# Patient Record
Sex: FEMALE | Race: BLACK OR AFRICAN AMERICAN | Employment: OTHER | URBAN - METROPOLITAN AREA
[De-identification: names, ages, dates, MRNs, and addresses within clinical notes are randomized per-mention and may not be internally consistent; named-entity substitution may affect disease eponyms.]

---

## 2017-01-05 ENCOUNTER — HOSPITAL ENCOUNTER (EMERGENCY)
Age: 77
Discharge: HOME | End: 2017-01-05
Attending: EMERGENCY MEDICINE
Payer: MEDICARE

## 2017-01-05 ENCOUNTER — APPOINTMENT (OUTPATIENT)
Dept: GENERAL RADIOLOGY | Age: 77
End: 2017-01-05
Attending: EMERGENCY MEDICINE
Payer: MEDICARE

## 2017-01-05 VITALS
BODY MASS INDEX: 37.24 KG/M2 | RESPIRATION RATE: 16 BRPM | OXYGEN SATURATION: 98 % | SYSTOLIC BLOOD PRESSURE: 143 MMHG | DIASTOLIC BLOOD PRESSURE: 58 MMHG | HEIGHT: 62 IN | TEMPERATURE: 97.9 F | WEIGHT: 202.38 LBS | HEART RATE: 107 BPM

## 2017-01-05 DIAGNOSIS — J20.9 ACUTE BRONCHITIS, UNSPECIFIED ORGANISM: Primary | ICD-10-CM

## 2017-01-05 DIAGNOSIS — J98.01 ACUTE BRONCHOSPASM: ICD-10-CM

## 2017-01-05 LAB
ALBUMIN SERPL BCP-MCNC: 3.7 G/DL (ref 3.5–5)
ALBUMIN/GLOB SERPL: 0.8 {RATIO} (ref 1.1–2.2)
ALP SERPL-CCNC: 59 U/L (ref 45–117)
ALT SERPL-CCNC: 32 U/L (ref 12–78)
ANION GAP BLD CALC-SCNC: 9 MMOL/L (ref 5–15)
AST SERPL W P-5'-P-CCNC: 19 U/L (ref 15–37)
ATRIAL RATE: 77 BPM
BASOPHILS # BLD AUTO: 0 K/UL (ref 0–0.1)
BASOPHILS # BLD: 0 % (ref 0–1)
BILIRUB SERPL-MCNC: 0.2 MG/DL (ref 0.2–1)
BUN SERPL-MCNC: 12 MG/DL (ref 6–20)
BUN/CREAT SERPL: 16 (ref 12–20)
CALCIUM SERPL-MCNC: 9.3 MG/DL (ref 8.5–10.1)
CALCULATED P AXIS, ECG09: 71 DEGREES
CALCULATED R AXIS, ECG10: -37 DEGREES
CALCULATED T AXIS, ECG11: 7 DEGREES
CHLORIDE SERPL-SCNC: 102 MMOL/L (ref 97–108)
CK MB CFR SERPL CALC: 0.6 % (ref 0–2.5)
CK MB SERPL-MCNC: 1.2 NG/ML (ref 5–25)
CK SERPL-CCNC: 214 U/L (ref 26–192)
CO2 SERPL-SCNC: 31 MMOL/L (ref 21–32)
CREAT SERPL-MCNC: 0.73 MG/DL (ref 0.55–1.02)
DIAGNOSIS, 93000: NORMAL
EOSINOPHIL # BLD: 0.2 K/UL (ref 0–0.4)
EOSINOPHIL NFR BLD: 4 % (ref 0–7)
ERYTHROCYTE [DISTWIDTH] IN BLOOD BY AUTOMATED COUNT: 14.8 % (ref 11.5–14.5)
GLOBULIN SER CALC-MCNC: 4.4 G/DL (ref 2–4)
GLUCOSE SERPL-MCNC: 112 MG/DL (ref 65–100)
HCT VFR BLD AUTO: 36.6 % (ref 35–47)
HGB BLD-MCNC: 11.7 G/DL (ref 11.5–16)
LYMPHOCYTES # BLD AUTO: 40 % (ref 12–49)
LYMPHOCYTES # BLD: 2 K/UL (ref 0.8–3.5)
MCH RBC QN AUTO: 27.5 PG (ref 26–34)
MCHC RBC AUTO-ENTMCNC: 32 G/DL (ref 30–36.5)
MCV RBC AUTO: 85.9 FL (ref 80–99)
MONOCYTES # BLD: 0.3 K/UL (ref 0–1)
MONOCYTES NFR BLD AUTO: 5 % (ref 5–13)
NEUTS SEG # BLD: 2.5 K/UL (ref 1.8–8)
NEUTS SEG NFR BLD AUTO: 51 % (ref 32–75)
P-R INTERVAL, ECG05: 160 MS
PLATELET # BLD AUTO: 288 K/UL (ref 150–400)
POTASSIUM SERPL-SCNC: 4 MMOL/L (ref 3.5–5.1)
PROT SERPL-MCNC: 8.1 G/DL (ref 6.4–8.2)
Q-T INTERVAL, ECG07: 400 MS
QRS DURATION, ECG06: 88 MS
QTC CALCULATION (BEZET), ECG08: 452 MS
RBC # BLD AUTO: 4.26 M/UL (ref 3.8–5.2)
SODIUM SERPL-SCNC: 142 MMOL/L (ref 136–145)
TROPONIN I SERPL-MCNC: <0.04 NG/ML
VENTRICULAR RATE, ECG03: 77 BPM
WBC # BLD AUTO: 5 K/UL (ref 3.6–11)

## 2017-01-05 PROCEDURE — 74011250637 HC RX REV CODE- 250/637: Performed by: EMERGENCY MEDICINE

## 2017-01-05 PROCEDURE — 94640 AIRWAY INHALATION TREATMENT: CPT

## 2017-01-05 PROCEDURE — 82550 ASSAY OF CK (CPK): CPT | Performed by: EMERGENCY MEDICINE

## 2017-01-05 PROCEDURE — 74011636637 HC RX REV CODE- 636/637: Performed by: EMERGENCY MEDICINE

## 2017-01-05 PROCEDURE — 36415 COLL VENOUS BLD VENIPUNCTURE: CPT | Performed by: EMERGENCY MEDICINE

## 2017-01-05 PROCEDURE — 77030013140 HC MSK NEB VYRM -A

## 2017-01-05 PROCEDURE — A9270 NON-COVERED ITEM OR SERVICE: HCPCS | Performed by: EMERGENCY MEDICINE

## 2017-01-05 PROCEDURE — 74011000250 HC RX REV CODE- 250: Performed by: EMERGENCY MEDICINE

## 2017-01-05 PROCEDURE — 84484 ASSAY OF TROPONIN QUANT: CPT | Performed by: EMERGENCY MEDICINE

## 2017-01-05 PROCEDURE — 71020 XR CHEST PA LAT: CPT

## 2017-01-05 PROCEDURE — 80053 COMPREHEN METABOLIC PANEL: CPT | Performed by: EMERGENCY MEDICINE

## 2017-01-05 PROCEDURE — 99284 EMERGENCY DEPT VISIT MOD MDM: CPT

## 2017-01-05 PROCEDURE — 93005 ELECTROCARDIOGRAM TRACING: CPT

## 2017-01-05 PROCEDURE — 85025 COMPLETE CBC W/AUTO DIFF WBC: CPT | Performed by: EMERGENCY MEDICINE

## 2017-01-05 RX ORDER — BENZONATATE 100 MG/1
200 CAPSULE ORAL ONCE
Status: COMPLETED | OUTPATIENT
Start: 2017-01-05 | End: 2017-01-05

## 2017-01-05 RX ORDER — PREDNISONE 20 MG/1
60 TABLET ORAL
Status: COMPLETED | OUTPATIENT
Start: 2017-01-05 | End: 2017-01-05

## 2017-01-05 RX ORDER — IPRATROPIUM BROMIDE AND ALBUTEROL SULFATE 2.5; .5 MG/3ML; MG/3ML
3 SOLUTION RESPIRATORY (INHALATION)
Status: COMPLETED | OUTPATIENT
Start: 2017-01-05 | End: 2017-01-05

## 2017-01-05 RX ORDER — PREDNISONE 20 MG/1
60 TABLET ORAL DAILY
Qty: 12 TAB | Refills: 0 | Status: SHIPPED | OUTPATIENT
Start: 2017-01-05 | End: 2017-01-09

## 2017-01-05 RX ADMIN — IPRATROPIUM BROMIDE AND ALBUTEROL SULFATE 3 ML: .5; 3 SOLUTION RESPIRATORY (INHALATION) at 08:09

## 2017-01-05 RX ADMIN — IPRATROPIUM BROMIDE AND ALBUTEROL SULFATE 3 ML: .5; 3 SOLUTION RESPIRATORY (INHALATION) at 09:09

## 2017-01-05 RX ADMIN — BENZONATATE 200 MG: 100 CAPSULE ORAL at 08:08

## 2017-01-05 RX ADMIN — PREDNISONE 60 MG: 20 TABLET ORAL at 08:08

## 2017-01-05 RX ADMIN — IPRATROPIUM BROMIDE AND ALBUTEROL SULFATE 3 ML: .5; 3 SOLUTION RESPIRATORY (INHALATION) at 08:40

## 2017-01-05 NOTE — DISCHARGE INSTRUCTIONS
Bronchitis: Care Instructions  Your Care Instructions    Bronchitis is inflammation of the bronchial tubes, which carry air to the lungs. The tubes swell and produce mucus, or phlegm. The mucus and inflamed bronchial tubes make you cough. You may have trouble breathing. Most cases of bronchitis are caused by viruses like those that cause colds. Antibiotics usually do not help and they may be harmful. Bronchitis usually develops rapidly and lasts about 2 to 3 weeks in otherwise healthy people. Follow-up care is a key part of your treatment and safety. Be sure to make and go to all appointments, and call your doctor if you are having problems. It's also a good idea to know your test results and keep a list of the medicines you take. How can you care for yourself at home? · Take all medicines exactly as prescribed. Call your doctor if you think you are having a problem with your medicine. · Get some extra rest.  · Take an over-the-counter pain medicine, such as acetaminophen (Tylenol), ibuprofen (Advil, Motrin), or naproxen (Aleve) to reduce fever and relieve body aches. Read and follow all instructions on the label. · Do not take two or more pain medicines at the same time unless the doctor told you to. Many pain medicines have acetaminophen, which is Tylenol. Too much acetaminophen (Tylenol) can be harmful. · Take an over-the-counter cough medicine that contains dextromethorphan to help quiet a dry, hacking cough so that you can sleep. Avoid cough medicines that have more than one active ingredient. Read and follow all instructions on the label. · Breathe moist air from a humidifier, hot shower, or sink filled with hot water. The heat and moisture will thin mucus so you can cough it out. · Do not smoke. Smoking can make bronchitis worse. If you need help quitting, talk to your doctor about stop-smoking programs and medicines. These can increase your chances of quitting for good.   When should you call for help? Call 911 anytime you think you may need emergency care. For example, call if:  · You have severe trouble breathing. Call your doctor now or seek immediate medical care if:  · You have new or worse trouble breathing. · You cough up dark brown or bloody mucus (sputum). · You have a new or higher fever. · You have a new rash. Watch closely for changes in your health, and be sure to contact your doctor if:  · You cough more deeply or more often, especially if you notice more mucus or a change in the color of your mucus. · You are not getting better as expected. Where can you learn more? Go to http://radha-tracy.info/. Enter H333 in the search box to learn more about \"Bronchitis: Care Instructions. \"  Current as of: May 23, 2016  Content Version: 11.1  © 4863-0255 [employer]. Care instructions adapted under license by [employer] (which disclaims liability or warranty for this information). If you have questions about a medical condition or this instruction, always ask your healthcare professional. [address] any warranty or liability for your use of this information.

## 2017-01-05 NOTE — ED NOTES
Dr. Nestor Cantu reviewed discharge instructions with the patient. The patient verbalized understanding. All questions and concerns were addressed. The patient is discharged ambulatory in the care of family members with instructions and prescriptions in hand. Pt is alert and oriented x 4. Respirations are clear and unlabored.

## 2017-01-05 NOTE — ED PROVIDER NOTES
HPI Comments: Arpita Gilbert, 78 y.o. Female with PMHx of asthma, HTN, GERD, DM, PNA and DAVID  presents ambulatory to [address] [facility] ED with cc of wheezing and slightly productive cough x 1 week. She has been coughing a lot, which is causing \"scraping\" type chest tightness, occurring only with coughing, lasting no more than a few seconds. Also reports multiple episodes of diarrhea yesterday. Pt notes that her symptoms have made her feel very weak. SOB is exacerbated by lying flat or ambulating. No CP is present when she lies down. She used a nebulizer treatment at home without relief of SOB or wheezing. She has been hospitalized due to asthma and has received steroids for asthma in the past.  She has not had any recent hospital visits. She denies a hx of cardiac issues. She denies any fevers, chills, tremors, nausea, vomiting, recent travel, or leg swelling. PCP: Glory Merino MD  PSHx: hysterectomy, appendectomy, tonsillectomy    Social history significant for: - Tobacco, - EtOH, - Illicit Drug Use    There are no other complaints, changes, or physical findings at this time. Written by Lisbeth Foote ED Scribe, as dictated by [employer]. Roberto Ruby MD.    The history is provided by the patient. No  was used.         Past Medical History:   Diagnosis Date    Abdominal pain 4/7/2014    Abnormal finding on MRI of brain 3/16/2015     evaluated by neurologist and no findings    Acid reflux 4/5/2010    Acute bronchitis 11/30/2011    Acute pharyngitis 5/26/2016    Anemia NEC     Arthralgia of right hip 4/25/2016    Arthritis 2/18/2010    Asthma 2/18/2010    Cataract 9/24/2014    Dental disorder NEC     Diabetes ([facility]     Elevated LFTs 4/16/2014    DAVID (generalized anxiety disorder) 1/22/2015    GERD (gastroesophageal reflux disease)     GERD (gastroesophageal reflux disease) 4/7/2014    Hammer toe of right foot 9/29/2014    Headache(784.0) 6/00/8950    Helicobacter Pylori (H. Pylori) Infection 8/8/5376    Helicobacter pylori (H. 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 ([address] 9/29/2014    Preop examination 9/24/2014    Risk for falls 4/16/2014    Screening for breast cancer 9/23/2014    Screening for depression 4/16/2014    Shoulder pain, left 3/18/2014    Spondylitis, cervical 4/5/2010    Tinea pedis 6/3/2015    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 ([address] 9/29/2014    Type 2 diabetes mellitus with pressure callus ([address] 9/29/2014       Past Surgical History:   Procedure Laterality Date    Hx gyn  1983     total hysterectomy for uterine fibroid    Pr abdomen surgery proc unlisted       inguinal hernia    Pr abdomen surgery proc unlisted      Pr open repair clavicle fracture  2009    Hx appendectomy      Endoscopy, colon, diagnostic      Hx heent       tonsillectomy    Hx orthopaedic       clavicle repair    Hx other surgical       ?straightened colon out         Family History:   Problem Relation Age of Onset    Cancer Mother      mycosis fungoives    Stroke Father     Cancer Sister      one sister with breast cancer    Breast Cancer Sister     Cancer Paternal Aunt      2 paternal aunts with breast cancer    Thyroid Cancer Neg Hx        Social History     Social History    Marital status:      Spouse name: N/A    Number of children: N/A    Years of education: N/A     Occupational History    Not on file.      Social History Main Topics    Smoking status: Former Smoker     Quit date: 6/14/1988    Smokeless tobacco: Never Used    Alcohol use No    Drug use: No    Sexual activity: No     Other Topics Concern    Not on file Social History Narrative         ALLERGIES: Latex; Amoxil [amoxicillin]; Levaquin [levofloxacin]; Percocet [oxycodone-acetaminophen]; Tetanus vaccines and toxoid; and Tetracycline hcl    Review of Systems   Constitutional: Negative for chills and fever. HENT: Negative for congestion. Eyes: Negative for visual disturbance. Respiratory: Positive for cough, chest tightness, shortness of breath and wheezing. Cardiovascular: Negative for chest pain and leg swelling. Gastrointestinal: Positive for diarrhea. Negative for abdominal pain, nausea and vomiting. Endocrine: Negative for polyuria. Genitourinary: Negative for dysuria and frequency. Musculoskeletal: Negative for myalgias. Skin: Negative for color change. Allergic/Immunologic: Negative for immunocompromised state. Neurological: Negative for numbness. All other systems reviewed and are negative. Patient Vitals for the past 12 hrs:   Temp Pulse Resp BP SpO2   01/05/17 1051 - (!) 107 - - 98 %   01/05/17 1050 - - - 143/58 -   01/05/17 0830 - 81 16 138/88 100 %   01/05/17 0800 97.9 °F (36.6 °C) - - - -   01/05/17 0800 - 80 21 (!) 143/99 99 %   01/05/17 0733 - - - - 100 %   01/05/17 0650 98 °F (36.7 °C) 78 18 160/80 100 %            Physical Exam   Nursing note and vitals reviewed.   General appearance: non-toxic, NAD  Eyes: PERRL, EOMI, conjunctiva normal, anicteric sclera  HENT: mucous membranes moist, oropharynx is clear, dentures noted  Pulmonary: Lung sounds diminished, slightly prolonged expiratory phase, scattered expiratory wheezes  Cardiac: normal rate and regular rhythm, no murmurs, gallops, or rubs, 2+ peripheral pulses  Abdomen: soft, nontender, nondistended, bowel sounds present  MSK: no lower extremity edema, no calf/thigh TTP or swelling  Neuro: Alert, answers questions appropriately      MDM  Number of Diagnoses or Management Options  Acute bronchitis, unspecified organism:   Acute bronchospasm:   Diagnosis management comments: DDx: PNA, bronchitis, pleural effusion, pulmonary edema. Very low suspicion of ACS or PE (Wells score 0). Pt improved after nebs/steroids. Ambulates w/o symptoms. No respiratory distress. Suspect bronchitis, no signs organized PNA. No Abx indicated at this time. Amount and/or Complexity of Data Reviewed  Clinical lab tests: ordered and reviewed  Tests in the radiology section of CPT®: ordered and reviewed  Tests in the medicine section of CPT®: ordered and reviewed  Review and summarize past medical records: yes  Independent visualization of images, tracings, or specimens: yes    Patient Progress  Patient progress: stable    ED Course       Procedures    EKG interpretation: (Preliminary) 06:56  Rhythm: normal sinus rhythm; and regular . Rate (approx.): 77; Axis: left axis deviation; TX interval: 160; QRS interval: 88; ST/T wave: normal;  Other findings: unchanged from previous EKG  Written by Guillermo Warner ED Scribe, as dictated by Ariadne Orona. Veronica Urias MD.    Progress Note   8:35 AM  Pt is feeling improved after first nebulizer treatment. I will reevaluate pt after 3rd nebulizer treatment. Written by Guillermo Warner ED Scribe, as dictated by Ariadne Orona.  Veronica Urias MD.    LABORATORY TESTS:  Recent Results (from the past 12 hour(s))   EKG, 12 LEAD, INITIAL    Collection Time: 01/05/17  6:56 AM   Result Value Ref Range    Ventricular Rate 77 BPM    Atrial Rate 77 BPM    P-R Interval 160 ms    QRS Duration 88 ms    Q-T Interval 400 ms    QTC Calculation (Bezet) 452 ms    Calculated P Axis 71 degrees    Calculated R Axis -37 degrees    Calculated T Axis 7 degrees    Diagnosis       Normal sinus rhythm  Left axis deviation  Poor Anterior Forces    When compared with ECG of 22-NOV-2015 07:57,  No significant change was found  Confirmed by Jeremías Spain ([id number]) on 1/5/2017 8:32:51 AM     CBC WITH AUTOMATED DIFF    Collection Time: 01/05/17  7:42 AM   Result Value Ref Range    WBC 5.0 3.6 - 11.0 K/uL    RBC 4.26 3.80 - 5.20 M/uL    HGB 11.7 11.5 - 16.0 g/dL    HCT 36.6 35.0 - 47.0 %    MCV 85.9 80.0 - 99.0 FL    MCH 27.5 26.0 - 34.0 PG    MCHC 32.0 30.0 - 36.5 g/dL    RDW 14.8 (H) 11.5 - 14.5 %    PLATELET 145 569 - 045 K/uL    NEUTROPHILS 51 32 - 75 %    LYMPHOCYTES 40 12 - 49 %    MONOCYTES 5 5 - 13 %    EOSINOPHILS 4 0 - 7 %    BASOPHILS 0 0 - 1 %    ABS. NEUTROPHILS 2.5 1.8 - 8.0 K/UL    ABS. LYMPHOCYTES 2.0 0.8 - 3.5 K/UL    ABS. MONOCYTES 0.3 0.0 - 1.0 K/UL    ABS. EOSINOPHILS 0.2 0.0 - 0.4 K/UL    ABS. BASOPHILS 0.0 0.0 - 0.1 K/UL   CK W/ CKMB & INDEX    Collection Time: 01/05/17  7:42 AM   Result Value Ref Range     (H) 26 - 192 U/L    CK - MB 1.2 <3.6 NG/ML    CK-MB Index 0.6 0 - 2.5     TROPONIN I    Collection Time: 01/05/17  7:42 AM   Result Value Ref Range    Troponin-I, Qt. <0.04 <1.78 ng/mL   METABOLIC PANEL, COMPREHENSIVE    Collection Time: 01/05/17  7:42 AM   Result Value Ref Range    Sodium 142 136 - 145 mmol/L    Potassium 4.0 3.5 - 5.1 mmol/L    Chloride 102 97 - 108 mmol/L    CO2 31 21 - 32 mmol/L    Anion gap 9 5 - 15 mmol/L    Glucose 112 (H) 65 - 100 mg/dL    BUN 12 6 - 20 MG/DL    Creatinine 0.73 0.55 - 1.02 MG/DL    BUN/Creatinine ratio 16 12 - 20      GFR est AA >60 >60 ml/min/1.73m2    GFR est non-AA >60 >60 ml/min/1.73m2    Calcium 9.3 8.5 - 10.1 MG/DL    Bilirubin, total 0.2 0.2 - 1.0 MG/DL    ALT 32 12 - 78 U/L    AST 19 15 - 37 U/L    Alk. phosphatase 59 45 - 117 U/L    Protein, total 8.1 6.4 - 8.2 g/dL    Albumin 3.7 3.5 - 5.0 g/dL    Globulin 4.4 (H) 2.0 - 4.0 g/dL    A-G Ratio 0.8 (L) 1.1 - 2.2         IMAGING RESULTS:  XR CHEST PA LAT   Final Result   Indication: cough      Exam: PA and lateral views of the chest.     Direct comparison is made to prior CXR dated November 2015.     Findings: Cardiomediastinal silhouette is within normal limits. Lungs are clear  bilaterally.  Pleural spaces are normal. Osseous structures are intact.     IMPRESSION  IMPRESSION: No acute cardiopulmonary disease. MEDICATIONS GIVEN:  Medications   albuterol-ipratropium (DUO-NEB) 2.5 MG-0.5 MG/3 ML (3 mL Nebulization Given 1/5/17 0909)   predniSONE (DELTASONE) tablet 60 mg (60 mg Oral Given 1/5/17 0808)   benzonatate (TESSALON) capsule 200 mg (200 mg Oral Given 1/5/17 0808)       IMPRESSION:  1. Acute bronchitis, unspecified organism    2. Acute bronchospasm        PLAN:  1. Discharge Medication List as of 1/5/2017 11:00 AM      START taking these medications    Details   predniSONE (DELTASONE) 20 mg tablet Take 3 Tabs by mouth daily for 4 days. , Normal, Disp-12 Tab, R-0      codeine-guaifenesin  mg/5 mL liqd Take 5 mL by mouth every six (6) hours as needed. Max Daily Amount: 20 mL. Indications: COLD SYMPTOMS, COUGH, MAY CAUSE DROWSINESS; DO NOT DRINK,DRIVE, OR USE MACHINERY WHILE TAKING, Print, Disp-473 mL, R-0      inhalational spacing device (SPACE CHAMBER PLUS) 1 Each by Does Not Apply route as needed. Indications: USE WITH ALBUTEROL MDI, Normal, Disp-1 Device, R-0         CONTINUE these medications which have NOT CHANGED    Details   cetirizine (ZYRTEC) 10 mg tablet TAKE 1 TABLET BY MOUTH EVERY DAY, Normal, Disp-30 Tab, R-3      ESTRACE 0.01 % (0.1 mg/gram) vaginal cream INSERT 1 GRAM VAGINALLY TWICE WEEKLY AS DIRECTED, Historical Med, R-4, RICARDO      nystatin (MYCOSTATIN) topical cream APPLY TO AFFECTED AREA 3 TIMES A DAY, Historical Med, R-0      triamcinolone acetonide (KENALOG) 0.1 % topical cream APPLY TO AFFECTED AREA 3 TIMES A DAY, Historical Med, R-0      pantoprazole (PROTONIX) 40 mg tablet Take 1 Tab by mouth daily. , Normal, Disp-90 Tab, R-5      albuterol-ipratropium (DUO-NEB) 2.5 mg-0.5 mg/3 ml nebu 3 mL by Nebulization route every four (4) hours. ICD 10: J45.909, Normal, Disp-180 Nebule, R-3      aspirin delayed-release 81 mg tablet Take 1 Tab by mouth daily. , Normal, Disp-30 Tab, R-11      hydrochlorothiazide (HYDRODIURIL) 25 mg tablet Take 1 Tab by mouth daily. , Normal, Disp-90 Tab, R-1 albuterol (PROAIR HFA) 90 mcg/actuation inhaler Take 1 Puff by inhalation every six (6) hours as needed for Wheezing., Normal, Disp-1 Inhaler, R-11      metoprolol succinate (TOPROL-XL) 25 mg XL tablet TAKE 1 TABLET BY MOUTH EVERY DAY, Normal, Disp-90 Tab, R-2      rosuvastatin (CRESTOR) 10 mg tablet TAKE 1 TABLET BY MOUTH EVERY DAY, Normal, Disp-90 Tab, R-3      calcium-cholecalciferol, D3, (CALTRATE 600+D) tablet Take 1 Tab by mouth two (2) times a day., Print, Disp-60 Tab, R-11      butalbital-acetaminophen (PHRENILIN)  mg tablet Take 1 Tab by mouth every six (6) hours as needed for Pain., Normal, Disp-90 Tab, R-1      naproxen (NAPROSYN) 500 mg tablet Take 500 mg by mouth as needed., Historical Med, R-0      metFORMIN (GLUCOPHAGE) 500 mg tablet TAKE 1 & 1/2 TABLETS BY MOUTH TWICE A DAY WITH MEALS, Normal, Disp-90 Tab, R-11      !! glucose blood VI test strips (FREESTYLE INSULINX) strip TEST EVERY DAY AND AS NEEDED, Normal, Disp-100 Strip, R-6      fluticasone (FLONASE) 50 mcg/actuation nasal spray 2 Sprays by Both Nostrils route as needed for Rhinitis., Historical Med      MULTIVITAMIN PO Take 1 Tab by mouth daily. , Historical Med      Lancets (FREESTYLE LANCETS) Misc Test once daily. (diagnosis code: 250.00), Normal, Disp-1 Package, R-11      Blood-Glucose Meter monitoring kit Once daily before breakfast, Normal, Disp-1 Kit, R-0      !! glucose blood VI test strips (ACCU-CHEK DA) strip ., Normal, Disp-1 Package, R-11       !! - Potential duplicate medications found. Please discuss with provider.         2.   Follow-up Information     Follow up With Details Comments [address], [place] Schedule an appointment as soon as possible for a visit in 4 days  [address] [place] [address]  [phone]      [facility] EMERGENCY DEPT Go in 1 day If symptoms worsen [address] [place] [address]  [phone]        Return to ED if worse     Discharge Note:  11:36 AM  The pt is ready for discharge. The pt's signs, symptoms, diagnosis, and discharge instructions have been discussed and pt has conveyed their understanding. The pt is to follow up as recommended or return to ER should their symptoms worsen. Plan has been discussed and pt is in agreement. This note is prepared by Xu Adams, acting as a Scribe for [employer]. Daryl Carroll MD.    [employer]. Daryl Carroll MD: The scribe's documentation has been prepared under my direction and personally reviewed by me in its entirety. I confirm that the notes above accurately reflects all work, treatment, procedures, and medical decision making performed by me.

## 2017-01-05 NOTE — ED NOTES
Hourly rounds. Patient quiet in room. Pt is alert and oriented x 4.  . Monitor x 3 continues, VS reassessed. Safety measures continue. Patient reports that she is feeling better and is requesting to go home, Dr. Barrett Roger to be updated.

## 2017-01-11 ENCOUNTER — APPOINTMENT (OUTPATIENT)
Dept: MRI IMAGING | Age: 77
End: 2017-01-11
Attending: INTERNAL MEDICINE
Payer: MEDICARE

## 2017-01-11 ENCOUNTER — HOSPITAL ENCOUNTER (OUTPATIENT)
Age: 77
Setting detail: OBSERVATION
Discharge: HOME | End: 2017-01-12
Attending: EMERGENCY MEDICINE | Admitting: INTERNAL MEDICINE
Payer: MEDICARE

## 2017-01-11 ENCOUNTER — APPOINTMENT (OUTPATIENT)
Dept: CT IMAGING | Age: 77
End: 2017-01-11
Attending: EMERGENCY MEDICINE
Payer: MEDICARE

## 2017-01-11 ENCOUNTER — APPOINTMENT (OUTPATIENT)
Dept: GENERAL RADIOLOGY | Age: 77
End: 2017-01-11
Attending: EMERGENCY MEDICINE
Payer: MEDICARE

## 2017-01-11 DIAGNOSIS — E11.9 CONTROLLED TYPE 2 DIABETES MELLITUS WITHOUT COMPLICATION, UNSPECIFIED LONG TERM INSULIN USE STATUS: ICD-10-CM

## 2017-01-11 DIAGNOSIS — I10 ESSENTIAL HYPERTENSION: ICD-10-CM

## 2017-01-11 DIAGNOSIS — R20.0 LEFT FACIAL NUMBNESS: ICD-10-CM

## 2017-01-11 DIAGNOSIS — R51.9 HEADACHE, UNSPECIFIED HEADACHE TYPE: ICD-10-CM

## 2017-01-11 LAB
ALBUMIN SERPL BCP-MCNC: 3.6 G/DL (ref 3.5–5)
ALBUMIN/GLOB SERPL: 1 {RATIO} (ref 1.1–2.2)
ALP SERPL-CCNC: 54 U/L (ref 45–117)
ALT SERPL-CCNC: 34 U/L (ref 12–78)
ANION GAP BLD CALC-SCNC: 6 MMOL/L (ref 5–15)
APPEARANCE UR: CLEAR
AST SERPL W P-5'-P-CCNC: 19 U/L (ref 15–37)
BACTERIA URNS QL MICRO: NEGATIVE /HPF
BASOPHILS # BLD AUTO: 0 K/UL (ref 0–0.1)
BASOPHILS # BLD: 0 % (ref 0–1)
BILIRUB SERPL-MCNC: 0.2 MG/DL (ref 0.2–1)
BILIRUB UR QL: NEGATIVE
BUN SERPL-MCNC: 14 MG/DL (ref 6–20)
BUN/CREAT SERPL: 17 (ref 12–20)
CALCIUM SERPL-MCNC: 8.8 MG/DL (ref 8.5–10.1)
CHLORIDE SERPL-SCNC: 100 MMOL/L (ref 97–108)
CK SERPL-CCNC: 111 U/L (ref 26–192)
CO2 SERPL-SCNC: 36 MMOL/L (ref 21–32)
COLOR UR: ABNORMAL
CREAT SERPL-MCNC: 0.82 MG/DL (ref 0.55–1.02)
EOSINOPHIL # BLD: 0.1 K/UL (ref 0–0.4)
EOSINOPHIL NFR BLD: 1 % (ref 0–7)
EPITH CASTS URNS QL MICRO: ABNORMAL /LPF
ERYTHROCYTE [DISTWIDTH] IN BLOOD BY AUTOMATED COUNT: 14.9 % (ref 11.5–14.5)
GLOBULIN SER CALC-MCNC: 3.6 G/DL (ref 2–4)
GLUCOSE BLD STRIP.AUTO-MCNC: 117 MG/DL (ref 65–100)
GLUCOSE BLD STRIP.AUTO-MCNC: 128 MG/DL (ref 65–100)
GLUCOSE BLD STRIP.AUTO-MCNC: 129 MG/DL (ref 65–100)
GLUCOSE BLD STRIP.AUTO-MCNC: 92 MG/DL (ref 65–100)
GLUCOSE BLD STRIP.AUTO-MCNC: 92 MG/DL (ref 65–100)
GLUCOSE SERPL-MCNC: 96 MG/DL (ref 65–100)
GLUCOSE UR STRIP.AUTO-MCNC: NEGATIVE MG/DL
HCT VFR BLD AUTO: 36.9 % (ref 35–47)
HGB BLD-MCNC: 11.7 G/DL (ref 11.5–16)
HGB UR QL STRIP: NEGATIVE
HYALINE CASTS URNS QL MICRO: ABNORMAL /LPF (ref 0–5)
KETONES UR QL STRIP.AUTO: NEGATIVE MG/DL
LEUKOCYTE ESTERASE UR QL STRIP.AUTO: ABNORMAL
LYMPHOCYTES # BLD AUTO: 54 % (ref 12–49)
LYMPHOCYTES # BLD: 3.2 K/UL (ref 0.8–3.5)
MAGNESIUM SERPL-MCNC: 2.3 MG/DL (ref 1.6–2.4)
MCH RBC QN AUTO: 27.2 PG (ref 26–34)
MCHC RBC AUTO-ENTMCNC: 31.7 G/DL (ref 30–36.5)
MCV RBC AUTO: 85.8 FL (ref 80–99)
MONOCYTES # BLD: 0.3 K/UL (ref 0–1)
MONOCYTES NFR BLD AUTO: 5 % (ref 5–13)
NEUTS SEG # BLD: 2.4 K/UL (ref 1.8–8)
NEUTS SEG NFR BLD AUTO: 40 % (ref 32–75)
NITRITE UR QL STRIP.AUTO: NEGATIVE
PH UR STRIP: 7.5 [PH] (ref 5–8)
PLATELET # BLD AUTO: 310 K/UL (ref 150–400)
POTASSIUM SERPL-SCNC: 4 MMOL/L (ref 3.5–5.1)
PROT SERPL-MCNC: 7.2 G/DL (ref 6.4–8.2)
PROT UR STRIP-MCNC: NEGATIVE MG/DL
RBC # BLD AUTO: 4.3 M/UL (ref 3.8–5.2)
RBC #/AREA URNS HPF: ABNORMAL /HPF (ref 0–5)
SERVICE CMNT-IMP: ABNORMAL
SERVICE CMNT-IMP: NORMAL
SERVICE CMNT-IMP: NORMAL
SODIUM SERPL-SCNC: 142 MMOL/L (ref 136–145)
SP GR UR REFRACTOMETRY: 1.01 (ref 1–1.03)
TROPONIN I SERPL-MCNC: <0.04 NG/ML
TSH SERPL DL<=0.05 MIU/L-ACNC: 4.53 UIU/ML (ref 0.36–3.74)
UA: UC IF INDICATED,UAUC: ABNORMAL
UROBILINOGEN UR QL STRIP.AUTO: 0.2 EU/DL (ref 0.2–1)
WBC # BLD AUTO: 6 K/UL (ref 3.6–11)
WBC URNS QL MICRO: ABNORMAL /HPF (ref 0–4)

## 2017-01-11 PROCEDURE — 94640 AIRWAY INHALATION TREATMENT: CPT

## 2017-01-11 PROCEDURE — 82550 ASSAY OF CK (CPK): CPT | Performed by: EMERGENCY MEDICINE

## 2017-01-11 PROCEDURE — 77030029684 HC NEB SM VOL KT MONA -A

## 2017-01-11 PROCEDURE — 85025 COMPLETE CBC W/AUTO DIFF WBC: CPT | Performed by: EMERGENCY MEDICINE

## 2017-01-11 PROCEDURE — 70551 MRI BRAIN STEM W/O DYE: CPT

## 2017-01-11 PROCEDURE — 99284 EMERGENCY DEPT VISIT MOD MDM: CPT

## 2017-01-11 PROCEDURE — 70450 CT HEAD/BRAIN W/O DYE: CPT

## 2017-01-11 PROCEDURE — 99218 HC RM OBSERVATION: CPT

## 2017-01-11 PROCEDURE — 84443 ASSAY THYROID STIM HORMONE: CPT | Performed by: EMERGENCY MEDICINE

## 2017-01-11 PROCEDURE — 80053 COMPREHEN METABOLIC PANEL: CPT | Performed by: EMERGENCY MEDICINE

## 2017-01-11 PROCEDURE — 70544 MR ANGIOGRAPHY HEAD W/O DYE: CPT

## 2017-01-11 PROCEDURE — 96372 THER/PROPH/DIAG INJ SC/IM: CPT

## 2017-01-11 PROCEDURE — 82962 GLUCOSE BLOOD TEST: CPT

## 2017-01-11 PROCEDURE — 81001 URINALYSIS AUTO W/SCOPE: CPT | Performed by: EMERGENCY MEDICINE

## 2017-01-11 PROCEDURE — 36415 COLL VENOUS BLD VENIPUNCTURE: CPT | Performed by: EMERGENCY MEDICINE

## 2017-01-11 PROCEDURE — 74011250636 HC RX REV CODE- 250/636: Performed by: EMERGENCY MEDICINE

## 2017-01-11 PROCEDURE — 74011000250 HC RX REV CODE- 250: Performed by: INTERNAL MEDICINE

## 2017-01-11 PROCEDURE — 74011250637 HC RX REV CODE- 250/637: Performed by: INTERNAL MEDICINE

## 2017-01-11 PROCEDURE — 74020 XR ABD FLAT/ ERECT: CPT

## 2017-01-11 PROCEDURE — 96374 THER/PROPH/DIAG INJ IV PUSH: CPT

## 2017-01-11 PROCEDURE — 74011250636 HC RX REV CODE- 250/636: Performed by: INTERNAL MEDICINE

## 2017-01-11 PROCEDURE — 83735 ASSAY OF MAGNESIUM: CPT | Performed by: EMERGENCY MEDICINE

## 2017-01-11 PROCEDURE — 84484 ASSAY OF TROPONIN QUANT: CPT | Performed by: EMERGENCY MEDICINE

## 2017-01-11 PROCEDURE — 70547 MR ANGIOGRAPHY NECK W/O DYE: CPT

## 2017-01-11 RX ORDER — IPRATROPIUM BROMIDE AND ALBUTEROL SULFATE 2.5; .5 MG/3ML; MG/3ML
3 SOLUTION RESPIRATORY (INHALATION) EVERY 12 HOURS
Status: DISCONTINUED | OUTPATIENT
Start: 2017-01-11 | End: 2017-01-12 | Stop reason: HOSPADM

## 2017-01-11 RX ORDER — ASPIRIN 81 MG/1
81 TABLET ORAL DAILY
Status: DISCONTINUED | OUTPATIENT
Start: 2017-01-11 | End: 2017-01-11

## 2017-01-11 RX ORDER — IPRATROPIUM BROMIDE AND ALBUTEROL SULFATE 2.5; .5 MG/3ML; MG/3ML
3 SOLUTION RESPIRATORY (INHALATION)
Status: DISCONTINUED | OUTPATIENT
Start: 2017-01-11 | End: 2017-01-11

## 2017-01-11 RX ORDER — ALBUTEROL SULFATE 90 UG/1
1 AEROSOL, METERED RESPIRATORY (INHALATION)
Status: DISCONTINUED | OUTPATIENT
Start: 2017-01-11 | End: 2017-01-12 | Stop reason: HOSPADM

## 2017-01-11 RX ORDER — METFORMIN HYDROCHLORIDE 850 MG/1
850 TABLET ORAL 2 TIMES DAILY WITH MEALS
Status: DISCONTINUED | OUTPATIENT
Start: 2017-01-11 | End: 2017-01-12 | Stop reason: HOSPADM

## 2017-01-11 RX ORDER — SODIUM CHLORIDE 0.9 % (FLUSH) 0.9 %
5-10 SYRINGE (ML) INJECTION EVERY 8 HOURS
Status: DISCONTINUED | OUTPATIENT
Start: 2017-01-11 | End: 2017-01-12 | Stop reason: HOSPADM

## 2017-01-11 RX ORDER — INSULIN LISPRO 100 [IU]/ML
INJECTION, SOLUTION INTRAVENOUS; SUBCUTANEOUS
Status: DISCONTINUED | OUTPATIENT
Start: 2017-01-11 | End: 2017-01-12 | Stop reason: HOSPADM

## 2017-01-11 RX ORDER — ASPIRIN 325 MG
325 TABLET ORAL DAILY
Status: DISCONTINUED | OUTPATIENT
Start: 2017-01-11 | End: 2017-01-12 | Stop reason: HOSPADM

## 2017-01-11 RX ORDER — ONDANSETRON 2 MG/ML
4 INJECTION INTRAMUSCULAR; INTRAVENOUS
Status: COMPLETED | OUTPATIENT
Start: 2017-01-11 | End: 2017-01-11

## 2017-01-11 RX ORDER — SODIUM CHLORIDE 0.9 % (FLUSH) 0.9 %
5-10 SYRINGE (ML) INJECTION EVERY 8 HOURS
Status: DISCONTINUED | OUTPATIENT
Start: 2017-01-11 | End: 2017-01-12 | Stop reason: SDUPTHER

## 2017-01-11 RX ORDER — ATORVASTATIN CALCIUM 10 MG/1
20 TABLET, FILM COATED ORAL DAILY
Status: DISCONTINUED | OUTPATIENT
Start: 2017-01-11 | End: 2017-01-12 | Stop reason: HOSPADM

## 2017-01-11 RX ORDER — HYDROCHLOROTHIAZIDE 25 MG/1
25 TABLET ORAL DAILY
Status: DISCONTINUED | OUTPATIENT
Start: 2017-01-11 | End: 2017-01-12 | Stop reason: HOSPADM

## 2017-01-11 RX ORDER — SODIUM CHLORIDE 0.9 % (FLUSH) 0.9 %
5-10 SYRINGE (ML) INJECTION AS NEEDED
Status: DISCONTINUED | OUTPATIENT
Start: 2017-01-11 | End: 2017-01-12 | Stop reason: HOSPADM

## 2017-01-11 RX ORDER — SODIUM CHLORIDE 0.9 % (FLUSH) 0.9 %
SYRINGE (ML) INJECTION
Status: DISCONTINUED
Start: 2017-01-11 | End: 2017-01-12 | Stop reason: HOSPADM

## 2017-01-11 RX ORDER — DEXTROSE 50 % IN WATER (D50W) INTRAVENOUS SYRINGE
12.5-25 AS NEEDED
Status: DISCONTINUED | OUTPATIENT
Start: 2017-01-11 | End: 2017-01-12 | Stop reason: HOSPADM

## 2017-01-11 RX ORDER — MAGNESIUM SULFATE 100 %
4 CRYSTALS MISCELLANEOUS AS NEEDED
Status: DISCONTINUED | OUTPATIENT
Start: 2017-01-11 | End: 2017-01-12 | Stop reason: HOSPADM

## 2017-01-11 RX ORDER — BENZONATATE 100 MG/1
100 CAPSULE ORAL
Status: DISCONTINUED | OUTPATIENT
Start: 2017-01-11 | End: 2017-01-12 | Stop reason: HOSPADM

## 2017-01-11 RX ORDER — ENOXAPARIN SODIUM 100 MG/ML
40 INJECTION SUBCUTANEOUS DAILY
Status: DISCONTINUED | OUTPATIENT
Start: 2017-01-11 | End: 2017-01-12 | Stop reason: HOSPADM

## 2017-01-11 RX ORDER — PRENATAL VIT CALC,IRON,FOLIC
1 TABLET ORAL 2 TIMES DAILY
Status: DISCONTINUED | OUTPATIENT
Start: 2017-01-11 | End: 2017-01-12 | Stop reason: HOSPADM

## 2017-01-11 RX ORDER — SODIUM CHLORIDE 0.9 % (FLUSH) 0.9 %
5-10 SYRINGE (ML) INJECTION AS NEEDED
Status: DISCONTINUED | OUTPATIENT
Start: 2017-01-11 | End: 2017-01-12 | Stop reason: SDUPTHER

## 2017-01-11 RX ORDER — ACETAMINOPHEN 325 MG/1
650 TABLET ORAL
Status: DISCONTINUED | OUTPATIENT
Start: 2017-01-11 | End: 2017-01-12 | Stop reason: HOSPADM

## 2017-01-11 RX ORDER — METOPROLOL SUCCINATE 25 MG/1
25 TABLET, EXTENDED RELEASE ORAL DAILY
Status: DISCONTINUED | OUTPATIENT
Start: 2017-01-11 | End: 2017-01-12 | Stop reason: HOSPADM

## 2017-01-11 RX ORDER — LORAZEPAM 2 MG/ML
1 INJECTION INTRAMUSCULAR
Status: COMPLETED | OUTPATIENT
Start: 2017-01-11 | End: 2017-01-11

## 2017-01-11 RX ORDER — BUTALBITAL, ACETAMINOPHEN AND CAFFEINE 50; 325; 40 MG/1; MG/1; MG/1
1 TABLET ORAL
Status: DISCONTINUED | OUTPATIENT
Start: 2017-01-11 | End: 2017-01-12 | Stop reason: HOSPADM

## 2017-01-11 RX ORDER — LABETALOL HCL 20 MG/4 ML
10 SYRINGE (ML) INTRAVENOUS
Status: DISCONTINUED | OUTPATIENT
Start: 2017-01-11 | End: 2017-01-12 | Stop reason: HOSPADM

## 2017-01-11 RX ORDER — ACETAMINOPHEN 650 MG/1
650 SUPPOSITORY RECTAL
Status: DISCONTINUED | OUTPATIENT
Start: 2017-01-11 | End: 2017-01-11

## 2017-01-11 RX ORDER — ONDANSETRON 2 MG/ML
4 INJECTION INTRAMUSCULAR; INTRAVENOUS
Status: DISCONTINUED | OUTPATIENT
Start: 2017-01-11 | End: 2017-01-12 | Stop reason: HOSPADM

## 2017-01-11 RX ADMIN — Medication 1 TABLET: at 10:45

## 2017-01-11 RX ADMIN — Medication 10 ML: at 21:12

## 2017-01-11 RX ADMIN — Medication 10 ML: at 15:34

## 2017-01-11 RX ADMIN — ATORVASTATIN CALCIUM 20 MG: 10 TABLET, FILM COATED ORAL at 11:42

## 2017-01-11 RX ADMIN — GUAIFENESIN 600 MG: 600 TABLET, EXTENDED RELEASE ORAL at 18:19

## 2017-01-11 RX ADMIN — Medication 1 TABLET: at 18:19

## 2017-01-11 RX ADMIN — IPRATROPIUM BROMIDE AND ALBUTEROL SULFATE 3 ML: .5; 3 SOLUTION RESPIRATORY (INHALATION) at 20:31

## 2017-01-11 RX ADMIN — METFORMIN HYDROCHLORIDE 850 MG: 850 TABLET ORAL at 10:45

## 2017-01-11 RX ADMIN — METOPROLOL SUCCINATE 25 MG: 25 TABLET, EXTENDED RELEASE ORAL at 08:55

## 2017-01-11 RX ADMIN — ASPIRIN 325 MG ORAL TABLET 325 MG: 325 PILL ORAL at 08:55

## 2017-01-11 RX ADMIN — LORAZEPAM 1 MG: 2 INJECTION INTRAMUSCULAR; INTRAVENOUS at 18:16

## 2017-01-11 RX ADMIN — METFORMIN HYDROCHLORIDE 850 MG: 850 TABLET ORAL at 16:19

## 2017-01-11 RX ADMIN — ENOXAPARIN SODIUM 40 MG: 40 INJECTION SUBCUTANEOUS at 08:55

## 2017-01-11 RX ADMIN — HYDROCHLOROTHIAZIDE 25 MG: 25 TABLET ORAL at 10:45

## 2017-01-11 RX ADMIN — IPRATROPIUM BROMIDE AND ALBUTEROL SULFATE 3 ML: .5; 3 SOLUTION RESPIRATORY (INHALATION) at 08:54

## 2017-01-11 RX ADMIN — GUAIFENESIN 600 MG: 600 TABLET, EXTENDED RELEASE ORAL at 11:43

## 2017-01-11 RX ADMIN — ONDANSETRON 4 MG: 2 INJECTION INTRAMUSCULAR; INTRAVENOUS at 08:55

## 2017-01-11 RX ADMIN — BUTALBITAL, ACETAMINOPHEN AND CAFFEINE 1 TABLET: 50; 325; 40 TABLET ORAL at 16:22

## 2017-01-11 NOTE — CONSULTS
NEUROLOGY CONSULTATION NOTE       DATE OF CONSULTATION: 1/11/2017    CONSULTED BY: Rubi Newman MD    Reason for Consult  I have been asked to see the patient in neurological consultation to render advice and opinion regarding stroke. HISTORY OF PRESENT ILLNESS  Lety Parnell is a 78 y.o. female who presents to the hospital because of left facial numbness. At baseline, she does have a headache every 3 wks which usually starts from the cervical area and radiates to the vertex. It is moderate in severity. Since Sunday, she has been having a sensation of facial tightness and yesterday she started having dizziness. She was unstable on walking. By evening, she started having a headache. Along with this she was also having tingling in her lips. She took butabital and she got slightly better. Today am, she had 10/10 headache in the left side of the head and this was associated with left facial numbness. No other bulbar symptoms. NO weakness in the arms and legs. She came to the hospital and CT head was performed and that was normal. TTE is normal. She was admitted for further evaluation.      ROS  A ten system review of constitutional, cardiovascular, respiratory, musculoskeletal, endocrine, skin, SHEENT, genitourinary, psychiatric and neurologic systems was obtained and is unremarkable except as stated in HPI     PMH  Past Medical History   Diagnosis Date    Abnormal finding on MRI of brain 3/16/2015     evaluated by neurologist and no findings    Acute pharyngitis 5/26/2016    Anemia NEC     Arthralgia of right hip 4/25/2016    Arthritis 2/18/2010    Asthma 2/18/2010    Cataract 9/24/2014    Diabetes ([address]     Elevated LFTs 4/16/2014    DAVID (generalized anxiety disorder) 1/22/2015    GERD (gastroesophageal reflux disease) 4/7/2014    Hammer toe of right foot 9/29/2014    Headache(784.0) 7/06/5161    Helicobacter pylori (H. 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 ([facility] 9/29/2014    Preop examination 9/24/2014    Risk for falls 4/16/2014    Screening for breast cancer 9/23/2014    Screening for depression 4/16/2014    Shoulder pain, left 3/18/2014    Spondylitis, cervical 4/5/2010    Tinea pedis 6/3/2015    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 ([facility] 9/29/2014       FH  Family History   Problem Relation Age of Onset    Cancer Mother      mycosis fungoives    Stroke Father     Cancer Sister      one sister with breast cancer    Breast Cancer Sister     Cancer Paternal Aunt      2 paternal aunts with breast cancer    Thyroid Cancer Neg Hx        SH  Social History     Social History    Marital status:      Spouse name: N/A    Number of children: N/A    Years of education: N/A     Social History Main Topics    Smoking status: Former Smoker     Quit date: 6/14/1988    Smokeless tobacco: Never Used    Alcohol use No    Drug use: No    Sexual activity: No     Other Topics Concern    None     Social History Narrative       ALLERGIES  Allergies   Allergen Reactions    Latex Rash    Amoxil [Amoxicillin] Itching     rash    Levaquin [Levofloxacin] Other (comments)     Dizziness      Percocet [Oxycodone-Acetaminophen] Nausea and Vomiting    Tetanus Vaccines And Toxoid Swelling    Tetracycline Hcl Rash       PHYSICAL EXAM  EXAMINATION:   Visit Vitals    /67    Pulse 80    Temp 98.8 °F (37.1 °C)    Resp 18    Ht 5' 2\" (1.575 m)    Wt 202 lb 2.6 oz (91.7 kg)    LMP 02/18/1983    SpO2 95%    BMI 36.98 kg/m2        General:   General appearance: Pt is in no acute distress   Distal pulses are preserved  Fundoscopic exam: attempted    Neurological Examination:   Mental Status:  AAO x3. Speech is fluent. Follows commands, has normal fund of knowledge, attention, short term recall, comprehension and insight. Cranial Nerves: Visual fields are full. PERRL, Extraocular movements are full. Facial sensation intact V1- V3. Facial movement intact, symmetric. Hearing intact to conversation. Palate elevates symmetrically. Shoulder shrug symmetric. Tongue midline. Motor: Strength is 5/5 in all 4 ext. Normal tone. No atrophy. Sensation: Normal to light touch    Reflexes: DTRs 1+ in UE and absent in LE throughout. Coordination/Cerebellar: Intact to finger-nose-finger     Gait: Romberg is positive and casual gait is normal.     Skin: No significant bruising or lacerations. HOME MEDS  Prior to Admission Medications   Prescriptions Last Dose Informant Patient Reported? Taking? Blood-Glucose Meter monitoring kit   No No   Sig: Once daily before breakfast   ESTRACE 0.01 % (0.1 mg/gram) vaginal cream   Yes No   Sig: INSERT 1 GRAM VAGINALLY TWICE WEEKLY AS DIRECTED   Lancets (FREESTYLE LANCETS) [facility]   No No   Sig: Test once daily. (diagnosis code: 250.00)   MULTIVITAMIN PO   Yes No   Sig: Take 1 Tab by mouth daily. albuterol (PROAIR HFA) 90 mcg/actuation inhaler   No No   Sig: Take 1 Puff by inhalation every six (6) hours as needed for Wheezing. albuterol-ipratropium (DUO-NEB) 2.5 mg-0.5 mg/3 ml nebu   No No   Sig: 3 mL by Nebulization route every four (4) hours. ICD 10: J45.909   aspirin delayed-release 81 mg tablet   No No   Sig: Take 1 Tab by mouth daily. butalbital-acetaminophen (PHRENILIN)  mg tablet   No No   Sig: Take 1 Tab by mouth every six (6) hours as needed for Pain. calcium-cholecalciferol, D3, (CALTRATE 600+D) tablet   No No   Sig: Take 1 Tab by mouth two (2) times a day. fluticasone (FLONASE) 50 mcg/actuation nasal spray   Yes No   Si Sprays by Both Nostrils route as needed for Rhinitis.    glucose blood VI test strips (ACCU-CHEK DA) strip   No No   Sig: .   glucose blood VI test strips (FREESTYLE INSULINX) strip   No No   Sig: TEST EVERY DAY AND AS NEEDED   hydrochlorothiazide (HYDRODIURIL) 25 mg tablet   No No   Sig: Take 1 Tab by mouth daily. inhalational spacing device (SPACE CHAMBER PLUS)   No No   Si Each by Does Not Apply route as needed. Indications: USE WITH ALBUTEROL MDI   metFORMIN (GLUCOPHAGE) 500 mg tablet   No No   Sig: TAKE 1 & 1/2 TABLETS BY MOUTH TWICE A DAY WITH MEALS   metoprolol succinate (TOPROL-XL) 25 mg XL tablet   No No   Sig: TAKE 1 TABLET BY MOUTH EVERY DAY   naproxen (NAPROSYN) 500 mg tablet   Yes No   Sig: Take 500 mg by mouth as needed. nystatin (MYCOSTATIN) topical cream   Yes No   Sig: APPLY TO AFFECTED AREA 3 TIMES A DAY   pantoprazole (PROTONIX) 40 mg tablet   No No   Sig: Take 1 Tab by mouth daily.    rosuvastatin (CRESTOR) 10 mg tablet   No No   Sig: TAKE 1 TABLET BY MOUTH EVERY DAY   triamcinolone acetonide (KENALOG) 0.1 % topical cream   Yes No   Sig: APPLY TO AFFECTED AREA 3 TIMES A DAY      Facility-Administered Medications: None       CURRENT MEDS  Current Facility-Administered Medications   Medication Dose Route Frequency    sodium chloride (NS) flush 5-10 mL  5-10 mL IntraVENous Q8H    Calcium Citrate-Vitamin D3 (CITROCAL) tablet 1 Tab  1 Tab Oral BID    hydroCHLOROthiazide (HYDRODIURIL) tablet 25 mg  25 mg Oral DAILY    metFORMIN (GLUCOPHAGE) tablet 850 mg  850 mg Oral BID WITH MEALS    metoprolol succinate (TOPROL-XL) XL tablet 25 mg  25 mg Oral DAILY    atorvastatin (LIPITOR) tablet 20 mg  20 mg Oral DAILY    sodium chloride (NS) flush 5-10 mL  5-10 mL IntraVENous Q8H    aspirin (ASPIRIN) tablet 325 mg  325 mg Oral DAILY    enoxaparin (LOVENOX) injection 40 mg  40 mg SubCUTAneous DAILY    insulin lispro (HUMALOG) injection   SubCUTAneous ACB&D    LORazepam (ATIVAN) injection 1 mg  1 mg IntraVENous ON CALL    albuterol-ipratropium (DUO-NEB) 2.5 MG-0.5 MG/3 ML  3 mL Nebulization Q12H    guaiFENesin SR (MUCINEX) tablet 600 mg 600 mg Oral BID    sodium chloride (NS) 0.9 % flush           LAB DATA REVIEWED:    Results for orders placed or performed during the hospital encounter of 01/11/17   CBC WITH AUTOMATED DIFF   Result Value Ref Range    WBC 6.0 3.6 - 11.0 K/uL    RBC 4.30 3.80 - 5.20 M/uL    HGB 11.7 11.5 - 16.0 g/dL    HCT 36.9 35.0 - 47.0 %    MCV 85.8 80.0 - 99.0 FL    MCH 27.2 26.0 - 34.0 PG    MCHC 31.7 30.0 - 36.5 g/dL    RDW 14.9 (H) 11.5 - 14.5 %    PLATELET 343 254 - 062 K/uL    NEUTROPHILS 40 32 - 75 %    LYMPHOCYTES 54 (H) 12 - 49 %    MONOCYTES 5 5 - 13 %    EOSINOPHILS 1 0 - 7 %    BASOPHILS 0 0 - 1 %    ABS. NEUTROPHILS 2.4 1.8 - 8.0 K/UL    ABS. LYMPHOCYTES 3.2 0.8 - 3.5 K/UL    ABS. MONOCYTES 0.3 0.0 - 1.0 K/UL    ABS. EOSINOPHILS 0.1 0.0 - 0.4 K/UL    ABS. BASOPHILS 0.0 0.0 - 0.1 K/UL   CK W/ REFLX CKMB   Result Value Ref Range     26 - 192 U/L   MAGNESIUM   Result Value Ref Range    Magnesium 2.3 1.6 - 2.4 mg/dL   METABOLIC PANEL, COMPREHENSIVE   Result Value Ref Range    Sodium 142 136 - 145 mmol/L    Potassium 4.0 3.5 - 5.1 mmol/L    Chloride 100 97 - 108 mmol/L    CO2 36 (H) 21 - 32 mmol/L    Anion gap 6 5 - 15 mmol/L    Glucose 96 65 - 100 mg/dL    BUN 14 6 - 20 MG/DL    Creatinine 0.82 0.55 - 1.02 MG/DL    BUN/Creatinine ratio 17 12 - 20      GFR est AA >60 >60 ml/min/1.73m2    GFR est non-AA >60 >60 ml/min/1.73m2    Calcium 8.8 8.5 - 10.1 MG/DL    Bilirubin, total 0.2 0.2 - 1.0 MG/DL    ALT 34 12 - 78 U/L    AST 19 15 - 37 U/L    Alk.  phosphatase 54 45 - 117 U/L    Protein, total 7.2 6.4 - 8.2 g/dL    Albumin 3.6 3.5 - 5.0 g/dL    Globulin 3.6 2.0 - 4.0 g/dL    A-G Ratio 1.0 (L) 1.1 - 2.2     TROPONIN I   Result Value Ref Range    Troponin-I, Qt. <0.04 <0.05 ng/mL   URINALYSIS W/ REFLEX CULTURE   Result Value Ref Range    Color YELLOW/STRAW      Appearance CLEAR CLEAR      Specific gravity 1.009 1.003 - 1.030      pH (UA) 7.5 5.0 - 8.0      Protein NEGATIVE  NEG mg/dL    Glucose NEGATIVE  NEG mg/dL Ketone NEGATIVE  NEG mg/dL    Bilirubin NEGATIVE  NEG      Blood NEGATIVE  NEG      Urobilinogen 0.2 0.2 - 1.0 EU/dL    Nitrites NEGATIVE  NEG      Leukocyte Esterase SMALL (A) NEG      WBC 0-4 0 - 4 /hpf    RBC 0-5 0 - 5 /hpf    Epithelial cells FEW FEW /lpf    Bacteria NEGATIVE  NEG /hpf    UA:UC IF INDICATED CULTURE NOT INDICATED BY UA RESULT CNI      Hyaline Cast 0-2 0 - 5 /lpf   TSH, 3RD GENERATION   Result Value Ref Range    TSH 4.53 (H) 0.36 - 3.74 uIU/mL   GLUCOSE, POC   Result Value Ref Range    Glucose (POC) 92 65 - 100 mg/dL    Performed by Rossi Sal    GLUCOSE, POC   Result Value Ref Range    Glucose (POC) 92 65 - 100 mg/dL    Performed by Unkown     GLUCOSE, POC   Result Value Ref Range    Glucose (POC) 117 (H) 65 - 100 mg/dL    Performed by Jorge Espinosa         Imaging review:  CT Head  No acute abnormality    Stroke workup    MRI Brain:   Pending    MRA head/neck:   Pending    TTE:   Left ventricle: Systolic function was normal. Ejection fraction was  estimated in the range of 60 % to 65 %. There were no regional wall motion  abnormalities.  Wall thickness was at the upper limits of normal.    Stroke labs:  HgBA1c    Lab Results   Component Value Date/Time    Hemoglobin A1c 7.0 11/29/2016 09:03 AM     LDL   Lab Results   Component Value Date/Time    LDL, calculated 83 11/29/2016 09:03 AM       Stroke Risk Factors:      IMPRESSION:  Patient Active Problem List   Diagnosis Code    Asthma J45.909    Hyperlipemia E78.5    Arthritis M19.90    HTN (hypertension) I10    Headache(784.0)     Spondylitis, cervical M46.92    Tingling sensation R20.2    Chest pain R07.9    Anemia D64.9    Cyst of right kidney N28.1    Hip pain, right M25.551    Acute bronchitis J20.9    Visual floaters H43.399    Colon cancer screening Z12.11    Osteopenia M85.80    Postmenopausal state Z78.0    Vitamin D deficiency E55.9    Bilateral hip pain M25.551, M25.552    Diabetes mellitus, type 2 (HCC) E11.9    Postmenopausal disorder N95.9    Numbness and tingling of right leg R20.2    Foot pain, left M79.672    Rotator cuff (capsule) sprain and strain [id number]    Osteoarthritis of acromioclavicular joint M19.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 pylori) infection A04.8       Marzena Taylor is a 78 y.o. female who presents with new onset left facial numbness since am. Came to the hospital and CT head is nl. TTE is nl. She does have risk factor for stroke - htn, hld and dm. This could also be secondary to the headache. Will do MRI Of the brain and a stroke taylor for further eval.     1. Left facial tingling ?secondary to headache vs stroke  2. Headaches  3. HTN  4. HLD  5.  DM    RECOMMENDATIONS:  - MRI brain w/o C + MRA head  - MRA Neck    - TTE - normal  - Telemetry  - Permissive HTN (SBP<220/<120) for 24 hrs from symptom onset and then BP goal is less than 140/90  - Stroke labs (HgbA1c, TSH, lipid panel)  - Continue ASA 325 mg daily  - Start atorvastatin 20mg daily and increase to 40 mg if LDL more than 70.  - ST/OT/PT eval  - Discussed tobacco cessation, healthy lifestyle changes, and modifiable risk factors for stroke with patient and family    Thank you very much for this consultation. We will follow up on the above studies and give further recommendations as indicated.       Kevin Ngo MD

## 2017-01-11 NOTE — H&P
Hospitalist Admission Note    NAME: Marsha Singh   :  1940   MRN:  [id number]     Date/Time:  2017 7:46 AM    Patient PCP: Kirsten Pimentel MD  ________________________________________________________________________    My assessment of this patient's clinical condition and my plan of care is as follows. Assessment / Plan:  TIA vs atypical migraine: +L facial numbness and L eye blurred vision in setting of severe HA - multiple other sx reported  - CT head no acute abnormality  - check MRI/MRA head and neck  - check echo  - check lipids and HgA1c in AM  - increase ASA to 325mg dose. 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 Still with some cough/wheezing.  - con't duonebs BID as she does at home  - mucinex and tessalon perles ordered  - prn albuterol MRI  Hypertension, benign/essential: controlled  - con' HCTZ and toprol as I do not feel this is acute CVA  - prn labetalol  Hyperlipidemia: con't crestor, checking lipids as above  Recurrent H Pylori positive:  Completed Abx mid-December  - off PPI per protocol and waiting for re-test for H Pylori later this month  Morbid obesity    Code Status: Full  Surrogate Decision Maker: dtr  DVT Prophylaxis: lovenox        Subjective:   CHIEF COMPLAINT: multiple complaints including visual changes    HISTORY OF PRESENT ILLNESS:     Anila Fraser is a 78 y.o.  female who presents with above. Pt recently seen at [facility] last week and completed a short course of steroids 2 days ago for bronchitis. She has multiple complaints. She says she woke up yesterday morning and felt \"dizzy and out of it. \"  She complained of a dull headache going up the back of her neck to the top of her head. No fevers, but continues to have coughing and wheezing despite recent course of steroids for asthmatic bronchitis. She feels generally weak. She felt like her lips were swollen and tingling (bilaterally) yesterday but they looked normal in the mirror to her despite checking multiple times. She felt like there was pressure in her sinuses and her face felt \"tight\" to her. She also developed reflux sx and drank a glass of water with baking soda since she is not to take her PPI right now after just completing a course of Abx for H Pylori. After drinking the baking soda she had a couple episodes of diarrhea. Last night, she took some butalbitol that she had at home for her headache and went to bed. When she woke up this morning, her headache was much worse. It was still mostly on the top of her head. Her L eye was blurry like her glasses were not clean and remains blurry. She has new L facial tingling. Her L arm hurts (starts in her shoulder and goes down her arm). She denies focal weakness. We were asked to admit for work up and evaluation of the above problems.      Past Medical History   Diagnosis Date    Abnormal finding on MRI of brain 3/16/2015     evaluated by neurologist and no findings    Acute pharyngitis 5/26/2016    Anemia NEC     Arthralgia of right hip 4/25/2016    Arthritis 2/18/2010    Asthma 2/18/2010    Cataract 9/24/2014    Diabetes ([address]     Elevated LFTs 4/16/2014    DAVID (generalized anxiety disorder) 1/22/2015    GERD (gastroesophageal reflux disease) 4/7/2014    Hammer toe of right foot 9/29/2014    Headache(784.0) 0/31/1504    Helicobacter pylori (H. 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 ([facility] 9/29/2014        Past Surgical History   Procedure Laterality Date    Hx gyn  1983     total hysterectomy for uterine fibroid    Pr abdomen surgery proc unlisted       inguinal hernia    Pr abdomen surgery proc unlisted      Pr open repair clavicle fracture  2009    Hx appendectomy      Endoscopy, colon, diagnostic      Hx heent       tonsillectomy    Hx orthopaedic       clavicle repair    Hx other surgical       ?straightened colon out       Social History   Substance Use Topics    Smoking status: Former Smoker     Quit date: 6/14/1988    Smokeless tobacco: Never Used    Alcohol use No        Family History   Problem Relation Age of Onset    Cancer Mother      mycosis fungoives    Stroke Father     Cancer Sister      one sister with breast cancer    Breast Cancer Sister     Cancer Paternal Aunt      2 paternal aunts with breast cancer    Thyroid Cancer Neg Hx      Allergies   Allergen Reactions    Latex Rash    Amoxil [Amoxicillin] Itching     rash    Levaquin [Levofloxacin] Other (comments)     Dizziness      Percocet [Oxycodone-Acetaminophen] Nausea and Vomiting    Tetanus Vaccines And Toxoid Swelling    Tetracycline Hcl Rash        Prior to Admission medications    Medication Sig Start Date End Date Taking? Authorizing Provider   inhalational spacing device (SPACE CHAMBER PLUS) 1 Each by Does Not Apply route as needed. Indications: USE WITH ALBUTEROL MDI 1/5/17   Arvel Brothers.  Jean-Paul Petit MD   ESTRACE 0.01 % (0.1 mg/gram) vaginal cream INSERT 1 GRAM VAGINALLY TWICE WEEKLY AS DIRECTED 10/5/16   Historical Provider   nystatin (MYCOSTATIN) topical cream APPLY TO AFFECTED AREA 3 TIMES A DAY 9/20/16 Historical Provider   triamcinolone acetonide (KENALOG) 0.1 % topical cream APPLY TO AFFECTED AREA 3 TIMES A DAY 9/20/16   Historical Provider   pantoprazole (PROTONIX) 40 mg tablet Take 1 Tab by mouth daily. 11/29/16   Yimi Reddy MD   albuterol-ipratropium (DUO-NEB) 2.5 mg-0.5 mg/3 ml nebu 3 mL by Nebulization route every four (4) hours. ICD 10: J45.909 11/29/16   Yimi Reddy MD   aspirin delayed-release 81 mg tablet Take 1 Tab by mouth daily. 11/18/16   Yimi Reddy MD   hydrochlorothiazide (HYDRODIURIL) 25 mg tablet Take 1 Tab by mouth daily. 9/8/16   Yimi Reddy MD   albuterol (PROAIR HFA) 90 mcg/actuation inhaler Take 1 Puff by inhalation every six (6) hours as needed for Wheezing. 7/14/16   Yimi Reddy MD   metoprolol succinate (TOPROL-XL) 25 mg XL tablet TAKE 1 TABLET BY MOUTH EVERY DAY 6/3/16   Yimi Reddy MD   rosuvastatin (CRESTOR) 10 mg tablet TAKE 1 TABLET BY MOUTH EVERY DAY 5/31/16   Yimi Reddy MD   calcium-cholecalciferol, D3, (CALTRATE 600+D) tablet Take 1 Tab by mouth two (2) times a day. 4/25/16   Yimi Reddy MD   butalbital-acetaminophen (PHRENILIN)  mg tablet Take 1 Tab by mouth every six (6) hours as needed for Pain. 4/25/16   Yimi Reddy MD   naproxen (NAPROSYN) 500 mg tablet Take 500 mg by mouth as needed. 1/31/16   Historical Provider   metFORMIN (GLUCOPHAGE) 500 mg tablet TAKE 1 & 1/2 TABLETS BY MOUTH TWICE A DAY WITH MEALS 3/28/16   Yimi Reddy MD   glucose blood VI test strips (FREESTYLE INSULINX) strip TEST EVERY DAY AND AS NEEDED 3/16/16   Yimi Reddy MD   fluticasone (FLONASE) 50 mcg/actuation nasal spray 2 Sprays by Both Nostrils route as needed for Rhinitis. Historical Provider   MULTIVITAMIN PO Take 1 Tab by mouth daily. Historical Provider   Lancets (FREESTYLE LANCETS) Misc Test once daily.  (diagnosis code: 250.00) 10/24/13   Yimi Reddy MD   Blood-Glucose Meter monitoring kit Once daily before breakfast 9/20/13   Yimi Reddy MD glucose blood VI test strips (ACCU-CHEK DA) strip . 11/7/12   Lei Santacruz MD       REVIEW OF SYSTEMS:     I am not able to complete the review of systems because: The patient is intubated and sedated    The patient has altered mental status due to his acute medical problems    The patient has baseline aphasia from prior stroke(s)    The patient has baseline dementia and is not reliable historian    The patient is in acute medical distress and unable to provide information           Total of 12 systems reviewed as follows:       POSITIVE= underlined text  Negative = text not underlined  General:  fever, chills, sweats, generalized weakness, weight loss/gain,      loss of appetite   Eyes:    blurred vision, eye pain, loss of vision, double vision  ENT:    rhinorrhea, pharyngitis   Respiratory:   cough, sputum production, SOB, SUGGS, wheezing, pleuritic pain   Cardiology:   chest pain, palpitations, orthopnea, PND, edema, syncope   Gastrointestinal:  abdominal pain , N/V, diarrhea, dysphagia, constipation, bleeding   Genitourinary:  frequency, urgency, dysuria, hematuria, incontinence   Muskuloskeletal :  arthralgia, myalgia, back pain  Hematology:  easy bruising, nose or gum bleeding, lymphadenopathy   Dermatological: rash, ulceration, pruritis, color change / jaundice  Endocrine:   hot flashes or polydipsia   Neurological:  headache, dizziness, confusion, focal weakness, paresthesia,     Speech difficulties, memory loss, gait difficulty  Psychological: Feelings of anxiety, depression, agitation    Objective:   VITALS:    Visit Vitals    /70    Pulse 82    Temp 98 °F (36.7 °C)    Resp 16    Ht 5' 2\" (1.575 m)    Wt 91.7 kg (202 lb 2.6 oz)    SpO2 97%    BMI 36.98 kg/m2       PHYSICAL EXAM:    General:    Obese, alert, cooperative, no distress, appears stated age.      HEENT: Atraumatic, anicteric sclerae, pink conjunctivae     No oral ulcers, mucosa moist, throat clear, dentition fair  Neck:  Supple, symmetrical,  thyroid: non tender  Lungs:   Clear to auscultation bilaterally. No Wheezing or Rhonchi. No rales. Chest wall:  No tenderness  No Accessory muscle use. Heart:   Regular  rhythm,  No  murmur   No edema  Abdomen:   Soft, non-tender. Not distended. Bowel sounds normal  Extremities: No cyanosis. No clubbing  Skin:     Not pale. Not Jaundiced  No rashes   Psych:  Some insight. Not depressed. Not anxious or agitated. Neurologic: EOMs intact. No facial asymmetry. No aphasia or slurred speech. Symmetrical strength, Sensation grossly intact. Alert and oriented X 4.     _______________________________________________________________________  Care Plan discussed with:    Comments   Patient x    Family  x dtr   RN x    Care Manager                    Consultant:      _______________________________________________________________________  Expected  Disposition:   Home with Family x   HH/PT/OT/RN x   SNF/LTC    MARNIE    ________________________________________________________________________  TOTAL TIME:  54 Minutes    Critical Care Provided     Minutes non procedure based      Comments    x Reviewed previous records   >50% of visit spent in counseling and coordination of care x Discussion with patient and/or family and questions answered       ________________________________________________________________________  Signed: Maria Ines Pardo MD    Procedures: see electronic medical records for all procedures/Xrays and details which were not copied into this note but were reviewed prior to creation of Plan.     LAB DATA REVIEWED:    Recent Results (from the past 24 hour(s))   GLUCOSE, POC    Collection Time: 01/11/17  5:40 AM   Result Value Ref Range    Glucose (POC) 92 65 - 100 mg/dL    Performed by Leta Bull    CBC WITH AUTOMATED DIFF    Collection Time: 01/11/17  5:42 AM   Result Value Ref Range    WBC 6.0 3.6 - 11.0 K/uL    RBC 4.30 3.80 - 5.20 M/uL    HGB 11.7 11.5 - 16.0 g/dL HCT 36.9 35.0 - 47.0 %    MCV 85.8 80.0 - 99.0 FL    MCH 27.2 26.0 - 34.0 PG    MCHC 31.7 30.0 - 36.5 g/dL    RDW 14.9 (H) 11.5 - 14.5 %    PLATELET 783 531 - 914 K/uL    NEUTROPHILS 40 32 - 75 %    LYMPHOCYTES 54 (H) 12 - 49 %    MONOCYTES 5 5 - 13 %    EOSINOPHILS 1 0 - 7 %    BASOPHILS 0 0 - 1 %    ABS. NEUTROPHILS 2.4 1.8 - 8.0 K/UL    ABS. LYMPHOCYTES 3.2 0.8 - 3.5 K/UL    ABS. MONOCYTES 0.3 0.0 - 1.0 K/UL    ABS. EOSINOPHILS 0.1 0.0 - 0.4 K/UL    ABS. BASOPHILS 0.0 0.0 - 0.1 K/UL   CK W/ REFLX CKMB    Collection Time: 01/11/17  5:42 AM   Result Value Ref Range     26 - 192 U/L   MAGNESIUM    Collection Time: 01/11/17  5:42 AM   Result Value Ref Range    Magnesium 2.3 1.6 - 2.4 mg/dL   METABOLIC PANEL, COMPREHENSIVE    Collection Time: 01/11/17  5:42 AM   Result Value Ref Range    Sodium 142 136 - 145 mmol/L    Potassium 4.0 3.5 - 5.1 mmol/L    Chloride 100 97 - 108 mmol/L    CO2 36 (H) 21 - 32 mmol/L    Anion gap 6 5 - 15 mmol/L    Glucose 96 65 - 100 mg/dL    BUN 14 6 - 20 MG/DL    Creatinine 0.82 0.55 - 1.02 MG/DL    BUN/Creatinine ratio 17 12 - 20      GFR est AA >60 >60 ml/min/1.73m2    GFR est non-AA >60 >60 ml/min/1.73m2    Calcium 8.8 8.5 - 10.1 MG/DL    Bilirubin, total 0.2 0.2 - 1.0 MG/DL    ALT 34 12 - 78 U/L    AST 19 15 - 37 U/L    Alk.  phosphatase 54 45 - 117 U/L    Protein, total 7.2 6.4 - 8.2 g/dL    Albumin 3.6 3.5 - 5.0 g/dL    Globulin 3.6 2.0 - 4.0 g/dL    A-G Ratio 1.0 (L) 1.1 - 2.2     TROPONIN I    Collection Time: 01/11/17  5:42 AM   Result Value Ref Range    Troponin-I, Qt. <0.04 <0.05 ng/mL   TSH, 3RD GENERATION    Collection Time: 01/11/17  5:42 AM   Result Value Ref Range    TSH 4.53 (H) 0.36 - 3.74 uIU/mL   URINALYSIS W/ REFLEX CULTURE    Collection Time: 01/11/17  6:30 AM   Result Value Ref Range    Color YELLOW/STRAW      Appearance CLEAR CLEAR      Specific gravity 1.009 1.003 - 1.030      pH (UA) 7.5 5.0 - 8.0      Protein NEGATIVE  NEG mg/dL    Glucose NEGATIVE  NEG mg/dL Ketone NEGATIVE  NEG mg/dL    Bilirubin NEGATIVE  NEG      Blood NEGATIVE  NEG      Urobilinogen 0.2 0.2 - 1.0 EU/dL    Nitrites NEGATIVE  NEG      Leukocyte Esterase SMALL (A) NEG      WBC 0-4 0 - 4 /hpf    RBC 0-5 0 - 5 /hpf    Epithelial cells FEW FEW /lpf    Bacteria NEGATIVE  NEG /hpf    UA:UC IF INDICATED CULTURE NOT INDICATED BY UA RESULT CNI      Hyaline Cast 0-2 0 - 5 /lpf

## 2017-01-11 NOTE — IP AVS SNAPSHOT
[address] [facility] 
[phone] Patient: Nelida Perez MRN: [id number] :1940 You are allergic to the following Allergen Reactions Latex Rash Amoxil (Amoxicillin) Itching  
 rash Levaquin (Levofloxacin) Other (comments) Dizziness Percocet (Oxycodone-Acetaminophen) Nausea and Vomiting Tetanus Vaccines And Toxoid Swelling Tetracycline Hcl Rash Recent Documentation Height Weight BMI OB Status Smoking Status 1.575 m 91.7 kg 36.98 kg/m2 Hysterectomy Former Smoker Emergency Contacts Name Discharge Info Relation Home Work Mobile [facility] DISCHARGE CAREGIVER [3] Daughter [21] [phone] About your hospitalization You were admitted on:  2017 You last received care in the:  [facility] 3 NEUROSCIENCE TELEMETRY You were discharged on:  2017 Unit phone number:  [phone] Why you were hospitalized Your primary diagnosis was:  Not on File Providers Seen During Your Hospitalizations Provider Role Specialty Primary office phone Surya Burns MD Attending Provider Emergency Medicine [phone] Bina Godoy MD Attending Provider Internal Medicine [phone] Your Primary Care Physician (PCP) Primary Care Physician Office Phone Office Fax Marinetta Radha [phone] Follow-up Information Follow up With Details Comments Contact Info Naty  This is the outpatient provider  [address] [facility] 
[phone] Esvin Fields MD Schedule an appointment as soon as possible for a visit in 1 week Call Sooner, As needed, If symptoms worsen [id number] [facility] [address] [phone] 
[phone] Elias Daniels MD Schedule an appointment as soon as possible for a visit As needed if headaches do not improve [facility] [address] [place] 
[phone] Current Discharge Medication List  
  
CONTINUE these medications which have NOT CHANGED Dose & Instructions Dispensing Information Comments Morning Noon Evening Bedtime  
 albuterol 90 mcg/actuation inhaler Commonly known as:  PROAIR HFA Your next dose is: Today, Tomorrow Other:  _________ Dose:  1 Puff Take 1 Puff by inhalation every six (6) hours as needed for Wheezing. Quantity:  1 Inhaler Refills:  11  
     
   
   
   
  
 albuterol-ipratropium 2.5 mg-0.5 mg/3 ml Nebu Commonly known as:  Lorzara Vegary Your next dose is: Today, Tomorrow Other:  _________ Dose:  3 mL  
3 mL by Nebulization route every four (4) hours. ICD 10: R66.544 Quantity:  180 Nebule Refills:  3  
     
   
   
   
  
 aspirin delayed-release 81 mg tablet Your next dose is: Today, Tomorrow Other:  _________ Dose:  81 mg Take 1 Tab by mouth daily. Quantity:  30 Tab Refills:  11 Blood-Glucose Meter monitoring kit Your next dose is: Today, Tomorrow Other:  _________ Once daily before breakfast  
 Quantity:  1 Kit Refills:  0  
     
   
   
   
  
 butalbital-acetaminophen  mg tablet Commonly known as:  Omar Olmos Your next dose is: Today, Tomorrow Other:  _________ Dose:  1 Tab Take 1 Tab by mouth every six (6) hours as needed for Pain. Quantity:  90 Tab Refills:  1  
     
   
   
   
  
 calcium-cholecalciferol (D3) tablet Commonly known as:  CALTRATE 600+D Your next dose is: Today, Tomorrow Other:  _________ Dose:  1 Tab Take 1 Tab by mouth two (2) times a day. Quantity:  60 Tab Refills:  11  
     
   
   
   
 ESTRACE 0.01 % (0.1 mg/gram) vaginal cream  
Generic drug:  estradiol Your next dose is: Today, Tomorrow Other:  _________ INSERT 1 GRAM VAGINALLY TWICE WEEKLY AS DIRECTED Refills:  4 FLONASE 50 mcg/actuation nasal spray Generic drug:  fluticasone Your next dose is: Today, Tomorrow Other:  _________ Dose:  2 Spray 2 Sprays by Both Nostrils route as needed for Rhinitis. Refills:  0  
     
   
   
   
  
 * glucose blood VI test strips strip Commonly known as:  ACCU-CHEK DA Your next dose is: Today, Tomorrow Other:  _________ Peggyann Gang Quantity:  1 Package Refills:  11  
     
   
   
   
  
 * glucose blood VI test strips strip Commonly known as:  FREESTYLE INSULINX Your next dose is: Today, Tomorrow Other:  _________ TEST EVERY DAY AND AS NEEDED Quantity:  100 Strip Refills:  6 DX Code E11.51 .  
    
   
   
   
  
 hydroCHLOROthiazide 25 mg tablet Commonly known as:  HYDRODIURIL Your next dose is: Today, Tomorrow Other:  _________ Dose:  25 mg Take 1 Tab by mouth daily. Quantity:  90 Tab Refills:  1  
     
   
   
   
  
 inhalational spacing device Commonly known as:  SPACE CHAMBER PLUS Your next dose is: Today, Tomorrow Other:  _________ Dose:  1 Each  
1 Each by Does Not Apply route as needed. Indications: USE WITH ALBUTEROL MDI Quantity:  1 Device Refills:  0 Lancets Misc Commonly known as:  FREESTYLE LANCETS Your next dose is: Today, Tomorrow Other:  _________ Test once daily. (diagnosis code: 250.00) Quantity:  1 Package Refills:  11  
     
   
   
   
  
 metFORMIN 500 mg tablet Commonly known as:  GLUCOPHAGE Your next dose is: Today, Tomorrow Other:  _________  TAKE 1 & 1/2 TABLETS BY MOUTH TWICE A DAY WITH MEALS  
 Quantity:  90 Tab Refills:  11  
     
   
   
   
  
 metoprolol succinate 25 mg XL tablet Commonly known as:  TOPROL-XL Your next dose is: Today, Tomorrow Other:  _________ TAKE 1 TABLET BY MOUTH EVERY DAY Quantity:  90 Tab Refills:  2 MULTIVITAMIN PO Your next dose is: Today, Tomorrow Other:  _________ Dose:  1 Tab Take 1 Tab by mouth daily. Refills:  0  
     
   
   
   
  
 naproxen 500 mg tablet Commonly known as:  NAPROSYN Your next dose is: Today, Tomorrow Other:  _________ Dose:  500 mg Take 500 mg by mouth as needed. Refills:  0  
     
   
   
   
  
 nystatin topical cream  
Commonly known as:  MYCOSTATIN Your next dose is: Today, Tomorrow Other:  _________ APPLY TO AFFECTED AREA 3 TIMES A DAY Refills:  0  
     
   
   
   
  
 pantoprazole 40 mg tablet Commonly known as:  PROTONIX Your next dose is: Today, Tomorrow Other:  _________ Dose:  40 mg Take 1 Tab by mouth daily. Quantity:  90 Tab Refills:  5  
     
   
   
   
  
 rosuvastatin 10 mg tablet Commonly known as:  CRESTOR Your next dose is: Today, Tomorrow Other:  _________ TAKE 1 TABLET BY MOUTH EVERY DAY Quantity:  90 Tab Refills:  3  
     
   
   
   
  
 triamcinolone acetonide 0.1 % topical cream  
Commonly known as:  KENALOG Your next dose is: Today, Tomorrow Other:  _________ APPLY TO AFFECTED AREA 3 TIMES A DAY Refills:  0  
     
   
   
   
  
 * Notice: This list has 2 medication(s) that are the same as other medications prescribed for you. Read the directions carefully, and ask your doctor or other care provider to review them with you. Discharge Instructions DISCHARGE DIAGNOSIS: 
Left facial tingling secondary to headache NO TIA Mild dizziness, chronic MEDICATIONS: 
 · It is important that you take the medication exactly as they are prescribed. · Keep your medication in the bottles provided by the pharmacist and keep a list of the medication names, dosages, and times to be taken in your wallet. · Do not take other medications without consulting your doctor. Pain Management: per above medications What to do at [facility] Recommended diet:  Cardiac Diet Recommended activity: Outpatient balance center If you have questions regarding the hospital related prescriptions or hospital related issues please call [place] [phone] at . You can always direct your questions to your primary care doctor if you are unable to reach your hospital physician; your PCP works as an extension of your hospital doctor just like your hospital doctor is an extension of your PCP for your time at the [facility]). If you experience any of the following symptoms then please call your primary care physician or return to the emergency room if you cannot get hold of your doctor: 
Fever, chills, nausea, vomiting, diarrhea, change in mentation, falling, bleeding, shortness of breath You have been referred to OUTPATIENT GAIT RETRAINING to help with your dizziness with moving. Call neurology if your headache and numbness persist 
 
 
Discharge Orders None [employer] Announcement We are excited to announce that we are making your provider's discharge notes available to you in Idooblet. You will see these notes when they are completed and signed by the physician that discharged you from your recent hospital stay. If you have any questions or concerns about any information you see in Idooblet, please call the Health Information Department where you were seen or reach out to your Primary Care Provider for more information about your plan of care. Introducing [facility] [place] [facility]! Dear Sade Wayne: Thank you for requesting a DocbookMD account. Our records indicate that you already have an active DocbookMD account. You can access your account anytime at [email or web address] Did you know that you can access your hospital and ER discharge instructions at any time in DocbookMD? You can also review all of your test results from your hospital stay or ER visit. Additional Information If you have questions, please visit the Frequently Asked Questions section of the DocbookMD website at [email or web address] Remember, DocbookMD is NOT to be used for urgent needs. For medical emergencies, dial 911. Now available from your iPhone and Android! General Information Please provide this summary of care documentation to your next provider. Patient Signature:  ____________________________________________________________ Date:  ____________________________________________________________  
  
Evelia Rinaldimp Provider Signature:  ____________________________________________________________ Date:  ____________________________________________________________

## 2017-01-11 NOTE — ED NOTES
.Bedside shift change report given to Gabriel Page RN (oncoming nurse) by Scot Oden RN (offgoing nurse). Report included the following information SBAR, Kardex, ED Summary, Procedure Summary and Recent Results.

## 2017-01-11 NOTE — ED NOTES
Assumed care of pt at this time. Pt states that yesterday she started having diarrhea with 4 total episodes, numbness in the right side of her face and tingling in her fingers on both hands. Pt complains of abdominal pain of 5 out of 10 at this time. Assessment done at this time. Call bell in reach, family at bedside.

## 2017-01-11 NOTE — PROGRESS NOTES
Physical Therapy    Received PT order for eval.  Arrived to see pt in ED awaiting IP bed. Pt currently in [address] per RN with RN procedures to follow. Will see for eval as available in ED or post trasfer to IP floor.     Thi Garcia, PT

## 2017-01-11 NOTE — ED NOTES
TRANSFER - OUT REPORT:    Verbal report given to Meagan Masterson (name) on Génesis Hayden  being transferred to NSTU (unit) for routine progression of care       Report consisted of patients Situation, Background, Assessment and   Recommendations(SBAR). Information from the following report(s) SBAR and ED Summary was reviewed with the receiving nurse. Lines:   Peripheral IV 01/11/17 Left Forearm (Active)   Site Assessment Clean, dry, & intact 1/11/2017  6:02 AM   Phlebitis Assessment 0 1/11/2017  6:02 AM   Infiltration Assessment 0 1/11/2017  6:02 AM   Dressing Status New 1/11/2017  6:02 AM   Dressing Type Transparent 1/11/2017  6:02 AM   Hub Color/Line Status Pink 1/11/2017  6:02 AM   Alcohol Cap Used No 1/11/2017  6:02 AM        Opportunity for questions and clarification was provided.       Patient transported with:   [employer]

## 2017-01-11 NOTE — ED NOTES
Pt continues to rest quietly in bed in position of comfort. Updated on plan of care. Call bell within reach.

## 2017-01-11 NOTE — ED PROVIDER NOTES
HPI Comments: Jayme Noel is a 78 y.o. female who presents ambulatory to the ED with c/o dizziness and HA x yesterday. Pt reports having onset of dizziness described as feeling unsteady on her feet early yesterday and then developed tingling paresthesias in her BL fingers, diarrhea, and a HA with sinus pressure and rhinorrhea. She was able to go to bed but awoke at 0300 with a worsened, dull HA across her occiput, left facial numbness, \"fuzzy\" vision in her left eye, and palpitations. Pt denies any focal weakness or slurred speech, which daughter confirms. [facility]/cold sxs and was put on a 4 day course of steroids for bronchitis which she finished 2 days ago. She states she has been monitoring her BG given the recent course of steroids and states it was 140s last night. Pt denies any CP, SOB, vomiting, urinary sxs, leg swelling, or gait disturbance. Pt notes taking 81mg ASA every day. PCP: Aram Lu MD  PMHx significant for: asthma, HTN, anemia, GERD, DM, anxiety, uterine fibroids  PSHx significant for: total hysterectomy, inguinal hernia repair, appendectomy, colonoscopy   Social Hx:  smoking (-)                      alcohol (-)                        There are no other complaints, changes, or physical findings at this time. Written by Johnnie Solano ED scribperico, as dictated by Agustin Suárez MD     The history is provided by the patient and a relative.         Past Medical History:   Diagnosis Date    Abdominal pain 4/7/2014    Abnormal finding on MRI of brain 3/16/2015     evaluated by neurologist and no findings    Acid reflux 4/5/2010    Acute bronchitis 11/30/2011    Acute pharyngitis 5/26/2016    Anemia NEC     Arthralgia of right hip 4/25/2016    Arthritis 2/18/2010    Asthma 2/18/2010    Cataract 9/24/2014    Dental disorder NEC     Diabetes ([facility] [address]     Elevated LFTs 4/16/2014    DAVID (generalized anxiety disorder) 1/22/2015    GERD (gastroesophageal reflux disease)     GERD (gastroesophageal reflux disease) 4/7/2014    Hammer toe of right foot 9/29/2014    Headache(784.0) 1/95/7331    Helicobacter Pylori (H. Pylori) Infection 9/2/4975    Helicobacter pylori (H. 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 ([address] 9/29/2014    Preop examination 9/24/2014    Risk for falls 4/16/2014    Screening for breast cancer 9/23/2014    Screening for depression 4/16/2014    Shoulder pain, left 3/18/2014    Spondylitis, cervical 4/5/2010    Tinea pedis 6/3/2015    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 ([facility] [address] 9/29/2014    Type 2 diabetes mellitus with pressure callus ([facility] [address] 9/29/2014       Past Surgical History:   Procedure Laterality Date    Hx gyn  1983     total hysterectomy for uterine fibroid    Pr abdomen surgery proc unlisted       inguinal hernia    Pr abdomen surgery proc unlisted      Pr open repair clavicle fracture  2009    Hx appendectomy      Endoscopy, colon, diagnostic      Hx heent       tonsillectomy    Hx orthopaedic       clavicle repair    Hx other surgical       ?straightened colon out         Family History:   Problem Relation Age of Onset    Cancer Mother      mycosis fungoives    Stroke Father     Cancer Sister      one sister with breast cancer    Breast Cancer Sister     Cancer Paternal Aunt      2 paternal aunts with breast cancer    Thyroid Cancer Neg Hx        Social History     Social History    Marital status:      Spouse name: N/A    Number of children: N/A    Years of education: N/A     Occupational History    Not on file.      Social History Main Topics  Smoking status: Former Smoker     Quit date: 6/14/1988    Smokeless tobacco: Never Used    Alcohol use No    Drug use: No    Sexual activity: No     Other Topics Concern    Not on file     Social History Narrative         ALLERGIES: Latex; Amoxil [amoxicillin]; Levaquin [levofloxacin]; Percocet [oxycodone-acetaminophen]; Tetanus vaccines and toxoid; and Tetracycline hcl    Review of Systems   Constitutional: Positive for fatigue. HENT: Negative for sore throat. Eyes: Positive for visual disturbance (blurred left eye). + Pressure around eyes   Respiratory: Positive for cough. Negative for shortness of breath. Cardiovascular: Positive for palpitations. Negative for chest pain and leg swelling. Gastrointestinal: Positive for diarrhea. Negative for nausea and vomiting. Endocrine: Negative. Genitourinary: Negative. Negative for dysuria. Musculoskeletal: Negative. Skin: Negative. Neurological: Positive for dizziness, numbness (left face) and headaches. Negative for speech difficulty.        + Tingling in BL fingers   All other systems reviewed and are negative. Patient Vitals for the past 12 hrs:   Temp Pulse Resp BP SpO2   01/11/17 0545 - - - 138/70 97 %   01/11/17 0530 - - - 153/75 95 %   01/11/17 0521 98 °F (36.7 °C) 82 16 176/84 94 %   01/11/17 0520 - - - - 94 %   01/11/17 0519 - - - 176/84 -              Physical Exam   Nursing note and vitals reviewed.    General appearance - overweight, well appearing, and in no distress  Eyes - pupils equal and reactive, extraocular eye movements intact  ENT - mucous membranes moist, pharynx normal without lesions  Neck - supple, no significant adenopathy; non-tender to palpation  Chest - clear to auscultation, no wheezes, rales or rhonchi; non-tender to palpation  Heart - normal rate and regular rhythm, S1 and S2 normal, no murmurs noted  Abdomen - soft, nontender, nondistended, no masses or organomegaly  Musculoskeletal - no joint tenderness, deformity or swelling; normal ROM  Extremities - peripheral pulses normal, no pedal edema  Skin - normal coloration and turgor, no rashes  Neurological - alert, oriented x3, normal speech, no focal findings or movement disorder noted. Normal finger to nose, no pronator drift, cranial nerves intact    MDM  Number of Diagnoses or Management Options  Diagnosis management comments: DDx: Electrolyte Abnormalities, Veritgo, TIA, paresthesias, UTI        Amount and/or Complexity of Data Reviewed  Clinical lab tests: ordered and reviewed  Tests in the radiology section of CPT®: ordered and reviewed  Obtain history from someone other than the patient: yes (Daughter)  Review and summarize past medical records: yes  Discuss the patient with other providers: yes (hospitalist)      ED Course       Procedures    5:31 AM   Pt's rate is 84 with a normal rhythm as noted on Cardiac monitor. SpO2 is 94% on room air  Written by Howard Almaraz ED scribe, as dictated by Surya Burns MD       CONSULT NOTE:   7:47 AM  Surya Burns MD  spoke to Dr. Ovi Goodwin  Specialty: Hospitalist  Discussed pt's HPI and available diagnostic results thus far. Expressed concerns for needed admission.  She will see and evaluate for admission  Written by Howard Almaraz ED scribe, as dictated by Vida Velasquez MD        LABORATORY TESTS:  Recent Results (from the past 12 hour(s))   GLUCOSE, POC    Collection Time: 01/11/17  5:40 AM   Result Value Ref Range    Glucose (POC) 92 65 - 100 mg/dL    Performed by Estella Kwan    CBC WITH AUTOMATED DIFF    Collection Time: 01/11/17  5:42 AM   Result Value Ref Range    WBC 6.0 3.6 - 11.0 K/uL    RBC 4.30 3.80 - 5.20 M/uL    HGB 11.7 11.5 - 16.0 g/dL    HCT 36.9 35.0 - 47.0 %    MCV 85.8 80.0 - 99.0 FL    MCH 27.2 26.0 - 34.0 PG    MCHC 31.7 30.0 - 36.5 g/dL    RDW 14.9 (H) 11.5 - 14.5 %    PLATELET 725 056 - 051 K/uL    NEUTROPHILS 40 32 - 75 %    LYMPHOCYTES 54 (H) 12 - 49 %    MONOCYTES 5 5 - 13 % EOSINOPHILS 1 0 - 7 %    BASOPHILS 0 0 - 1 %    ABS. NEUTROPHILS 2.4 1.8 - 8.0 K/UL    ABS. LYMPHOCYTES 3.2 0.8 - 3.5 K/UL    ABS. MONOCYTES 0.3 0.0 - 1.0 K/UL    ABS. EOSINOPHILS 0.1 0.0 - 0.4 K/UL    ABS. BASOPHILS 0.0 0.0 - 0.1 K/UL   CK W/ REFLX CKMB    Collection Time: 01/11/17  5:42 AM   Result Value Ref Range     26 - 192 U/L   MAGNESIUM    Collection Time: 01/11/17  5:42 AM   Result Value Ref Range    Magnesium 2.3 1.6 - 2.4 mg/dL   METABOLIC PANEL, COMPREHENSIVE    Collection Time: 01/11/17  5:42 AM   Result Value Ref Range    Sodium 142 136 - 145 mmol/L    Potassium 4.0 3.5 - 5.1 mmol/L    Chloride 100 97 - 108 mmol/L    CO2 36 (H) 21 - 32 mmol/L    Anion gap 6 5 - 15 mmol/L    Glucose 96 65 - 100 mg/dL    BUN 14 6 - 20 MG/DL    Creatinine 0.82 0.55 - 1.02 MG/DL    BUN/Creatinine ratio 17 12 - 20      GFR est AA >60 >60 ml/min/1.73m2    GFR est non-AA >60 >60 ml/min/1.73m2    Calcium 8.8 8.5 - 10.1 MG/DL    Bilirubin, total 0.2 0.2 - 1.0 MG/DL    ALT 34 12 - 78 U/L    AST 19 15 - 37 U/L    Alk.  phosphatase 54 45 - 117 U/L    Protein, total 7.2 6.4 - 8.2 g/dL    Albumin 3.6 3.5 - 5.0 g/dL    Globulin 3.6 2.0 - 4.0 g/dL    A-G Ratio 1.0 (L) 1.1 - 2.2     TROPONIN I    Collection Time: 01/11/17  5:42 AM   Result Value Ref Range    Troponin-I, Qt. <0.04 <0.05 ng/mL   TSH, 3RD GENERATION    Collection Time: 01/11/17  5:42 AM   Result Value Ref Range    TSH 4.53 (H) 0.36 - 3.74 uIU/mL   URINALYSIS W/ REFLEX CULTURE    Collection Time: 01/11/17  6:30 AM   Result Value Ref Range    Color YELLOW/STRAW      Appearance CLEAR CLEAR      Specific gravity 1.009 1.003 - 1.030      pH (UA) 7.5 5.0 - 8.0      Protein NEGATIVE  NEG mg/dL    Glucose NEGATIVE  NEG mg/dL    Ketone NEGATIVE  NEG mg/dL    Bilirubin NEGATIVE  NEG      Blood NEGATIVE  NEG      Urobilinogen 0.2 0.2 - 1.0 EU/dL    Nitrites NEGATIVE  NEG      Leukocyte Esterase SMALL (A) NEG      WBC 0-4 0 - 4 /hpf    RBC 0-5 0 - 5 /hpf    Epithelial cells FEW FEW /lpf    Bacteria NEGATIVE  NEG /hpf    UA:UC IF INDICATED CULTURE NOT INDICATED BY UA RESULT CNI      Hyaline Cast 0-2 0 - 5 /lpf       IMAGING RESULTS:  CT Results  (Last 48 hours)               01/11/17 0614  CT HEAD WO CONT Final result    Impression:  IMPRESSION: No acute abnormality               Narrative:  EXAM:  CT HEAD WO CONT       INDICATION:   Left-sided headache began at 4:00 AM this morning       COMPARISON: None. TECHNIQUE: Unenhanced CT of the head was performed using 5 mm images. Brain and   bone windows were generated. CT dose reduction was achieved through use of a   standardized protocol tailored for this examination and automatic exposure   control for dose modulation. FINDINGS:   The ventricles and sulci are normal in size, shape and configuration and   midline. There is no significant white matter disease. There is no intracranial   hemorrhage, extra-axial collection, mass, mass effect or midline shift. The   basilar cisterns are open. No acute infarct is identified. The bone windows   demonstrate no abnormalities. The visualized portions of the paranasal sinuses   and mastoid air cells are clear. MEDICATIONS GIVEN:  Medications   sodium chloride (NS) flush 5-10 mL (not administered)   sodium chloride (NS) flush 5-10 mL (not administered)   ondansetron (ZOFRAN) injection 4 mg (not administered)       IMPRESSION:  1. Controlled type 2 diabetes mellitus without complication, unspecified long term insulin use status ([facility] [address]    2. Headache, unspecified headache type    3. Essential hypertension    4. Left facial numbness        PLAN:  1. Admit to medicine    ADMIT NOTE    Pt will be admitted by Dr. Arcelia Lundberg. All diagnostic results and reasons for admission have been reviewed with pt and any family. Care plan has been outlined with pt and any family. Pt and all family agree and understand conditions of admission. All of pt's questions have been answered.  There are no further findings. Pt is ready to be admitted. This note is prepared by Delphine Michelle, acting as scribe for Vida Mcgrath MD.    Emiliano Euceda MD: The scribe's documentation has been prepared under my direction and personally reviewed by me in its entirety. I confirm that the note above accurately reflects all work, treatment, procedures, and medical decision making performed by me                                         This note will not be viewable in [address]

## 2017-01-12 VITALS
BODY MASS INDEX: 37.2 KG/M2 | HEIGHT: 62 IN | RESPIRATION RATE: 16 BRPM | HEART RATE: 100 BPM | DIASTOLIC BLOOD PRESSURE: 61 MMHG | OXYGEN SATURATION: 97 % | WEIGHT: 202.16 LBS | TEMPERATURE: 98.9 F | SYSTOLIC BLOOD PRESSURE: 151 MMHG

## 2017-01-12 LAB
ANION GAP BLD CALC-SCNC: 9 MMOL/L (ref 5–15)
BUN SERPL-MCNC: 14 MG/DL (ref 6–20)
BUN/CREAT SERPL: 17 (ref 12–20)
CALCIUM SERPL-MCNC: 9.2 MG/DL (ref 8.5–10.1)
CHLORIDE SERPL-SCNC: 98 MMOL/L (ref 97–108)
CHOLEST SERPL-MCNC: 172 MG/DL
CO2 SERPL-SCNC: 33 MMOL/L (ref 21–32)
CREAT SERPL-MCNC: 0.81 MG/DL (ref 0.55–1.02)
ERYTHROCYTE [DISTWIDTH] IN BLOOD BY AUTOMATED COUNT: 15 % (ref 11.5–14.5)
EST. AVERAGE GLUCOSE BLD GHB EST-MCNC: 154 MG/DL
GLUCOSE BLD STRIP.AUTO-MCNC: 121 MG/DL (ref 65–100)
GLUCOSE SERPL-MCNC: 107 MG/DL (ref 65–100)
HBA1C MFR BLD: 7 % (ref 4.2–6.3)
HCT VFR BLD AUTO: 38 % (ref 35–47)
HDLC SERPL-MCNC: 59 MG/DL
HDLC SERPL: 2.9 {RATIO} (ref 0–5)
HGB BLD-MCNC: 11.9 G/DL (ref 11.5–16)
LDLC SERPL CALC-MCNC: 77.4 MG/DL (ref 0–100)
LIPID PROFILE,FLP: ABNORMAL
MCH RBC QN AUTO: 27 PG (ref 26–34)
MCHC RBC AUTO-ENTMCNC: 31.3 G/DL (ref 30–36.5)
MCV RBC AUTO: 86.4 FL (ref 80–99)
PLATELET # BLD AUTO: 318 K/UL (ref 150–400)
POTASSIUM SERPL-SCNC: 3.7 MMOL/L (ref 3.5–5.1)
RBC # BLD AUTO: 4.4 M/UL (ref 3.8–5.2)
SERVICE CMNT-IMP: ABNORMAL
SODIUM SERPL-SCNC: 140 MMOL/L (ref 136–145)
TRIGL SERPL-MCNC: 178 MG/DL (ref ?–150)
VLDLC SERPL CALC-MCNC: 35.6 MG/DL
WBC # BLD AUTO: 5.6 K/UL (ref 3.6–11)

## 2017-01-12 PROCEDURE — 36415 COLL VENOUS BLD VENIPUNCTURE: CPT | Performed by: INTERNAL MEDICINE

## 2017-01-12 PROCEDURE — 96375 TX/PRO/DX INJ NEW DRUG ADDON: CPT

## 2017-01-12 PROCEDURE — 96372 THER/PROPH/DIAG INJ SC/IM: CPT

## 2017-01-12 PROCEDURE — 74011000250 HC RX REV CODE- 250: Performed by: INTERNAL MEDICINE

## 2017-01-12 PROCEDURE — 97165 OT EVAL LOW COMPLEX 30 MIN: CPT | Performed by: OCCUPATIONAL THERAPIST

## 2017-01-12 PROCEDURE — G8988 SELF CARE GOAL STATUS: HCPCS | Performed by: OCCUPATIONAL THERAPIST

## 2017-01-12 PROCEDURE — 97112 NEUROMUSCULAR REEDUCATION: CPT

## 2017-01-12 PROCEDURE — 94640 AIRWAY INHALATION TREATMENT: CPT

## 2017-01-12 PROCEDURE — G8979 MOBILITY GOAL STATUS: HCPCS

## 2017-01-12 PROCEDURE — 83036 HEMOGLOBIN GLYCOSYLATED A1C: CPT | Performed by: INTERNAL MEDICINE

## 2017-01-12 PROCEDURE — 85027 COMPLETE CBC AUTOMATED: CPT | Performed by: INTERNAL MEDICINE

## 2017-01-12 PROCEDURE — 80061 LIPID PANEL: CPT | Performed by: INTERNAL MEDICINE

## 2017-01-12 PROCEDURE — 99218 HC RM OBSERVATION: CPT

## 2017-01-12 PROCEDURE — 97162 PT EVAL MOD COMPLEX 30 MIN: CPT

## 2017-01-12 PROCEDURE — G8978 MOBILITY CURRENT STATUS: HCPCS

## 2017-01-12 PROCEDURE — 74011250636 HC RX REV CODE- 250/636: Performed by: INTERNAL MEDICINE

## 2017-01-12 PROCEDURE — 97530 THERAPEUTIC ACTIVITIES: CPT | Performed by: OCCUPATIONAL THERAPIST

## 2017-01-12 PROCEDURE — 80048 BASIC METABOLIC PNL TOTAL CA: CPT | Performed by: INTERNAL MEDICINE

## 2017-01-12 PROCEDURE — G8989 SELF CARE D/C STATUS: HCPCS | Performed by: OCCUPATIONAL THERAPIST

## 2017-01-12 PROCEDURE — 82962 GLUCOSE BLOOD TEST: CPT

## 2017-01-12 PROCEDURE — 74011250637 HC RX REV CODE- 250/637: Performed by: INTERNAL MEDICINE

## 2017-01-12 PROCEDURE — G8987 SELF CARE CURRENT STATUS: HCPCS | Performed by: OCCUPATIONAL THERAPIST

## 2017-01-12 RX ADMIN — ENOXAPARIN SODIUM 40 MG: 40 INJECTION SUBCUTANEOUS at 08:54

## 2017-01-12 RX ADMIN — Medication 10 ML: at 04:24

## 2017-01-12 RX ADMIN — IPRATROPIUM BROMIDE AND ALBUTEROL SULFATE 3 ML: .5; 3 SOLUTION RESPIRATORY (INHALATION) at 08:14

## 2017-01-12 RX ADMIN — METFORMIN HYDROCHLORIDE 850 MG: 850 TABLET ORAL at 08:55

## 2017-01-12 RX ADMIN — ASPIRIN 325 MG ORAL TABLET 325 MG: 325 PILL ORAL at 08:55

## 2017-01-12 RX ADMIN — Medication 1 TABLET: at 08:55

## 2017-01-12 RX ADMIN — METOPROLOL SUCCINATE 25 MG: 25 TABLET, EXTENDED RELEASE ORAL at 08:55

## 2017-01-12 RX ADMIN — HYDROCHLOROTHIAZIDE 25 MG: 25 TABLET ORAL at 08:54

## 2017-01-12 RX ADMIN — GUAIFENESIN 600 MG: 600 TABLET, EXTENDED RELEASE ORAL at 08:55

## 2017-01-12 RX ADMIN — ATORVASTATIN CALCIUM 20 MG: 10 TABLET, FILM COATED ORAL at 08:55

## 2017-01-12 NOTE — DISCHARGE SUMMARY
Hospitalist Discharge Summary    NAME: Aubree Sanchez   :  1940   MRN:  [id number]     DISCHARGE DIAGNOSIS:  Left facial tingling secondary to headache  NO TIA   Mild dizziness, chronic    CONSULTATIONS: Neurology    Follow Up: Follow-up Information     Follow up With Details Comments Contact [address]  This is the outpatient provider  [address]   [address] [facility]    Pamela Zhu MD Schedule an appointment as soon as possible for a visit in 1 week Call Sooner, As needed, If symptoms worsen [address]  [phone]      Luna Mann MD Schedule an appointment as soon as possible for a visit As needed if headaches do not improve [address] [place] [address]  [phone]            Procedures: see electronic medical records for all procedures/Xrays and details which were not copied into this note but were reviewed prior to creation of Plan. Please follow-up tests/labs that are still pendin. None     DISCHARGE SUMMARY/HOSPITAL COURSE: for full details see H&P, daily progress notes, labs, consult notes. Briefly As Per HPI:   Polly Garcia is a 78 y.o.  female who presents with above. Pt recently seen at [address] [facility] last week and completed a short course of steroids 2 days ago for bronchitis. She has multiple complaints. She says she woke up yesterday morning and felt \"dizzy and out of it. \" She complained of a dull headache going up the back of her neck to the top of her head. No fevers, but continues to have coughing and wheezing despite recent course of steroids for asthmatic bronchitis. She feels generally weak. She felt like her lips were swollen and tingling (bilaterally) yesterday but they looked normal in the mirror to her despite checking multiple times.  She felt like there was pressure in her sinuses and her face felt \"tight\" to her. She also developed reflux sx and drank a glass of water with baking soda since she is not to take her PPI right now after just completing a course of Abx for H Pylori. After drinking the baking soda she had a couple episodes of diarrhea. Last night, she took some butalbitol that she had at home for her headache and went to bed. When she woke up this morning, her headache was much worse. It was still mostly on the top of her head. Her L eye was blurry like her glasses were not clean and remains blurry. She has new L facial tingling. Her L arm hurts (starts in her shoulder and goes down her arm). She denies focal weakness.     We were asked to admit for work up and evaluation of the above problems.        The patient's hospital course was complicated by:  Left facial tingling secondary to headache  NO TIA   Mild dizziness, chronic    Patient with complex inpatient course. Please see all notes/labs/vitals/testing/procedures for details, briefly the patient was admitted following presentation to ED with facial tingling/headache. Ruled out for TIA with normal stroke work up. 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 She will continue on home regimen per neurologist. She has chronic dizziness - we have sent referral for OP Rehab to correct further. Patient safe for dc at this time. _______________________________________________________________________   Patient seen and examined by me on day of discharge. Pertinent findings are:  Gen: pleasant bf sitting up in chair in nad  HEENT: ncat  Chest: cta b  Cv: no r/g  Abd: s/nd/nt +bs  Neuro: cn 2-12 gi    See Discharge Instructions for further details.   _______________________________________________________________________  DISPOSITION:    Home with Family: x   Home with HH/PT/OT/RN:    SNF/LTC:    MARNIE:    OTHER:    ________________________________________________________________________  Medications Reviewed:  Current Discharge Medication List CONTINUE these medications which have NOT CHANGED    Details   inhalational spacing device (SPACE CHAMBER PLUS) 1 Each by Does Not Apply route as needed. Indications: USE WITH ALBUTEROL MDI  Qty: 1 Device, Refills: 0      ESTRACE 0.01 % (0.1 mg/gram) vaginal cream INSERT 1 GRAM VAGINALLY TWICE WEEKLY AS DIRECTED  Refills: 4      nystatin (MYCOSTATIN) topical cream APPLY TO AFFECTED AREA 3 TIMES A DAY  Refills: 0      triamcinolone acetonide (KENALOG) 0.1 % topical cream APPLY TO AFFECTED AREA 3 TIMES A DAY  Refills: 0      pantoprazole (PROTONIX) 40 mg tablet Take 1 Tab by mouth daily. Qty: 90 Tab, Refills: 5    Associated Diagnoses: Gastroesophageal reflux disease without esophagitis      albuterol-ipratropium (DUO-NEB) 2.5 mg-0.5 mg/3 ml nebu 3 mL by Nebulization route every four (4) hours. ICD 10: J45.909  Qty: 180 Nebule, Refills: 3    Associated Diagnoses: Asthmatic bronchitis, unspecified asthma severity, with status asthmaticus      aspirin delayed-release 81 mg tablet Take 1 Tab by mouth daily. Qty: 30 Tab, Refills: 11    Associated Diagnoses: Vitamin D deficiency      hydrochlorothiazide (HYDRODIURIL) 25 mg tablet Take 1 Tab by mouth daily. Qty: 90 Tab, Refills: 1      albuterol (PROAIR HFA) 90 mcg/actuation inhaler Take 1 Puff by inhalation every six (6) hours as needed for Wheezing. Qty: 1 Inhaler, Refills: 11      metoprolol succinate (TOPROL-XL) 25 mg XL tablet TAKE 1 TABLET BY MOUTH EVERY DAY  Qty: 90 Tab, Refills: 2      rosuvastatin (CRESTOR) 10 mg tablet TAKE 1 TABLET BY MOUTH EVERY DAY  Qty: 90 Tab, Refills: 3      calcium-cholecalciferol, D3, (CALTRATE 600+D) tablet Take 1 Tab by mouth two (2) times a day. Qty: 60 Tab, Refills: 11      butalbital-acetaminophen (PHRENILIN)  mg tablet Take 1 Tab by mouth every six (6) hours as needed for Pain.   Qty: 90 Tab, Refills: 1    Associated Diagnoses: Intractable headache, unspecified chronicity pattern, unspecified headache type; Essential hypertension with goal blood pressure less than 140/90      naproxen (NAPROSYN) 500 mg tablet Take 500 mg by mouth as needed. Refills: 0    Associated Diagnoses: Essential hypertension, hypertension with unspecified goal      metFORMIN (GLUCOPHAGE) 500 mg tablet TAKE 1 & 1/2 TABLETS BY MOUTH TWICE A DAY WITH MEALS  Qty: 90 Tab, Refills: 11      !! glucose blood VI test strips (FREESTYLE INSULINX) strip TEST EVERY DAY AND AS NEEDED  Qty: 100 Strip, Refills: 6    Comments: DX Code E11.51 . 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 MULTIVITAMIN PO Take 1 Tab by mouth daily. Lancets (FREESTYLE LANCETS) Misc Test once daily. (diagnosis code: 250.00)  Qty: 1 Package, Refills: 11      Blood-Glucose Meter monitoring kit Once daily before breakfast  Qty: 1 Kit, Refills: 0      !! glucose blood VI test strips (ACCU-CHEK DA) strip . Qty: 1 Package, Refills: 11       !! - Potential duplicate medications found. Please discuss with provider. PMH/SH reviewed - no change compared to H&P  ________________________________________________________________________  Total time spent in discharge (min): 35   ________________________________________________________________________  Risk of deterioration:    Low    Moderate x   High    ________________________________________________________________________  Care Plan discussed with:    Comments   Patient xx    Family  x    RN x    Care Manager x 5                  Consultant:  x Neurology 5   ________________________________________________________________________  Signed:  Sloane Sarabia MD    CDMP Checked:   Yes 35

## 2017-01-12 NOTE — DISCHARGE INSTRUCTIONS
DISCHARGE DIAGNOSIS:  Left facial tingling secondary to headache  NO TIA   Mild dizziness, chronic    MEDICATIONS:  · It is important that you take the medication exactly as they are prescribed. · Keep your medication in the bottles provided by the pharmacist and keep a list of the medication names, dosages, and times to be taken in your wallet. · Do not take other medications without consulting your doctor. Pain Management: per above medications    What to do at Home    Recommended diet:  Cardiac Diet    Recommended activity: Outpatient balance center    If you have questions regarding the hospital related prescriptions or hospital related issues please call [address] [place] [address] at . You can always direct your questions to your primary care doctor if you are unable to reach your hospital physician; your PCP works as an extension of your hospital doctor just like your hospital doctor is an extension of your PCP for your time at the hospital [facility]). If you experience any of the following symptoms then please call your primary care physician or return to the emergency room if you cannot get hold of your doctor:  Fever, chills, nausea, vomiting, diarrhea, change in mentation, falling, bleeding, shortness of breath    You have been referred to OUTPATIENT GAIT RETRAINING to help with your dizziness with moving.      Call neurology if your headache and numbness persist

## 2017-01-12 NOTE — ROUTINE PROCESS
BSV Stroke Education Jw Reid and Stroke Education provided to patient and the following topics were discussed    1. Patients personal risk factors for stroke are hypertension, diabetes mellitus and obesity    2. Warning signs of Stroke:        * Sudden numbness or weakness of the face, arm or leg, especially on one side of          The body            * Sudden confusion, trouble speaking or understanding        * Sudden trouble seeing in one or both eyes        * Sudden trouble walking, dizziness, loss of balance or coordination        * Sudden severe headache with no known cause      3. Importance of activation Emergency [facility] Services ( 9-1-1 ) immediately if                       experience any warning signs of stroke. 4. Be sure and schedule a follow-up appointment with your primary care doctor or any                  specialists as instructed. 5. You must take medicine every day to treat your risk factors for stroke. Be sure to take your medicines exactly as your doctor tells you: no more, no less. Know what your medicines are for , what they do. Anti-thrombotics /anticoagulants can help prevent strokes. You are taking the following medicine(s)  Aspirin, Enoxaparin  6. Smoking and second-hand smoke greatly increase your risk of stroke, cardiovascular disease and death.  Smoking history never

## 2017-01-12 NOTE — PROGRESS NOTES
MSW intern delivered observation letter to pt. Pt accepted observation letter and signed it. Copy was placed on pt's bedside nursing chart.     CONNOR Mcnamara, MSW Intern    Fatoumata Do MSW  [facility]   Ext [phone]

## 2017-01-12 NOTE — PROGRESS NOTES
NEUROLOGY progress NOTE       SUBJECTIVE   - Pt states that the headache is better  - Pt has bilateral facial numbness, better, more on the left that the right    ROS  A ten system review of constitutional, cardiovascular, respiratory, musculoskeletal, endocrine, skin, SHEENT, genitourinary, psychiatric and neurologic systems was obtained and is unremarkable except as stated in HPI     PHYSICAL EXAM  EXAMINATION:   Visit Vitals    /61 (BP 1 Location: Left arm, BP Patient Position: Sitting;Post activity)    Pulse 100    Temp 98.9 °F (37.2 °C)    Resp 16    Ht 5' 2\" (1.575 m)    Wt 202 lb 2.6 oz (91.7 kg)    LMP 02/18/1983    SpO2 97%    BMI 36.98 kg/m2        General:   General appearance: Pt is in no acute distress   Distal pulses are preserved    Neurological Examination:   Mental Status:  AAO x3. Speech is fluent. Follows commands, has normal fund of knowledge, attention, short term recall, comprehension and insight. Cranial Nerves: Visual fields are full. PERRL, Extraocular movements are full. Facial sensation- decreased on the left. Facial movement intact, symmetric. Hearing intact to conversation. Palate elevates symmetrically. Shoulder shrug symmetric. Tongue midline. Motor: Strength is 5/5 in all 4 ext. Normal tone. No atrophy. Sensation: Normal to light touch    Coordination/Cerebellar: Intact to finger-nose-finger     Gait: Romberg is positive and casual gait is normal.     Skin: No significant bruising or lacerations.     CURRENT MEDS  Current Facility-Administered Medications   Medication Dose Route Frequency    sodium chloride (NS) flush 5-10 mL  5-10 mL IntraVENous Q8H    Calcium Citrate-Vitamin D3 (CITROCAL) tablet 1 Tab  1 Tab Oral BID    hydroCHLOROthiazide (HYDRODIURIL) tablet 25 mg  25 mg Oral DAILY    metFORMIN (GLUCOPHAGE) tablet 850 mg  850 mg Oral BID WITH MEALS    metoprolol succinate (TOPROL-XL) XL tablet 25 mg  25 mg Oral DAILY    atorvastatin (LIPITOR) tablet 20 mg  20 mg Oral DAILY    aspirin (ASPIRIN) tablet 325 mg  325 mg Oral DAILY    enoxaparin (LOVENOX) injection 40 mg  40 mg SubCUTAneous DAILY    insulin lispro (HUMALOG) injection   SubCUTAneous ACB&D    albuterol-ipratropium (DUO-NEB) 2.5 MG-0.5 MG/3 ML  3 mL Nebulization Q12H    guaiFENesin SR (MUCINEX) tablet 600 mg  600 mg Oral BID    sodium chloride (NS) 0.9 % flush           LAB DATA REVIEWED:    Results for orders placed or performed during the hospital encounter of 01/11/17   CBC WITH AUTOMATED DIFF   Result Value Ref Range    WBC 6.0 3.6 - 11.0 K/uL    RBC 4.30 3.80 - 5.20 M/uL    HGB 11.7 11.5 - 16.0 g/dL    HCT 36.9 35.0 - 47.0 %    MCV 85.8 80.0 - 99.0 FL    MCH 27.2 26.0 - 34.0 PG    MCHC 31.7 30.0 - 36.5 g/dL    RDW 14.9 (H) 11.5 - 14.5 %    PLATELET 711 874 - 459 K/uL    NEUTROPHILS 40 32 - 75 %    LYMPHOCYTES 54 (H) 12 - 49 %    MONOCYTES 5 5 - 13 %    EOSINOPHILS 1 0 - 7 %    BASOPHILS 0 0 - 1 %    ABS. NEUTROPHILS 2.4 1.8 - 8.0 K/UL    ABS. LYMPHOCYTES 3.2 0.8 - 3.5 K/UL    ABS. MONOCYTES 0.3 0.0 - 1.0 K/UL    ABS. EOSINOPHILS 0.1 0.0 - 0.4 K/UL    ABS. BASOPHILS 0.0 0.0 - 0.1 K/UL   CK W/ REFLX CKMB   Result Value Ref Range     26 - 192 U/L   MAGNESIUM   Result Value Ref Range    Magnesium 2.3 1.6 - 2.4 mg/dL   METABOLIC PANEL, COMPREHENSIVE   Result Value Ref Range    Sodium 142 136 - 145 mmol/L    Potassium 4.0 3.5 - 5.1 mmol/L    Chloride 100 97 - 108 mmol/L    CO2 36 (H) 21 - 32 mmol/L    Anion gap 6 5 - 15 mmol/L    Glucose 96 65 - 100 mg/dL    BUN 14 6 - 20 MG/DL    Creatinine 0.82 0.55 - 1.02 MG/DL    BUN/Creatinine ratio 17 12 - 20      GFR est AA >60 >60 ml/min/1.73m2    GFR est non-AA >60 >60 ml/min/1.73m2    Calcium 8.8 8.5 - 10.1 MG/DL    Bilirubin, total 0.2 0.2 - 1.0 MG/DL    ALT 34 12 - 78 U/L    AST 19 15 - 37 U/L    Alk.  phosphatase 54 45 - 117 U/L    Protein, total 7.2 6.4 - 8.2 g/dL    Albumin 3.6 3.5 - 5.0 g/dL    Globulin 3.6 2.0 - 4.0 g/dL    A-G Ratio 1.0 (L) 1.1 - 2.2     TROPONIN I   Result Value Ref Range    Troponin-I, Qt. <0.04 <0.05 ng/mL   URINALYSIS W/ REFLEX CULTURE   Result Value Ref Range    Color YELLOW/STRAW      Appearance CLEAR CLEAR      Specific gravity 1.009 1.003 - 1.030      pH (UA) 7.5 5.0 - 8.0      Protein NEGATIVE  NEG mg/dL    Glucose NEGATIVE  NEG mg/dL    Ketone NEGATIVE  NEG mg/dL    Bilirubin NEGATIVE  NEG      Blood NEGATIVE  NEG      Urobilinogen 0.2 0.2 - 1.0 EU/dL    Nitrites NEGATIVE  NEG      Leukocyte Esterase SMALL (A) NEG      WBC 0-4 0 - 4 /hpf    RBC 0-5 0 - 5 /hpf    Epithelial cells FEW FEW /lpf    Bacteria NEGATIVE  NEG /hpf    UA:UC IF INDICATED CULTURE NOT INDICATED BY UA RESULT CNI      Hyaline Cast 0-2 0 - 5 /lpf   TSH, 3RD GENERATION   Result Value Ref Range    TSH 4.53 (H) 0.36 - 3.74 uIU/mL   CBC W/O DIFF   Result Value Ref Range    WBC 5.6 3.6 - 11.0 K/uL    RBC 4.40 3.80 - 5.20 M/uL    HGB 11.9 11.5 - 16.0 g/dL    HCT 38.0 35.0 - 47.0 %    MCV 86.4 80.0 - 99.0 FL    MCH 27.0 26.0 - 34.0 PG    MCHC 31.3 30.0 - 36.5 g/dL    RDW 15.0 (H) 11.5 - 14.5 %    PLATELET 018 764 - 482 K/uL   LIPID PANEL   Result Value Ref Range    LIPID PROFILE          Cholesterol, total 172 <200 MG/DL    Triglyceride 178 (H) <150 MG/DL    HDL Cholesterol 59 MG/DL    LDL, calculated 77.4 0 - 100 MG/DL    VLDL, calculated 35.6 MG/DL    CHOL/HDL Ratio 2.9 0 - 5.0     HEMOGLOBIN A1C WITH EAG   Result Value Ref Range    Hemoglobin A1c 7.0 (H) 4.2 - 6.3 %    Est. average glucose 663 mg/dL   METABOLIC PANEL, BASIC   Result Value Ref Range    Sodium 140 136 - 145 mmol/L    Potassium 3.7 3.5 - 5.1 mmol/L    Chloride 98 97 - 108 mmol/L    CO2 33 (H) 21 - 32 mmol/L    Anion gap 9 5 - 15 mmol/L    Glucose 107 (H) 65 - 100 mg/dL    BUN 14 6 - 20 MG/DL    Creatinine 0.81 0.55 - 1.02 MG/DL    BUN/Creatinine ratio 17 12 - 20      GFR est AA >60 >60 ml/min/1.73m2    GFR est non-AA >60 >60 ml/min/1.73m2    Calcium 9.2 8.5 - 10.1 MG/DL   GLUCOSE, POC   Result Value Ref Range    Glucose (POC) 92 65 - 100 mg/dL    Performed by Mike Amin    GLUCOSE, POC   Result Value Ref Range    Glucose (POC) 92 65 - 100 mg/dL    Performed by Nisha Cifuentes    GLUCOSE, POC   Result Value Ref Range    Glucose (POC) 117 (H) 65 - 100 mg/dL    Performed by Jazz Fishman    GLUCOSE, POC   Result Value Ref Range    Glucose (POC) 129 (H) 65 - 100 mg/dL    Performed by Markell Nixon    GLUCOSE, POC   Result Value Ref Range    Glucose (POC) 128 (H) 65 - 100 mg/dL    Performed by SHILOH VELEZ    GLUCOSE, POC   Result Value Ref Range    Glucose (POC) 121 (H) 65 - 100 mg/dL    Performed by Tip Ferrell         Imaging review:  CT Head  No acute abnormality    Stroke workup    MRI Brain:   Mild white matter disease likely to chronic small vessel ischemic  disease. No evidence of acute process. MRA head/neck:   1. 2 mm protuberance area from the left supraclinoid ICA which may be an  infundibulum of a vessel versus a small aneurysm. Recommend CTA for further  evaluation. .  2. Mild narrowing of the origin of the right internal carotid artery. TTE:   Left ventricle: Systolic function was normal. Ejection fraction was  estimated in the range of 60 % to 65 %. There were no regional wall motion  abnormalities.  Wall thickness was at the upper limits of normal.    Stroke labs:  HgBA1c    Lab Results   Component Value Date/Time    Hemoglobin A1c 7.0 01/12/2017 04:20 AM     LDL   Lab Results   Component Value Date/Time    LDL, calculated 77.4 01/12/2017 04:20 AM       Stroke Risk Factors:      IMPRESSION:  Patient Active Problem List   Diagnosis Code    Asthma J45.909    Hyperlipemia E78.5    Arthritis M19.90    HTN (hypertension) I10    Headache(784.0)     Spondylitis, cervical M46.92    Tingling sensation R20.2    Chest pain R07.9    Anemia D64.9    Cyst of right kidney N28.1    Hip pain, right M25.551    Acute bronchitis J20.9    Visual floaters H43.399    Colon cancer screening Z12.11  Osteopenia M85.80    Postmenopausal state Z78.0    Vitamin D deficiency E55.9    Bilateral hip pain M25.551, M25.552    Diabetes mellitus, type 2 (HCC) E11.9    Postmenopausal disorder N95.9    Numbness and tingling of right leg R20.2    Foot pain, left M79.672    Rotator cuff (capsule) sprain and strain [id number]    Osteoarthritis of acromioclavicular joint M19.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 0    Need for varicella vaccine Z23    Screening for breast cancer Z12.39    Cataract H26.9    Preop examination Z01.818    Peripheral autonomic neuropathy due to DM ([facility]) E11.43    Pre-ulcerative calluses L84    Type 2 diabetes mellitus with pressure callus ([facility]) E11.628, L84    Hammer toe of right foot M20.41    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 ([facility]) E11.69, M21.969    Poorly controlled type 2 diabetes mellitus with circulatory disorder ([facility]) E11.59, E11.65    DAVID (generalized anxiety disorder) F41.1    Abnormal finding on MRI of brain R90.89    Tinea pedis B35.3    Arthralgia of right hip M25.551    Swollen gland R59.9    Acute pharyngitis J02.9    Diabetes mellitus type 2, controlled ([facility]) E11.9    Multiple thyroid nodules E04.2    Skipped heart beats D08.5    Helicobacter pylori (H. pylori) infection A04.8       Lv Maguire is a 78 y.o. female who presents with new onset left facial numbness since am. Came to the hospital and CT head is nl. TTE is nl. She does have risk factor for stroke - htn, hld and dm. This could also be secondary to the headache. 1. Left facial tingling secondary to headache. No evidence of bell's palsy. 2. Headaches  3. HTN  4. HLD  5.  DM    RECOMMENDATIONS:  - Stroke taylor is negative  - Headache is better but continues to have mild numbness on the left side of the face.  - OK to d/c pt  - Continue to take butabital as needed. Pt is having headache once in 3 wks. If headache frequency worsens, will need a follow up with neurology.       Kia Duval MD

## 2017-01-12 NOTE — ROUTINE PROCESS
Bedside shift change report given to Fabricio Gonzalez RN (oncoming nurse) by Víctor Andujar RN (offgoing nurse). Report included the following information SBAR, Kardex, OR Summary, Procedure Summary and Intake/Output.     Zone Phone:   [phone]      Significant changes during shift:  Admission      Patient Information    Jayme Noel  78 y.o.  1/11/2017  5:16 AM by Suzanne Canavan, MD. Jayme Noel was admitted from Home    Problem List    Patient Active Problem List    Diagnosis Date Noted    Helicobacter pylori (H. pylori) infection 12/07/2016    Skipped heart beats 11/29/2016    Multiple thyroid nodules 07/14/2016    Swollen gland 05/26/2016    Acute pharyngitis 05/26/2016    Diabetes mellitus type 2, controlled ([address] 05/26/2016    Arthralgia of right hip 04/25/2016    Tinea pedis 06/03/2015    Abnormal finding on MRI of brain 03/16/2015    DAVID (generalized anxiety disorder) 01/22/2015    Peripheral autonomic neuropathy due to DM ([address] 09/29/2014    Pre-ulcerative calluses 09/29/2014    Type 2 diabetes mellitus with pressure callus ([address] 09/29/2014    Hammer toe of right foot 09/29/2014    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 ([address] 09/29/2014    Poorly controlled type 2 diabetes mellitus with circulatory disorder ([address] 09/29/2014    Cataract 09/24/2014    Preop examination 09/24/2014    Need for varicella vaccine 09/23/2014    Screening for breast cancer 09/23/2014    Acute asthma exacerbation 08/31/2014    Elevated LFTs 04/16/2014    Screening for depression 04/16/2014    Risk for falls 04/16/2014    GERD (gastroesophageal reflux disease) 04/07/2014    Osteoarthritis of acromioclavicular joint 03/18/2014    Shoulder pain, left 03/18/2014    Rotator cuff (capsule) sprain and strain 09/20/2013    Foot pain, left 06/14/2013    Bilateral hip pain 05/20/2013    Diabetes mellitus, type 2 ([address] 05/20/2013    Postmenopausal disorder 05/20/2013    Numbness and tingling of right leg 05/20/2013    Postmenopausal state 01/21/2013    Vitamin D deficiency 01/21/2013    Colon cancer screening 01/02/2013    Osteopenia 01/02/2013    Visual floaters 02/01/2012    Acute bronchitis 11/30/2011    Hip pain, right 09/14/2011    Cyst of right kidney 08/10/2011    Anemia 04/22/2011    Chest pain 10/20/2010    Tingling sensation 09/13/2010    Spondylitis, cervical 04/05/2010    HTN (hypertension) 03/26/2010    Headache(784.0) 03/26/2010    Asthma 02/18/2010    Hyperlipemia 02/18/2010    Arthritis 02/18/2010     Past Medical History   Diagnosis Date    Abnormal finding on MRI of brain 3/16/2015     evaluated by neurologist and no findings    Acute pharyngitis 5/26/2016    Anemia NEC     Arthralgia of right hip 4/25/2016    Arthritis 2/18/2010    Asthma 2/18/2010    Cataract 9/24/2014    Diabetes ([address]     Elevated LFTs 4/16/2014    DAVID (generalized anxiety disorder) 1/22/2015    GERD (gastroesophageal reflux disease) 4/7/2014    Hammer toe of right foot 9/29/2014    Headache(784.0) 2/52/5909    Helicobacter pylori (H. 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 ([address] 9/29/2014         Core Measures:    CVA: Yes     Activity Status:    OOB to Chair Yes  Ambulated this shift Yes      DVT prophylaxis:    DVT prophylaxis Med-    Patient Safety:    Falls Score Total Score: 1  Safety Level_______  Bed Alarm On? No  Sitter?  No    Plan for upcoming shift: Safety, Meds        Discharge Plan: In progress  Active Consults:  IP CONSULT TO NEUROLOGY

## 2017-01-12 NOTE — INTERDISCIPLINARY ROUNDS
[facility] Interdisciplinary rounds were held today to discuss patient plan of care and outcomes. The interdisciplinary team collaborated to facilitate discharge planning needs. The following members were present; Nurse Manager, Kraig Nunez [id number], Pharmacist, Primary Nurse, , Physical Therapy,   Physician, and Case Management. PLAN:  Pt will be dc'd today with a complicated migraine dx, to follow with PCP.

## 2017-01-12 NOTE — PROGRESS NOTES
Problem: Mobility Impaired (Adult and Pediatric)  Goal: *Acute Goals and Plan of Care (Insert Text)  Physical Therapy Goals  Initiated 1/12/2017  1. Patient will move from supine to sit and sit to supine , scoot up and down and roll side to side in bed with independence within 7 day(s). 2. Patient will transfer from bed to chair and chair to bed with independence using the least restrictive device within 7 day(s). 3. Patient will perform sit to stand with independence within 7 day(s). 4. Patient will ambulate with independence for 150 feet with the least restrictive device within 7 day(s). 5. Patient will ascend/descend 2 stairs with one sided handrail(s) with modified independence within 7 day(s). 6. Patient will improve Whitten Balance score by 7 points within 7 days. PHYSICAL THERAPY EVALUATION- NEURO POPULATION     Patient: Uziel Gonzalez (13 y.o. female)  Date: 1/12/2017  Primary Diagnosis: TIA        Precautions:      CT and MRI negative for acute CVA. ASSESSMENT :  Based on the objective data described below, the patient presents with good strength, decreased functional mobility, impaired higher level balance, and mildly unsteady gait following admission for TIA. PTA patient was independent with all aspects of functional mobility. Patient lives with her daughter who can provide 24/7 support. Patient performs all iADLs and is fairly active at home. She reports feeling unbalanced for the last few weeks. Currently, patient was up ad nuzhat in the room, reporting feeling dizzy although VSS. Patient is independent with bed mobility and transfers. Patient ambulated 150 feet with CGA-SBA. Patient with unsteady gait, worsening with head turns and nods with change in gait speed and increased path deviations. Patient with veering to the L at times with narrow base of support, although no LOB noted. Patient scored 50/56 on Whitten balance test and may benefit from higher level dynamic balance testing possibly DGI. Patient and/or family was verbally educated on the BE FAST acronym for signs/symptoms of CVA and TIA. BE FAST was written on patient's communication board  for visual education and reinforcement. All questions answered with patient indicating good understanding. Patient left sitting in the chair at the conclusion of PT evaluation. Patient will benefit from OP neuro PT at discharge. Patient will benefit from skilled intervention to address the above impairments. Patients rehabilitation potential is considered to be Good  Factors which may influence rehabilitation potential include:   [X]           None noted  [ ]           Mental ability/status  [ ]           Medical condition  [ ]           Home/family situation and support systems  [ ]           Safety awareness  [ ]           Pain tolerance/management  [ ]           Other:        PLAN :  Recommendations and Planned Interventions:  [X]             Bed Mobility Training             [X]      Neuromuscular Re-Education  [X]             Transfer Training                   [ ]      Orthotic/Prosthetic Training  [X]             Gait Training                         [ ]      Modalities  [X]             Therapeutic Exercises           [ ]      Edema Management/Control  [X]             Therapeutic Activities            [X]      Patient and Family Training/Education  [ ]             Other (comment):  Frequency/Duration: Patient will be followed by physical therapy 5 days per week to address goals. Discharge Recommendations: Outpatient neuro  Further Equipment Recommendations for Discharge: recommended buying SPC       SUBJECTIVE:   Patient stated My balance has been off for a few weeks.       OBJECTIVE DATA SUMMARY:   HISTORY:    Past Medical History   Diagnosis Date    Abnormal finding on MRI of brain 3/16/2015       evaluated by neurologist and no findings    Acute pharyngitis 5/26/2016    Anemia NEC      Arthralgia of right hip 4/25/2016    Arthritis 2/18/2010    Asthma 2/18/2010    Cataract 9/24/2014    Diabetes ([address]      Elevated LFTs 4/16/2014    DAVID (generalized anxiety disorder) 1/22/2015    GERD (gastroesophageal reflux disease) 4/7/2014    Hammer toe of right foot 9/29/2014    Headache(784.0) 8/42/9129    Helicobacter pylori (H. 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 ([address] 9/29/2014    Preop examination 9/24/2014    Risk for falls 4/16/2014    Screening for breast cancer 9/23/2014    Screening for depression 4/16/2014    Shoulder pain, left 3/18/2014    Spondylitis, cervical 4/5/2010    Tinea pedis 6/3/2015    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 ([address] 9/29/2014     Past Surgical History   Procedure Laterality Date    Hx gyn   1983       total hysterectomy for uterine fibroid    Pr abdomen surgery proc unlisted           inguinal hernia    Pr abdomen surgery proc unlisted        Pr open repair clavicle fracture   2009    Hx appendectomy        Endoscopy, colon, diagnostic        Hx heent           tonsillectomy    Hx orthopaedic           clavicle repair    Hx other surgical           ?straightened colon out     Prior Level of Function/Home Situation: patient was independent with all aspects of functional mobility. Patient lives with her daughter who can provide 24/7 support. Patient performs all iADLs and is fairly active at home. She reports feeling unbalanced for the last few weeks. Personal factors and/or comorbidities impacting plan of care:   Fall risk      Home Situation  Home Environment: Private residence  # Steps to Enter: 2  Rails to Enter: Yes  Hand Rails : Bilateral  One/Two Story Residence: One story  Living Alone: No  Support Systems: Child(florian), Family member(s) (grandson is in a wheelchair; daughter)  Patient Expects to be Discharged to[de-identified] Private residence  Current DME Used/Available at Home: Nebulizer  Tub or Shower Type: Tub/Shower combination     EXAMINATION/PRESENTATION/DECISION MAKING:   Critical Behavior:  Neurologic State: Alert  Orientation Level: Oriented X4  Cognition: Appropriate for age attention/concentration  Safety/Judgement: Awareness of environment, Fall prevention  Skin:  Appears intact  Strength:    Strength: Within functional limits                    Coordination:  Coordination:  (slight tremor)  Tone & Sensation:   Tone: Normal              Sensation:  (numb sensation across top of head/forehead)               Range Of Motion:  AROM: Within functional limits           PROM: Within functional limits           Functional Mobility:  Bed Mobility:  Rolling:  (in bathroom upon arrival)        Scooting: Independent  Transfers:  Sit to Stand: Supervision  Stand to Sit: Supervision        Bed to Chair: Contact guard assistance              Balance:   Sitting: Intact  Standing: Impaired  Standing - Static: Good  Standing - Dynamic : Fair  Ambulation/Gait Training:  Distance (ft): 150 Feet (ft)  Assistive Device: Gait belt  Ambulation - Level of Assistance: Contact guard assistance;Minimal assistance     Gait Description (WDL): Exceptions to WDL  Gait Abnormalities: Ataxic;Decreased step clearance; Path deviations        Base of Support: Narrowed     Speed/Tona: Pace decreased (<100 feet/min)  Step Length: Left shortened;Right shortened                               Functional Measure  Whitten Balance Test:      Sitting to Standin  Standing Unsupported: 4  Sitting with Back Unsupported: 4  Standing to Sittin  Transfers: 4  Standing Unsupported with Eyes Closed: 3  Standing Unsupported with Feet Together: 3  Reach Forward with Outstretched Arm: 4   Object: 4  Turn to Look Over Shoulders: 4  Turn 360 Degrees: 4  Alternate Foot on Step/Stool: 3  Standing Unsupported One Foot in Front: 3  Stand on One Le  Total: 50             56=Maximum possible score;   0-20=High fall risk  21-40=Moderate fall risk   41-56=Low fall risk      Whitten Balance Test and G-code impairment scale:  Percentage of Impairment CH     0%    CI     1-19% CJ     20-39% CK     40-59% CL     60-79% CM     80-99% CN      100%   Whitten   Score 0-56 56 45-55 34-44 23-33 12-22 1-11 0         G codes: In compliance with CMSs Claims Based Outcome Reporting, the following G-code set was chosen for this patient based on their primary functional limitation being treated: The outcome measure chosen to determine the severity of the functional limitation was the Dumont with a score of 50/56 which was correlated with the impairment scale. · Mobility - Walking and Moving Around:               - CURRENT STATUS:    CI - 1%-19% impaired, limited or restricted               - GOAL STATUS:           CH - 0% impaired, limited or restricted               - D/C STATUS:                       ---------------To be determined---------------       Physical Therapy Evaluation Charge Determination   History Examination Presentation Decision-Making   MEDIUM  Complexity : 1-2 comorbidities / personal factors will impact the outcome/ POC  MEDIUM Complexity : 3 Standardized tests and measures addressing body structure, function, activity limitation and / or participation in recreation  LOW Complexity : Stable, uncomplicated  Other outcome measures Whitten  LOW       Based on the above components, the patient evaluation is determined to be of the following complexity level: LOW      Therapeutic Exercises:      Pain:                    Activity Tolerance:   Good, VSS  Please refer to the flowsheet for vital signs taken during this treatment.   After treatment:   [X]     Patient left in no apparent distress sitting up in chair  [ ]     Patient left in no apparent distress in bed  [X] Call bell left within reach  [X]     Nursing notified  [ ]     Caregiver present  [ ]     Bed alarm activated      COMMUNICATION/EDUCATION:   The patients plan of care was discussed with: Occupational Therapist, Registered Nurse and . Patient was educated regarding Her deficit(s) of balance and unsteady gait as this relates to Her diagnosis of CVA/TIA workup. She demonstrated good understanding as evidenced by verbal feedback. [X]  Fall prevention education was provided and the patient/caregiver indicated understanding. [X]  Patient/family have participated as able in goal setting and plan of care. [X]  Patient/family agree to work toward stated goals and plan of care. [ ]  Patient understands intent and goals of therapy, but is neutral about his/her participation. [ ]  Patient is unable to participate in goal setting and plan of care.      Thank you for this referral.  Karol Marx, PT, DPT   Time Calculation: 34 mins

## 2017-01-12 NOTE — PROGRESS NOTES
Occupational Therapy neurological EVALUATION with discharge  Patient: Nelida Perez (86 y.o. female)  Date: 1/12/2017  Primary Diagnosis: TIA        Precautions: none       ASSESSMENT:  Based on the objective data described below, the patient presents with dizziness with head turns up and down and left and right. Pt reported no numbness and had equal strength and coordination in UE. BP was stable. Pt was in bathroom upon arrival and was able to perform ADLs with distant supervision due to slight unsteadiness and dizziness. PT is being followed by PT services to address balance. Further skilled acute occupational therapy is not indicated at this time.   Discharge Recommendations: OP Neuro PT  Further Equipment Recommendations for Discharge: none     SUBJECTIVE:   Patient stated I don't feel completely back to normal.    OBJECTIVE DATA SUMMARY:   HISTORY:   Past Medical History   Diagnosis Date    Abnormal finding on MRI of brain 3/16/2015     evaluated by neurologist and no findings    Acute pharyngitis 5/26/2016    Anemia NEC     Arthralgia of right hip 4/25/2016    Arthritis 2/18/2010    Asthma 2/18/2010    Cataract 9/24/2014    Diabetes ([address]     Elevated LFTs 4/16/2014    DAVID (generalized anxiety disorder) 1/22/2015    GERD (gastroesophageal reflux disease) 4/7/2014    Hammer toe of right foot 9/29/2014    Headache(784.0) 2/26/7268    Helicobacter pylori (H. 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 ([facility] 9/29/2014     Past Surgical History   Procedure Laterality Date    Hx gyn  1983     total hysterectomy for uterine fibroid    Pr abdomen surgery proc unlisted       inguinal hernia    Pr abdomen surgery proc unlisted      Pr open repair clavicle fracture  2009    Hx appendectomy      Endoscopy, colon, diagnostic      Hx heent       tonsillectomy    Hx orthopaedic       clavicle repair    Hx other surgical       ?straightened colon out       Prior Level of Function/Home Situation: ambulated recently touching objects or with shopping cart; dizziness and unsteadiness at times; able to perform ADLs/IADLs   Expanded or extensive additional review of patient history: performed fugl wright; PLF assessment; looked at old notes; assessed vision balance ADLs    Home Situation  Home Environment: Private residence  # Steps to Enter: 2  Rails to Enter: Yes  Hand Rails : Bilateral  One/Two Story Residence: One story  Living Alone: No  Support Systems: Child(florian), Family member(s) (grandson is in a wheelchair; daughter)  Patient Expects to be Discharged to[de-identified] Private residence  Current DME Used/Available at Home: Nebulizer  Tub or Shower Type: Tub/Shower combination  [x]  Right hand dominant   []  Left hand dominant    EXAMINATION OF PERFORMANCE DEFICITS:  Cognitive/Behavioral Status:  Neurologic State: Alert  Orientation Level: Oriented X4  Cognition: Appropriate decision making; Appropriate for age attention/concentration; Appropriate safety awareness; Follows commands  Perception: Appears intact  Perseveration: No perseveration noted  Safety/Judgement: Awareness of environment; Fall prevention    Vision/Perceptual:    Tracking: Able to track stimulus in all quadrants w/o difficulty                      Acuity: Within Defined Limits    Corrective Lenses: Glasses  Range of Motion:    AROM: Within functional limits  PROM: Within functional limits Strength:    Strength: Within functional limits              Coordination:  Coordination:  (slight tremor)  Fine Motor Skills-Upper: Left Intact; Right Intact    Gross Motor Skills-Upper: Left Intact; Right Intact  Tone & Sensation:    Tone: Normal  Sensation:  (numb sensation across top of head/forehead)                      Balance:  Sitting: Intact  Standing: Impaired  Standing - Static: Good  Standing - Dynamic : Fair    Functional Mobility and Transfers for ADLs:  Bed Mobility:  Rolling:  (in bathroom upon arrival)  Scooting: Independent    Transfers:  Sit to Stand: Supervision  Stand to Sit: Supervision  Bed to Chair: Contact guard assistance  Toilet Transfer : Contact guard assistance    ADL Assessment:  Feeding: Independent    Oral Facial Hygiene/Grooming: Supervision    Bathing: Supervision    Upper Body Dressing: Supervision    Lower Body Dressing: Supervision    Toileting: Supervision        Cognitive Retraining  Safety/Judgement: Awareness of environment; Fall prevention      Functional Measure:   Fugl-France Assessment of Motor Recovery after Stroke:     Reflex Activity  Flexors/Biceps/Fingers: Can be elicited  Extensors/Triceps: Can be elicited  Reflex Subtotal: 4    Volitional Movement Within Synergies  Shoulder Retraction: Full  Shoulder Elevation: Full  Shoulder Abduction (90 degrees): Full  Shoulder External Rotation: Full  Elbow Flexion: Full  Forearm Supination: Full  Shoulder Adduction/Internal Rotation: Full  Elbow Extension: Full  Forearm Pronation: Full  Subtotal: 18    Volitional Movement Mixing Synergies  Hand to Lumbar Spine: Full  Shoulder Flexion (0-90 degrees): Full  Pronation-Supination: Full  Subtotal: 6    Volitional Movement With Little or No Synergy  Shoulder Abduction (0-90 degrees): Full  Shoulder Flexion ( degrees): Full  Pronation/Supination: Full  Subtotal : 6    Normal Reflex Activity  Biceps, Triceps, Finger Flexors:  Full  Subtotal : 2    Upper Extremity Total   Upper Extremity Total: 36    Wrist  Stability at 15 Degree Dorsiflexion: Full  Repeated Dorsiflexion/ Volar Flexion: Full  Stability at 15 Degree Dorsiflexion: Full  Repeated Dorsiflexion/ Volar Flexion: Full  Circumduction: Full  Wrist Total: 10    Hand  Mass Flexion: Full  Mass Extension: Full  Grasp A: Full  Grasp B: Full  Grasp C: Full  Grasp D: Full  Grasp E: Full  Hand Total: 14    Coordination/Speed  Tremor: None  Dysmetria: None  Time: <1s  Coordination/Speed Total : 6    Total A-D  Total A-D (Motor Function): 66/66     Percentage of impairment CH  0% CI  1-19% CJ  20-39% CK  40-59% CL  60-79% CM  80-99% CN  100%   Fugl-France score: 0-66 66 53-65 39-52 26-38 13-25 1-12   0      This is a reliable/valid measure of arm function after a neurological event. It has established value to characterize functional status and for measuring spontaneous and therapy-induced recovery; tests proximal and distal motor functions. Fugl-France Assessment - UE scores recorded between five and 30 days post neurologic event can be used to predict UE recovery at six months post neurologic event. Severe = 0-21 points   Moderately Severe = 22-33 points   Moderate = 34-47 points   Mild = 48-66 points  Bard FORTINO Crook, LESA Olmos, & PRETTY Olea (1992). Measurement of motor recovery after stroke: Outcome assessment and sample size requirements.  Stroke, 23, pp. 7963-2813.   ------------------------------------------------------------------------------------------------------------------------------------------------------------------  MCID:  Stroke:   Marston Settles et al, 2001; n = 171; mean age 79 (5) years; assessed within 16 (12) days of stroke, Acute Stroke)  FMA Motor Scores from Admission to Discharge   10 point increase in FMA Upper Extremity = 1.5 change in discharge FIM   10 point increase in FMA Lower Extremity = 1.9 change in discharge FIM  MDC:   Stroke:   Yesy Whitaker et al, 2008, n = 14, mean age = 59.9 (14.6) years, assessed on average 14 (6.5) months post stroke, Chronic Stroke)   FMA = 5.2 points for the Upper Extremity portion of the assessment     G codes: In compliance with CMSs Claims Based Outcome Reporting, the following G-code set was chosen for this patient based on their primary functional limitation being treated: The outcome measure chosen to determine the severity of the functional limitation was the fugl wright with a score of 66/66 which was correlated with the impairment scale. ? Self Care:     - CURRENT STATUS: CH - 0% impaired, limited or restricted    - GOAL STATUS: CH - 0% impaired, limited or restricted    - D/C STATUS:  CH - 0% impaired, limited or restricted      Occupational Therapy Evaluation Charge Determination   History Examination Decision-Making   MEDIUM Complexity : Expanded review of history including physical, cognitive and psychosocial  history  LOW Complexity : 1-3 performance deficits relating to physical, cognitive , or psychosocial skils that result in activity limitations and / or participation restrictions  MEDIUM Complexity : Patient may present with comorbidities that affect occupational performnce. Miniml to moderate modification of tasks or assistance (eg, physical or verbal ) with assesment(s) is necessary to enable patient to complete evaluation       Based on the above components, the patient evaluation is determined to be of the following complexity level: LOW     Pain:  Pain Scale 1: Numeric (0 - 10)  Pain Intensity 1: 0              Activity Tolerance:     Please refer to the flowsheet for vital signs taken during this treatment.   After treatment:   [x]  Patient left in no apparent distress sitting up in chair  []  Patient left in no apparent distress in bed  [x]  Call bell left within reach  [x]  Nursing notified  []  Caregiver present  [x]  Bed alarm activated    COMMUNICATION/EDUCATION:   The patients plan of care was discussed with: Physical Therapist, Registered Nurse and patient. Patient was educated regarding the BEFAST scale. She demonstrated Good understanding as evidenced by teach back. []      Home safety education was provided and the patient/caregiver indicated understanding. [x]      Patient have participated as able and agree with findings and recommendations. []      Patient is unable to participate in plan of care at this time.   Findings and recommendations were discussed with: Physical Therapist, Registered Nurse and patient    Thank you for this referral.  Armaan Lopez OTR/L  Time Calculation: 24 mins

## 2017-01-12 NOTE — PROGRESS NOTES
Orders received, chart reviewed and patient evaluated by physical therapy. Recommend patient to discharge to OP neuro pending progress with skilled acute care physical therapy. Recommend with nursing patient to complete as able in order to maintain strength, endurance and independence: OOB to chair 3x/day with CGA-SBA. Thank you for your assistance. Full evaluation to follow.

## 2017-01-12 NOTE — PROGRESS NOTES
Pt is a 79 y/o -American female under observation for TIA. Pt has been recommended for outpatient Neurological Conditions via [facility]. CM provided pt with therapy notes and [facility] outpatient orders. Pt will be transported by family at discharge. Pt is alert and oriented to person, place, time and situation. Care Management Interventions  PCP Verified by CM: Yes (Dr. Josefina Benavidez)  Mode of Transport at Discharge: BLS  Transition of Care Consult (CM Consult):  Other (outpatient )  Discharge Durable Medical Equipment: No  Health Maintenance Reviewed: Yes  Physical Therapy Consult: Yes  Occupational Therapy Consult: Yes  Confirm Follow Up Transport: Self (Pt is independent to include driving )  Plan discussed with Pt/Family/Caregiver: Yes  Discharge Location  Discharge Placement: Home with outpatient services (Home with outpatient )    EMI Barroso, [address] [facility]   [phone]

## 2017-01-12 NOTE — PROGRESS NOTES
Patient discharge instructions provided. Instructions included AVS and stroke booklet. All questions answered. Patient left via volunteers.

## 2017-01-12 NOTE — ROUTINE PROCESS
* No surgery found *  Bedside and Verbal shift change report given to Robert (oncoming nurse) by Humble Richards RN (offgoing nurse). Report included the following information SBAR, Kardex, MAR and Recent Results.     Zone Phone:   [phone]      Significant changes during shift:  none        Patient Information    Ava Lane  78 y.o.  1/11/2017  5:16 AM by William Miner MD. Ava Lane was admitted from Home    Problem List    Patient Active Problem List    Diagnosis Date Noted    Helicobacter pylori (H. pylori) infection 12/07/2016    Skipped heart beats 11/29/2016    Multiple thyroid nodules 07/14/2016    Swollen gland 05/26/2016    Acute pharyngitis 05/26/2016    Diabetes mellitus type 2, controlled ([address] 05/26/2016    Arthralgia of right hip 04/25/2016    Tinea pedis 06/03/2015    Abnormal finding on MRI of brain 03/16/2015    DAVID (generalized anxiety disorder) 01/22/2015    Peripheral autonomic neuropathy due to DM ([address] 09/29/2014    Pre-ulcerative calluses 09/29/2014    Type 2 diabetes mellitus with pressure callus ([address] 09/29/2014    Hammer toe of right foot 09/29/2014    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 ([address] 09/29/2014    Poorly controlled type 2 diabetes mellitus with circulatory disorder ([address] 09/29/2014    Cataract 09/24/2014    Preop examination 09/24/2014    Need for varicella vaccine 09/23/2014    Screening for breast cancer 09/23/2014    Acute asthma exacerbation 08/31/2014    Elevated LFTs 04/16/2014    Screening for depression 04/16/2014    Risk for falls 04/16/2014    GERD (gastroesophageal reflux disease) 04/07/2014    Osteoarthritis of acromioclavicular joint 03/18/2014    Shoulder pain, left 03/18/2014    Rotator cuff (capsule) sprain and strain 09/20/2013    Foot pain, left 06/14/2013    Bilateral hip pain 05/20/2013    Diabetes mellitus, type 2 ([address] 05/20/2013    Postmenopausal disorder 05/20/2013    Numbness and tingling of right leg 05/20/2013  Postmenopausal state 01/21/2013    Vitamin D deficiency 01/21/2013    Colon cancer screening 01/02/2013    Osteopenia 01/02/2013    Visual floaters 02/01/2012    Acute bronchitis 11/30/2011    Hip pain, right 09/14/2011    Cyst of right kidney 08/10/2011    Anemia 04/22/2011    Chest pain 10/20/2010    Tingling sensation 09/13/2010    Spondylitis, cervical 04/05/2010    HTN (hypertension) 03/26/2010    Headache(784.0) 03/26/2010    Asthma 02/18/2010    Hyperlipemia 02/18/2010    Arthritis 02/18/2010     Past Medical History   Diagnosis Date    Abnormal finding on MRI of brain 3/16/2015     evaluated by neurologist and no findings    Acute pharyngitis 5/26/2016    Anemia NEC     Arthralgia of right hip 4/25/2016    Arthritis 2/18/2010    Asthma 2/18/2010    Cataract 9/24/2014    Diabetes ([address]     Elevated LFTs 4/16/2014    DAVID (generalized anxiety disorder) 1/22/2015    GERD (gastroesophageal reflux disease) 4/7/2014    Hammer toe of right foot 9/29/2014    Headache(784.0) 9/54/9999    Helicobacter pylori (H. 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 ([address] 9/29/2014         Core Measures:    CVA: YES YES  CHF:NO NOT APPLICABLE  PNA:NO NOT APPLICABLE      Activity Status:    OOB to Chair NO  Ambulated this shift YES   Bed Rest NO    Supplemental O2: (If Applicable)    On room air    LINES AND DRAINS:    Central Line? NO     PICC LINE? NO     Urinary Catheter? NO     DVT prophylaxis:    DVT prophylaxis Med- YES  DVT prophylaxis SCD or SHAWNA- NO     Wounds: (If Applicable)    Wounds- NO    Location none    Patient Safety:    Falls Score Total Score: 2  Safety Level_II______  Bed Alarm On? YES  Sitter?  NO    Plan for upcoming shift: PT, OT,        Discharge Plan: NO just admitted    Active Consults:  IP CONSULT TO NEUROLOGY

## 2017-01-13 ENCOUNTER — PATIENT OUTREACH (OUTPATIENT)
Dept: FAMILY MEDICINE CLINIC | Age: 77
End: 2017-01-13

## 2017-01-13 NOTE — PROGRESS NOTES
NN NOTE  Patient listed on discharge [facility]) report on 17. Patient discharged from [facility] for atypical migraine. Contacted patient to perform post hospital discharge assessment. Verified  and address with patient as identifiers. Provided introduction to self, and explanation of the NN role. Performed medication reconciliation with patient, and patient verbalizes understanding of administration of home medications. Reviewed discharge instructions with patient. Patient verbalizes understand of discharge instructions and follow-up care. Patient scheduled to f/u with Dr. Hanny Mallory at [facility] on 17 at 11:30 AM.  Reviewed red flags with patient, and patient verbalizes understanding. Patient is testing blood sugar several times a day and still reports diarrhea from antibiotics that she finished for H. Pylori. No other clinical/social/functional needs noted. Patient given an opportunity to ask questions. Contact information provided to the patient for future reference or further questions. Red Flags:  Patient states at this time she does not wish to pursue outpatient therapy for balance training at [facility], encouraged her to discuss this with PCP at appointment.

## 2017-01-25 ENCOUNTER — OFFICE VISIT (OUTPATIENT)
Dept: FAMILY MEDICINE CLINIC | Age: 77
End: 2017-01-25

## 2017-01-25 VITALS
HEART RATE: 67 BPM | OXYGEN SATURATION: 95 % | DIASTOLIC BLOOD PRESSURE: 72 MMHG | WEIGHT: 204.4 LBS | RESPIRATION RATE: 12 BRPM | SYSTOLIC BLOOD PRESSURE: 146 MMHG | HEIGHT: 62 IN | BODY MASS INDEX: 37.61 KG/M2 | TEMPERATURE: 97.9 F

## 2017-01-25 DIAGNOSIS — J45.40 MODERATE PERSISTENT ASTHMA WITHOUT COMPLICATION: ICD-10-CM

## 2017-01-25 DIAGNOSIS — G43.119 INTRACTABLE MIGRAINE WITH AURA WITHOUT STATUS MIGRAINOSUS: ICD-10-CM

## 2017-01-25 DIAGNOSIS — J45.902: ICD-10-CM

## 2017-01-25 DIAGNOSIS — E11.9 DIABETES MELLITUS WITHOUT COMPLICATION (HCC): Primary | ICD-10-CM

## 2017-01-25 LAB — GLUCOSE POC: 136 MG/DL

## 2017-01-25 RX ORDER — AZELASTINE 1 MG/ML
SPRAY, METERED NASAL
COMMUNITY
Start: 2017-01-25 | End: 2017-04-12

## 2017-01-25 RX ORDER — IBUPROFEN 200 MG
600 TABLET ORAL
Qty: 40 TAB | Refills: 1
Start: 2017-01-25 | End: 2017-04-12

## 2017-01-25 RX ORDER — IPRATROPIUM BROMIDE 0.5 MG/2.5ML
0.5 SOLUTION RESPIRATORY (INHALATION) AS NEEDED
Qty: 1250 ML | Refills: 11 | Status: SHIPPED | OUTPATIENT
Start: 2017-01-25 | End: 2017-01-26 | Stop reason: SDUPTHER

## 2017-01-25 RX ORDER — CETIRIZINE HCL 10 MG
TABLET ORAL
Refills: 3 | COMMUNITY
Start: 2016-12-31 | End: 2017-10-18 | Stop reason: ALTCHOICE

## 2017-01-25 RX ORDER — LEVALBUTEROL TARTRATE 45 UG/1
1 AEROSOL, METERED ORAL
Qty: 1 INHALER | Refills: 7 | Status: SHIPPED | OUTPATIENT
Start: 2017-01-25 | End: 2017-10-14

## 2017-01-25 RX ORDER — LEVALBUTEROL INHALATION SOLUTION 1.25 MG/3ML
1.25 SOLUTION RESPIRATORY (INHALATION)
Qty: 30 NEBULE | Refills: 11 | Status: SHIPPED | OUTPATIENT
Start: 2017-01-25 | End: 2017-01-26 | Stop reason: SDUPTHER

## 2017-01-25 RX ORDER — FLUTICASONE PROPIONATE 50 MCG
2 SPRAY, SUSPENSION (ML) NASAL AS NEEDED
Qty: 1 BOTTLE | Refills: 11 | Status: SHIPPED | OUTPATIENT
Start: 2017-01-25 | End: 2017-10-31 | Stop reason: SDUPTHER

## 2017-01-25 NOTE — PROGRESS NOTES
HISTORY OF PRESENT ILLNESS  Génesis Hayden is a 78 y.o. female. HPI   Present with her daughter with 1 night posthospitalization stating that she developed and Had some upper extremity numbness, tingling senstion of her face, so that she went to [facility] fortunately she was investigated with negative results, before that was also told to have bronchitits , was put on prednisone, finished prednisone, then after, the numbness and tingling sensation started today she is stating that she is not stressed out, in addition she was sent to ENT was told have Crystal problem in her ear and that may cause her strokelike symptoms she was given some therapeutic maneuvers and in addition given nasal spray, stating that she has not had any of the above symptoms since the discharge. The daughter stating that she was only hospitalized one night was tought to have stroke, also had evaluation by consulting neurologist for which she was told to have complex migraine HA, she was not given any migraine medication and was told to be on butalbital for headache at this time till she sees her primary care physician, she has no seizural activity, one pulsating HA once every weeks last one day, requesting not to be put on any new headache medication at this time  Asthma  no wheezing no sob, no cough, no exposure ot cigs and other smokes,  no hx of nasal polyps, no recent attack, last albuterol use has been dailly, no night awakening for dyspnea over the last 30nights, unfortunately stating that every time she takes her nebulizer or her albuterol inhaler she developed tremor become very jittery anxious and get palpitation and does not feel comfortable for couple of and then sometimes it aggravates her headache, compliant with meds, + different medication RF needed.       Current Outpatient Prescriptions   Medication Sig Dispense Refill    azelastine (ASTELIN) 137 mcg (0.1 %) nasal spray       cetirizine (ZYRTEC) 10 mg tablet TAKE 1 TABLET BY MOUTH EVERY DAY  3    levalbuterol (XOPENEX) 1.25 mg/3 mL nebu 3 mL by Nebulization route every six (6) hours as needed. 30 Nebule 11    levalbuterol tartrate (XOPENEX HFA) 45 mcg/actuation inhaler Take 1 Puff by inhalation every six (6) hours as needed for Wheezing or Shortness of Breath. Indications: BRONCHOSPASM PREVENTION, unable to take ALbuterol developed tremors and palpitations 1 Inhaler 7    fluticasone (FLONASE) 50 mcg/actuation nasal spray 2 Sprays by Both Nostrils route as needed for Rhinitis or Allergies. Indications: ALLERGIC RHINITIS 1 Bottle 11    pantoprazole (PROTONIX) 40 mg tablet Take 1 Tab by mouth daily. 90 Tab 5    aspirin delayed-release 81 mg tablet Take 1 Tab by mouth daily. 30 Tab 11    hydrochlorothiazide (HYDRODIURIL) 25 mg tablet Take 1 Tab by mouth daily. 90 Tab 1    metoprolol succinate (TOPROL-XL) 25 mg XL tablet TAKE 1 TABLET BY MOUTH EVERY DAY 90 Tab 2    rosuvastatin (CRESTOR) 10 mg tablet TAKE 1 TABLET BY MOUTH EVERY DAY 90 Tab 3    calcium-cholecalciferol, D3, (CALTRATE 600+D) tablet Take 1 Tab by mouth two (2) times a day. 60 Tab 11    butalbital-acetaminophen (PHRENILIN)  mg tablet Take 1 Tab by mouth every six (6) hours as needed for Pain. 90 Tab 1    metFORMIN (GLUCOPHAGE) 500 mg tablet TAKE 1 & 1/2 TABLETS BY MOUTH TWICE A DAY WITH MEALS 90 Tab 11    glucose blood VI test strips (FREESTYLE INSULINX) strip TEST EVERY DAY AND AS NEEDED 100 Strip 6    MULTIVITAMIN PO Take 1 Tab by mouth daily.  Lancets (FREESTYLE LANCETS) Misc Test once daily. (diagnosis code: 250.00) 1 Package 11    Blood-Glucose Meter monitoring kit Once daily before breakfast 1 Kit 0    glucose blood VI test strips (ACCU-CHEK DA) strip . 1 Package 11    ipratropium (ATROVENT) 0.02 % nebulizer solution 2.5 mL by Nebulization route as needed for Wheezing.  One package of Nebules to be used every 4-6hrs prn 1250 mL 11    inhalational spacing device (SPACE CHAMBER PLUS) 1 Each by Does Not Apply route as needed.  Indications: USE WITH ALBUTEROL MDI 1 Device 0     Allergies   Allergen Reactions    Latex Rash    Amoxil [Amoxicillin] Itching     rash    Levaquin [Levofloxacin] Other (comments)     Dizziness      Percocet [Oxycodone-Acetaminophen] Nausea and Vomiting    Tetanus Vaccines And Toxoid Swelling    Tetracycline Hcl Rash     Past Medical History   Diagnosis Date    Abnormal finding on MRI of brain 3/16/2015     evaluated by neurologist and no findings    Acute pharyngitis 5/26/2016    Anemia NEC     Arthralgia of right hip 4/25/2016    Arthritis 2/18/2010    Asthma 2/18/2010    Cataract 9/24/2014    Diabetes ([address]     Elevated LFTs 4/16/2014    DAVID (generalized anxiety disorder) 1/22/2015    GERD (gastroesophageal reflux disease) 4/7/2014    Hammer toe of right foot 9/29/2014    Headache(784.0) 3/47/2230    Helicobacter pylori (H. 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 ([address] 9/29/2014     Past Surgical History   Procedure Laterality Date    Hx gyn  1983     total hysterectomy for uterine fibroid    Pr abdomen surgery proc unlisted       inguinal hernia    Pr abdomen surgery proc unlisted      Pr open repair clavicle fracture  2009    Hx appendectomy      Endoscopy, colon, diagnostic      Hx heent       tonsillectomy    Hx orthopaedic       clavicle repair    Hx other surgical       ?straightened colon out Family History   Problem Relation Age of Onset    Cancer Mother      mycosis fungoives    Stroke Father     Cancer Sister      one sister with breast cancer    Breast Cancer Sister     Cancer Paternal Aunt      2 paternal aunts with breast cancer    Thyroid Cancer Neg Hx      Social History   Substance Use Topics    Smoking status: Former Smoker     Quit date: 6/14/1988    Smokeless tobacco: Never Used    Alcohol use No      Lab Results  Component Value Date/Time   WBC 5.6 01/12/2017 04:20 AM   HGB 11.9 01/12/2017 04:20 AM   HCT 38.0 01/12/2017 04:20 AM   PLATELET 729 52/26/5931 04:20 AM   MCV 86.4 01/12/2017 04:20 AM       Lab Results  Component Value Date/Time   Hemoglobin A1c 7.0 01/12/2017 04:20 AM   Hemoglobin A1c 7.0 11/29/2016 09:03 AM   Hemoglobin A1c 6.8 07/24/2014 09:10 AM   Glucose 107 01/12/2017 04:20 AM   Glucose (POC) 121 01/12/2017 06:48 AM   Microalb/Creat ratio (ug/mg creat.) 3.7 04/25/2016 08:22 AM   LDL, calculated 77.4 01/12/2017 04:20 AM   Creatinine (POC) 0.7 10/28/2010 09:10 AM   Creatinine 0.81 01/12/2017 04:20 AM      Lab Results  Component Value Date/Time   Cholesterol, total 172 01/12/2017 04:20 AM   HDL Cholesterol 59 01/12/2017 04:20 AM   LDL, calculated 77.4 01/12/2017 04:20 AM   Triglyceride 178 01/12/2017 04:20 AM   CHOL/HDL Ratio 2.9 01/12/2017 04:20 AM       Lab Results  Component Value Date/Time   GFR est AA >60 01/12/2017 04:20 AM   GFR est non-AA >60 01/12/2017 04:20 AM   Creatinine (POC) 0.7 10/28/2010 09:10 AM   Creatinine 0.81 01/12/2017 04:20 AM   BUN 14 01/12/2017 04:20 AM   Sodium 140 01/12/2017 04:20 AM   Potassium 3.7 01/12/2017 04:20 AM   Chloride 98 01/12/2017 04:20 AM   CO2 33 01/12/2017 04:20 AM         Review of Systems   Constitutional: Negative for chills and fever. HENT: Negative for ear pain and nosebleeds. Eyes: Negative for blurred vision, pain and discharge. Respiratory: Negative for shortness of breath.     Cardiovascular: Negative for chest pain and leg swelling. Gastrointestinal: Negative for constipation, diarrhea, nausea and vomiting. Genitourinary: Negative for frequency. Musculoskeletal: Negative for joint pain. Skin: Negative for itching and rash. Neurological: Negative for headaches. Psychiatric/Behavioral: Negative for depression. The patient is not nervous/anxious. Physical Exam   Constitutional: She is oriented to person, place, and time. She appears well-developed and well-nourished. HENT:   Head: Normocephalic and atraumatic. Eyes: Conjunctivae and EOM are normal.   Neck: Normal range of motion. Neck supple. Cardiovascular: Normal rate, regular rhythm and normal heart sounds. No murmur heard. Pulmonary/Chest: Effort normal and breath sounds normal.   Abdominal: Soft. Bowel sounds are normal. She exhibits no distension. Musculoskeletal: Normal range of motion. She exhibits no edema. Neurological: She is alert and oriented to person, place, and time. Skin: No erythema. Psychiatric: Her behavior is normal.   Nursing note and vitals reviewed. ASSESSMENT and Ellie Lynn was seen today for hospital follow up. Diagnoses and all orders for this visit:    Diabetes mellitus without complication [address] [address]  -     [facility] POC GLUCOSE BLOOD, BY GLUCOSE MONITORING DEVICE  -      DIABETES FOOT EXAM    Moderate persistent asthma without complication  -     levalbuterol (XOPENEX) 1.25 mg/3 mL nebu; 3 mL by Nebulization route every six (6) hours as needed. -     ipratropium (ATROVENT) 0.02 % nebulizer solution; 2.5 mL by Nebulization route as needed for Wheezing. One package of Nebules to be used every 4-6hrs prn  -     levalbuterol tartrate (XOPENEX HFA) 45 mcg/actuation inhaler; Take 1 Puff by inhalation every six (6) hours as needed for Wheezing or Shortness of Breath.  Indications: BRONCHOSPASM PREVENTION, unable to take ALbuterol developed tremors and palpitations  -     fluticasone (FLONASE) 50 mcg/actuation nasal spray; 2 Sprays by Both Nostrils route as needed for Rhinitis or Allergies. Indications: ALLERGIC RHINITIS    Intractable migraine with aura without status migrainosus  -     fluticasone (FLONASE) 50 mcg/actuation nasal spray; 2 Sprays by Both Nostrils route as needed for Rhinitis or Allergies. Indications: ALLERGIC RHINITIS  -     ibuprofen (ADVIL) 200 mg tablet; Take 3 Tabs by mouth every eight (8) hours as needed for Pain. Indications: HEADACHE DISORDER    Chronic asthma with status asthmaticus  -     levalbuterol (XOPENEX) 1.25 mg/3 mL nebu; 3 mL by Nebulization route every six (6) hours as needed. -     ipratropium (ATROVENT) 0.02 % nebulizer solution; 2.5 mL by Nebulization route as needed for Wheezing. One package of Nebules to be used every 4-6hrs prn  -     levalbuterol tartrate (XOPENEX HFA) 45 mcg/actuation inhaler; Take 1 Puff by inhalation every six (6) hours as needed for Wheezing or Shortness of Breath. Indications: BRONCHOSPASM PREVENTION, unable to take ALbuterol developed tremors and palpitations  -     fluticasone (FLONASE) 50 mcg/actuation nasal spray; 2 Sprays by Both Nostrils route as needed for Rhinitis or Allergies.  Indications: ALLERGIC RHINITIS      Evan Escobedo and Red action plan was stated to the patient, was told to use 1 or 2 puffs 10-15 minutes before any kind of exercise activity patient acknowledged, return to clinic any time in the office chart for finalizing h recommendeder  immunizations,  Regardless today all her meds side effects detailed and her compliancy advised, no cig nor 2nd hand cig exposures advised, carry meds at all time w/ self any wheezing or cough please call or go to er, avoid stimulants    Exedrin otc prn, lie in darkened room and apply cold packs prn for pain, will start on prophylactic therapy with triptan therapy due to high frequency of pain, side effect profile discussed in detail, behavioral modification and exercise regimen asked to keep headache diary, avoid alcohol, fatty or fried foods, smoking, stressful situations as much as practical and the use of illicit or street drugs.  Patient agreed with today's recommendation

## 2017-01-25 NOTE — MR AVS SNAPSHOT
Visit Information Date & Time Provider Department Dept. Phone Encounter #  
 1/25/2017 11:30 AM MD Lj Kelly OFFICE-[facility] [phone] [id number] Your Appointments 2/28/2017  8:00 AM  
ROUTINE CARE with MD Lj Kelly OFFICE[facility] ([facility]) Appt Note: 3 mo f/u  
 [address] [facility] [place] [address] [phone]  
[phone] [phone]  
  
    
 3/1/2017  9:30 AM  
ROUTINE CARE with Valentino Rummer, MD  
[facility]) Appt Note: 8m f/u Thyroid cp0.00  
 [address] [facility] [phone]  
[address] [facility]  
  
    
 5/2/2017  8:30 AM  
Office Visit with MD Lj Kelly OFFICE-ANNEX ([facility]) Appt Note: medicare wellness exam  
 [address] [facility] [place] [address] [phone]  
[phone] [phone] Upcoming Health Maintenance Date Due  
 FOOT EXAM Q1 10/7/2016 MICROALBUMIN Q1 4/25/2017 MEDICARE YEARLY EXAM 4/26/2017 HEMOGLOBIN A1C Q6M 7/12/2017 EYE EXAM RETINAL OR DILATED Q1 11/21/2017 LIPID PANEL Q1 1/12/2018 BREAST CANCER SCRN MAMMOGRAM 11/14/2018 GLAUCOMA SCREENING Q2Y 11/21/2018 COLONOSCOPY 6/20/2021 DTaP/Tdap/Td series (2 - Td) 5/26/2026 Allergies as of 1/25/2017  Review Complete On: 1/25/2017 By: Meryle Dad, LPN Severity Noted Reaction Type Reactions Latex  06/14/2011    Rash Amoxil [Amoxicillin]  01/05/2017    Itching  
 rash Levaquin [Levofloxacin]  03/11/2016    Other (comments) Dizziness Percocet [Oxycodone-acetaminophen]  02/18/2010   Intolerance Nausea and Vomiting Tetanus Vaccines And Toxoid  02/18/2010   Systemic Swelling Tetracycline Hcl  02/18/2010   Systemic Rash Current Immunizations  Reviewed on 4/25/2016 Name Date Influenza High Dose Vaccine PF 11/29/2016, 10/7/2015, 11/3/2014 Pneumococcal Conjugate (PCV-13) 10/7/2015 Pneumococcal Polysaccharide (PPSV-23) 2/5/2013 Zoster Vaccine, Live 9/29/2014 Not reviewed this visit You Were Diagnosed With   
  
 Codes Comments Diabetes mellitus without complication ([facility]    -  Primary ICD-10-CM: E11.9 ICD-9-CM: 250.00 Vitals BP Pulse Temp Resp Height(growth percentile) Weight(growth percentile) 146/72 (BP 1 Location: Left arm, BP Patient Position: At rest) 67 97.9 °F (36.6 °C) (Oral) 12 5' 2\" (1.575 m) 204 lb 6.4 oz (92.7 kg) LMP SpO2 BMI OB Status Smoking Status 02/18/1983 95% 37.39 kg/m2 Hysterectomy Former Smoker Vitals History BMI and BSA Data Body Mass Index Body Surface Area  
 37.39 kg/m 2 2.01 m 2 Preferred Pharmacy Pharmacy Name Phone [id number] Ricardo MCGEE [phone] Your Updated Medication List  
  
   
This list is accurate as of: 1/25/17 12:33 PM.  Always use your most recent med list.  
  
  
  
  
 aspirin delayed-release 81 mg tablet Take 1 Tab by mouth daily. azelastine 137 mcg (0.1 %) nasal spray Commonly known as:  ASTELIN Blood-Glucose Meter monitoring kit Once daily before breakfast  
  
 butalbital-acetaminophen  mg tablet Commonly known as:  Siddharth Shape Take 1 Tab by mouth every six (6) hours as needed for Pain. calcium-cholecalciferol (D3) tablet Commonly known as:  CALTRATE 600+D Take 1 Tab by mouth two (2) times a day. cetirizine 10 mg tablet Commonly known as:  ZYRTEC  
TAKE 1 TABLET BY MOUTH EVERY DAY  
  
 fluticasone 50 mcg/actuation nasal spray Commonly known as:  Georgina Buffalo 2 Sprays by Both Nostrils route as needed for Rhinitis or Allergies. Indications: ALLERGIC RHINITIS  
  
 * glucose blood VI test strips strip Commonly known as:  ACCU-CHEK DA Estefanía Harlan * glucose blood VI test strips strip Commonly known as:  FREESTYLE INSULINX  
TEST EVERY DAY AND AS NEEDED  
  
 hydroCHLOROthiazide 25 mg tablet Commonly known as:  HYDRODIURIL Take 1 Tab by mouth daily. inhalational spacing device Commonly known as:  SPACE CHAMBER PLUS  
1 Each by Does Not Apply route as needed. Indications: USE WITH ALBUTEROL MDI  
  
 ipratropium 0.02 % nebulizer solution Commonly known as:  ATROVENT  
2.5 mL by Nebulization route as needed for Wheezing. One package of Nebules to be used every 4-6hrs prn Lancets Misc Commonly known as:  FREESTYLE LANCETS Test once daily. (diagnosis code: 250.00) levalbuterol 1.25 mg/3 mL Nebu Commonly known as:  XOPENEX  
3 mL by Nebulization route every six (6) hours as needed. levalbuterol tartrate 45 mcg/actuation inhaler Commonly known as:  Torres Seeds Take 1 Puff by inhalation every six (6) hours as needed for Wheezing or Shortness of Breath. Indications: BRONCHOSPASM PREVENTION, unable to take ALbuterol developed tremors and palpitations  
  
 metFORMIN 500 mg tablet Commonly known as:  GLUCOPHAGE  
TAKE 1 & 1/2 TABLETS BY MOUTH TWICE A DAY WITH MEALS  
  
 metoprolol succinate 25 mg XL tablet Commonly known as:  TOPROL-XL  
TAKE 1 TABLET BY MOUTH EVERY DAY  
  
 MULTIVITAMIN PO Take 1 Tab by mouth daily. pantoprazole 40 mg tablet Commonly known as:  PROTONIX Take 1 Tab by mouth daily. rosuvastatin 10 mg tablet Commonly known as:  CRESTOR  
TAKE 1 TABLET BY MOUTH EVERY DAY  
  
 * Notice: This list has 2 medication(s) that are the same as other medications prescribed for you. Read the directions carefully, and ask your doctor or other care provider to review them with you. Prescriptions Printed  Refills  
 levalbuterol (XOPENEX) 1.25 mg/3 mL nebu 11  
 Sig: 3 mL by Nebulization route every six (6) hours as needed. Class: Print Route: Nebulization  
 ipratropium (ATROVENT) 0.02 % nebulizer solution 11 Si.5 mL by Nebulization route as needed for Wheezing. One package of Nebules to be used every 4-6hrs prn Class: Print Route: Nebulization  
 levalbuterol tartrate (XOPENEX HFA) 45 mcg/actuation inhaler 7 Sig: Take 1 Puff by inhalation every six (6) hours as needed for Wheezing or Shortness of Breath. Indications: BRONCHOSPASM PREVENTION, unable to take ALbuterol developed tremors and palpitations Class: Print Route: Inhalation Prescriptions Sent to Pharmacy Refills  
 fluticasone (FLONASE) 50 mcg/actuation nasal spray 11 Si Sprays by Both Nostrils route as needed for Rhinitis or Allergies. Indications: ALLERGIC RHINITIS Class: Normal  
 Pharmacy: [address] [place] [address] #: [phone] Route: Both Nostrils We Performed the Following AMB POC GLUCOSE BLOOD, BY GLUCOSE MONITORING DEVICE [24348 CPT(R)] To-Do List   
 2017 7:30 AM  
  Appointment with Sharmaine Hazel at [facility] ([phone]) GENERAL INSTRUCTIONS  1. Bring outside films ([employer]) pertaining to the affected area with you on the day of appointment. 2. A written order with a valid diagnosis and Physicians signature is required for all scheduled tests. 3. Check in at registration 30 minutes before your appointment time unless you were instructed to do otherwise. Introducing [facility]! Dear Yasmine Mulligan: Thank you for requesting a [employer] account. Our records indicate that you already have an active [employer] account. You can access your account anytime at [email or web address] Did you know that you can access your hospital and ER discharge instructions at any time in [email or web address]? You can also review all of your test results from your hospital stay or ER visit. Additional Information If you have questions, please visit the Frequently Asked Questions section of the [email or web address] website at [email or web address] Remember, [email or web address] is NOT to be used for urgent needs. For medical emergencies, dial 911. Now available from your iPhone and Android! Please provide this summary of care documentation to your next provider. Your primary care clinician is listed as Pamela Zhu. If you have any questions after today's visit, please call [phone].

## 2017-01-25 NOTE — PATIENT INSTRUCTIONS
Learning About Asthma Triggers  What are asthma triggers? When you have asthma, certain things can make your symptoms worse. These are called triggers. Learn what triggers an asthma attack for you, and avoid the triggers when you can. Common triggers include colds, smoke, air pollution, dust, pollen, pets, stress, and cold air. How do asthma triggers affect you? Triggers can make it harder for your lungs to work as they should. They can lead to sudden breathing problems and other symptoms. When you are around a trigger, an asthma attack is more likely. If your symptoms are severe, you may need emergency treatment or have to go to the hospital for treatment. What can you do to avoid triggers? The first thing is to know your triggers. When you are having symptoms, note the things around you that might be causing them. Then look for patterns that may be triggering your symptoms. Record your triggers on a piece of paper or in an asthma diary. When you have your list of possible triggers, work with your doctor to find ways to avoid them. Avoid colds and flu. Get a pneumococcal vaccine shot. If you have had one before, ask your doctor whether you need a second dose. Get a flu vaccine every year, as soon as it's available. If you must be around people with colds or the flu, wash your hands often. Here are some ways to avoid a few common triggers. · Do not smoke or allow others to smoke around you. If you need help quitting, talk to your doctor about stop-smoking programs and medicines. These can increase your chances of quitting for good. · If there is a lot of pollution, pollen, or dust outside, stay at home and keep your windows closed. Use an air conditioner or air filter in your home. Check your local weather report or newspaper for air quality and pollen reports. What else should you know? · Take your controller medicine every day, not just when you have symptoms.  It helps prevent problems before they occur. · Your doctor may suggest that you check how well your lungs are working by measuring your peak expiratory flow (PEF) throughout the day. Your PEF may drop when you are near things that trigger symptoms. Where can you learn more? Go to http://radha-tracy.info/. Enter P943 in the search box to learn more about \"Learning About Asthma Triggers. \"  Current as of: May 23, 2016  Content Version: 11.1  © 9966-8394 [employer]. Care instructions adapted under license by [employer] (which disclaims liability or warranty for this information). If you have questions about a medical condition or this instruction, always ask your healthcare professional. [address] any warranty or liability for your use of this information. Asthma Action Plan: After Your Visit  Your Care Instructions  An asthma action plan is based on peak flow and asthma symptoms. Sorting symptoms and peak flow into red, yellow, and green \"zones\" can help you know how bad your asthma is and what actions you should take. Work with your doctor to make your plan. An action plan may include:  · The peak flow readings and symptoms for each zone. · What medicines to take in each zone. · When to call a doctor. · A list of emergency contact numbers. · A list of your asthma triggers. Follow-up care is a key part of your treatment and safety. Be sure to make and go to all appointments, and call your doctor if you are having problems. It's also a good idea to know your test results and keep a list of the medicines you take. How can you care for yourself at home? · Take your daily medicines to help minimize long-term damage and avoid asthma attacks. · Check your peak flow every morning and evening. This is the best way to know how well your lungs are working.   · Check your action plan to see what zone you are in.  ¨ If you are in the green zone, keep taking your daily asthma medicines as prescribed. ¨ If you are in the yellow zone, you may be having or will soon have an asthma attack. You may not have any symptoms, but your lungs are not working as well as they should. Take the medicines listed in your action plan. If you stay in the yellow zone, your doctor may need to increase the dose or add a medicine. ¨ If you are in the red zone, follow your action plan. If your symptoms or peak flow don't improve soon, you may need to go to the emergency room or be admitted to the hospital.  · Use an asthma diary. Write down your peak flow readings in the asthma diary. If you have an attack, write down what caused it (if you know), the symptoms, and what medicine you took. · Make sure you know how and when to call your doctor or go to the hospital.  · Take both the asthma action plan and the asthma diary--along with your peak flow meter and medicines--when you see your doctor. Tell your doctor if you are having trouble following your action plan. When should you call for help? Call 911 anytime you think you may need emergency care. For example, call if:  · You have severe trouble breathing. Call your doctor now or seek immediate medical care if:  · Your symptoms do not get better after you have followed your asthma action plan. · You cough up yellow, dark brown, or bloody mucus (sputum). Watch closely for changes in your health, and be sure to contact your doctor if:  · Your coughing and wheezing get worse. · You need to use quick-relief medicine on more than 2 days a week (unless it is just for exercise). · You need help figuring out what is triggering your asthma attacks. Where can you learn more? Go to [employer]  Enter B511 in the search box to learn more about \"Asthma Action Plan: After Your Visit. \"   © 1484-7826 Healthwise[employer].  Care instructions adapted under license by [employer] (which disclaims liability or warranty for this information). This care instruction is for use with your licensed healthcare professional. If you have questions about a medical condition or this instruction, always ask your healthcare professional. [address] any warranty or liability for your use of this information. Content Version: 96.5.848617; Last Revised: March 9, 2012              Asthma Action Plan: After Your Visit  Your Care Instructions  An asthma action plan is based on peak flow and asthma symptoms. Sorting symptoms and peak flow into red, yellow, and green \"zones\" can help you know how bad your asthma is and what actions you should take. Work with your doctor to make your plan. An action plan may include:  · The peak flow readings and symptoms for each zone. · What medicines to take in each zone. · When to call a doctor. · A list of emergency contact numbers. · A list of your asthma triggers. Follow-up care is a key part of your treatment and safety. Be sure to make and go to all appointments, and call your doctor if you are having problems. It's also a good idea to know your test results and keep a list of the medicines you take. How can you care for yourself at home? · Take your daily medicines to help minimize long-term damage and avoid asthma attacks. · Check your peak flow every morning and evening. This is the best way to know how well your lungs are working. · Check your action plan to see what zone you are in.  ¨ If you are in the green zone, keep taking your daily asthma medicines as prescribed. ¨ If you are in the yellow zone, you may be having or will soon have an asthma attack. You may not have any symptoms, but your lungs are not working as well as they should. Take the medicines listed in your action plan. If you stay in the yellow zone, your doctor may need to increase the dose or add a medicine. ¨ If you are in the red zone, follow your action plan.  If your symptoms or peak flow don't improve soon, you may need to go to the emergency room or be admitted to the hospital.  · Use an asthma diary. Write down your peak flow readings in the asthma diary. If you have an attack, write down what caused it (if you know), the symptoms, and what medicine you took. · Make sure you know how and when to call your doctor or go to the hospital.  · Take both the asthma action plan and the asthma diary--along with your peak flow meter and medicines--when you see your doctor. Tell your doctor if you are having trouble following your action plan. When should you call for help? Call 911 anytime you think you may need emergency care. For example, call if:  · You have severe trouble breathing. Call your doctor now or seek immediate medical care if:  · Your symptoms do not get better after you have followed your asthma action plan. · You cough up yellow, dark brown, or bloody mucus (sputum). Watch closely for changes in your health, and be sure to contact your doctor if:  · Your coughing and wheezing get worse. · You need to use quick-relief medicine on more than 2 days a week (unless it is just for exercise). · You need help figuring out what is triggering your asthma attacks. Where can you learn more? Go to [employer]  Enter B511 in the search box to learn more about \"Asthma Action Plan: After Your Visit. \"   © 1852-0999 Healthwise, Incorporated. Care instructions adapted under license by [employer] (which disclaims liability or warranty for this information). This care instruction is for use with your licensed healthcare professional. If you have questions about a medical condition or this instruction, always ask your healthcare professional. [place] [address] any warranty or liability for your use of this information. Content Version: 31.9.482582;  Last Revised: March 9, 2012                 Asthma in Adults: Care Instructions  Your Care Instructions    During an asthma attack, your airways swell and narrow as a reaction to certain things (triggers). This makes it hard to breathe. You may be able to prevent asthma attacks if you avoid the things that set off your asthma symptoms. Keeping your asthma under control and treating symptoms before they get bad can help you avoid severe attacks. If you can control your asthma, you may be able to do all of your normal daily activities. You may also avoid asthma attacks and trips to the hospital.  Follow-up care is a key part of your treatment and safety. Be sure to make and go to all appointments, and call your doctor if you are having problems. It's also a good idea to know your test results and keep a list of the medicines you take. How can you care for yourself at home? · Follow your asthma action plan so you can manage your symptoms at home. An asthma action plan will help you prevent and control airway reactions and will tell you what to do during an asthma attack. If you do not have an asthma action plan, work with your doctor to build one. · Take your asthma medicine exactly as prescribed. Medicine plays an important role in controlling asthma. Talk to your doctor right away if you have any questions about what to take and how to take it. ¨ Use your quick-relief medicine when you have symptoms of an attack. Quick-relief medicine often is an albuterol inhaler. Some people need to use quick-relief medicine before they exercise. ¨ Take your controller medicine every day, not just when you have symptoms. Controller medicine is usually an inhaled corticosteroid. The goal is to prevent problems before they occur. Do not use your controller medicine to try to treat an attack that has already started. It does not work fast enough to help. ¨ If your doctor prescribed corticosteroid pills to use during an attack, take them as directed. They may take hours to work, but they may shorten the attack and help you breathe better.   ¨ Keep your quick-relief medicine with you at all times. · Talk to your doctor before using other medicines. Some medicines, such as aspirin, can cause asthma attacks in some people. · Check yourself for asthma symptoms to know which step to follow in your action plan. Watch for things like being short of breath, having chest tightness, coughing, and wheezing. Also notice if symptoms wake you up at night or if you get tired quickly when you exercise. · If you have a peak flow meter, use it to check how well you are breathing. This can help you predict when an asthma attack is going to occur. Then you can take medicine to prevent the asthma attack or make it less severe. · See your doctor regularly. These visits will help you learn more about asthma and what you can do to control it. Your doctor will monitor your treatment to make sure the medicine is helping you. · Keep track of your asthma attacks and your treatment. After you have had an attack, write down what triggered it, what helped end it, and any concerns you have about your asthma action plan. Take your diary when you see your doctor. You can then review your asthma action plan and decide if it is working. · Do not smoke or allow others to smoke around you. Avoid smoky places. Smoking makes asthma worse. If you need help quitting, talk to your doctor about stop-smoking programs and medicines. These can increase your chances of quitting for good. · Learn what triggers an asthma attack for you, and avoid the triggers when you can. Common triggers include colds, smoke, air pollution, dust, pollen, mold, pets, cockroaches, stress, and cold air. · Avoid colds and the flu. Get a pneumococcal vaccine shot. If you have had one before, ask your doctor whether you need a second dose. Get a flu vaccine every fall. If you must be around people with colds or the flu, wash your hands often. When should you call for help?   Call 911 anytime you think you may need emergency care. For example, call if:  · You have severe trouble breathing. Call your doctor now or seek immediate medical care if:  · Your symptoms do not get better after you have followed your asthma action plan. · You cough up yellow, dark brown, or bloody mucus (sputum). Watch closely for changes in your health, and be sure to contact your doctor if:  · Your coughing and wheezing get worse. · You need to use quick-relief medicine on more than 2 days a week (unless it is just for exercise). · You need help figuring out what is triggering your asthma attacks. Where can you learn more? Go to http://radha-tracy.info/. Enter P597 in the search box to learn more about \"Asthma in Adults: Care Instructions. \"  Current as of: May 23, 2016  Content Version: 11.1  © 5694-3088 [employer]). Care instructions adapted under license by [employer] (which disclaims liability or warranty for this information). If you have questions about a medical condition or this instruction, always ask your healthcare professional. [place] [address] any warranty or liability for your use of this information. Controlling Your Asthma: Care Instructions  Your Care Instructions    Asthma is a long-term condition that affects your breathing. It causes the airways that lead to the lungs to swell. During an asthma attack, the airways swell and narrow. This makes it hard to breathe. You may wheeze or cough. If you have a bad attack, you may need emergency care. There are two things to do to treat asthma. · Control asthma over the long term. · Treat attacks when they occur. You and your doctor can make an asthma action plan. It tells you what medicines you need to take every day to control asthma symptoms and what to do if you have an asthma attack. Your asthma action plan can help prevent and treat attacks.   When you keep your asthma under control, you can prevent severe attacks and lasting damage to your airways. You need to treat your asthma even when you are not having symptoms. Although asthma is a lifelong problem, you can still lead a full and active life. Follow-up care is a key part of your treatment and safety. Be sure to make and go to all appointments, and call your doctor if you are having problems. It's also a good idea to know your test results and keep a list of the medicines you take. How can you care for yourself at home? To control asthma over the long term  Medicines  Controller medicines reduce swelling in your lungs. They also prevent asthma attacks. Take your controller medicine exactly as prescribed. Talk to your doctor if you have any problems with your medicine. · Inhaled corticosteroid is a common and effective controller medicine. Using it the right way can prevent or reduce most side effects. · Take your controller medicine every day, not just when you have symptoms. It helps prevent problems before they occur. · Your doctor may prescribe another medicine that you use along with the corticosteroid. This is often a long-acting bronchodilator. Do not take this medicine by itself. Using a long-acting bronchodilator by itself can increase your risk of a severe or fatal asthma attack. · Do not take inhaled corticosteroids or long-acting bronchodilators to stop an asthma attack that has already started. They don't work fast enough to help. · Talk to your doctor before you use other medicines. Some medicines, such as aspirin, can cause asthma attacks in some people. Education  · Learn what triggers an asthma attack. Avoid these triggers when you can. Common triggers include colds, smoke, air pollution, dust, pollen, mold, pets, cockroaches, stress, and cold air. · Check yourself for asthma symptoms to know which step to follow in your action plan. Watch for things like being short of breath, having chest tightness, coughing, and wheezing.  Also notice if symptoms wake you up at night or if you get tired quickly when you exercise. · If you have a peak flow meter, use it to check how well you are breathing. It can help you know when an asthma attack is going to occur. Then you can take medicine to prevent the asthma attack or make it less severe. · Do not smoke or allow others to smoke around you. Avoid smoky places. Smoking makes asthma worse. If you need help quitting, talk to your doctor about stop-smoking programs and medicines. These can increase your chances of quitting for good. · Avoid colds and the flu. Get a pneumococcal vaccine shot. If you have had one before, ask your doctor whether you need a second dose. Get a flu vaccine every year, as soon as it's available. If you must be around people with colds or the flu, wash your hands often. To treat attacks when they occur  Use your asthma action plan when you have an attack. Your quick-relief medicine will stop an asthma attack. It relaxes the muscles that get tight around the airways. If your doctor prescribed corticosteroid pills to use during an attack, take them as directed. They may take hours to work, but they may shorten the attack and help you breathe better. · Albuterol is an effective quick-relief inhaler. · Take your quick-relief medicine exactly as prescribed. · Always bring your asthma medicine with you when you travel. · You may need to use quick-relief medicine before you exercise. · Call your doctor if you think you are having a problem with your medicine. When should you call for help? Call 911 anytime you think you may need emergency care. For example, call if:  · You are having severe trouble breathing. Call your doctor now or seek immediate medical care if:  · Your symptoms do not get better after you have followed your asthma action plan. · You cough up yellow, dark brown, or bloody mucus (sputum).   Watch closely for changes in your health, and be sure to contact your doctor if:  · Your coughing and wheezing get worse. · You need to use your quick-relief medicine on more than 2 days a week (unless it is just for exercise). · You need help figuring out what is triggering your asthma attacks. Where can you learn more? Go to http://radha-tracy.info/. Enter S320 in the search box to learn more about \"Controlling Your Asthma: Care Instructions. \"  Current as of: May 23, 2016  Content Version: 11.1  © 8488-6322 [employer]. Care instructions adapted under license by [employer] (which disclaims liability or warranty for this information). If you have questions about a medical condition or this instruction, always ask your healthcare professional. [address] any warranty or liability for your use of this information. Learning About Asthma  What is asthma? Asthma is a long-term condition that affects your breathing. It causes the airways that lead to the lungs to swell. People with asthma may have asthma attacks. During an asthma attack, the airways tighten and become narrower. This makes it hard to breathe, and you may wheeze or cough. If you have a bad asthma attack, you may need emergency care. Asthma affects people in different ways. Some people only have asthma attacks during allergy season, or when they breathe in cold air, or when they exercise. Others have many bad attacks that send them to the doctor often. What are the symptoms? Symptoms of asthma can be mild or severe. You may have mild attacks now and then, you may have severe symptoms every day, or you may have something in between. How often you have symptoms can also change. When you have asthma, you may:  · Wheeze, making a loud or soft whistling noise when you breathe in and out. · Cough a lot. · Feel tightness in your chest.  · Feel short of breath. · Have trouble sleeping because of coughing or having a hard time breathing.   · Get tired quickly during exercise. Your symptoms may be worse at night. How can you prevent asthma attacks? Certain things can make asthma symptoms worse. These are called triggers. When you are around a trigger, an asthma attack is more likely. Common triggers include:  · Cigarette smoke or air pollution. · Things you are allergic to, such as:  ¨ Pollen, mold, or dust mites. ¨ Pet hair, skin, or saliva. · Illnesses, like colds, flu, or pneumonia. · Exercise. · Dry, cold air. Here are some ways to avoid a few common triggers:  · Do not smoke or allow others to smoke around you. If you need help quitting, talk to your doctor about stop-smoking programs and medicines. These can increase your chances of quitting for good. · If there is a lot of pollution, pollen, or dust outside, stay at home and keep your windows closed. Use an air conditioner or air filter in your home. Check your local weather report or newspaper for air quality and pollen reports. · Get the flu vaccine every year. Talk to your doctor about getting a pneumococcal shot. Wash your hands often to prevent infections. · Avoid exercising outdoors in cold weather. If you are outdoors in cold weather, wear a scarf around your face and breathe through your nose. How is asthma treated? There are two parts to treating asthma, which are outlined in your asthma action plan. The goals are to:  · Control asthma over the long term. The asthma action plan tells you which medicine you may need to take every day. This is called a controller medicine. It helps to reduce the swelling of the airways and prevent asthma attacks. · Treat asthma attacks when they occur. The asthma action plan tells you what to do when you have an asthma attack. It helps you identify triggers that can cause your attacks. You use quick-relief medicine during an attack. The asthma plan also helps you track your symptoms and know how well the treatment is working.   Follow-up care is a key part of your treatment and safety. Be sure to make and go to all appointments, and call your doctor if you are having problems. It's also a good idea to know your test results and keep a list of the medicines you take. Where can you learn more? Go to http://radha-tracy.info/. Enter 9187 [id number] in the search box to learn more about \"Learning About Asthma. \"  Current as of: May 23, 2016  Content Version: 11.1  © 4403-0967 [employer]. Care instructions adapted under license by [employer] (which disclaims liability or warranty for this information). If you have questions about a medical condition or this instruction, always ask your healthcare professional. [address] any warranty or liability for your use of this information. Noninsulin Medicines for Type 2 Diabetes: Care Instructions  Your Care Instructions  There are different types of noninsulin medicines for diabetes. Each works in a different way to help you control your blood sugar. Some types help your body make insulin to lower your blood sugar. Others lower how much insulin your body needs. Some can slow how quickly your body digests sugars. And some can remove extra glucose through your urine. Some of these medicines are:  · Alpha-glucosidase inhibitors. These keep starches from breaking down. This means that they lower the amount of glucose absorbed when you eat. They do not help your body make more insulin. So they will not cause low blood sugar unless they are used with other medicines for diabetes. They include acarbose and miglitol. · DPP-4 inhibitors. These help your body increase the level of insulin after eating. They also help your body make less of a hormone that raises blood sugar. They include linagliptin, saxagliptin, and sitagliptin. · Incretin hormones (GLP-1 receptor agonists).  Your body makes a protein that can raise your insulin level, lower your blood sugar, and make you less hungry. You can inject hormone medicines that work the same way as this protein. They include exenatide and liraglutide. · Meglitinides. These help your body release insulin. They also help slow how your body digests sugars. In this way, they prevent your blood sugar from rising too fast after you eat. They include nateglinide and repaglinide. · Metformin. This lowers how much glucose your liver makes. And it helps you respond better to insulin. It also lowers the amount of stored sugar that your liver releases when you are not eating. · Sodium glucose co-transporter 2 inhibitors (SGLT2 inhibitors). These help to remove extra glucose through your urine. They may also help some people lose weight. They include canagliflozin, dapagliflozin, and empagliflozin. · Sulfonylureas. These help your body release more insulin. Some work for many hours and can cause low blood sugar if you don't eat as you expected. They include glipizide and glyburide. · Thiazolidinediones. These reduce the amount of blood glucose. They also help you respond better to insulin. They include pioglitazone and rosiglitazone. You may need to take more than one medicine for diabetes. Two or more medicines may work better to lower your blood sugar level than just one medicine. Follow-up care is a key part of your treatment and safety. Be sure to make and go to all appointments, and call your doctor if you are having problems. It's also a good idea to know your test results and keep a list of the medicines you take. How can you care for yourself at home? · Eat a healthy diet. Get some exercise each day. This may help you to reduce how much medicine you need. · Do not take other prescription or over-the-counter medicines, vitamins, herbal products, or supplements without talking to your doctor first. Some medicines for type 2 diabetes can cause problems with other medicines or supplements.   · Tell your doctor if you plan to get pregnant. Some of these drugs are not safe for pregnant women. · Be safe with medicines. Take your medicines exactly as prescribed. Meglitinides or sulfonylureas can cause your blood sugar to drop very low. Call your doctor if you think you are having a problem with your medicine. · Check your blood sugar levels often. You can use a glucose monitor. Keeping track can help you know how certain foods, activities, and medicines affect your blood sugar. And it can help you keep your blood sugar from getting so low that it's not safe. When should you call for help? Call 911 anytime you think you may need emergency care. For example, call if:  · You passed out (lost consciousness), or you suddenly become very sleepy or confused. (You may have very low blood sugar.)  · You have symptoms of high blood sugar, such as:  ¨ Blurred vision. ¨ Trouble staying awake or being woken up. ¨ Fast, deep breathing. ¨ Breath that smells fruity. ¨ Belly pain, not feeling hungry, and vomiting. ¨ Feeling confused. Call your doctor now or seek immediate medical care if:  · You are sick and cannot control your blood sugar. · You have been vomiting or have had diarrhea for more than 6 hours. · Your blood sugar stays higher than the level your doctor has set for you. · You have symptoms of low blood sugar, such as:  ¨ Sweating. ¨ Feeling nervous, shaky, and weak. ¨ Extreme hunger and slight nausea. ¨ Dizziness and headache. ¨ Blurred vision. ¨ Confusion. Watch closely for changes in your health, and be sure to contact your doctor if:  · You have a hard time knowing when your blood sugar is low. · You have trouble keeping your blood sugar in the target range. · You often have problems controlling your blood sugar. · You have symptoms of long-term diabetes problems, such as:  ¨ New vision changes. ¨ New pain, numbness, or tingling in your hands or feet. ¨ Skin problems. Where can you learn more?   Go to http://radha-tracy.info/. Enter H153 in the search box to learn more about \"Noninsulin Medicines for Type 2 Diabetes: Care Instructions. \"  Current as of: August 3, 2016  Content Version: 11.1  © 1259-2484 [employer]. Care instructions adapted under license by [employer] (which disclaims liability or warranty for this information). If you have questions about a medical condition or this instruction, always ask your healthcare professional. [place] [address] any warranty or liability for your use of this information.

## 2017-01-25 NOTE — PROGRESS NOTES
Marsha Singh      Name and  verified      Chief Complaint   Patient presents with   [facility] Follow Up     Left Facial Tingling Secondary to Headache 2017       Order placed for AMB POC Glucose, per Verbal Order from Dr. Kirsten Pimentel on 2017 due to patient complaint sussy.

## 2017-01-26 PROBLEM — J45.902: Status: ACTIVE | Noted: 2017-01-26

## 2017-01-26 RX ORDER — IPRATROPIUM BROMIDE 0.5 MG/2.5ML
0.5 SOLUTION RESPIRATORY (INHALATION) AS NEEDED
Qty: 1250 ML | Refills: 11
Start: 2017-01-26 | End: 2017-01-26 | Stop reason: SDUPTHER

## 2017-01-26 RX ORDER — LEVALBUTEROL INHALATION SOLUTION 1.25 MG/3ML
1.25 SOLUTION RESPIRATORY (INHALATION)
Qty: 30 NEBULE | Refills: 11 | Status: SHIPPED | OUTPATIENT
Start: 2017-01-26 | End: 2018-03-06

## 2017-01-27 ENCOUNTER — TELEPHONE (OUTPATIENT)
Dept: FAMILY MEDICINE CLINIC | Age: 77
End: 2017-01-27

## 2017-02-20 DIAGNOSIS — A04.8 HELICOBACTER PYLORI (H. PYLORI) INFECTION: ICD-10-CM

## 2017-02-27 RX ORDER — METFORMIN HYDROCHLORIDE 500 MG/1
TABLET ORAL
Qty: 270 TAB | Refills: 2 | Status: SHIPPED | OUTPATIENT
Start: 2017-02-27 | End: 2017-03-01 | Stop reason: DRUGHIGH

## 2017-02-28 ENCOUNTER — HOSPITAL ENCOUNTER (OUTPATIENT)
Dept: ULTRASOUND IMAGING | Age: 77
Discharge: HOME | End: 2017-02-28
Attending: INTERNAL MEDICINE
Payer: MEDICARE

## 2017-02-28 DIAGNOSIS — E04.1 LEFT THYROID NODULE: ICD-10-CM

## 2017-02-28 DIAGNOSIS — E04.2 MULTIPLE THYROID NODULES: ICD-10-CM

## 2017-02-28 PROCEDURE — 76536 US EXAM OF HEAD AND NECK: CPT

## 2017-03-01 ENCOUNTER — OFFICE VISIT (OUTPATIENT)
Dept: ENDOCRINOLOGY | Age: 77
End: 2017-03-01

## 2017-03-01 VITALS — OXYGEN SATURATION: 98 % | HEIGHT: 62 IN | RESPIRATION RATE: 16 BRPM | BODY MASS INDEX: 36.99 KG/M2 | WEIGHT: 201 LBS

## 2017-03-01 DIAGNOSIS — E11.9 CONTROLLED TYPE 2 DIABETES MELLITUS WITHOUT COMPLICATION, UNSPECIFIED LONG TERM INSULIN USE STATUS: ICD-10-CM

## 2017-03-01 DIAGNOSIS — R79.89 ELEVATED TSH: ICD-10-CM

## 2017-03-01 DIAGNOSIS — E04.2 MULTIPLE THYROID NODULES: Primary | ICD-10-CM

## 2017-03-01 RX ORDER — METOPROLOL SUCCINATE 25 MG/1
TABLET, EXTENDED RELEASE ORAL
Qty: 90 TAB | Refills: 2 | Status: SHIPPED | OUTPATIENT
Start: 2017-03-01 | End: 2017-10-14

## 2017-03-01 RX ORDER — METFORMIN HYDROCHLORIDE 850 MG/1
850 TABLET ORAL 2 TIMES DAILY WITH MEALS
Qty: 180 TAB | Refills: 3 | Status: SHIPPED | OUTPATIENT
Start: 2017-03-01 | End: 2017-05-04 | Stop reason: SDUPTHER

## 2017-03-01 NOTE — PROGRESS NOTES
Endocrinology Visit    CC: thyroid nodule    History of present illness:  Patient is an 78y.o. year old with history of type 2 DM, asthma, and HTN who returns for thyroid nodules. I saw her in initial consultation in July 2016 at which time I recommended FNA of the dominant nodule on the left side. Pathology returned benign. She also had a follow-up ultrasound done last week which showed stability of the nodule size. Today she denies denies dysphagia, neck soreness, supine compression, or voice hoarseness. She does report occasional hoarseness when she talks for a long duration of time (saw ENT and visual inspection of the vocal cords was normal). She denies racing heart, tremors, palpitations, heat or cold intolerance, weight loss, weight gain, fatigue, constipation, diarrhea, and excessive sweating. She does report occasional 'skipped beats' for which she has seen cardiology. She takes an 81mg aspirin daily but is not on any other anticoagulants. TSH values have always been normal until recently when TSH returned slightly elevated in January (see labs below). She is not taking any thyroid-specific medication. She also has a history of type 2 diabetes. Last A1c was 7.0% in Jan and she is only taking metformin 750mg (1 and 1/2 of the 500mg tablets) BIDAC. She tries to watch her diet and avoids excessive carbohydrates. For breakfast every morning she has oatmeal ([employer]). Eats meat/poultry, vegetables, salads for her other meals. Review of Systems - see hpi for pertinent positives and negatives, otherwise a 7 system review is negative.     Problem list:  Patient Active Problem List   Diagnosis Code    Asthma J45.909    Hyperlipemia E78.5    Arthritis M19.90    HTN (hypertension) I10    Headache(784.0)     Spondylitis, cervical M46.92    Tingling sensation R20.2    Chest pain R07.9    Anemia D64.9    Cyst of right kidney N28.1    Hip pain, right M25.551    Acute bronchitis J20.9  Visual floaters H43.399    Colon cancer screening Z12.11    Osteopenia M85.80    Postmenopausal state Z78.0    Vitamin D deficiency E55.9    Bilateral hip pain M25.551, M25.552    Diabetes mellitus, type 2 (HCC) E11.9    Postmenopausal disorder N95.9    Numbness and tingling of right leg R20.2    Foot pain, left M79.672    Rotator cuff (capsule) sprain and strain [id number]    Osteoarthritis of acromioclavicular joint M19.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 0    Need for varicella vaccine Z23    Screening for breast cancer Z12.39    Cataract H26.9    Preop examination Z01.818    Peripheral autonomic neuropathy due to DM (HCC) E11.43    Pre-ulcerative calluses L84    Type 2 diabetes mellitus with pressure callus ([facility]) E11.628, L84    Hammer toe of right foot M20.41    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 (HCC) E11.69, M21.969    Poorly controlled type 2 diabetes mellitus with circulatory disorder (HCC) E11.59, E11.65    DAVID (generalized anxiety disorder) F41.1    Abnormal finding on MRI of brain R90.89    Tinea pedis B35.3    Arthralgia of right hip M25.551    Swollen gland R59.9    Acute pharyngitis J02.9    Diabetes mellitus type 2, controlled (HCC) E11.9    Multiple thyroid nodules E04.2    Skipped heart beats L07.1    Helicobacter pylori (H. pylori) infection A04.8    Intractable migraine with aura without status migrainosus G43.119    Chronic asthma with status asthmaticus J45.902       Medical history:  Past Medical History:   Diagnosis Date    Abnormal finding on MRI of brain 3/16/2015    evaluated by neurologist and no findings    Acute pharyngitis 5/26/2016    Anemia NEC     Arthralgia of right hip 4/25/2016    Arthritis 2/18/2010    Asthma 2/18/2010    Cataract 9/24/2014    Diabetes ([facility] [address]     Elevated LFTs 4/16/2014    DAVID (generalized anxiety disorder) 1/22/2015    GERD (gastroesophageal reflux disease) 4/7/2014    Hammer toe of right foot 9/29/2014    Headache(784.0) 3/90/4620    Helicobacter pylori (H. pylori) infection 4/16/2014    HTN (hypertension) 3/26/2010    Hyperlipemia 2/18/2010    Intractable migraine with aura without status migrainosus 1/25/2017    Morbid obesity ([facility] [address]     Need for varicella vaccine 9/23/2014    Peripheral autonomic neuropathy due to DM ([address] 9/29/2014    Poorly controlled type 2 diabetes mellitus with circulatory disorder ([facility] [address] 9/29/2014    Preop examination 9/24/2014    Risk for falls 4/16/2014    Screening for breast cancer 9/23/2014    Screening for depression 4/16/2014    Shoulder pain, left 3/18/2014    Spondylitis, cervical 4/5/2010    Tinea pedis 6/3/2015    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 ([facility] [address] 9/29/2014       Past surgical history:  Past Surgical History:   Procedure Laterality Date    ABDOMEN SURGERY PROC UNLISTED      inguinal hernia    ABDOMEN SURGERY PROC UNLISTED      ENDOSCOPY, COLON, DIAGNOSTIC      HX APPENDECTOMY      HX GYN  1983    total hysterectomy for uterine fibroid    HX HEENT      tonsillectomy    HX ORTHOPAEDIC      clavicle repair    HX OTHER SURGICAL      ? straightened colon out    OPEN REPAIR CLAVICLE FRACTURE  2009       Medications:    Current Outpatient Prescriptions:     metFORMIN (GLUCOPHAGE) 500 mg tablet, TAKE 1 & 1/2 TABLETS BY MOUTH TWICE A DAY WITH MEALS, Disp: 270 Tab, Rfl: 2    levalbuterol (XOPENEX) 1.25 mg/3 mL nebu, 3 mL by Nebulization route every six (6) hours as needed. icd 10 J45.902, Disp: 30 Nebule, Rfl: 11    ipratropium (ATROVENT) 0.02 % nebulizer solution, 2.5 mL by Nebulization route as needed for Wheezing.  One package of Nebules to be used every 4-6hrs prn.  icd 10 J45.902, Disp: 1250 mL, Rfl: 11    azelastine (ASTELIN) 137 mcg (0.1 %) nasal spray, , Disp: , Rfl:     cetirizine (ZYRTEC) 10 mg tablet, TAKE 1 TABLET BY MOUTH EVERY DAY, Disp: , Rfl: 3    levalbuterol tartrate (XOPENEX HFA) 45 mcg/actuation inhaler, Take 1 Puff by inhalation every six (6) hours as needed for Wheezing or Shortness of Breath. Indications: BRONCHOSPASM PREVENTION, unable to take ALbuterol developed tremors and palpitations, Disp: 1 Inhaler, Rfl: 7    fluticasone (FLONASE) 50 mcg/actuation nasal spray, 2 Sprays by Both Nostrils route as needed for Rhinitis or Allergies. Indications: ALLERGIC RHINITIS, Disp: 1 Bottle, Rfl: 11    ibuprofen (ADVIL) 200 mg tablet, Take 3 Tabs by mouth every eight (8) hours as needed for Pain. Indications: HEADACHE DISORDER, Disp: 40 Tab, Rfl: 1    inhalational spacing device (SPACE CHAMBER PLUS), 1 Each by Does Not Apply route as needed. Indications: USE WITH ALBUTEROL MDI, Disp: 1 Device, Rfl: 0    pantoprazole (PROTONIX) 40 mg tablet, Take 1 Tab by mouth daily. , Disp: 90 Tab, Rfl: 5    aspirin delayed-release 81 mg tablet, Take 1 Tab by mouth daily. , Disp: 30 Tab, Rfl: 11    hydrochlorothiazide (HYDRODIURIL) 25 mg tablet, Take 1 Tab by mouth daily. , Disp: 90 Tab, Rfl: 1    metoprolol succinate (TOPROL-XL) 25 mg XL tablet, TAKE 1 TABLET BY MOUTH EVERY DAY, Disp: 90 Tab, Rfl: 2    rosuvastatin (CRESTOR) 10 mg tablet, TAKE 1 TABLET BY MOUTH EVERY DAY, Disp: 90 Tab, Rfl: 3    calcium-cholecalciferol, D3, (CALTRATE 600+D) tablet, Take 1 Tab by mouth two (2) times a day., Disp: 60 Tab, Rfl: 11    butalbital-acetaminophen (PHRENILIN)  mg tablet, Take 1 Tab by mouth every six (6) hours as needed for Pain., Disp: 90 Tab, Rfl: 1    glucose blood VI test strips (FREESTYLE INSULINX) strip, TEST EVERY DAY AND AS NEEDED, Disp: 100 Strip, Rfl: 6    MULTIVITAMIN PO, Take 1 Tab by mouth daily. , Disp: , Rfl:     Lancets (FREESTYLE LANCETS) Misc, Test once daily.  (diagnosis code: 250.00), Disp: 1 Package, Rfl: 11    Blood-Glucose Meter monitoring kit, Once daily before breakfast, Disp: 1 Kit, Rfl: 0    glucose blood VI test strips (ACCU-CHEK DA) strip, ., Disp: 1 Package, Rfl: 11    Allergies: Allergies   Allergen Reactions    Latex Rash    Amoxil [Amoxicillin] Itching     rash    Levaquin [Levofloxacin] Other (comments)     Dizziness      Percocet [Oxycodone-Acetaminophen] Nausea and Vomiting    Tetanus Vaccines And Toxoid Swelling    Tetracycline Hcl Rash       Social History:  Social History     Social History    Marital status:      Spouse name: N/A    Number of children: N/A    Years of education: N/A     Occupational History    Not on file. Social History Main Topics    Smoking status: Former Smoker     Quit date: 6/14/1988    Smokeless tobacco: Never Used    Alcohol use No    Drug use: No    Sexual activity: No     Other Topics Concern    Not on file     Social History Narrative       Family History:  Family History   Problem Relation Age of Onset    Cancer Mother      mycosis fungoives    Stroke Father     Cancer Sister      one sister with breast cancer    Breast Cancer Sister     Cancer Paternal Aunt      2 paternal aunts with breast cancer    Thyroid Cancer Neg Hx      Physical Exam:  Visit Vitals    Wt 201 lb (91.2 kg)    LMP 02/18/1983    BMI 36.76 kg/m2     Gen: NAD, pleasant, appears younger than stated age  [de-identified]: normocephalic, atraumatic, mucous membranes moist, no lid lag, stare or exophthalmos. No scleral or conjunctival injection. Extra ocular muscles intact . Thyroid: moves well with swallowing, larger L>R with a 3cm palpable, mobile nodule on left; normal texture, no tenderness  Heme/lymph: no cervical, supraclavicular or submandibular lymphadenopathy is appreciated. Pulmonary: clear to auscultation bilaterally, no wheezes/rhonchi or rales  Cardiovascular: regular rate and rhythm, no murmurs, rubs or gallops, good distal pulses  Extremities: tr nonpitting edema BL.  No onocholysis. Neuro: no tremor of the outstretch hands, reflexes are normal with normal relaxation phase. Normal gait, normal concentration  Skin: normal texture or warm and moist, or dry  Psyche: normal affect with good insight into their medical conditions    Pertinent lab review:    Lab Results   Component Value Date/Time    TSH 4.53 01/11/2017 05:42 AM       Component      Latest Ref Rng 4/25/2016 8/30/2013 5/20/2013 1/2/2013           8:22 AM  9:32 AM 11:02 AM  4:58 PM   TSH      0.450 - 4.500 uIU/mL 1.670 2.860 2.260 1.930     Lab Results   Component Value Date/Time    Hemoglobin A1c 7.0 01/12/2017 04:20 AM    Hemoglobin A1c (POC) 6.4 04/25/2016 08:22 AM     Thyroid Ultrasound May 2016  REPORT:  EXAM:  US THYROID PARATHYROID     INDICATION:   left sided swollen glands and thyromegaly     COMPARISON: None.     FINDINGS: Real-time sonography of the thyroid gland was performed with a    high frequency linear transducer. Multiple static images were obtained.     The thyroid is heterogeneous in echotexture. In the upper pole of the right    thyroid, there are multiple anechoic smooth subcentimeter structures. In the    lower pole of the right thyroid, there is a discrete 1.4 x 1.4 x 1.2 cm    isoechoic macrolobulated nodule with multiple internal microcystic changes.    There is no increased flow to this structure. The isthmus is slightly    prominent but does not contain a discrete mass. The left thyroid is    comprised largely of 3 predominantly solid, but minimally cystic nodules.    These individually measure up to 3.2 cm; however, formal measurements were    not made. There is diffusely increased flow in the left thyroid lobe as    compared to the right.     The right lobe measures 5.0 x 1.9 x 1.7 cm and the left lobe measures 5.5 x    2.6 x 2.5 cm. The isthmus measures 4 mm.       IMPRESSION:     Multinodular goiter.  Three large left thyroid nodules with increased flow.    Please correlate with clinical parameters. Thyroid US Feb 2017  FINDINGS:      There is no significant change. Left thyroid lobe is expanded by 3 nodules which  fill the left lobe, difficult to individually measure. Approximate dimensions of  the largest nodule which is in the upper pole are 3.3 x 2.9 x 1.9 cm, without  appreciable change.     Right thyroid lobe contains a 0.7 cm cyst, a 1.1 cm spongiform nodule, and a  posterior area of coarse hypoechogenicity without well-defined margins which is  not recognized as a discrete nodule (this is measured on the current study, 2.8  x 1.5 x 1.5 cm, not previously measured).     A nodule is measured in the isthmus, 1.2 x 1.2 x 0.8 cm, not previously  measured, although probably present.     Right lobe measures approximately 5.8 x 2.1 x 1.9 cm. Left lobe measures  approximately 5.7 x 3.0 x 2.8 cm.     IMPRESSION: Multinodular goiter, affecting the left lobe more than the right  lobe, without appreciable change. Assessment and Plan:  Patient is a pleasant 78y.o. year old here for f/u multiple thyroid nodules and diabetes. 1. Multiple thyroid nodules: s/p FNA of large left sided nodule with benign results. 8mo f/u US demonstrates stability of nodule size. She is not experiencing compressive symptoms. Plan to repeat US in Feb 2018 prior to her f/u with me. 2. Elevated TSH: TSH values have always been normal until recently in January when TSH returned slightly elevated. This could be lab error or subclinical hypothyroidism. Will recheck TSH again today with FT3 and FT4 to determine if she has overt hypothyroidism. If so, will start levothyroxine and have her follow-up sooner than 1 year. 3. Controlled type 2 diabetes mellitus without complication, unspecified long term insulin use status ([facility] [place] [address]): recent A1c of 7.0% indicates good control on metformin alone. We discussed lifestyle modification as an adjunct, particularly reduction in dietary carbohydrates and increase in physical activity. Switch to metformin 850mg BID so she doesn't have to split pills. Mrs Kika Beltrán will return to clinic in 1 year. I spent 40 minutes with the patient today and > 50% of the time was spent in counseling about thyroid nodules, treatment of hypothyroidism, and management of type 2 diabetes. Thank you for the opportunity to partake in this patients care.   Marlena Felder MD

## 2017-03-02 LAB
T3FREE SERPL-MCNC: 3.1 PG/ML (ref 2–4.4)
T4 FREE SERPL-MCNC: 1.15 NG/DL (ref 0.82–1.77)
TSH SERPL DL<=0.005 MIU/L-ACNC: 1.52 UIU/ML (ref 0.45–4.5)

## 2017-03-09 ENCOUNTER — LAB ONLY (OUTPATIENT)
Dept: FAMILY MEDICINE CLINIC | Age: 77
End: 2017-03-09

## 2017-03-09 DIAGNOSIS — A04.8 HELICOBACTER PYLORI (H. PYLORI) INFECTION: Primary | ICD-10-CM

## 2017-03-11 LAB — H PYLORI AG STL QL IA: NEGATIVE

## 2017-03-20 DIAGNOSIS — E11.65 POORLY CONTROLLED TYPE 2 DIABETES MELLITUS WITH CIRCULATORY DISORDER (HCC): ICD-10-CM

## 2017-03-20 DIAGNOSIS — E11.59 POORLY CONTROLLED TYPE 2 DIABETES MELLITUS WITH CIRCULATORY DISORDER (HCC): ICD-10-CM

## 2017-04-12 ENCOUNTER — APPOINTMENT (OUTPATIENT)
Dept: GENERAL RADIOLOGY | Age: 77
End: 2017-04-12
Attending: PHYSICIAN ASSISTANT
Payer: MEDICARE

## 2017-04-12 ENCOUNTER — HOSPITAL ENCOUNTER (EMERGENCY)
Age: 77
Discharge: HOME | End: 2017-04-12
Attending: EMERGENCY MEDICINE
Payer: MEDICARE

## 2017-04-12 VITALS
WEIGHT: 197.97 LBS | BODY MASS INDEX: 36.43 KG/M2 | RESPIRATION RATE: 20 BRPM | SYSTOLIC BLOOD PRESSURE: 148 MMHG | HEART RATE: 76 BPM | OXYGEN SATURATION: 99 % | DIASTOLIC BLOOD PRESSURE: 62 MMHG | HEIGHT: 62 IN | TEMPERATURE: 98.4 F

## 2017-04-12 DIAGNOSIS — R07.89 MUSCULOSKELETAL CHEST PAIN: ICD-10-CM

## 2017-04-12 DIAGNOSIS — Z86.59 H/O ANXIETY DISORDER: ICD-10-CM

## 2017-04-12 DIAGNOSIS — M94.0 COSTOCHONDRITIS: Primary | ICD-10-CM

## 2017-04-12 LAB
ALBUMIN SERPL BCP-MCNC: 3.8 G/DL (ref 3.5–5)
ALBUMIN/GLOB SERPL: 0.9 {RATIO} (ref 1.1–2.2)
ALP SERPL-CCNC: 51 U/L (ref 45–117)
ALT SERPL-CCNC: 31 U/L (ref 12–78)
ANION GAP BLD CALC-SCNC: 8 MMOL/L (ref 5–15)
APPEARANCE UR: CLEAR
AST SERPL W P-5'-P-CCNC: 23 U/L (ref 15–37)
BASOPHILS # BLD AUTO: 0 K/UL (ref 0–0.1)
BASOPHILS # BLD: 0 % (ref 0–1)
BILIRUB SERPL-MCNC: 0.2 MG/DL (ref 0.2–1)
BILIRUB UR QL: NEGATIVE
BUN SERPL-MCNC: 17 MG/DL (ref 6–20)
BUN/CREAT SERPL: 16 (ref 12–20)
CALCIUM SERPL-MCNC: 9.3 MG/DL (ref 8.5–10.1)
CHLORIDE SERPL-SCNC: 101 MMOL/L (ref 97–108)
CK MB CFR SERPL CALC: 0.6 % (ref 0–2.5)
CK MB SERPL-MCNC: 1.8 NG/ML (ref 5–25)
CK SERPL-CCNC: 282 U/L (ref 26–192)
CO2 SERPL-SCNC: 33 MMOL/L (ref 21–32)
COLOR UR: NORMAL
CREAT SERPL-MCNC: 1.04 MG/DL (ref 0.55–1.02)
EOSINOPHIL # BLD: 0.1 K/UL (ref 0–0.4)
EOSINOPHIL NFR BLD: 1 % (ref 0–7)
ERYTHROCYTE [DISTWIDTH] IN BLOOD BY AUTOMATED COUNT: 14.5 % (ref 11.5–14.5)
GLOBULIN SER CALC-MCNC: 4.2 G/DL (ref 2–4)
GLUCOSE SERPL-MCNC: 81 MG/DL (ref 65–100)
GLUCOSE UR STRIP.AUTO-MCNC: NEGATIVE MG/DL
HCT VFR BLD AUTO: 36.7 % (ref 35–47)
HGB BLD-MCNC: 12.1 G/DL (ref 11.5–16)
HGB UR QL STRIP: NEGATIVE
KETONES UR QL STRIP.AUTO: NEGATIVE MG/DL
LEUKOCYTE ESTERASE UR QL STRIP.AUTO: NEGATIVE
LYMPHOCYTES # BLD AUTO: 36 % (ref 12–49)
LYMPHOCYTES # BLD: 2.1 K/UL (ref 0.8–3.5)
MCH RBC QN AUTO: 28.4 PG (ref 26–34)
MCHC RBC AUTO-ENTMCNC: 33 G/DL (ref 30–36.5)
MCV RBC AUTO: 86.2 FL (ref 80–99)
MONOCYTES # BLD: 0.3 K/UL (ref 0–1)
MONOCYTES NFR BLD AUTO: 5 % (ref 5–13)
NEUTS SEG # BLD: 3.4 K/UL (ref 1.8–8)
NEUTS SEG NFR BLD AUTO: 58 % (ref 32–75)
NITRITE UR QL STRIP.AUTO: NEGATIVE
PH UR STRIP: 8 [PH] (ref 5–8)
PLATELET # BLD AUTO: 276 K/UL (ref 150–400)
POTASSIUM SERPL-SCNC: 4 MMOL/L (ref 3.5–5.1)
PROT SERPL-MCNC: 8 G/DL (ref 6.4–8.2)
PROT UR STRIP-MCNC: NEGATIVE MG/DL
RBC # BLD AUTO: 4.26 M/UL (ref 3.8–5.2)
SODIUM SERPL-SCNC: 142 MMOL/L (ref 136–145)
SP GR UR REFRACTOMETRY: 1.01 (ref 1–1.03)
TROPONIN I SERPL-MCNC: <0.04 NG/ML
UROBILINOGEN UR QL STRIP.AUTO: 0.2 EU/DL (ref 0.2–1)
WBC # BLD AUTO: 5.9 K/UL (ref 3.6–11)

## 2017-04-12 PROCEDURE — 84484 ASSAY OF TROPONIN QUANT: CPT | Performed by: PHYSICIAN ASSISTANT

## 2017-04-12 PROCEDURE — 71020 XR CHEST PA LAT: CPT

## 2017-04-12 PROCEDURE — 80053 COMPREHEN METABOLIC PANEL: CPT | Performed by: EMERGENCY MEDICINE

## 2017-04-12 PROCEDURE — 81003 URINALYSIS AUTO W/O SCOPE: CPT | Performed by: EMERGENCY MEDICINE

## 2017-04-12 PROCEDURE — 93005 ELECTROCARDIOGRAM TRACING: CPT

## 2017-04-12 PROCEDURE — 82553 CREATINE MB FRACTION: CPT | Performed by: PHYSICIAN ASSISTANT

## 2017-04-12 PROCEDURE — 99285 EMERGENCY DEPT VISIT HI MDM: CPT

## 2017-04-12 PROCEDURE — 85025 COMPLETE CBC W/AUTO DIFF WBC: CPT | Performed by: EMERGENCY MEDICINE

## 2017-04-12 PROCEDURE — 36415 COLL VENOUS BLD VENIPUNCTURE: CPT | Performed by: EMERGENCY MEDICINE

## 2017-04-12 PROCEDURE — 82550 ASSAY OF CK (CPK): CPT | Performed by: PHYSICIAN ASSISTANT

## 2017-04-12 RX ORDER — IBUPROFEN 800 MG/1
800 TABLET ORAL
Qty: 20 TAB | Refills: 0 | Status: SHIPPED | OUTPATIENT
Start: 2017-04-12 | End: 2017-04-19

## 2017-04-12 NOTE — DISCHARGE INSTRUCTIONS
Chest Pain: Care Instructions  Your Care Instructions  There are many things that can cause chest pain. Some are not serious and will get better on their own in a few days. But some kinds of chest pain need more testing and treatment. Your doctor may have recommended a follow-up visit in the next 8 to 12 hours. If you are not getting better, you may need more tests or treatment. Even though your doctor has released you, you still need to watch for any problems. The doctor carefully checked you, but sometimes problems can develop later. If you have new symptoms or if your symptoms do not get better, get medical care right away. If you have worse or different chest pain or pressure that lasts more than 5 minutes or you passed out (lost consciousness), call 911 or seek other emergency help right away. A medical visit is only one step in your treatment. Even if you feel better, you still need to do what your doctor recommends, such as going to all suggested follow-up appointments and taking medicines exactly as directed. This will help you recover and help prevent future problems. How can you care for yourself at home? · Rest until you feel better. · Take your medicine exactly as prescribed. Call your doctor if you think you are having a problem with your medicine. · Do not drive after taking a prescription pain medicine. When should you call for help? Call 911 if:  · You passed out (lost consciousness). · You have severe difficulty breathing. · You have symptoms of a heart attack. These may include:  ¨ Chest pain or pressure, or a strange feeling in your chest.  ¨ Sweating. ¨ Shortness of breath. ¨ Nausea or vomiting. ¨ Pain, pressure, or a strange feeling in your back, neck, jaw, or upper belly or in one or both shoulders or arms. ¨ Lightheadedness or sudden weakness. ¨ A fast or irregular heartbeat.   After you call 911, the  may tell you to chew 1 adult-strength or 2 to 4 low-dose aspirin. Wait for an ambulance. Do not try to drive yourself. Call your doctor today if:  · You have any trouble breathing. · Your chest pain gets worse. · You are dizzy or lightheaded, or you feel like you may faint. · You are not getting better as expected. · You are having new or different chest pain. Where can you learn more? Go to http://radha-tracy.info/. Enter A120 in the search box to learn more about \"Chest Pain: Care Instructions. \"  Current as of: May 27, 2016  Content Version: 11.2  © 3622-4829 [employer]. Care instructions adapted under license by [employer] (which disclaims liability or warranty for this information). If you have questions about a medical condition or this instruction, always ask your healthcare professional. [address] any warranty or liability for your use of this information. Costochondritis: Care Instructions  Your Care Instructions  You have chest pain because the cartilage of your rib cage is inflamed. This problem is called costochondritis. This type of chest wall pain may last from days to weeks. It is not a heart problem. Sometimes costochondritis occurs with a cold or the flu, and other times the exact cause is not known. Follow-up care is a key part of your treatment and safety. Be sure to make and go to all appointments, and call your doctor if you are having problems. Its also a good idea to know your test results and keep a list of the medicines you take. How can you care for yourself at home? · Take medicines for pain and inflammation exactly as directed. ¨ If the doctor gave you a prescription medicine, take it as prescribed. ¨ If you are not taking a prescription pain medicine, ask your doctor if you can take an over-the-counter medicine. ¨ Do not take two or more pain medicines at the same time unless the doctor told you to.  Many pain medicines have acetaminophen, which is Tylenol. Too much acetaminophen (Tylenol) can be harmful. · It may help to use a warm compress or heating pad (set on low) on your chest. You can also try alternating heat and ice. Put ice or a cold pack on the area for 10 to 20 minutes at a time. Put a thin cloth between the ice and your skin. · Avoid any activity that strains the chest area. As your pain gets better, you can slowly return to your normal activities. · Do not use tape, an elastic bandage, a \"rib belt,\" or anything else that restricts your chest wall motion. When should you call for help? Call 911 anytime you think you may need emergency care. For example, call if:  · You have new or different chest pain or pressure. This may occur with:  ¨ Sweating. ¨ Shortness of breath. ¨ Nausea or vomiting. ¨ Pain that spreads from the chest to the neck, jaw, or one or both shoulders or arms. ¨ Dizziness or lightheadedness. ¨ A fast or uneven pulse. After calling 911, chew 1 adult-strength aspirin. Wait for an ambulance. Do not try to drive yourself. · You have severe trouble breathing. Call your doctor now or seek immediate medical care if:  · You have a fever or cough. · You have any trouble breathing. · Your chest pain gets worse. Watch closely for changes in your health, and be sure to contact your doctor if:  · Your chest pain continues even though you are taking anti-inflammatory medicine. · Your chest wall pain has not improved after 5 to 7 days. Where can you learn more? Go to http://radha-tracy.info/. Enter W152 in the search box to learn more about \"Costochondritis: Care Instructions. \"  Current as of: May 27, 2016  Content Version: 11.2  © 9002-9770 [employer]. Care instructions adapted under license by [employer] (which disclaims liability or warranty for this information).  If you have questions about a medical condition or this instruction, always ask your healthcare professional. [employer], [facility] disclaims any warranty or liability for your use of this information. Musculoskeletal Chest Pain: Care Instructions  Your Care Instructions  Chest pain is not always a sign that something is wrong with your heart or that you have another serious problem. The doctor thinks your chest pain is caused by strained muscles or ligaments, inflamed chest cartilage, or another problem in your chest, rather than by your heart. You may need more tests to find the cause of your chest pain. Follow-up care is a key part of your treatment and safety. Be sure to make and go to all appointments, and call your doctor if you are having problems. Its also a good idea to know your test results and keep a list of the medicines you take. How can you care for yourself at home? · Take pain medicines exactly as directed. ¨ If the doctor gave you a prescription medicine for pain, take it as prescribed. ¨ If you are not taking a prescription pain medicine, ask your doctor if you can take an over-the-counter medicine. · Rest and protect the sore area. · Stop, change, or take a break from any activity that may be causing your pain or soreness. · Put ice or a cold pack on the sore area for 10 to 20 minutes at a time. Try to do this every 1 to 2 hours for the next 3 days (when you are awake) or until the swelling goes down. Put a thin cloth between the ice and your skin. · After 2 or 3 days, apply a heating pad set on low or a warm cloth to the area that hurts. Some doctors suggest that you go back and forth between hot and cold. · Do not wrap or tape your ribs for support. This may cause you to take smaller breaths, which could increase your risk of lung problems. · Mentholated creams such as Bengay or Icy Hot may soothe sore muscles. Follow the instructions on the package. · Follow your doctor's instructions for exercising. · Gentle stretching and massage may help you get better faster.  Stretch slowly to the point just before pain begins, and hold the stretch for at least 15 to 30 seconds. Do this 3 or 4 times a day. Stretch just after you have applied heat. · As your pain gets better, slowly return to your normal activities. Any increased pain may be a sign that you need to rest a while longer. When should you call for help? Call 911 anytime you think you may need emergency care. For example, call if:  · You have chest pain or pressure. This may occur with:  ¨ Sweating. ¨ Shortness of breath. ¨ Nausea or vomiting. ¨ Pain that spreads from the chest to the neck, jaw, or one or both shoulders or arms. ¨ Dizziness or lightheadedness. ¨ A fast or uneven pulse. After calling 911, chew 1 adult-strength aspirin. Wait for an ambulance. Do not try to drive yourself. · You have sudden chest pain and shortness of breath, or you cough up blood. Call your doctor now or seek immediate medical care if:  · You have any trouble breathing. · Your chest pain gets worse. · Your chest pain occurs consistently with exercise and is relieved by rest.  Watch closely for changes in your health, and be sure to contact your doctor if:  · Your chest pain does not get better after 1 week. Where can you learn more? Go to http://radha-tracy.info/. Enter V293 in the search box to learn more about \"Musculoskeletal Chest Pain: Care Instructions. \"  Current as of: May 27, 2016  Content Version: 11.2  © 8356-0277 [employer]. Care instructions adapted under license by [employer] (which disclaims liability or warranty for this information). If you have questions about a medical condition or this instruction, always ask your healthcare professional. [place] [address] any warranty or liability for your use of this information.

## 2017-04-12 NOTE — ED PROVIDER NOTES
HPI Comments: Fely Gallardo is a 78 y.o. female, pmhx significant for asthma, arthritis, HTN, cervical spondylitis, DM, GERD, chronic shoulder pain, and generalized anxiety d/o , who presents ambulatory with daughter to the ED c/o progressively worsening left flank pain and left chest wall pain that radiates into her left shoulder x 2 weeks. Pt states that this pain has been constantly waxing and waning with intermittent episodes of sharp pains to her side. Pt states that the pain is made worse with movement of her LUE, when lifting heavy objects, or when sitting in one position for too long. Pt states that the sharp pains are typically brought on by staying in one position for too long, but that the pain improves once she changes position. Pt additionally reports that she's had diarrhea with nausea for the past couple days which she describes as sometimes loose stools or sometimes watery. However, she states that she has not had diarrhea today. Pt has not taken any OTC medications for her pain. Pt denies any significant SOB, dyspnea, cough, fever, chills, melena, or any urinary sxs. PCP: Mega Ge MD    PSHx: Significant for total hysterectomy, inguinal hernia repair, appendectomy, tonsillectomy, orthopaedic (open repair of clavicle fx in 2009)   Social Hx: - tobacco (former), - EtOH , - Illicit Drugs    There are no other complaints, changes, or physical findings at this time. The history is provided by the patient. No  was used.         Past Medical History:   Diagnosis Date    Abnormal finding on MRI of brain 3/16/2015    evaluated by neurologist and no findings    Acute pharyngitis 5/26/2016    Anemia NEC     Arthralgia of right hip 4/25/2016    Arthritis 2/18/2010    Asthma 2/18/2010    Cataract 9/24/2014    Diabetes [address] [address]     Elevated LFTs 4/16/2014    DAVID (generalized anxiety disorder) 1/22/2015    GERD (gastroesophageal reflux disease) 4/7/2014    Hammer toe of right foot 9/29/2014    Headache 7/66/6671    Helicobacter pylori (H. 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 ([address] 9/29/2014    Preop examination 9/24/2014    Risk for falls 4/16/2014    Screening for breast cancer 9/23/2014    Screening for depression 4/16/2014    Shoulder pain, left 3/18/2014    Spondylitis, cervical ([address] 4/5/2010    Tinea pedis 6/3/2015    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 ([facility] [address] 9/29/2014       Past Surgical History:   Procedure Laterality Date    ABDOMEN SURGERY PROC UNLISTED      inguinal hernia    ABDOMEN SURGERY PROC UNLISTED      ENDOSCOPY, COLON, DIAGNOSTIC      HX APPENDECTOMY      HX GYN  1983    total hysterectomy for uterine fibroid    HX HEENT      tonsillectomy    HX ORTHOPAEDIC      clavicle repair    HX OTHER SURGICAL      ? straightened colon out    OPEN REPAIR CLAVICLE FRACTURE  2009         Family History:   Problem Relation Age of Onset    Cancer Mother      mycosis fungoives    Stroke Father     Cancer Sister      one sister with breast cancer    Breast Cancer Sister     Cancer Paternal Aunt      2 paternal aunts with breast cancer    Thyroid Cancer Neg Hx        Social History     Social History    Marital status:      Spouse name: N/A    Number of children: N/A    Years of education: N/A     Occupational History    Not on file.      Social History Main Topics    Smoking status: Former Smoker     Quit date: 6/14/1988    Smokeless tobacco: Never Used    Alcohol use No    Drug use: No    Sexual activity: No     Other Topics Concern    Not on file     Social History Narrative         ALLERGIES: Latex; Amoxil [amoxicillin]; Levaquin [levofloxacin]; Percocet [oxycodone-acetaminophen]; Tetanus vaccines and toxoid; and Tetracycline hcl    Review of Systems   Constitutional: Negative. Negative for activity change, appetite change, chills, diaphoresis, fever and unexpected weight change. HENT: Negative for congestion, hearing loss, rhinorrhea, sinus pressure, sneezing, sore throat and trouble swallowing. Eyes: Negative for pain, redness, itching and visual disturbance. Respiratory: Negative for cough, shortness of breath and wheezing. Cardiovascular: Negative for chest pain, palpitations and leg swelling. Gastrointestinal: Negative for abdominal pain, constipation, diarrhea, nausea and vomiting. Genitourinary: Positive for flank pain (L side). Negative for dysuria and hematuria. Musculoskeletal: Positive for arthralgias (radiation from L side to L shoulder). Negative for gait problem and myalgias. Skin: Negative for color change, pallor, rash and wound. Neurological: Negative for tremors, weakness, light-headedness, numbness and headaches. All other systems reviewed and are negative. Patient Vitals for the past 12 hrs:   Temp Pulse Resp BP SpO2   04/12/17 1904 98.4 °F (36.9 °C) 76 20 148/62 99 %   04/12/17 1900 - 78 21 - 98 %   04/12/17 1815 - 79 19 - 95 %   04/12/17 1545 98.1 °F (36.7 °C) 83 18 156/73 100 %     Physical Exam   Constitutional: She is oriented to person, place, and time. Vital signs are normal. She appears well-developed and well-nourished. No distress. 78 y.o.  female in NAD  Communicates appropriately and in full sentences     HENT:   Head: Normocephalic and atraumatic. Right Ear: External ear normal.   Left Ear: External ear normal.   Mouth/Throat: Oropharynx is clear and moist. No oropharyngeal exudate. Eyes: Conjunctivae are normal. Pupils are equal, round, and reactive to light. Right eye exhibits no discharge. Left eye exhibits no discharge.  No scleral icterus. Neck: Normal range of motion. Neck supple. No tracheal deviation present. Cardiovascular: Normal rate, regular rhythm, normal heart sounds and intact distal pulses. Exam reveals no gallop and no friction rub. No murmur heard. Pulmonary/Chest: Effort normal and breath sounds normal. No stridor. No respiratory distress. She has no wheezes. She has no rales. She exhibits tenderness (MCL under L breast). Chest wall minimally tender to palpation under left breast  Comfortable at rest without tachypnea, accessory muscle use, or obvious chest wall abnormalities. Lungs CTA B/L without adventitious sounds. Communicates in full sentences     Abdominal: Soft. Bowel sounds are normal. She exhibits no distension. There is no tenderness. No CVA tenderness  No pulsatile mass   No abdominal scars  Normoactive bowel sounds x 4  no tenderness  with light and deep palpation  no grimacing throughout entirety of abdominal exam  No rebound, guarding, or peritoneal signs     Musculoskeletal: Normal range of motion. She exhibits no edema or deformity. Pt has full active/passive ROM of left shoulder  No elicited pain along joint lines  No obvious deformity   Lymphadenopathy:     She has no cervical adenopathy. Neurological: She is alert and oriented to person, place, and time. No cranial nerve deficit. Coordination normal.   Skin: Skin is warm and dry. No rash noted. She is not diaphoretic. No erythema. No pallor. Psychiatric: She has a normal mood and affect. Nursing note and vitals reviewed. MDM  Number of Diagnoses or Management Options  Costochondritis:   H/O anxiety disorder:   Musculoskeletal chest pain:   Diagnosis management comments: DDx: costochondritis, pneumonia, musculoskeletal chest pain, low suspicion for ACS and nephrolithiasis, arrhythmia, anxiety, esophageal spasm     70yo  Female presents with CP PTA, relieved p/t d/c. No acute findings.  The patient has no chest pain on re-examination. She has had intermittent chest pain for two weeks with one negative set of cardiac enzymes in the ER during this visit. Diagnosis, follow up, return instructions, test results, x-rays and medications have been discussed and reviewed with the patient and/or family. The patient and/or family have been given the opportunity to ask questions. The patient and/or family express understanding of the care plan, follow-up appointments and return instructions. The patient and/or family agree to follow up with cardiology as directed and to return immediately if the chest pain worsens. The patient and family express understanding that although cardiac testing at this time is negative, a cardiac problem could still be present and that a follow-up appointment with a cardiologist for further evaluation and risk factor modification is necessary to complete the evaluation of this complaint. Amount and/or Complexity of Data Reviewed  Clinical lab tests: ordered and reviewed  Tests in the radiology section of CPT®: ordered and reviewed  Tests in the medicine section of CPT®: reviewed and ordered  Review and summarize past medical records: yes  Independent visualization of images, tracings, or specimens: yes    Patient Progress  Patient progress: stable    ED Course       Procedures     PROGRESS NOTE   4:57 PM  Pt declined analgesia while in the ED at this time, and was encouraged to ask RN or PA for analgesia at any point during her ED course if she felt she needed it. Written by [employer], ED Scribe, as dictated by Lauryn Bob PA-C. PROGRESS NOTE   6:18 PM  Pt re-evaluated. She is still reporting mild chest wall tenderness, but continues to decline analgesia while in the ED. Written by [facility], ED Scribe, as dictated by Lauryn Bob PA-C.      PROGRESS NOTE   7:03 PM  Pt reports that she was a pt of Dr. Heraclio Gomez, but was discharged from the practice about 2 years ago after having an extensive negative cariology workup. Pt states that she's had multiple negative stress tests in the past. Encouraged pt to contact Dr. Ellen Smith to ensure appropriate evaluation and follow-up. Written by MALACHI Joseph, as dictated by Yuko Garcia PA-C. PROGRESS NOTE:  7:39 PM  Discussed workup results/findings, and counseled pt regarding her diagnosis. Pt has been encouraged will follow-up as instructed. All questions have been answered, and pt conveyed understanding. Written by MALACHI Joseph, as dictated by Yuko Garcia PA-C.    LABORATORY TESTS:  Recent Results (from the past 12 hour(s))   CBC WITH AUTOMATED DIFF    Collection Time: 04/12/17  3:58 PM   Result Value Ref Range    WBC 5.9 3.6 - 11.0 K/uL    RBC 4.26 3.80 - 5.20 M/uL    HGB 12.1 11.5 - 16.0 g/dL    HCT 36.7 35.0 - 47.0 %    MCV 86.2 80.0 - 99.0 FL    MCH 28.4 26.0 - 34.0 PG    MCHC 33.0 30.0 - 36.5 g/dL    RDW 14.5 11.5 - 14.5 %    PLATELET 401 008 - 690 K/uL    NEUTROPHILS 58 32 - 75 %    LYMPHOCYTES 36 12 - 49 %    MONOCYTES 5 5 - 13 %    EOSINOPHILS 1 0 - 7 %    BASOPHILS 0 0 - 1 %    ABS. NEUTROPHILS 3.4 1.8 - 8.0 K/UL    ABS. LYMPHOCYTES 2.1 0.8 - 3.5 K/UL    ABS. MONOCYTES 0.3 0.0 - 1.0 K/UL    ABS. EOSINOPHILS 0.1 0.0 - 0.4 K/UL    ABS. BASOPHILS 0.0 0.0 - 0.1 K/UL   METABOLIC PANEL, COMPREHENSIVE    Collection Time: 04/12/17  3:58 PM   Result Value Ref Range    Sodium 142 136 - 145 mmol/L    Potassium 4.0 3.5 - 5.1 mmol/L    Chloride 101 97 - 108 mmol/L    CO2 33 (H) 21 - 32 mmol/L    Anion gap 8 5 - 15 mmol/L    Glucose 81 65 - 100 mg/dL    BUN 17 6 - 20 MG/DL    Creatinine 1.04 (H) 0.55 - 1.02 MG/DL    BUN/Creatinine ratio 16 12 - 20      GFR est AA >60 >60 ml/min/1.73m2    GFR est non-AA 52 (L) >60 ml/min/1.73m2    Calcium 9.3 8.5 - 10.1 MG/DL    Bilirubin, total 0.2 0.2 - 1.0 MG/DL    ALT (SGPT) 31 12 - 78 U/L    AST (SGOT) 23 15 - 37 U/L    Alk.  phosphatase 51 45 - 117 U/L    Protein, total 8.0 6.4 - 8.2 g/dL    Albumin 3.8 3.5 - 5.0 g/dL    Globulin 4.2 (H) 2.0 - 4.0 g/dL    A-G Ratio 0.9 (L) 1.1 - 2.2     CK W/ REFLX CKMB    Collection Time: 04/12/17  3:58 PM   Result Value Ref Range     (H) 26 - 192 U/L   TROPONIN I    Collection Time: 04/12/17  3:58 PM   Result Value Ref Range    Troponin-I, Qt. <0.04 <0.05 ng/mL   CK-MB,QUANT. Collection Time: 04/12/17  3:58 PM   Result Value Ref Range    CK - MB 1.8 <3.6 NG/ML    CK-MB Index 0.6 0 - 2.5     URINALYSIS W/ RFLX MICROSCOPIC    Collection Time: 04/12/17  4:28 PM   Result Value Ref Range    Color YELLOW/STRAW      Appearance CLEAR CLEAR      Specific gravity 1.015 1.003 - 1.030      pH (UA) 8.0 5.0 - 8.0      Protein NEGATIVE  NEG mg/dL    Glucose NEGATIVE  NEG mg/dL    Ketone NEGATIVE  NEG mg/dL    Bilirubin NEGATIVE  NEG      Blood NEGATIVE  NEG      Urobilinogen 0.2 0.2 - 1.0 EU/dL    Nitrites NEGATIVE  NEG      Leukocyte Esterase NEGATIVE  NEG         IMAGING RESULTS:  XR CHEST PA LAT   Final Result     EXAM: XR CHEST PA LAT     INDICATION: Left chest wall pain, left breast pain, and left shoulder pain  started 2 weeks ago but increased recently.     COMPARISON: Chest views on 1/5/2017     TECHNIQUE: PA and lateral chest views     FINDINGS: The cardiomediastinal and hilar contours are within normal limits. The  pulmonary vasculature is within normal limits.      The lungs and pleural spaces are clear. Bilateral shoulder arthritis is  unchanged.     IMPRESSION  IMPRESSION:     No acute process on chest x-ray. No change. IMPRESSION:  1. Costochondritis    2. Musculoskeletal chest pain    3. H/O anxiety disorder        PLAN:  1. Discharge home. Discharge Medication List as of 4/12/2017  6:53 PM      CONTINUE these medications which have CHANGED    Details   ibuprofen (MOTRIN) 800 mg tablet Take 1 Tab by mouth every six (6) hours as needed for Pain for up to 7 days. , Normal, Disp-20 Tab, R-0 CONTINUE these medications which have NOT CHANGED    Details   !! glucose blood VI test strips (FREESTYLE INSULINX) strip TEST EVERY DAY AND AS NEEDED, Normal, Disp-100 Strip, R-6      metoprolol succinate (TOPROL-XL) 25 mg XL tablet TAKE 1 TABLET BY MOUTH EVERY DAY, Normal, Disp-90 Tab, R-2      metFORMIN (GLUCOPHAGE) 850 mg tablet Take 1 Tab by mouth two (2) times daily (with meals). , Normal, Disp-180 Tab, R-3      levalbuterol (XOPENEX) 1.25 mg/3 mL nebu 3 mL by Nebulization route every six (6) hours as needed. icd 10 J45.902, Normal, Disp-30 Nebule, R-11      ipratropium (ATROVENT) 0.02 % nebulizer solution 2.5 mL by Nebulization route as needed for Wheezing. One package of Nebules to be used every 4-6hrs prn.  icd 10 J45.902, Normal, Disp-1250 mL, R-11      cetirizine (ZYRTEC) 10 mg tablet TAKE 1 TABLET BY MOUTH EVERY DAY, Historical Med, R-3      levalbuterol tartrate (XOPENEX HFA) 45 mcg/actuation inhaler Take 1 Puff by inhalation every six (6) hours as needed for Wheezing or Shortness of Breath. Indications: BRONCHOSPASM PREVENTION, unable to take ALbuterol developed tremors and palpitations, Print, Disp-1 Inhaler, R-7      fluticasone (FLONASE) 50 mcg/actuation nasal spray 2 Sprays by Both Nostrils route as needed for Rhinitis or Allergies. Indications: ALLERGIC RHINITIS, Normal, Disp-1 Bottle, R-11      inhalational spacing device (SPACE CHAMBER PLUS) 1 Each by Does Not Apply route as needed. Indications: USE WITH ALBUTEROL MDI, Normal, Disp-1 Device, R-0      aspirin delayed-release 81 mg tablet Take 1 Tab by mouth daily. , Normal, Disp-30 Tab, R-11      hydrochlorothiazide (HYDRODIURIL) 25 mg tablet Take 1 Tab by mouth daily. , Normal, Disp-90 Tab, R-1      rosuvastatin (CRESTOR) 10 mg tablet TAKE 1 TABLET BY MOUTH EVERY DAY, Normal, Disp-90 Tab, R-3      calcium-cholecalciferol, D3, (CALTRATE 600+D) tablet Take 1 Tab by mouth two (2) times a day., Print, Disp-60 Tab, R-11      butalbital-acetaminophen (PHRENILIN)  mg tablet Take 1 Tab by mouth every six (6) hours as needed for Pain., Normal, Disp-90 Tab, R-1      MULTIVITAMIN PO Take 1 Tab by mouth daily. , Historical Med      Lancets (FREESTYLE LANCETS) Misc Test once daily. (diagnosis code: 250.00), Normal, Disp-1 Package, R-11      Blood-Glucose Meter monitoring kit Once daily before breakfast, Normal, Disp-1 Kit, R-0      !! glucose blood VI test strips (ACCU-CHEK DA) strip ., Normal, Disp-1 Package, R-11       !! - Potential duplicate medications found. Please discuss with provider. 2.   Follow-up Information     Follow up With Details Comments Zainab  West, MD Schedule an appointment as soon as possible for a visit in 2 days As needed, If symptoms worsen, Possible further evaluation and treatment [address] [facility]  [phone]      [facility] EMERGENCY DEPT Go to As needed, If symptoms worsen [address] [place] [address]  [phone]    [facility] Schedule an appointment as soon as possible for a visit in 2 days As needed, If symptoms worsen, Possible further evaluation and treatment [facility] [address]  [address] [address]  [phone]        3. Return to ED if worse       DISCHARGE NOTE:  7:39 PM  Patient's results have been reviewed with them. Patient and/or family have verbally conveyed their understanding and agreement of the patient's signs, symptoms, diagnosis, treatment and prognosis and additionally agree to follow up as recommended or return to the Emergency Room should their condition change prior to follow-up. Discharge instructions have also been provided to the patient with some educational information regarding their diagnosis as well a list of reasons why they would want to return to the ER prior to their follow-up appointment should their condition change.      This note is prepared by Aleksey Godoy, acting as Scribe for [employer], [employer], [place]: The scribe's documentation has been prepared under my direction and personally reviewed by me in its entirety. I confirm that the note above accurately reflects all work, treatment, procedures, and medical decision making performed by me. This note will not be viewable in [address]

## 2017-04-12 NOTE — ED NOTES
The physician has reviewed discharge instructions with the patient. The patient verbalized understanding. Patient ambulated out of the Emergency Department with a steady gait.

## 2017-04-13 LAB
ATRIAL RATE: 77 BPM
CALCULATED P AXIS, ECG09: 63 DEGREES
CALCULATED R AXIS, ECG10: -40 DEGREES
CALCULATED T AXIS, ECG11: 0 DEGREES
DIAGNOSIS, 93000: NORMAL
P-R INTERVAL, ECG05: 166 MS
Q-T INTERVAL, ECG07: 434 MS
QRS DURATION, ECG06: 92 MS
QTC CALCULATION (BEZET), ECG08: 491 MS
VENTRICULAR RATE, ECG03: 77 BPM

## 2017-04-14 ENCOUNTER — PATIENT OUTREACH (OUTPATIENT)
Dept: FAMILY MEDICINE CLINIC | Age: 77
End: 2017-04-14

## 2017-04-14 NOTE — PROGRESS NOTES
Spoke with patient after verifying name and  regarding ED visit. Patient informed this writer she has just sat down to breakfast please call back in about 30 minutes. 1030--Spoke with patient after verifying name and . Patient informed this writer she is still having the discomfort but nothing was found on EKG and chest xray. She stated she has moved in the last 3 weeks and was packing two prior to move and now unpacking. She does not want to take pain medication. She was prescribed Motrin 800 mg but has not filled. This writer educated patient on Motrin. Patient declines to schedule appointment at this time but agrees to call office if needed. Patient has follow up appointment scheduled for 17.

## 2017-04-20 ENCOUNTER — HOSPITAL ENCOUNTER (EMERGENCY)
Age: 77
Discharge: HOME | End: 2017-04-20
Attending: EMERGENCY MEDICINE
Payer: MEDICARE

## 2017-04-20 ENCOUNTER — APPOINTMENT (OUTPATIENT)
Dept: GENERAL RADIOLOGY | Age: 77
End: 2017-04-20
Attending: PHYSICIAN ASSISTANT
Payer: MEDICARE

## 2017-04-20 VITALS
RESPIRATION RATE: 18 BRPM | HEIGHT: 62 IN | OXYGEN SATURATION: 100 % | TEMPERATURE: 97.8 F | BODY MASS INDEX: 37.53 KG/M2 | SYSTOLIC BLOOD PRESSURE: 168 MMHG | DIASTOLIC BLOOD PRESSURE: 79 MMHG | WEIGHT: 203.93 LBS | HEART RATE: 80 BPM

## 2017-04-20 DIAGNOSIS — S90.31XA CONTUSION OF RIGHT FOOT, INITIAL ENCOUNTER: ICD-10-CM

## 2017-04-20 DIAGNOSIS — S97.81XA CRUSH INJURY OF RIGHT FOOT, INITIAL ENCOUNTER: Primary | ICD-10-CM

## 2017-04-20 PROCEDURE — 73630 X-RAY EXAM OF FOOT: CPT

## 2017-04-20 PROCEDURE — 74011250637 HC RX REV CODE- 250/637: Performed by: PHYSICIAN ASSISTANT

## 2017-04-20 PROCEDURE — 99283 EMERGENCY DEPT VISIT LOW MDM: CPT

## 2017-04-20 RX ORDER — HYDROCODONE BITARTRATE AND ACETAMINOPHEN 5; 325 MG/1; MG/1
.5-1 TABLET ORAL
Qty: 8 TAB | Refills: 0 | Status: SHIPPED | OUTPATIENT
Start: 2017-04-20 | End: 2017-08-03

## 2017-04-20 RX ORDER — HYDROCODONE BITARTRATE AND ACETAMINOPHEN 5; 325 MG/1; MG/1
1 TABLET ORAL
Status: COMPLETED | OUTPATIENT
Start: 2017-04-20 | End: 2017-04-20

## 2017-04-20 RX ADMIN — HYDROCODONE BITARTRATE AND ACETAMINOPHEN 1 TABLET: 5; 325 TABLET ORAL at 00:53

## 2017-04-20 NOTE — DISCHARGE INSTRUCTIONS
Contusion: Care Instructions  Your Care Instructions  Contusion is the medical term for a bruise. It is the result of a direct blow or an impact, such as a fall. Contusions are common sports injuries. Most people think of a bruise as a black-and-blue spot. This happens when small blood vessels get torn and leak blood under the skin. But bones, muscles, and organs can also get bruised. This may damage deep tissues but not cause a bruise you can see. The doctor will do a physical exam to find the location of your contusion. You may also have tests to make sure you do not have a more serious injury, such as a broken bone or nerve damage. These may include X-rays or other imaging tests like a CT scan or MRI. Deep-tissue contusions may cause pain and swelling. But if there is no serious damage, they will often get better in a few weeks with home treatment. The doctor has checked you carefully, but problems can develop later. If you notice any problems or new symptoms, get medical treatment right away. Follow-up care is a key part of your treatment and safety. Be sure to make and go to all appointments, and call your doctor if you are having problems. It's also a good idea to know your test results and keep a list of the medicines you take. How can you care for yourself at home? · Put ice or a cold pack on the sore area for 10 to 20 minutes at a time to stop swelling. Put a thin cloth between the ice pack and your skin. · Be safe with medicines. Read and follow all instructions on the label. ¨ If the doctor gave you a prescription medicine for pain, take it as prescribed. ¨ If you are not taking a prescription pain medicine, ask your doctor if you can take an over-the-counter medicine. · If you can, prop up the sore area on pillows as much as possible for the next few days. Try to keep the sore area above the level of your heart. When should you call for help?   Call your doctor now or seek immediate medical care if:  · Your pain gets worse. · You have new or worse swelling. · You have tingling, weakness, or numbness in the area near the contusion. · The area near the contusion is cold or pale. Watch closely for changes in your health, and be sure to contact your doctor if:  · You do not get better as expected. Where can you learn more? Go to [employer]  Enter [id number] in the search box to learn more about \"Contusion: Care Instructions. \"   © 7660-3743 [employer]wise, [employer]. Care instructions adapted under license by [employer] (which disclaims liability or warranty for this information). This care instruction is for use with your licensed healthcare professional. If you have questions about a medical condition or this instruction, always ask your healthcare professional. [address] any warranty or liability for your use of this information.   Content Version: 48.0.414969; Current as of: May 22, 2015

## 2017-04-20 NOTE — ED NOTES
.Discharge instructions reviewed with patient and given to pt per PA Teja Speaks. Pt able to return/verbalize discharge instructions. Copy of discharge instructions given. RX given to pt. Pt condition stable, no further complaints. Pt out of ER, accompanied by self & family. Ambulatory, steady gait. Wheelchair offered & pt declined. Family to drive patient home. Patient out of ED prior to obtaining discharge vitals.

## 2017-04-20 NOTE — ED PROVIDER NOTES
HPI Comments: Julian Parra is a 78 y.o. female who has a h/o arthritis, hypertension and diabetes presents to [facility] ED ambulatory with cc right foot pain and swelling x today. The patient states that she went to the trunk of her car, to remove of folding chair, however when she opened the trunk, the folding chair fell out, onto her foot. The patient has not yet attempted any medications for symptom relief. She is still able to ambulate and does not endorse any exacerbating or alleviating factors for her current pain symptoms. The patient denies any previous history of injuries to that right foot. She denies all other symptoms at this time, including any numbness/tingling. Of note, the patient is a diabetic and states that her sugars having been around 120, which is normal for her baseline. PCP: Ayo Strong MD    There are no other complaints changes or physical findings at this time  Written by Rand Scott ED Scribe as dictated by Daily Jones. The history is provided by the patient.         Past Medical History:   Diagnosis Date    Abnormal finding on MRI of brain 3/16/2015    evaluated by neurologist and no findings    Acute pharyngitis 5/26/2016    Anemia NEC     Arthralgia of right hip 4/25/2016    Arthritis 2/18/2010    Asthma 2/18/2010    Cataract 9/24/2014    Diabetes ([address]     Elevated LFTs 4/16/2014    DAVID (generalized anxiety disorder) 1/22/2015    GERD (gastroesophageal reflux disease) 4/7/2014    Hammer toe of right foot 9/29/2014    Headache 4/18/1267    Helicobacter pylori (H. 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 ([address] 9/29/2014    Preop examination 9/24/2014    Risk for falls 4/16/2014    Screening for breast cancer 9/23/2014    Screening for depression 4/16/2014    Shoulder pain, left 3/18/2014    Spondylitis, cervical ([facility] 4/5/2010    Tinea pedis 6/3/2015    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 straightened colon out    OPEN REPAIR CLAVICLE FRACTURE  2009         Family History:   Problem Relation Age of Onset    Cancer Mother      mycosis fungoives    Stroke Father     Cancer Sister      one sister with breast cancer    Breast Cancer Sister     Cancer Paternal Aunt      2 paternal aunts with breast cancer    Thyroid Cancer Neg Hx        Social History     Social History    Marital status:      Spouse name: N/A    Number of children: N/A    Years of education: N/A     Occupational History    Not on file. Social History Main Topics    Smoking status: Former Smoker     Quit date: 6/14/1988    Smokeless tobacco: Never Used    Alcohol use No    Drug use: No    Sexual activity: No     Other Topics Concern    Not on file     Social History Narrative         ALLERGIES: Latex; Amoxil [amoxicillin]; Levaquin [levofloxacin]; Percocet [oxycodone-acetaminophen]; Tetanus vaccines and toxoid; and Tetracycline hcl    Review of Systems   Constitutional: Negative. Negative for fever. HENT: Negative. Eyes: Negative. Respiratory: Negative. Cardiovascular: Negative. Gastrointestinal: Negative. Negative for constipation, diarrhea, nausea and vomiting.         Denies liver disease   Endocrine:        Denies thyroid disease   Genitourinary: Negative. Negative for dysuria. Denies kidney disease   Musculoskeletal: Positive for arthralgias and joint swelling. Skin: Negative. Neurological: Negative. Negative for numbness. All other systems reviewed and are negative. Vitals:    04/20/17 0013   BP: 168/79   Pulse: 80   Resp: 18   Temp: 97.8 °F (36.6 °C)   SpO2: 100%   Weight: 92.5 kg (203 lb 14.8 oz)   Height: 5' 2\" (1.575 m)            Physical Exam   Constitutional: She is oriented to person, place, and time. She appears well-developed and well-nourished. No distress. Patient is ambulatory   HENT:   Head: Normocephalic and atraumatic. Right Ear: External ear normal.   Left Ear: External ear normal.   Nose: Nose normal.   Mouth/Throat: Oropharynx is clear and moist. No oropharyngeal exudate. Eyes: Conjunctivae and EOM are normal. Pupils are equal, round, and reactive to light. Right eye exhibits no discharge. Left eye exhibits no discharge. No scleral icterus. Neck: Normal range of motion. Neck supple. No tracheal deviation present. Cardiovascular: Normal rate, regular rhythm, normal heart sounds and intact distal pulses. Exam reveals no gallop and no friction rub. No murmur heard. Pulmonary/Chest: Effort normal and breath sounds normal. No respiratory distress. She has no wheezes. She has no rales. She exhibits no tenderness. Abdominal: Soft. Bowel sounds are normal. She exhibits no distension and no mass. There is no tenderness. There is no rebound and no guarding. Musculoskeletal:   Swelling, tenderness and contusions to the superior aspect of the right mid foot  Flexion and extension of the right foot is intact   Lymphadenopathy:     She has no cervical adenopathy. Neurological: She is alert and oriented to person, place, and time. No cranial nerve deficit. Skin: Skin is warm and dry. No rash noted. No erythema. Capillary refill is less than 3 seconds   Psychiatric: She has a normal mood and affect.  Her behavior is normal.   Nursing note and vitals reviewed. MDM  Number of Diagnoses or Management Options  Diagnosis management comments: Ddx: Crush injury, contusion, fracture, sprain, strain       Amount and/or Complexity of Data Reviewed  Tests in the radiology section of CPT®: ordered and reviewed  Review and summarize past medical records: yes  Independent visualization of images, tracings, or specimens: yes    Patient Progress  Patient progress: stable    ED Course       Procedures    Procedure Note - Ace Wrap Placement:  1:08 AM  Performed by: Shannan Jack  Neurovascularly intact prior to tx. An ace wrap was placed on pt's right foot. Joint was placed in neutral position. Neurovascularly intact after tx. The procedure took 1-15 minutes, and pt tolerated well. Written by Chidi Menard II, ED Scribe, as dictated by Shannan Jack. LABORATORY TESTS:  No results found for this or any previous visit (from the past 12 hour(s)). IMAGING RESULTS:  INDICATION: crush injury     COMPARISON: None     FINDINGS:  3 views of the right foot demonstrate no acute fracture or subluxation. Diffuse  soft tissue swelling. There is no radiopaque foreign body.     IMPRESSION  IMPRESSION:  No acute fracture. MEDICATIONS GIVEN:  Medications   HYDROcodone-acetaminophen (NORCO) 5-325 mg per tablet 1 Tab (1 Tab Oral Given 4/20/17 0053)       IMPRESSION:  1. Crush injury of right foot, initial encounter    2. Contusion of right foot, initial encounter        PLAN:  1. Current Discharge Medication List      START taking these medications    Details   HYDROcodone-acetaminophen (NORCO) 5-325 mg per tablet Take 0.5-1 Tabs by mouth every six (6) hours as needed for Pain. Max Daily Amount: 4 Tabs. Qty: 8 Tab, Refills: 0           2.    Follow-up Information     Follow up With Details Comments [place] [address] [place], [place]   [address]  [phone]      [facility] EMERGENCY DEPT  If symptoms worsen [address] [address]  [phone]    Jaswinder Mata MD  foot and ankle specialist, As needed [address]  [address]  [place] [phone]          Return to ED if worse   Discharge Note:  1:14 AM  The patient is ready for discharge. The patient's signs, symptoms, diagnosis, and discharge instructions have been discussed and the patient has conveyed their understanding. The patient is to follow up as recommended or return to the ER should their symptoms worsen. Plan has been discussed and the patient is in agreement. This note is prepared by Mckenzie Powell II acting as Scribe for Jeana Farah. CARO Brown Brought: The Scribe's documentation has been prepared under my direction and personally reviewed by me in its entirety. I confirm that the note above accurately reflects all work, treatment, procedures, and medical decision making performed by me.

## 2017-05-02 ENCOUNTER — HOSPITAL ENCOUNTER (OUTPATIENT)
Dept: LAB | Age: 77
Discharge: HOME | End: 2017-05-02
Payer: MEDICARE

## 2017-05-02 ENCOUNTER — OFFICE VISIT (OUTPATIENT)
Dept: FAMILY MEDICINE CLINIC | Age: 77
End: 2017-05-02

## 2017-05-02 VITALS
WEIGHT: 200 LBS | HEART RATE: 70 BPM | TEMPERATURE: 97.8 F | DIASTOLIC BLOOD PRESSURE: 72 MMHG | RESPIRATION RATE: 18 BRPM | BODY MASS INDEX: 36.8 KG/M2 | OXYGEN SATURATION: 95 % | SYSTOLIC BLOOD PRESSURE: 144 MMHG | HEIGHT: 62 IN

## 2017-05-02 DIAGNOSIS — S99.921D RIGHT FOOT INJURY, SUBSEQUENT ENCOUNTER: ICD-10-CM

## 2017-05-02 DIAGNOSIS — Z13.39 SCREENING FOR ALCOHOLISM: ICD-10-CM

## 2017-05-02 DIAGNOSIS — E09.65 DRUG OR CHEMICAL INDUCED DIABETES MELLITUS WITH HYPERGLYCEMIA (HCC): ICD-10-CM

## 2017-05-02 DIAGNOSIS — Z13.31 SCREENING FOR DEPRESSION: ICD-10-CM

## 2017-05-02 DIAGNOSIS — Z71.89 ADVANCED DIRECTIVES, COUNSELING/DISCUSSION: ICD-10-CM

## 2017-05-02 DIAGNOSIS — Z12.11 SCREEN FOR COLON CANCER: ICD-10-CM

## 2017-05-02 DIAGNOSIS — Z00.00 ROUTINE GENERAL MEDICAL EXAMINATION AT A HEALTH CARE FACILITY: Primary | ICD-10-CM

## 2017-05-02 DIAGNOSIS — E78.2 MIXED HYPERLIPIDEMIA: ICD-10-CM

## 2017-05-02 PROBLEM — S99.921A RIGHT FOOT INJURY: Status: ACTIVE | Noted: 2017-05-02

## 2017-05-02 LAB — HBA1C MFR BLD HPLC: 6.3 %

## 2017-05-02 PROCEDURE — 80061 LIPID PANEL: CPT

## 2017-05-02 PROCEDURE — 84443 ASSAY THYROID STIM HORMONE: CPT

## 2017-05-02 PROCEDURE — 80053 COMPREHEN METABOLIC PANEL: CPT

## 2017-05-02 PROCEDURE — 85027 COMPLETE CBC AUTOMATED: CPT

## 2017-05-02 PROCEDURE — 36415 COLL VENOUS BLD VENIPUNCTURE: CPT

## 2017-05-02 NOTE — MR AVS SNAPSHOT
Visit Information Date & Time Provider Department Dept. Phone Encounter #  
 5/2/2017  8:30 AM Connie Brooks MD [facility] [phone] [id number] Follow-up Instructions Return in about 1 year (around 5/2/2018). Your Appointments 3/1/2018  9:30 AM  
ROUTINE CARE with Dewey Blair MD  
[facility] [address] [facility]) Appt Note: 1yr f/u thyroid cp0.00  
 [address] [place] [address]  
[address] [address] [address] [facility] [address] [id number] Upcoming Health Maintenance Date Due MICROALBUMIN Q1 4/25/2017 MEDICARE YEARLY EXAM 4/26/2017 HEMOGLOBIN A1C Q6M 7/12/2017 INFLUENZA AGE 9 TO ADULT 8/1/2017 EYE EXAM RETINAL OR DILATED Q1 11/21/2017 LIPID PANEL Q1 1/12/2018 FOOT EXAM Q1 1/25/2018 GLAUCOMA SCREENING Q2Y 11/21/2018 COLONOSCOPY 6/20/2021 DTaP/Tdap/Td series (2 - Td) 5/26/2026 Allergies as of 5/2/2017  Review Complete On: 5/2/2017 By: Adali Sanderson LPN Severity Noted Reaction Type Reactions Latex  06/14/2011    Rash Amoxil [Amoxicillin]  01/05/2017    Itching  
 rash Levaquin [Levofloxacin]  03/11/2016    Other (comments) Dizziness Percocet [Oxycodone-acetaminophen]  02/18/2010   Intolerance Nausea and Vomiting Tetanus Vaccines And Toxoid  02/18/2010   Systemic Swelling Tetracycline Hcl  02/18/2010   Systemic Rash Current Immunizations  Reviewed on 5/2/2017 Name Date Influenza High Dose Vaccine PF 11/29/2016, 10/7/2015, 11/3/2014 Pneumococcal Conjugate (PCV-13) 10/7/2015 Pneumococcal Polysaccharide (PPSV-23) 2/5/2013 Zoster Vaccine, Live 9/29/2014 Reviewed by Connie Brooks MD on 5/2/2017 at  8:35 AM  
 Reviewed by Connie Brooks MD on 5/2/2017 at  8:36 AM  
 Reviewed by Connie Brooks MD on 5/2/2017 at  8:36 AM  
You Were Diagnosed With   
  
 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  
 36.58 kg/m 2 1.99 m 2 Preferred Pharmacy Pharmacy Name Phone [id number]- [place], [place] [phone] Your Updated Medication List  
  
   
This list is accurate as of: 5/2/17  9:19 AM.  Always use your most recent med list.  
  
  
  
  
 aspirin delayed-release 81 mg tablet Take 1 Tab by mouth daily. Blood-Glucose Meter monitoring kit Once daily before breakfast  
  
 butalbital-acetaminophen  mg tablet Commonly known as:  Richar Harry Take 1 Tab by mouth every six (6) hours as needed for Pain. calcium-cholecalciferol (D3) tablet Commonly known as:  CALTRATE 600+D Take 1 Tab by mouth two (2) times a day. cetirizine 10 mg tablet Commonly known as:  ZYRTEC  
TAKE 1 TABLET BY MOUTH EVERY DAY  
  
 fluticasone 50 mcg/actuation nasal spray Commonly known as:  Maritza Earnest 2 Sprays by Both Nostrils route as needed for Rhinitis or Allergies. Indications: ALLERGIC RHINITIS  
  
 * glucose blood VI test strips strip Commonly known as:  ACCU-CHEK DA Luana Pretty * glucose blood VI test strips strip Commonly known as:  FREESTYLE INSULINX  
TEST EVERY DAY AND AS NEEDED  
  
 hydroCHLOROthiazide 25 mg tablet Commonly known as:  HYDRODIURIL Take 1 Tab by mouth daily. HYDROcodone-acetaminophen 5-325 mg per tablet Commonly known as:  Simmie Blonder Take 0.5-1 Tabs by mouth every six (6) hours as needed for Pain. Max Daily Amount: 4 Tabs. inhalational spacing device Commonly known as:  SPACE CHAMBER PLUS  
1 Each by Does Not Apply route as needed. Indications: USE WITH ALBUTEROL MDI  
  
 ipratropium 0.02 % nebulizer solution Commonly known as:  ATROVENT  
2.5 mL by Nebulization route as needed for Wheezing. One package of Nebules to be used every 4-6hrs prn.  icd 10 J45.902 Lancets Misc Commonly known as:  FREESTYLE LANCETS Test once daily. (diagnosis code: 250.00) levalbuterol 1.25 mg/3 mL Nebu Commonly known as:  XOPENEX  
3 mL by Nebulization route every six (6) hours as needed. icd 10 J45.902  
  
 levalbuterol tartrate 45 mcg/actuation inhaler Commonly known as:  [place] Andre Take 1 Puff by inhalation every six (6) hours as needed for Wheezing or Shortness of Breath. Indications: BRONCHOSPASM PREVENTION, unable to take ALbuterol developed tremors and palpitations  
  
 metFORMIN 850 mg tablet Commonly known as:  GLUCOPHAGE Take 1 Tab by mouth two (2) times daily (with meals). metoprolol succinate 25 mg XL tablet Commonly known as:  TOPROL-XL  
TAKE 1 TABLET BY MOUTH EVERY DAY  
  
 MULTIVITAMIN PO Take 1 Tab by mouth daily. rosuvastatin 10 mg tablet Commonly known as:  CRESTOR  
TAKE 1 TABLET BY MOUTH EVERY DAY  
  
 * Notice:   This list has 2 medication(s) that are the same as other medications prescribed for you. Read the directions carefully, and ask your doctor or other care provider to review them with you. We Performed the Following ADVANCE CARE PLANNING FIRST 30 MINS [43890 CPT(R)] AMB POC HEMOGLOBIN A1C [44145 CPT(R)] CBC W/O DIFF [19089 CPT(R)] Luis 68 [[id number] [facility]] LIPID PANEL [39496 CPT(R)] METABOLIC PANEL, COMPREHENSIVE [91152 CPT(R)] OCCULT BLOOD, IMMUNOASSAY (FIT) [id number] CPT(R)] TSH 3RD GENERATION [49917 CPT(R)] Follow-up Instructions Return in about 1 year (around 5/2/2018). To-Do List   
 05/02/2017 Imaging:  XR FOOT RT MIN 3 V   
  
 02/06/2018 7:00 AM  
  Appointment with Sharmaine Hazel at [facility] Ultrasound ([phone]) GENERAL INSTRUCTIONS  1. Bring outside films ([employer]) pertaining to the affected area with you on the day of appointment. 2. A written order with a valid diagnosis and Physicians signature is required for all scheduled tests. 3. Check in at registration 30 minutes before your appointment time unless you were instructed to do otherwise. Introducing [facility]! Dear Kelby Botello: Thank you for requesting a [employer] account. Our records indicate that you already have an active [employer] account. You can access your account anytime at [email or web address] Did you know that you can access your hospital and ER discharge instructions at any time in [employer]? You can also review all of your test results from your hospital stay or ER visit. Additional Information If you have questions, please visit the Frequently Asked Questions section of the [employer] website at [email or web address] Remember, [employer] is NOT to be used for urgent needs. For medical emergencies, dial 911. Now available from your iPhone and Android! Please provide this summary of care documentation to your next provider. Your primary care clinician is listed as Carmel Hollins. If you have any questions after today's visit, please call [phone].

## 2017-05-02 NOTE — ACP (ADVANCE CARE PLANNING)
Advance Care Planning    Advance Care Planning        Authorized Decision Maker (if patient is incapable of making informed decisions): This person is: Other Legally Authorized Decision Maker which would be her current daughterTel ## noted      For Patients with Decision Making Capacity:   Values/Goals: Exploration of values, goals, and preferences if recovery is not expected, even with continued medical treatment in the event of:  Imminent death, Severe, permanent brain injury  \"In these circumstances, what matters most to you? \" patient in the presence of familymember stated that care focused more on comfort and the quality of life than Care focused more on quantity (length) of life and wants to be DNR form given will sign and bring us a copy on the later date

## 2017-05-02 NOTE — PROGRESS NOTES
This is a Subsequent Medicare Annual Wellness Visit providing Personalized Prevention Plan Services (PPPS) (Performed 12 months after initial AWV and PPPS )    I have reviewed the patient's medical history in detail and updated the computerized patient record.      History         Present for CPE, last Complete Physical exam was couple yr ago  ,    Up todate w/ all vaccination,  last mammog was nl and In 2016,      last pap smear normal and was in 2014,     last colonoscopy was normal and was in 2013,    + past surgical hx,  last bone dexa scan was normal and was in 2016  No family hx of breast cancer    no family hx of colon cancer, ++sexaully active and uses Safe sex, ++physically active,  compliant w/ meds, + Rf needed for today for her meds.      Screening for fall   Medications causing fall and the risk for future fall screened  the current meds r/w'd and addressed,  Depression    the depression at this age addressed pt with improvig interests and enjoy to do things, not anxious not depressed, not wanted to heart self or others  Functional assesement   pt at this visit, is physically functional with gait nl and nicely independent and walks w/out mobility device and, in addition mentaly is functional,  very Alert and oriented ,    BMI for pt's age  the abnl BMI r/w'd and information given,   bp was screened for abnormality,   the colon ca screening uptodate, no further testing required except for FIT, given to the pt today  The diabetic, lipid and daily Aspirin care    Right foot pain,had some thing fell on it, went to ER did have a nl xray but swelled up, and painfull, no icing,   Past Medical History:   Diagnosis Date    Abnormal finding on MRI of brain 3/16/2015    evaluated by neurologist and no findings    Acute pharyngitis 5/26/2016    Anemia NEC     Arthralgia of right hip 4/25/2016    Arthritis 2/18/2010    Asthma 2/18/2010    Cataract 9/24/2014    Diabetes ([facility] [address]     Elevated LFTs 4/16/2014    DAVID (generalized anxiety disorder) 1/22/2015    GERD (gastroesophageal reflux disease) 4/7/2014    Hammer toe of right foot 9/29/2014    Headache 1/13/2050    Helicobacter pylori (H. 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 ([address] 9/29/2014    Preop examination 9/24/2014    Risk for falls 4/16/2014    Screening for breast cancer 9/23/2014    Screening for depression 4/16/2014    Shoulder pain, left 3/18/2014    Spondylitis, cervical ([address] 4/5/2010    Tinea pedis 6/3/2015    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 straightened colon out    OPEN REPAIR CLAVICLE FRACTURE  2009     Current Outpatient Prescriptions   Medication Sig Dispense Refill    HYDROcodone-acetaminophen (NORCO) 5-325 mg per tablet Take 0.5-1 Tabs by mouth every six (6) hours as needed for Pain. Max Daily Amount: 4 Tabs. 8 Tab 0    glucose blood VI test strips (FREESTYLE INSULINX) strip TEST EVERY DAY AND AS NEEDED 100 Strip 6    metoprolol succinate (TOPROL-XL) 25 mg XL tablet TAKE 1 TABLET BY MOUTH EVERY DAY 90 Tab 2    metFORMIN (GLUCOPHAGE) 850 mg tablet Take 1 Tab by mouth two (2) times daily (with meals). 180 Tab 3    levalbuterol (XOPENEX) 1.25 mg/3 mL nebu 3 mL by Nebulization route every six (6) hours as needed.  icd 10 J45.902 30 Nebule 11    ipratropium (ATROVENT) 0.02 % nebulizer solution 2.5 mL by Nebulization route as needed for Wheezing. One package of Nebules to be used every 4-6hrs prn.  icd 10 J45.902 1250 mL 11    cetirizine (ZYRTEC) 10 mg tablet TAKE 1 TABLET BY MOUTH EVERY DAY  3    levalbuterol tartrate (XOPENEX HFA) 45 mcg/actuation inhaler Take 1 Puff by inhalation every six (6) hours as needed for Wheezing or Shortness of Breath. Indications: BRONCHOSPASM PREVENTION, unable to take ALbuterol developed tremors and palpitations 1 Inhaler 7    fluticasone (FLONASE) 50 mcg/actuation nasal spray 2 Sprays by Both Nostrils route as needed for Rhinitis or Allergies. Indications: ALLERGIC RHINITIS 1 Bottle 11    inhalational spacing device (SPACE CHAMBER PLUS) 1 Each by Does Not Apply route as needed. Indications: USE WITH ALBUTEROL MDI 1 Device 0    aspirin delayed-release 81 mg tablet Take 1 Tab by mouth daily. 30 Tab 11    hydrochlorothiazide (HYDRODIURIL) 25 mg tablet Take 1 Tab by mouth daily. 90 Tab 1    rosuvastatin (CRESTOR) 10 mg tablet TAKE 1 TABLET BY MOUTH EVERY DAY 90 Tab 3    calcium-cholecalciferol, D3, (CALTRATE 600+D) tablet Take 1 Tab by mouth two (2) times a day. 60 Tab 11    butalbital-acetaminophen (PHRENILIN)  mg tablet Take 1 Tab by mouth every six (6) hours as needed for Pain. 90 Tab 1    MULTIVITAMIN PO Take 1 Tab by mouth daily.  Lancets (FREESTYLE LANCETS) Misc Test once daily. (diagnosis code: 250.00) 1 Package 11    Blood-Glucose Meter monitoring kit Once daily before breakfast 1 Kit 0    glucose blood VI test strips (ACCU-CHEK DA) strip .  1 Package 11     Allergies   Allergen Reactions    Latex Rash    Amoxil [Amoxicillin] Itching     rash    Levaquin [Levofloxacin] Other (comments)     Dizziness      Percocet [Oxycodone-Acetaminophen] Nausea and Vomiting    Tetanus Vaccines And Toxoid Swelling    Tetracycline Hcl Rash     Family History   Problem Relation Age of Onset    Cancer Mother      mycosis fungoives    Stroke Father     Cancer Sister      one sister with breast cancer    Breast Cancer Sister     Cancer Paternal Aunt      2 paternal aunts with breast cancer    Thyroid Cancer Neg Hx      Social History   Substance Use Topics    Smoking status: Former Smoker     Quit date: 6/14/1988    Smokeless tobacco: Never Used    Alcohol use No     Patient Active Problem List   Diagnosis Code    Asthma J45.909    Hyperlipemia E78.5    Arthritis M19.90    HTN (hypertension) I10    Headache R51    Spondylitis, cervical (HCC) M46.92    Tingling sensation R20.2    Chest pain R07.9    Anemia D64.9    Cyst of right kidney N28.1    Hip pain, right M25.551    Acute bronchitis J20.9    Visual floaters H43.399    Colon cancer screening Z12.11    Osteopenia M85.80    Postmenopausal state Z78.0    Vitamin D deficiency E55.9    Bilateral hip pain M25.551, M25.552    Postmenopausal disorder N95.1    Numbness and tingling of right leg R20.0, R20.2    Foot pain, left M79.672    Rotator cuff (capsule) sprain and strain [id number]    Osteoarthritis of acromioclavicular joint M19.019    Shoulder pain, left M25.512    GERD (gastroesophageal reflux disease) K21.9    Elevated LFTs R94.5    Screening for depression Z13.89    Risk for falls Z91.81    Acute asthma exacerbation J45. 0    Need for varicella vaccine Z23    Screening for breast cancer Z12.39    Cataract H26.9    Preop examination Z01.818    Peripheral autonomic neuropathy due to DM (HCC) E11.43    Pre-ulcerative calluses L84    Type 2 diabetes mellitus with pressure callus (HCC) E11.628, L84    Hammer toe of right foot M20.41    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 (HCC) E11.69, M21.969    DAVID (generalized anxiety disorder) F41.1    Abnormal finding on MRI of brain R90.89    Tinea pedis B35.3    Arthralgia of right hip M25.551    Swollen gland R59.9    Acute pharyngitis J02.9  Diabetes mellitus type 2, controlled ([facility] [address] E11.9    Multiple thyroid nodules E04.2    Skipped heart beats A43.6    Helicobacter pylori (H. pylori) infection A04.8    Intractable migraine with aura without status migrainosus G43.119    Chronic asthma with status asthmaticus J45.902    Elevated TSH R94.6       Depression Risk Factor Screening:     PHQ 2 / 9, over the last two weeks 1/25/2017   Little interest or pleasure in doing things Not at all   Feeling down, depressed or hopeless Not at all   Total Score PHQ 2 0     Alcohol Risk Factor Screening: On any occasion during the past 3 months, have you had more than 3 drinks containing alcohol? No    Do you average more than 7 drinks per week? No      Functional Ability and Level of Safety:     Hearing Loss   normal-to-mild    Activities of Daily Living   Self-care. Requires assistance with: no ADLs    Fall Risk     Fall Risk Assessment, last 12 mths 1/25/2017   Able to walk? Yes   Fall in past 12 months? No     Abuse Screen   Patient is not abused    Review of Systems   Constitutional: negative  Eyes: negative  Ears, nose, mouth, throat, and face: negative  Respiratory: negative  Cardiovascular: negative  Gastrointestinal: negative  Genitourinary:negative  Integument/breast: negative  Hematologic/lymphatic: negative  Musculoskeletal:negative  Neurological: negative  Behavioral/Psych: negative  Endocrine: negative  Allergic/Immunologic: negative    Physical Examination     Evaluation of Cognitive Function:  Mood/affect:  neutral  Appearance: age appropriate and within normal Limits  Family member/caregiver input: daughter     Visit Vitals    LMP 02/18/1983     General:  Alert, cooperative, no distress, appears stated age. Head:  Normocephalic, without obvious abnormality, atraumatic. Eyes:  Conjunctivae/corneas clear. PERRL, EOMs intact. Fundi benign. Ears:  Normal TMs and external ear canals both ears. Nose: Nares normal. Septum midline. Mucosa normal. No drainage or sinus tenderness. Throat: Lips, mucosa, and tongue normal. Teeth and gums normal.   Neck: Supple, symmetrical, trachea midline, no adenopathy, thyroid: no enlargement/tenderness/nodules, no carotid bruit and no JVD. Back:   Symmetric, no curvature. ROM normal. No CVA tenderness. Lungs:   Clear to auscultation bilaterally. Chest wall:  No tenderness or deformity. Heart:  Regular rate and rhythm, S1, S2 normal, no murmur, click, rub or gallop. Breast Exam:  No tenderness, masses, or nipple abnormality. Abdomen:   Soft, non-tender. Bowel sounds normal. No masses,  No organomegaly. Genitalia:  Normal female without lesion, discharge or tenderness. Rectal:  Normal tone,  no masses or tenderness  Guaiac negative stool. Extremities: Extremities normal, atraumatic, no cyanosis or edema. Pulses: 2+ and symmetric all extremities. Skin: Skin color, texture, turgor normal. No rashes or lesions. Lymph nodes: Cervical, supraclavicular, and axillary nodes normal.   Neurologic: CNII-XII intact. Normal strength, sensation and reflexes throughout.        Patient Care Team:  Vee Joshi MD as PCP - Jadiel Gonzalez MD (Neurology)  Yahaira Gant MD as Physician (Cardiology)  Oliverio Shelton MD as Consulting Provider (Endocrinology)  Lizbeth Cat RN as Nurse Navigator    Advice/Referrals/Counseling   Education and counseling provided:    Are appropriate based on today's review and evaluation  End-of-Life planning (with patient's consent): Initial ACP discussion, pt wants the daughter as SDM,  Pneumococcal Vaccuptodateine, uptodate  Influenza Vaccine, uptodate    Hepatitis B Vaccine, declined    Colorectal cancer screening tests, ordered today  Cardiovascular screening blood test, addressed  Bone mass measurement (DEXA), ordered await for approval  Screening for glaucoma, done  Diabetes screening test, done today, ++ hx of it  Medical nutrition therapy for individuals with diabetes or renal disease, addressed info given  Diabetes outpatient self-management training services, informed        Assessment/Plan   Carmella Cisse was seen today for annual wellness visit. Diagnoses and all orders for this visit:    Routine general medical examination at a health care facility  -     CBC W/O DIFF  -     METABOLIC PANEL, COMPREHENSIVE  -     TSH 3RD GENERATION  -     AMB POC HEMOGLOBIN A1C  -     LIPID PANEL    Screening for alcoholism  -     CBC W/O DIFF  -     METABOLIC PANEL, COMPREHENSIVE  -     TSH 3RD GENERATION  -     AMB POC HEMOGLOBIN A1C  -     LIPID PANEL    Advanced directives, counseling/discussion  -     ADVANCE CARE PLANNING FIRST 30 MINS  -     CBC W/O DIFF  -     METABOLIC PANEL, COMPREHENSIVE  -     TSH 3RD GENERATION  -     AMB POC HEMOGLOBIN A1C  -     LIPID PANEL    Screen for colon cancer  -     OCCULT BLOOD, IMMUNOASSAY (FIT) ([id number])  -     CBC W/O DIFF  -     METABOLIC PANEL, COMPREHENSIVE  -     TSH 3RD GENERATION  -     AMB POC HEMOGLOBIN A1C  -     LIPID PANEL    Screening for depression  -     Depression Screen Annual  -     CBC W/O DIFF  -     METABOLIC PANEL, COMPREHENSIVE  -     TSH 3RD GENERATION  -     AMB POC HEMOGLOBIN A1C  -     LIPID PANEL    Mixed hyperlipidemia  -     CBC W/O DIFF  -     METABOLIC PANEL, COMPREHENSIVE  -     TSH 3RD GENERATION  -     AMB POC HEMOGLOBIN A1C  -     LIPID PANEL    Right foot injury, subsequent encounter  -     XR FOOT RT MIN 3 V; Future    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 Patient was advised to stay at a healthy weight, which would lower the risk for many problems, such as the current obesity, less chance of worsening the uncontrolled diabetic state, depressing the progress of heart disease, maintaining the target level of a stable blood pressure.      In addition patient was also told try to get at least 30 minutes of physical activity on most days of the week, was told a daily brisk walking would be a good choice to start with, and then later one can add and do other harder activities, such as running, swimming, cycling, or playing tennis or team sports for a better outcome. Also informed regarding the risk of sexually transmitted disease at any age was told to try to be in a protective mode as much as possible. The continuity of the care  Pt was told if for any reason, the pt's future Office appointments, medication refills or any of the patient concerns not addressed, pt should obtain the name of the corresponding tel representatives and call us back or send us a letter for future investigation,     Patient was also for informed regarding the recommended dosage of alcohol intake, and Not to use any of the tobacco smoke, not allowing others to smoke around the patient. Iin addition, lab results and schedule of future lab studies reviewed with patient,    A lean diet also advised in addition of avoiding fast foods, the current medications and their side effects reviewed with the patient, patient acknowledged all understanding and today the patient agreed with all the recommendations.

## 2017-05-02 NOTE — PROGRESS NOTES
Barron Rolling        Name and  verified        Chief Complaint   Patient presents with   [place] [facility] Annual Wellness Visit         Health Maintenance reviewed-discussed with patient. Advance Directives Care Planning Information given, patient acknowledged understanding.

## 2017-05-03 LAB
ALBUMIN SERPL-MCNC: 4.3 G/DL (ref 3.5–4.8)
ALBUMIN/GLOB SERPL: 1.7 {RATIO} (ref 1.2–2.2)
ALP SERPL-CCNC: 52 IU/L (ref 39–117)
ALT SERPL-CCNC: 22 IU/L (ref 0–32)
AST SERPL-CCNC: 22 IU/L (ref 0–40)
BILIRUB SERPL-MCNC: <0.2 MG/DL (ref 0–1.2)
BUN SERPL-MCNC: 15 MG/DL (ref 8–27)
BUN/CREAT SERPL: 19 (ref 12–28)
CALCIUM SERPL-MCNC: 9.6 MG/DL (ref 8.7–10.3)
CHLORIDE SERPL-SCNC: 100 MMOL/L (ref 96–106)
CHOLEST SERPL-MCNC: 159 MG/DL (ref 100–199)
CO2 SERPL-SCNC: 28 MMOL/L (ref 18–29)
CREAT SERPL-MCNC: 0.77 MG/DL (ref 0.57–1)
ERYTHROCYTE [DISTWIDTH] IN BLOOD BY AUTOMATED COUNT: 15.2 % (ref 12.3–15.4)
GLOBULIN SER CALC-MCNC: 2.6 G/DL (ref 1.5–4.5)
GLUCOSE SERPL-MCNC: 113 MG/DL (ref 65–99)
HCT VFR BLD AUTO: 34.1 % (ref 34–46.6)
HDLC SERPL-MCNC: 58 MG/DL
HGB BLD-MCNC: 11.1 G/DL (ref 11.1–15.9)
LDLC SERPL CALC-MCNC: 81 MG/DL (ref 0–99)
MCH RBC QN AUTO: 26.9 PG (ref 26.6–33)
MCHC RBC AUTO-ENTMCNC: 32.6 G/DL (ref 31.5–35.7)
MCV RBC AUTO: 83 FL (ref 79–97)
PLATELET # BLD AUTO: 321 X10E3/UL (ref 150–379)
POTASSIUM SERPL-SCNC: 4.8 MMOL/L (ref 3.5–5.2)
PROT SERPL-MCNC: 6.9 G/DL (ref 6–8.5)
RBC # BLD AUTO: 4.13 X10E6/UL (ref 3.77–5.28)
SODIUM SERPL-SCNC: 145 MMOL/L (ref 134–144)
TRIGL SERPL-MCNC: 99 MG/DL (ref 0–149)
TSH SERPL DL<=0.005 MIU/L-ACNC: 1.76 UIU/ML (ref 0.45–4.5)
VLDLC SERPL CALC-MCNC: 20 MG/DL (ref 5–40)
WBC # BLD AUTO: 5.7 X10E3/UL (ref 3.4–10.8)

## 2017-05-03 RX ORDER — ROSUVASTATIN CALCIUM 10 MG/1
TABLET, COATED ORAL
Qty: 90 TAB | Refills: 3 | Status: SHIPPED | OUTPATIENT
Start: 2017-05-03 | End: 2017-10-18 | Stop reason: SDUPTHER

## 2017-05-04 RX ORDER — METFORMIN HYDROCHLORIDE 850 MG/1
TABLET ORAL
Qty: 180 TAB | Refills: 3
Start: 2017-05-04 | End: 2017-10-03 | Stop reason: DRUGHIGH

## 2017-05-05 ENCOUNTER — TELEPHONE (OUTPATIENT)
Dept: FAMILY MEDICINE CLINIC | Age: 77
End: 2017-05-05

## 2017-05-05 NOTE — TELEPHONE ENCOUNTER
Patient phoned she had questions as to why alcoholism and depression was on paperwork received from office informed her alcoholism and depression is a part of the wellness exam she acknowledged understanding.

## 2017-05-08 ENCOUNTER — HOSPITAL ENCOUNTER (OUTPATIENT)
Dept: LAB | Age: 77
Discharge: HOME | End: 2017-05-08
Payer: MEDICARE

## 2017-05-08 PROCEDURE — 82270 OCCULT BLOOD FECES: CPT

## 2017-05-14 LAB — HEMOCCULT STL QL IA: NEGATIVE

## 2017-06-13 ENCOUNTER — OFFICE VISIT (OUTPATIENT)
Dept: FAMILY MEDICINE CLINIC | Age: 77
End: 2017-06-13

## 2017-06-13 ENCOUNTER — HOSPITAL ENCOUNTER (OUTPATIENT)
Dept: LAB | Age: 77
Discharge: HOME | End: 2017-06-13
Payer: MEDICARE

## 2017-06-13 VITALS
TEMPERATURE: 98 F | HEART RATE: 80 BPM | SYSTOLIC BLOOD PRESSURE: 133 MMHG | OXYGEN SATURATION: 97 % | HEIGHT: 62 IN | WEIGHT: 201 LBS | BODY MASS INDEX: 36.99 KG/M2 | RESPIRATION RATE: 14 BRPM | DIASTOLIC BLOOD PRESSURE: 69 MMHG

## 2017-06-13 DIAGNOSIS — R10.12 LEFT UPPER QUADRANT PAIN: ICD-10-CM

## 2017-06-13 DIAGNOSIS — R19.02 ABDOMINAL MASS, LEFT UPPER QUADRANT: Primary | ICD-10-CM

## 2017-06-13 DIAGNOSIS — E11.9 DIABETES MELLITUS WITHOUT COMPLICATION (HCC): ICD-10-CM

## 2017-06-13 PROCEDURE — 82043 UR ALBUMIN QUANTITATIVE: CPT

## 2017-06-13 RX ORDER — METFORMIN HYDROCHLORIDE 500 MG/1
TABLET ORAL
Refills: 2 | COMMUNITY
Start: 2017-05-24 | End: 2017-10-18 | Stop reason: SDUPTHER

## 2017-06-13 RX ORDER — MELOXICAM 7.5 MG/1
TABLET ORAL
Refills: 0 | COMMUNITY
Start: 2017-05-16 | End: 2017-08-03

## 2017-06-13 NOTE — PROGRESS NOTES
Fely Gallardo      Name and  verified        Chief Complaint   Patient presents with    Abdominal Pain     left upper X 3 months         Health Maintenance reviewed-discussed with patient.

## 2017-06-13 NOTE — PROGRESS NOTES
HISTORY OF PRESENT ILLNESS  Sunny Avina is a 78 y.o. female. HPI   Present with the complaint of left-sided abdominal pain started 3 months ago no history of traumatic injury no heavy lifting heavy pushing patient with history of GERD for which she is no no PPI or on no medication for her GERD stating that she does not need meds for it and she denies any heartburn regurgitation at this time the pain is 8 out of 10 nonradiating not taking any medication wanted to know what the cause of it is worsened on inhalation and sometimes by laying down she has to switch site in order to get some comfort zone in addition she feels the swelling on the right upper quadrant of her abdomen no discoloration stating that she went to emergency room the area was x-rayed with normal result in addition patient denies any fever or chills no nausea vomiting no diarrhea currently on Metformin causes minor to moderate abdominal discomfort otherwise no other complaint at this time      Current Outpatient Prescriptions   Medication Sig Dispense Refill    metFORMIN (GLUCOPHAGE) 500 mg tablet TAKE 1 & 1/2 TABLETS BY MOUTH TWICE A DAY WITH MEALS  2    rosuvastatin (CRESTOR) 10 mg tablet TAKE 1 TABLET BY MOUTH EVERY DAY 90 Tab 3    glucose blood VI test strips (FREESTYLE INSULINX) strip TEST EVERY DAY AND AS NEEDED 100 Strip 6    metoprolol succinate (TOPROL-XL) 25 mg XL tablet TAKE 1 TABLET BY MOUTH EVERY DAY 90 Tab 2    levalbuterol (XOPENEX) 1.25 mg/3 mL nebu 3 mL by Nebulization route every six (6) hours as needed. icd 10 J45.902 30 Nebule 11    ipratropium (ATROVENT) 0.02 % nebulizer solution 2.5 mL by Nebulization route as needed for Wheezing. One package of Nebules to be used every 4-6hrs prn.  icd 10 J45.902 1250 mL 11    levalbuterol tartrate (XOPENEX HFA) 45 mcg/actuation inhaler Take 1 Puff by inhalation every six (6) hours as needed for Wheezing or Shortness of Breath.  Indications: BRONCHOSPASM PREVENTION, unable to take ALbuterol developed tremors and palpitations 1 Inhaler 7    fluticasone (FLONASE) 50 mcg/actuation nasal spray 2 Sprays by Both Nostrils route as needed for Rhinitis or Allergies. Indications: ALLERGIC RHINITIS 1 Bottle 11    aspirin delayed-release 81 mg tablet Take 1 Tab by mouth daily. 30 Tab 11    hydrochlorothiazide (HYDRODIURIL) 25 mg tablet Take 1 Tab by mouth daily. 90 Tab 1    calcium-cholecalciferol, D3, (CALTRATE 600+D) tablet Take 1 Tab by mouth two (2) times a day. 60 Tab 11    butalbital-acetaminophen (PHRENILIN)  mg tablet Take 1 Tab by mouth every six (6) hours as needed for Pain. 90 Tab 1    MULTIVITAMIN PO Take 1 Tab by mouth daily.  Lancets (FREESTYLE LANCETS) Misc Test once daily. (diagnosis code: 250.00) 1 Package 11    Blood-Glucose Meter monitoring kit Once daily before breakfast 1 Kit 0    glucose blood VI test strips (ACCU-CHEK DA) strip . 1 Package 11    meloxicam (MOBIC) 7.5 mg tablet TAKE 1 TABLET BY MOUTH ONCE DAILY AFTER A MEAL  0    metFORMIN (GLUCOPHAGE) 850 mg tablet One tablet in the morning and half a tablet at night with meals 180 Tab 3    HYDROcodone-acetaminophen (NORCO) 5-325 mg per tablet Take 0.5-1 Tabs by mouth every six (6) hours as needed for Pain. Max Daily Amount: 4 Tabs. 8 Tab 0    cetirizine (ZYRTEC) 10 mg tablet TAKE 1 TABLET BY MOUTH EVERY DAY  3    inhalational spacing device (SPACE CHAMBER PLUS) 1 Each by Does Not Apply route as needed.  Indications: USE WITH ALBUTEROL MDI 1 Device 0     Allergies   Allergen Reactions    Latex Rash    Amoxil [Amoxicillin] Itching     rash    Levaquin [Levofloxacin] Other (comments)     Dizziness      Percocet [Oxycodone-Acetaminophen] Nausea and Vomiting    Tetanus Vaccines And Toxoid Swelling    Tetracycline Hcl Rash     Past Medical History:   Diagnosis Date    Abnormal finding on MRI of brain 3/16/2015    evaluated by neurologist and no findings    Acute pharyngitis 5/26/2016    Anemia NEC     Arthralgia of right hip 4/25/2016    Arthritis 2/18/2010    Asthma 2/18/2010    Cataract 9/24/2014    Diabetes (HCC)     Elevated LFTs 4/16/2014    DAVID (generalized anxiety disorder) 1/22/2015    GERD (gastroesophageal reflux disease) 4/7/2014    Hammer toe of right foot 9/29/2014    Headache 8/58/3734    Helicobacter pylori (H. 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 ([address] 9/29/2014    Preop examination 9/24/2014    Right foot injury 5/2/2017    Risk for falls 4/16/2014    Screening for breast cancer 9/23/2014    Screening for depression 4/16/2014    Shoulder pain, left 3/18/2014    Spondylitis, cervical ([address] 4/5/2010    Tinea pedis 6/3/2015    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 straightened colon out    OPEN REPAIR CLAVICLE FRACTURE  2009     Family History   Problem Relation Age of Onset    Cancer Mother      mycosis fungoives    Stroke Father     Cancer Sister      one sister with breast cancer    Breast Cancer Sister     Cancer Paternal Aunt      2 paternal aunts with breast cancer    Thyroid Cancer Neg Hx      Social History   Substance Use Topics    Smoking status: Former Smoker     Quit date: 6/14/1988    Smokeless tobacco: Never Used    Alcohol use No      Lab Results  Component Value Date/Time   WBC 5.7 05/02/2017 09:21 AM   HGB 11.1 05/02/2017 09:21 AM   HCT 34.1 05/02/2017 09:21 AM   PLATELET 638 14/23/4659 09:21 AM   MCV 83 05/02/2017 09:21 AM     Lab Results  Component Value Date/Time   Hemoglobin A1c 7.0 01/12/2017 04:20 AM   Hemoglobin A1c 7.0 11/29/2016 09:03 AM   Hemoglobin A1c 6.8 07/24/2014 09:10 AM   Glucose 113 05/02/2017 09:21 AM   Glucose (POC) 121 01/12/2017 06:48 AM   Glucose  01/25/2017 12:05 PM   Microalb/Creat ratio (ug/mg creat.) 3.7 04/25/2016 08:22 AM   LDL, calculated 81 05/02/2017 09:21 AM   Creatinine (POC) 0.7 10/28/2010 09:10 AM   Creatinine 0.77 05/02/2017 09:21 AM      Lab Results  Component Value Date/Time   Cholesterol, total 159 05/02/2017 09:21 AM   HDL Cholesterol 58 05/02/2017 09:21 AM   LDL, calculated 81 05/02/2017 09:21 AM   Triglyceride 99 05/02/2017 09:21 AM   CHOL/HDL Ratio 2.9 01/12/2017 04:20 AM     Lab Results  Component Value Date/Time   GFR est non-AA 76 05/02/2017 09:21 AM   GFR, non-AA (POC) >60 10/28/2010 09:10 AM   GFR est AA 88 05/02/2017 09:21 AM   GFR-AA (POC) >60 10/28/2010 09:10 AM   Creatinine 0.77 05/02/2017 09:21 AM   Creatinine (POC) 0.7 10/28/2010 09:10 AM   BUN 15 05/02/2017 09:21 AM   Sodium 145 05/02/2017 09:21 AM   Potassium 4.8 05/02/2017 09:21 AM   Chloride 100 05/02/2017 09:21 AM   CO2 28 05/02/2017 09:21 AM   Magnesium 2.3 01/11/2017 05:42 AM        Review of Systems   Constitutional: Negative for chills and fever. HENT: Negative for ear pain and nosebleeds. Eyes: Negative for blurred vision, pain and discharge. Respiratory: Negative for shortness of breath. Cardiovascular: Negative for chest pain and leg swelling. Gastrointestinal: Negative for constipation, diarrhea, nausea and vomiting. Genitourinary: Negative for frequency. Musculoskeletal: Negative for joint pain. Skin: Negative for itching and rash. Neurological: Negative for headaches.    Psychiatric/Behavioral: Negative for depression. The patient is not nervous/anxious. Physical Exam   Constitutional: She is oriented to person, place, and time. She appears well-developed and well-nourished. HENT:   Head: Normocephalic and atraumatic. Eyes: Conjunctivae and EOM are normal.   Neck: Normal range of motion. Neck supple. Cardiovascular: Normal rate, regular rhythm and normal heart sounds. No murmur heard. Pulmonary/Chest: Effort normal and breath sounds normal.   Abdominal: Soft. Bowel sounds are normal. She exhibits no distension. There is tenderness. lump   Musculoskeletal: Normal range of motion. She exhibits no edema. Neurological: She is alert and oriented to person, place, and time. Skin: No erythema. Psychiatric: Her behavior is normal.   Nursing note and vitals reviewed. ASSESSMENT and Nilo Saunders was seen today for abdominal pain. Diagnoses and all orders for this visit:    Abdominal mass, left upper quadrant  -     CT ABD W WO CONT; Future    Diabetes mellitus without complication (HCC)  -     MICROALBUMIN, UR, RAND W/ MICROALBUMIN/CREA RATIO    Left upper quadrant pain  -     CT ABD W WO CONT; Future      Anti-reflux measures such as raising the head of the bed, avoiding tight clothing or belts, avoiding eating late at night and not lying down shortly after mealtime, overeating and achieving weight loss,   are discussed. Also advised on avoiding ASA, NSAID's, caffeine,alcohol and tobacco. OTC H2 blockers and/or antacids are often very helpful for PRN use. She was told to alert me if there are persistent symptoms, dysphagia, weight loss or GI bleeding. meds compliance and side effect advised.

## 2017-06-13 NOTE — MR AVS SNAPSHOT
Visit Information Date & Time Provider Department Dept. Phone Encounter #  
 6/13/2017  8:45 AM Christiano Denis MD [address] MA[facility] [phone] [id number] Follow-up Instructions Return in about 4 months (around 10/13/2017), or if symptoms worsen or fail to improve. Your Appointments 3/1/2018  9:30 AM  
ROUTINE CARE with Christiano White MD  
[facility]) Appt Note: 1yr f/u thyroid cp0.00  
 [address]  
[address] [facility] [address] [address] Upcoming Health Maintenance Date Due MICROALBUMIN Q1 4/25/2017 INFLUENZA AGE 9 TO ADULT 8/1/2017 HEMOGLOBIN A1C Q6M 11/2/2017 EYE EXAM RETINAL OR DILATED Q1 11/21/2017 FOOT EXAM Q1 1/25/2018 LIPID PANEL Q1 5/2/2018 MEDICARE YEARLY EXAM 5/3/2018 GLAUCOMA SCREENING Q2Y 11/21/2018 COLONOSCOPY 6/20/2021 DTaP/Tdap/Td series (2 - Td) 5/26/2026 Allergies as of 6/13/2017  Review Complete On: 6/13/2017 By: Mary Gimenez LPN Severity Noted Reaction Type Reactions Latex  06/14/2011    Rash Amoxil [Amoxicillin]  01/05/2017    Itching  
 rash Levaquin [Levofloxacin]  03/11/2016    Other (comments) Dizziness Percocet [Oxycodone-acetaminophen]  02/18/2010   Intolerance Nausea and Vomiting Tetanus Vaccines And Toxoid  02/18/2010   Systemic Swelling Tetracycline Hcl  02/18/2010   Systemic Rash Current Immunizations  Reviewed on 5/2/2017 Name Date Influenza High Dose Vaccine PF 11/29/2016, 10/7/2015, 11/3/2014 Pneumococcal Conjugate (PCV-13) 10/7/2015 Pneumococcal Polysaccharide (PPSV-23) 2/5/2013 Zoster Vaccine, Live 9/29/2014 Not reviewed this visit You Were Diagnosed With   
  
 Codes Comments  Abdominal mass, left upper quadrant    -  Primary ICD-10-CM: R19.02 
ICD-9-CM: 789.32   
 Diabetes mellitus without complication ([facility]     WUF-44-CI: E11.9 ICD-9-CM: 250.00 Left upper quadrant pain     ICD-10-CM: R10.12 ICD-9-CM: 789.02 Vitals BP Pulse Temp Resp Height(growth percentile) Weight(growth percentile) 133/69 (BP 1 Location: Left arm, BP Patient Position: At rest) 80 98 °F (36.7 °C) (Oral) 14 5' 2\" (1.575 m) 201 lb (91.2 kg) LMP SpO2 BMI OB Status Smoking Status 02/18/1983 97% 36.76 kg/m2 Hysterectomy Former Smoker Vitals History BMI and BSA Data Body Mass Index Body Surface Area  
 36.76 kg/m 2 2 m 2 Preferred Pharmacy Pharmacy Name Phone [id number]- [place], [place] [phone] Your Updated Medication List  
  
   
This list is accurate as of: 6/13/17  9:42 AM.  Always use your most recent med list.  
  
  
  
  
 aspirin delayed-release 81 mg tablet Take 1 Tab by mouth daily. Blood-Glucose Meter monitoring kit Once daily before breakfast  
  
 butalbital-acetaminophen  mg tablet Commonly known as:  Richar Harry Take 1 Tab by mouth every six (6) hours as needed for Pain. calcium-cholecalciferol (D3) tablet Commonly known as:  CALTRATE 600+D Take 1 Tab by mouth two (2) times a day. cetirizine 10 mg tablet Commonly known as:  ZYRTEC  
TAKE 1 TABLET BY MOUTH EVERY DAY  
  
 fluticasone 50 mcg/actuation nasal spray Commonly known as:  Mónica Six 2 Sprays by Both Nostrils route as needed for Rhinitis or Allergies. Indications: ALLERGIC RHINITIS  
  
 * glucose blood VI test strips strip Commonly known as:  ACCU-CHEK DA Arvell Caleb * glucose blood VI test strips strip Commonly known as:  FREESTYLE INSULINX  
TEST EVERY DAY AND AS NEEDED  
  
 hydroCHLOROthiazide 25 mg tablet Commonly known as:  HYDRODIURIL Take 1 Tab by mouth daily. HYDROcodone-acetaminophen 5-325 mg per tablet Commonly known as:  [address] [facility]  
 Take 0.5-1 Tabs by mouth every six (6) hours as needed for Pain. Max Daily Amount: 4 Tabs. inhalational spacing device Commonly known as:  SPACE CHAMBER PLUS  
1 Each by Does Not Apply route as needed. Indications: USE WITH ALBUTEROL MDI  
  
 ipratropium 0.02 % nebulizer solution Commonly known as:  ATROVENT  
2.5 mL by Nebulization route as needed for Wheezing. One package of Nebules to be used every 4-6hrs prn.  icd 10 J45.902 Lancets Misc Commonly known as:  FREESTYLE LANCETS Test once daily. (diagnosis code: 250.00) levalbuterol 1.25 mg/3 mL Nebu Commonly known as:  XOPENEX  
3 mL by Nebulization route every six (6) hours as needed. icd 10 J45.902  
  
 levalbuterol tartrate 45 mcg/actuation inhaler Commonly known as:  Cherilynn Nett Take 1 Puff by inhalation every six (6) hours as needed for Wheezing or Shortness of Breath. Indications: BRONCHOSPASM PREVENTION, unable to take ALbuterol developed tremors and palpitations  
  
 meloxicam 7.5 mg tablet Commonly known as:  MOBIC  
TAKE 1 TABLET BY MOUTH ONCE DAILY AFTER A MEAL  
  
 * metFORMIN 850 mg tablet Commonly known as:  GLUCOPHAGE One tablet in the morning and half a tablet at night with meals * metFORMIN 500 mg tablet Commonly known as:  GLUCOPHAGE  
TAKE 1 & 1/2 TABLETS BY MOUTH TWICE A DAY WITH MEALS  
  
 metoprolol succinate 25 mg XL tablet Commonly known as:  TOPROL-XL  
TAKE 1 TABLET BY MOUTH EVERY DAY  
  
 MULTIVITAMIN PO Take 1 Tab by mouth daily. rosuvastatin 10 mg tablet Commonly known as:  CRESTOR  
TAKE 1 TABLET BY MOUTH EVERY DAY  
  
 * Notice: This list has 4 medication(s) that are the same as other medications prescribed for you. Read the directions carefully, and ask your doctor or other care provider to review them with you. We Performed the Following MICROALBUMIN, UR, RAND W/ MICROALBUMIN/CREA RATIO [id number] CPT(R)] Follow-up Instructions Return in about 4 months (around 10/13/2017), or if symptoms worsen or fail to improve. To-Do List   
 06/13/2017 Imaging:  CT ABD W WO CONT   
  
 02/06/2018 7:00 AM  
  Appointment with Sharmaine Hazel at [facility] Ultrasound ([phone]) GENERAL INSTRUCTIONS  1. Bring outside films ([employer]) pertaining to the affected area with you on the day of appointment. 2. A written order with a valid diagnosis and Physicians signature is required for all scheduled tests. 3. Check in at registration 30 minutes before your appointment time unless you were instructed to do otherwise. Patient Instructions Abdominal Pain: Care Instructions Your Care Instructions Abdominal pain has many possible causes. Some aren't serious and get better on their own in a few days. Others need more testing and treatment. If your pain continues or gets worse, you need to be rechecked and may need more tests to find out what is wrong. You may need surgery to correct the problem. Don't ignore new symptoms, such as fever, nausea and vomiting, urination problems, pain that gets worse, and dizziness. These may be signs of a more serious problem. Your doctor may have recommended a follow-up visit in the next 8 to 12 hours. If you are not getting better, you may need more tests or treatment. The doctor has checked you carefully, but problems can develop later. If you notice any problems or new symptoms, get medical treatment right away. Follow-up care is a key part of your treatment and safety. Be sure to make and go to all appointments, and call your doctor if you are having problems. It's also a good idea to know your test results and keep a list of the medicines you take. How can you care for yourself at home? · Rest until you feel better. · To prevent dehydration, drink plenty of fluids, enough so that your urine is light yellow or clear like water.  Choose water and other caffeine-free clear liquids until you feel better. If you have kidney, heart, or liver disease and have to limit fluids, talk with your doctor before you increase the amount of fluids you drink. · If your stomach is upset, eat mild foods, such as rice, dry toast or crackers, bananas, and applesauce. Try eating several small meals instead of two or three large ones. · Wait until 48 hours after all symptoms have gone away before you have spicy foods, alcohol, and drinks that contain caffeine. · Do not eat foods that are high in fat. · Avoid anti-inflammatory medicines such as aspirin, ibuprofen (Advil, Motrin), and naproxen (Aleve). These can cause stomach upset. Talk to your doctor if you take daily aspirin for another health problem. When should you call for help? Call 911 anytime you think you may need emergency care. For example, call if: 
· You passed out (lost consciousness). · You pass maroon or very bloody stools. · You vomit blood or what looks like coffee grounds. · You have new, severe belly pain. Call your doctor now or seek immediate medical care if: 
· Your pain gets worse, especially if it becomes focused in one area of your belly. · You have a new or higher fever. · Your stools are black and look like tar, or they have streaks of blood. · You have unexpected vaginal bleeding. · You have symptoms of a urinary tract infection. These may include: 
¨ Pain when you urinate. ¨ Urinating more often than usual. 
¨ Blood in your urine. · You are dizzy or lightheaded, or you feel like you may faint. Watch closely for changes in your health, and be sure to contact your doctor if: 
· You are not getting better after 1 day (24 hours). Where can you learn more? Go to http://radha-tracy.info/. Enter O317 in the search box to learn more about \"Abdominal Pain: Care Instructions. \" Current as of: May 27, 2016 Content Version: 11.2 © 7923-5623 [employer]wise[employer]. Care instructions adapted under license by [employer] (which disclaims liability or warranty for this information). If you have questions about a medical condition or this instruction, always ask your healthcare professional. [address] any warranty or liability for your use of this information. Introducing [facility] HE[facility]! Dear Hilario Bond: Thank you for requesting a [employer] account. Our records indicate that you already have an active [employer] account. You can access your account anytime at [email or web address] Did you know that you can access your hospital and ER discharge instructions at any time in [employer]? You can also review all of your test results from your hospital stay or ER visit. Additional Information If you have questions, please visit the Frequently Asked Questions section of the [employer] website at [email or web address] Remember, [employer] is NOT to be used for urgent needs. For medical emergencies, dial 911. Now available from your iPhone and Android! Please provide this summary of care documentation to your next provider. Your primary care clinician is listed as Fallon Jaquez. If you have any questions after today's visit, please call [phone].

## 2017-06-13 NOTE — PATIENT INSTRUCTIONS

## 2017-06-14 LAB
ALBUMIN/CREAT UR: <9.2 MG/G CREAT (ref 0–30)
CREAT UR-MCNC: 32.6 MG/DL
MICROALBUMIN UR-MCNC: <3 UG/ML

## 2017-06-19 ENCOUNTER — HOSPITAL ENCOUNTER (OUTPATIENT)
Dept: CT IMAGING | Age: 77
Discharge: HOME | End: 2017-06-19
Attending: FAMILY MEDICINE
Payer: MEDICARE

## 2017-06-19 DIAGNOSIS — R10.12 LEFT UPPER QUADRANT PAIN: ICD-10-CM

## 2017-06-19 DIAGNOSIS — R19.02 ABDOMINAL MASS, LEFT UPPER QUADRANT: ICD-10-CM

## 2017-06-19 PROCEDURE — 74011250636 HC RX REV CODE- 250/636: Performed by: FAMILY MEDICINE

## 2017-06-19 PROCEDURE — 74011636320 HC RX REV CODE- 636/320: Performed by: FAMILY MEDICINE

## 2017-06-19 PROCEDURE — 74160 CT ABDOMEN W/CONTRAST: CPT

## 2017-06-19 PROCEDURE — 74011000255 HC RX REV CODE- 255: Performed by: FAMILY MEDICINE

## 2017-06-19 RX ORDER — BARIUM SULFATE 20 MG/ML
900 SUSPENSION ORAL
Status: COMPLETED | OUTPATIENT
Start: 2017-06-19 | End: 2017-06-19

## 2017-06-19 RX ORDER — SODIUM CHLORIDE 9 MG/ML
50 INJECTION, SOLUTION INTRAVENOUS
Status: COMPLETED | OUTPATIENT
Start: 2017-06-19 | End: 2017-06-19

## 2017-06-19 RX ORDER — SODIUM CHLORIDE 0.9 % (FLUSH) 0.9 %
10 SYRINGE (ML) INJECTION
Status: COMPLETED | OUTPATIENT
Start: 2017-06-19 | End: 2017-06-19

## 2017-06-19 RX ADMIN — SODIUM CHLORIDE 50 ML/HR: 900 INJECTION, SOLUTION INTRAVENOUS at 07:25

## 2017-06-19 RX ADMIN — Medication 10 ML: at 07:25

## 2017-06-19 RX ADMIN — IOPAMIDOL 100 ML: 755 INJECTION, SOLUTION INTRAVENOUS at 07:25

## 2017-06-19 RX ADMIN — BARIUM SULFATE 900 ML: 21 SUSPENSION ORAL at 07:25

## 2017-07-03 NOTE — H&P
The patient is a 78year old female who presents with a complaint of Abdominal Pain. The patient presents for reoccurrence of symptoms. The onset of the abdominal pain has been chronic and has been occurring in an intermittent pattern for months. The abdominal pain is described as is described as being located in the left upper quadrant and epigastrium .  , while the symptoms are not aggravated by nothing, meals (1/2 to 1 hour after eating), meals (2 to 4 hours after eating), lying down, coughing, breathing, standing, walking, running, motion, exercise, alcohol, milk, empty stomach, stress or other. The symptoms have been associated with abdominal distention,  while the symptoms have not been associated with amenorrhea, anorexia, bloating, bloody stools, black stools, bulky stools, bone pain, chest pain, constipation, dark urine, diarrhea, dysuria, early satiety, family history of colon cancer, fever, heartburn, hematemesis, hematuria, jaundice, melena, nausea, passing worms, pica, use of alcohol, vaginal bleeding, vaginal discharge, vomiting, weight loss or other.  Note for \"Abdominal Pain\": she c/o for months of epigastric/LUQ pain, mild, wiht small bulge, very worry about it, s/p negative CXR and CT scan of abdomen, stopped protonix, gerd well controlled on diet      Problem List/Past Medical (Rj Ricardo; 6/21/2017 9:35 AM)  Hiatal hernia (553.3  K44.9)   Abdominal pain, RLQ (789.03  R10.31)   Gastritis, acute (535.00  K29.00)   Dyspepsia (536.8  K30)   Abdominal swelling, generalized (789.37  R19.07)   Epigastric abdominal pain (789.06  R10.13)   Heartburn (787.1) (787.1  R12)   Hypertension   Asthma   Arthritis   Colonic Polyps   Diabetes Mellitus, Type II   Hypercholesterolemia     Past Surgical History Portillo MURRAY Walker; 6/21/2017 9:35 AM)  Tonsillectomy   Rotator cuff surgery  left  Appendectomy   Colon surgery  2001  Polypectomy   Adenoidectomy     Allergies Portillo MURRAY [address]; 6/21/2017 9:34 AM)  Tetracyclines   Percocet *ANALGESICS - OPIOID*   Nuts   Penicillins   Kiwi   Tetanus Toxoid Fluid *TOXOIDS*     Medication History (Orly MURRAY Ariel; 6/21/2017 9:35 AM)  Multivitamin  (1 Oral daily) Active. Calcium-Vitamin D  (600MG Tablet Chewable, 1 Oral daily) Active. MetFORMIN HCl  (500MG Tablet, 1 Oral daily) Active. Aspirin  (81MG Tablet DR, Oral daily) Active. Crestor  (10MG Tablet, Oral) Active. Hydrochlorothiazide  (25MG Tablet, 1/2 tab Oral daily) Active. Metoprolol Tartrate  (25MG Tablet, Oral daily) Active. Albuterol Sulfate  (Inhalation) Specific dose unknown - Active. Medications Reconciled     Family History ([facility]; 6/21/2017 9:35 AM)  Breast Cancer  First Degree Relatives. Stroke  Father. Heart Disease  First Degree Relatives. Diabetes Mellitus  First Degree Relatives. [facility]  Blood Transfusion  Yes. Tobacco Use  Former smoker. Employment status  Retired. Marital status  . Alcohol Use  Has never drank. Diagnostic Studies History ([facility]; 6/21/2017 9:35 AM)  Christi Pasquale  Colonoscopy  2005    Health Maintenance History ([facility]; 6/21/2017 9:34 AM)  Hussain Naranjito Date: 2016. Pneumovax  Date: 2013. Other Problems [facility] Remberto George; 6/21/2017 9:35 AM)  Screening for colon cancer (V76.51  Z12.11)     Review of Systems (Orly Elmore; 6/21/2017 9:36 AM)  General Not Present- Appetite Loss, Chronic Fatigue, Dietary Changes, Fever, Night Sweats, Weight Gain and Weight Loss. Skin Not Present- Bruising, Dryness, Itching and Rash. HEENT Not Present- Headache, Hearing Loss, Ringing in the Ears, Vertigo and Visual Loss. Neck Not Present- Neck Pain. Respiratory Not Present- Difficulty Breathing and TB exposure. Cardiovascular Present- Hypertension.  Not Present- Bypass Surgery, Chest Pain, History of Heart Attack, Irregular Heart Beat, Murmur, Pacemaker, Palpitations, Shortness of Breath, Use of Antibiotics before Dental Procedures and Use of Blood Thinners. Gastrointestinal Present- Abdominal Pain (epigastric), Bloating, Heartburn, Hiatal Hernia, History of Previous Colonoscopy and History of Previous Endoscopy. Not Present- Black Stool, Bloody Stool, Change in Bowel Habits, Cirrhosis, Colon Cancer, Constipation, Crohns disease, Diarrhea, Difficulty Swallowing, Food Intolerance, Gallbladder Disease, Gets full quickly at meals, Hematemesis, Hepatitis, Laxative Use, Nausea, Polyps, Rectal Bleeding, Ulcer, Ulcerative Colitis, use of NSAIDS, Vomiting and Weight Loss. Musculoskeletal Present- Back Pain (radiating from epigastric pain). Not Present- Arthritis, Decreased Range of Motion, Hip Replacement Surgery and Knee Replacement Surgery. Neurological Not Present- Decreased Memory, Headaches, Seizures and Stroke. Psychiatric Not Present- Anxiety and Depression. Endocrine Present- Diabetes. Not Present- Appetite Changes, Complications from Diabetes and Thyroid Problems. Hematology Not Present- Anemia, Easy Bleeding and Easy Bruising. Vitals (Orly Hughes; 6/21/2017 9:39 AM)  6/21/2017 9:34 AM  Weight: 200 lb   Height: 62 in    Body Surface Area: 1.99 m²   Body Mass Index: 36.58 kg/m²  Pulse: 72 (Regular)    Resp.: 20 (Unlabored)     BP: 138/74 (Sitting, Left Arm, Standard)        Physical Exam (Souheil Pearla Boxer MD; 6/21/2017 10:06 AM)  General  Mental Status - Alert. General Appearance - Cooperative, Pleasant and Consistent with stated age, Not Anxious, Not in acute distress. Build & Nutrition - Obese and Well developed. Integumentary  General Characteristics  Overall examination of the patient's skin reveals - no rashes and no bruises. Color - normal coloration of skin. Head and Neck  Neck  Global Assessment - full range of motion and supple, non-tender, no palpable mass on the right, no palpable mass on the left. Trachea - midline.     Eye  Sclera/Conjunctiva - Left - Normal.  Sclera/Conjunctiva - Right - Normal.  Pupil - Left - Normal.  Pupil - Right - Normal.    ENMT  Global Assessment  Upon examination of the ears, nose, mouth and throat - examination of the oral cavity reveals normal lips, teeth, gums and oral mucosa. Mouth and Throat  Oral Cavity/Oropharynx - Oropharynx - Normal.    Chest and Lung Exam  Chest and lung exam reveals  - normal excursion with symmetric chest walls, quiet, even and easy respiratory effort with no use of accessory muscles and on auscultation, normal breath sounds, no adventitious sounds and normal vocal resonance. Cardiovascular  Auscultation  Rhythm - Regular. Heart Sounds - S1 WNL and S2 WNL. Abdomen  Inspection  Inspection of the abdomen reveals - Soft, Non-distended. Incisional scars - No incisional scars. Palpation/Percussion  Tenderness - Non-Tender. Rebound tenderness - No rebound. Liver - No hepatosplenomegaly. Abdominal Mass Palpable - No masses. Other Characteristics - No Ascites. Auscultation  Auscultation of the abdomen reveals - Bowel sounds normal.    Rectal - Did not examine. Peripheral Vascular  Upper Extremity  Inspection - Left - No Digital clubbing. Right - No Digital clubbing. Neurologic  Neurologic evaluation reveals  - Cranial nerves grossly intact, no focal neurologic deficits and alert and oriented x 3 with no impairment of recent or remote memory. Musculoskeletal  Global Assessment  Gait and Station - normal gait and station and normal posture.         Assessment & Plan (Qasim Hernandez MD; 6/21/2017 10:07 AM)  Abdominal pain (789.00  R10.9)  Epigastric abdominal pain (789.06  R10.13)  Impression: she c/o for months of epigastric/LUQ pain, mild, wiht small bulge, very worry about it, s/p negative CXR and CT scan of abdomen, stopped protonix, gerd well controlled on diet    suspect muscular pain, needs also to r/o any ulcers  EGD to r/o any ulcers, gastritis, or any UGI tract neoplasm  Current Plans    Endoscopy (86963) (Discussed risks and benefits with the patient to include: perforation, post polypectomy, or post biopsy bleeding, missed lesions, and sedation reactions.)  Pt Education - How to access health information online: discussed with patient and provided information. Patient is to call me for any questions or concerns. Left upper quadrant pain (789.02  R10.12)    Date of Surgery Update:  Katherin Hansen was seen and examined. History and physical has been reviewed. The patient has been examined.  There have been no significant clinical changes since the completion of the originally dated History and Physical.    Signed By: Isela Jimenez MD     July 5, 2017 8:04 AM

## 2017-07-05 ENCOUNTER — ANESTHESIA (OUTPATIENT)
Dept: ENDOSCOPY | Age: 77
End: 2017-07-05
Payer: MEDICARE

## 2017-07-05 ENCOUNTER — HOSPITAL ENCOUNTER (OUTPATIENT)
Age: 77
Setting detail: OUTPATIENT SURGERY
Discharge: HOME | End: 2017-07-05
Attending: INTERNAL MEDICINE | Admitting: INTERNAL MEDICINE
Payer: MEDICARE

## 2017-07-05 ENCOUNTER — ANESTHESIA EVENT (OUTPATIENT)
Dept: ENDOSCOPY | Age: 77
End: 2017-07-05
Payer: MEDICARE

## 2017-07-05 VITALS
WEIGHT: 200 LBS | SYSTOLIC BLOOD PRESSURE: 133 MMHG | HEART RATE: 75 BPM | OXYGEN SATURATION: 98 % | RESPIRATION RATE: 27 BRPM | TEMPERATURE: 98 F | DIASTOLIC BLOOD PRESSURE: 79 MMHG | HEIGHT: 62 IN | BODY MASS INDEX: 36.8 KG/M2

## 2017-07-05 LAB
GLUCOSE BLD STRIP.AUTO-MCNC: 112 MG/DL (ref 65–100)
SERVICE CMNT-IMP: ABNORMAL

## 2017-07-05 PROCEDURE — 74011000250 HC RX REV CODE- 250

## 2017-07-05 PROCEDURE — 76060000031 HC ANESTHESIA FIRST 0.5 HR: Performed by: INTERNAL MEDICINE

## 2017-07-05 PROCEDURE — 74011250636 HC RX REV CODE- 250/636

## 2017-07-05 PROCEDURE — 88305 TISSUE EXAM BY PATHOLOGIST: CPT | Performed by: INTERNAL MEDICINE

## 2017-07-05 PROCEDURE — 82962 GLUCOSE BLOOD TEST: CPT

## 2017-07-05 PROCEDURE — 77030009426 HC FCPS BIOP ENDOSC BSC -B: Performed by: INTERNAL MEDICINE

## 2017-07-05 PROCEDURE — 76040000019: Performed by: INTERNAL MEDICINE

## 2017-07-05 RX ORDER — EPINEPHRINE 0.1 MG/ML
1 INJECTION INTRACARDIAC; INTRAVENOUS
Status: DISCONTINUED | OUTPATIENT
Start: 2017-07-05 | End: 2017-07-05 | Stop reason: HOSPADM

## 2017-07-05 RX ORDER — PANTOPRAZOLE SODIUM 40 MG/1
40 TABLET, DELAYED RELEASE ORAL
Qty: 30 TAB | Refills: 12 | Status: SHIPPED | OUTPATIENT
Start: 2017-07-05 | End: 2017-08-04

## 2017-07-05 RX ORDER — LIDOCAINE HYDROCHLORIDE 20 MG/ML
INJECTION, SOLUTION EPIDURAL; INFILTRATION; INTRACAUDAL; PERINEURAL AS NEEDED
Status: DISCONTINUED | OUTPATIENT
Start: 2017-07-05 | End: 2017-07-05 | Stop reason: HOSPADM

## 2017-07-05 RX ORDER — SODIUM CHLORIDE 0.9 % (FLUSH) 0.9 %
5-10 SYRINGE (ML) INJECTION AS NEEDED
Status: DISCONTINUED | OUTPATIENT
Start: 2017-07-05 | End: 2017-07-05 | Stop reason: HOSPADM

## 2017-07-05 RX ORDER — FLUMAZENIL 0.1 MG/ML
0.2 INJECTION INTRAVENOUS
Status: DISCONTINUED | OUTPATIENT
Start: 2017-07-05 | End: 2017-07-05 | Stop reason: HOSPADM

## 2017-07-05 RX ORDER — SODIUM CHLORIDE 9 MG/ML
100 INJECTION, SOLUTION INTRAVENOUS CONTINUOUS
Status: DISCONTINUED | OUTPATIENT
Start: 2017-07-05 | End: 2017-07-05 | Stop reason: HOSPADM

## 2017-07-05 RX ORDER — FENTANYL CITRATE 50 UG/ML
200 INJECTION, SOLUTION INTRAMUSCULAR; INTRAVENOUS
Status: DISCONTINUED | OUTPATIENT
Start: 2017-07-05 | End: 2017-07-05 | Stop reason: HOSPADM

## 2017-07-05 RX ORDER — PROPOFOL 10 MG/ML
INJECTION, EMULSION INTRAVENOUS AS NEEDED
Status: DISCONTINUED | OUTPATIENT
Start: 2017-07-05 | End: 2017-07-05 | Stop reason: HOSPADM

## 2017-07-05 RX ORDER — SODIUM CHLORIDE 9 MG/ML
INJECTION, SOLUTION INTRAVENOUS
Status: DISCONTINUED | OUTPATIENT
Start: 2017-07-05 | End: 2017-07-05 | Stop reason: HOSPADM

## 2017-07-05 RX ORDER — DEXTROMETHORPHAN/PSEUDOEPHED 2.5-7.5/.8
1.2 DROPS ORAL
Status: DISCONTINUED | OUTPATIENT
Start: 2017-07-05 | End: 2017-07-05 | Stop reason: HOSPADM

## 2017-07-05 RX ORDER — NALOXONE HYDROCHLORIDE 0.4 MG/ML
0.4 INJECTION, SOLUTION INTRAMUSCULAR; INTRAVENOUS; SUBCUTANEOUS
Status: DISCONTINUED | OUTPATIENT
Start: 2017-07-05 | End: 2017-07-05 | Stop reason: HOSPADM

## 2017-07-05 RX ORDER — SODIUM CHLORIDE 0.9 % (FLUSH) 0.9 %
5-10 SYRINGE (ML) INJECTION EVERY 8 HOURS
Status: DISCONTINUED | OUTPATIENT
Start: 2017-07-05 | End: 2017-07-05 | Stop reason: HOSPADM

## 2017-07-05 RX ORDER — ATROPINE SULFATE 0.1 MG/ML
0.5 INJECTION INTRAVENOUS
Status: DISCONTINUED | OUTPATIENT
Start: 2017-07-05 | End: 2017-07-05 | Stop reason: HOSPADM

## 2017-07-05 RX ORDER — MIDAZOLAM HYDROCHLORIDE 1 MG/ML
.25-1 INJECTION, SOLUTION INTRAMUSCULAR; INTRAVENOUS
Status: DISCONTINUED | OUTPATIENT
Start: 2017-07-05 | End: 2017-07-05 | Stop reason: HOSPADM

## 2017-07-05 RX ADMIN — PROPOFOL 100 MG: 10 INJECTION, EMULSION INTRAVENOUS at 08:12

## 2017-07-05 RX ADMIN — PROPOFOL 10 MG: 10 INJECTION, EMULSION INTRAVENOUS at 08:16

## 2017-07-05 RX ADMIN — SODIUM CHLORIDE: 9 INJECTION, SOLUTION INTRAVENOUS at 08:08

## 2017-07-05 RX ADMIN — PROPOFOL 20 MG: 10 INJECTION, EMULSION INTRAVENOUS at 08:15

## 2017-07-05 RX ADMIN — LIDOCAINE HYDROCHLORIDE 50 MG: 20 INJECTION, SOLUTION EPIDURAL; INFILTRATION; INTRACAUDAL; PERINEURAL at 08:12

## 2017-07-05 RX ADMIN — PROPOFOL 20 MG: 10 INJECTION, EMULSION INTRAVENOUS at 08:13

## 2017-07-05 NOTE — PROCEDURES
[address] [facility] [address], [address] [facility]        Esophago- Gastroduodenoscopy (EGD) Procedure Note    Kasie Cabezas  1940  [id number]      Procedure: Endoscopic Gastroduodenoscopy with biopsy    Indication:  Abdominal pain, epigastric     Pre-operative Diagnosis: see indication above    Post-operative Diagnosis: see findings below    : Margarito Gleason MD    Referring Provider:  Danielle Walton MD      Anesthesia/Sedation:  MAC anesthesia Propofol        Procedure Details     After infomed consent was obtained for the procedure, with all risks and benefits of procedure explained the patient was taken to the endoscopy suite and placed in the left lateral decubitus position. Following sequential administration of sedation as per above, the endoscope was inserted into the mouth and advanced under direct vision to third portion of the duodenum. A careful inspection was made as the gastroscope was withdrawn, including a retroflexed view of the proximal stomach; findings and interventions are described below. Findings:   Esophagus:hiatal hernia 2 cm in size   Stomach: normal   Duodenum/jejunum: normal, random biopsies taken      Therapies:  none    Specimens: as above         EBL: None      Complications:   None; patient tolerated the procedure well. Impression:    -See post-procedure diagnoses.     Recommendations:  - restart protonix 40 mg/d since c/o more GERD symptoms  -may use zantac as needed   -f/u in 2 months    Signed By: Margarito Gleason MD     7/5/2017  8:20 AM

## 2017-07-05 NOTE — ROUTINE PROCESS
Raza Trejo  1940  [id number]    Situation:  Verbal report received from: [facility]  Procedure: Procedure(s):  ESOPHAGOGASTRODUODENOSCOPY (EGD) (LATEX ALLERGY)  ESOPHAGOGASTRODUODENAL (EGD) BIOPSY    Background:    Preoperative diagnosis: ABDOMINAL PAIN  Postoperative diagnosis: 1. Small Hernia    :  Dr. Mike   Assistant(s): Endoscopy Technician-1: Arron Heaton  Endoscopy RN-1: Elizabeth Flanagan RN    Specimens:   ID Type Source Tests Collected by Time Destination   1 : Duodenal Biopsies Preservative   Bettye Adkins MD 7/5/2017 0815 Pathology     H. Pylori  no    Assessment:  Intra-procedure medications   Anesthesia gave intra-procedure sedation and medications, see anesthesia flow sheet yes    Intravenous fluids: NS@ KVO     Vital signs stable     Abdominal assessment: round and soft     Recommendation:  Discharge patient per MD order.     Family or Friend   Permission to share finding with family or friend yes

## 2017-07-05 NOTE — DISCHARGE INSTRUCTIONS
[address]  [place] [address] [place] [address]    EGD DISCHARGE INSTRUCTIONS    Helder Maria  [id number]  1940    Discomfort:  Sore throat- throat lozenges or warm salt water gargle  redness at IV site- apply warm compress to area; if redness or soreness persist- contact your physician  Gaseous discomfort- walking, belching will help relieve any discomfort  You may not operate a vehicle for 12 hours  You may not engage in an occupation involving machinery or appliances for rest of today  You may not drink alcoholic beverages for at least 12 hours  Avoid making any critical decisions for at least 24 hour  DIET  You may resume your regular diet - however -  remember your colon is empty and a heavy meal will produce gas. Avoid these foods:  vegetables, fried / greasy foods, carbonated drinks    ACTIVITY  You may resume your normal daily activities   Spend the remainder of the day resting -  avoid any strenuous activity. CALL M.D. ANY SIGN OF   Increasing pain, nausea, vomiting  Abdominal distension (swelling)  New increased bleeding (oral or rectal)  Fever (chills)  Pain in chest area  Bloody discharge from nose or mouth  Shortness of breath    Follow-up Instructions:   Call Dr. Fernandez Taylor for any questions or problems and follow up with him in 2 months  Telephone # [phone]  May use zantac OTC as needed for heartburn    ENDOSCOPY FINDINGS:   Your endoscopy showed small hiatal hernia, otherwise normal exam, no ulcers.     Signed By: Fernandez Taylor MD     7/5/2017  8:22 AM

## 2017-07-05 NOTE — ANESTHESIA PREPROCEDURE EVALUATION
Anesthetic History   No history of anesthetic complications            Review of Systems / Medical History  Patient summary reviewed, nursing notes reviewed and pertinent labs reviewed    Pulmonary            Asthma        Neuro/Psych             Comments: Diabetic neuropathy Cardiovascular    Hypertension                   GI/Hepatic/Renal     GERD          Comments: Ab pain  Hx gastric polyp Endo/Other    Diabetes: well controlled, type 2  Hypothyroidism  Obesity and arthritis     Other Findings            Physical Exam    Airway  Mallampati: I  TM Distance: > 6 cm  Neck ROM: normal range of motion   Mouth opening: Normal     Cardiovascular    Rhythm: regular  Rate: normal         Dental    Dentition: Edentulous     Pulmonary  Breath sounds clear to auscultation               Abdominal  Abdominal exam normal       Other Findings            Anesthetic Plan    ASA: 2  Anesthesia type: MAC          Induction: Intravenous  Anesthetic plan and risks discussed with: Patient

## 2017-07-05 NOTE — IP AVS SNAPSHOT
[address] 
[phone] Patient: Elizabeth Snell MRN: [id number] :1940 You are allergic to the following Allergen Reactions Latex Rash Amoxil (Amoxicillin) Itching  
 rash Levaquin (Levofloxacin) Other (comments) Dizziness Percocet (Oxycodone-Acetaminophen) Nausea and Vomiting Tetanus Vaccines And Toxoid Swelling Tetracycline Hcl Rash Recent Documentation Height Weight BMI OB Status Smoking Status 1.575 m 90.7 kg 36.58 kg/m2 Hysterectomy Former Smoker Emergency Contacts Name Discharge Info Relation Home Work Mobile [facility] DISCHARGE CAREGIVER [3] Daughter [21] [phone] Rhiannon Amin  Child [2] [phone] About your hospitalization You were admitted on:  2017 [facility] You were [facility] on:  2017 Unit phone number:  [phone] Why you were hospitalized Your primary diagnosis was:  Not on File Providers Seen During Your Hospitalizations Provider Role Specialty Primary office phone Asif Atkins MD Attending Provider Gastroenterology [phone] Your Primary Care Physician (PCP) Primary Care Physician Office Phone Office Fax Shane Mccann [phone] Follow-up Information None Current Discharge Medication List  
  
START taking these medications Dose & Instructions Dispensing Information Comments Morning Noon Evening Bedtime  
 pantoprazole 40 mg tablet Commonly known as:  PROTONIX Your last dose was: Your next dose is:    
   
   
 Dose:  40 mg Take 1 Tab by mouth Daily (before breakfast) for 30 days. Quantity:  30 Tab Refills:  12 CONTINUE these medications which have NOT CHANGED Dose & Instructions Dispensing Information Comments Morning Noon Evening Bedtime  
 aspirin delayed-release 81 mg tablet Your last dose was: Your next dose is:    
   
   
 Dose:  81 mg Take 1 Tab by mouth daily. Quantity:  30 Tab Refills:  11 Blood-Glucose Meter monitoring kit Your last dose was: Your next dose is: Once daily before breakfast  
 Quantity:  1 Kit Refills:  0  
     
   
   
   
  
 butalbital-acetaminophen  mg tablet Commonly known as:  Daryl Ochoa Your last dose was: Your next dose is:    
   
   
 Dose:  1 Tab Take 1 Tab by mouth every six (6) hours as needed for Pain. Quantity:  90 Tab Refills:  1  
     
   
   
   
  
 calcium-cholecalciferol (D3) tablet Commonly known as:  CALTRATE 600+D Your last dose was: Your next dose is:    
   
   
 Dose:  1 Tab Take 1 Tab by mouth two (2) times a day. Quantity:  60 Tab Refills:  11  
     
   
   
   
  
 cetirizine 10 mg tablet Commonly known as:  ZYRTEC Your last dose was: Your next dose is: TAKE 1 TABLET BY MOUTH EVERY DAY Refills:  3  
     
   
   
   
  
 fluticasone 50 mcg/actuation nasal spray Commonly known as:  Francella Anshul Your last dose was: Your next dose is:    
   
   
 Dose:  2 Spray 2 Sprays by Both Nostrils route as needed for Rhinitis or Allergies. Indications: ALLERGIC RHINITIS Quantity:  1 Bottle Refills:  11  
     
   
   
   
  
 * glucose blood VI test strips strip Commonly known as:  ACCU-CHEK DA Your last dose was: Your next dose is: Bernardino Saunas Quantity:  1 Package Refills:  11  
     
   
   
   
  
 * glucose blood VI test strips strip Commonly known as:  FREESTYLE INSULINX Your last dose was: Your next dose is:    
   
   
 TEST EVERY DAY AND AS NEEDED Quantity:  100 Strip Refills:  6 DX Code E11.51 .  
    
   
   
   
  
 hydroCHLOROthiazide 25 mg tablet Commonly known as:  HYDRODIURIL Your last dose was: Your next dose is:    
   
   
 Dose:  25 mg Take 1 Tab by mouth daily. Quantity:  90 Tab Refills:  1 HYDROcodone-acetaminophen 5-325 mg per tablet Commonly known as:  Alexandrea Snell Your last dose was: Your next dose is:    
   
   
 Dose:  0.5-1 Tab Take 0.5-1 Tabs by mouth every six (6) hours as needed for Pain. Max Daily Amount: 4 Tabs. Quantity:  8 Tab Refills:  0  
     
   
   
   
  
 inhalational spacing device Commonly known as:  SPACE CHAMBER PLUS Your last dose was: Your next dose is:    
   
   
 Dose:  1 Each  
1 Each by Does Not Apply route as needed. Indications: USE WITH ALBUTEROL MDI Quantity:  1 Device Refills:  0  
     
   
   
   
  
 ipratropium 0.02 % nebulizer solution Commonly known as:  ATROVENT Your last dose was: Your next dose is:    
   
   
 Dose:  0.5 mg  
2.5 mL by Nebulization route as needed for Wheezing. One package of Nebules to be used every 4-6hrs prn.  icd 10 J45.902 Quantity:  1250 mL Refills:  11 Lancets Misc Commonly known as:  FREESTYLE LANCETS Your last dose was: Your next dose is:    
   
   
 Test once daily. (diagnosis code: 250.00) Quantity:  1 Package Refills:  11  
     
   
   
   
  
 levalbuterol 1.25 mg/3 mL Nebu Commonly known as:  Sridhar Peoples Your last dose was: Your next dose is:    
   
   
 Dose:  1.25 mg  
3 mL by Nebulization route every six (6) hours as needed. icd 10 J45.902 Quantity:  30 Nebule Refills:  11  
     
   
   
   
  
 levalbuterol tartrate 45 mcg/actuation inhaler Commonly known as:  Sadie Cano Your last dose was: Your next dose is:    
   
   
 Dose:  1 Puff Take 1 Puff by inhalation every six (6) hours as needed for Wheezing or Shortness of Breath. Indications: BRONCHOSPASM PREVENTION, unable to take ALbuterol developed tremors and palpitations Quantity:  1 Inhaler Refills:  7  
     
   
   
   
  
 meloxicam 7.5 mg tablet Commonly known as:  MOBIC Your last dose was: Your next dose is: TAKE 1 TABLET BY MOUTH ONCE DAILY AFTER A MEAL Refills:  0  
     
   
   
   
  
 * metFORMIN 850 mg tablet Commonly known as:  GLUCOPHAGE Your last dose was: Your next dose is: One tablet in the morning and half a tablet at night with meals Quantity:  180 Tab Refills:  3  
     
   
   
   
  
 * metFORMIN 500 mg tablet Commonly known as:  GLUCOPHAGE Your last dose was: Your next dose is: TAKE 1 & 1/2 TABLETS BY MOUTH TWICE A DAY WITH MEALS Refills:  2  
     
   
   
   
  
 metoprolol succinate 25 mg XL tablet Commonly known as:  TOPROL-XL Your last dose was: Your next dose is: TAKE 1 TABLET BY MOUTH EVERY DAY Quantity:  90 Tab Refills:  2 MULTIVITAMIN PO Your last dose was: Your next dose is:    
   
   
 Dose:  1 Tab Take 1 Tab by mouth daily. Refills:  0  
     
   
   
   
  
 rosuvastatin 10 mg tablet Commonly known as:  CRESTOR Your last dose was: Your next dose is: TAKE 1 TABLET BY MOUTH EVERY DAY Quantity:  90 Tab Refills:  3  
     
   
   
   
  
 * Notice: This list has 4 medication(s) that are the same as other medications prescribed for you. Read the directions carefully, and ask your doctor or other care provider to review them with you. Where to Get Your Medications Information on where to get these meds will be given to you by the nurse or doctor. ! Ask your nurse or doctor about these medications  
  pantoprazole 40 mg tablet Discharge Instructions [address] [place] [address], [address] [address] EGD DISCHARGE INSTRUCTIONS Violet Izquierdo [id number] 
1940 Discomfort: 
Sore throat- throat lozenges or warm salt water gargle 
redness at IV site- apply warm compress to area; if redness or soreness persist- contact your physician Gaseous discomfort- walking, belching will help relieve any discomfort You may not operate a vehicle for 12 hours You may not engage in an occupation involving machinery or appliances for rest of today You may not drink alcoholic beverages for at least 12 hours Avoid making any critical decisions for at least 24 hour DIET You may resume your regular diet  however -  remember your colon is empty and a heavy meal will produce gas. Avoid these foods:  vegetables, fried / greasy foods, carbonated drinks ACTIVITY You may resume your normal daily activities Spend the remainder of the day resting -  avoid any strenuous activity. CALL M.D. ANY SIGN OF Increasing pain, nausea, vomiting Abdominal distension (swelling) New increased bleeding (oral or rectal) Fever (chills) Pain in chest area Bloody discharge from nose or mouth Shortness of breath Follow-up Instructions: 
 Call Dr. Yosef Valladares for any questions or problems and follow up with him in 2 months Telephone # [phone] May use zantac OTC as needed for heartburn ENDOSCOPY FINDINGS: 
 Your endoscopy showed small hiatal hernia, otherwise normal exam, no ulcers. Signed By: Yosef Valladares MD   
 7/5/2017  8:22 AM 
  
 
 
Discharge Orders None ACO Transitions of Care [address] offers a voluntary care coordination program to provide high quality service and care to [facility] fee-for-service beneficiaries. Marilee Becerril was designed to help you enhance your health and well-being through the following services: ? Transitions of Care  support for individuals who are transitioning from one care setting to another (example: Hospital to home). ? Chronic and Complex Care Coordination  support for individuals and caregivers of those with serious or chronic illnesses or with more than one chronic (ongoing) condition and those who take a number of different medications. If you meet specific medical criteria, a [facility] [address] may call you directly to coordinate your care with your primary care physician and your other care providers. For questions about the [facility] programs, please, contact your physicians office. For general questions or additional information about Accountable Care Organizations: 
Please visit www.medicare.gov/acos. html or call 1-800-MEDICARE (3-677.601.1529) TTY users should call 8-484.665.6008. Introducing [facility]! Dear Mackenzie : Thank you for requesting a [employer] account. Our records indicate that you already have an active [employer] account. You can access your account anytime at [email or web address]" Did you know that you can access your hospital and ER discharge instructions at any time in [employer]? You can also review all of your test results from your hospital stay or ER visit. Additional Information If you have questions, please visit the Frequently Asked Questions section of the [employer] website at [email or web address]"/. Remember, [employer] is NOT to be used for urgent needs. For medical emergencies, dial 911. Now available from your iPhone and Android! General Information Please provide this summary of care documentation to your next provider. Patient Signature:  ____________________________________________________________ Date:  ____________________________________________________________  
  
[facility]  Provider Signature: ____________________________________________________________ Date:  ____________________________________________________________

## 2017-07-05 NOTE — PROGRESS NOTES

## 2017-08-03 ENCOUNTER — HOSPITAL ENCOUNTER (EMERGENCY)
Age: 77
Discharge: HOME | End: 2017-08-03
Attending: EMERGENCY MEDICINE
Payer: MEDICARE

## 2017-08-03 ENCOUNTER — APPOINTMENT (OUTPATIENT)
Dept: GENERAL RADIOLOGY | Age: 77
End: 2017-08-03
Attending: EMERGENCY MEDICINE
Payer: MEDICARE

## 2017-08-03 VITALS
RESPIRATION RATE: 23 BRPM | TEMPERATURE: 98.6 F | HEART RATE: 97 BPM | HEIGHT: 62 IN | OXYGEN SATURATION: 96 % | WEIGHT: 199.96 LBS | DIASTOLIC BLOOD PRESSURE: 65 MMHG | SYSTOLIC BLOOD PRESSURE: 136 MMHG | BODY MASS INDEX: 36.8 KG/M2

## 2017-08-03 DIAGNOSIS — J45.41 MODERATE PERSISTENT ASTHMA WITH ACUTE EXACERBATION: Primary | ICD-10-CM

## 2017-08-03 LAB
ALBUMIN SERPL BCP-MCNC: 3.6 G/DL (ref 3.5–5)
ALBUMIN/GLOB SERPL: 0.9 {RATIO} (ref 1.1–2.2)
ALP SERPL-CCNC: 56 U/L (ref 45–117)
ALT SERPL-CCNC: 30 U/L (ref 12–78)
ANION GAP BLD CALC-SCNC: 7 MMOL/L (ref 5–15)
AST SERPL W P-5'-P-CCNC: 21 U/L (ref 15–37)
BASOPHILS # BLD AUTO: 0 K/UL (ref 0–0.1)
BASOPHILS # BLD: 0 % (ref 0–1)
BILIRUB SERPL-MCNC: 0.2 MG/DL (ref 0.2–1)
BNP SERPL-MCNC: 32 PG/ML (ref 0–125)
BUN SERPL-MCNC: 12 MG/DL (ref 6–20)
BUN/CREAT SERPL: 17 (ref 12–20)
CALCIUM SERPL-MCNC: 8.8 MG/DL (ref 8.5–10.1)
CHLORIDE SERPL-SCNC: 102 MMOL/L (ref 97–108)
CK MB CFR SERPL CALC: 0.5 % (ref 0–2.5)
CK MB SERPL-MCNC: 1.5 NG/ML (ref 5–25)
CK SERPL-CCNC: 315 U/L (ref 26–192)
CO2 SERPL-SCNC: 30 MMOL/L (ref 21–32)
CREAT SERPL-MCNC: 0.7 MG/DL (ref 0.55–1.02)
D DIMER PPP FEU-MCNC: 0.19 MG/L FEU (ref 0–0.65)
EOSINOPHIL # BLD: 0.2 K/UL (ref 0–0.4)
EOSINOPHIL NFR BLD: 3 % (ref 0–7)
ERYTHROCYTE [DISTWIDTH] IN BLOOD BY AUTOMATED COUNT: 14.7 % (ref 11.5–14.5)
GLOBULIN SER CALC-MCNC: 3.9 G/DL (ref 2–4)
GLUCOSE SERPL-MCNC: 149 MG/DL (ref 65–100)
HCT VFR BLD AUTO: 37.3 % (ref 35–47)
HGB BLD-MCNC: 11.9 G/DL (ref 11.5–16)
LYMPHOCYTES # BLD AUTO: 42 % (ref 12–49)
LYMPHOCYTES # BLD: 2.1 K/UL (ref 0.8–3.5)
MCH RBC QN AUTO: 27.4 PG (ref 26–34)
MCHC RBC AUTO-ENTMCNC: 31.9 G/DL (ref 30–36.5)
MCV RBC AUTO: 85.7 FL (ref 80–99)
MONOCYTES # BLD: 0.2 K/UL (ref 0–1)
MONOCYTES NFR BLD AUTO: 3 % (ref 5–13)
NEUTS SEG # BLD: 2.6 K/UL (ref 1.8–8)
NEUTS SEG NFR BLD AUTO: 52 % (ref 32–75)
PLATELET # BLD AUTO: 268 K/UL (ref 150–400)
POTASSIUM SERPL-SCNC: 3.4 MMOL/L (ref 3.5–5.1)
PROT SERPL-MCNC: 7.5 G/DL (ref 6.4–8.2)
RBC # BLD AUTO: 4.35 M/UL (ref 3.8–5.2)
SODIUM SERPL-SCNC: 139 MMOL/L (ref 136–145)
TROPONIN I SERPL-MCNC: <0.04 NG/ML
WBC # BLD AUTO: 5.1 K/UL (ref 3.6–11)

## 2017-08-03 PROCEDURE — 85379 FIBRIN DEGRADATION QUANT: CPT | Performed by: EMERGENCY MEDICINE

## 2017-08-03 PROCEDURE — 93005 ELECTROCARDIOGRAM TRACING: CPT

## 2017-08-03 PROCEDURE — 83880 ASSAY OF NATRIURETIC PEPTIDE: CPT | Performed by: EMERGENCY MEDICINE

## 2017-08-03 PROCEDURE — 84484 ASSAY OF TROPONIN QUANT: CPT | Performed by: EMERGENCY MEDICINE

## 2017-08-03 PROCEDURE — 82553 CREATINE MB FRACTION: CPT | Performed by: EMERGENCY MEDICINE

## 2017-08-03 PROCEDURE — 96374 THER/PROPH/DIAG INJ IV PUSH: CPT

## 2017-08-03 PROCEDURE — 36415 COLL VENOUS BLD VENIPUNCTURE: CPT | Performed by: EMERGENCY MEDICINE

## 2017-08-03 PROCEDURE — 77030029684 HC NEB SM VOL KT MONA -A

## 2017-08-03 PROCEDURE — 82550 ASSAY OF CK (CPK): CPT | Performed by: EMERGENCY MEDICINE

## 2017-08-03 PROCEDURE — 99283 EMERGENCY DEPT VISIT LOW MDM: CPT

## 2017-08-03 PROCEDURE — 94640 AIRWAY INHALATION TREATMENT: CPT

## 2017-08-03 PROCEDURE — 74011250636 HC RX REV CODE- 250/636: Performed by: EMERGENCY MEDICINE

## 2017-08-03 PROCEDURE — 80053 COMPREHEN METABOLIC PANEL: CPT | Performed by: EMERGENCY MEDICINE

## 2017-08-03 PROCEDURE — 85025 COMPLETE CBC W/AUTO DIFF WBC: CPT | Performed by: EMERGENCY MEDICINE

## 2017-08-03 PROCEDURE — 71020 XR CHEST PA LAT: CPT

## 2017-08-03 PROCEDURE — 74011000250 HC RX REV CODE- 250: Performed by: EMERGENCY MEDICINE

## 2017-08-03 RX ORDER — ALBUTEROL SULFATE 0.83 MG/ML
2.5 SOLUTION RESPIRATORY (INHALATION)
Qty: 48 EACH | Refills: 0 | Status: SHIPPED | OUTPATIENT
Start: 2017-08-03 | End: 2018-06-27

## 2017-08-03 RX ORDER — IPRATROPIUM BROMIDE AND ALBUTEROL SULFATE 2.5; .5 MG/3ML; MG/3ML
3 SOLUTION RESPIRATORY (INHALATION)
Status: COMPLETED | OUTPATIENT
Start: 2017-08-03 | End: 2017-08-03

## 2017-08-03 RX ORDER — PREDNISONE 20 MG/1
60 TABLET ORAL DAILY
Qty: 15 TAB | Refills: 0 | Status: SHIPPED | OUTPATIENT
Start: 2017-08-03 | End: 2017-08-08

## 2017-08-03 RX ADMIN — METHYLPREDNISOLONE SODIUM SUCCINATE 125 MG: 125 INJECTION, POWDER, FOR SOLUTION INTRAMUSCULAR; INTRAVENOUS at 23:10

## 2017-08-03 RX ADMIN — IPRATROPIUM BROMIDE AND ALBUTEROL SULFATE 3 ML: .5; 3 SOLUTION RESPIRATORY (INHALATION) at 21:53

## 2017-08-04 ENCOUNTER — TELEPHONE (OUTPATIENT)
Dept: FAMILY MEDICINE CLINIC | Age: 77
End: 2017-08-04

## 2017-08-04 LAB
ATRIAL RATE: 84 BPM
CALCULATED P AXIS, ECG09: 69 DEGREES
CALCULATED R AXIS, ECG10: -39 DEGREES
CALCULATED T AXIS, ECG11: 17 DEGREES
DIAGNOSIS, 93000: NORMAL
P-R INTERVAL, ECG05: 162 MS
Q-T INTERVAL, ECG07: 390 MS
QRS DURATION, ECG06: 96 MS
QTC CALCULATION (BEZET), ECG08: 460 MS
VENTRICULAR RATE, ECG03: 84 BPM

## 2017-08-04 NOTE — DISCHARGE INSTRUCTIONS

## 2017-08-04 NOTE — TELEPHONE ENCOUNTER
----- Message from Aito BV Dates sent at 8/4/2017 12:02 PM EDT -----  Regarding: /Refill  Pt want to the Er and was give and Inhaler but [facility] is needed a Medicare B Diagnosis code for Albuterol. The ER stated that they would not give and to call her pcp. [facility] needs new prescription.  Best contact number is [phone]

## 2017-08-04 NOTE — ED TRIAGE NOTES
Pt arrived ambulatory to ED with c/o SOB and congestion for a week. Pt has been using breathing treatment at home and felt lightheaded/dizzy after treatment last night. Pt reports feeling slight swelling under L breast/rib area and has a pain the goes down L side. Has been present \"for a while\" PCP is aware and is unsure what it may be. Pt has had CT and endoscopy already and is having a gastric emptying study on Monday.

## 2017-08-04 NOTE — ED PROVIDER NOTES
HPI Comments: Conrado Oconnor is a 78 y.o. female with PMHx significant for Asthma, Arthritis, HTN, DM who presents ambulatory to ED [facility] ED with cc of acute onset worsening SOB x week with associated mild intermittent cough, nausea, leg swelling, and mid sternal CP. Pt reports her SOB is worse with exertion. Pt describes her CP as, \"It feels like a liquid or water in my chest, I feel like i'm always full\". Pt does report a hx of Asthma and notes the use of breathing treatments for her Asthma, she has tried breathing treatments at home with no relief of her SOB. Pt also notes she is on daily ASA 81 mg for her hx of TIA. Pt specifically denies any fever, vomiting, sinus congestion, sore throat, or any hx of heart failure. PCP: Ruddy Lino MD    Social Hx: - tobacco (former smoker), -EtOH (-), - illicit drugs (-). There are no other complaints, changes or physical findings at this time. The history is provided by the patient. No  was used.         Past Medical History:   Diagnosis Date    Abdominal mass, left upper quadrant 6/13/2017    Abnormal finding on MRI of brain 3/16/2015    evaluated by neurologist and no findings    Acute pharyngitis 5/26/2016    Anemia NEC     Arthralgia of right hip 4/25/2016    Arthritis 2/18/2010    Asthma 2/18/2010    Cataract 9/24/2014    Diabetes ([address]     Elevated LFTs 4/16/2014    DAVID (generalized anxiety disorder) 1/22/2015    GERD (gastroesophageal reflux disease) 4/7/2014    Hammer toe of right foot 9/29/2014    Headache 0/92/1580    Helicobacter pylori (H. 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 ([address] 9/29/2014    Preop examination 9/24/2014    Right foot injury 5/2/2017    Risk for falls 4/16/2014    Screening for breast cancer 9/23/2014    Screening for depression 4/16/2014    Shoulder pain, left 3/18/2014    Spondylitis, cervical ([facility] 4/5/2010    Tinea pedis 6/3/2015    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 ([facility] 9/29/2014       Past Surgical History:   Procedure Laterality Date    ABDOMEN SURGERY PROC UNLISTED      inguinal hernia    ABDOMEN SURGERY PROC UNLISTED      ENDOSCOPY, COLON, DIAGNOSTIC      HX APPENDECTOMY      HX GYN  1983    total hysterectomy for uterine fibroid    HX HEENT      tonsillectomy    HX ORTHOPAEDIC      clavicle repair    HX OTHER SURGICAL      ? straightened colon out    HX ROTATOR CUFF REPAIR  2009    left    OPEN REPAIR CLAVICLE FRACTURE  2009         Family History:   Problem Relation Age of Onset    Cancer Mother      mycosis fungoives    Stroke Father     Cancer Sister      one sister with breast cancer    Breast Cancer Sister     Cancer Paternal Aunt      2 paternal aunts with breast cancer    Thyroid Cancer Neg Hx        Social History     Social History    Marital status:      Spouse name: N/A    Number of children: N/A    Years of education: N/A     Occupational History    Not on file. Social History Main Topics    Smoking status: Former Smoker     Quit date: 6/14/1988    Smokeless tobacco: Never Used    Alcohol use No    Drug use: No    Sexual activity: No     Other Topics Concern    Not on file     Social History Narrative         ALLERGIES: Latex; Amoxil [amoxicillin]; Levaquin [levofloxacin]; Percocet [oxycodone-acetaminophen]; Tetanus vaccines and toxoid; and Tetracycline hcl    Review of Systems   Constitutional: Negative for chills and fever. HENT: Negative for congestion, ear pain, rhinorrhea, sore throat and trouble swallowing. Eyes: Negative for visual disturbance.    Respiratory: Positive for cough and shortness of breath. Negative for chest tightness. Cardiovascular: Positive for chest pain and leg swelling. Negative for palpitations. Gastrointestinal: Positive for nausea. Negative for abdominal pain, blood in stool, constipation, diarrhea and vomiting. Genitourinary: Negative for decreased urine volume, difficulty urinating, dysuria and frequency. Musculoskeletal: Negative for back pain and neck pain. Skin: Negative for color change and rash. Neurological: Negative for dizziness, weakness, light-headedness and headaches. Vitals:    08/03/17 2036 08/03/17 2230 08/03/17 2300   BP: 152/79 118/53 136/65   Pulse: 83 91 97   Resp: 16 22 23   Temp: 98.6 °F (37 °C)     SpO2: 97% 95% 96%   Weight: 90.7 kg (199 lb 15.3 oz)     Height: 5' 2\" (1.575 m)              Physical Exam   Constitutional: She is oriented to person, place, and time. Vital signs are normal. She appears well-developed and well-nourished. She does not appear ill. No distress. HENT:   Mouth/Throat: Oropharynx is clear and moist.   Eyes: Conjunctivae are normal.   Neck: Neck supple. Cardiovascular: Normal rate and regular rhythm. Pulmonary/Chest: Effort normal and breath sounds normal. No accessory muscle usage. No respiratory distress. Abdominal: Soft. She exhibits no distension. There is no tenderness. Musculoskeletal: She exhibits edema. 1+ edema to bilateral legs   Lymphadenopathy:     She has no cervical adenopathy. Neurological: She is alert and oriented to person, place, and time. She has normal strength. No cranial nerve deficit or sensory deficit. Skin: Skin is warm and dry. Nursing note and vitals reviewed. MDM  Number of Diagnoses or Management Options  Diagnosis management comments: DDx: COPD, Pneumonia, Asthma, Heart failure, PE.        Amount and/or Complexity of Data Reviewed  Clinical lab tests: ordered and reviewed  Tests in the radiology section of CPT®: ordered and reviewed  Tests in the medicine section of CPT®: ordered and reviewed  Review and summarize past medical records: yes  Independent visualization of images, tracings, or specimens: yes    Patient Progress  Patient progress: stable    ED Course       Procedures    PROGRESS NOTE:  11:01 PM  Updated pt on all available results. There was no PE, no heart failure, and that her symptoms are likely due to her asthma. Recommended she use a nebulizer treatment every 4 hours. Will start her on steroids and refer her to pulmonology. Written by Will Herrera ED Scribe, as dictated by Matthew Ivy MD.    LABORATORY TESTS:  No results found for this or any previous visit (from the past 12 hour(s)). IMAGING RESULTS:  XR CHEST PA LAT   Final Result   Study Result      Chest PA and lateral     History: Dyspnea     Comparison: 4/12/2017     Findings: The lungs are well expanded. No focal consolidation, pleural  effusion, or pneumothorax. The cardiomediastinal silhouette is unremarkable. The visualized osseous structures are unremarkable.     IMPRESSION  Impression:  No acute cardiopulmonary process. MEDICATIONS GIVEN:  Medications   albuterol-ipratropium (DUO-NEB) 2.5 MG-0.5 MG/3 ML (3 mL Nebulization Given 8/3/17 0084)   methylPREDNISolone (PF) (SOLU-MEDROL) injection 125 mg (125 mg IntraVENous Given 8/3/17 5080)       IMPRESSION:  1. Moderate persistent asthma with acute exacerbation        PLAN:  1. Discharge Medication List as of 8/3/2017 11:04 PM      START taking these medications    Details   albuterol (PROVENTIL VENTOLIN) 2.5 mg /3 mL (0.083 %) nebulizer solution 3 mL by Nebulization route every four (4) hours as needed for Shortness of Breath. Indications: BRONCHOSPASTIC PULMONARY DISEASE, Normal, Disp-48 Each, R-0      predniSONE (DELTASONE) 20 mg tablet Take 3 Tabs by mouth daily for 5 days.  With Breakfast, Normal, Disp-15 Tab, R-0         CONTINUE these medications which have NOT CHANGED    Details   pantoprazole (PROTONIX) 40 mg tablet Take 1 Tab by mouth Daily (before breakfast) for 30 days. , Print, Disp-30 Tab, R-12      !! metFORMIN (GLUCOPHAGE) 500 mg tablet TAKE 1 & 1/2 TABLETS BY MOUTH TWICE A DAY WITH MEALS, Historical Med, R-2      !! metFORMIN (GLUCOPHAGE) 850 mg tablet One tablet in the morning and half a tablet at night with meals, No Print, Disp-180 Tab, R-3      rosuvastatin (CRESTOR) 10 mg tablet TAKE 1 TABLET BY MOUTH EVERY DAY, Normal, Disp-90 Tab, R-3      !! glucose blood VI test strips (FREESTYLE INSULINX) strip TEST EVERY DAY AND AS NEEDED, Normal, Disp-100 Strip, R-6      metoprolol succinate (TOPROL-XL) 25 mg XL tablet TAKE 1 TABLET BY MOUTH EVERY DAY, Normal, Disp-90 Tab, R-2      levalbuterol (XOPENEX) 1.25 mg/3 mL nebu 3 mL by Nebulization route every six (6) hours as needed. icd 10 J45.902, Normal, Disp-30 Nebule, R-11      ipratropium (ATROVENT) 0.02 % nebulizer solution 2.5 mL by Nebulization route as needed for Wheezing. One package of Nebules to be used every 4-6hrs prn.  icd 10 J45.902, Normal, Disp-1250 mL, R-11      cetirizine (ZYRTEC) 10 mg tablet TAKE 1 TABLET BY MOUTH EVERY DAY, Historical Med, R-3      levalbuterol tartrate (XOPENEX HFA) 45 mcg/actuation inhaler Take 1 Puff by inhalation every six (6) hours as needed for Wheezing or Shortness of Breath. Indications: BRONCHOSPASM PREVENTION, unable to take ALbuterol developed tremors and palpitations, Print, Disp-1 Inhaler, R-7      fluticasone (FLONASE) 50 mcg/actuation nasal spray 2 Sprays by Both Nostrils route as needed for Rhinitis or Allergies. Indications: ALLERGIC RHINITIS, Normal, Disp-1 Bottle, R-11      inhalational spacing device (SPACE CHAMBER PLUS) 1 Each by Does Not Apply route as needed. Indications: USE WITH ALBUTEROL MDI, Normal, Disp-1 Device, R-0      aspirin delayed-release 81 mg tablet Take 1 Tab by mouth daily. , Normal, Disp-30 Tab, R-11      hydrochlorothiazide (HYDRODIURIL) 25 mg tablet Take 1 Tab by mouth daily. , Normal, Disp-90 Tab, R-1      calcium-cholecalciferol, D3, (CALTRATE 600+D) tablet Take 1 Tab by mouth two (2) times a day., Print, Disp-60 Tab, R-11      MULTIVITAMIN PO Take 1 Tab by mouth daily. , Historical Med      Lancets (FREESTYLE LANCETS) Misc Test once daily. (diagnosis code: 250.00), Normal, Disp-1 Package, R-11      Blood-Glucose Meter monitoring kit Once daily before breakfast, Normal, Disp-1 Kit, R-0      !! glucose blood VI test strips (ACCU-CHEK DA) strip ., Normal, Disp-1 Package, R-11       !! - Potential duplicate medications found. Please discuss with provider. 2.   Follow-up Information     Follow up With Details Comments [place] [address] [place], MD Schedule an appointment as soon as possible for a visit in 4 days  [address]  [address] [facility]      Gurjit Calvin MD Schedule an appointment as soon as possible for a visit in 1 week  [address]  [phone]          Return to ED if worse     DISCHARGE NOTE  11:04 PM  The patient has been re-evaluated and is ready for discharge. Reviewed available results with patient. Counseled pt on diagnosis and care plan. Pt has expressed understanding, and all questions have been answered. Pt agrees with plan and agrees to F/U as recommended, or return to the ED if their sxs worsen. Discharge instructions have been provided and explained to the pt, along with reasons to return to the ED. This note is prepared by Deanna Elmore acting as Scribe for MD Cyrus Christian MD : The scribe's documentation has been prepared under my direction and personally reviewed by me in its entirety. I confirm that the note above accurately reflects all work, treatment, procedures, and medical decision making performed by me.

## 2017-08-04 NOTE — ED NOTES
Discharge instructions reviewed with pt and copy given along with RX by Dr Mayi Marie. All questions answered at this time. Pt discharged ambulatory home. Pt refused wheelchair.

## 2017-08-07 ENCOUNTER — HOSPITAL ENCOUNTER (OUTPATIENT)
Dept: NUCLEAR MEDICINE | Age: 77
Discharge: HOME | End: 2017-08-07
Attending: INTERNAL MEDICINE
Payer: MEDICARE

## 2017-08-07 DIAGNOSIS — K31.84 GASTROPARESIS: ICD-10-CM

## 2017-08-07 PROCEDURE — 78264 GASTRIC EMPTYING IMG STUDY: CPT

## 2017-08-10 ENCOUNTER — TELEPHONE (OUTPATIENT)
Dept: FAMILY MEDICINE CLINIC | Age: 77
End: 2017-08-10

## 2017-08-10 RX ORDER — BUTALBITAL AND ACETAMINOPHEN 325; 50 MG/1; MG/1
1 TABLET ORAL
Qty: 30 TAB | Refills: 0 | Status: SHIPPED | OUTPATIENT
Start: 2017-08-10 | End: 2018-03-06

## 2017-09-27 RX ORDER — HYDROCHLOROTHIAZIDE 25 MG/1
25 TABLET ORAL DAILY
Qty: 90 TAB | Refills: 1 | Status: SHIPPED | OUTPATIENT
Start: 2017-09-27 | End: 2017-10-18 | Stop reason: SDUPTHER

## 2017-09-27 NOTE — TELEPHONE ENCOUNTER
----- Message from Anyi Siegel sent at 9/27/2017 10:14 AM EDT -----  Regarding: / telephone  Contact: [phone]  Pt is requesting a call back regarding the status of refill request for med hydrochlorothiazide that was sent by pharmacy [facility] on [address] Sunday but no response back. Pt has one pill left for today.  Pt's best contact number is [phone]

## 2017-10-02 ENCOUNTER — APPOINTMENT (OUTPATIENT)
Dept: GENERAL RADIOLOGY | Age: 77
End: 2017-10-02
Attending: EMERGENCY MEDICINE
Payer: MEDICARE

## 2017-10-02 ENCOUNTER — HOSPITAL ENCOUNTER (EMERGENCY)
Age: 77
Discharge: HOME | End: 2017-10-02
Attending: EMERGENCY MEDICINE
Payer: MEDICARE

## 2017-10-02 ENCOUNTER — APPOINTMENT (OUTPATIENT)
Dept: CT IMAGING | Age: 77
End: 2017-10-02
Attending: EMERGENCY MEDICINE
Payer: MEDICARE

## 2017-10-02 VITALS
SYSTOLIC BLOOD PRESSURE: 117 MMHG | DIASTOLIC BLOOD PRESSURE: 59 MMHG | BODY MASS INDEX: 36.44 KG/M2 | RESPIRATION RATE: 16 BRPM | HEIGHT: 62 IN | TEMPERATURE: 98.2 F | WEIGHT: 198 LBS | HEART RATE: 77 BPM | OXYGEN SATURATION: 100 %

## 2017-10-02 DIAGNOSIS — R00.2 PALPITATIONS: Primary | ICD-10-CM

## 2017-10-02 LAB
ANION GAP SERPL CALC-SCNC: 7 MMOL/L (ref 5–15)
BASOPHILS # BLD: 0 K/UL (ref 0–0.1)
BASOPHILS NFR BLD: 0 % (ref 0–1)
BUN SERPL-MCNC: 10 MG/DL (ref 6–20)
BUN/CREAT SERPL: 16 (ref 12–20)
CALCIUM SERPL-MCNC: 9 MG/DL (ref 8.5–10.1)
CHLORIDE SERPL-SCNC: 102 MMOL/L (ref 97–108)
CO2 SERPL-SCNC: 31 MMOL/L (ref 21–32)
CREAT SERPL-MCNC: 0.63 MG/DL (ref 0.55–1.02)
EOSINOPHIL # BLD: 0.1 K/UL (ref 0–0.4)
EOSINOPHIL NFR BLD: 2 % (ref 0–7)
ERYTHROCYTE [DISTWIDTH] IN BLOOD BY AUTOMATED COUNT: 14.2 % (ref 11.5–14.5)
GLUCOSE BLD STRIP.AUTO-MCNC: 124 MG/DL (ref 65–100)
GLUCOSE SERPL-MCNC: 111 MG/DL (ref 65–100)
HCT VFR BLD AUTO: 36.5 % (ref 35–47)
HGB BLD-MCNC: 12 G/DL (ref 11.5–16)
INR BLD: 1 (ref 0.9–1.2)
LYMPHOCYTES # BLD: 1.3 K/UL (ref 0.8–3.5)
LYMPHOCYTES NFR BLD: 38 % (ref 12–49)
MCH RBC QN AUTO: 27.3 PG (ref 26–34)
MCHC RBC AUTO-ENTMCNC: 32.9 G/DL (ref 30–36.5)
MCV RBC AUTO: 83.1 FL (ref 80–99)
MONOCYTES # BLD: 0.2 K/UL (ref 0–1)
MONOCYTES NFR BLD: 6 % (ref 5–13)
NEUTS SEG # BLD: 1.8 K/UL (ref 1.8–8)
NEUTS SEG NFR BLD: 54 % (ref 32–75)
PLATELET # BLD AUTO: 260 K/UL (ref 150–400)
POTASSIUM SERPL-SCNC: 3.8 MMOL/L (ref 3.5–5.1)
RBC # BLD AUTO: 4.39 M/UL (ref 3.8–5.2)
SERVICE CMNT-IMP: ABNORMAL
SODIUM SERPL-SCNC: 140 MMOL/L (ref 136–145)
WBC # BLD AUTO: 3.4 K/UL (ref 3.6–11)

## 2017-10-02 PROCEDURE — 36415 COLL VENOUS BLD VENIPUNCTURE: CPT | Performed by: EMERGENCY MEDICINE

## 2017-10-02 PROCEDURE — 82962 GLUCOSE BLOOD TEST: CPT

## 2017-10-02 PROCEDURE — 85025 COMPLETE CBC W/AUTO DIFF WBC: CPT | Performed by: EMERGENCY MEDICINE

## 2017-10-02 PROCEDURE — 99284 EMERGENCY DEPT VISIT MOD MDM: CPT

## 2017-10-02 PROCEDURE — 96374 THER/PROPH/DIAG INJ IV PUSH: CPT

## 2017-10-02 PROCEDURE — 80048 BASIC METABOLIC PNL TOTAL CA: CPT | Performed by: EMERGENCY MEDICINE

## 2017-10-02 PROCEDURE — 70450 CT HEAD/BRAIN W/O DYE: CPT

## 2017-10-02 PROCEDURE — 71010 XR CHEST PORT: CPT

## 2017-10-02 PROCEDURE — 94762 N-INVAS EAR/PLS OXIMTRY CONT: CPT

## 2017-10-02 PROCEDURE — 93005 ELECTROCARDIOGRAM TRACING: CPT

## 2017-10-02 PROCEDURE — 85610 PROTHROMBIN TIME: CPT

## 2017-10-02 PROCEDURE — 74011250636 HC RX REV CODE- 250/636: Performed by: EMERGENCY MEDICINE

## 2017-10-02 RX ORDER — LORAZEPAM 2 MG/ML
1 INJECTION INTRAMUSCULAR
Status: COMPLETED | OUTPATIENT
Start: 2017-10-02 | End: 2017-10-02

## 2017-10-02 RX ORDER — ALPRAZOLAM 0.25 MG/1
0.25 TABLET ORAL
Qty: 20 TAB | Refills: 0 | Status: SHIPPED | OUTPATIENT
Start: 2017-10-02 | End: 2017-10-18 | Stop reason: ALTCHOICE

## 2017-10-02 RX ADMIN — LORAZEPAM 1 MG: 2 INJECTION INTRAMUSCULAR; INTRAVENOUS at 11:59

## 2017-10-02 NOTE — ED NOTES
Pt discharged by Dr. Rosalva Baeza. Pt provided with discharge instructions Rx and instructions on follow up care. Pt out of ED ambulatory without difficulty accompanied by family.

## 2017-10-02 NOTE — PROGRESS NOTES
Speech pathology  Consult received for SLP dysphagia screening. Nursing dysphagia screen completed and WNL. No indication of changes in speech or swallowing. If formal SLP evaluation is desired, please re-consult with SLP eval and treat order as SLP does not complete \"screenings\" only evaluations or treatments. Thanks.     Charmaine Quinonez M.S. CCC-SLP

## 2017-10-02 NOTE — DISCHARGE INSTRUCTIONS
Palpitations: Care Instructions  Your Care Instructions    Heart palpitations are the uncomfortable sensation that your heart is beating fast or irregularly. You might feel pounding or fluttering in your chest. It might feel like your heart is skipping a beat. Although palpitations may be caused by a heart problem, they also occur because of stress, fatigue, or use of alcohol, caffeine, or nicotine. Many medicines, including diet pills, antihistamines, decongestants, and some herbal products, can cause heart palpitations. Nearly everyone has palpitations from time to time. Depending on your symptoms, your doctor may need to do more tests to try to find the cause of your palpitations. Follow-up care is a key part of your treatment and safety. Be sure to make and go to all appointments, and call your doctor if you are having problems. It's also a good idea to know your test results and keep a list of the medicines you take. How can you care for yourself at home? · Avoid caffeine, nicotine, and excess alcohol. · Do not take illegal drugs, such as methamphetamines and cocaine. · Do not take weight loss or diet medicines unless you talk with your doctor first.  · Get plenty of sleep. · Do not overeat. · If you have palpitations again, take deep breaths and try to relax. This may slow a racing heart. · If you start to feel lightheaded, lie down to avoid injuries that might result if you pass out and fall down. · Keep a record of your palpitations and bring it to your next doctor's appointment. Write down:  ¨ The date and time. ¨ Your pulse. (If your heart is beating fast, it may be hard to count your pulse.)  ¨ What you were doing when the palpitations started. ¨ How long the palpitations lasted. ¨ Any other symptoms. · If an activity causes palpitations, slow down or stop. Talk to your doctor before you do that activity again. · Take your medicines exactly as prescribed.  Call your doctor if you think you are having a problem with your medicine. When should you call for help? Call 911 anytime you think you may need emergency care. For example, call if:  · You passed out (lost consciousness). · You have symptoms of a heart attack. These may include:  ¨ Chest pain or pressure, or a strange feeling in the chest.  ¨ Sweating. ¨ Shortness of breath. ¨ Pain, pressure, or a strange feeling in the back, neck, jaw, or upper belly or in one or both shoulders or arms. ¨ Lightheadedness or sudden weakness. ¨ A fast or irregular heartbeat. After you call 911, the  may tell you to chew 1 adult-strength or 2 to 4 low-dose aspirin. Wait for an ambulance. Do not try to drive yourself. · You have symptoms of a stroke. These may include:  ¨ Sudden numbness, tingling, weakness, or loss of movement in your face, arm, or leg, especially on only one side of your body. ¨ Sudden vision changes. ¨ Sudden trouble speaking. ¨ Sudden confusion or trouble understanding simple statements. ¨ Sudden problems with walking or balance. ¨ A sudden, severe headache that is different from past headaches. Call your doctor now or seek immediate medical care if:  · You have heart palpitations and:  ¨ Are dizzy or lightheaded, or you feel like you may faint. ¨ Have new or increased shortness of breath. Watch closely for changes in your health, and be sure to contact your doctor if:  · You continue to have heart palpitations. Where can you learn more? Go to http://radha-tracy.info/. Enter R508 in the search box to learn more about \"Palpitations: Care Instructions. \"  Current as of: April 3, 2017  Content Version: 11.3  © [id number] [employer]. Care instructions adapted under license by [employer] (which disclaims liability or warranty for this information).  If you have questions about a medical condition or this instruction, always ask your healthcare professional. [employer] disclaims any warranty or liability for your use of this information.

## 2017-10-02 NOTE — ED TRIAGE NOTES
Assumed care of pt from triage. Pt reports CC of tingling in her fingers and in her chest since  Sat. Pt states she feels like she has a rapid heart beat. Pt on monitor. No acute distress at this time.

## 2017-10-02 NOTE — PROGRESS NOTES
Pt is a 79 y/o [facility] American female who presented to the ED for numbness via personal vehicle. [facility]. CM completing assessment. CM introduced self, role. Pt verbalized understanding. Pt verified demographic information on chart. Pt verified PCP as Dr. Mulu Martin whom the pt last saw a few months ago. Pt lives in a one story home with no steps to enter with her daughter, Ju Raman ([phone], [phone]). Pt stated she is independent in ADL/IADL needs. Pt does not drive, but has supportive family to assist with transportation as needed to include discharge. She does not have access to any DME in the home. Pt stated she has not utilized SNF or home health services prior to presenting to the ED. Pt stated she has no needs or concerns at this time. CM to continue to offer support, discharge planning as needed. Care Management Interventions  PCP Verified by CM: Yes  Last Visit to PCP: 08/02/17  Mode of Transport at Discharge: Other (see comment) (Pt daughter available to transport at d/c)  Transition of Care Consult (CM Consult): Other (Pt reports she has not utilized [facility] or SNF services.  She is independent in ADL/IADL needs)  Discharge Durable Medical Equipment: No (Pt does not have access to DME in the home)  Physical Therapy Consult: No  Occupational Therapy Consult: No  Current Support Network: Lives with Caregiver, Own Home (Pt lives in a one story home with no steps to enter with her daughter)  Confirm Follow Up Transport: Family (Pt does not drive, but has supportive family to assist with transportation as needed)  Plan discussed with Pt/Family/Caregiver: Yes (Plan discussed with pt)  Discharge Location  Discharge Placement:  (TBD)    Kevin Salgado, MSW  Ext [phone]

## 2017-10-02 NOTE — ED PROVIDER NOTES
[facility]  EMERGENCY DEPARTMENT HISTORY AND PHYSICAL EXAM       Date of Service: 10/2/2017   Patient Name: Moraima Guillen   YOB: 1940  Medical Record Number: [id number]    History of Presenting Illness     Chief Complaint   Patient presents with    Numbness     Pt. complains of tingling throughout body since Saturday        History Provided By:  patient    Additional History:   Moraima Guillen is a 78 y.o. female with PMhx significant for HTN, DM, and migraines who presents ambulatory to the ED with cc of right arm soreness and weakness x ~1 day, and a sensation of \"shaking on the inside\" x ~2 days. Pt also c/o SUGGS since onset of her symptoms, as well as occasional palpitations and intermittent tingling in her B/L fingers. Pt denies taking any medications for her symptoms. She specifically denies any slurred speech, fevers, chills, nausea, vomiting, chest pain, shortness of breath, headache, rash, diarrhea, sweating or weight loss. Social Hx: (former) Tobacco, - EtOH, - Illicit Drugs    There are no other complaints, changes or physical findings at this time. Primary Care Provider: Kei Lawson MD   Cardiology: OLIVIA Hernandez MD    Past History     Past Medical History:   Past Medical History:   Diagnosis Date    Abdominal mass, left upper quadrant 6/13/2017    Abnormal finding on MRI of brain 3/16/2015    evaluated by neurologist and no findings    Acute pharyngitis 5/26/2016    Anemia NEC     Arthralgia of right hip 4/25/2016    Arthritis 2/18/2010    Asthma 2/18/2010    Cataract 9/24/2014    [facility]     Elevated LFTs 4/16/2014    DAVID (generalized anxiety disorder) 1/22/2015    GERD (gastroesophageal reflux disease) 4/7/2014    Hammer toe of right foot 9/29/2014    Headache(784.0) 9/82/0033    Helicobacter pylori (H. 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 ([address] 9/29/2014    Preop examination 9/24/2014    Right foot injury 5/2/2017    Risk for falls 4/16/2014    Screening for breast cancer 9/23/2014    Screening for depression 4/16/2014    Shoulder pain, left 3/18/2014    Spondylitis, cervical ([address] 4/5/2010    Tinea pedis 6/3/2015    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 straightened colon out    HX ROTATOR CUFF REPAIR  2009    left    OPEN REPAIR CLAVICLE FRACTURE  2009        Family History:   Family History   Problem Relation Age of Onset    Cancer Mother      mycosis fungoives    Stroke Father     Cancer Sister      one sister with breast cancer    Breast Cancer Sister     Cancer Paternal Aunt      2 paternal aunts with breast cancer    Thyroid Cancer Neg Hx         Social History:   Social History   Substance Use Topics    Smoking status: Former Smoker     Quit date: 6/14/1988    Smokeless tobacco: Never Used    Alcohol use No        Allergies:    Allergies   Allergen Reactions    Latex Rash    Amoxil [Amoxicillin] Itching     rash    Levaquin [Levofloxacin] Other (comments)     Dizziness      Percocet [Oxycodone-Acetaminophen] Nausea and Vomiting    Tetanus Vaccines And Toxoid Swelling    Tetracycline Hcl Rash         Review of Systems   Review of Systems   Constitutional: Negative for activity change, appetite change, chills, fatigue, fever and unexpected weight change. HENT: Negative. Negative for congestion, hearing loss, rhinorrhea, sneezing and voice change. Eyes: Negative. Negative for pain and visual disturbance. Respiratory: Positive for shortness of breath (with exertion). Negative for apnea, cough, choking and chest tightness. Cardiovascular: Positive for palpitations. Negative for chest pain. Gastrointestinal: Negative. Negative for abdominal distention, abdominal pain, blood in stool, diarrhea, nausea and vomiting. Genitourinary: Negative. Negative for difficulty urinating, flank pain, frequency and urgency. No discharge   Musculoskeletal: Positive for myalgias (right arm). Negative for arthralgias, back pain and neck stiffness. Skin: Negative. Negative for color change and rash. Neurological: Positive for weakness (right arm). Negative for dizziness, seizures, syncope, speech difficulty, numbness and headaches. Hematological: Negative for adenopathy. Psychiatric/Behavioral: Negative. Negative for agitation, behavioral problems, dysphoric mood and suicidal ideas. The patient is not nervous/anxious. Physical Exam  Physical Exam   Constitutional: She is oriented to person, place, and time. She appears well-developed and well-nourished. No distress. HENT:   Head: Normocephalic and atraumatic. Mouth/Throat: Oropharynx is clear and moist. No oropharyngeal exudate. Eyes: Conjunctivae and EOM are normal. Pupils are equal, round, and reactive to light. Right eye exhibits no discharge. Left eye exhibits no discharge. Neck: Normal range of motion. Neck supple. Cardiovascular: Normal rate, regular rhythm and intact distal pulses. Exam reveals no gallop and no friction rub. No murmur heard. Pulmonary/Chest: Effort normal and breath sounds normal. No respiratory distress. She has no wheezes. She has no rales.  She exhibits no tenderness. Abdominal: Soft. Bowel sounds are normal. She exhibits no distension and no mass. There is no tenderness. There is no rebound and no guarding. Musculoskeletal: Normal range of motion. She exhibits no edema. Lymphadenopathy:     She has no cervical adenopathy. Neurological: She is alert and oriented to person, place, and time. No cranial nerve deficit. Coordination normal.   Skin: Skin is warm and dry. No rash noted. No erythema. Psychiatric: She has a normal mood and affect. Nursing note and vitals reviewed. Medical Decision Making   I am the first provider for this patient. I reviewed the vital signs, available nursing notes, past medical history, past surgical history, family history and social history. Old Medical Records: Old medical records. Nursing notes. Provider Notes:   DDx: ACS, arrhythmia, dehydration, anxiety, CVA, TIA    ED Course:  10:06 AM   Initial assessment performed. The patients presenting problems have been discussed, and they are in agreement with the care plan formulated and outlined with them. I have encouraged them to ask questions as they arise throughout their visit. Progress Notes:     1:40 PM  Pt re-evaluated, symptoms have resolved. She reports she has hx of similar symptoms in the past, which seem to be related to PAC's or palpitations. Pt has had negative neuro work up for similar episodes. 1:45 PM  The patient states that their symptoms have resolved and they feel much better. There are no other new complaints at this time. Her questions have been answered. We are awaiting final results and those will be reviewed with them when they become available.       Diagnostic Study Results     Labs -      Recent Results (from the past 12 hour(s))   CBC WITH AUTOMATED DIFF    Collection Time: 10/02/17 10:54 AM   Result Value Ref Range    WBC 3.4 (L) 3.6 - 11.0 K/uL    RBC 4.39 3.80 - 5.20 M/uL    HGB 12.0 11.5 - 16.0 g/dL    HCT 36.5 35.0 - 47.0 %    MCV 83.1 80.0 - 99.0 FL    MCH 27.3 26.0 - 34.0 PG    MCHC 32.9 30.0 - 36.5 g/dL    RDW 14.2 11.5 - 14.5 %    PLATELET 174 922 - 353 K/uL    NEUTROPHILS 54 32 - 75 %    LYMPHOCYTES 38 12 - 49 %    MONOCYTES 6 5 - 13 %    EOSINOPHILS 2 0 - 7 %    BASOPHILS 0 0 - 1 %    ABS. NEUTROPHILS 1.8 1.8 - 8.0 K/UL    ABS. LYMPHOCYTES 1.3 0.8 - 3.5 K/UL    ABS. MONOCYTES 0.2 0.0 - 1.0 K/UL    ABS. EOSINOPHILS 0.1 0.0 - 0.4 K/UL    ABS.  BASOPHILS 0.0 0.0 - 0.1 K/UL   METABOLIC PANEL, BASIC    Collection Time: 10/02/17 10:54 AM   Result Value Ref Range    Sodium 140 136 - 145 mmol/L    Potassium 3.8 3.5 - 5.1 mmol/L    Chloride 102 97 - 108 mmol/L    CO2 31 21 - 32 mmol/L    Anion gap 7 5 - 15 mmol/L    Glucose 111 (H) 65 - 100 mg/dL    BUN 10 6 - 20 MG/DL    Creatinine 0.63 0.55 - 1.02 MG/DL    BUN/Creatinine ratio 16 12 - 20      GFR est AA >60 >60 ml/min/1.73m2    GFR est non-AA >60 >60 ml/min/1.73m2    Calcium 9.0 8.5 - 10.1 MG/DL   GLUCOSE, POC    Collection Time: 10/02/17 11:00 AM   Result Value Ref Range    Glucose (POC) 124 (H) 65 - 100 mg/dL    Performed by Mackenzie Noel    EKG, 12 LEAD, INITIAL    Collection Time: 10/02/17 11:01 AM   Result Value Ref Range    Ventricular Rate 74 BPM    Atrial Rate 74 BPM    P-R Interval 176 ms    QRS Duration 82 ms    Q-T Interval 422 ms    QTC Calculation (Bezet) 468 ms    Calculated P Axis 56 degrees    Calculated R Axis -38 degrees    Diagnosis       Normal sinus rhythm  Left axis deviation  Abnormal ECG  When compared with ECG of 03-AUG-2017 20:30,  No significant change was found     POC INR    Collection Time: 10/02/17 11:02 AM   Result Value Ref Range    INR (POC) 1.0 <1.2         Radiologic Studies -  The following have been ordered and reviewed:  CT Results  (Last 48 hours)               10/02/17 1136  CT HEAD WO CONT Final result    Impression:  IMPRESSION: No acute process or change compared to the prior exam.               Narrative:  EXAM:  CT HEAD WO CONT INDICATION:   Bilateral upper extremity numbness for 3 days       COMPARISON: 1/11/2017. CONTRAST:  None. TECHNIQUE: Unenhanced CT of the head was performed using 5 mm images. Brain and   bone windows were generated. CT dose reduction was achieved through use of a   standardized protocol tailored for this examination and automatic exposure   control for dose modulation. FINDINGS:   The ventricles and sulci are normal in size, shape and configuration and   midline. There is no significant white matter disease. There is no intracranial   hemorrhage, extra-axial collection, mass, mass effect or midline shift. The   basilar cisterns are open. No acute infarct is identified. The bone windows   demonstrate no abnormalities. The visualized portions of the paranasal sinuses   and mastoid air cells are clear. CXR Results  (Last 48 hours)               10/02/17 1109  XR CHEST PORT Final result    Impression:  Impression: No acute process. Narrative: Indication: Tingling in fingers and chest       Comparison: 8/3/2017       Portable exam of the chest obtained at 1057 demonstrates normal heart size. There is no acute process in the lung fields. The osseous structures are   unremarkable. Vital Signs-Reviewed the patient's vital signs. Patient Vitals for the past 12 hrs:   Temp Pulse Resp BP SpO2   10/02/17 1331 - - 16 117/59 100 %   10/02/17 1014 98.2 °F (36.8 °C) 77 16 134/72 99 %       Medications Given in the ED:  Medications   LORazepam (ATIVAN) injection 1 mg (1 mg IntraVENous Given 10/2/17 1159)       Pulse Oximetry Analysis - Normal 99% on RA     Cardiac Monitor:   Rate: 74  Rhythm: Normal Sinus Rhythm      EKG interpretation: 11:01  Rhythm: normal sinus rhythm; and regular . Rate (approx.): 74; Axis: normal; ND interval: normal; QRS interval: normal ; ST/T wave: normal.    Diagnosis   Clinical Impression:   1.  Palpitations         Plan:  1: Follow-up Information     Follow up With Details Comments Contact Info    Kirsten Pimentel MD Call in 2 days As needed, If symptoms worsen [address] [address] [phone]          2:   Current Discharge Medication List        Return to ED if Worse    Disposition Note:  1:42 PM  James García  results have been reviewed with her. She has been counseled regarding her diagnosis. She verbally conveys understanding and agreement of the signs, symptoms, diagnosis, treatment and prognosis and additionally agrees to follow up as recommended with Dr. Kirsten Pimentel MD in 24 - 48 hours. She also agrees with the care-plan and conveys that all of her questions have been answered. I have also put together some discharge instructions for her that include: 1) educational information regarding their diagnosis, 2) how to care for their diagnosis at home, as well a 3) list of reasons why they would want to return to the ED prior to their follow-up appointment, should their condition change.    _______________________________   Attestations: This note is prepared by Francella Frankel, acting as Scribe for [employer] Jai Frederick, [address] [facility] Jai Frederick MD: The scribe's documentation has been prepared under my direction and personally reviewed by me in its entirety.  I confirm that the note above accurately reflects all work, treatment, procedures, and medical decision making performed by me.  _______________________________

## 2017-10-03 ENCOUNTER — OFFICE VISIT (OUTPATIENT)
Dept: CARDIOLOGY CLINIC | Age: 77
End: 2017-10-03

## 2017-10-03 ENCOUNTER — CLINICAL SUPPORT (OUTPATIENT)
Dept: CARDIOLOGY CLINIC | Age: 77
End: 2017-10-03

## 2017-10-03 VITALS
SYSTOLIC BLOOD PRESSURE: 130 MMHG | HEIGHT: 62 IN | BODY MASS INDEX: 37.3 KG/M2 | OXYGEN SATURATION: 96 % | DIASTOLIC BLOOD PRESSURE: 80 MMHG | HEART RATE: 76 BPM | WEIGHT: 202.7 LBS | RESPIRATION RATE: 16 BRPM

## 2017-10-03 DIAGNOSIS — R06.09 DOE (DYSPNEA ON EXERTION): ICD-10-CM

## 2017-10-03 DIAGNOSIS — R00.2 INTERMITTENT PALPITATIONS: ICD-10-CM

## 2017-10-03 DIAGNOSIS — I10 ESSENTIAL HYPERTENSION: ICD-10-CM

## 2017-10-03 DIAGNOSIS — I45.9 SKIPPED HEART BEATS: ICD-10-CM

## 2017-10-03 DIAGNOSIS — R00.2 INTERMITTENT PALPITATIONS: Primary | ICD-10-CM

## 2017-10-03 DIAGNOSIS — E78.2 MIXED HYPERLIPIDEMIA: ICD-10-CM

## 2017-10-03 LAB
ATRIAL RATE: 74 BPM
CALCULATED P AXIS, ECG09: 56 DEGREES
CALCULATED R AXIS, ECG10: -38 DEGREES
DIAGNOSIS, 93000: NORMAL
P-R INTERVAL, ECG05: 176 MS
Q-T INTERVAL, ECG07: 422 MS
QRS DURATION, ECG06: 82 MS
QTC CALCULATION (BEZET), ECG08: 468 MS
VENTRICULAR RATE, ECG03: 74 BPM

## 2017-10-03 NOTE — PROGRESS NOTES
Farrah Vargas DNP, ANP-BC  Subjective/HPI:     Annalee Talavera is a 78 y.o. female is here for symptom based appointment regarding episodes of daily persistent rapid heartbeat fluttering and palpitations and overall generalized sensation of \"quivering inside\". She reports the symptoms have been waxing and waning for several weeks there are no exacerbating triggering or alleviating factors. She denies any excessive caffeine intake, new medications or over-the-counter medications. She denies any unusual stress or anxiety. Recently seen in the emergency room, I reviewed her labs and workup where she presented with diffuse tingling throughout her body. Additionally she reports episodes of progressive dyspnea on exertion and fatigue. Echocardiogram January 2017    SUMMARY:  Left ventricle: Systolic function was normal. Ejection fraction was  estimated in the range of 60 % to 65 %. There were no regional wall motion  abnormalities. Wall thickness was at the upper limits of normal.    Atrial septum: No obvious atrial septal defect was identified. Doppler  evaluation was performed. Saline contrast study was not performed and  would likely not be of adequate quality to rule out a small PFO if it were  done. Normal right- sided chamber size and only mildly elevated PASP makes  significant intracardiac shunt unlikely. Tricuspid valve: There was mild pulmonary hypertension. AORTA: The root exhibited normal size    Chest x-ray:    Comparison: 8/3/2017     Portable exam of the chest obtained at 1057 demonstrates normal heart size. There is no acute process in the lung fields. The osseous structures are  Unremarkable. CT ABD  FINDINGS:  Lung bases show some anterior basilar atelectasis or scarring. Liver and spleen  are normal in size. Gallbladder, adrenals and pancreas appear unremarkable. Major vessels are patent. There is a cyst in the inferior pole of the right  kidney measuring 44 mm.  There is no renal obstruction calcification or masses  her. There is no bowel wall thickening or obstruction. Vascular calcification  without aneurysm. No free air or free fluid, no adenopathy.     IMPRESSION   impression: No significant abnormalities suspected. IMPRESSION  Impression: No acute process.   PCP Provider  Latoya Moss MD  Past Medical History:   Diagnosis Date    Abdominal mass, left upper quadrant 6/13/2017    Abnormal finding on MRI of brain 3/16/2015    evaluated by neurologist and no findings    Acute pharyngitis 5/26/2016    Anemia NEC     Arthralgia of right hip 4/25/2016    Arthritis 2/18/2010    Asthma 2/18/2010    Cataract 9/24/2014    Diabetes ([address]     Elevated LFTs 4/16/2014    DAVID (generalized anxiety disorder) 1/22/2015    GERD (gastroesophageal reflux disease) 4/7/2014    Hammer toe of right foot 9/29/2014    Headache(784.0) 4/63/7224    Helicobacter pylori (H. 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 ([address] 9/29/2014    Preop examination 9/24/2014    Right foot injury 5/2/2017    Risk for falls 4/16/2014    Screening for breast cancer 9/23/2014    Screening for depression 4/16/2014    Shoulder pain, left 3/18/2014    Spondylitis, cervical ([address] 4/5/2010    Tinea pedis 6/3/2015    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 straightened colon out    HX ROTATOR CUFF REPAIR  2009    left    OPEN REPAIR CLAVICLE FRACTURE  2009     Allergies   Allergen Reactions    Latex Rash    Amoxil [Amoxicillin] Itching     rash    Levaquin [Levofloxacin] Other (comments)     Dizziness      Percocet [Oxycodone-Acetaminophen] Nausea and Vomiting    Tetanus Vaccines And Toxoid Swelling    Tetracycline Hcl Rash      Family History   Problem Relation Age of Onset    Cancer Mother      mycosis fungoives    Stroke Father     Cancer Sister      one sister with breast cancer    Breast Cancer Sister     Cancer Paternal Aunt      2 paternal aunts with breast cancer    Thyroid Cancer Neg Hx       Current Outpatient Prescriptions   Medication Sig    hydroCHLOROthiazide (HYDRODIURIL) 25 mg tablet Take 1 Tab by mouth daily.  albuterol (PROVENTIL VENTOLIN) 2.5 mg /3 mL (0.083 %) nebulizer solution 3 mL by Nebulization route every four (4) hours as needed for Shortness of Breath. Indications: BRONCHOSPASTIC PULMONARY DISEASE    metFORMIN (GLUCOPHAGE) 500 mg tablet TAKE 1 & 1/2 TABLETS BY MOUTH TWICE A DAY WITH MEALS    rosuvastatin (CRESTOR) 10 mg tablet TAKE 1 TABLET BY MOUTH EVERY DAY    glucose blood VI test strips (FREESTYLE INSULINX) strip TEST EVERY DAY AND AS NEEDED    metoprolol succinate (TOPROL-XL) 25 mg XL tablet TAKE 1 TABLET BY MOUTH EVERY DAY    levalbuterol (XOPENEX) 1.25 mg/3 mL nebu 3 mL by Nebulization route every six (6) hours as needed. icd 10 J45.902    cetirizine (ZYRTEC) 10 mg tablet TAKE 1 TABLET BY MOUTH EVERY DAY    levalbuterol tartrate (XOPENEX HFA) 45 mcg/actuation inhaler Take 1 Puff by inhalation every six (6) hours as needed for Wheezing or Shortness of Breath.  Indications: BRONCHOSPASM PREVENTION, unable to take ALbuterol developed tremors and palpitations    fluticasone (FLONASE) 50 mcg/actuation nasal spray 2 Sprays by Both Nostrils route as needed for Rhinitis or Allergies. Indications: ALLERGIC RHINITIS    aspirin delayed-release 81 mg tablet Take 1 Tab by mouth daily.  calcium-cholecalciferol, D3, (CALTRATE 600+D) tablet Take 1 Tab by mouth two (2) times a day.  MULTIVITAMIN PO Take 1 Tab by mouth daily.  Lancets (FREESTYLE LANCETS) Misc Test once daily. (diagnosis code: 250.00)    Blood-Glucose Meter monitoring kit Once daily before breakfast    glucose blood VI test strips (ACCU-CHEK DA) strip .  ALPRAZolam (XANAX) 0.25 mg tablet Take 1 Tab by mouth every eight (8) hours as needed for Anxiety. Max Daily Amount: 0.75 mg.  butalbital-acetaminophen (PHRENILIN)  mg tablet Take 1 Tab by mouth every six (6) hours as needed.  ipratropium (ATROVENT) 0.02 % nebulizer solution 2.5 mL by Nebulization route as needed for Wheezing. One package of Nebules to be used every 4-6hrs prn.  icd 10 J45.902    inhalational spacing device (SPACE CHAMBER PLUS) 1 Each by Does Not Apply route as needed. Indications: USE WITH ALBUTEROL MDI     No current facility-administered medications for this visit. Vitals:    10/03/17 1354   BP: 130/80   Pulse: 76   Resp: 16   SpO2: 96%   Weight: 202 lb 11.2 oz (91.9 kg)   Height: 5' 2\" (1.575 m)     Social History     Social History    Marital status:      Spouse name: N/A    Number of children: N/A    Years of education: N/A     Occupational History    Not on file. Social History Main Topics    Smoking status: Former Smoker     Quit date: 6/14/1988    Smokeless tobacco: Never Used    Alcohol use No    Drug use: No    Sexual activity: No     Other Topics Concern    Not on file     Social History Narrative       I have reviewed the nurses notes, vitals, problem list, allergy list, medical history, family, social history and medications. Review of Symptoms:    General: Pt denies excessive weight gain or loss.  Pt is able to conduct ADL's  HEENT: Denies blurred vision, headaches, epistaxis and difficulty swallowing. Respiratory: Denies shortness of breath, +SUGGS, no wheezing or stridor. Cardiovascular: Denies precordial pain, +palpitations,no  edema or PND  Gastrointestinal: Denies poor appetite, indigestion, abdominal pain or blood in stool  Musculoskeletal: Denies pain or swelling from muscles or joints  Neurologic: Denies tremor, paresthesias, or sensory motor disturbance  Skin: Denies rash, itching or texture change. Physical Exam:      General: Well developed, in no acute distress, cooperative and alert  HEENT: No carotid bruits, no JVD, trach is midline. Neck Supple, PEERL, EOM intact. Heart:  Normal S1/S2 negative S3 or S4. Regular, no murmur, gallop or rub.   Respiratory: Clear bilaterally x 4, no wheezing or rales  Abdomen:   Soft, non-tender, no masses, bowel sounds are active.   Extremities:  No edema, normal cap refill, no cyanosis, atraumatic. Neuro: A&Ox3, speech clear, gait stable. Skin: Skin color is normal. No rashes or lesions.  Non diaphoretic  Vascular: 2+ pulses symmetric in all extremities    Cardiographics    ECG: NSR   Results for orders placed or performed during the hospital encounter of 10/02/17   EKG, 12 LEAD, INITIAL   Result Value Ref Range    Ventricular Rate 74 BPM    Atrial Rate 74 BPM    P-R Interval 176 ms    QRS Duration 82 ms    Q-T Interval 422 ms    QTC Calculation (Bezet) 468 ms    Calculated P Axis 56 degrees    Calculated R Axis -38 degrees    Diagnosis       Normal sinus rhythm  Left axis deviation  Abnormal ECG  When compared with ECG of 03-AUG-2017 20:30,  No significant change was found     Results for orders placed or performed in visit on 02/12/14   CARDIAC HOLTER MONITOR, 24 HOURS    Narrative    ECG Monitor/24 hours, Complete    Reason for Holter Monitor   Tachycardia    Heartbeat    Slowest 73  Average 93  Fastest  131            Results:   Underlying Rhythm: Normal sinus rhythm      Atrial Arrhythmias: none            AV Conduction: normal    Ventricular Arrhythmias: premature ventricular contractions; rare    ST Segment Analysis:normal     Symptom Correlation:  none    Comment:   Rare single PVC's. Dayana Morales MD             Results for orders placed or performed in visit on 10/20/10   AMB POC EKG ROUTINE W/ 12 LEADS, INTER & REP    Impression    SR, widen QRS, LAD,, IRBBB, ANT hemiblock, no st elevation, no st  depression, will investigate for underlying heart dz,ref to  cardiologist         Cardiology Labs:  Lab Results   Component Value Date/Time    Cholesterol, total 159 05/02/2017 09:21 AM    HDL Cholesterol 58 05/02/2017 09:21 AM    LDL, calculated 81 05/02/2017 09:21 AM    Triglyceride 99 05/02/2017 09:21 AM    CHOL/HDL Ratio 2.9 01/12/2017 04:20 AM       Lab Results   Component Value Date/Time    Sodium 140 10/02/2017 10:54 AM    Potassium 3.8 10/02/2017 10:54 AM    Chloride 102 10/02/2017 10:54 AM    CO2 31 10/02/2017 10:54 AM    Anion gap 7 10/02/2017 10:54 AM    Glucose 111 10/02/2017 10:54 AM    BUN 10 10/02/2017 10:54 AM    Creatinine 0.63 10/02/2017 10:54 AM    BUN/Creatinine ratio 16 10/02/2017 10:54 AM    GFR est AA >60 10/02/2017 10:54 AM    GFR est non-AA >60 10/02/2017 10:54 AM    Calcium 9.0 10/02/2017 10:54 AM    Bilirubin, total 0.2 08/03/2017 09:55 PM    AST (SGOT) 21 08/03/2017 09:55 PM    Alk. phosphatase 56 08/03/2017 09:55 PM    Protein, total 7.5 08/03/2017 09:55 PM    Albumin 3.6 08/03/2017 09:55 PM    Globulin 3.9 08/03/2017 09:55 PM    A-G Ratio 0.9 08/03/2017 09:55 PM    ALT (SGPT) 30 08/03/2017 09:55 PM           Assessment:     Assessment:     Diagnoses and all orders for this visit:    1. Intermittent palpitations  -     LEXISCAN/CARDIOLITE, Clinic Performed; Future  -     HOLTER MONITOR, 24 HOURS, Clinic Performed; Future    2. Mixed hyperlipidemia  -     AMB POC EKG ROUTINE W/ 12 LEADS, INTER & REP  -     LEXISCAN/CARDIOLITE, Clinic Performed;  Future  -     HOLTER MONITOR, 24 HOURS, Clinic Performed; Future    3. Essential hypertension  -     LEXISCAN/CARDIOLITE, Clinic Performed; Future  -     HOLTER MONITOR, 24 HOURS, Clinic Performed; Future    4. SUGGS (dyspnea on exertion)  -     LEXISCAN/CARDIOLITE, Clinic Performed; Future  -     HOLTER MONITOR, 24 HOURS, Clinic Performed; Future        ICD-10-CM ICD-9-CM    1. Intermittent palpitations R00.2 785.1 STRESS TEST LEXISCAN/CARDIOLITE      CARDIAC HOLTER MONITOR, 24 HOURS   2. Mixed hyperlipidemia E78.2 272.2 AMB POC EKG ROUTINE W/ 12 LEADS, INTER & REP      STRESS TEST LEXISCAN/CARDIOLITE      CARDIAC HOLTER MONITOR, 24 HOURS   3. Essential hypertension I10 401.9 STRESS TEST LEXISCAN/CARDIOLITE      CARDIAC HOLTER MONITOR, 24 HOURS   4. SUGGS (dyspnea on exertion) R06.09 786.09 STRESS TEST LEXISCAN/CARDIOLITE      CARDIAC HOLTER MONITOR, 24 HOURS     Orders Placed This Encounter    AMB POC EKG ROUTINE W/ 12 LEADS, INTER & REP     Order Specific Question:   Reason for Exam:     Answer:   routine    LEXISCAN/CARDIOLITE, Clinic Performed     Standing Status:   Future     Standing Expiration Date:   4/3/2018     Order Specific Question:   Reason for Exam:     Answer:   SUGGS    HOLTER MONITOR, 24 HOURS, Clinic Performed     Standing Status:   Future     Number of Occurrences:   1     Standing Expiration Date:   4/3/2018     Order Specific Question:   Reason for Exam:     Answer:   palps        Plan:     Patient is a 81-year-old female reporting today episodes of daily and persistent intermittent fluttering of palpitations, EKG normal sinus rhythm today Holter monitor has been placed to rule out arrhythmia. In chart review regarding her quivering sensation in her chest, CT of abdomen 3 months ago negative for abdominal aortic aneurysm, echocardiogram earlier this year negative for dilated aortic root which would be suggestive of any aneurysmal issues. She does not have any radiating pain through to her back nor unequal blood pressures.   Dyspnea on exertion: Will evaluate with Lexiscan nuclear stress test.  Follow-up with Dr. Angela Perry when testing complete.     Latanya Foley NP

## 2017-10-10 ENCOUNTER — HOSPITAL ENCOUNTER (EMERGENCY)
Age: 77
Discharge: HOME | End: 2017-10-10
Attending: FAMILY MEDICINE

## 2017-10-10 VITALS
OXYGEN SATURATION: 96 % | RESPIRATION RATE: 16 BRPM | TEMPERATURE: 97.8 F | WEIGHT: 204 LBS | DIASTOLIC BLOOD PRESSURE: 76 MMHG | HEIGHT: 62 IN | BODY MASS INDEX: 37.54 KG/M2 | HEART RATE: 78 BPM | SYSTOLIC BLOOD PRESSURE: 184 MMHG

## 2017-10-10 DIAGNOSIS — M54.41 ACUTE RIGHT-SIDED LOW BACK PAIN WITH RIGHT-SIDED SCIATICA: Primary | ICD-10-CM

## 2017-10-10 RX ORDER — PREDNISONE 5 MG/1
TABLET ORAL
Qty: 21 TAB | Refills: 0 | Status: SHIPPED | OUTPATIENT
Start: 2017-10-10 | End: 2017-10-18 | Stop reason: ALTCHOICE

## 2017-10-10 RX ORDER — METHOCARBAMOL 500 MG/1
500 TABLET, FILM COATED ORAL
Qty: 20 TAB | Refills: 0 | Status: SHIPPED | OUTPATIENT
Start: 2017-10-10 | End: 2017-10-18 | Stop reason: ALTCHOICE

## 2017-10-10 NOTE — UC PROVIDER NOTE
Patient is a 78 y.o. female presenting with back pain. The history is provided by the patient. Back Pain    This is a new problem. Episode onset: 1 week ago. The problem has not changed since onset. The problem occurs constantly. The pain is associated with no known injury. The pain is present in the right side and lower back. The quality of the pain is described as aching and shooting. The pain radiates to the right thigh. The pain is moderate. The symptoms are aggravated by bending and twisting. Pertinent negatives include no chest pain, no fever, no numbness, no bowel incontinence, no perianal numbness, no bladder incontinence, no pelvic pain, no leg pain, no paresthesias, no paresis, no tingling and no weakness. She has tried NSAIDs for the symptoms. The treatment provided no relief.         Past Medical History:   Diagnosis Date    Abdominal mass, left upper quadrant 6/13/2017    Abnormal finding on MRI of brain 3/16/2015    evaluated by neurologist and no findings    Acute pharyngitis 5/26/2016    Anemia NEC     Arthralgia of right hip 4/25/2016    Arthritis 2/18/2010    Asthma 2/18/2010    Cataract 9/24/2014    Diabetes ([address]     Elevated LFTs 4/16/2014    DAVID (generalized anxiety disorder) 1/22/2015    GERD (gastroesophageal reflux disease) 4/7/2014    Hammer toe of right foot 9/29/2014    Headache(784.0) 0/72/2160    Helicobacter pylori (H. 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 ([address] 9/29/2014    Preop examination 9/24/2014    Right foot injury 5/2/2017    Risk for falls 4/16/2014    Screening for breast cancer 9/23/2014    Screening for depression 4/16/2014    Shoulder pain, left 3/18/2014    Spondylitis, cervical ([facility] 4/5/2010    Tinea pedis 6/3/2015    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 straightened colon out    HX ROTATOR CUFF REPAIR  2009    left    OPEN REPAIR CLAVICLE FRACTURE  2009         Family History   Problem Relation Age of Onset    Cancer Mother      mycosis fungoives    Stroke Father     Cancer Sister      one sister with breast cancer    Breast Cancer Sister     Cancer Paternal Aunt      2 paternal aunts with breast cancer    Thyroid Cancer Neg Hx         Social History     Social History    Marital status:      Spouse name: N/A    Number of children: N/A    Years of education: N/A     Occupational History    Not on file. Social History Main Topics    Smoking status: Former Smoker     Quit date: 6/14/1988    Smokeless tobacco: Never Used    Alcohol use No    Drug use: No    Sexual activity: No     Other Topics Concern    Not on file     Social History Narrative                ALLERGIES: Latex; Amoxil [amoxicillin]; Levaquin [levofloxacin]; Percocet [oxycodone-acetaminophen]; Tetanus vaccines and toxoid; and Tetracycline hcl    Review of Systems   Constitutional: Negative for chills and fever. Respiratory: Negative for shortness of breath and wheezing. Cardiovascular: Negative for chest pain and palpitations. Gastrointestinal: Negative for bowel incontinence, nausea and vomiting. Genitourinary: Negative for bladder incontinence and pelvic pain. Musculoskeletal: Positive for back pain. Skin: Negative for rash. Neurological: Negative for tingling, weakness, numbness and paresthesias. Vitals:    10/10/17 1047   BP: 184/76   Pulse: 78   Resp: 16   Temp: 97.8 °F (36.6 °C)   SpO2: 96%   Weight: 92.5 kg (204 lb)   Height: 5' 2\" (1.575 m)       Physical Exam   Constitutional: She appears well-developed and well-nourished. No distress. Musculoskeletal:        Lumbar back: She exhibits decreased range of motion, tenderness, pain and spasm. She exhibits no bony tenderness, no swelling, no edema, no deformity and no laceration. Back:    Neurological: She is alert. Skin: She is not diaphoretic. Psychiatric: She has a normal mood and affect. Her behavior is normal. Judgment and thought content normal.   Nursing note and vitals reviewed. MDM     Differential Diagnosis; Clinical Impression; Plan:     CLINICAL IMPRESSION:  Acute right-sided low back pain with right-sided sciatica  (primary encounter diagnosis)    Plan:  1. Pred jossie  2. Robaxin prn  3. PCP if no improvement  Risk of Significant Complications, Morbidity, and/or Mortality:   Presenting problems: Moderate  Management options:   Moderate  Progress:   Patient progress:  Stable      Procedures

## 2017-10-10 NOTE — DISCHARGE INSTRUCTIONS
Sciatica: Exercises  Your Care Instructions  Here are some examples of typical rehabilitation exercises for your condition. Start each exercise slowly. Ease off the exercise if you start to have pain. Your doctor or physical therapist will tell you when you can start these exercises and which ones will work best for you. When you are not being active, find a comfortable position for rest. Some people are comfortable on the floor or a medium-firm bed with a small pillow under their head and another under their knees. Some people prefer to lie on their side with a pillow between their knees. Don't stay in one position for too long. Take short walks (10 to 20 minutes) every 2 to 3 hours. Avoid slopes, hills, and stairs until you feel better. Walk only distances you can manage without pain, especially leg pain. How to do the exercises  Back stretches    1. Get down on your hands and knees on the floor. 2. Relax your head and allow it to droop. Round your back up toward the ceiling until you feel a nice stretch in your upper, middle, and lower back. Hold this stretch for as long as it feels comfortable, or about 15 to 30 seconds. 3. Return to the starting position with a flat back while you are on your hands and knees. 4. Let your back sway by pressing your stomach toward the floor. Lift your buttocks toward the ceiling. 5. Hold this position for 15 to 30 seconds. 6. Repeat 2 to 4 times. Follow-up care is a key part of your treatment and safety. Be sure to make and go to all appointments, and call your doctor if you are having problems. It's also a good idea to know your test results and keep a list of the medicines you take. Where can you learn more? Go to http://radha-tracy.info/. Enter R287 in the search box to learn more about \"Sciatica: Exercises. \"  Current as of: March 21, 2017  Content Version: 11.3  © 6453-0383 [employer].  Care instructions adapted under license by [address] (which disclaims liability or warranty for this information). If you have questions about a medical condition or this instruction, always ask your healthcare professional. [address] any warranty or liability for your use of this information. Sciatica: Care Instructions  Your Care Instructions    Sciatica (say \"lzq-LF-yw-kuh\") is an irritation of one of the sciatic nerves, which come from the spinal cord in the lower back. The sciatic nerves and their branches extend down through the buttock to the foot. Sciatica can develop when an injured disc in the back presses against a spinal nerve root. Its main symptom is pain, numbness, or weakness that is often worse in the leg or foot than in the back. Sciatica often will improve and go away with time. Early treatment usually includes medicines and exercises to relieve pain. Follow-up care is a key part of your treatment and safety. Be sure to make and go to all appointments, and call your doctor if you are having problems. It's also a good idea to know your test results and keep a list of the medicines you take. How can you care for yourself at home? · Take pain medicines exactly as directed. ¨ If the doctor gave you a prescription medicine for pain, take it as prescribed. ¨ If you are not taking a prescription pain medicine, ask your doctor if you can take an over-the-counter medicine. · Use heat or ice to relieve pain. ¨ To apply heat, put a warm water bottle, heating pad set on low, or warm cloth on your back. Do not go to sleep with a heating pad on your skin. ¨ To use ice, put ice or a cold pack on the area for 10 to 20 minutes at a time. Put a thin cloth between the ice and your skin. · Avoid sitting if possible, unless it feels better than standing. · Alternate lying down with short walks. Increase your walking distance as you are able to without making your symptoms worse.   · Do not do anything that makes your symptoms worse. When should you call for help? Call 911 anytime you think you may need emergency care. For example, call if:  · You are unable to move a leg at all. Call your doctor now or seek immediate medical care if:  · You have new or worse symptoms in your legs or buttocks. Symptoms may include:  ¨ Numbness or tingling. ¨ Weakness. ¨ Pain. · You lose bladder or bowel control. Watch closely for changes in your health, and be sure to contact your doctor if:  · You are not getting better as expected. Where can you learn more? Go to http://radha-tracy.info/. Enter [phone] in the search box to learn more about \"Sciatica: Care Instructions. \"  Current as of: March 21, 2017  Content Version: 11.3  © 7815-9275 [employer]. Care instructions adapted under license by [employer] (which disclaims liability or warranty for this information). If you have questions about a medical condition or this instruction, always ask your healthcare professional. [place] [address] any warranty or liability for your use of this information.

## 2017-10-11 ENCOUNTER — TELEPHONE (OUTPATIENT)
Dept: CARDIOLOGY CLINIC | Age: 77
End: 2017-10-11

## 2017-10-11 NOTE — TELEPHONE ENCOUNTER
Verified patient with two identifiers. Pt informed of monitor results. She has a stress test scheduled for Oct 12 and a follow up on Oct 24 to discuss all test results with Dr Torey Mari. Pt verbalized understanding.

## 2017-10-11 NOTE — TELEPHONE ENCOUNTER
----- Message from [address] Milvia Perez MD sent at 10/10/2017 12:36 PM EDT -----  holter shows afib. F/u to discus. . thx.

## 2017-10-12 ENCOUNTER — CLINICAL SUPPORT (OUTPATIENT)
Dept: CARDIOLOGY CLINIC | Age: 77
End: 2017-10-12

## 2017-10-12 DIAGNOSIS — R00.2 INTERMITTENT PALPITATIONS: ICD-10-CM

## 2017-10-12 DIAGNOSIS — R06.09 DOE (DYSPNEA ON EXERTION): ICD-10-CM

## 2017-10-12 DIAGNOSIS — I10 ESSENTIAL HYPERTENSION: ICD-10-CM

## 2017-10-12 DIAGNOSIS — E78.2 MIXED HYPERLIPIDEMIA: ICD-10-CM

## 2017-10-13 NOTE — PROGRESS NOTES
Spoke with patient regarding HOLTER/STRESS test results. Verified patient with two identifiers. Pt is taking aspirin daily, has an appt on 10 2/4 with Dr. Georgia Snell.

## 2017-10-13 NOTE — PROGRESS NOTES
Please call patient stress test normal, holter abnormal needs follow up appt to discuss treatment. Confirm she is taking ASA daily.

## 2017-10-14 ENCOUNTER — APPOINTMENT (OUTPATIENT)
Dept: GENERAL RADIOLOGY | Age: 77
End: 2017-10-14
Attending: EMERGENCY MEDICINE
Payer: MEDICARE

## 2017-10-14 ENCOUNTER — HOSPITAL ENCOUNTER (EMERGENCY)
Age: 77
Discharge: HOME | End: 2017-10-14
Attending: EMERGENCY MEDICINE
Payer: MEDICARE

## 2017-10-14 VITALS
BODY MASS INDEX: 36.21 KG/M2 | OXYGEN SATURATION: 96 % | SYSTOLIC BLOOD PRESSURE: 137 MMHG | WEIGHT: 198 LBS | HEART RATE: 77 BPM | DIASTOLIC BLOOD PRESSURE: 68 MMHG | RESPIRATION RATE: 19 BRPM | TEMPERATURE: 98.3 F

## 2017-10-14 DIAGNOSIS — I48.92 ATRIAL FLUTTER WITH RAPID VENTRICULAR RESPONSE (HCC): Primary | ICD-10-CM

## 2017-10-14 LAB
ALBUMIN SERPL-MCNC: 4.1 G/DL (ref 3.5–5)
ALBUMIN/GLOB SERPL: 1 {RATIO} (ref 1.1–2.2)
ALP SERPL-CCNC: 56 U/L (ref 45–117)
ALT SERPL-CCNC: 34 U/L (ref 12–78)
ANION GAP SERPL CALC-SCNC: 7 MMOL/L (ref 5–15)
AST SERPL-CCNC: 26 U/L (ref 15–37)
BASOPHILS # BLD: 0 K/UL (ref 0–0.1)
BASOPHILS NFR BLD: 1 % (ref 0–1)
BILIRUB SERPL-MCNC: 0.3 MG/DL (ref 0.2–1)
BUN SERPL-MCNC: 15 MG/DL (ref 6–20)
BUN/CREAT SERPL: 20 (ref 12–20)
CALCIUM SERPL-MCNC: 9.5 MG/DL (ref 8.5–10.1)
CHLORIDE SERPL-SCNC: 100 MMOL/L (ref 97–108)
CK MB CFR SERPL CALC: 0.8 % (ref 0–2.5)
CK MB SERPL-MCNC: 2.8 NG/ML (ref 5–25)
CK SERPL-CCNC: 340 U/L (ref 26–192)
CO2 SERPL-SCNC: 31 MMOL/L (ref 21–32)
CREAT SERPL-MCNC: 0.75 MG/DL (ref 0.55–1.02)
D DIMER PPP FEU-MCNC: 0.21 MG/L FEU (ref 0–0.65)
EOSINOPHIL # BLD: 0.1 K/UL (ref 0–0.4)
EOSINOPHIL NFR BLD: 3 % (ref 0–7)
ERYTHROCYTE [DISTWIDTH] IN BLOOD BY AUTOMATED COUNT: 14.2 % (ref 11.5–14.5)
GLOBULIN SER CALC-MCNC: 4.3 G/DL (ref 2–4)
GLUCOSE SERPL-MCNC: 113 MG/DL (ref 65–100)
HCT VFR BLD AUTO: 39.3 % (ref 35–47)
HGB BLD-MCNC: 12.8 G/DL (ref 11.5–16)
LYMPHOCYTES # BLD: 1.9 K/UL (ref 0.8–3.5)
LYMPHOCYTES NFR BLD: 46 % (ref 12–49)
MAGNESIUM SERPL-MCNC: 2.1 MG/DL (ref 1.6–2.4)
MCH RBC QN AUTO: 27.5 PG (ref 26–34)
MCHC RBC AUTO-ENTMCNC: 32.6 G/DL (ref 30–36.5)
MCV RBC AUTO: 84.3 FL (ref 80–99)
MONOCYTES # BLD: 0.2 K/UL (ref 0–1)
MONOCYTES NFR BLD: 5 % (ref 5–13)
NEUTS SEG # BLD: 1.9 K/UL (ref 1.8–8)
NEUTS SEG NFR BLD: 45 % (ref 32–75)
PLATELET # BLD AUTO: 306 K/UL (ref 150–400)
POTASSIUM SERPL-SCNC: 3.9 MMOL/L (ref 3.5–5.1)
PROT SERPL-MCNC: 8.4 G/DL (ref 6.4–8.2)
RBC # BLD AUTO: 4.66 M/UL (ref 3.8–5.2)
SODIUM SERPL-SCNC: 138 MMOL/L (ref 136–145)
TROPONIN I SERPL-MCNC: <0.04 NG/ML
TSH SERPL DL<=0.05 MIU/L-ACNC: 1.78 UIU/ML (ref 0.36–3.74)
WBC # BLD AUTO: 4.1 K/UL (ref 3.6–11)

## 2017-10-14 PROCEDURE — 82550 ASSAY OF CK (CPK): CPT | Performed by: EMERGENCY MEDICINE

## 2017-10-14 PROCEDURE — 84484 ASSAY OF TROPONIN QUANT: CPT | Performed by: EMERGENCY MEDICINE

## 2017-10-14 PROCEDURE — 93005 ELECTROCARDIOGRAM TRACING: CPT

## 2017-10-14 PROCEDURE — 96360 HYDRATION IV INFUSION INIT: CPT

## 2017-10-14 PROCEDURE — 96361 HYDRATE IV INFUSION ADD-ON: CPT

## 2017-10-14 PROCEDURE — 82553 CREATINE MB FRACTION: CPT | Performed by: EMERGENCY MEDICINE

## 2017-10-14 PROCEDURE — 85025 COMPLETE CBC W/AUTO DIFF WBC: CPT | Performed by: EMERGENCY MEDICINE

## 2017-10-14 PROCEDURE — 84443 ASSAY THYROID STIM HORMONE: CPT | Performed by: EMERGENCY MEDICINE

## 2017-10-14 PROCEDURE — 99285 EMERGENCY DEPT VISIT HI MDM: CPT

## 2017-10-14 PROCEDURE — 80053 COMPREHEN METABOLIC PANEL: CPT | Performed by: EMERGENCY MEDICINE

## 2017-10-14 PROCEDURE — 85379 FIBRIN DEGRADATION QUANT: CPT | Performed by: EMERGENCY MEDICINE

## 2017-10-14 PROCEDURE — 74011250636 HC RX REV CODE- 250/636: Performed by: EMERGENCY MEDICINE

## 2017-10-14 PROCEDURE — 74011250637 HC RX REV CODE- 250/637: Performed by: EMERGENCY MEDICINE

## 2017-10-14 PROCEDURE — 83735 ASSAY OF MAGNESIUM: CPT | Performed by: EMERGENCY MEDICINE

## 2017-10-14 PROCEDURE — 71010 XR CHEST PORT: CPT

## 2017-10-14 PROCEDURE — 36415 COLL VENOUS BLD VENIPUNCTURE: CPT | Performed by: EMERGENCY MEDICINE

## 2017-10-14 RX ORDER — DILTIAZEM HYDROCHLORIDE 5 MG/ML
20 INJECTION INTRAVENOUS
Status: DISCONTINUED | OUTPATIENT
Start: 2017-10-14 | End: 2017-10-14 | Stop reason: HOSPADM

## 2017-10-14 RX ORDER — METOPROLOL SUCCINATE 50 MG/1
25 TABLET, EXTENDED RELEASE ORAL
Status: COMPLETED | OUTPATIENT
Start: 2017-10-14 | End: 2017-10-14

## 2017-10-14 RX ORDER — METOPROLOL SUCCINATE 50 MG/1
50 TABLET, EXTENDED RELEASE ORAL DAILY
Qty: 30 TAB | Refills: 0 | Status: SHIPPED | OUTPATIENT
Start: 2017-10-14 | End: 2017-11-27 | Stop reason: SDUPTHER

## 2017-10-14 RX ADMIN — METOPROLOL SUCCINATE 25 MG: 50 TABLET, EXTENDED RELEASE ORAL at 12:52

## 2017-10-14 RX ADMIN — SODIUM CHLORIDE 500 ML: 900 INJECTION, SOLUTION INTRAVENOUS at 11:43

## 2017-10-14 NOTE — ED NOTES
Once Dr. Danuta Locke entered the patients room, the patient converted from Atrial Flutter to Sinus Tachycardia. Per Dr. Danuta Locke, hold Diltiazem and will watch patient. Patient is currently stable and feeling a little better.

## 2017-10-14 NOTE — ED PROVIDER NOTES
[facility]  EMERGENCY DEPARTMENT HISTORY AND PHYSICAL EXAM       Date of Service: 10/14/2017   Patient Name: Ivan Roberson   YOB: 1940  Medical Record Number: [id number]    History of Presenting Illness     No chief complaint on file. History Provided By:  patient    Additional History:   Ivan Roberson is a 78 y.o. female with PMhx significant for Asthma, HLD, DM, and HTN who presents ambulatory to the ED with cc of palpitations since this morning. She endorses associated SOB. She notes that her palpitations are exacerbated with movement. She states that she has had similar symptoms in the past which have resolved on their own, but her current symptoms are much more severe in quality. Pt explains that she is being regularly followed by Dr. Maury Clark. Leidy Gamez and had a stress test performed along with Holter monitor testing in his office a few weeks ago. She notes that she has a follow-up appointment with Dr. Leidy Gamez on 10/24/17. Pt specifically denies cough, cold symptoms, nor Hx of blood clots or thyroid issues. Social Hx: +(former) Tobacco, - EtOH, - Illicit Drugs    There are no other complaints, changes or physical findings at this time.     Primary Care Provider: Benny Quinonez MD   Specialist: Hamzah Aldridge MD    Past History     Past Medical History:   Past Medical History:   Diagnosis Date    Abdominal mass, left upper quadrant 6/13/2017    Abnormal finding on MRI of brain 3/16/2015    evaluated by neurologist and no findings    Acute pharyngitis 5/26/2016    Anemia NEC     Arthralgia of right hip 4/25/2016    Arthritis 2/18/2010    Asthma 2/18/2010    Cataract 9/24/2014    [facility]     Elevated LFTs 4/16/2014    DAVID (generalized anxiety disorder) 1/22/2015    GERD (gastroesophageal reflux disease) 4/7/2014    Hammer toe of right foot 9/29/2014    Headache(784.0) 5/30/8981    Helicobacter pylori (H. 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 ([address] 9/29/2014    Preop examination 9/24/2014    Right foot injury 5/2/2017    Risk for falls 4/16/2014    Screening for breast cancer 9/23/2014    Screening for depression 4/16/2014    Shoulder pain, left 3/18/2014    Spondylitis, cervical ([address] 4/5/2010    Tinea pedis 6/3/2015    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 straightened colon out    HX ROTATOR CUFF REPAIR  2009    left    OPEN REPAIR CLAVICLE FRACTURE  2009        Family History:   Family History   Problem Relation Age of Onset    Cancer Mother      mycosis fungoives    Stroke Father     Cancer Sister      one sister with breast cancer    Breast Cancer Sister     Cancer Paternal Aunt      2 paternal aunts with breast cancer    Thyroid Cancer Neg Hx         Social History:   Social History   Substance Use Topics    Smoking status: Former Smoker     Quit date: 6/14/1988    Smokeless tobacco: Never Used    Alcohol use No        Allergies:    Allergies   Allergen Reactions    Latex Rash    Amoxil [Amoxicillin] Itching     rash    Levaquin [Levofloxacin] Other (comments)     Dizziness      Percocet [Oxycodone-Acetaminophen] Nausea and Vomiting    Tetanus Vaccines And Toxoid Swelling    Tetracycline Hcl Rash         Review of Systems   Review of Systems   Constitutional: Negative. Negative for appetite change, chills, fatigue and fever. HENT: Negative. Negative for congestion, rhinorrhea, sinus pressure and sore throat. Eyes: Negative. Respiratory: Positive for shortness of breath. Negative for cough, choking, chest tightness and wheezing. Cardiovascular: Positive for palpitations. Negative for chest pain and leg swelling. Gastrointestinal: Negative for abdominal pain, constipation, diarrhea, nausea and vomiting. Endocrine: Negative. Genitourinary: Negative. Negative for difficulty urinating, dysuria, flank pain and urgency. Musculoskeletal: Negative. Skin: Negative. Neurological: Negative. Negative for dizziness, speech difficulty, weakness, light-headedness, numbness and headaches. Psychiatric/Behavioral: Negative. All other systems reviewed and are negative. Physical Exam  Physical Exam   Constitutional: She is oriented to person, place, and time. She appears well-developed and well-nourished. No distress. HENT:   Head: Normocephalic and atraumatic. Mouth/Throat: Oropharynx is clear and moist. No oropharyngeal exudate. Eyes: Conjunctivae and EOM are normal. Pupils are equal, round, and reactive to light. Neck: Normal range of motion. Neck supple. No JVD present. No tracheal deviation present. Cardiovascular: Normal heart sounds and intact distal pulses. No murmur heard. Tachycardic, irregular     Pulmonary/Chest: Effort normal and breath sounds normal. No stridor. No respiratory distress. She has no wheezes. She has no rales. She exhibits no tenderness. Abdominal: Soft. She exhibits no distension. There is no tenderness. There is no rebound and no guarding. Musculoskeletal: Normal range of motion. She exhibits no edema or tenderness. Neurological: She is alert and oriented to person, place, and time. No cranial nerve deficit.    No gross motor or sensory deficits    Skin: Skin is warm and dry. She is not diaphoretic. Psychiatric: She has a normal mood and affect. Her behavior is normal.   Nursing note and vitals reviewed. Medical Decision Making   I am the first provider for this patient. I reviewed the vital signs, available nursing notes, past medical history, past surgical history, family history and social history. Old Medical Records: Old medical records. Provider Notes:   DDx: A Flutter, electrolyte abnormality, thyroid dysfunction, PE.    ED Course:  11:26 AM   Initial assessment performed. The patients presenting problems have been discussed, and they are in agreement with the care plan formulated and outlined with them. I have encouraged them to ask questions as they arise throughout their visit. CONSULT NOTE:  12:01 PM  Gerber Snyder DO spoke with Arron Chery MD  Specialty: Cardiology  Discussed patient's hx, disposition, and available diagnostic and imaging results. Reviewed care plans. Consultant agrees with plans as outlined. Dr. Trinity Tran recommends to discharge the pt on Cardizem daily and follow up with cardiology. Written by Evonne Ayala ED Scribe, as dictated by Gerber Snyder DO. Progress Notes:  PROGRESS NOTE:  12:36 PM  Pt was able to ambulate through the ER without reoccurrence of A flutter. Will Increase metoprolol dose as discussed with Dr. Jo Ann Roger. Written by Evonne Ayala ED Scribe, as dictated by Gerber Snyder DO.     Diagnostic Study Results     Labs -      Recent Results (from the past 12 hour(s))   EKG, 12 LEAD, INITIAL    Collection Time: 10/14/17 10:41 AM   Result Value Ref Range    Ventricular Rate 135 BPM    Atrial Rate 249 BPM    QRS Duration 74 ms    Q-T Interval 322 ms    QTC Calculation (Bezet) 483 ms    Calculated P Axis 69 degrees    Calculated R Axis -50 degrees    Calculated T Axis 17 degrees    Diagnosis       Atrial flutter with variable AV block  Left anterior fascicular block  Nonspecific ST abnormality  Abnormal ECG  When compared with ECG of 02-OCT-2017 11:01,  Atrial flutter has replaced Sinus rhythm  Vent. rate has increased BY  61 BPM  ST more depressed in Inferior leads  ST now depressed in Lateral leads  T wave inversion no longer evident in Inferior leads     CBC WITH AUTOMATED DIFF    Collection Time: 10/14/17 10:53 AM   Result Value Ref Range    WBC 4.1 3.6 - 11.0 K/uL    RBC 4.66 3.80 - 5.20 M/uL    HGB 12.8 11.5 - 16.0 g/dL    HCT 39.3 35.0 - 47.0 %    MCV 84.3 80.0 - 99.0 FL    MCH 27.5 26.0 - 34.0 PG    MCHC 32.6 30.0 - 36.5 g/dL    RDW 14.2 11.5 - 14.5 %    PLATELET 182 615 - 175 K/uL    NEUTROPHILS 45 32 - 75 %    LYMPHOCYTES 46 12 - 49 %    MONOCYTES 5 5 - 13 %    EOSINOPHILS 3 0 - 7 %    BASOPHILS 1 0 - 1 %    ABS. NEUTROPHILS 1.9 1.8 - 8.0 K/UL    ABS. LYMPHOCYTES 1.9 0.8 - 3.5 K/UL    ABS. MONOCYTES 0.2 0.0 - 1.0 K/UL    ABS. EOSINOPHILS 0.1 0.0 - 0.4 K/UL    ABS. BASOPHILS 0.0 0.0 - 0.1 K/UL   METABOLIC PANEL, COMPREHENSIVE    Collection Time: 10/14/17 10:53 AM   Result Value Ref Range    Sodium 138 136 - 145 mmol/L    Potassium 3.9 3.5 - 5.1 mmol/L    Chloride 100 97 - 108 mmol/L    CO2 31 21 - 32 mmol/L    Anion gap 7 5 - 15 mmol/L    Glucose 113 (H) 65 - 100 mg/dL    BUN 15 6 - 20 MG/DL    Creatinine 0.75 0.55 - 1.02 MG/DL    BUN/Creatinine ratio 20 12 - 20      GFR est AA >60 >60 ml/min/1.73m2    GFR est non-AA >60 >60 ml/min/1.73m2    Calcium 9.5 8.5 - 10.1 MG/DL    Bilirubin, total 0.3 0.2 - 1.0 MG/DL    ALT (SGPT) 34 12 - 78 U/L    AST (SGOT) 26 15 - 37 U/L    Alk.  phosphatase 56 45 - 117 U/L    Protein, total 8.4 (H) 6.4 - 8.2 g/dL    Albumin 4.1 3.5 - 5.0 g/dL    Globulin 4.3 (H) 2.0 - 4.0 g/dL    A-G Ratio 1.0 (L) 1.1 - 2.2     CK W/ REFLX CKMB    Collection Time: 10/14/17 10:53 AM   Result Value Ref Range     (H) 26 - 192 U/L   TROPONIN I    Collection Time: 10/14/17 10:53 AM   Result Value Ref Range    Troponin-I, Qt. <0.04 <0.05 ng/mL   MAGNESIUM    Collection Time: 10/14/17 10:53 AM   Result Value Ref Range    Magnesium 2.1 1.6 - 2.4 mg/dL   TSH 3RD GENERATION    Collection Time: 10/14/17 10:53 AM   Result Value Ref Range    TSH 1.78 0.36 - 3.74 uIU/mL   CK-MB,QUANT. Collection Time: 10/14/17 10:53 AM   Result Value Ref Range    CK - MB 2.8 <3.6 NG/ML    CK-MB Index 0.8 0 - 2.5     D DIMER    Collection Time: 10/14/17 10:54 AM   Result Value Ref Range    D-dimer 0.21 0.00 - 0.65 mg/L FEU       Radiologic Studies -  The following have been ordered and reviewed:  XR CHEST PORT   Final Result   Study Result      Chest portable AP     History: Chest pain. Atrial flutter     Comparison: 10/2/2017     Findings: The lungs are well expanded. No focal consolidation, pleural  effusion, or pneumothorax. The heart is on the upper limits of normal for size,  but unchanged. No significant edema. There is postsurgical deformity of the  distal left clavicle.     IMPRESSION  Impression:  No acute cardiopulmonary process. Vital Signs-Reviewed the patient's vital signs.    Patient Vitals for the past 12 hrs:   Temp Pulse Resp BP SpO2   10/14/17 1345 - 77 19 137/68 96 %   10/14/17 1330 - 77 18 137/73 95 %   10/14/17 1315 - 76 (!) 31 130/67 96 %   10/14/17 1252 - 83 - 149/72 -   10/14/17 1245 - 88 21 149/72 96 %   10/14/17 1235 - (!) 110 - - -   10/14/17 1233 - 88 16 144/74 96 %   10/14/17 1232 - 94 - - -   10/14/17 1200 - 92 19 136/69 95 %   10/14/17 1145 - 98 21 146/81 94 %   10/14/17 1130 - (!) 104 20 128/66 93 %   10/14/17 1115 - (!) 102 21 139/75 96 %   10/14/17 1114 - (!) 106 22 - 97 %   10/14/17 1113 - (!) 104 24 - 97 %   10/14/17 1111 - (!) 133 16 - 96 %   10/14/17 1108 - (!) 143 15 - 97 %   10/14/17 1048 98.3 °F (36.8 °C) (!) 129 20 (!) 152/91 95 %       Medications Given in the ED:  Medications   dilTIAZem (CARDIZEM) injection 20 mg (0 mg IntraVENous Held 10/14/17 1059)   dilTIAZem (CARDIZEM) 100 mg in 0.9% sodium chloride (MBP/ADV) 100 mL infusion (0 mg/hr IntraVENous Held 10/14/17 1101)   sodium chloride 0.9 % bolus infusion 500 mL (0 mL IntraVENous IV Completed 10/14/17 1334)   metoprolol succinate (TOPROL-XL) XL tablet 25 mg (25 mg Oral Given 10/14/17 1252)       Pulse Oximetry Analysis - Normal 98% on room air     Cardiac Monitor:   Rate: 107  Rhythm: Atrial Flutter    EKG interpretation: (Preliminary)  Atrial flutter with variable AV block, LAFB, leftward axis, flutter waves, normal qrs, NSST, Evlyn Ast, DO    Pt arrive in A flutter w/ RVR, pt spontaneously converted here in the ED to Sinus. Pt observed and ambulated in the ED without re-occurrence, Evlyn Ast, DO      Diagnosis   Clinical Impression:   1. Atrial flutter with rapid ventricular response ([address]         Plan:  1:   Follow-up Information     Follow up With Details Comments [place] [id number] [place], [place]   [address], [place]   [address]  [phone]          2:   Discharge Medication List as of 10/14/2017  1:28 PM      CONTINUE these medications which have CHANGED    Details   metoprolol succinate (TOPROL XL) 50 mg XL tablet Take 1 Tab by mouth daily. , Print, Disp-30 Tab, R-0         CONTINUE these medications which have NOT CHANGED    Details   methocarbamol (ROBAXIN) 500 mg tablet Take 1 Tab by mouth three (3) times daily as needed (muscle spasm). , Normal, Disp-20 Tab, R-0      ALPRAZolam (XANAX) 0.25 mg tablet Take 1 Tab by mouth every eight (8) hours as needed for Anxiety. Max Daily Amount: 0.75 mg., Print, Disp-20 Tab, R-0      hydroCHLOROthiazide (HYDRODIURIL) 25 mg tablet Take 1 Tab by mouth daily. , Normal, Disp-90 Tab, R-1      butalbital-acetaminophen (PHRENILIN)  mg tablet Take 1 Tab by mouth every six (6) hours as needed., Normal, Disp-30 Tab, R-0      albuterol (PROVENTIL VENTOLIN) 2.5 mg /3 mL (0.083 %) nebulizer solution 3 mL by Nebulization route every four (4) hours as needed for Shortness of Breath. Indications: BRONCHOSPASTIC PULMONARY DISEASE, Normal, Disp-48 Each, R-0      metFORMIN (GLUCOPHAGE) 500 mg tablet TAKE 1 & 1/2 TABLETS BY MOUTH TWICE A DAY WITH MEALS, Historical Med, R-2      rosuvastatin (CRESTOR) 10 mg tablet TAKE 1 TABLET BY MOUTH EVERY DAY, Normal, Disp-90 Tab, R-3      !! glucose blood VI test strips (FREESTYLE INSULINX) strip TEST EVERY DAY AND AS NEEDED, Normal, Disp-100 Strip, R-6      cetirizine (ZYRTEC) 10 mg tablet TAKE 1 TABLET BY MOUTH EVERY DAY, Historical Med, R-3      fluticasone (FLONASE) 50 mcg/actuation nasal spray 2 Sprays by Both Nostrils route as needed for Rhinitis or Allergies. Indications: ALLERGIC RHINITIS, Normal, Disp-1 Bottle, R-11      inhalational spacing device (SPACE CHAMBER PLUS) 1 Each by Does Not Apply route as needed. Indications: USE WITH ALBUTEROL MDI, Normal, Disp-1 Device, R-0      aspirin delayed-release 81 mg tablet Take 1 Tab by mouth daily. , Normal, Disp-30 Tab, R-11      calcium-cholecalciferol, D3, (CALTRATE 600+D) tablet Take 1 Tab by mouth two (2) times a day., Print, Disp-60 Tab, R-11      MULTIVITAMIN PO Take 1 Tab by mouth daily. , Historical Med      Lancets (FREESTYLE LANCETS) Misc Test once daily. (diagnosis code: 250.00), Normal, Disp-1 Package, R-11      Blood-Glucose Meter monitoring kit Once daily before breakfast, Normal, Disp-1 Kit, R-0      !! glucose blood VI test strips (ACCU-CHEK DA) strip ., Normal, Disp-1 Package, R-11      predniSONE (STERAPRED) 5 mg dose pack See administration instruction per 5mg dose pack, Normal, Disp-21 Tab, R-0      levalbuterol (XOPENEX) 1.25 mg/3 mL nebu 3 mL by Nebulization route every six (6) hours as needed. icd 10 J45.902, Normal, Disp-30 Nebule, R-11      ipratropium (ATROVENT) 0.02 % nebulizer solution 2.5 mL by Nebulization route as needed for Wheezing.  One package of Nebules to be used every 4-6hrs prn.  icd 10 J45.902, Normal, Disp-1250 mL, R-11       !! - Potential duplicate medications found. Please discuss with provider. STOP taking these medications       Technetium [id number]-Tetrofosmin 0.23 mg solr Comments:   Reason for Stopping:         regadenoson (REGADENSON) 0.4 mg/5 mL syrg injection Comments:   Reason for Stopping:             Return to ED if Worse    Disposition Note:  1:28 PM  The patient has been re-evaluated and is ready for discharge. Reviewed available results with patient. Counseled patient on diagnosis and care plan. Patient has expressed understanding, and all questions have been answered. Patient agrees with plan and agrees to follow up as recommended, or return to the ED if their symptoms worsen. Discharge instructions have been provided and explained to the patient, along with reasons to return to the ED.  _______________________________   Attestations: This note is prepared by Gasper Gallo, acting as Scribe for Finesse Alvarez, [address] [place] [address], DO: The scribe's documentation has been prepared under my direction and personally reviewed by me in its entirety.  I confirm that the note above accurately reflects all work, treatment, procedures, and medical decision making performed by me.   _______________________________

## 2017-10-14 NOTE — ED NOTES
Ambulated patient around hospital. Patients started HR was 94 and the highest it reached by the end of the ambulation was 110.

## 2017-10-14 NOTE — ED NOTES
Dr. Arti Brito reviewed discharge instructions with the patient. The patient verbalized understanding. All questions and concerns were addressed. The patient is discharged via wheelchair in the care of family members with instructions and prescriptions in hand. Pt is alert and oriented x 4. Respirations are clear and unlabored.

## 2017-10-14 NOTE — DISCHARGE INSTRUCTIONS
Atrial Fibrillation: Care Instructions  Your Care Instructions    Atrial fibrillation is an irregular and often fast heartbeat. Treating this condition is important for several reasons. It can cause blood clots, which can travel from your heart to your brain and cause a stroke. If you have a fast heartbeat, you may feel lightheaded, dizzy, and weak. An irregular heartbeat can also increase your risk for heart failure. Atrial fibrillation is often the result of another heart condition, such as high blood pressure or coronary artery disease. Making changes to improve your heart condition will help you stay healthy and active. Follow-up care is a key part of your treatment and safety. Be sure to make and go to all appointments, and call your doctor if you are having problems. It's also a good idea to know your test results and keep a list of the medicines you take. How can you care for yourself at home? Medicines  · Take your medicines exactly as prescribed. Call your doctor if you think you are having a problem with your medicine. You will get more details on the specific medicines your doctor prescribes. · If your doctor has given you a blood thinner to prevent a stroke, be sure you get instructions about how to take your medicine safely. Blood thinners can cause serious bleeding problems. · Do not take any vitamins, over-the-counter drugs, or herbal products without talking to your doctor first.  Lifestyle changes  · Do not smoke. Smoking can increase your chance of a stroke and heart attack. If you need help quitting, talk to your doctor about stop-smoking programs and medicines. These can increase your chances of quitting for good. · Eat a heart-healthy diet. · Stay at a healthy weight. Lose weight if you need to. · Limit alcohol to 2 drinks a day for men and 1 drink a day for women. Too much alcohol can cause health problems. · Avoid colds and flu. Get a pneumococcal vaccine shot.  If you have had one before, ask your doctor whether you need another dose. Get a flu shot every year. If you must be around people with colds or flu, wash your hands often. Activity  · If your doctor recommends it, get more exercise. Walking is a good choice. Bit by bit, increase the amount you walk every day. Try for at least 30 minutes on most days of the week. You also may want to swim, bike, or do other activities. Your doctor may suggest that you join a cardiac rehabilitation program so that you can have help increasing your physical activity safely. · Start light exercise if your doctor says it is okay. Even a small amount will help you get stronger, have more energy, and manage stress. Walking is an easy way to get exercise. Start out by walking a little more than you did in the hospital. Gradually increase the amount you walk. · When you exercise, watch for signs that your heart is working too hard. You are pushing too hard if you cannot talk while you are exercising. If you become short of breath or dizzy or have chest pain, sit down and rest immediately. · Check your pulse regularly. Place two fingers on the artery at the palm side of your wrist, in line with your thumb. If your heartbeat seems uneven or fast, talk to your doctor. When should you call for help? Call 911 anytime you think you may need emergency care. For example, call if:  · You have symptoms of a heart attack. These may include:  ¨ Chest pain or pressure, or a strange feeling in the chest.  ¨ Sweating. ¨ Shortness of breath. ¨ Nausea or vomiting. ¨ Pain, pressure, or a strange feeling in the back, neck, jaw, or upper belly or in one or both shoulders or arms. ¨ Lightheadedness or sudden weakness. ¨ A fast or irregular heartbeat. After you call 911, the  may tell you to chew 1 adult-strength or 2 to 4 low-dose aspirin. Wait for an ambulance. Do not try to drive yourself. · You have symptoms of a stroke.  These may include:  ¨ Sudden numbness, tingling, weakness, or loss of movement in your face, arm, or leg, especially on only one side of your body. ¨ Sudden vision changes. ¨ Sudden trouble speaking. ¨ Sudden confusion or trouble understanding simple statements. ¨ Sudden problems with walking or balance. ¨ A sudden, severe headache that is different from past headaches. · You passed out (lost consciousness). Call your doctor now or seek immediate medical care if:  · You have new or increased shortness of breath. · You feel dizzy or lightheaded, or you feel like you may faint. · Your heart rate becomes irregular. · You can feel your heart flutter in your chest or skip heartbeats. Tell your doctor if these symptoms are new or worse. Watch closely for changes in your health, and be sure to contact your doctor if you have any problems. Where can you learn more? Go to http://radha-tracy.info/. Enter U020 in the search box to learn more about \"Atrial Fibrillation: Care Instructions. \"  Current as of: September 21, 2016  Content Version: 11.3  © 1159-7491 [employer]. Care instructions adapted under license by [employer] (which disclaims liability or warranty for this information). If you have questions about a medical condition or this instruction, always ask your healthcare professional. [address] any warranty or liability for your use of this information.

## 2017-10-15 LAB
ATRIAL RATE: 249 BPM
CALCULATED P AXIS, ECG09: 69 DEGREES
CALCULATED R AXIS, ECG10: -50 DEGREES
CALCULATED T AXIS, ECG11: 17 DEGREES
DIAGNOSIS, 93000: NORMAL
Q-T INTERVAL, ECG07: 322 MS
QRS DURATION, ECG06: 74 MS
QTC CALCULATION (BEZET), ECG08: 483 MS
VENTRICULAR RATE, ECG03: 135 BPM

## 2017-10-17 ENCOUNTER — PATIENT OUTREACH (OUTPATIENT)
Dept: FAMILY MEDICINE CLINIC | Age: 77
End: 2017-10-17

## 2017-10-17 NOTE — PROGRESS NOTES
Tg Rosario is a 78 y.o. female   This patient was received as a referral from [address] [place] [address]            16       Total Score        3 Has Seen PCP in Last 6 Months (Yes=3, No=0)    13 Charlson Comorbidity Score (Age + Comorbid Conditions)        Criteria that do not apply:    . Living with Significant Other. Assisted Living. LTAC. SNF. or   Rehab    Patient Length of Stay (>5 days = 3)    IP Visits Last 12 Months (1-3=4, 4=9, >4=11)    Pt. Coverage (Medicare=5 , Medicaid, or Self-Pay=4)        Summary of patients top three problems:     Problem 1: Atrial flutter      Problem 2: Sciatica pain     Problem 3: DM    Patient's challenges to self management identified:  lack of knowledge about disease, medication management and utilization of services  Patients motivational level on a scale of 0-10: 8  Medication Management:  good adherence and poor understanding  Advance Care Planning:   Patient was offered the opportunity to discuss advance care planning:  yes     Does patient have an Advance Directive:  yes   If no, did you provide information on Advance Care Planning? NA     [employer], Referrals, and Durable Medical Equipment: Patient has handicapped grandson and daughter that assist, she is able to get out and aboutn on her own \"I don't like to sit still\", has an appt with Addi Hong in approx 2 weeks, is going to double up on her metoprolol as instructed by the ED until then, is now aware that there is a 24 hour on call provider through her cardiolgist's office. She is to see Dr. Thiago Valencia tomorrow in hopes of getting a cortisone shot for her sciatica pain. Follow up appointments:  Tomorrow with PCP, 2 weeks with cardiology   Goals      Reduce ED Utilization            Patient will not return to the ED for cardiac-related problems within 30 days. Patient verbalized understanding of all information discussed.  Patient has this Nurse Navigators contact information for any further questions, concerns, or needs.

## 2017-10-18 ENCOUNTER — HOSPITAL ENCOUNTER (OUTPATIENT)
Dept: LAB | Age: 77
Discharge: HOME | End: 2017-10-18
Payer: MEDICARE

## 2017-10-18 ENCOUNTER — OFFICE VISIT (OUTPATIENT)
Dept: FAMILY MEDICINE CLINIC | Age: 77
End: 2017-10-18

## 2017-10-18 VITALS
SYSTOLIC BLOOD PRESSURE: 133 MMHG | DIASTOLIC BLOOD PRESSURE: 58 MMHG | TEMPERATURE: 97.5 F | HEART RATE: 69 BPM | BODY MASS INDEX: 37.28 KG/M2 | RESPIRATION RATE: 14 BRPM | HEIGHT: 62 IN | WEIGHT: 202.6 LBS | OXYGEN SATURATION: 97 %

## 2017-10-18 DIAGNOSIS — E11.9 DIABETES MELLITUS WITHOUT COMPLICATION (HCC): Primary | ICD-10-CM

## 2017-10-18 DIAGNOSIS — Z23 ENCOUNTER FOR IMMUNIZATION: ICD-10-CM

## 2017-10-18 DIAGNOSIS — M25.551 BILATERAL HIP PAIN: ICD-10-CM

## 2017-10-18 DIAGNOSIS — G43.119 INTRACTABLE MIGRAINE WITH AURA WITHOUT STATUS MIGRAINOSUS: ICD-10-CM

## 2017-10-18 DIAGNOSIS — M25.552 BILATERAL HIP PAIN: ICD-10-CM

## 2017-10-18 DIAGNOSIS — I10 ESSENTIAL HYPERTENSION: ICD-10-CM

## 2017-10-18 LAB — HBA1C MFR BLD HPLC: 6.4 %

## 2017-10-18 PROCEDURE — 80061 LIPID PANEL: CPT

## 2017-10-18 PROCEDURE — 36415 COLL VENOUS BLD VENIPUNCTURE: CPT

## 2017-10-18 RX ORDER — METOPROLOL SUCCINATE 25 MG/1
TABLET, EXTENDED RELEASE ORAL
Refills: 2 | COMMUNITY
Start: 2017-08-28 | End: 2017-10-18 | Stop reason: ALTCHOICE

## 2017-10-18 RX ORDER — HYDROCHLOROTHIAZIDE 25 MG/1
25 TABLET ORAL DAILY
Qty: 90 TAB | Refills: 1 | Status: SHIPPED | OUTPATIENT
Start: 2017-10-18 | End: 2018-10-07 | Stop reason: SDUPTHER

## 2017-10-18 RX ORDER — PANTOPRAZOLE SODIUM 40 MG/1
TABLET, DELAYED RELEASE ORAL
Refills: 4 | COMMUNITY
Start: 2017-10-02 | End: 2018-03-06

## 2017-10-18 RX ORDER — CETIRIZINE HCL 10 MG
TABLET ORAL
Qty: 90 TAB | Refills: 3 | Status: CANCELLED | OUTPATIENT
Start: 2017-10-18

## 2017-10-18 RX ORDER — METFORMIN HYDROCHLORIDE 500 MG/1
TABLET ORAL
Qty: 30 TAB | Refills: 3 | Status: SHIPPED | OUTPATIENT
Start: 2017-10-18 | End: 2017-10-20 | Stop reason: SDUPTHER

## 2017-10-18 RX ORDER — HYDROCODONE BITARTRATE AND ACETAMINOPHEN 5; 325 MG/1; MG/1
1 TABLET ORAL
Qty: 40 TAB | Refills: 0 | Status: SHIPPED | OUTPATIENT
Start: 2017-10-18 | End: 2018-03-06

## 2017-10-18 RX ORDER — ROSUVASTATIN CALCIUM 20 MG/1
TABLET, COATED ORAL
Qty: 90 TAB | Refills: 1 | Status: SHIPPED | OUTPATIENT
Start: 2017-10-18 | End: 2018-03-05 | Stop reason: SDUPTHER

## 2017-10-18 NOTE — MR AVS SNAPSHOT
Visit Information Date & Time Provider Department Dept. Phone Encounter #  
 10/18/2017  8:30 AM Lety Hernandez MD [facility] [phone] [id number] Follow-up Instructions Return if symptoms worsen or fail to improve. Your Appointments 10/24/2017 11:30 AM  
RESULTS/REVIEW with Lorenzo Mead MD  
[facility]) Appt Note: abnormal holter per dr Cristine Chavez [address] [facility]  
[phone] [address] [facility]  
  
    
 12/4/2017  9:30 AM  
New Patient with Kortney Quinones MD  
[facility]) Appt Note: establish  
 [address] [facility] [address]  
[address] [facility] [place] [address]  
  
    
 3/1/2018  9:30 AM  
ROUTINE CARE with Eliz Whitlock MD  
[facility]) Appt Note: 1yr f/u thyroid cp0.00  
 [address] [place] [address]  
[address] [facility] [address] [address] Upcoming Health Maintenance Date Due INFLUENZA AGE 9 TO ADULT 8/1/2017 HEMOGLOBIN A1C Q6M 11/2/2017 FOOT EXAM Q1 1/25/2018 LIPID PANEL Q1 5/2/2018 MEDICARE YEARLY EXAM 5/3/2018 MICROALBUMIN Q1 6/13/2018 EYE EXAM RETINAL OR DILATED Q1 7/17/2018 GLAUCOMA SCREENING Q2Y 7/17/2019 COLONOSCOPY 6/20/2021 DTaP/Tdap/Td series (2 - Td) 5/26/2026 Allergies as of 10/18/2017  Review Complete On: 10/18/2017 By: Lety Hernandez MD  
  
 Severity Noted Reaction Type Reactions Latex  06/14/2011    Rash Amoxil [Amoxicillin]  01/05/2017    Itching  
 rash Levaquin [Levofloxacin]  03/11/2016    Other (comments) Dizziness Percocet [Oxycodone-acetaminophen]  02/18/2010   Intolerance Nausea and Vomiting Tetanus Vaccines And Toxoid  02/18/2010   Systemic Swelling Tetracycline Hcl  02/18/2010   Systemic Rash Current Immunizations  Reviewed on 5/2/2017 Name Date Influenza High Dose Vaccine PF  Incomplete, 11/29/2016, 10/7/2015, 11/3/2014 Pneumococcal Conjugate (PCV-13) 10/7/2015 Pneumococcal Polysaccharide (PPSV-23) 2/5/2013 Zoster Vaccine, Live 9/29/2014 Not reviewed this visit You Were Diagnosed With   
  
 Codes Comments Diabetes mellitus without complication ([facility]    -  Primary ICD-10-CM: E11.9 ICD-9-CM: 250.00 Intractable migraine with aura without status migrainosus     ICD-10-CM: G43.119 ICD-9-CM: 346.01 Essential hypertension     ICD-10-CM: I10 
ICD-9-CM: 401.9 Encounter for immunization     ICD-10-CM: W34 ICD-9-CM: V03.89 Bilateral hip pain     ICD-10-CM: M25.551, M25.552 ICD-9-CM: 719.45 Vitals BP Pulse Temp Resp Height(growth percentile) Weight(growth percentile) 133/58 (BP 1 Location: Left arm, BP Patient Position: At rest) 69 97.5 °F (36.4 °C) (Oral) 14 5' 2\" (1.575 m) 202 lb 9.6 oz (91.9 kg) LMP SpO2 BMI OB Status Smoking Status 02/18/1983 97% 37.06 kg/m2 Hysterectomy Former Smoker Vitals History BMI and BSA Data Body Mass Index Body Surface Area 37.06 kg/m 2 2.01 m 2 Preferred Pharmacy Pharmacy Name Phone [facility] #[id number]- [facility]EE [phone] Your Updated Medication List  
  
   
This list is accurate as of: 10/18/17 10:16 AM.  Always use your most recent med list.  
  
  
  
  
 albuterol 2.5 mg /3 mL (0.083 %) nebulizer solution Commonly known as:  PROVENTIL VENTOLIN  
3 mL by Nebulization route every four (4) hours as needed for Shortness of Breath. Indications: BRONCHOSPASTIC PULMONARY DISEASE  
  
 aspirin delayed-release 81 mg tablet Take 1 Tab by mouth daily. Blood-Glucose Meter monitoring kit Once daily before breakfast  
  
 butalbital-acetaminophen  mg tablet Commonly known as:  Perez Mars Take 1 Tab by mouth every six (6) hours as needed. calcium-cholecalciferol (D3) tablet Commonly known as:  CALTRATE 600+D Take 1 Tab by mouth two (2) times a day. fluticasone 50 mcg/actuation nasal spray Commonly known as:  Ko Riser 2 Sprays by Both Nostrils route as needed for Rhinitis or Allergies. Indications: ALLERGIC RHINITIS  
  
 * glucose blood VI test strips strip Commonly known as:  ACCU-CHEK DA Candance Huger * glucose blood VI test strips strip Commonly known as:  FREESTYLE INSULINX  
TEST EVERY DAY AND AS NEEDED  
  
 hydroCHLOROthiazide 25 mg tablet Commonly known as:  HYDRODIURIL Take 1 Tab by mouth daily. HYDROcodone-acetaminophen 5-325 mg per tablet Commonly known as:  [address] Take 1 Tab by mouth every eight (8) hours as needed for Pain. Max Daily Amount: 3 Tabs. inhalational spacing device Commonly known as:  SPACE CHAMBER PLUS  
1 Each by Does Not Apply route as needed. Indications: USE WITH ALBUTEROL MDI  
  
 ipratropium 0.02 % Soln Commonly known as:  ATROVENT  
2.5 mL by Nebulization route as needed for Wheezing. One package of Nebules to be used every 4-6hrs prn.  icd 10 J45.902 Lancets Misc Commonly known as:  FREESTYLE LANCETS Test once daily. (diagnosis code: 250.00) levalbuterol 1.25 mg/3 mL Nebu Commonly known as:  XOPENEX  
3 mL by Nebulization route every six (6) hours as needed. icd 10 J45.902  
  
 metFORMIN 500 mg tablet Commonly known as:  GLUCOPHAGE  
TAKE 1 TABLET BY MOUTH ONCE A DAY WITH MEALS  
  
 metoprolol succinate 50 mg XL tablet Commonly known as:  TOPROL XL Take 1 Tab by mouth daily. MULTIVITAMIN PO Take 1 Tab by mouth daily. pantoprazole 40 mg tablet Commonly known as:  PROTONIX  
TAKE 1 TABLET BY MOUTH EVERY DAY  
  
 rosuvastatin 20 mg tablet Commonly known as:  CRESTOR  
 TAKE 1 TABLET BY MOUTH EVERY DAY  
  
 * Notice: This list has 2 medication(s) that are the same as other medications prescribed for you. Read the directions carefully, and ask your doctor or other care provider to review them with you. Prescriptions Printed Refills HYDROcodone-acetaminophen (NORCO) 5-325 mg per tablet 0 Sig: Take 1 Tab by mouth every eight (8) hours as needed for Pain. Max Daily Amount: 3 Tabs. Class: Print Route: Oral  
  
Prescriptions Sent to Pharmacy Refills  
 rosuvastatin (CRESTOR) 20 mg tablet 1 Sig: TAKE 1 TABLET BY MOUTH EVERY DAY Class: Normal  
 Pharmacy: [facility]/pharmacy #[id number] Ph #: [phone]  
 metFORMIN (GLUCOPHAGE) 500 mg tablet 3 Sig: TAKE 1 TABLET BY MOUTH ONCE A DAY WITH MEALS Class: Normal  
 Pharmacy: [facility]/pharmacy #[id number] Ph #: [phone]  
 hydroCHLOROthiazide (HYDRODIURIL) 25 mg tablet 1 Sig: Take 1 Tab by mouth daily. Class: Normal  
 Pharmacy: [address] [place] [address] Ph #: [phone] Route: Oral  
  
We Performed the Following AMB POC HEMOGLOBIN A1C [13450 CPT(R)] INFLUENZA VIRUS VACCINE, HIGH DOSE SEASONAL, PRESERVATIVE FREE [54697 CPT(R)] LIPID PANEL [19911 CPT(R)] DC IMMUNIZ ADMIN,1 SINGLE/COMB VAC/TOXOID [id number] CPT(R)] REFERRAL TO PHYSICAL THERAPY [LGR92 Custom] Follow-up Instructions Return if symptoms worsen or fail to improve. To-Do List   
 10/18/2017 Imaging:  DUPLEX CAROTID BILATERAL   
  
 02/06/2018 7:00 AM  
  Appointment with Sharmaine Hazel at [facility] Ultrasound ([phone]) GENERAL INSTRUCTIONS  1. Bring outside films ([employer]) pertaining to the affected area with you on the day of appointment.  2. A written order with a valid diagnosis and Physicians signature is required for all scheduled tests. 3. Check in at registration 30 minutes before your appointment time unless you were instructed to do otherwise. Referral Information Referral ID Referred By Referred To  
  
 [id number] Gisel Stahl Not Available Visits Status Start Date End Date 1 New Request 10/18/17 10/18/18 If your referral has a status of pending review or denied, additional information will be sent to support the outcome of this decision. Patient Instructions Vaccine Information Statement Influenza (Flu) Vaccine (Inactivated or Recombinant): What you need to know Many Vaccine Information Statements are available in [place] and other languages. See www.immunize.org/vis Hojas de Información Sobre Vacunas están disponibles en Español y en muchos otros idiomas. Visite www.immunize.org/vis 1. Why get vaccinated? Influenza (flu) is a contagious disease that spreads around the [facility] every year, usually between October and May. Flu is caused by influenza viruses, and is spread mainly by coughing, sneezing, and close contact. Anyone can get flu. Flu strikes suddenly and can last several days. Symptoms vary by age, but can include: 
 fever/chills  sore throat  muscle aches  fatigue  cough  headache  runny or stuffy nose Flu can also lead to pneumonia and blood infections, and cause diarrhea and seizures in children. If you have a medical condition, such as heart or lung disease, flu can make it worse. Flu is more dangerous for some people. Infants and young children, people 72years of age and older, pregnant women, and people with certain health conditions or a weakened immune system are at greatest risk. Each year thousands of people in the [facility] die from flu, and many more are hospitalized.   
 
Flu vaccine can: 
 keep you from getting flu, 
  make flu less severe if you do get it, and 
 keep you from spreading flu to your family and other people. 2. Inactivated and recombinant flu vaccines A dose of flu vaccine is recommended every flu season. Children 6 months through 6years of age may need two doses during the same flu season. Everyone else needs only one dose each flu season. Some inactivated flu vaccines contain a very small amount of a mercury-based preservative called thimerosal. Studies have not shown thimerosal in vaccines to be harmful, but flu vaccines that do not contain thimerosal are available. There is no live flu virus in flu shots. They cannot cause the flu. There are many flu viruses, and they are always changing. Each year a new flu vaccine is made to protect against three or four viruses that are likely to cause disease in the upcoming flu season. But even when the vaccine doesnt exactly match these viruses, it may still provide some protection Flu vaccine cannot prevent: 
 flu that is caused by a virus not covered by the vaccine, or 
 illnesses that look like flu but are not. It takes about 2 weeks for protection to develop after vaccination, and protection lasts through the flu season. 3. Some people should not get this vaccine Tell the person who is giving you the vaccine:  If you have any severe, life-threatening allergies. If you ever had a life-threatening allergic reaction after a dose of flu vaccine, or have a severe allergy to any part of this vaccine, you may be advised not to get vaccinated. Most, but not all, types of flu vaccine contain a small amount of egg protein.  If you ever had Guillain-Barré Syndrome (also called GBS). Some people with a history of GBS should not get this vaccine. This should be discussed with your doctor.  If you are not feeling well.    
It is usually okay to get flu vaccine when you have a mild illness, but you might be asked to come back when you feel better. 4. Risks of a vaccine reaction With any medicine, including vaccines, there is a chance of reactions. These are usually mild and go away on their own, but serious reactions are also possible. Most people who get a flu shot do not have any problems with it. Minor problems following a flu shot include:  
 soreness, redness, or swelling where the shot was given  hoarseness  sore, red or itchy eyes  cough  fever  aches  headache  itching  fatigue If these problems occur, they usually begin soon after the shot and last 1 or 2 days. More serious problems following a flu shot can include the following:  There may be a small increased risk of Guillain-Barré Syndrome (GBS) after inactivated flu vaccine. This risk has been estimated at 1 or 2 additional cases per million people vaccinated. This is much lower than the risk of severe complications from flu, which can be prevented by flu vaccine.  Young children who get the flu shot along with pneumococcal vaccine (PCV13) and/or DTaP vaccine at the same time might be slightly more likely to have a seizure caused by fever. Ask your doctor for more information. Tell your doctor if a child who is getting flu vaccine has ever had a seizure. Problems that could happen after any injected vaccine:  People sometimes faint after a medical procedure, including vaccination. Sitting or lying down for about 15 minutes can help prevent fainting, and injuries caused by a fall. Tell your doctor if you feel dizzy, or have vision changes or ringing in the ears.  Some people get severe pain in the shoulder and have difficulty moving the arm where a shot was given. This happens very rarely.  Any medication can cause a severe allergic reaction.  Such reactions from a vaccine are very rare, estimated at about 1 in a million doses, and would happen within a few minutes to a few hours after the vaccination. As with any medicine, there is a very remote chance of a vaccine causing a serious injury or death. The safety of vaccines is always being monitored. For more information, visit: www.cdc.gov/vaccinesafety/ 
 
5. What if there is a serious reaction? What should I look for?  Look for anything that concerns you, such as signs of a severe allergic reaction, very high fever, or unusual behavior. Signs of a severe allergic reaction can include hives, swelling of the face and throat, difficulty breathing, a fast heartbeat, dizziness, and weakness  usually within a few minutes to a few hours after the vaccination. What should I do?  If you think it is a severe allergic reaction or other emergency that cant wait, call 9-1-1 and get the person to the nearest hospital. Otherwise, call your doctor.  Reactions should be reported to the Vaccine Adverse Event Reporting System (VAERS). Your doctor should file this report, or you can do it yourself through  the VAERS web site at www.vaers. [facility].gov, or by calling 5-903.546.6409. VAERS does not give medical advice. 6. The National Vaccine Injury Compensation Program 
 
The [facility] Vaccine Injury Compensation Program (VICP) is a federal program that was created to compensate people who may have been injured by certain vaccines. Persons who believe they may have been injured by a vaccine can learn about the program and about filing a claim by calling 2-621.532.5269 or visiting the [employer] website at www.[facility].gov/vaccinecompensation. There is a time limit to file a claim for compensation. 7. How can I learn more?  Ask your healthcare provider. He or she can give you the vaccine package insert or suggest other sources of information.  Call your local or state health department.  
 Contact the Centers for Disease Control and Prevention (CDC): 
 - Call 3-106.872.7306 (1-942-DKY-INFO) or 
- Visit CDCs website at www.cdc.gov/flu Vaccine Information Statement Inactivated Influenza Vaccine 8/7/2015 
[address] ROBERT[address] CARLOS Cisneros [id number]-[id number] [facility] and [employer] Centers for Disease Control and Prevention Office Use Only Introducing [facility] SERVICES! Dear Irma Snyder: Thank you for requesting a [employer]chel[employer] account. Our records indicate that you already have an active monEchelle account. You can access your account anytime at [email or web address] Did you know that you can access your hospital and ER discharge instructions at any time in monEchelle? You can also review all of your test results from your hospital stay or ER visit. Additional Information If you have questions, please visit the Frequently Asked Questions section of the [employer]chelle website at [email or web address] Remember, monEchelle is NOT to be used for urgent needs. For medical emergencies, dial 911. Now available from your iPhone and Android! Please provide this summary of care documentation to your next provider. Your primary care clinician is listed as Kei Lawson. If you have any questions after today's visit, please call [phone].

## 2017-10-18 NOTE — PROGRESS NOTES
HISTORY OF PRESENT ILLNESS  Marsha Singh is a 78 y.o. female. HPI   Patient presented with her daughter and state that she has heart trouble which was recently diagnosed and low back pain with sciatica, last seen the cardiologist, was told to have A fib, but was not put on any meds, did have more palpitation went to ER the b blocker dosage was doubled,  she will see her cardiologist on 24 of Oct, stating that she had a nl stress test with ejection fraction of greater than 50, was on 25 mg XL recenetly changed to 50mg xl, slightly better with the dosage increased, currently on daily asa, no bleeding noted, currently patient denies any dizziness chest pain   No leg swelling,  Patient stating that her lower back has been bothering her requesting to get steroidal injection currently taking Tylenol is not helping unfortunately patient still palpitating regardless of increased dosage of her breath about blocker patient stating that the pain is 10 out of 10 unable to do any of the activity of daily living unable to sleep multiple night awakening unfortunately secondary to current medical diagnoses patient is not going to benefit from nonsteroidal anti-inflammatory pain medication including steroidal injection in addition she is having multiple question regarding her recurrent emergency room visits and all the imaging study that has been done on her including the MRI CT scans and etc. and she like to get them all discussed today the results are as follow and they all were discussed and explained to the patient;  IMPRESSION MRI: Mild white matter disease likely to chronic small vessel ischemic,     IMPRESSION MRA:     1. 2 mm protuberance area from the left supraclinoid ICA which may be an  infundibulum of a vessel versus a small aneurysm. Recommend CTA for further  evaluation. .  2. Mild narrowing of the origin of the right internal carotid artery. IMPRESSION of the thyroid nodule:  1.  Ultrasound-guided fine-needle aspiration conglomerate nodules left   thyroid.   Patient stating that nobody explained any of her imaging study currently having an appointment with her heart doctor in addition she needs some medication refills otherwise her main complaint today is the continuation of her palpitation and her lower back in addition to follow-up for her diabetic state    Current Outpatient Prescriptions   Medication Sig Dispense Refill    pantoprazole (PROTONIX) 40 mg tablet TAKE 1 TABLET BY MOUTH EVERY DAY  4    metoprolol succinate (TOPROL XL) 50 mg XL tablet Take 1 Tab by mouth daily. 30 Tab 0    ALPRAZolam (XANAX) 0.25 mg tablet Take 1 Tab by mouth every eight (8) hours as needed for Anxiety. Max Daily Amount: 0.75 mg. 20 Tab 0    hydroCHLOROthiazide (HYDRODIURIL) 25 mg tablet Take 1 Tab by mouth daily. 90 Tab 1    albuterol (PROVENTIL VENTOLIN) 2.5 mg /3 mL (0.083 %) nebulizer solution 3 mL by Nebulization route every four (4) hours as needed for Shortness of Breath. Indications: BRONCHOSPASTIC PULMONARY DISEASE 48 Each 0    metFORMIN (GLUCOPHAGE) 500 mg tablet TAKE 1 TABLET BY MOUTH TWICE A DAY WITH MEALS  2    rosuvastatin (CRESTOR) 10 mg tablet TAKE 1 TABLET BY MOUTH EVERY DAY 90 Tab 3    glucose blood VI test strips (FREESTYLE INSULINX) strip TEST EVERY DAY AND AS NEEDED 100 Strip 6    levalbuterol (XOPENEX) 1.25 mg/3 mL nebu 3 mL by Nebulization route every six (6) hours as needed. icd 10 J45.902 30 Nebule 11    cetirizine (ZYRTEC) 10 mg tablet TAKE 1 TABLET BY MOUTH EVERY DAY  3    fluticasone (FLONASE) 50 mcg/actuation nasal spray 2 Sprays by Both Nostrils route as needed for Rhinitis or Allergies. Indications: ALLERGIC RHINITIS 1 Bottle 11    aspirin delayed-release 81 mg tablet Take 1 Tab by mouth daily. 30 Tab 11    calcium-cholecalciferol, D3, (CALTRATE 600+D) tablet Take 1 Tab by mouth two (2) times a day. 60 Tab 11    MULTIVITAMIN PO Take 1 Tab by mouth daily.       Lancets (FREESTYLE LANCETS) Misc Test once daily. (diagnosis code: 250.00) 1 Package 11    Blood-Glucose Meter monitoring kit Once daily before breakfast 1 Kit 0    glucose blood VI test strips (ACCU-CHEK DA) strip . 1 Package 11    metoprolol succinate (TOPROL-XL) 25 mg XL tablet TAKE 1 TABLET BY MOUTH EVERY DAY  2    butalbital-acetaminophen (PHRENILIN)  mg tablet Take 1 Tab by mouth every six (6) hours as needed. 30 Tab 0    ipratropium (ATROVENT) 0.02 % nebulizer solution 2.5 mL by Nebulization route as needed for Wheezing. One package of Nebules to be used every 4-6hrs prn.  icd 10 J45.902 1250 mL 11    inhalational spacing device (SPACE CHAMBER PLUS) 1 Each by Does Not Apply route as needed.  Indications: USE WITH ALBUTEROL MDI 1 Device 0     Allergies   Allergen Reactions    Latex Rash    Amoxil [Amoxicillin] Itching     rash    Levaquin [Levofloxacin] Other (comments)     Dizziness      Percocet [Oxycodone-Acetaminophen] Nausea and Vomiting    Tetanus Vaccines And Toxoid Swelling    Tetracycline Hcl Rash     Past Medical History:   Diagnosis Date    Abdominal mass, left upper quadrant 6/13/2017    Abnormal finding on MRI of brain 3/16/2015    evaluated by neurologist and no findings    Acute pharyngitis 5/26/2016    Anemia NEC     Arthralgia of right hip 4/25/2016    Arthritis 2/18/2010    Asthma 2/18/2010    Cataract 9/24/2014    Diabetes ([facility]     Elevated LFTs 4/16/2014    DAVID (generalized anxiety disorder) 1/22/2015    GERD (gastroesophageal reflux disease) 4/7/2014    Hammer toe of right foot 9/29/2014    Headache(784.0) 5/34/0278    Helicobacter pylori (H. 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 9/29/2014     Past Surgical History:   Procedure Laterality Date    ABDOMEN SURGERY PROC UNLISTED      inguinal hernia    ABDOMEN SURGERY PROC UNLISTED      ENDOSCOPY, COLON, DIAGNOSTIC      HX APPENDECTOMY      HX GYN  1983    total hysterectomy for uterine fibroid    HX HEENT      tonsillectomy    HX ORTHOPAEDIC      clavicle repair    HX OTHER SURGICAL      ? straightened colon out    HX ROTATOR CUFF REPAIR  2009    left    OPEN REPAIR CLAVICLE FRACTURE  2009     Family History   Problem Relation Age of Onset    Cancer Mother      mycosis fungoives    Stroke Father     Cancer Sister      one sister with breast cancer    Breast Cancer Sister     Cancer Paternal Aunt      2 paternal aunts with breast cancer    Thyroid Cancer Neg Hx      Social History   Substance Use Topics    Smoking status: Former Smoker     Quit date: 6/14/1988    Smokeless tobacco: Never Used    Alcohol use No      Lab Results  Component Value Date/Time   WBC 4.1 10/14/2017 10:53 AM   HGB 12.8 10/14/2017 10:53 AM   HCT 39.3 10/14/2017 10:53 AM   PLATELET 414 45/17/7363 10:53 AM   MCV 84.3 10/14/2017 10:53 AM     Lab Results  Component Value Date/Time   Hemoglobin A1c 7.0 01/12/2017 04:20 AM   Hemoglobin A1c 7.0 11/29/2016 09:03 AM   Hemoglobin A1c 6.8 07/24/2014 09:10 AM   Glucose 113 10/14/2017 10:53 AM   Glucose (POC) 124 10/02/2017 11:00 AM   Microalb/Creat ratio (ug/mg creat.) <9.2 06/13/2017 09:50 AM   LDL, calculated 81 05/02/2017 09:21 AM   Creatinine (POC) 0.7 10/28/2010 09:10 AM   Creatinine 0.75 10/14/2017 10:53 AM      Lab Results  Component Value Date/Time   Cholesterol, total 159 05/02/2017 09:21 AM   HDL Cholesterol 58 05/02/2017 09:21 AM   LDL, calculated 81 05/02/2017 09:21 AM   Triglyceride 99 05/02/2017 09:21 AM   CHOL/HDL Ratio 2.9 01/12/2017 04:20 AM   Lab Results  Component Value Date/Time   GFR est non-AA >60 10/14/2017 10:53 AM   GFRNA, POC >60 10/28/2010 09:10 AM   GFR est AA >60 10/14/2017 10:53 AM   GFRAA, POC >60 10/28/2010 09:10 AM   Creatinine 0.75 10/14/2017 10:53 AM   Creatinine (POC) 0.7 10/28/2010 09:10 AM   BUN 15 10/14/2017 10:53 AM   Sodium 138 10/14/2017 10:53 AM   Potassium 3.9 10/14/2017 10:53 AM   Chloride 100 10/14/2017 10:53 AM   CO2 31 10/14/2017 10:53 AM   Magnesium 2.1 10/14/2017 10:53 AM              Review of Systems   Constitutional: Negative for chills and fever. HENT: Negative for ear pain and nosebleeds. Eyes: Negative for blurred vision, pain and discharge. Respiratory: Negative for shortness of breath. Cardiovascular: Negative for chest pain and leg swelling. Gastrointestinal: Negative for constipation, diarrhea, nausea and vomiting. Genitourinary: Negative for frequency. Musculoskeletal: Positive for back pain and joint pain. Skin: Negative for itching and rash. Neurological: Negative for headaches. Psychiatric/Behavioral: Negative for depression. The patient is not nervous/anxious. Physical Exam   Constitutional: She is oriented to person, place, and time. She appears well-developed and well-nourished. HENT:   Head: Normocephalic and atraumatic. Eyes: Conjunctivae and EOM are normal.   Neck: Normal range of motion. Neck supple. Cardiovascular: Normal rate, regular rhythm and normal heart sounds. No murmur heard. Pulmonary/Chest: Effort normal and breath sounds normal.   Abdominal: Soft. Bowel sounds are normal. She exhibits no distension. Musculoskeletal: She exhibits tenderness. She exhibits no edema.         Lumbar back: She exhibits decreased range of motion, tenderness, deformity, pain and spasm. Neurological: She is alert and oriented to person, place, and time. Skin: No erythema. Psychiatric: Her behavior is normal.   Nursing note and vitals reviewed. ASSESSMENT and PLAN  Diagnoses and all orders for this visit:    1. Diabetes mellitus without complication ([facility]  -     LIPID PANEL  -     AMB POC HEMOGLOBIN A1C  -     Influenza virus vaccine (FLUZONE HIGH-DOSE) 65 years and older  -     OH IMMUNIZ ADMIN,1 SINGLE/COMB VAC/TOXOID  -     DUPLEX CAROTID BILATERAL; Future  -     rosuvastatin (CRESTOR) 20 mg tablet; TAKE 1 TABLET BY MOUTH EVERY DAY  -     metFORMIN (GLUCOPHAGE) 500 mg tablet; TAKE 1 TABLET BY MOUTH ONCE A DAY WITH MEALS  -     hydroCHLOROthiazide (HYDRODIURIL) 25 mg tablet; Take 1 Tab by mouth daily. 2. Intractable migraine with aura without status migrainosus  -     LIPID PANEL  -     AMB POC HEMOGLOBIN A1C  -     Influenza virus vaccine (FLUZONE HIGH-DOSE) 65 years and older  -     OH IMMUNIZ ADMIN,1 SINGLE/COMB VAC/TOXOID  -     DUPLEX CAROTID BILATERAL; Future  -     rosuvastatin (CRESTOR) 20 mg tablet; TAKE 1 TABLET BY MOUTH EVERY DAY  -     metFORMIN (GLUCOPHAGE) 500 mg tablet; TAKE 1 TABLET BY MOUTH ONCE A DAY WITH MEALS  -     hydroCHLOROthiazide (HYDRODIURIL) 25 mg tablet; Take 1 Tab by mouth daily. 3. Essential hypertension  -     LIPID PANEL  -     AMB POC HEMOGLOBIN A1C  -     Influenza virus vaccine (FLUZONE HIGH-DOSE) 65 years and older  -     OH IMMUNIZ ADMIN,1 SINGLE/COMB VAC/TOXOID  -     DUPLEX CAROTID BILATERAL; Future  -     rosuvastatin (CRESTOR) 20 mg tablet; TAKE 1 TABLET BY MOUTH EVERY DAY  -     metFORMIN (GLUCOPHAGE) 500 mg tablet; TAKE 1 TABLET BY MOUTH ONCE A DAY WITH MEALS  -     hydroCHLOROthiazide (HYDRODIURIL) 25 mg tablet; Take 1 Tab by mouth daily.     4. Encounter for immunization  -     LIPID PANEL  -     AMB POC HEMOGLOBIN A1C  -     Influenza virus vaccine (FLUZONE HIGH-DOSE) 65 years and older  -     OH IMMUNIZ ADMIN,1 SINGLE/COMB VAC/TOXOID  -     DUPLEX CAROTID BILATERAL; Future  -     rosuvastatin (CRESTOR) 20 mg tablet; TAKE 1 TABLET BY MOUTH EVERY DAY  -     metFORMIN (GLUCOPHAGE) 500 mg tablet; TAKE 1 TABLET BY MOUTH ONCE A DAY WITH MEALS  -     hydroCHLOROthiazide (HYDRODIURIL) 25 mg tablet; Take 1 Tab by mouth daily. 5. Bilateral hip pain  -     REFERRAL TO PHYSICAL THERAPY    Other orders  -     HYDROcodone-acetaminophen (NORCO) 5-325 mg per tablet; Take 1 Tab by mouth every eight (8) hours as needed for Pain. Max Daily Amount: 3 Tabs. Patient was provided evidence based informations with the regard of their expected course,  In addition was told to help with weight reduction, spinal manipulations, massage therapy, exercise therapy:  Patient was also told to remain active but to avoid heavy lifting and pushing at this time for the next 6 weeks which is the time of the recovery for most of the low back pain duration of healing. Advised for self cared options such as:  Tylenol for pain, take meds w/ food and water, if develop abdominal upsets, such as heart burn and sour stomach. Please take some OTC antacids or Nexium 30 min before the first meal once daily and watch for discolored stool. Also do the exercise therapy: Ice therapy 2-30min tid daily, daily stretching x2 daily for 5-10 min, rom strengthening with resistance banding 3-4 times per week  Most of the how to do informations printed and handed out to the patient,   and finally the stool softner if opioid base meds given, in addition, pt was told to avoid machinary operation and driving while on any opioid based medications that will cause dizziness, drowsiness, and sleepiness. Dependency and tolerancy were also addressed,  meds side effects and compliancy advised,  Call or rtc if worsens,   Pt agreed with today's recommendations.

## 2017-10-18 NOTE — PROGRESS NOTES
Radha Rick        Name and  verified        Chief Complaint   Patient presents with    Hypertension     f/u    Diabetes     f/u         Health Maintenance reviewed-discussed with patient. Patient educated on the parts of a diabetes care plan  1. Meal plan  2. Plan for staying active  3. Diabetes medicine plan  4. Plan for checking blood sugar as ordered by Dr. Davidson Ahr  5. Schedule for diabetes follow up appt as recommended by provider        Patient received handout on diabetic education. 1. Have you been to the ER, urgent care clinic since your last visit? Hospitalized since your last visit? Yes When: 10/14/2017 ([facility]) and ([facility]) on 10/10/2017 Where: [facility] 10/10/2017    2. Have you seen or consulted any other health care providers outside of the [address] [place] [address] since your last visit? Include any pap smears or colon screening.  No

## 2017-10-18 NOTE — PATIENT INSTRUCTIONS
Vaccine Information Statement    Influenza (Flu) Vaccine (Inactivated or Recombinant): What you need to know    Many Vaccine Information Statements are available in Amharic and other languages. See www.immunize.org/vis  Hojas de Información Sobre Vacunas están disponibles en Español y en muchos otros idiomas. Visite www.immunize.org/vis    1. Why get vaccinated? Influenza (flu) is a contagious disease that spreads around the [place] every year, usually between October and May. Flu is caused by influenza viruses, and is spread mainly by coughing, sneezing, and close contact. Anyone can get flu. Flu strikes suddenly and can last several days. Symptoms vary by age, but can include:   fever/chills   sore throat   muscle aches   fatigue   cough   headache    runny or stuffy nose    Flu can also lead to pneumonia and blood infections, and cause diarrhea and seizures in children. If you have a medical condition, such as heart or lung disease, flu can make it worse. Flu is more dangerous for some people. Infants and young children, people 72years of age and older, pregnant women, and people with certain health conditions or a weakened immune system are at greatest risk. Each year thousands of people in the [facility] die from flu, and many more are hospitalized. Flu vaccine can:   keep you from getting flu,   make flu less severe if you do get it, and   keep you from spreading flu to your family and other people. 2. Inactivated and recombinant flu vaccines    A dose of flu vaccine is recommended every flu season. Children 6 months through 6years of age may need two doses during the same flu season. Everyone else needs only one dose each flu season.        Some inactivated flu vaccines contain a very small amount of a mercury-based preservative called thimerosal. Studies have not shown thimerosal in vaccines to be harmful, but flu vaccines that do not contain thimerosal are available. There is no live flu virus in flu shots. They cannot cause the flu. There are many flu viruses, and they are always changing. Each year a new flu vaccine is made to protect against three or four viruses that are likely to cause disease in the upcoming flu season. But even when the vaccine doesnt exactly match these viruses, it may still provide some protection    Flu vaccine cannot prevent:   flu that is caused by a virus not covered by the vaccine, or   illnesses that look like flu but are not. It takes about 2 weeks for protection to develop after vaccination, and protection lasts through the flu season. 3. Some people should not get this vaccine    Tell the person who is giving you the vaccine:     If you have any severe, life-threatening allergies. If you ever had a life-threatening allergic reaction after a dose of flu vaccine, or have a severe allergy to any part of this vaccine, you may be advised not to get vaccinated. Most, but not all, types of flu vaccine contain a small amount of egg protein.  If you ever had Guillain-Barré Syndrome (also called GBS). Some people with a history of GBS should not get this vaccine. This should be discussed with your doctor.  If you are not feeling well. It is usually okay to get flu vaccine when you have a mild illness, but you might be asked to come back when you feel better. 4. Risks of a vaccine reaction    With any medicine, including vaccines, there is a chance of reactions. These are usually mild and go away on their own, but serious reactions are also possible. Most people who get a flu shot do not have any problems with it.      Minor problems following a flu shot include:    soreness, redness, or swelling where the shot was given     hoarseness   sore, red or itchy eyes   cough   fever   aches   headache   itching   fatigue  If these problems occur, they usually begin soon after the shot and last 1 or 2 days. More serious problems following a flu shot can include the following:     There may be a small increased risk of Guillain-Barré Syndrome (GBS) after inactivated flu vaccine. This risk has been estimated at 1 or 2 additional cases per million people vaccinated. This is much lower than the risk of severe complications from flu, which can be prevented by flu vaccine.  Young children who get the flu shot along with pneumococcal vaccine (PCV13) and/or DTaP vaccine at the same time might be slightly more likely to have a seizure caused by fever. Ask your doctor for more information. Tell your doctor if a child who is getting flu vaccine has ever had a seizure. Problems that could happen after any injected vaccine:      People sometimes faint after a medical procedure, including vaccination. Sitting or lying down for about 15 minutes can help prevent fainting, and injuries caused by a fall. Tell your doctor if you feel dizzy, or have vision changes or ringing in the ears.  Some people get severe pain in the shoulder and have difficulty moving the arm where a shot was given. This happens very rarely.  Any medication can cause a severe allergic reaction. Such reactions from a vaccine are very rare, estimated at about 1 in a million doses, and would happen within a few minutes to a few hours after the vaccination. As with any medicine, there is a very remote chance of a vaccine causing a serious injury or death. The safety of vaccines is always being monitored. For more information, visit: www.cdc.gov/vaccinesafety/    5. What if there is a serious reaction? What should I look for?  Look for anything that concerns you, such as signs of a severe allergic reaction, very high fever, or unusual behavior.     Signs of a severe allergic reaction can include hives, swelling of the face and throat, difficulty breathing, a fast heartbeat, dizziness, and weakness - usually within a few minutes to a few hours after the vaccination. What should I do?  If you think it is a severe allergic reaction or other emergency that cant wait, call 9-1-1 and get the person to the nearest hospital. Otherwise, call your doctor.  Reactions should be reported to the Vaccine Adverse Event Reporting System (VAERS). Your doctor should file this report, or you can do it yourself through  the VAERS web site at www.vaers. [facility].gov, or by calling 0-513.245.4507. VAERS does not give medical advice. 6. The National Vaccine Injury Compensation Program    The [facility] Vaccine Injury Compensation Program (VICP) is a federal program that was created to compensate people who may have been injured by certain vaccines. Persons who believe they may have been injured by a vaccine can learn about the program and about filing a claim by calling 7-630.564.5322 or visiting the [employer] website at www.[facility].gov/vaccinecompensation. There is a time limit to file a claim for compensation. 7. How can I learn more?  Ask your healthcare provider. He or she can give you the vaccine package insert or suggest other sources of information.  Call your local or state health department.  Contact the Centers for Disease Control and Prevention (CDC):  - Call 9-412.140.8112 (1-800-CDC-INFO) or  - Visit CDCs website at www.cdc.gov/flu    Vaccine Information Statement   Inactivated Influenza Vaccine   8/7/2015  [address] UN[address] Lory Ramos [id number]    Department of Health and Human Services  Centers for Disease Control and Prevention    Office Use Only

## 2017-10-19 LAB
CHOLEST SERPL-MCNC: 180 MG/DL (ref 100–199)
HDLC SERPL-MCNC: 56 MG/DL
LDLC SERPL CALC-MCNC: 94 MG/DL (ref 0–99)
TRIGL SERPL-MCNC: 150 MG/DL (ref 0–149)
VLDLC SERPL CALC-MCNC: 30 MG/DL (ref 5–40)

## 2017-10-20 DIAGNOSIS — G43.119 INTRACTABLE MIGRAINE WITH AURA WITHOUT STATUS MIGRAINOSUS: ICD-10-CM

## 2017-10-20 DIAGNOSIS — Z23 ENCOUNTER FOR IMMUNIZATION: ICD-10-CM

## 2017-10-20 DIAGNOSIS — E11.9 DIABETES MELLITUS WITHOUT COMPLICATION (HCC): ICD-10-CM

## 2017-10-20 DIAGNOSIS — I10 ESSENTIAL HYPERTENSION: ICD-10-CM

## 2017-10-20 NOTE — TELEPHONE ENCOUNTER
----- Message from Marika Mendoza sent at 10/20/2017 12:48 PM EDT -----  Regarding: Dr. Lou Betancourt: [phone]  Pt's pharmacy is requesting a prescription for a 90 day supply of \"metformin\". The best contact number is [phone].

## 2017-10-24 ENCOUNTER — OFFICE VISIT (OUTPATIENT)
Dept: CARDIOLOGY CLINIC | Age: 77
End: 2017-10-24

## 2017-10-24 VITALS
BODY MASS INDEX: 37.08 KG/M2 | WEIGHT: 201.5 LBS | HEIGHT: 62 IN | DIASTOLIC BLOOD PRESSURE: 84 MMHG | RESPIRATION RATE: 20 BRPM | SYSTOLIC BLOOD PRESSURE: 160 MMHG | HEART RATE: 80 BPM | OXYGEN SATURATION: 97 %

## 2017-10-24 DIAGNOSIS — I10 ESSENTIAL HYPERTENSION: ICD-10-CM

## 2017-10-24 DIAGNOSIS — E78.2 MIXED HYPERLIPIDEMIA: ICD-10-CM

## 2017-10-24 DIAGNOSIS — I48.0 PAF (PAROXYSMAL ATRIAL FIBRILLATION) (HCC): Primary | ICD-10-CM

## 2017-10-24 RX ORDER — PROPAFENONE HYDROCHLORIDE 150 MG/1
150 TABLET, FILM COATED ORAL EVERY 8 HOURS
Qty: 90 TAB | Refills: 1 | Status: SHIPPED | OUTPATIENT
Start: 2017-10-24 | End: 2017-12-17 | Stop reason: SDUPTHER

## 2017-10-24 RX ORDER — METFORMIN HYDROCHLORIDE 500 MG/1
TABLET ORAL
Qty: 90 TAB | Refills: 0 | Status: SHIPPED | OUTPATIENT
Start: 2017-10-24 | End: 2018-02-03 | Stop reason: SDUPTHER

## 2017-10-24 NOTE — PROGRESS NOTES
NAME:  Damaso Varghese   :   1940   MRN:   [id number]   PCP:  Saba Euceda MD           Subjective: The patient is a 78y.o. year old female  who returns for a routine follow-up. Since the last visit, patient reports going to ED for palpitations for PAF. She continues to have episodes of dyspnea with exertion and rapid HR.      Past Medical History:   Diagnosis Date    Abdominal mass, left upper quadrant 2017    Abnormal finding on MRI of brain 3/16/2015    evaluated by neurologist and no findings    Acute pharyngitis 2016    Anemia NEC     Arthralgia of right hip 2016    Arthritis 2010    Asthma 2010    Cataract 2014    Diabetes ([address]     Elevated LFTs 2014    DAVID (generalized anxiety disorder) 2015    GERD (gastroesophageal reflux disease) 2014    Hammer toe of right foot 2014    Headache(784.0) 3/88/4334    Helicobacter pylori (H. 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 ([address] 2014    Preop examination 2014    Right foot injury 2017    Risk for falls 2014    Screening for breast cancer 2014    Screening for depression 2014    Shoulder pain, left 3/18/2014    Spondylitis, cervical ([address] 2010    Tinea pedis 6/3/2015    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 Respiratory: Negative for cough, hemoptysis, sputum production, shortness of breath and wheezing. Cardiovascular: Negative for chest pain, palpitations, orthopnea, claudication, leg swelling and PND. Gastrointestinal: Negative for heartburn, nausea, vomiting, blood in stool and melena. Genitourinary: Negative for dysuria and flank pain. Musculoskeletal: Negative for joint pain and back pain. Skin: Negative for rash. Neurological: Negative for focal weakness, seizures, loss of consciousness, weakness and headaches. Endo/Heme/Allergies: Does not bruise/bleed easily. Psychiatric/Behavioral: Negative for memory loss. The patient does not have insomnia. Objective:       Vitals:    10/24/17 1138 10/24/17 1140   BP: 160/80 160/84   Pulse: 80    Resp: 20    SpO2: 97%    Weight: 201 lb 8 oz (91.4 kg)    Height: 5' 2\" (1.575 m)     Body mass index is 36.85 kg/(m^2). General PE    Gen: NAD     Mental Status - Alert. General Appearance - Not in acute distress. Neck - no JVD     Chest and Lung Exam     Inspection: Accessory muscles - No use of accessory muscles in breathing. Auscultation:   Breath sounds: - Normal.     Cardiovascular   Inspection: Jugular vein - Bilateral - Inspection Normal.   Palpation/Percussion:   Apical Impulse: - Normal.   Auscultation: Rhythm - Regular. Heart Sounds - S1 WNL and S2 WNL. No S3 or S4. Murmurs & Other Heart Sounds: Auscultation of the heart reveals - No Murmurs. Peripheral Vascular   Upper Extremity: Inspection - Bilateral - No Cyanotic nailbeds or Digital clubbing. Lower Extremity:   Palpation: Edema - Bilateral - No edema. Abdomen: Soft, non-tender, bowel sounds are active.      Neuro: A&O times 3, CN and motor grossly WNL      Data Review:       Stress Test Myocardial perfusion imaging is normal. Overall left ventricular systolic function was normal. Compared to the study from 02/26/2014, the current study reveals no significant change. Holter -Slowest 71  Average 86  Fastest  146      Results:   Underlying Rhythm: Normal sinus rhythm      Atrial Arrhythmias: paroxysmal atrial fibrillation            AV Conduction: normal    Ventricular Arrhythmias: premature ventricular contractions; rare     ST Segment Analysis:non-specific changes     Symptom Correlation:  yes    Comment:   Paroxysmal atrial fibrillation that correlates with patient symptoms. Allergies reviewed  Allergies   Allergen Reactions    Latex Rash    Amoxil [Amoxicillin] Itching     rash    Levaquin [Levofloxacin] Other (comments)     Dizziness      Percocet [Oxycodone-Acetaminophen] Nausea and Vomiting    Tetanus Vaccines And Toxoid Swelling    Tetracycline Hcl Rash       Medications reviewed  Current Outpatient Prescriptions   Medication Sig    metFORMIN (GLUCOPHAGE) 500 mg tablet TAKE 1 TABLET BY MOUTH ONCE A DAY WITH MEALS    propafenone (RYTHMOL) 150 mg tablet Take 1 Tab by mouth every eight (8) hours. For heart rhythm    apixaban (ELIQUIS) 5 mg tablet Take 1 Tab by mouth two (2) times a day.  rosuvastatin (CRESTOR) 20 mg tablet TAKE 1 TABLET BY MOUTH EVERY DAY    hydroCHLOROthiazide (HYDRODIURIL) 25 mg tablet Take 1 Tab by mouth daily.  HYDROcodone-acetaminophen (NORCO) 5-325 mg per tablet Take 1 Tab by mouth every eight (8) hours as needed for Pain. Max Daily Amount: 3 Tabs.  metoprolol succinate (TOPROL XL) 50 mg XL tablet Take 1 Tab by mouth daily.  albuterol (PROVENTIL VENTOLIN) 2.5 mg /3 mL (0.083 %) nebulizer solution 3 mL by Nebulization route every four (4) hours as needed for Shortness of Breath.  Indications: BRONCHOSPASTIC PULMONARY DISEASE    glucose blood VI test strips (FREESTYLE INSULINX) strip TEST EVERY DAY AND AS NEEDED    levalbuterol (XOPENEX) 1.25 mg/3 mL nebu 3 mL by Nebulization route every six (6) hours as needed. icd 10 J45.902    ipratropium (ATROVENT) 0.02 % nebulizer solution 2.5 mL by Nebulization route as needed for Wheezing. One package of Nebules to be used every 4-6hrs prn.  icd 10 J45.902    fluticasone (FLONASE) 50 mcg/actuation nasal spray 2 Sprays by Both Nostrils route as needed for Rhinitis or Allergies. Indications: ALLERGIC RHINITIS    inhalational spacing device (SPACE CHAMBER PLUS) 1 Each by Does Not Apply route as needed. Indications: USE WITH ALBUTEROL MDI    aspirin delayed-release 81 mg tablet Take 1 Tab by mouth daily.  calcium-cholecalciferol, D3, (CALTRATE 600+D) tablet Take 1 Tab by mouth two (2) times a day.  MULTIVITAMIN PO Take 1 Tab by mouth daily.  Lancets (FREESTYLE LANCETS) Misc Test once daily. (diagnosis code: 250.00)    Blood-Glucose Meter monitoring kit Once daily before breakfast    glucose blood VI test strips (ACCU-CHEK DA) strip .  pantoprazole (PROTONIX) 40 mg tablet TAKE 1 TABLET BY MOUTH EVERY DAY    butalbital-acetaminophen (PHRENILIN)  mg tablet Take 1 Tab by mouth every six (6) hours as needed. No current facility-administered medications for this visit. Assessment:       ICD-10-CM ICD-9-CM    1. PAF (paroxysmal atrial fibrillation) ([facility]) I48.0 427.31    2. Essential hypertension I10 401.9    3. Mixed hyperlipidemia E78.2 272.2         Orders Placed This Encounter    propafenone (RYTHMOL) 150 mg tablet     Sig: Take 1 Tab by mouth every eight (8) hours. For heart rhythm     Dispense:  90 Tab     Refill:  1    apixaban (ELIQUIS) 5 mg tablet     Sig: Take 1 Tab by mouth two (2) times a day.      Dispense:  60 Tab     Refill:  12       Patient Active Problem List   Diagnosis Code    Asthma J45.909    Hyperlipemia E78.5    Arthritis M19.90    HTN (hypertension) I10    Headache(784.0) R51    Spondylitis, cervical ([facility] M46.92    Tingling sensation R20.2    Chest pain R07.9    Anemia D64.9    Cyst of right kidney N28.1    Hip pain, right M25.551    Acute bronchitis J20.9    Visual floaters H43.399    Colon cancer screening Z12.11    Osteopenia M85.80    Postmenopausal state Z78.0    Vitamin D deficiency E55.9    Bilateral hip pain M25.551, M25.552    Postmenopausal disorder N95.1    Numbness and tingling of right leg R20.0, R20.2    Foot pain, left M79.672    Rotator cuff (capsule) sprain and strain [id number]    Osteoarthritis of acromioclavicular joint M19.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 pylori) infection A04.8    Intractable migraine with aura without status migrainosus G43.119    Chronic asthma with status asthmaticus J45.902    Elevated TSH R94.6    Right foot injury S99.921A    Abdominal mass, left upper quadrant R19.02    Left upper quadrant pain R10.12       Plan:         AMARIS Plunkett       [facility]    10/24/2017         Agree with note as outlined by  NP.  I confirm findings in history and physical exam. No additional findings noted. holter showed paroxysmal afib. Will start NOAC, propafenone. Discussed risks, benefits. Continue beta blocker. May need PVI if recurrent symptoms. EKG in 3 days and f/u in 2-3 weeks.     Caro Gaytan MD

## 2017-10-27 ENCOUNTER — OFFICE VISIT (OUTPATIENT)
Dept: CARDIOLOGY CLINIC | Age: 77
End: 2017-10-27

## 2017-10-27 ENCOUNTER — HOSPITAL ENCOUNTER (OUTPATIENT)
Dept: VASCULAR SURGERY | Age: 77
Discharge: HOME | End: 2017-10-27
Attending: FAMILY MEDICINE
Payer: MEDICARE

## 2017-10-27 VITALS
OXYGEN SATURATION: 96 % | HEIGHT: 62 IN | SYSTOLIC BLOOD PRESSURE: 132 MMHG | BODY MASS INDEX: 36.75 KG/M2 | HEART RATE: 66 BPM | DIASTOLIC BLOOD PRESSURE: 90 MMHG | WEIGHT: 199.7 LBS | RESPIRATION RATE: 18 BRPM

## 2017-10-27 DIAGNOSIS — Z23 ENCOUNTER FOR IMMUNIZATION: ICD-10-CM

## 2017-10-27 DIAGNOSIS — I10 ESSENTIAL HYPERTENSION: ICD-10-CM

## 2017-10-27 DIAGNOSIS — I48.0 PAF (PAROXYSMAL ATRIAL FIBRILLATION) (HCC): Primary | ICD-10-CM

## 2017-10-27 DIAGNOSIS — G43.119 INTRACTABLE MIGRAINE WITH AURA WITHOUT STATUS MIGRAINOSUS: ICD-10-CM

## 2017-10-27 DIAGNOSIS — E11.9 DIABETES MELLITUS WITHOUT COMPLICATION (HCC): ICD-10-CM

## 2017-10-27 DIAGNOSIS — E78.2 MIXED HYPERLIPIDEMIA: ICD-10-CM

## 2017-10-27 PROCEDURE — 93880 EXTRACRANIAL BILAT STUDY: CPT

## 2017-10-27 NOTE — PROCEDURES
[facility]  *** FINAL REPORT ***    Name: Eleonora Gutierrez  MRN: [id number]    Outpatient  : 26 Dec 1940  HIS Order #: [id number]  [id number] [facility] Visit #: [id number]  Date: 27 Oct 2017    TYPE OF TEST: Cerebrovascular Duplex    REASON FOR TEST  Dizziness/vertigo    Right Carotid:-             Proximal               Mid                 Distal  cm/s  Systolic  Diastolic  Systolic  Diastolic  Systolic  Diastolic  CCA:     31.5      14.0                            60.0      14.0  Bulb:  ECA:     56.0       0.0  ICA:     60.0      13.0       78.0      27.0       86.0      31.0  ICA/CCA:  1.0       0.9    ICA Stenosis: <50%    Right Vertebral:-  Finding: Antegrade  Sys:       70.0  Giulia:       15.0    Right Subclavian: Normal    Left Carotid:-            Proximal                Mid                 Distal  cm/s  Systolic  Diastolic  Systolic  Diastolic  Systolic  Diastolic  CCA:     42.1      13.0                            74.0      17.0  Bulb:  ECA:     54.0       0.0  ICA:     62.0      12.0       57.0      18.0       89.0      29.0  ICA/CCA:  0.8       0.7    ICA Stenosis: <50%    Left Vertebral:-  Finding: Antegrade  Sys:       37.0  Giulia:       10.0    Left Subclavian: Normal    INTERPRETATION/FINDINGS  PROCEDURE:  Carotid Duplex Examination using B-mode, color and  spectral Doppler of the extracranial cerebrovascular arteries. 1. Bilateral <50% stenosis of the internal carotid arteries. 2. No significant stenosis in the external carotid arteries  bilaterally. 3. Antegrade flow in both vertebral arteries. 4. Normal flow in both subclavian arteries. Plaque Morphology:  1. Calcific plaque in the bulb and right ICA. 2. Calcific plaque in the bulb and left ICA. ADDITIONAL COMMENTS    I have personally reviewed the data relevant to the interpretation of  this  study. TECHNOLOGIST: José Miguel Joshi RVT  Signed: 10/27/2017 10:36 AM    PHYSICIAN: Jamey Lewis.  Aubrey Dominguez MD  Signed: 10/27/2017 03:36 PM

## 2017-10-27 NOTE — PROGRESS NOTES
NAME:  Lv Maguire   :   1940   MRN:   [id number]   PCP:  Yessenia Hughes MD           Subjective: The patient is a 78y.o. year old female  who returns for a routine follow-up from [facility]. Since the last visit, patient reports no change in exercise tolerance, chest pain, edema, medication intolerance, palpitations, shortness of breath, PND/orthopnea wheezing, sputum, syncope, dizziness or light headedness. Doing well.     Past Medical History:   Diagnosis Date    Abdominal mass, left upper quadrant 2017    Abnormal finding on MRI of brain 3/16/2015    evaluated by neurologist and no findings    Acute pharyngitis 2016    Anemia NEC     Arthralgia of right hip 2016    Arthritis 2010    Asthma 2010    Cataract 2014    Diabetes ([address]     Elevated LFTs 2014    DAVID (generalized anxiety disorder) 2015    GERD (gastroesophageal reflux disease) 2014    Hammer toe of right foot 2014    Headache(784.0)     Helicobacter pylori (H. 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 ([address] 2014    Preop examination 2014    Right foot injury 2017    Risk for falls 2014    Screening for breast cancer 2014    Screening for depression 2014    Shoulder pain, left 3/18/2014    Spondylitis, cervical ([address] 2010    Tinea pedis 6/3/2015    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 Respiratory: Negative for cough, hemoptysis, sputum production, shortness of breath and wheezing. Cardiovascular: Negative for chest pain, palpitations, orthopnea, claudication, leg swelling and PND. Gastrointestinal: Negative for heartburn, nausea, vomiting, blood in stool and melena. Genitourinary: Negative for dysuria and flank pain. Musculoskeletal: Negative for joint pain and back pain. Skin: Negative for rash. Neurological: Negative for focal weakness, seizures, loss of consciousness, weakness and headaches. Endo/Heme/Allergies: Does not bruise/bleed easily. Psychiatric/Behavioral: Negative for memory loss. The patient does not have insomnia. Objective:       Vitals:    10/27/17 0833 10/27/17 0844   BP: 122/82 132/90   Pulse: 66    Resp: 18    SpO2: 96%    Weight: 199 lb 11.2 oz (90.6 kg)    Height: 5' 2\" (1.575 m)     Body mass index is 36.53 kg/(m^2). General PE    Gen: NAD     Mental Status - Alert. General Appearance - Not in acute distress. Neck - no JVD     Chest and Lung Exam     Inspection: Accessory muscles - No use of accessory muscles in breathing. Auscultation:   Breath sounds: - Normal.     Cardiovascular   Inspection: Jugular vein - Bilateral - Inspection Normal.   Palpation/Percussion:   Apical Impulse: - Normal.   Auscultation: Rhythm - Regular. Heart Sounds - S1 WNL and S2 WNL. No S3 or S4. Murmurs & Other Heart Sounds: Auscultation of the heart reveals - No Murmurs. Peripheral Vascular   Upper Extremity: Inspection - Bilateral - No Cyanotic nailbeds or Digital clubbing.    Lower Extremity:   Palpation: Edema - Bilateral - No edema. Abdomen: Soft, non-tender, bowel sounds are active. Neuro: A&O times 3, CN and motor grossly WNL      Data Review:     EKG -  Sinus  Rhythm   -Left axis -anterior fascicular block. Allergies reviewed  Allergies   Allergen Reactions    Latex Rash    Amoxil [Amoxicillin] Itching     rash    Levaquin [Levofloxacin] Other (comments)     Dizziness      Percocet [Oxycodone-Acetaminophen] Nausea and Vomiting    Tetanus Vaccines And Toxoid Swelling    Tetracycline Hcl Rash       Medications reviewed  Current Outpatient Prescriptions   Medication Sig    metFORMIN (GLUCOPHAGE) 500 mg tablet TAKE 1 TABLET BY MOUTH ONCE A DAY WITH MEALS    propafenone (RYTHMOL) 150 mg tablet Take 1 Tab by mouth every eight (8) hours. For heart rhythm    apixaban (ELIQUIS) 5 mg tablet Take 1 Tab by mouth two (2) times a day.  rosuvastatin (CRESTOR) 20 mg tablet TAKE 1 TABLET BY MOUTH EVERY DAY    hydroCHLOROthiazide (HYDRODIURIL) 25 mg tablet Take 1 Tab by mouth daily.  HYDROcodone-acetaminophen (NORCO) 5-325 mg per tablet Take 1 Tab by mouth every eight (8) hours as needed for Pain. Max Daily Amount: 3 Tabs.  metoprolol succinate (TOPROL XL) 50 mg XL tablet Take 1 Tab by mouth daily.  albuterol (PROVENTIL VENTOLIN) 2.5 mg /3 mL (0.083 %) nebulizer solution 3 mL by Nebulization route every four (4) hours as needed for Shortness of Breath. Indications: BRONCHOSPASTIC PULMONARY DISEASE    glucose blood VI test strips (FREESTYLE INSULINX) strip TEST EVERY DAY AND AS NEEDED    levalbuterol (XOPENEX) 1.25 mg/3 mL nebu 3 mL by Nebulization route every six (6) hours as needed. icd 10 J45.902    fluticasone (FLONASE) 50 mcg/actuation nasal spray 2 Sprays by Both Nostrils route as needed for Rhinitis or Allergies. Indications: ALLERGIC RHINITIS    aspirin delayed-release 81 mg tablet Take 1 Tab by mouth daily.     calcium-cholecalciferol, D3, (CALTRATE 600+D) tablet Take 1 Tab by mouth two (2) times a day.  MULTIVITAMIN PO Take 1 Tab by mouth daily.  Blood-Glucose Meter monitoring kit Once daily before breakfast    glucose blood VI test strips (ACCU-CHEK DA) strip .  pantoprazole (PROTONIX) 40 mg tablet TAKE 1 TABLET BY MOUTH EVERY DAY    butalbital-acetaminophen (PHRENILIN)  mg tablet Take 1 Tab by mouth every six (6) hours as needed.  ipratropium (ATROVENT) 0.02 % nebulizer solution 2.5 mL by Nebulization route as needed for Wheezing. One package of Nebules to be used every 4-6hrs prn.  icd 10 J45.902    inhalational spacing device (SPACE CHAMBER PLUS) 1 Each by Does Not Apply route as needed. Indications: USE WITH ALBUTEROL MDI    Lancets (FREESTYLE LANCETS) Misc Test once daily. (diagnosis code: 250.00)     No current facility-administered medications for this visit. Assessment:       ICD-10-CM ICD-9-CM    1. PAF (paroxysmal atrial fibrillation) ([facility]) I48.0 427.31    2. Essential hypertension I10 401.9 AMB POC EKG ROUTINE W/ 12 LEADS, INTER & REP   3.  Mixed hyperlipidemia E78.2 272.2         Orders Placed This Encounter    AMB POC EKG ROUTINE W/ 12 LEADS, INTER & REP     Order Specific Question:   Reason for Exam:     Answer:   routine       Patient Active Problem List   Diagnosis Code    Asthma J45.909    Hyperlipemia E78.5    Arthritis M19.90    HTN (hypertension) I10    Headache(784.0) R51    Spondylitis, cervical (HCC) M46.92    Tingling sensation R20.2    Chest pain R07.9    Anemia D64.9    Cyst of right kidney N28.1    Hip pain, right M25.551    Acute bronchitis J20.9    Visual floaters H43.399    Colon cancer screening Z12.11    Osteopenia M85.80    Postmenopausal state Z78.0    Vitamin D deficiency E55.9    Bilateral hip pain M25.551, M25.552    Postmenopausal disorder N95.1    Numbness and tingling of right leg R20.0, R20.2    Foot pain, left M79.672    Rotator cuff (capsule) sprain and strain [id number]    Osteoarthritis of acromioclavicular joint M19.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 pylori) infection A04.8    Intractable migraine with aura without status migrainosus G43.119    Chronic asthma with status asthmaticus J45.902    Elevated TSH R94.6    Right foot injury J21.675W    Abdominal mass, left upper quadrant R19.02    Left upper quadrant pain R10.12       Plan:     Patient presents for follow up, feeling well and stable from cardiac standpoint. She is tolerating [address] and rythmol. EKG sinus WNL. Her palpitations are less present. Will continue with current medications and see her back in 3 mo or sooner if she becomes symptomatic.      Alea Fofana, ANP

## 2017-10-31 DIAGNOSIS — J45.40 MODERATE PERSISTENT ASTHMA WITHOUT COMPLICATION: ICD-10-CM

## 2017-10-31 DIAGNOSIS — G43.119 INTRACTABLE MIGRAINE WITH AURA WITHOUT STATUS MIGRAINOSUS: ICD-10-CM

## 2017-10-31 NOTE — TELEPHONE ENCOUNTER
----- Message from Esther Fisher sent at 10/31/2017 12:09 PM EDT -----  Regarding: Dr. Dipti Parra  Pt is requesting that \"Flonase\" discussed \"about 3 weeks ago\" sent to [facility] on file. Pt best contact [phone].

## 2017-11-06 RX ORDER — FLUTICASONE PROPIONATE 50 MCG
2 SPRAY, SUSPENSION (ML) NASAL AS NEEDED
Qty: 1 BOTTLE | Refills: 11 | Status: SHIPPED | OUTPATIENT
Start: 2017-11-06 | End: 2019-04-22

## 2017-11-20 DIAGNOSIS — E55.9 VITAMIN D DEFICIENCY: ICD-10-CM

## 2017-11-21 RX ORDER — ASPIRIN 81 MG/1
81 TABLET ORAL DAILY
Qty: 30 TAB | Refills: 11 | Status: SHIPPED | OUTPATIENT
Start: 2017-11-21 | End: 2018-11-26 | Stop reason: SDUPTHER

## 2017-11-29 RX ORDER — METOPROLOL SUCCINATE 50 MG/1
50 TABLET, EXTENDED RELEASE ORAL DAILY
Qty: 30 TAB | Refills: 3 | Status: SHIPPED | OUTPATIENT
Start: 2017-11-29 | End: 2018-03-31 | Stop reason: SDUPTHER

## 2017-12-04 ENCOUNTER — OFFICE VISIT (OUTPATIENT)
Dept: INTERNAL MEDICINE CLINIC | Age: 77
End: 2017-12-04

## 2017-12-04 ENCOUNTER — HOSPITAL ENCOUNTER (OUTPATIENT)
Dept: LAB | Age: 77
Discharge: HOME | End: 2017-12-04
Payer: MEDICARE

## 2017-12-04 VITALS
TEMPERATURE: 98 F | WEIGHT: 201.8 LBS | HEART RATE: 62 BPM | DIASTOLIC BLOOD PRESSURE: 80 MMHG | RESPIRATION RATE: 20 BRPM | OXYGEN SATURATION: 98 % | BODY MASS INDEX: 37.13 KG/M2 | SYSTOLIC BLOOD PRESSURE: 116 MMHG | HEIGHT: 62 IN

## 2017-12-04 DIAGNOSIS — J45.20 MILD INTERMITTENT ASTHMA WITHOUT COMPLICATION: ICD-10-CM

## 2017-12-04 DIAGNOSIS — I65.23 BILATERAL CAROTID ARTERY STENOSIS: ICD-10-CM

## 2017-12-04 DIAGNOSIS — E11.65 TYPE 2 DIABETES MELLITUS WITH HYPERGLYCEMIA, WITHOUT LONG-TERM CURRENT USE OF INSULIN (HCC): ICD-10-CM

## 2017-12-04 DIAGNOSIS — Z76.89 ENCOUNTER TO ESTABLISH CARE: Primary | ICD-10-CM

## 2017-12-04 PROCEDURE — 80053 COMPREHEN METABOLIC PANEL: CPT

## 2017-12-04 PROCEDURE — 83036 HEMOGLOBIN GLYCOSYLATED A1C: CPT

## 2017-12-04 PROCEDURE — 36415 COLL VENOUS BLD VENIPUNCTURE: CPT

## 2017-12-04 NOTE — PROGRESS NOTES
New Patient Evaluation    Denise Simpson is a 78 y.o. female. They are here to establish care with the group and me as a primary care provider. she has seen Dr. Noah Yeboah in the past.  The last visit was in Sept for a medicare wellness visit. She was seen in Sept--told that she had some carotid blockage in the past.  Test was done in Oct--noted <50% stenosis bilateral ICA. Has been having dizziness and vertigo--still present ongoing for the past 6 months. Not seen by Vascular. She also sees Cardiology--Dr. Justin Juarez for Afib. Diagnosed in Oct.  On Rhythmol. Follow up in January. Improving on medication. She is also on Eliquis. No bleeding noted. She also has high blood pressure which is controlled. She has a history of DM--on Metformin. She normally checks sugars every morning. On Metformin 500mg PO BID. Last A1c was 6.4    She has had a history of asthma on the problem list.  She reports that she had been sent to cardiology, determined to have A. fib and after the treatment for this her shortness of breath improved. She does not feel as if she has asthma. We will remove this from the problem list today. Has GERD, follows up with Dr. Kayli Scott.       Patient Active Problem List    Diagnosis Date Noted    Abdominal mass, left upper quadrant 06/13/2017    Left upper quadrant pain 06/13/2017    Right foot injury 05/02/2017    Elevated TSH 03/01/2017    Chronic asthma with status asthmaticus 01/26/2017    Intractable migraine with aura without status migrainosus 28/23/6522    Helicobacter pylori (H. pylori) infection 12/07/2016    Skipped heart beats 11/29/2016    Multiple thyroid nodules 07/14/2016    Swollen gland 05/26/2016    Acute pharyngitis 05/26/2016    Diabetes mellitus type 2, controlled ([facility] 05/26/2016    Arthralgia of right hip 04/25/2016    Tinea pedis 06/03/2015    Abnormal finding on MRI of brain 03/16/2015    DAVID (generalized anxiety disorder) 01/22/2015    Peripheral autonomic neuropathy due to DM ([facility] 09/29/2014    Pre-ulcerative calluses 09/29/2014    Type 2 diabetes mellitus with pressure callus ([facility] 09/29/2014    Hammer toe of right foot 09/29/2014    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 ([facility] 09/29/2014    Cataract 09/24/2014    Preop examination 09/24/2014    Need for varicella vaccine 09/23/2014    Screening for breast cancer 09/23/2014    Acute asthma exacerbation 08/31/2014    Elevated LFTs 04/16/2014    Screening for depression 04/16/2014    Risk for falls 04/16/2014    GERD (gastroesophageal reflux disease) 04/07/2014    Osteoarthritis of acromioclavicular joint 03/18/2014    Shoulder pain, left 03/18/2014    Rotator cuff (capsule) sprain and strain 09/20/2013    Foot pain, left 06/14/2013    Bilateral hip pain 05/20/2013    Postmenopausal disorder 05/20/2013    Numbness and tingling of right leg 05/20/2013    Postmenopausal state 01/21/2013    Vitamin D deficiency 01/21/2013    Colon cancer screening 01/02/2013    Visual floaters 02/01/2012    Acute bronchitis 11/30/2011    Hip pain, right 09/14/2011    Cyst of right kidney 08/10/2011    Anemia 04/22/2011    Chest pain 10/20/2010    Tingling sensation 09/13/2010    Spondylitis, cervical ([facility] 04/05/2010    HTN (hypertension) 03/26/2010    Headache(784.0) 03/26/2010    Hyperlipemia 02/18/2010    Arthritis 02/18/2010     Current Outpatient Prescriptions   Medication Sig Dispense Refill    metoprolol succinate (TOPROL XL) 50 mg XL tablet Take 1 Tab by mouth daily. 30 Tab 3    aspirin delayed-release 81 mg tablet Take 1 Tab by mouth daily. 30 Tab 11    fluticasone (FLONASE) 50 mcg/actuation nasal spray 2 Sprays by Both Nostrils route as needed for Rhinitis or Allergies. Indications:  Allergic Rhinitis 1 Bottle 11    metFORMIN (GLUCOPHAGE) 500 mg tablet TAKE 1 TABLET BY MOUTH ONCE A DAY WITH MEALS 90 Tab 0    propafenone (RYTHMOL) 150 mg tablet Take 1 Tab by mouth every eight (8) hours. For heart rhythm 90 Tab 1    apixaban (ELIQUIS) 5 mg tablet Take 1 Tab by mouth two (2) times a day. 60 Tab 12    rosuvastatin (CRESTOR) 20 mg tablet TAKE 1 TABLET BY MOUTH EVERY DAY 90 Tab 1    hydroCHLOROthiazide (HYDRODIURIL) 25 mg tablet Take 1 Tab by mouth daily. 90 Tab 1    glucose blood VI test strips (FREESTYLE INSULINX) strip TEST EVERY DAY AND AS NEEDED 100 Strip 6    calcium-cholecalciferol, D3, (CALTRATE 600+D) tablet Take 1 Tab by mouth two (2) times a day. 60 Tab 11    MULTIVITAMIN PO Take 1 Tab by mouth daily.  Lancets (FREESTYLE LANCETS) Misc Test once daily. (diagnosis code: 250.00) 1 Package 11    Blood-Glucose Meter monitoring kit Once daily before breakfast 1 Kit 0    glucose blood VI test strips (ACCU-CHEK DA) strip . 1 Package 11    pantoprazole (PROTONIX) 40 mg tablet TAKE 1 TABLET BY MOUTH EVERY DAY  4    HYDROcodone-acetaminophen (NORCO) 5-325 mg per tablet Take 1 Tab by mouth every eight (8) hours as needed for Pain. Max Daily Amount: 3 Tabs. 40 Tab 0    butalbital-acetaminophen (PHRENILIN)  mg tablet Take 1 Tab by mouth every six (6) hours as needed. 30 Tab 0    albuterol (PROVENTIL VENTOLIN) 2.5 mg /3 mL (0.083 %) nebulizer solution 3 mL by Nebulization route every four (4) hours as needed for Shortness of Breath. Indications: BRONCHOSPASTIC PULMONARY DISEASE 48 Each 0    levalbuterol (XOPENEX) 1.25 mg/3 mL nebu 3 mL by Nebulization route every six (6) hours as needed. icd 10 J45.902 30 Nebule 11    ipratropium (ATROVENT) 0.02 % nebulizer solution 2.5 mL by Nebulization route as needed for Wheezing. One package of Nebules to be used every 4-6hrs prn.  icd 10 J45.902 1250 mL 11    inhalational spacing device (SPACE CHAMBER PLUS) 1 Each by Does Not Apply route as needed.  Indications: USE WITH ALBUTEROL MDI 1 Device 0     Allergies   Allergen Reactions    Latex Rash    Amoxil [Amoxicillin] Itching     rash    Levaquin [Levofloxacin] Other (comments)     Dizziness      Percocet [Oxycodone-Acetaminophen] Nausea and Vomiting    Tetanus Vaccines And Toxoid Swelling    Tetracycline Hcl Rash     Past Medical History:   Diagnosis Date    Abdominal mass, left upper quadrant 6/13/2017    Abnormal finding on MRI of brain 3/16/2015    evaluated by neurologist and no findings    Acute pharyngitis 5/26/2016    Anemia NEC     Arthralgia of right hip 4/25/2016    Arthritis 2/18/2010    Asthma 2/18/2010    Cataract 9/24/2014    Diabetes ([address]     Elevated LFTs 4/16/2014    DAVID (generalized anxiety disorder) 1/22/2015    GERD (gastroesophageal reflux disease) 4/7/2014    Hammer toe of right foot 9/29/2014    Headache(784.0) 4/09/6289    Helicobacter pylori (H. 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 ([address] 9/29/2014    Preop examination 9/24/2014    Right foot injury 5/2/2017    Risk for falls 4/16/2014    Screening for breast cancer 9/23/2014    Screening for depression 4/16/2014    Shoulder pain, left 3/18/2014    Spondylitis, cervical ([address] 4/5/2010    Tinea pedis 6/3/2015    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 straightened colon out    HX ROTATOR CUFF REPAIR  2009    left    OPEN REPAIR CLAVICLE FRACTURE  2009 Family History   Problem Relation Age of Onset    Cancer Mother      mycosis fungoives    Stroke Father     Cancer Sister      one sister with breast cancer    Breast Cancer Sister     Cancer Paternal Aunt      2 paternal aunts with breast cancer    Thyroid Cancer Neg Hx      Social History   Substance Use Topics    Smoking status: Former Smoker     Quit date: 6/14/1988    Smokeless tobacco: Never Used    Alcohol use No        Health Maintenance   Topic Date Due    FOOT EXAM Q1  01/25/2018    HEMOGLOBIN A1C Q6M  04/18/2018    MEDICARE YEARLY EXAM  05/03/2018    MICROALBUMIN Q1  06/13/2018    EYE EXAM RETINAL OR DILATED Q1  07/17/2018    LIPID PANEL Q1  10/18/2018    GLAUCOMA SCREENING Q2Y  07/17/2019    COLONOSCOPY  06/20/2021    DTaP/Tdap/Td series (2 - Td) 05/26/2026    OSTEOPOROSIS SCREENING (DEXA)  Completed    ZOSTER VACCINE AGE 60>  Addressed    Pneumococcal 65+ Low/Medium Risk  Completed    Influenza Age 5 to Adult  Completed       Review of Systems   Constitutional: Negative. Respiratory: Negative. Cardiovascular: Negative. Visit Vitals    /80 (BP 1 Location: Right arm, BP Patient Position: Sitting)    Pulse 62    Temp 98 °F (36.7 °C) (Oral)    Resp 20    Ht 5' 2.4\" (1.585 m)    Wt 201 lb 12.8 oz (91.5 kg)    LMP 02/18/1983    SpO2 98%    BMI 36.44 kg/m2       Physical Exam   Constitutional: No distress. Cardiovascular: Normal rate and regular rhythm. Pulmonary/Chest: Effort normal and breath sounds normal.         ASSESSMENT/PLAN    Diagnoses and all orders for this visit:    1. Encounter to establish care    2. Bilateral carotid artery stenosis  -     REFERRAL TO VASCULAR SURGERY  -     METABOLIC PANEL, COMPREHENSIVE    3. Type 2 diabetes mellitus with hyperglycemia, without long-term current use of insulin (HCC)  -     HEMOGLOBIN A1C WITH EAG    4.  Mild intermittent asthma without complication      Follow-up Disposition:  Return in about 3 months (around 3/4/2018) for Follow up.    -Discussed with the patient to continue the current plan of care. We will obtain baseline labwork and determine if any adjustments need to be done. We will also await the records of the previous PCP to ascertain further details of the patient's history. The patient agrees with and understands the plan of care. All questions have been answered.

## 2017-12-05 LAB
ALBUMIN SERPL-MCNC: 4.4 G/DL (ref 3.5–4.8)
ALBUMIN/GLOB SERPL: 1.6 {RATIO} (ref 1.2–2.2)
ALP SERPL-CCNC: 49 IU/L (ref 39–117)
ALT SERPL-CCNC: 20 IU/L (ref 0–32)
AST SERPL-CCNC: 21 IU/L (ref 0–40)
BILIRUB SERPL-MCNC: 0.3 MG/DL (ref 0–1.2)
BUN SERPL-MCNC: 13 MG/DL (ref 8–27)
BUN/CREAT SERPL: 18 (ref 12–28)
CALCIUM SERPL-MCNC: 9.8 MG/DL (ref 8.7–10.3)
CHLORIDE SERPL-SCNC: 97 MMOL/L (ref 96–106)
CO2 SERPL-SCNC: 26 MMOL/L (ref 18–29)
CREAT SERPL-MCNC: 0.74 MG/DL (ref 0.57–1)
EST. AVERAGE GLUCOSE BLD GHB EST-MCNC: 148 MG/DL
GFR SERPLBLD CREATININE-BSD FMLA CKD-EPI: 80 ML/MIN/1.73
GFR SERPLBLD CREATININE-BSD FMLA CKD-EPI: 92 ML/MIN/1.73
GLOBULIN SER CALC-MCNC: 2.7 G/DL (ref 1.5–4.5)
GLUCOSE SERPL-MCNC: 118 MG/DL (ref 65–99)
HBA1C MFR BLD: 6.8 % (ref 4.8–5.6)
POTASSIUM SERPL-SCNC: 4.6 MMOL/L (ref 3.5–5.2)
PROT SERPL-MCNC: 7.1 G/DL (ref 6–8.5)
SODIUM SERPL-SCNC: 140 MMOL/L (ref 134–144)

## 2017-12-06 ENCOUNTER — HOSPITAL ENCOUNTER (OUTPATIENT)
Dept: MAMMOGRAPHY | Age: 77
Discharge: HOME | End: 2017-12-06
Attending: INTERNAL MEDICINE
Payer: MEDICARE

## 2017-12-06 DIAGNOSIS — Z12.31 VISIT FOR SCREENING MAMMOGRAM: ICD-10-CM

## 2017-12-06 PROCEDURE — 77067 SCR MAMMO BI INCL CAD: CPT

## 2018-01-02 ENCOUNTER — OFFICE VISIT (OUTPATIENT)
Dept: CARDIOLOGY CLINIC | Age: 78
End: 2018-01-02

## 2018-01-02 VITALS
WEIGHT: 205.4 LBS | DIASTOLIC BLOOD PRESSURE: 78 MMHG | BODY MASS INDEX: 37.8 KG/M2 | HEIGHT: 62 IN | RESPIRATION RATE: 20 BRPM | SYSTOLIC BLOOD PRESSURE: 128 MMHG | HEART RATE: 76 BPM | OXYGEN SATURATION: 97 %

## 2018-01-02 DIAGNOSIS — E78.2 MIXED HYPERLIPIDEMIA: ICD-10-CM

## 2018-01-02 DIAGNOSIS — I48.0 PAROXYSMAL ATRIAL FIBRILLATION (HCC): Primary | ICD-10-CM

## 2018-01-02 DIAGNOSIS — I10 ESSENTIAL HYPERTENSION: ICD-10-CM

## 2018-01-02 NOTE — PROGRESS NOTES
NAME:  Brenda Yung   :   1940   MRN:   [id number]   PCP:  Kimi Waller MD           Subjective: The patient is a 78y.o. year old female  who returns for a routine follow-up. Since the last visit, patient reports no change in exercise tolerance, chest pain, edema, medication intolerance, palpitations, shortness of breath, PND/orthopnea wheezing, sputum, syncope, dizziness or light headedness. Doing well. Past Medical History:   Diagnosis Date    Abdominal mass, left upper quadrant 2017    Abnormal finding on MRI of brain 3/16/2015    evaluated by neurologist and no findings    Acute pharyngitis 2016    Anemia NEC     Arthralgia of right hip 2016    Arthritis 2010    Asthma 2010    Cataract 2014    Diabetes ([address]     Elevated LFTs 2014    DAVID (generalized anxiety disorder) 2015    GERD (gastroesophageal reflux disease) 2014    Hammer toe of right foot 2014    Headache(784.0) 3/36/0413    Helicobacter pylori (H. 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 ([address] 2014    Preop examination 2014    Right foot injury 2017    Risk for falls 2014    Screening for breast cancer 2014    Screening for depression 2014    Shoulder pain, left 3/18/2014    Spondylitis, cervical ([address] 2010    Tinea pedis 6/3/2015    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 ([address] 2014        ICD-10-CM ICD-9-CM    1. Paroxysmal atrial fibrillation (HCC) I48.0 427.31    2. Essential hypertension I10 401.9 AMB POC EKG ROUTINE W/ 12 LEADS, INTER & REP   3.  Mixed hyperlipidemia E78.2 272.2       Social History   Substance Use Topics    Smoking status: Former Smoker     Quit date: 6/14/1988    Smokeless tobacco: Never Used    Alcohol use No      Family History   Problem Relation Age of Onset    Cancer Mother      mycosis fungoives    Stroke Father     Cancer Sister      one sister with breast cancer    Breast Cancer Sister     Cancer Paternal Aunt      2 paternal aunts with breast cancer    Thyroid Cancer Neg Hx         Review of Systems  General: Pt denies excessive weight gain or loss. Pt is able to conduct ADL's  HEENT: Denies blurred vision, headaches, epistaxis and difficulty swallowing. Respiratory: Denies shortness of breath, SUGGS, wheezing or stridor. Cardiovascular: Denies precordial pain, palpitations, edema or PND  Gastrointestinal: Denies poor appetite, indigestion, abdominal pain or blood in stool  Musculoskeletal: Denies pain or swelling from muscles or joints  Neurologic: Denies tremor, paresthesias, or sensory motor disturbance  Skin: Denies rash, itching or texture change. Objective:       Vitals:    01/02/18 0911 01/02/18 0922   BP: 120/80 128/78   Pulse: 76    Resp: 20    SpO2: 97%    Weight: 205 lb 6.4 oz (93.2 kg)    Height: 5' 2\" (1.575 m)     Body mass index is 37.57 kg/(m^2). General PE  Mental Status - Alert. General Appearance - Not in acute distress. Chest and Lung Exam   Inspection: Accessory muscles - No use of accessory muscles in breathing. Auscultation:   Breath sounds: - Normal.    Cardiovascular   Inspection: Jugular vein - Bilateral - Inspection Normal.  Palpation/Percussion:   Apical Impulse: - Normal.  Auscultation: Rhythm - Regular. Heart Sounds - S1 WNL and S2 WNL. No S3 or S4. Murmurs & Other Heart Sounds: Auscultation of the heart reveals - No Murmurs. Peripheral Vascular   Upper Extremity: Inspection - Bilateral - No Cyanotic nailbeds or Digital clubbing.   Lower Extremity:   Palpation: Edema - Bilateral - No edema. Data Review:     EKG -EKG: normal EKG, normal sinus rhythm, unchanged from previous tracings. Medications reviewed  Current Outpatient Prescriptions   Medication Sig    propafenone (RYTHMOL) 150 mg tablet TAKE 1 TABLET BY MOUTH EVERY EIGHT (8) HOURS. FOR HEART RHYTHM    metoprolol succinate (TOPROL XL) 50 mg XL tablet Take 1 Tab by mouth daily.  aspirin delayed-release 81 mg tablet Take 1 Tab by mouth daily.  fluticasone (FLONASE) 50 mcg/actuation nasal spray 2 Sprays by Both Nostrils route as needed for Rhinitis or Allergies. Indications: Allergic Rhinitis    metFORMIN (GLUCOPHAGE) 500 mg tablet TAKE 1 TABLET BY MOUTH ONCE A DAY WITH MEALS    apixaban (ELIQUIS) 5 mg tablet Take 1 Tab by mouth two (2) times a day.  rosuvastatin (CRESTOR) 20 mg tablet TAKE 1 TABLET BY MOUTH EVERY DAY    hydroCHLOROthiazide (HYDRODIURIL) 25 mg tablet Take 1 Tab by mouth daily.  glucose blood VI test strips (FREESTYLE INSULINX) strip TEST EVERY DAY AND AS NEEDED    inhalational spacing device (SPACE CHAMBER PLUS) 1 Each by Does Not Apply route as needed. Indications: USE WITH ALBUTEROL MDI    calcium-cholecalciferol, D3, (CALTRATE 600+D) tablet Take 1 Tab by mouth two (2) times a day.  MULTIVITAMIN PO Take 1 Tab by mouth daily.  Lancets (FREESTYLE LANCETS) Misc Test once daily. (diagnosis code: 250.00)    Blood-Glucose Meter monitoring kit Once daily before breakfast    glucose blood VI test strips (ACCU-CHEK DA) strip .  pantoprazole (PROTONIX) 40 mg tablet TAKE 1 TABLET BY MOUTH EVERY DAY    HYDROcodone-acetaminophen (NORCO) 5-325 mg per tablet Take 1 Tab by mouth every eight (8) hours as needed for Pain. Max Daily Amount: 3 Tabs.  butalbital-acetaminophen (PHRENILIN)  mg tablet Take 1 Tab by mouth every six (6) hours as needed.     albuterol (PROVENTIL VENTOLIN) 2.5 mg /3 mL (0.083 %) nebulizer solution 3 mL by Nebulization route every four (4) hours as needed for Shortness of Breath. Indications: BRONCHOSPASTIC PULMONARY DISEASE    levalbuterol (XOPENEX) 1.25 mg/3 mL nebu 3 mL by Nebulization route every six (6) hours as needed. icd 10 J45.902    ipratropium (ATROVENT) 0.02 % nebulizer solution 2.5 mL by Nebulization route as needed for Wheezing. One package of Nebules to be used every 4-6hrs prn.  icd 10 J45.902     No current facility-administered medications for this visit. Assessment:       ICD-10-CM ICD-9-CM    1. Paroxysmal atrial fibrillation (HCC) I48.0 427.31    2. Essential hypertension I10 401.9 AMB POC EKG ROUTINE W/ 12 LEADS, INTER & REP   3. Mixed hyperlipidemia E78.2 272.2         Plan:     Patient presents doing well and stable from cardiac standpoint. Notes occassional palpitations, sharp chest pains. Continue current care and follow up in 6 months or sooner if necessary.     Fátima Warren MD

## 2018-01-02 NOTE — MR AVS SNAPSHOT
Visit Information Date & Time Provider Department Dept. Phone Encounter #  
 1/2/2018  9:15 AM Aubrey Zarco MD [facility] [phone] [id number] Your Appointments 3/1/2018  9:30 AM  
ROUTINE CARE with Yulissa Renee MD  
[facility] [address]) Appt Note: 1yr f/u thyroid cp0.00  
 [address] [facility] [address]  
[phone]  
  
   
 [address] [facility] [address]  
  
    
 3/5/2018  8:00 AM  
ROUTINE CARE with Jay Marinelli MD  
[facility] [address]) Appt Note: 3-month f/u  
 [address] [facility] [address]  
[address] [address] [address] [place] [address] Upcoming Health Maintenance Date Due  
 FOOT EXAM Q1 1/25/2018 MEDICARE YEARLY EXAM 5/3/2018 HEMOGLOBIN A1C Q6M 6/4/2018 MICROALBUMIN Q1 6/13/2018 EYE EXAM RETINAL OR DILATED Q1 7/17/2018 LIPID PANEL Q1 10/18/2018 GLAUCOMA SCREENING Q2Y 7/17/2019 COLONOSCOPY 6/20/2021 DTaP/Tdap/Td series (2 - Td) 5/26/2026 Allergies as of 1/2/2018  Review Complete On: 1/2/2018 By: Jennyfer Reese LPN Severity Noted Reaction Type Reactions Latex  06/14/2011    Rash Amoxil [Amoxicillin]  01/05/2017    Itching  
 rash Levaquin [Levofloxacin]  03/11/2016    Other (comments) Dizziness Percocet [Oxycodone-acetaminophen]  02/18/2010   Intolerance Nausea and Vomiting Tetanus Vaccines And Toxoid  02/18/2010   Systemic Swelling Tetracycline Hcl  02/18/2010   Systemic Rash Current Immunizations  Reviewed on 5/2/2017 Name Date Influenza High Dose Vaccine PF 10/18/2017, 11/29/2016, 10/7/2015, 11/3/2014 Pneumococcal Conjugate (PCV-13) 10/7/2015 Pneumococcal Polysaccharide (PPSV-23) 2/5/2013 Zoster Vaccine, Live 9/29/2014 Not reviewed this visit You Were Diagnosed With   
  
 Codes Comments Paroxysmal atrial fibrillation (HCC)    -  Primary ICD-10-CM: I48.0 ICD-9-CM: 427.31 Essential hypertension     ICD-10-CM: I10 
ICD-9-CM: 401.9 Mixed hyperlipidemia     ICD-10-CM: E78.2 ICD-9-CM: 272.2 Vitals BP Pulse Resp Height(growth percentile) Weight(growth percentile) LMP  
 128/78 (BP 1 Location: Right arm, BP Patient Position: Sitting) 76 20 5' 2\" (1.575 m) 205 lb 6.4 oz (93.2 kg) 02/18/1983 SpO2 BMI OB Status Smoking Status 97% 37.57 kg/m2 Hysterectomy Former Smoker Vitals History BMI and BSA Data Body Mass Index Body Surface Area  
 37.57 kg/m 2 2.02 m 2 Preferred Pharmacy Pharmacy Name Phone [id number] [phone] Your Updated Medication List  
  
   
This list is accurate as of: 1/2/18  9:45 AM.  Always use your most recent med list.  
  
  
  
  
 albuterol 2.5 mg /3 mL (0.083 %) nebulizer solution Commonly known as:  PROVENTIL VENTOLIN  
3 mL by Nebulization route every four (4) hours as needed for Shortness of Breath. Indications: BRONCHOSPASTIC PULMONARY DISEASE  
  
 apixaban 5 mg tablet Commonly known as:  Faulkner Nipper Take 1 Tab by mouth two (2) times a day. aspirin delayed-release 81 mg tablet Take 1 Tab by mouth daily. Blood-Glucose Meter monitoring kit Once daily before breakfast  
  
 butalbital-acetaminophen  mg tablet Commonly known as:  Vivek Asters Take 1 Tab by mouth every six (6) hours as needed. calcium-cholecalciferol (D3) tablet Commonly known as:  CALTRATE 600+D Take 1 Tab by mouth two (2) times a day. fluticasone 50 mcg/actuation nasal spray Commonly known as:  Celestia Keri 2 Sprays by Both Nostrils route as needed for Rhinitis or Allergies. Indications: Allergic Rhinitis * glucose blood VI test strips strip Commonly known as:  ACCU-CHEK DA Kate Mcmillan * glucose blood VI test strips strip Commonly known as:  FREESTYLE INSULINX  
TEST EVERY DAY AND AS NEEDED  
  
 hydroCHLOROthiazide 25 mg tablet Commonly known as:  HYDRODIURIL Take 1 Tab by mouth daily. HYDROcodone-acetaminophen 5-325 mg per tablet Commonly known as:  Zilphia Deck Take 1 Tab by mouth every eight (8) hours as needed for Pain. Max Daily Amount: 3 Tabs. inhalational spacing device Commonly known as:  SPACE CHAMBER PLUS  
1 Each by Does Not Apply route as needed. Indications: USE WITH ALBUTEROL MDI  
  
 ipratropium 0.02 % Soln Commonly known as:  ATROVENT  
2.5 mL by Nebulization route as needed for Wheezing. One package of Nebules to be used every 4-6hrs prn.  icd 10 J45.902 Lancets Misc Commonly known as:  FREESTYLE LANCETS Test once daily. (diagnosis code: 250.00) levalbuterol 1.25 mg/3 mL Nebu Commonly known as:  XOPENEX  
3 mL by Nebulization route every six (6) hours as needed. icd 10 J45.902  
  
 metFORMIN 500 mg tablet Commonly known as:  GLUCOPHAGE  
TAKE 1 TABLET BY MOUTH ONCE A DAY WITH MEALS  
  
 metoprolol succinate 50 mg XL tablet Commonly known as:  TOPROL XL Take 1 Tab by mouth daily. MULTIVITAMIN PO Take 1 Tab by mouth daily. pantoprazole 40 mg tablet Commonly known as:  PROTONIX  
TAKE 1 TABLET BY MOUTH EVERY DAY  
  
 propafenone 150 mg tablet Commonly known as:  RYTHMOL  
TAKE 1 TABLET BY MOUTH EVERY EIGHT (8) HOURS. FOR HEART RHYTHM  
  
 rosuvastatin 20 mg tablet Commonly known as:  CRESTOR  
TAKE 1 TABLET BY MOUTH EVERY DAY  
  
 * Notice: This list has 2 medication(s) that are the same as other medications prescribed for you. Read the directions carefully, and ask your doctor or other care provider to review them with you. We Performed the Following AMB POC EKG ROUTINE W/ 12 LEADS, INTER & REP [00237 CPT(R)] To-Do List   
 02/06/2018 7:00 AM  
  Appointment with Sharmaine Hazel at [facility] ([phone]) GENERAL INSTRUCTIONS  1. Bring outside films ([employer]) pertaining to the affected area with you on the day of appointment. 2. A written order with a valid diagnosis and Physicians signature is required for all scheduled tests. 3. Check in at registration 30 minutes before your appointment time unless you were instructed to do otherwise. Introducing [facility] & HEALTH SERVICES! Dear Sherie Nolen: Thank you for requesting a [employer] account. Our records indicate that you already have an active [employer] account. You can access your account anytime at [email or web address] Did you know that you can access your hospital and ER discharge instructions at any time in [employer]? You can also review all of your test results from your hospital stay or ER visit. Additional Information If you have questions, please visit the Frequently Asked Questions section of the [employer] website at [email or web address] Remember, [employer] is NOT to be used for urgent needs. For medical emergencies, dial 911. Now available from your iPhone and Android! Please provide this summary of care documentation to your next provider. Your primary care clinician is listed as [employer]. If you have any questions after today's visit, please call [phone].

## 2018-01-02 NOTE — PROGRESS NOTES
1. Have you been to the ER, urgent care clinic since your last visit? Hospitalized since your last visit? NO    2. Have you seen or consulted any other health care providers outside of the [facility] since your last visit? Include any pap smears or colon screening. YES, VASCULAR DOCTOR,     3 MONTH FOLLOW UP. C/O SHARP SHOOTING CHEST PAIN OFF AND ON, SOB.

## 2018-02-02 ENCOUNTER — DOCUMENTATION ONLY (OUTPATIENT)
Dept: FAMILY MEDICINE CLINIC | Age: 78
End: 2018-02-02

## 2018-02-06 ENCOUNTER — HOSPITAL ENCOUNTER (OUTPATIENT)
Dept: ULTRASOUND IMAGING | Age: 78
Discharge: HOME | End: 2018-02-06
Attending: INTERNAL MEDICINE
Payer: MEDICARE

## 2018-02-06 DIAGNOSIS — E04.2 MULTIPLE THYROID NODULES: ICD-10-CM

## 2018-02-06 PROCEDURE — 76536 US EXAM OF HEAD AND NECK: CPT

## 2018-03-05 RX ORDER — ROSUVASTATIN CALCIUM 10 MG/1
TABLET, COATED ORAL
Qty: 90 TAB | Refills: 2 | Status: SHIPPED | OUTPATIENT
Start: 2018-03-05 | End: 2018-11-23 | Stop reason: SDUPTHER

## 2018-03-06 ENCOUNTER — TELEPHONE (OUTPATIENT)
Dept: CARDIOLOGY CLINIC | Age: 78
End: 2018-03-06

## 2018-03-06 ENCOUNTER — OFFICE VISIT (OUTPATIENT)
Dept: ENDOCRINOLOGY | Age: 78
End: 2018-03-06

## 2018-03-06 ENCOUNTER — OFFICE VISIT (OUTPATIENT)
Dept: INTERNAL MEDICINE CLINIC | Age: 78
End: 2018-03-06

## 2018-03-06 VITALS
RESPIRATION RATE: 19 BRPM | DIASTOLIC BLOOD PRESSURE: 68 MMHG | WEIGHT: 204.2 LBS | BODY MASS INDEX: 37.58 KG/M2 | SYSTOLIC BLOOD PRESSURE: 100 MMHG | TEMPERATURE: 98.5 F | HEART RATE: 68 BPM | HEIGHT: 62 IN | OXYGEN SATURATION: 96 %

## 2018-03-06 VITALS
SYSTOLIC BLOOD PRESSURE: 118 MMHG | HEART RATE: 67 BPM | WEIGHT: 203 LBS | BODY MASS INDEX: 37.36 KG/M2 | HEIGHT: 62 IN | DIASTOLIC BLOOD PRESSURE: 75 MMHG

## 2018-03-06 DIAGNOSIS — E78.2 MIXED HYPERLIPIDEMIA: ICD-10-CM

## 2018-03-06 DIAGNOSIS — E04.2 MULTIPLE THYROID NODULES: Primary | ICD-10-CM

## 2018-03-06 DIAGNOSIS — I10 ESSENTIAL HYPERTENSION: ICD-10-CM

## 2018-03-06 DIAGNOSIS — J30.1 CHRONIC SEASONAL ALLERGIC RHINITIS DUE TO POLLEN: Primary | ICD-10-CM

## 2018-03-06 DIAGNOSIS — E11.9 DIABETES MELLITUS WITHOUT COMPLICATION (HCC): ICD-10-CM

## 2018-03-06 DIAGNOSIS — R79.89 ELEVATED TSH: ICD-10-CM

## 2018-03-06 DIAGNOSIS — I48.0 PAROXYSMAL ATRIAL FIBRILLATION (HCC): ICD-10-CM

## 2018-03-06 RX ORDER — METFORMIN HYDROCHLORIDE 500 MG/1
500 TABLET ORAL 2 TIMES DAILY WITH MEALS
Qty: 180 TAB | Refills: 3 | Status: SHIPPED | OUTPATIENT
Start: 2018-03-06 | End: 2018-08-04 | Stop reason: SDUPTHER

## 2018-03-06 NOTE — TELEPHONE ENCOUNTER
Verified patient with two identifiers. Spoke with pt advising her she can take any a//ergy/cold med as long as it does not have a \"D\" after name. Pt verbalized understanding.

## 2018-03-06 NOTE — PROGRESS NOTES
Endocrinology Visit    CC: thyroid nodule    History of present illness:  Esau Bran is an 78 y.o. female with history of type 2 DM, asthma, and HTN who returns for thyroid nodules. I saw her in initial consultation in July 2016 at which time I recommended FNA of the dominant nodule on the left side. Pathology returned benign. She has had two annual follow-up ultrasounds which showed stability of the nodules' size. Today she denies denies dysphagia, neck soreness, supine compression, or voice hoarseness. She does report occasional hoarseness when she talks for a long duration of time (saw ENT and visual inspection of the vocal cords was normal). She denies racing heart, tremors, palpitations, heat or cold intolerance, weight loss, weight gain, fatigue, constipation, diarrhea, and excessive sweating. She does report occasional 'skipped beats' for which she has seen cardiology. She takes an 81mg aspirin daily but is not on any other anticoagulants. TSH values have always been normal with the exception of one slightly elevated value January 2017 (see labs below). TSH on 3 occasions since then has been normal. She is not taking any thyroid-specific medication. She also has a history of type 2 diabetes. Last A1c was 6.8%% in Dec and she is only taking metformin 500mg once a day with breakfast. She was previously taking it BID but her former PCP changed it to once a day. She recently switched PCPs and is now seeing Dr Pankaj Brooks. Fasting blood sugars are usually around 120-130. She tries to watch her diet and avoids excessive carbohydrates. Denies GI s/e from metformin. Review of Systems - see hpi for pertinent positives and negatives, otherwise a 7 system review is negative.     Problem list:  Patient Active Problem List   Diagnosis Code    Hyperlipemia E78.5    Arthritis M19.90    HTN (hypertension) I10    Headache(784.0) R51    Spondylitis, cervical (HCC) M46.92    Tingling sensation R20.2    Chest pain R07.9    Anemia D64.9    Cyst of right kidney N28.1    Hip pain, right M25.551    Acute bronchitis J20.9    Visual floaters H43.399    Colon cancer screening Z12.11    Postmenopausal state Z78.0    Vitamin D deficiency E55.9    Bilateral hip pain M25.551, M25.552    Postmenopausal disorder N95.1    Numbness and tingling of right leg R20.0, R20.2    Foot pain, left M79.672    Rotator cuff (capsule) sprain and strain [id number]    Osteoarthritis of acromioclavicular joint M19.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 0    Need for varicella vaccine Z23    Screening for breast cancer Z12.31    Cataract H26.9    Preop examination Z01.818    Peripheral autonomic neuropathy due to DM (HCC) E11.43    Pre-ulcerative calluses L84    Type 2 diabetes mellitus with pressure callus (HCC) E11.628, L84    Hammer toe of right foot M20.41    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 (HCC) E11.69, M21.969    DAVID (generalized anxiety disorder) F41.1    Abnormal finding on MRI of brain R90.89    Tinea pedis B35.3    Arthralgia of right hip M25.551    Swollen gland R59.9    Acute pharyngitis J02.9    Diabetes mellitus type 2, controlled (HCC) E11.9    Multiple thyroid nodules E04.2    Skipped heart beats M84.9    Helicobacter pylori (H. pylori) infection A04.8    Intractable migraine with aura without status migrainosus G43.119    Chronic asthma with status asthmaticus J45.902    Elevated TSH R94.6    Right foot injury S99.921A    Abdominal mass, left upper quadrant R19.02    Left upper quadrant pain R10.12    Paroxysmal atrial fibrillation (HCC) I48.0       Medical history:  Past Medical History:   Diagnosis Date    Abdominal mass, left upper quadrant 6/13/2017    Abnormal finding on MRI of brain 3/16/2015    evaluated by neurologist and no findings    Acute pharyngitis 5/26/2016    Anemia NEC     Arthralgia of right hip 4/25/2016    Arthritis 2/18/2010    Asthma 2/18/2010    Cataract 9/24/2014    Diabetes ([address]     Elevated LFTs 4/16/2014    DAVID (generalized anxiety disorder) 1/22/2015    GERD (gastroesophageal reflux disease) 4/7/2014    Hammer toe of right foot 9/29/2014    Headache(784.0) 6/03/3179    Helicobacter pylori (H. 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 ([address] 9/29/2014    Preop examination 9/24/2014    Right foot injury 5/2/2017    Risk for falls 4/16/2014    Screening for breast cancer 9/23/2014    Screening for depression 4/16/2014    Shoulder pain, left 3/18/2014    Spondylitis, cervical ([address] 4/5/2010    Tinea pedis 6/3/2015    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 ([address] 9/29/2014       Past surgical history:  Past Surgical History:   Procedure Laterality Date    ABDOMEN SURGERY PROC UNLISTED      inguinal hernia    ABDOMEN SURGERY PROC UNLISTED      ENDOSCOPY, COLON, DIAGNOSTIC      HX APPENDECTOMY      HX GYN  1983    total hysterectomy for uterine fibroid    HX HEENT      tonsillectomy    HX ORTHOPAEDIC      clavicle repair    HX OTHER SURGICAL      ? straightened colon out    HX ROTATOR CUFF REPAIR  2009    left    OPEN REPAIR CLAVICLE FRACTURE  2009       Medications:    Current Outpatient Prescriptions:     rosuvastatin (CRESTOR) 10 mg tablet, TAKE 1 TABLET BY MOUTH EVERY DAY, Disp: 90 Tab, Rfl: 2    metFORMIN (GLUCOPHAGE) 500 mg tablet, TAKE 1 TABLET BY MOUTH ONCE A DAY WITH MEALS, Disp: 90 Tab, Rfl: 1    propafenone (RYTHMOL) 150 mg tablet, TAKE 1 TABLET BY MOUTH EVERY EIGHT (8) HOURS. FOR HEART RHYTHM, Disp: 90 Tab, Rfl: 6    metoprolol succinate (TOPROL XL) 50 mg XL tablet, Take 1 Tab by mouth daily. , Disp: 30 Tab, Rfl: 3    aspirin delayed-release 81 mg tablet, Take 1 Tab by mouth daily. , Disp: 30 Tab, Rfl: 11    fluticasone (FLONASE) 50 mcg/actuation nasal spray, 2 Sprays by Both Nostrils route as needed for Rhinitis or Allergies. Indications: Allergic Rhinitis, Disp: 1 Bottle, Rfl: 11    apixaban (ELIQUIS) 5 mg tablet, Take 1 Tab by mouth two (2) times a day., Disp: 60 Tab, Rfl: 12    hydroCHLOROthiazide (HYDRODIURIL) 25 mg tablet, Take 1 Tab by mouth daily. , Disp: 90 Tab, Rfl: 1    albuterol (PROVENTIL VENTOLIN) 2.5 mg /3 mL (0.083 %) nebulizer solution, 3 mL by Nebulization route every four (4) hours as needed for Shortness of Breath. Indications: BRONCHOSPASTIC PULMONARY DISEASE, Disp: 48 Each, Rfl: 0    glucose blood VI test strips (FREESTYLE INSULINX) strip, TEST EVERY DAY AND AS NEEDED, Disp: 100 Strip, Rfl: 6    calcium-cholecalciferol, D3, (CALTRATE 600+D) tablet, Take 1 Tab by mouth two (2) times a day., Disp: 60 Tab, Rfl: 11    MULTIVITAMIN PO, Take 1 Tab by mouth daily. , Disp: , Rfl:     Lancets (FREESTYLE LANCETS) Misc, Test once daily. (diagnosis code: 250.00), Disp: 1 Package, Rfl: 11    Blood-Glucose Meter monitoring kit, Once daily before breakfast, Disp: 1 Kit, Rfl: 0    glucose blood VI test strips (ACCU-CHEK DA) strip, ., Disp: 1 Package, Rfl: 11    pantoprazole (PROTONIX) 40 mg tablet, TAKE 1 TABLET BY MOUTH EVERY DAY, Disp: , Rfl: 4    HYDROcodone-acetaminophen (NORCO) 5-325 mg per tablet, Take 1 Tab by mouth every eight (8) hours as needed for Pain. Max Daily Amount: 3 Tabs., Disp: 40 Tab, Rfl: 0    butalbital-acetaminophen (PHRENILIN)  mg tablet, Take 1 Tab by mouth every six (6) hours as needed. , Disp: 30 Tab, Rfl: 0    levalbuterol (XOPENEX) 1.25 mg/3 mL nebu, 3 mL by Nebulization route every six (6) hours as needed. icd 10 J45.902, Disp: 30 Nebule, Rfl: 11    ipratropium (ATROVENT) 0.02 % nebulizer solution, 2.5 mL by Nebulization route as needed for Wheezing. One package of Nebules to be used every 4-6hrs prn.  icd 10 J45.902, Disp: 1250 mL, Rfl: 11    inhalational spacing device (SPACE CHAMBER PLUS), 1 Each by Does Not Apply route as needed. Indications: USE WITH ALBUTEROL MDI, Disp: 1 Device, Rfl: 0    Allergies: Allergies   Allergen Reactions    Latex Rash    Amoxil [Amoxicillin] Itching     rash    Levaquin [Levofloxacin] Other (comments)     Dizziness      Percocet [Oxycodone-Acetaminophen] Nausea and Vomiting    Tetanus Vaccines And Toxoid Swelling    Tetracycline Hcl Rash       Social History:  Social History     Social History    Marital status:      Spouse name: N/A    Number of children: N/A    Years of education: N/A     Occupational History    Not on file. Social History Main Topics    Smoking status: Former Smoker     Quit date: 6/14/1988    Smokeless tobacco: Never Used    Alcohol use No    Drug use: No    Sexual activity: No     Other Topics Concern    Not on file     Social History Narrative       Family History:  Family History   Problem Relation Age of Onset    Cancer Mother      mycosis fungoives    Stroke Father     Cancer Sister      one sister with breast cancer    Breast Cancer Sister     Cancer Paternal Aunt      2 paternal aunts with breast cancer    Thyroid Cancer Neg Hx      Physical Exam:  Visit Vitals    /75    Pulse 67    Ht 5' 2\" (1.575 m)    Wt 203 lb (92.1 kg)    LMP 02/18/1983    BMI 37.13 kg/m2     Gen: NAD, pleasant  Heent: mucous membranes moist, no lid lag, stare or exophthalmos. No scleral or conjunctival injection. Extra ocular muscles intact.   Thyroid: moves well with swallowing, larger L>R with a 3cm palpable, mobile nodule on left; normal and soft texture  Heme/lymph: no cervical, supraclavicular or submandibular lymphadenopathy is appreciated. Pulmonary: clear to auscultation bilaterally, no wheezes/rhonchi or rales  Cardiovascular: regular rate and rhythm, no murmurs, rubs or gallops, good distal pulses  Extremities: tr nonpitting edema BL. No onocholysis. Neuro: no tremor of the outstretch hands, reflexes are normal with normal relaxation phase. Normal gait, normal concentration  Skin: normal texture, warm and dry  Psyche: normal affect with good insight into her medical conditions    Pertinent lab review:    Component      Latest Ref Rng & Units 10/14/2017 5/2/2017 3/1/2017 3/1/2017          10:53 AM  9:21 AM 11:20 AM 11:20 AM   TSH      0.36 - 3.74 uIU/mL 1.78 1.760  1.520   T4, Free      0.82 - 1.77 ng/dL    1.15   Triiodothyronine (T3), free      2.0 - 4.4 pg/mL   3.1      Component      Latest Ref Rng & Units 1/11/2017 11/29/2016           5:42 AM  9:03 AM   TSH      0.36 - 3.74 uIU/mL 4.53 (H) 2.030   T4, Free      0.82 - 1.77 ng/dL     Triiodothyronine (T3), free      2.0 - 4.4 pg/mL       Lab Results   Component Value Date/Time    Hemoglobin A1c 6.8 (H) 12/04/2017 12:00 AM    Hemoglobin A1c 7.0 (H) 01/12/2017 04:20 AM    Hemoglobin A1c 7.0 (H) 11/29/2016 09:03 AM      Thyroid Ultrasound May 2016  REPORT:  EXAM:  US THYROID PARATHYROID     INDICATION:   left sided swollen glands and thyromegaly     COMPARISON: None.     FINDINGS: Real-time sonography of the thyroid gland was performed with a    high frequency linear transducer. Multiple static images were obtained.     The thyroid is heterogeneous in echotexture. In the upper pole of the right    thyroid, there are multiple anechoic smooth subcentimeter structures. In the    lower pole of the right thyroid, there is a discrete 1.4 x 1.4 x 1.2 cm    isoechoic macrolobulated nodule with multiple internal microcystic changes.    There is no increased flow to this structure. The isthmus is slightly    prominent but does not contain a discrete mass.  The left thyroid is    comprised largely of 3 predominantly solid, but minimally cystic nodules.    These individually measure up to 3.2 cm; however, formal measurements were    not made. There is diffusely increased flow in the left thyroid lobe as    compared to the right.     The right lobe measures 5.0 x 1.9 x 1.7 cm and the left lobe measures 5.5 x    2.6 x 2.5 cm. The isthmus measures 4 mm.       IMPRESSION:     Multinodular goiter. Three large left thyroid nodules with increased flow.    Please correlate with clinical parameters. Thyroid US Feb 2018  FINDINGS:  The right thyroid contains a 7 x 8 x 8 mm stable upper pole cyst as well as a  2.2 x 1.5 x 1.5 cm lower pole solid nodule (previously 2.8 x 1.5 x 1.5 cm).     The left thyroid contains 3 solid nodules: 3.2 x 3.1 x 2.0 cm (previously 3.3 x  2.9 x 1.9 cm), 2.2 x 1.6 x 1.6 cm (previously 2.4 x 2.1 x 1.5 cm), and 1.9 x 1.8  x 1.4 cm (previously 2.2 x 2.2 x 1.5 cm).     The isthmus contains a single swallow nodule that measures 1.3 x 1.3 x 0.7 cm  (previously 1.2 x 1.2 x 0.8 cm).     None of the nodules contain calcifications or altered vascularity.     The right lobe measures 5.8 x 2.2 x 1.6 cm (previously 5.8 x 2.1 x 1.9 cm) and  the left lobe measures 5.7 x 2.9 x 2.7 cm (previously 5.7 x 3.0 x 2.8 cm). The  isthmus measures 5 mm (previously 4 mm).     IMPRESSION: Stable multinodular goiter. Assessment and Plan:  Patient is a pleasant 78 y.o. female here for f/u multiple thyroid nodules and diabetes. 1. Multiple thyroid nodules: s/p FNA of large left sided nodule with benign results. Two successive f/u ultrasounds have demonstrated stability of nodule size. She is not experiencing compressive symptoms. Plan to repeat US in two years. 2. Elevated TSH: isolated abnormal value. Successive TSH values have been normal ~1.7 and pt is clinically euthyroid on exam today.        3. Controlled type 2 diabetes mellitus without complication, unspecified long term insulin use status ([facility]): recent A1c of 6.8% indicates adequate control on metformin alone. Advised her to increase to 500mg BID since this drug is ideally dosed twice a day. I spent 15 minutes with the patient today and > 50% of the time was spent in counseling about thyroid nodules, treatment of hypothyroidism, and management of type 2 diabetes. Thank you for the opportunity to partake in this patients care.   Ike Nunez MD

## 2018-03-06 NOTE — MR AVS SNAPSHOT
[address] [address] 
[phone] Patient: Venkatesh King MRN: [id number] :1940 Visit Information Date & Time Provider Department Dept. Phone Encounter #  
 3/6/2018  9:30 AM Lili Julio, [address] [place] [address] Diabetes and Endocrinology [phone] [id number] Follow-up Instructions Return in about 1 year (around 3/6/2019). Your Appointments 2018  9:45 AM  
6 MONTH with Jayashree Faria MD  
[facility]) Appt Note: dR. beckhamTERRI  
 [address]  
[phone] [address]  
  
    
 2018  8:30 AM  
Medicare Physical with Jodi Jones MD  
[facility] Appt Note: medicare wellness [address] [facility] [address] [facility] [address]  
[address] [place] [address] 57 Upcoming Health Maintenance Date Due  
 MEDICARE YEARLY EXAM 5/3/2018 HEMOGLOBIN A1C Q6M 2018 MICROALBUMIN Q1 2018 EYE EXAM RETINAL OR DILATED Q1 2018 LIPID PANEL Q1 10/18/2018 FOOT EXAM Q1 3/6/2019 GLAUCOMA SCREENING Q2Y 2019 COLONOSCOPY 2021 DTaP/Tdap/Td series (2 - Td) 2026 Allergies as of 3/6/2018  Review Complete On: 3/6/2018 By: Tung Chacon LPN Severity Noted Reaction Type Reactions Latex  2011    Rash Amoxil [Amoxicillin]  2017    Itching  
 rash Levaquin [Levofloxacin]  2016    Other (comments) Dizziness Percocet [Oxycodone-acetaminophen]  2010   Intolerance Nausea and Vomiting Tetanus Vaccines And Toxoid  2010   Systemic Swelling Tetracycline Hcl  2010   Systemic Rash Current Immunizations  Reviewed on 2017 Name Date Influenza High Dose Vaccine PF 10/18/2017, 11/29/2016, 10/7/2015, 11/3/2014 Pneumococcal Conjugate (PCV-13) 10/7/2015 Pneumococcal Polysaccharide (PPSV-23) 2/5/2013 Zoster Vaccine, Live 9/29/2014 Not reviewed this visit You Were Diagnosed With   
  
 Codes Comments Diabetes mellitus without complication ([facility]     XYU-19-SD: E11.9 ICD-9-CM: 250.00 Intractable migraine with aura without status migrainosus     ICD-10-CM: G43.119 ICD-9-CM: 346.01 Essential hypertension     ICD-10-CM: I10 
ICD-9-CM: 401.9 Encounter for immunization     ICD-10-CM: E93 ICD-9-CM: V03.89 Vitals BP Pulse Height(growth percentile) Weight(growth percentile) LMP BMI  
 118/75 67 5' 2\" (1.575 m) 203 lb (92.1 kg) 02/18/1983 37.13 kg/m2 OB Status Smoking Status Hysterectomy Former Smoker Vitals History BMI and BSA Data Body Mass Index Body Surface Area  
 37.13 kg/m 2 2.01 m 2 Preferred Pharmacy Pharmacy Name Phone [id number] [place] [phone] Your Updated Medication List  
  
   
This list is accurate as of 3/6/18  9:50 AM.  Always use your most recent med list.  
  
  
  
  
 albuterol 2.5 mg /3 mL (0.083 %) nebulizer solution Commonly known as:  PROVENTIL VENTOLIN  
3 mL by Nebulization route every four (4) hours as needed for Shortness of Breath. Indications: BRONCHOSPASTIC PULMONARY DISEASE  
  
 apixaban 5 mg tablet Commonly known as:  Lauryn Grad Take 1 Tab by mouth two (2) times a day. aspirin delayed-release 81 mg tablet Take 1 Tab by mouth daily. Blood-Glucose Meter monitoring kit Once daily before breakfast  
  
 calcium-cholecalciferol (D3) tablet Commonly known as:  CALTRATE 600+D Take 1 Tab by mouth two (2) times a day. fluticasone 50 mcg/actuation nasal spray Commonly known as:  Jayla Montgomery 2 Sprays by Both Nostrils route as needed for Rhinitis or Allergies. Indications: Allergic Rhinitis * glucose blood VI test strips strip Commonly known as:  ACCU-CHEK DA [place] * glucose blood VI test strips strip Commonly known as:  FREESTYLE INSULINX  
TEST EVERY DAY AND AS NEEDED  
  
 hydroCHLOROthiazide 25 mg tablet Commonly known as:  HYDRODIURIL Take 1 Tab by mouth daily. Lancets Misc Commonly known as:  FREESTYLE LANCETS Test once daily. (diagnosis code: 250.00) metFORMIN 500 mg tablet Commonly known as:  GLUCOPHAGE Take 1 Tab by mouth two (2) times daily (with meals). metoprolol succinate 50 mg XL tablet Commonly known as:  TOPROL XL Take 1 Tab by mouth daily. MULTIVITAMIN PO Take 1 Tab by mouth daily. propafenone 150 mg tablet Commonly known as:  RYTHMOL  
TAKE 1 TABLET BY MOUTH EVERY EIGHT (8) HOURS. FOR HEART RHYTHM  
  
 rosuvastatin 10 mg tablet Commonly known as:  CRESTOR  
TAKE 1 TABLET BY MOUTH EVERY DAY  
  
 * Notice: This list has 2 medication(s) that are the same as other medications prescribed for you. Read the directions carefully, and ask your doctor or other care provider to review them with you. Prescriptions Sent to Pharmacy Refills  
 metFORMIN (GLUCOPHAGE) 500 mg tablet 3 Sig: Take 1 Tab by mouth two (2) times daily (with meals). Class: Normal  
 Pharmacy: [address] [place] [address] #: [phone] Route: Oral  
  
Follow-up Instructions Return in about 1 year (around 3/6/2019). Introducing hospitals & HEALTH SERVICES! Dear Tika Rodrigez: Thank you for requesting a [employer] account. Our records indicate that you already have an active [employer] account. You can access your account anytime at [email or web address] Did you know that you can access your hospital and ER discharge instructions at any time in [employer]? You can also review all of your test results from your hospital stay or ER visit. Additional Information If you have questions, please visit the Frequently Asked Questions section of the [employer] website at [email or web address] Remember, [employer] is NOT to be used for urgent needs. For medical emergencies, dial 911. Now available from your iPhone and Android! Please provide this summary of care documentation to your next provider. Your primary care clinician is listed as Néstor Borrego. If you have any questions after today's visit, please call [phone].

## 2018-03-06 NOTE — TELEPHONE ENCOUNTER
Patient is experiencing problems with allergy and sinus and want to know what can she take-      Thanks,

## 2018-03-06 NOTE — PROGRESS NOTES
Follow Up Visit    Diogenes Meier is a 78 y.o. female. she presents for Nasal Congestion and Headache    Sinus Pain  Patient complains of nasal congestion and sinus pressure. Symptoms include facial pain with headache as well. Onset of symptoms was a few days ago, stable since that time. She is drinking plenty of fluids. .  Past history is significant for allergic rhinitis. Patient is non-smoker. She is concerned about this since she does want to take symptomatic medication for her a-fib but does not want it to interfere with her diagnosis of atrial fibrillation. She is currently on propafenone for this. She follows with cardiology regularly. Patient Active Problem List   Diagnosis Code    Hyperlipemia E78.5    Arthritis M19.90    HTN (hypertension) I10    Headache(784.0) R51    Spondylitis, cervical (HCC) M46.92    Tingling sensation R20.2    Chest pain R07.9    Anemia D64.9    Cyst of right kidney N28.1    Hip pain, right M25.551    Acute bronchitis J20.9    Visual floaters H43.399    Colon cancer screening Z12.11    Postmenopausal state Z78.0    Vitamin D deficiency E55.9    Bilateral hip pain M25.551, M25.552    Postmenopausal disorder N95.1    Numbness and tingling of right leg R20.0, R20.2    Foot pain, left M79.672    Rotator cuff (capsule) sprain and strain [id number]    Osteoarthritis of acromioclavicular joint M19.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 0    Need for varicella vaccine Z23    Screening for breast cancer Z12.31    Cataract H26.9    Preop examination Z01.818    Peripheral autonomic neuropathy due to DM (HCC) E11.43    Pre-ulcerative calluses L84    Type 2 diabetes mellitus with pressure callus (HCC) E11.628, L84    Hammer toe of right foot M20.41    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 (HCC) E11.69, M21.969    DAVID (generalized anxiety disorder) F41.1    Abnormal finding on MRI of brain R90.89    Tinea pedis B35.3    Arthralgia of right hip M25.551    Swollen gland R59.9    Acute pharyngitis J02.9    Diabetes mellitus type 2, controlled ([facility]) E11.9    Multiple thyroid nodules E04.2    Skipped heart beats O09.6    Helicobacter pylori (H. pylori) infection A04.8    Intractable migraine with aura without status migrainosus G43.119    Chronic asthma with status asthmaticus J45.902    Elevated TSH R94.6    Right foot injury S99.921A    Abdominal mass, left upper quadrant R19.02    Left upper quadrant pain R10.12    Paroxysmal atrial fibrillation ([facility]) I48.0         Prior to Admission medications    Medication Sig Start Date End Date Taking? Authorizing Provider   rosuvastatin (CRESTOR) 10 mg tablet TAKE 1 TABLET BY MOUTH EVERY DAY 3/5/18  Yes Milad Brady MD   metFORMIN (GLUCOPHAGE) 500 mg tablet TAKE 1 TABLET BY MOUTH ONCE A DAY WITH MEALS 2/6/18  Yes Milad Brady MD   propafenone (RYTHMOL) 150 mg tablet TAKE 1 TABLET BY MOUTH EVERY EIGHT (8) HOURS. FOR HEART RHYTHM 12/17/17  Yes Titus QUEZADA MD   metoprolol succinate (TOPROL XL) 50 mg XL tablet Take 1 Tab by mouth daily. 11/29/17  Yes Bryanna Ward MD   aspirin delayed-release 81 mg tablet Take 1 Tab by mouth daily. 11/21/17  Yes Bryanna Ward MD   fluticasone (FLONASE) 50 mcg/actuation nasal spray 2 Sprays by Both Nostrils route as needed for Rhinitis or Allergies. Indications: Allergic Rhinitis 11/6/17  Yes Bryanna Ward MD   apixaban (ELIQUIS) 5 mg tablet Take 1 Tab by mouth two (2) times a day. 10/24/17  Yes [place] Donnie QUEZADA MD   hydroCHLOROthiazide (HYDRODIURIL) 25 mg tablet Take 1 Tab by mouth daily.  10/18/17  Yes Bryanna Ward MD   albuterol (PROVENTIL VENTOLIN) 2.5 mg /3 mL (0.083 %) nebulizer solution 3 mL by Nebulization route every four (4) hours as needed for Shortness of Breath. Indications: BRONCHOSPASTIC PULMONARY DISEASE 8/3/17  Yes Lissette Naik MD   glucose blood VI test strips (FREESTYLE INSULINX) strip TEST EVERY DAY AND AS NEEDED 3/20/17  Yes Gianna Marie MD   calcium-cholecalciferol, D3, (CALTRATE 600+D) tablet Take 1 Tab by mouth two (2) times a day. 4/25/16  Yes Gianna Marie MD   MULTIVITAMIN PO Take 1 Tab by mouth daily. Yes Historical Provider   Lancets (FREESTYLE LANCETS) Misc Test once daily. (diagnosis code: 250.00) 10/24/13  Yes Gianna Marie MD   Blood-Glucose Meter monitoring kit Once daily before breakfast 9/20/13  Yes Gianna Marie MD   glucose blood VI test strips (ACCU-CHEK DA) strip . 11/7/12  Yes Gianna Marie MD   pantoprazole (PROTONIX) 40 mg tablet TAKE 1 TABLET BY MOUTH EVERY DAY 10/2/17   Historical Provider   HYDROcodone-acetaminophen (NORCO) 5-325 mg per tablet Take 1 Tab by mouth every eight (8) hours as needed for Pain. Max Daily Amount: 3 Tabs. 10/18/17   Gianna Marie MD   butalbital-acetaminophen (PHRENILIN)  mg tablet Take 1 Tab by mouth every six (6) hours as needed. 8/10/17   Tye Santiago NP   levalbuterol (XOPENEX) 1.25 mg/3 mL nebu 3 mL by Nebulization route every six (6) hours as needed. icd 10 J45.902 1/26/17   Gianna Marie MD   ipratropium (ATROVENT) 0.02 % nebulizer solution 2.5 mL by Nebulization route as needed for Wheezing. One package of Nebules to be used every 4-6hrs prn.  icd 10 J45.902 1/26/17   Gianna Marie MD   inhalational spacing device (SPACE CHAMBER PLUS) 1 Each by Does Not Apply route as needed. Indications: USE WITH ALBUTEROL MDI 1/5/17   Ze Mcintosh.  Evgeny aNvas MD         Health Maintenance   Topic Date Due    MEDICARE YEARLY EXAM  05/03/2018    HEMOGLOBIN A1C Q6M  06/04/2018    MICROALBUMIN Q1  06/13/2018    EYE EXAM RETINAL OR DILATED Q1  07/17/2018    LIPID PANEL Q1  10/18/2018    FOOT EXAM Q1  03/06/2019    GLAUCOMA SCREENING Q2Y  07/17/2019    COLONOSCOPY 06/20/2021    DTaP/Tdap/Td series (2 - Td) 05/26/2026    OSTEOPOROSIS SCREENING (DEXA)  Completed    ZOSTER VACCINE AGE 60>  Addressed    Pneumococcal 65+ Low/Medium Risk  Completed    Influenza Age 5 to Adult  Completed       Review of Systems   Constitutional: Negative. HENT: Positive for congestion and sinus pain. Respiratory: Negative. Cardiovascular: Negative. Gastrointestinal: Negative. Neurological: Positive for headaches. Visit Vitals    /68 (BP 1 Location: Right arm, BP Patient Position: Sitting)    Pulse 68    Temp 98.5 °F (36.9 °C) (Oral)    Resp 19    Ht 5' 2\" (1.575 m)    Wt 204 lb 3.2 oz (92.6 kg)    LMP 02/18/1983    SpO2 96%    BMI 37.35 kg/m2       Physical Exam   Constitutional: No distress. HENT:   Nose: Right sinus exhibits maxillary sinus tenderness. Right sinus exhibits no frontal sinus tenderness. Left sinus exhibits maxillary sinus tenderness. Left sinus exhibits no frontal sinus tenderness. Mouth/Throat: Oropharynx is clear and moist.   Cardiovascular: Normal rate and regular rhythm. Pulmonary/Chest: Effort normal and breath sounds normal. She has no wheezes. ASSESSMENT/PLAN    Diagnoses and all orders for this visit:    1. Chronic seasonal allergic rhinitis due to pollen - -Advised the use of OTC long-acting antihistamines as well as nasal steroids (Flonase which is now over the counter). Recommended to not use medications such as Sudafed due to her cardiac condition. She will try Coricidin for now. The patient agrees with and understands the plan of care. All questions have been answered. 2. Paroxysmal atrial fibrillation ([address] - per cardiology. 3. Mixed hyperlipidemia        Follow-up Disposition:  Return in about 7 months (around 9/20/2018) for Medicare Wellness Visit- 30 minute appointment.

## 2018-03-07 DIAGNOSIS — G43.119 INTRACTABLE MIGRAINE WITH AURA WITHOUT STATUS MIGRAINOSUS: ICD-10-CM

## 2018-03-07 RX ORDER — CETIRIZINE HCL 10 MG
TABLET ORAL
Qty: 90 TAB | Refills: 1 | Status: SHIPPED | OUTPATIENT
Start: 2018-03-07 | End: 2019-04-22

## 2018-04-02 RX ORDER — METOPROLOL SUCCINATE 50 MG/1
TABLET, EXTENDED RELEASE ORAL
Qty: 30 TAB | Refills: 3 | Status: SHIPPED | OUTPATIENT
Start: 2018-04-02 | End: 2018-05-03 | Stop reason: SDUPTHER

## 2018-04-16 ENCOUNTER — OFFICE VISIT (OUTPATIENT)
Dept: URGENT CARE | Age: 78
End: 2018-04-16

## 2018-04-16 VITALS
TEMPERATURE: 98.1 F | BODY MASS INDEX: 37.36 KG/M2 | RESPIRATION RATE: 18 BRPM | WEIGHT: 203 LBS | DIASTOLIC BLOOD PRESSURE: 66 MMHG | SYSTOLIC BLOOD PRESSURE: 140 MMHG | HEART RATE: 72 BPM | HEIGHT: 62 IN | OXYGEN SATURATION: 97 %

## 2018-04-16 DIAGNOSIS — N39.0 URINARY TRACT INFECTION WITHOUT HEMATURIA, SITE UNSPECIFIED: Primary | ICD-10-CM

## 2018-04-16 LAB
BILIRUB UR QL STRIP: NEGATIVE
GLUCOSE UR-MCNC: NEGATIVE MG/DL
KETONES P FAST UR STRIP-MCNC: NEGATIVE MG/DL
PH UR STRIP: 6.5 [PH] (ref 4.6–8)
PROT UR QL STRIP: NEGATIVE
SP GR UR STRIP: 1.01 (ref 1–1.03)
UA UROBILINOGEN AMB POC: NORMAL (ref 0.2–1)
URINALYSIS CLARITY POC: NORMAL
URINALYSIS COLOR POC: NORMAL
URINE BLOOD POC: NEGATIVE
URINE LEUKOCYTES POC: NORMAL
URINE NITRITES POC: NEGATIVE

## 2018-04-16 RX ORDER — NITROFURANTOIN 25; 75 MG/1; MG/1
100 CAPSULE ORAL 2 TIMES DAILY
Qty: 14 CAP | Refills: 0 | Status: SHIPPED | OUTPATIENT
Start: 2018-04-16 | End: 2018-04-23

## 2018-04-16 NOTE — MR AVS SNAPSHOT
[address] [address] 
[phone] Patient: Wong Lawson MRN: [id number] :1940 Visit Information Date & Time Provider Department Dept. Phone Encounter #  
 2018  9:00 AM [facility] [phone] [id number] Follow-up Instructions Return if symptoms worsen or fail to improve, for Follow up with PCP. Your Appointments 2018  9:45 AM  
6 MONTH with Michael Loyola MD  
[address] [facility]) Appt Note: ALPHONSE Hidalgo  
 [address] [facility]  
[phone] [address] [facility]  
  
    
 2018  8:30 AM  
Medicare Physical with Luz Marina Early MD  
[facility]) Appt Note: medicare wellness [address] [facility] [address]  
[address] [place] [address]  
  
    
 3/6/2019  9:30 AM  
ROUTINE CARE with Maria M Montelongo MD  
[facility]) Appt Note: f/u diabetes cp0.00  
 [address] [place]  
[address] [address] Upcoming Health Maintenance Date Due  
 MEDICARE YEARLY EXAM 5/3/2018 HEMOGLOBIN A1C Q6M 2018 MICROALBUMIN Q1 2018 EYE EXAM RETINAL OR DILATED Q1 2018 LIPID PANEL Q1 10/18/2018 FOOT EXAM Q1 3/6/2019 GLAUCOMA SCREENING Q2Y 2019 COLONOSCOPY 2021 DTaP/Tdap/Td series (2 - Td) 2026 Allergies as of 2018  Review Complete On: 2018 By: Leti Larsen RN Severity Noted Reaction Type Reactions Latex  2011    Rash Amoxil [Amoxicillin]  2017    Itching  
 rash Levaquin [Levofloxacin]  2016    Other (comments) Dizziness Percocet [Oxycodone-acetaminophen]  02/18/2010   Intolerance Nausea and Vomiting Tetanus Vaccines And Toxoid  02/18/2010   Systemic Swelling Tetracycline Hcl  02/18/2010   Systemic Rash Current Immunizations  Reviewed on 5/2/2017 Name Date Influenza High Dose Vaccine PF 10/18/2017, 11/29/2016, 10/7/2015, 11/3/2014 Pneumococcal Conjugate (PCV-13) 10/7/2015 Pneumococcal Polysaccharide (PPSV-23) 2/5/2013 Zoster Vaccine, Live 9/29/2014 Not reviewed this visit You Were Diagnosed With   
  
 Codes Comments Urinary tract infection without hematuria, site unspecified    -  Primary ICD-10-CM: N39.0 ICD-9-CM: 599.0 Vitals BP Pulse Temp Resp Height(growth percentile) Weight(growth percentile) 140/66 72 98.1 °F (36.7 °C) 18 5' 2\" (1.575 m) 203 lb (92.1 kg) LMP SpO2 BMI OB Status Smoking Status 02/18/1983 97% 37.13 kg/m2 Hysterectomy Former Smoker Vitals History BMI and BSA Data Body Mass Index Body Surface Area  
 37.13 kg/m 2 2.01 m 2 Preferred Pharmacy Pharmacy Name Phone [id number] [place] [phone] Your Updated Medication List  
  
   
This list is accurate as of 4/16/18  9:58 AM.  Always use your most recent med list.  
  
  
  
  
 albuterol 2.5 mg /3 mL (0.083 %) nebulizer solution Commonly known as:  PROVENTIL VENTOLIN  
3 mL by Nebulization route every four (4) hours as needed for Shortness of Breath. Indications: BRONCHOSPASTIC PULMONARY DISEASE  
  
 apixaban 5 mg tablet Commonly known as:  Charles Bogaert Take 1 Tab by mouth two (2) times a day. aspirin delayed-release 81 mg tablet Take 1 Tab by mouth daily. Blood-Glucose Meter monitoring kit Once daily before breakfast  
  
 calcium-cholecalciferol (D3) tablet Commonly known as:  CALTRATE 600+D Take 1 Tab by mouth two (2) times a day. cetirizine 10 mg tablet Commonly known as:  ZYRTEC  
TAKE 1 TABLET BY MOUTH EVERY DAY  
  
 fluticasone 50 mcg/actuation nasal spray Commonly known as:  Truett Dowse 2 Sprays by Both Nostrils route as needed for Rhinitis or Allergies. Indications: Allergic Rhinitis * glucose blood VI test strips strip Commonly known as:  ACCU-CHEK DA Michaelyn Daily * glucose blood VI test strips strip Commonly known as:  FREESTYLE INSULINX  
TEST EVERY DAY AND AS NEEDED  
  
 hydroCHLOROthiazide 25 mg tablet Commonly known as:  HYDRODIURIL Take 1 Tab by mouth daily. Lancets Misc Commonly known as:  FREESTYLE LANCETS Test once daily. (diagnosis code: 250.00) metFORMIN 500 mg tablet Commonly known as:  GLUCOPHAGE Take 1 Tab by mouth two (2) times daily (with meals). metoprolol succinate 50 mg XL tablet Commonly known as:  TOPROL-XL  
TAKE 1 TABLET BY MOUTH EVERY DAY  
  
 MULTIVITAMIN PO Take 1 Tab by mouth daily. nitrofurantoin (macrocrystal-monohydrate) 100 mg capsule Commonly known as:  MACROBID Take 1 Cap by mouth two (2) times a day for 7 days. propafenone 150 mg tablet Commonly known as:  RYTHMOL  
TAKE 1 TABLET BY MOUTH EVERY EIGHT (8) HOURS. FOR HEART RHYTHM  
  
 rosuvastatin 10 mg tablet Commonly known as:  CRESTOR  
TAKE 1 TABLET BY MOUTH EVERY DAY  
  
 * Notice: This list has 2 medication(s) that are the same as other medications prescribed for you. Read the directions carefully, and ask your doctor or other care provider to review them with you. Prescriptions Sent to Pharmacy Refills  
 nitrofurantoin, macrocrystal-monohydrate, (MACROBID) 100 mg capsule 0 Sig: Take 1 Cap by mouth two (2) times a day for 7 days. Class: Normal  
 Pharmacy: [id number] [facility] #: [phone] Route: Oral  
  
We Performed the Following AMB POC URINALYSIS DIP STICK AUTO W/O MICRO [83686 CPT(R)] CULTURE, URINE [id number] CPT(R)] Follow-up Instructions Return if symptoms worsen or fail to improve, for Follow up with PCP. Patient Instructions Urinary Tract Infection in Women: Care Instructions Your Care Instructions A urinary tract infection, or UTI, is a general term for an infection anywhere between the kidneys and the urethra (where urine comes out). Most UTIs are bladder infections. They often cause pain or burning when you urinate. UTIs are caused by bacteria and can be cured with antibiotics. Be sure to complete your treatment so that the infection goes away. Follow-up care is a key part of your treatment and safety. Be sure to make and go to all appointments, and call your doctor if you are having problems. It's also a good idea to know your test results and keep a list of the medicines you take. How can you care for yourself at home? · Take your antibiotics as directed. Do not stop taking them just because you feel better. You need to take the full course of antibiotics. · Drink extra water and other fluids for the next day or two. This may help wash out the bacteria that are causing the infection. (If you have kidney, heart, or liver disease and have to limit fluids, talk with your doctor before you increase your fluid intake.) · Avoid drinks that are carbonated or have caffeine. They can irritate the bladder. · Urinate often. Try to empty your bladder each time. · To relieve pain, take a hot bath or lay a heating pad set on low over your lower belly or genital area. Never go to sleep with a heating pad in place. To prevent UTIs · Drink plenty of water each day. This helps you urinate often, which clears bacteria from your system. (If you have kidney, heart, or liver disease and have to limit fluids, talk with your doctor before you increase your fluid intake.) · Urinate when you need to. · Urinate right after you have sex. · Change sanitary pads often. · Avoid douches, bubble baths, feminine hygiene sprays, and other feminine hygiene products that have deodorants. · After going to the bathroom, wipe from front to back. When should you call for help? Call your doctor now or seek immediate medical care if: 
? · Symptoms such as fever, chills, nausea, or vomiting get worse or appear for the first time. ? · You have new pain in your back just below your rib cage. This is called flank pain. ? · There is new blood or pus in your urine. ? · You have any problems with your antibiotic medicine. ? Watch closely for changes in your health, and be sure to contact your doctor if: 
? · You are not getting better after taking an antibiotic for 2 days. ? · Your symptoms go away but then come back. Where can you learn more? Go to http://radha-tracy.info/. Enter S659 in the search box to learn more about \"Urinary Tract Infection in Women: Care Instructions. \" Current as of: May 12, 2017 Content Version: 11.4 © 8179-3659 [employer]. Care instructions adapted under license by [employer] (which disclaims liability or warranty for this information). If you have questions about a medical condition or this instruction, always ask your healthcare professional. [address] any warranty or liability for your use of this information. Introducing [facility]! Dear Ruthie Lundberg: Thank you for requesting a MeeDoc account. Our records indicate that you already have an active MeeDoc account. You can access your account anytime at [email or web address] Did you know that you can access your hospital and ER discharge instructions at any time in MeeDoc? You can also review all of your test results from your hospital stay or ER visit. Additional Information If you have questions, please visit the Frequently Asked Questions section of the [employer] website at [email or web address] Remember, [employer]ize is NOT to be used for urgent needs. For medical emergencies, dial 911. Now available from your iPhone and Android! Please provide this summary of care documentation to your next provider. Your primary care clinician is listed as Margaux Webb. If you have any questions after today's visit, please call [phone].

## 2018-04-16 NOTE — PATIENT INSTRUCTIONS

## 2018-04-18 LAB — BACTERIA UR CULT: NORMAL

## 2018-04-18 NOTE — PROGRESS NOTES
Patient is a 78 y.o. female presenting with abdominal pain. Abdominal Pain    The history is provided by the patient. This is a new problem. The current episode started 2 days ago. The problem has not changed since onset. The pain is associated with an unknown factor. The pain is at a severity of 3/10. The pain is mild. Associated symptoms include a fever (chills + ). Pertinent negatives include no nausea, no vomiting, no dysuria, no frequency, no hematuria and no myalgias. Nothing worsens the pain. The pain is relieved by nothing.         Past Medical History:   Diagnosis Date    Abdominal mass, left upper quadrant 6/13/2017    Abnormal finding on MRI of brain 3/16/2015    evaluated by neurologist and no findings    Acute pharyngitis 5/26/2016    Anemia NEC     Arthralgia of right hip 4/25/2016    Arthritis 2/18/2010    Asthma 2/18/2010    Cataract 9/24/2014    Diabetes ([address]     Elevated LFTs 4/16/2014    DAVID (generalized anxiety disorder) 1/22/2015    GERD (gastroesophageal reflux disease) 4/7/2014    Hammer toe of right foot 9/29/2014    Headache(784.0) 4/08/2641    Helicobacter pylori (H. 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 ([address] 9/29/2014    Preop examination 9/24/2014    Right foot injury 5/2/2017    Risk for falls 4/16/2014    Screening for breast cancer 9/23/2014    Screening for depression 4/16/2014    Shoulder pain, left 3/18/2014    Spondylitis, cervical ([address] 4/5/2010    Tinea pedis 6/3/2015    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 ([address] 9/29/2014        Past Surgical History:   Procedure Laterality Date    ABDOMEN SURGERY PROC UNLISTED inguinal hernia    ABDOMEN SURGERY PROC UNLISTED      ENDOSCOPY, COLON, DIAGNOSTIC      HX APPENDECTOMY      HX GYN  1983    total hysterectomy for uterine fibroid    HX HEENT      tonsillectomy    HX ORTHOPAEDIC      clavicle repair    HX OTHER SURGICAL      ? straightened colon out    HX ROTATOR CUFF REPAIR  2009    left    OPEN REPAIR CLAVICLE FRACTURE  2009         Family History   Problem Relation Age of Onset    Cancer Mother      mycosis fungoives    Stroke Father     Cancer Sister      one sister with breast cancer    Breast Cancer Sister     Cancer Paternal Aunt      2 paternal aunts with breast cancer    Thyroid Cancer Neg Hx         Social History     Social History    Marital status:      Spouse name: N/A    Number of children: N/A    Years of education: N/A     Occupational History    Not on file. Social History Main Topics    Smoking status: Former Smoker     Quit date: 6/14/1988    Smokeless tobacco: Never Used    Alcohol use No    Drug use: No    Sexual activity: No     Other Topics Concern    Not on file     Social History Narrative                ALLERGIES: Latex; Amoxil [amoxicillin]; Levaquin [levofloxacin]; Percocet [oxycodone-acetaminophen]; Tetanus vaccines and toxoid; and Tetracycline hcl    Review of Systems   Constitutional: Positive for fever (chills + ). Gastrointestinal: Positive for abdominal pain. Negative for nausea and vomiting. Genitourinary: Negative for dysuria, frequency and hematuria. Musculoskeletal: Negative for myalgias. All other systems reviewed and are negative. Vitals:    04/16/18 0909   BP: 140/66   Pulse: 72   Resp: 18   Temp: 98.1 °F (36.7 °C)   SpO2: 97%   Weight: 203 lb (92.1 kg)   Height: 5' 2\" (1.575 m)       Physical Exam   Constitutional: No distress.    HENT:   Right Ear: Tympanic membrane and ear canal normal.   Left Ear: Tympanic membrane and ear canal normal.   Nose: Nose normal.   Mouth/Throat: No oropharyngeal exudate, posterior oropharyngeal edema or posterior oropharyngeal erythema. Eyes: Conjunctivae are normal. Right eye exhibits no discharge. Left eye exhibits no discharge. No scleral icterus. Neck: Neck supple. Pulmonary/Chest: Effort normal and breath sounds normal. No respiratory distress. She has no wheezes. She has no rales. Abdominal: Soft. Bowel sounds are normal. She exhibits no distension and no mass. There is no tenderness. There is no rebound and no guarding. Musculoskeletal: She exhibits no edema. Cervical back: Normal.        Thoracic back: Normal.        Lumbar back: Normal.   Lymphadenopathy:     She has no cervical adenopathy. Skin: No rash noted. Nursing note and vitals reviewed. MDM    Procedures      ICD-10-CM ICD-9-CM    1. Urinary tract infection without hematuria, site unspecified N39.0 599.0 AMB POC URINALYSIS DIP STICK AUTO W/O MICRO      CULTURE, URINE     Medications Ordered Today   Medications    nitrofurantoin, macrocrystal-monohydrate, (MACROBID) 100 mg capsule     Sig: Take 1 Cap by mouth two (2) times a day for 7 days.      Dispense:  14 Cap     Refill:  0     Results for orders placed or performed in visit on 04/16/18   CULTURE, URINE   Result Value Ref Range    Urine Culture, Routine       Mixed urogenital nazario  Less than 10,000 colonies/mL      Narrative    Performed at:  [address] [place] [address]  [id number]  : Manuelito Parrish MD, Phone:  [phone]   AMB POC URINALYSIS DIP STICK AUTO W/O MICRO   Result Value Ref Range    Color (UA POC)      Clarity (UA POC)      Glucose (UA POC) Negative Negative    Bilirubin (UA POC) Negative Negative    Ketones (UA POC) Negative Negative    Specific gravity (UA POC) 1.015 1.001 - 1.035    Blood (UA POC) Negative Negative    pH (UA POC) 6.5 4.6 - 8.0    Protein (UA POC) Negative Negative    Urobilinogen (UA POC) 0.2 mg/dL 0.2 - 1    Nitrites (UA POC) Negative Negative Leukocyte esterase (UA POC) 1+ Negative     The patients condition was discussed with the patient and they understand. The patient is to follow up with primary care doctor. If signs and symptoms become worse the pt is to go to the ER. The patient is to take medications as prescribed.

## 2018-04-19 ENCOUNTER — OFFICE VISIT (OUTPATIENT)
Dept: INTERNAL MEDICINE CLINIC | Age: 78
End: 2018-04-19

## 2018-04-19 ENCOUNTER — HOSPITAL ENCOUNTER (OUTPATIENT)
Dept: LAB | Age: 78
Discharge: HOME | End: 2018-04-19
Payer: MEDICARE

## 2018-04-19 VITALS
OXYGEN SATURATION: 97 % | HEIGHT: 62 IN | TEMPERATURE: 98 F | DIASTOLIC BLOOD PRESSURE: 80 MMHG | HEART RATE: 78 BPM | BODY MASS INDEX: 37.73 KG/M2 | WEIGHT: 205 LBS | RESPIRATION RATE: 17 BRPM | SYSTOLIC BLOOD PRESSURE: 140 MMHG

## 2018-04-19 DIAGNOSIS — E11.9 CONTROLLED TYPE 2 DIABETES MELLITUS WITHOUT COMPLICATION, UNSPECIFIED WHETHER LONG TERM INSULIN USE (HCC): ICD-10-CM

## 2018-04-19 DIAGNOSIS — E78.2 MIXED HYPERLIPIDEMIA: ICD-10-CM

## 2018-04-19 DIAGNOSIS — F41.9 ANXIETY: Primary | ICD-10-CM

## 2018-04-19 DIAGNOSIS — I48.0 PAROXYSMAL ATRIAL FIBRILLATION (HCC): ICD-10-CM

## 2018-04-19 LAB
BILIRUB UR QL STRIP: NEGATIVE
GLUCOSE UR-MCNC: NEGATIVE MG/DL
KETONES P FAST UR STRIP-MCNC: NEGATIVE MG/DL
PH UR STRIP: 5.5 [PH] (ref 4.6–8)
PROT UR QL STRIP: NEGATIVE
SP GR UR STRIP: 1.02 (ref 1–1.03)
UA UROBILINOGEN AMB POC: NORMAL (ref 0.2–1)
URINALYSIS CLARITY POC: NORMAL
URINALYSIS COLOR POC: YELLOW
URINE BLOOD POC: NEGATIVE
URINE LEUKOCYTES POC: NEGATIVE
URINE NITRITES POC: NEGATIVE

## 2018-04-19 PROCEDURE — 83735 ASSAY OF MAGNESIUM: CPT

## 2018-04-19 PROCEDURE — 80053 COMPREHEN METABOLIC PANEL: CPT

## 2018-04-19 PROCEDURE — 85025 COMPLETE CBC W/AUTO DIFF WBC: CPT

## 2018-04-19 PROCEDURE — 36415 COLL VENOUS BLD VENIPUNCTURE: CPT

## 2018-04-19 RX ORDER — ALPRAZOLAM 0.25 MG/1
0.25 TABLET ORAL
Qty: 30 TAB | Refills: 0 | Status: SHIPPED | OUTPATIENT
Start: 2018-04-19 | End: 2018-06-27

## 2018-04-19 NOTE — MR AVS SNAPSHOT
[address] [facility] [address] [place] 
[phone] Patient: Livier Neal MRN: [id number] :1940 Visit Information Date & Time Provider Department Dept. Phone Encounter #  
 2018  3:00 PM Keyla Sahu MD [facility] [phone] [id number] Your Appointments 2018  9:45 AM  
6 MONTH with 1700 [facility] Appt Note: ALPHONSE H[place]go  
 [id number] [facility]  
[phone] [address] [facility]  
  
    
 2018  8:30 AM  
Medicare Physical with Keyla Sahu MD  
[facility] Appt Note: medicare wellness [address] [facility] [address]  
[address] [address] [place] [address] [place]  
  
    
 3/6/2019  9:30 AM  
ROUTINE CARE with Alesha Stahl MD  
[facility]) Appt Note: f/u diabetes cp0.00  
 [address] [place]  
[address] [id number] [address] [address] Upcoming Health Maintenance Date Due  
 MEDICARE YEARLY EXAM 5/3/2018 HEMOGLOBIN A1C Q6M 2018 MICROALBUMIN Q1 2018 EYE EXAM RETINAL OR DILATED Q1 2018 LIPID PANEL Q1 10/18/2018 FOOT EXAM Q1 3/6/2019 GLAUCOMA SCREENING Q2Y 2019 COLONOSCOPY 2021 DTaP/Tdap/Td series (2 - Td) 2026 Allergies as of 2018  Review Complete On: 2018 By: Alysha Marti Severity Noted Reaction Type Reactions Latex  2011    Rash Amoxil [Amoxicillin]  2017    Itching  
 rash Levaquin [Levofloxacin]  2016    Other (comments) Dizziness Percocet [Oxycodone-acetaminophen]  2010   Intolerance Nausea and Vomiting Tetanus Vaccines And Toxoid  02/18/2010   Systemic Swelling Tetracycline Hcl  02/18/2010   Systemic Rash Current Immunizations  Reviewed on 5/2/2017 Name Date Influenza High Dose Vaccine PF 10/18/2017, 11/29/2016, 10/7/2015, 11/3/2014 Pneumococcal Conjugate (PCV-13) 10/7/2015 Pneumococcal Polysaccharide (PPSV-23) 2/5/2013 Zoster Vaccine, Live 9/29/2014 Not reviewed this visit You Were Diagnosed With   
  
 Codes Comments Anxiety    -  Primary ICD-10-CM: F41.9 ICD-9-CM: 300.00 Controlled type 2 diabetes mellitus without complication, unspecified whether long term insulin use ([facility]     ICD-10-CM: E11.9 ICD-9-CM: 250.00 Paroxysmal atrial fibrillation (HCC)     ICD-10-CM: I48.0 ICD-9-CM: 427.31 Mixed hyperlipidemia     ICD-10-CM: E78.2 ICD-9-CM: 272.2 Vitals BP Pulse Temp Resp Height(growth percentile) Weight(growth percentile) 140/80 (BP 1 Location: Right arm, BP Patient Position: Sitting) 78 98 °F (36.7 °C) (Oral) 17 5' 2\" (1.575 m) 205 lb (93 kg) LMP SpO2 BMI OB Status Smoking Status 02/18/1983 97% 37.49 kg/m2 Hysterectomy Former Smoker Vitals History BMI and BSA Data Body Mass Index Body Surface Area  
 37.49 kg/m 2 2.02 m 2 Preferred Pharmacy Pharmacy Name Phone [id number] [phone] Your Updated Medication List  
  
   
This list is accurate as of 4/19/18  4:20 PM.  Always use your most recent med list.  
  
  
  
  
 albuterol 2.5 mg /3 mL (0.083 %) nebulizer solution Commonly known as:  PROVENTIL VENTOLIN  
3 mL by Nebulization route every four (4) hours as needed for Shortness of Breath. Indications: BRONCHOSPASTIC PULMONARY DISEASE ALPRAZolam 0.25 mg tablet Commonly known as:  Peola Bran Take 1 Tab by mouth two (2) times daily as needed for Anxiety. Max Daily Amount: 0.5 mg.  
  
 apixaban 5 mg tablet Commonly known as:  Frantz [place] Take 1 Tab by mouth two (2) times a day. aspirin delayed-release 81 mg tablet Take 1 Tab by mouth daily. Blood-Glucose Meter monitoring kit Once daily before breakfast  
  
 calcium-cholecalciferol (D3) tablet Commonly known as:  CALTRATE 600+D Take 1 Tab by mouth two (2) times a day. cetirizine 10 mg tablet Commonly known as:  ZYRTEC  
TAKE 1 TABLET BY MOUTH EVERY DAY  
  
 fluticasone 50 mcg/actuation nasal spray Commonly known as:  Deetta Britain 2 Sprays by Both Nostrils route as needed for Rhinitis or Allergies. Indications: Allergic Rhinitis * glucose blood VI test strips strip Commonly known as:  ACCU-CHEK DA Afia Burch * glucose blood VI test strips strip Commonly known as:  FREESTYLE INSULINX  
TEST EVERY DAY AND AS NEEDED  
  
 hydroCHLOROthiazide 25 mg tablet Commonly known as:  HYDRODIURIL Take 1 Tab by mouth daily. Lancets Misc Commonly known as:  FREESTYLE LANCETS Test once daily. (diagnosis code: 250.00) metFORMIN 500 mg tablet Commonly known as:  GLUCOPHAGE Take 1 Tab by mouth two (2) times daily (with meals). metoprolol succinate 50 mg XL tablet Commonly known as:  TOPROL-XL  
TAKE 1 TABLET BY MOUTH EVERY DAY  
  
 MULTIVITAMIN PO Take 1 Tab by mouth daily. nitrofurantoin (macrocrystal-monohydrate) 100 mg capsule Commonly known as:  MACROBID Take 1 Cap by mouth two (2) times a day for 7 days. propafenone 150 mg tablet Commonly known as:  RYTHMOL  
TAKE 1 TABLET BY MOUTH EVERY EIGHT (8) HOURS. FOR HEART RHYTHM  
  
 rosuvastatin 10 mg tablet Commonly known as:  CRESTOR  
TAKE 1 TABLET BY MOUTH EVERY DAY  
  
 * Notice: This list has 2 medication(s) that are the same as other medications prescribed for you. Read the directions carefully, and ask your doctor or other care provider to review them with you. Prescriptions Printed Refills ALPRAZolam (XANAX) 0.25 mg tablet 0 Sig: Take 1 Tab by mouth two (2) times daily as needed for Anxiety. Max Daily Amount: 0.5 mg.  
 Class: Print Route: Oral  
  
We Performed the Following CBC WITH AUTOMATED DIFF [20679 CPT(R)] MAGNESIUM [id number] CPT(R)] METABOLIC PANEL, COMPREHENSIVE [65554 CPT(R)] Introducing [facility] SERVICES! Dear Ce: Thank you for requesting a [employer] account. Our records indicate that you already have an active [employer] account. You can access your account anytime at [email or web address] Did you know that you can access your hospital and ER discharge instructions at any time in [employer]? You can also review all of your test results from your hospital stay or ER visit. Additional Information If you have questions, please visit the Frequently Asked Questions section of the [employer] website at [email or web address] Remember, [employer] is NOT to be used for urgent needs. For medical emergencies, dial 911. Now available from your iPhone and Android! Please provide this summary of care documentation to your next provider. Your primary care clinician is listed as Paige Padilla. If you have any questions after today's visit, please call [phone].

## 2018-04-19 NOTE — PROGRESS NOTES
Pt stated feeling \"uneasy\" she explained \"feeling shaky all over on the inside\". Pt denies any burning or discomfort with voiding.

## 2018-04-20 LAB
ALBUMIN SERPL-MCNC: 4.5 G/DL (ref 3.5–4.8)
ALBUMIN/GLOB SERPL: 1.6 {RATIO} (ref 1.2–2.2)
ALP SERPL-CCNC: 49 IU/L (ref 39–117)
ALT SERPL-CCNC: 20 IU/L (ref 0–32)
AST SERPL-CCNC: 25 IU/L (ref 0–40)
BASOPHILS # BLD AUTO: 0 X10E3/UL (ref 0–0.2)
BASOPHILS NFR BLD AUTO: 1 %
BILIRUB SERPL-MCNC: <0.2 MG/DL (ref 0–1.2)
BUN SERPL-MCNC: 15 MG/DL (ref 8–27)
BUN/CREAT SERPL: 21 (ref 12–28)
CALCIUM SERPL-MCNC: 10.3 MG/DL (ref 8.7–10.3)
CHLORIDE SERPL-SCNC: 96 MMOL/L (ref 96–106)
CO2 SERPL-SCNC: 27 MMOL/L (ref 18–29)
CREAT SERPL-MCNC: 0.73 MG/DL (ref 0.57–1)
EOSINOPHIL # BLD AUTO: 0.1 X10E3/UL (ref 0–0.4)
EOSINOPHIL NFR BLD AUTO: 3 %
ERYTHROCYTE [DISTWIDTH] IN BLOOD BY AUTOMATED COUNT: 15.3 % (ref 12.3–15.4)
GFR SERPLBLD CREATININE-BSD FMLA CKD-EPI: 81 ML/MIN/1.73
GFR SERPLBLD CREATININE-BSD FMLA CKD-EPI: 93 ML/MIN/1.73
GLOBULIN SER CALC-MCNC: 2.8 G/DL (ref 1.5–4.5)
GLUCOSE SERPL-MCNC: 100 MG/DL (ref 65–99)
HCT VFR BLD AUTO: 36.2 % (ref 34–46.6)
HGB BLD-MCNC: 12 G/DL (ref 11.1–15.9)
IMM GRANULOCYTES # BLD: 0 X10E3/UL (ref 0–0.1)
IMM GRANULOCYTES NFR BLD: 0 %
LYMPHOCYTES # BLD AUTO: 1.8 X10E3/UL (ref 0.7–3.1)
LYMPHOCYTES NFR BLD AUTO: 44 %
MAGNESIUM SERPL-MCNC: 2.3 MG/DL (ref 1.6–2.3)
MCH RBC QN AUTO: 28.1 PG (ref 26.6–33)
MCHC RBC AUTO-ENTMCNC: 33.1 G/DL (ref 31.5–35.7)
MCV RBC AUTO: 85 FL (ref 79–97)
MONOCYTES # BLD AUTO: 0.3 X10E3/UL (ref 0.1–0.9)
MONOCYTES NFR BLD AUTO: 7 %
NEUTROPHILS # BLD AUTO: 1.9 X10E3/UL (ref 1.4–7)
NEUTROPHILS NFR BLD AUTO: 45 %
PLATELET # BLD AUTO: 284 X10E3/UL (ref 150–379)
POTASSIUM SERPL-SCNC: 4.7 MMOL/L (ref 3.5–5.2)
PROT SERPL-MCNC: 7.3 G/DL (ref 6–8.5)
RBC # BLD AUTO: 4.27 X10E6/UL (ref 3.77–5.28)
SODIUM SERPL-SCNC: 142 MMOL/L (ref 134–144)
WBC # BLD AUTO: 4.2 X10E3/UL (ref 3.4–10.8)

## 2018-04-30 NOTE — PROGRESS NOTES
Acute Care Note    Angelica Bloom is 78 y.o. female. she presents for evaluation of Tremors    Mental Health Review  Patient is seen for anxiety disorder. She describes a sensation as having had tremors. She feels \"shaky all over inside\". Current treatment includes nothing although she had her daughter tell me that she has been on antianxiety medication in the past.  Ongoing symptoms include: racing thoughts, psychomotor agitation, feelings of losing control. On further interview with the patient, she does note that many of her family members come to her with their problems. She feels overwhelmed at times with having to deal with the many issues. Denies: palpitations, SI/SA. Prior to Admission medications    Medication Sig Start Date End Date Taking? Authorizing Provider   ALPRAZolam Donia Soulier) 0.25 mg tablet Take 1 Tab by mouth two (2) times daily as needed for Anxiety. Max Daily Amount: 0.5 mg. 4/19/18  Yes Rishabh Navarrete MD   metoprolol succinate (TOPROL-XL) 50 mg XL tablet TAKE 1 TABLET BY MOUTH EVERY DAY 4/2/18  Yes Romeo Floyd MD   cetirizine (ZYRTEC) 10 mg tablet TAKE 1 TABLET BY MOUTH EVERY DAY 3/7/18  Yes Rishabh Navarrete MD   metFORMIN (GLUCOPHAGE) 500 mg tablet Take 1 Tab by mouth two (2) times daily (with meals). 3/6/18  Yes Nancy Blankenship MD   rosuvastatin (CRESTOR) 10 mg tablet TAKE 1 TABLET BY MOUTH EVERY DAY 3/5/18  Yes Rishabh Navarrete MD   propafenone (RYTHMOL) 150 mg tablet TAKE 1 TABLET BY MOUTH EVERY EIGHT (8) HOURS. FOR HEART RHYTHM 12/17/17  Yes Lorenzo QUEZADA MD   aspirin delayed-release 81 mg tablet Take 1 Tab by mouth daily. 11/21/17  Yes Romeo Floyd MD   fluticasone (FLONASE) 50 mcg/actuation nasal spray 2 Sprays by Both Nostrils route as needed for Rhinitis or Allergies. Indications: Allergic Rhinitis 11/6/17  Yes Romeo Floyd MD   apixaban (ELIQUIS) 5 mg tablet Take 1 Tab by mouth two (2) times a day.  10/24/17  Yes Lorenzo Fields V MD   hydroCHLOROthiazide (HYDRODIURIL) 25 mg tablet Take 1 Tab by mouth daily. 10/18/17  Yes Lianet Maier MD   albuterol (PROVENTIL VENTOLIN) 2.5 mg /3 mL (0.083 %) nebulizer solution 3 mL by Nebulization route every four (4) hours as needed for Shortness of Breath. Indications: BRONCHOSPASTIC PULMONARY DISEASE 8/3/17  Yes Brynn Smith MD   glucose blood VI test strips (FREESTYLE INSULINX) strip TEST EVERY DAY AND AS NEEDED 3/20/17  Yes Lianet Maier MD   calcium-cholecalciferol, D3, (CALTRATE 600+D) tablet Take 1 Tab by mouth two (2) times a day. 4/25/16  Yes Lianet Maier MD   MULTIVITAMIN PO Take 1 Tab by mouth daily. Yes Historical Provider   Lancets (FREESTYLE LANCETS) Misc Test once daily. (diagnosis code: 250.00) 10/24/13  Yes Lianet Maier MD   Blood-Glucose Meter monitoring kit Once daily before breakfast 9/20/13  Yes Lianet Maier MD   glucose blood VI test strips (ACCU-CHEK DA) strip . 11/7/12  Yes Lianet Maier MD         Patient Active Problem List   Diagnosis Code    Hyperlipemia E78.5    Arthritis M19.90    HTN (hypertension) I10    Headache(784.0) R51    Spondylitis, cervical (HCC) M46.92    Tingling sensation R20.2    Chest pain R07.9    Anemia D64.9    Cyst of right kidney N28.1    Hip pain, right M25.551    Acute bronchitis J20.9    Visual floaters H43.399    Colon cancer screening Z12.11    Postmenopausal state Z78.0    Vitamin D deficiency E55.9    Bilateral hip pain M25.551, M25.552    Postmenopausal disorder N95.1    Numbness and tingling of right leg R20.0, R20.2    Foot pain, left M79.672    Rotator cuff (capsule) sprain and strain [id number]    Osteoarthritis of acromioclavicular joint M19.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 0    Need for varicella vaccine Z23    Screening for breast cancer Z12.31    Cataract H26.9    Preop examination Z01.818    Peripheral autonomic neuropathy due to DM (HCC) E11.43    Pre-ulcerative calluses L84    Type 2 diabetes mellitus with pressure callus (HCC) E11.628, L84    Hammer toe of right foot M20.41    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 (HCC) E11.69, M21.969    DAVID (generalized anxiety disorder) F41.1    Abnormal finding on MRI of brain R90.89    Tinea pedis B35.3    Arthralgia of right hip M25.551    Swollen gland R59.9    Acute pharyngitis J02.9    Diabetes mellitus type 2, controlled (HCC) E11.9    Multiple thyroid nodules E04.2    Skipped heart beats S33.5    Helicobacter pylori (H. pylori) infection A04.8    Intractable migraine with aura without status migrainosus G43.119    Chronic asthma with status asthmaticus J45.902    Right foot injury S99.921A    Abdominal mass, left upper quadrant R19.02    Left upper quadrant pain R10.12    Paroxysmal atrial fibrillation ([facility]) I48.0         Review of Systems   Constitutional: Negative. Respiratory: Negative. Cardiovascular: Negative. Psychiatric/Behavioral: The patient is nervous/anxious. Visit Vitals    /80 (BP 1 Location: Right arm, BP Patient Position: Sitting)    Pulse 78    Temp 98 °F (36.7 °C) (Oral)    Resp 17    Ht 5' 2\" (1.575 m)    Wt 205 lb (93 kg)    LMP 02/18/1983    SpO2 97%    BMI 37.49 kg/m2       Physical Exam   Constitutional: She appears well-developed. No distress. HENT:   Head: Normocephalic. Mouth/Throat: Oropharynx is clear and moist.   Cardiovascular: Normal rate and regular rhythm. Pulmonary/Chest: Effort normal and breath sounds normal.   Abdominal: Soft.  Bowel sounds are normal.     Results for orders placed or performed in visit on 04/19/18   AMB POC URINALYSIS DIP STICK AUTO W/O MICRO     Status: None   Result Value Ref Range Status    Color (UA POC) Yellow  Final    Clarity (UA POC) Slightly Cloudy  Final    Glucose (UA POC) Negative Negative Final    Bilirubin (UA POC) Negative Negative Final    Ketones (UA POC) Negative Negative Final    Specific gravity (UA POC) 1.020 1.001 - 1.035 Final    Blood (UA POC) Negative Negative Final    pH (UA POC) 5.5 4.6 - 8.0 Final    Protein (UA POC) Negative Negative Final    Urobilinogen (UA POC) 0.2 mg/dL 0.2 - 1 Final    Nitrites (UA POC) Negative Negative Final    Leukocyte esterase (UA POC) Negative Negative Final           ASSESSMENT/PLAN  Diagnoses and all orders for this visit:    1. Anxiety  -     ALPRAZolam (XANAX) 0.25 mg tablet; Take 1 Tab by mouth two (2) times daily as needed for Anxiety. Max Daily Amount: 0.5 mg.  -     AMB POC URINALYSIS DIP STICK AUTO W/O MICRO    2. Controlled type 2 diabetes mellitus without complication, unspecified whether long term insulin use (HCC)    3. Paroxysmal atrial fibrillation (HCC)  -     CBC WITH AUTOMATED DIFF  -     METABOLIC PANEL, COMPREHENSIVE    4. Mixed hyperlipidemia  -     MAGNESIUM    No evidence of UTI    Advised the patient to call back or return to office if symptoms worsen/change/persist.   Discussed expected course/resolution/complications of diagnosis in detail with patient. Medication risks/benefits/costs/interactions/alternatives discussed with patient. The patient was given an after visit summary which includes diagnoses, current medications, & vitals. They expressed understanding with the diagnosis and plan.

## 2018-05-03 RX ORDER — PROPAFENONE HYDROCHLORIDE 150 MG/1
150 TABLET, FILM COATED ORAL EVERY 8 HOURS
Qty: 270 TAB | Refills: 3 | Status: SHIPPED | OUTPATIENT
Start: 2018-05-03 | End: 2019-05-19 | Stop reason: SDUPTHER

## 2018-05-03 RX ORDER — METOPROLOL SUCCINATE 50 MG/1
50 TABLET, EXTENDED RELEASE ORAL DAILY
Qty: 90 TAB | Refills: 0 | Status: SHIPPED | OUTPATIENT
Start: 2018-05-03 | End: 2018-09-03 | Stop reason: SDUPTHER

## 2018-05-29 ENCOUNTER — TELEPHONE (OUTPATIENT)
Dept: CARDIOLOGY CLINIC | Age: 78
End: 2018-05-29

## 2018-05-29 NOTE — TELEPHONE ENCOUNTER
Please confirm the quantity of the Eliquis is it suppose to be 90 or 180 tablets for a 90 day supply. The pharmacist stated the patient takes it twice a day. Thanks,      Advised of 24 hour return call policy.

## 2018-06-05 ENCOUNTER — OFFICE VISIT (OUTPATIENT)
Dept: URGENT CARE | Age: 78
End: 2018-06-05

## 2018-06-05 VITALS
HEIGHT: 62 IN | HEART RATE: 67 BPM | RESPIRATION RATE: 16 BRPM | WEIGHT: 205 LBS | OXYGEN SATURATION: 98 % | SYSTOLIC BLOOD PRESSURE: 145 MMHG | TEMPERATURE: 97.6 F | DIASTOLIC BLOOD PRESSURE: 66 MMHG | BODY MASS INDEX: 37.73 KG/M2

## 2018-06-05 DIAGNOSIS — H66.92 ACUTE LEFT OTITIS MEDIA: Primary | ICD-10-CM

## 2018-06-05 DIAGNOSIS — R42 VERTIGO: ICD-10-CM

## 2018-06-05 RX ORDER — MECLIZINE HCL 12.5 MG 12.5 MG/1
12.5 TABLET ORAL
Qty: 30 TAB | Refills: 0 | Status: SHIPPED | OUTPATIENT
Start: 2018-06-05 | End: 2019-04-22

## 2018-06-05 RX ORDER — FLUTICASONE PROPIONATE 50 MCG
2 SPRAY, SUSPENSION (ML) NASAL DAILY
Qty: 1 BOTTLE | Refills: 0 | Status: SHIPPED | OUTPATIENT
Start: 2018-06-05 | End: 2018-06-19

## 2018-06-05 RX ORDER — CEFDINIR 300 MG/1
300 CAPSULE ORAL 2 TIMES DAILY
Qty: 20 CAP | Refills: 0 | Status: SHIPPED | OUTPATIENT
Start: 2018-06-05 | End: 2018-06-15

## 2018-06-05 RX ORDER — PANTOPRAZOLE SODIUM 40 MG/1
TABLET, DELAYED RELEASE ORAL
COMMUNITY

## 2018-06-05 NOTE — PATIENT INSTRUCTIONS
Follow up with ENT if no improvement. ER if worse. Vertigo: Care Instructions  Your Care Instructions    Vertigo is the feeling that you or your surroundings are moving when there is no actual movement. It is often described as a feeling of spinning, whirling, falling, or tilting. Vertigo may make you vomit or feel nauseated. You may have trouble standing or walking and may lose your balance. Vertigo is often related to an inner ear problem, but it can have other more serious causes. If vertigo continues, you may need more tests to find its cause. Follow-up care is a key part of your treatment and safety. Be sure to make and go to all appointments, and call your doctor if you are having problems. It's also a good idea to know your test results and keep a list of the medicines you take. How can you care for yourself at home? · Do not lie flat on your back. Prop yourself up slightly. This may reduce the spinning feeling. Keep your eyes open. · Move slowly so that you do not fall. · If your doctor recommends medicine, take it exactly as directed. · Do not drive while you are having vertigo. Certain exercises, called Hampton-Daroff exercises, can help decrease vertigo. To do Hampton-Daroff exercises:  · Sit on the edge of a bed or sofa and quickly lie down on the side that causes the worst vertigo. Lie on your side with your ear down. · Stay in this position for at least 30 seconds or until the vertigo goes away. · Sit up. If this causes vertigo, wait for it to stop. · Repeat the procedure on the other side. · Repeat this 10 times. Do these exercises 2 times a day until the vertigo is gone. When should you call for help? Call 911 anytime you think you may need emergency care. For example, call if:  ? · You passed out (lost consciousness). ? · You have symptoms of a stroke.  These may include:  ¨ Sudden numbness, tingling, weakness, or loss of movement in your face, arm, or leg, especially on only one side of your body. ¨ Sudden vision changes. ¨ Sudden trouble speaking. ¨ Sudden confusion or trouble understanding simple statements. ¨ Sudden problems with walking or balance. ¨ A sudden, severe headache that is different from past headaches. ?Call your doctor now or seek immediate medical care if:  ? · Vertigo occurs with a fever, a headache, or ringing in your ears. ? · You have new or increased nausea and vomiting. ? Watch closely for changes in your health, and be sure to contact your doctor if:  ? · Vertigo gets worse or happens more often. ? · Vertigo has not gotten better after 2 weeks. Where can you learn more? Go to http://radha-tracy.info/. Enter C061 in the search box to learn more about \"Vertigo: Care Instructions. \"  Current as of: May 12, 2017  Content Version: 11.4  © 3106-9880 [employer]. Care instructions adapted under license by [employer] (which disclaims liability or warranty for this information). If you have questions about a medical condition or this instruction, always ask your healthcare professional. [place] [address] any warranty or liability for your use of this information. Ear Infection (Otitis Media): Care Instructions  Your Care Instructions    An ear infection may start with a cold and affect the middle ear (otitis media). It can hurt a lot. Most ear infections clear up on their own in a couple of days. Most often you will not need antibiotics. This is because many ear infections are caused by a virus. Antibiotics don't work against a virus. Regular doses of pain medicines are the best way to reduce your fever and help you feel better. Follow-up care is a key part of your treatment and safety. Be sure to make and go to all appointments, and call your doctor if you are having problems. It's also a good idea to know your test results and keep a list of the medicines you take.   How can you care for yourself at home? · Take pain medicines exactly as directed. ¨ If the doctor gave you a prescription medicine for pain, take it as prescribed. ¨ If you are not taking a prescription pain medicine, take an over-the-counter medicine, such as acetaminophen (Tylenol), ibuprofen (Advil, Motrin), or naproxen (Aleve). Read and follow all instructions on the label. ¨ Do not take two or more pain medicines at the same time unless the doctor told you to. Many pain medicines have acetaminophen, which is Tylenol. Too much acetaminophen (Tylenol) can be harmful. · Plan to take a full dose of pain reliever before bedtime. Getting enough sleep will help you get better. · Try a warm, moist washcloth on the ear. It may help relieve pain. · If your doctor prescribed antibiotics, take them as directed. Do not stop taking them just because you feel better. You need to take the full course of antibiotics. When should you call for help? Call your doctor now or seek immediate medical care if:  ? · You have new or increasing ear pain. ? · You have new or increasing pus or blood draining from your ear. ? · You have a fever with a stiff neck or a severe headache. ? Watch closely for changes in your health, and be sure to contact your doctor if:  ? · You have new or worse symptoms. ? · You are not getting better after taking an antibiotic for 2 days. Where can you learn more? Go to http://radha-tracy.info/. Enter P574 in the search box to learn more about \"Ear Infection (Otitis Media): Care Instructions. \"  Current as of: May 12, 2017  Content Version: 11.4  © 9773-0376 [employer]. Care instructions adapted under license by [employer] (which disclaims liability or warranty for this information).  If you have questions about a medical condition or this instruction, always ask your healthcare professional. [address] any warranty or liability for your use of this information.

## 2018-06-05 NOTE — MR AVS SNAPSHOT
[address] [facility] [address] 
[phone] Patient: Jennyfer Pepper MRN: [id number] :[id number] Visit Information Date & Time Provider Department Dept. Phone Encounter #  
 2018  8:00 AM [address] [facility] [phone] [id number] Your Appointments 2018  9:45 AM  
6 MONTH with Nico Murrieta MD  
[facility]) Appt Note: ALPHONSE Hidalgo  
 [address]  
[phone] [address]  
  
    
 2018  8:30 AM  
Medicare Physical with Lex Vernon MD  
[facility]) Appt Note: medicare wellness [address]  
  
    
 3/6/2019  9:30 AM  
ROUTINE CARE with MD [facility]) Appt Note: f/u diabetes cp0.00  
 [phone] Upcoming Health Maintenance Date Due  
 MEDICARE YEARLY EXAM 5/3/2018 HEMOGLOBIN A1C Q6M 2018 MICROALBUMIN Q1 2018 EYE EXAM RETINAL OR DILATED Q1 2018 Influenza Age 5 to Adult 2018 LIPID PANEL Q1 10/18/2018 FOOT EXAM Q1 3/6/2019 GLAUCOMA SCREENING Q2Y 2019 COLONOSCOPY 2021 DTaP/Tdap/Td series (2 - Td) 2026 Allergies as of 2018  Review Complete On: 2018 By: Edelmira Underwood MD  
  
 Severity Noted Reaction Type Reactions Latex  2011    Rash Amoxil [Amoxicillin]  2017    Itching  
 rash Levaquin [Levofloxacin]  2016    Other (comments) Dizziness Percocet [Oxycodone-acetaminophen]  2010   Intolerance Nausea and Vomiting Tetanus Vaccines And Toxoid  02/18/2010   Systemic Swelling Tetracycline Hcl  02/18/2010   Systemic Rash Current Immunizations  Reviewed on 5/2/2017 Name Date Influenza High Dose Vaccine PF 10/18/2017, 11/29/2016, 10/7/2015, 11/3/2014 Pneumococcal Conjugate (PCV-13) 10/7/2015 Pneumococcal Polysaccharide (PPSV-23) 2/5/2013 Zoster Vaccine, Live 9/29/2014 Not reviewed this visit You Were Diagnosed With   
  
 Codes Comments Acute left otitis media    -  Primary ICD-10-CM: H66.92 
ICD-9-CM: 382. 9 Vertigo     ICD-10-CM: F80 ICD-9-CM: 780.4 Vitals BP Pulse Temp Resp Height(growth percentile) Weight(growth percentile) 145/66 67 97.6 °F (36.4 °C) 16 5' 2\" (1.575 m) 205 lb (93 kg) LMP SpO2 BMI OB Status Smoking Status 02/18/1983 98% 37.49 kg/m2 Hysterectomy Former Smoker BMI and BSA Data Body Mass Index Body Surface Area  
 37.49 kg/m 2 2.02 m 2 Preferred Pharmacy Pharmacy Name Phone [id number] [phone] Your Updated Medication List  
  
   
This list is accurate as of 6/5/18  8:32 AM.  Always use your most recent med list.  
  
  
  
  
 albuterol 2.5 mg /3 mL (0.083 %) nebulizer solution Commonly known as:  PROVENTIL VENTOLIN  
3 mL by Nebulization route every four (4) hours as needed for Shortness of Breath. Indications: BRONCHOSPASTIC PULMONARY DISEASE ALPRAZolam 0.25 mg tablet Commonly known as:  Pascale Peppers Take 1 Tab by mouth two (2) times daily as needed for Anxiety. Max Daily Amount: 0.5 mg.  
  
 apixaban 5 mg tablet Commonly known as:  Obadiah Severe Take 1 Tab by mouth two (2) times a day. aspirin delayed-release 81 mg tablet Take 1 Tab by mouth daily. Blood-Glucose Meter monitoring kit Once daily before breakfast  
  
 calcium-cholecalciferol (D3) tablet Commonly known as:  CALTRATE 600+D Take 1 Tab by mouth two (2) times a day. cefdinir 300 mg capsule Commonly known as:  OMNICEF Take 1 Cap by mouth two (2) times a day for 10 days. cetirizine 10 mg tablet Commonly known as:  ZYRTEC  
TAKE 1 TABLET BY MOUTH EVERY DAY  
  
 * fluticasone 50 mcg/actuation nasal spray Commonly known as:  Cyndra [address] 2 Sprays by Both Nostrils route as needed for Rhinitis or Allergies. Indications: Allergic Rhinitis * fluticasone 50 mcg/actuation nasal spray Commonly known as:  Cyndra [address] 2 Sprays by Both Nostrils route daily for 14 days. * glucose blood VI test strips strip Commonly known as:  ACCU-CHEK DA Larraine Aliyah * glucose blood VI test strips strip Commonly known as:  FREESTYLE INSULINX  
TEST EVERY DAY AND AS NEEDED  
  
 hydroCHLOROthiazide 25 mg tablet Commonly known as:  HYDRODIURIL Take 1 Tab by mouth daily. Lancets Misc Commonly known as:  FREESTYLE LANCETS Test once daily. (diagnosis code: 250.00) meclizine 12.5 mg tablet Commonly known as:  ANTIVERT Take 1 Tab by mouth three (3) times daily as needed. metFORMIN 500 mg tablet Commonly known as:  GLUCOPHAGE Take 1 Tab by mouth two (2) times daily (with meals). metoprolol succinate 50 mg XL tablet Commonly known as:  TOPROL-XL Take 1 Tab by mouth daily. MULTIVITAMIN PO Take 1 Tab by mouth daily. pantoprazole 40 mg tablet Commonly known as:  PROTONIX  
TAKE 1 TABLET BY MOUTH EVERY DAY  
  
 propafenone 150 mg tablet Commonly known as:  RYTHMOL Take 1 Tab by mouth every eight (8) hours. rosuvastatin 10 mg tablet Commonly known as:  CRESTOR  
TAKE 1 TABLET BY MOUTH EVERY DAY  
  
 * Notice: This list has 4 medication(s) that are the same as other medications prescribed for you. Read the directions carefully, and ask your doctor or other care provider to review them with you. Prescriptions Sent to Pharmacy Refills cefdinir (OMNICEF) 300 mg capsule 0 Sig: Take 1 Cap by mouth two (2) times a day for 10 days. Class: Normal  
 Pharmacy: [address] [place] [address] #: [phone] Route: Oral  
 meclizine (ANTIVERT) 12.5 mg tablet 0 Sig: Take 1 Tab by mouth three (3) times daily as needed. Class: Normal  
 Pharmacy: [address] [place] [address] #: [phone] Route: Oral  
 fluticasone (FLONASE) 50 mcg/actuation nasal spray 0 Si Sprays by Both Nostrils route daily for 14 days. Class: Normal  
 Pharmacy: [address] [place] [address] #: [phone] Route: Both Nostrils Patient Instructions Follow up with ENT if no improvement. ER if worse. Vertigo: Care Instructions Your Care Instructions Vertigo is the feeling that you or your surroundings are moving when there is no actual movement. It is often described as a feeling of spinning, whirling, falling, or tilting. Vertigo may make you vomit or feel nauseated. You may have trouble standing or walking and may lose your balance. Vertigo is often related to an inner ear problem, but it can have other more serious causes. If vertigo continues, you may need more tests to find its cause. Follow-up care is a key part of your treatment and safety. Be sure to make and go to all appointments, and call your doctor if you are having problems. It's also a good idea to know your test results and keep a list of the medicines you take. How can you care for yourself at home? · Do not lie flat on your back. Prop yourself up slightly. This may reduce the spinning feeling. Keep your eyes open. · Move slowly so that you do not fall. · If your doctor recommends medicine, take it exactly as directed. · Do not drive while you are having vertigo. Certain exercises, called Hampton-Daroff exercises, can help decrease vertigo. To do Hampton-Daroff exercises: · Sit on the edge of a bed or sofa and quickly lie down on the side that causes the worst vertigo. Lie on your side with your ear down. · Stay in this position for at least 30 seconds or until the vertigo goes away. · Sit up. If this causes vertigo, wait for it to stop. · Repeat the procedure on the other side. · Repeat this 10 times. Do these exercises 2 times a day until the vertigo is gone. When should you call for help? Call 911 anytime you think you may need emergency care. For example, call if: 
? · You passed out (lost consciousness). ? · You have symptoms of a stroke. These may include: 
¨ Sudden numbness, tingling, weakness, or loss of movement in your face, arm, or leg, especially on only one side of your body. ¨ Sudden vision changes. ¨ Sudden trouble speaking. ¨ Sudden confusion or trouble understanding simple statements. ¨ Sudden problems with walking or balance. ¨ A sudden, severe headache that is different from past headaches. ?Call your doctor now or seek immediate medical care if: 
? · Vertigo occurs with a fever, a headache, or ringing in your ears. ? · You have new or increased nausea and vomiting. ? Watch closely for changes in your health, and be sure to contact your doctor if: 
? · Vertigo gets worse or happens more often. ? · Vertigo has not gotten better after 2 weeks. Where can you learn more? Go to http://radha-tracy.info/. Enter C381 in the search box to learn more about \"Vertigo: Care Instructions. \" Current as of: May 12, 2017 Content Version: 11.4 © 7600-7090 [employer]. Care instructions adapted under license by [employer] (which disclaims liability or warranty for this information).  If you have questions about a medical condition or this instruction, always ask your healthcare professional. Alec Tolliver, [facility] disclaims any warranty or liability for your use of this information. Ear Infection (Otitis Media): Care Instructions Your Care Instructions An ear infection may start with a cold and affect the middle ear (otitis media). It can hurt a lot. Most ear infections clear up on their own in a couple of days. Most often you will not need antibiotics. This is because many ear infections are caused by a virus. Antibiotics don't work against a virus. Regular doses of pain medicines are the best way to reduce your fever and help you feel better. Follow-up care is a key part of your treatment and safety. Be sure to make and go to all appointments, and call your doctor if you are having problems. It's also a good idea to know your test results and keep a list of the medicines you take. How can you care for yourself at home? · Take pain medicines exactly as directed. ¨ If the doctor gave you a prescription medicine for pain, take it as prescribed. ¨ If you are not taking a prescription pain medicine, take an over-the-counter medicine, such as acetaminophen (Tylenol), ibuprofen (Advil, Motrin), or naproxen (Aleve). Read and follow all instructions on the label. ¨ Do not take two or more pain medicines at the same time unless the doctor told you to. Many pain medicines have acetaminophen, which is Tylenol. Too much acetaminophen (Tylenol) can be harmful. · Plan to take a full dose of pain reliever before bedtime. Getting enough sleep will help you get better. · Try a warm, moist washcloth on the ear. It may help relieve pain. · If your doctor prescribed antibiotics, take them as directed. Do not stop taking them just because you feel better. You need to take the full course of antibiotics. When should you call for help? Call your doctor now or seek immediate medical care if: 
? · You have new or increasing ear pain. ? · You have new or increasing pus or blood draining from your ear. ? · You have a fever with a stiff neck or a severe headache. ? Watch closely for changes in your health, and be sure to contact your doctor if: 
? · You have new or worse symptoms. ? · You are not getting better after taking an antibiotic for 2 days. Where can you learn more? Go to http://radha-tracy.info/. Enter E504 in the search box to learn more about \"Ear Infection (Otitis Media): Care Instructions. \" Current as of: May 12, 2017 Content Version: 11.4 © 6442-5977 [employer]. Care instructions adapted under license by [employer] (which disclaims liability or warranty for this information). If you have questions about a medical condition or this instruction, always ask your healthcare professional. [address] any warranty or liability for your use of this information. Introducing [facility] & HEALTH SERVICES! Dear Ce: Thank you for requesting a [employer] account. Our records indicate that you already have an active [employer] account. You can access your account anytime at [email or web address] Did you know that you can access your hospital and ER discharge instructions at any time in [employer]? You can also review all of your test results from your hospital stay or ER visit. Additional Information If you have questions, please visit the Frequently Asked Questions section of the [employer] website at [email or web address] Remember, [employer] is NOT to be used for urgent needs. For medical emergencies, dial 911. Now available from your iPhone and Android! Please provide this summary of care documentation to your next provider. Your primary care clinician is listed as Lily Mota. If you have any questions after today's visit, please call [phone].

## 2018-06-05 NOTE — PROGRESS NOTES
HPI Comments: Destiny Dunn presents with worsening left ear congestion and \"irritation and fluttering\" for the past 2-3 days, along with mild generalized HA and slight dizziness described as the room spinning when moving head quickly. Reports she has had vertigo in the past in which she had physical therapy for. Reports congestion, rhinorrhea. Denies fever, chills, cough, ST, CP, SOB, palpitations. Took tylenol this AM with improvement of HA. Has hx of Afib on Eliquis/Rythmol, DM2, HTN, HLD, migraines, GERD, Asthma. Reports has taken cephalosporins in the past without reaction. The history is provided by the patient.         Past Medical History:   Diagnosis Date    Abdominal mass, left upper quadrant 6/13/2017    Abnormal finding on MRI of brain 3/16/2015    evaluated by neurologist and no findings    Acute pharyngitis 5/26/2016    Anemia NEC     Arthralgia of right hip 4/25/2016    Arthritis 2/18/2010    Asthma 2/18/2010    Cataract 9/24/2014    Diabetes ([address]     Elevated LFTs 4/16/2014    DAVID (generalized anxiety disorder) 1/22/2015    GERD (gastroesophageal reflux disease) 4/7/2014    Hammer toe of right foot 9/29/2014    Headache(784.0) 1/09/8043    Helicobacter pylori (H. 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 ([address] 9/29/2014    Preop examination 9/24/2014    Right foot injury 5/2/2017    Risk for falls 4/16/2014    Screening for breast cancer 9/23/2014    Screening for depression 4/16/2014    Shoulder pain, left 3/18/2014    Spondylitis, cervical ([address] 4/5/2010    Tinea pedis 6/3/2015    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 straightened colon out    HX ROTATOR CUFF REPAIR  2009    left    OPEN REPAIR CLAVICLE FRACTURE  2009         Family History   Problem Relation Age of Onset    Cancer Mother      mycosis fungoives    Stroke Father     Cancer Sister      one sister with breast cancer    Breast Cancer Sister     Cancer Paternal Aunt      2 paternal aunts with breast cancer    Thyroid Cancer Neg Hx         Social History     Social History    Marital status:      Spouse name: N/A    Number of children: N/A    Years of education: N/A     Occupational History    Not on file. Social History Main Topics    Smoking status: Former Smoker     Quit date: 6/14/1988    Smokeless tobacco: Never Used    Alcohol use No    Drug use: No    Sexual activity: No     Other Topics Concern    Not on file     Social History Narrative                ALLERGIES: Latex; Amoxil [amoxicillin]; Levaquin [levofloxacin]; Percocet [oxycodone-acetaminophen]; Tetanus vaccines and toxoid; and Tetracycline hcl    Review of Systems   Constitutional: Negative for activity change, appetite change, chills and fever. HENT: Positive for congestion, ear pain, rhinorrhea, sinus pain and sinus pressure. Negative for ear discharge, sore throat and trouble swallowing. Respiratory: Negative for cough, shortness of breath and wheezing. Cardiovascular: Negative for chest pain and palpitations. Gastrointestinal: Negative for nausea and vomiting. Musculoskeletal: Negative for myalgias. Neurological: Negative for dizziness and headaches. Hematological: Negative for adenopathy.        Vitals: 18 0817   BP: 145/66   Pulse: 67   Resp: 16   Temp: 97.6 °F (36.4 °C)   SpO2: 98%   Weight: 205 lb (93 kg)   Height: 5' 2\" (1.575 m)       Physical Exam   Constitutional: She appears well-developed and well-nourished. No distress. HENT:   Right Ear: Tympanic membrane, external ear and ear canal normal.   Left Ear: External ear and ear canal normal. Tympanic membrane is erythematous and bulging. A middle ear effusion is present. Nose: Rhinorrhea present. Right sinus exhibits maxillary sinus tenderness. Right sinus exhibits no frontal sinus tenderness. Left sinus exhibits maxillary sinus tenderness. Left sinus exhibits no frontal sinus tenderness. Mouth/Throat: Mucous membranes are normal. Posterior oropharyngeal erythema present. No oropharyngeal exudate, posterior oropharyngeal edema or tonsillar abscesses. Cardiovascular: Normal rate, regular rhythm and normal heart sounds. Pulmonary/Chest: Effort normal and breath sounds normal. No respiratory distress. She has no wheezes. She has no rales. Lymphadenopathy:     She has no cervical adenopathy. Neurological: She is alert. No cranial nerve deficit. Coordination and gait normal.   Skin: She is not diaphoretic. Psychiatric: She has a normal mood and affect. Her behavior is normal. Judgment and thought content normal.   Nursing note and vitals reviewed. MDM    Procedures        ICD-10-CM ICD-9-CM    1. Acute left otitis media H66.92 382.9    2. Vertigo R42 780.4      Medications Ordered Today   Medications    cefdinir (OMNICEF) 300 mg capsule     Sig: Take 1 Cap by mouth two (2) times a day for 10 days. Dispense:  20 Cap     Refill:  0    meclizine (ANTIVERT) 12.5 mg tablet     Sig: Take 1 Tab by mouth three (3) times daily as needed. Dispense:  30 Tab     Refill:  0    fluticasone (FLONASE) 50 mcg/actuation nasal spray     Si Sprays by Both Nostrils route daily for 14 days.      Dispense:  1 Bottle     Refill:  0     The patients condition was discussed with the patient and they understand. The patient is to follow up with ENT. If signs and symptoms become worse the pt is to go to the ER. The patient is to take medications as prescribed.

## 2018-06-27 ENCOUNTER — OFFICE VISIT (OUTPATIENT)
Dept: CARDIOLOGY CLINIC | Age: 78
End: 2018-06-27

## 2018-06-27 VITALS
BODY MASS INDEX: 37.78 KG/M2 | WEIGHT: 205.3 LBS | RESPIRATION RATE: 16 BRPM | SYSTOLIC BLOOD PRESSURE: 110 MMHG | DIASTOLIC BLOOD PRESSURE: 60 MMHG | HEART RATE: 70 BPM | OXYGEN SATURATION: 97 % | HEIGHT: 62 IN

## 2018-06-27 DIAGNOSIS — I10 ESSENTIAL HYPERTENSION: ICD-10-CM

## 2018-06-27 DIAGNOSIS — E78.2 MIXED HYPERLIPIDEMIA: ICD-10-CM

## 2018-06-27 DIAGNOSIS — I48.0 PAROXYSMAL ATRIAL FIBRILLATION (HCC): Primary | ICD-10-CM

## 2018-06-27 PROBLEM — E66.01 SEVERE OBESITY (BMI 35.0-39.9): Status: ACTIVE | Noted: 2018-06-27

## 2018-06-27 NOTE — PROGRESS NOTES
1. Have you been to the ER, urgent care clinic since your last visit? Hospitalized since your last visit? No    2. Have you seen or consulted any other health care providers outside of the [address] [place] [address] since your last visit? Include any pap smears or colon screening. No     Patient C/O left foot swelling and occassional palpitations.

## 2018-06-27 NOTE — MR AVS SNAPSHOT
[address] [facility] 
[phone] Patient: Lindsay Montero MRN: [id number] :1940 Visit Information Date & Time Provider Department Dept. Phone Encounter #  
 2018  9:45 AM Shantel Fitzgerald MD [facility] [phone] [id number] Your Appointments 2018  8:30 AM  
Medicare Physical with Jeannie Aden MD  
[address] [address] Internal Medicine [address] [place] [address]) Appt Note: medicare wellness [address] [facility] [address]  
[address] [facility]  
  
    
 2019  9:45 AM  
ESTABLISHED PATIENT with Shantel Fitzgerald MD  
[facility] [address]) [address] [facility]  
[phone] [address] [facility]  
  
    
 3/6/2019  9:30 AM  
ROUTINE CARE with Conchita Peters MD  
[facility] [address] [place] [address]) Appt Note: f/u diabetes cp0.00  
 [address]  
[address] [facility] [address] Upcoming Health Maintenance Date Due  
 MEDICARE YEARLY EXAM 5/3/2018 HEMOGLOBIN A1C Q6M 2018 MICROALBUMIN Q1 2018 EYE EXAM RETINAL OR DILATED Q1 2018 Influenza Age 5 to Adult 2018 LIPID PANEL Q1 10/18/2018 FOOT EXAM Q1 3/6/2019 GLAUCOMA SCREENING Q2Y 2019 COLONOSCOPY 2021 DTaP/Tdap/Td series (2 - Td) 2026 Allergies as of 2018  Review Complete On: 2018 By: Guevara Lopez LPN Severity Noted Reaction Type Reactions Latex  2011    Rash Amoxil [Amoxicillin]  2017    Itching  
 rash Levaquin [Levofloxacin]  2016    Other (comments) Dizziness  Percocet [Oxycodone-acetaminophen]  2010   Intolerance Nausea and Vomiting Tetanus Vaccines And Toxoid  02/18/2010   Systemic Swelling Tetracycline Hcl  02/18/2010   Systemic Rash Current Immunizations  Reviewed on 5/2/2017 Name Date Influenza High Dose Vaccine PF 10/18/2017, 11/29/2016, 10/7/2015, 11/3/2014 Pneumococcal Conjugate (PCV-13) 10/7/2015 Pneumococcal Polysaccharide (PPSV-23) 2/5/2013 Zoster Vaccine, Live 9/29/2014 Not reviewed this visit You Were Diagnosed With   
  
 Codes Comments Paroxysmal atrial fibrillation (HCC)    -  Primary ICD-10-CM: I48.0 ICD-9-CM: 427.31 Essential hypertension     ICD-10-CM: I10 
ICD-9-CM: 401.9 Mixed hyperlipidemia     ICD-10-CM: E78.2 ICD-9-CM: 272.2 Vitals BP Pulse Resp Height(growth percentile) Weight(growth percentile) LMP  
 110/60 (BP Patient Position: Sitting) 70 16 5' 2\" (1.575 m) 205 lb 4.8 oz (93.1 kg) 02/18/1983 SpO2 BMI OB Status Smoking Status 97% 37.55 kg/m2 Hysterectomy Former Smoker BMI and BSA Data Body Mass Index Body Surface Area  
 37.55 kg/m 2 2.02 m 2 Preferred Pharmacy Pharmacy Name Phone [id number] [place], [place] [phone] Your Updated Medication List  
  
   
This list is accurate as of 6/27/18 10:29 AM.  Always use your most recent med list.  
  
  
  
  
 apixaban 5 mg tablet Commonly known as:  Adali Bloodgood Take 1 Tab by mouth two (2) times a day. aspirin delayed-release 81 mg tablet Take 1 Tab by mouth daily. Blood-Glucose Meter monitoring kit Once daily before breakfast  
  
 calcium-cholecalciferol (D3) tablet Commonly known as:  CALTRATE 600+D Take 1 Tab by mouth two (2) times a day. cetirizine 10 mg tablet Commonly known as:  ZYRTEC  
TAKE 1 TABLET BY MOUTH EVERY DAY  
  
 fluticasone 50 mcg/actuation nasal spray Commonly known as:  Mohamud Wright 2 Sprays by Both Nostrils route as needed for Rhinitis or Allergies. Indications: Allergic Rhinitis * glucose blood VI test strips strip Commonly known as:  ACCU-CHEK DA Gutierrez * glucose blood VI test strips strip Commonly known as:  FREESTYLE INSULINX  
TEST EVERY DAY AND AS NEEDED  
  
 hydroCHLOROthiazide 25 mg tablet Commonly known as:  HYDRODIURIL Take 1 Tab by mouth daily. Lancets Misc Commonly known as:  FREESTYLE LANCETS Test once daily. (diagnosis code: 250.00) meclizine 12.5 mg tablet Commonly known as:  ANTIVERT Take 1 Tab by mouth three (3) times daily as needed. metFORMIN 500 mg tablet Commonly known as:  GLUCOPHAGE Take 1 Tab by mouth two (2) times daily (with meals). metoprolol succinate 50 mg XL tablet Commonly known as:  TOPROL-XL Take 1 Tab by mouth daily. MULTIVITAMIN PO Take 1 Tab by mouth daily. pantoprazole 40 mg tablet Commonly known as:  PROTONIX  
TAKE 1 TABLET BY MOUTH EVERY DAY  
  
 propafenone 150 mg tablet Commonly known as:  RYTHMOL Take 1 Tab by mouth every eight (8) hours. rosuvastatin 10 mg tablet Commonly known as:  CRESTOR  
TAKE 1 TABLET BY MOUTH EVERY DAY  
  
 * Notice: This list has 2 medication(s) that are the same as other medications prescribed for you. Read the directions carefully, and ask your doctor or other care provider to review them with you. We Performed the Following AMB POC EKG ROUTINE W/ 12 LEADS, INTER & REP [06564 CPT(R)] Introducing [facility] SERVICES! Dear Wilhelmenia Mortimer: Thank you for requesting a [employer] account. Our records indicate that you already have an active [employer] account. You can access your account anytime at [email or web address] Did you know that you can access your hospital and ER discharge instructions at any time in [employer]? You can also review all of your test results from your hospital stay or ER visit. Additional Information If you have questions, please visit the Frequently Asked Questions section of the [email or web address] website at [email or web address] [email or web address] Remember, [employer] is NOT to be used for urgent needs. For medical emergencies, dial 911. Now available from your iPhone and Android! Please provide this summary of care documentation to your next provider. Your primary care clinician is listed as Brenda Stoner. If you have any questions after today's visit, please call [phone].

## 2018-06-27 NOTE — PROGRESS NOTES
[address] [address]  [phone]     Subjective:      Yuan Torres is a 78 y.o. female is here for routine f/u. The patient denies chest pain/ shortness of breath, orthopnea, PND, LE edema, palpitations, syncope, or presyncope. States improved episodes of palpitation.     Patient Active Problem List    Diagnosis Date Noted    Severe obesity (BMI 35.0-39.9) ([facility] 06/27/2018    Paroxysmal atrial fibrillation ([address] 01/02/2018    Abdominal mass, left upper quadrant 06/13/2017    Left upper quadrant pain 06/13/2017    Right foot injury 05/02/2017    Chronic asthma with status asthmaticus 01/26/2017    Intractable migraine with aura without status migrainosus 80/32/3572    Helicobacter pylori (H. pylori) infection 12/07/2016    Skipped heart beats 11/29/2016    Multiple thyroid nodules 07/14/2016    Swollen gland 05/26/2016    Acute pharyngitis 05/26/2016    Diabetes mellitus type 2, controlled ([address] 05/26/2016    Arthralgia of right hip 04/25/2016    Tinea pedis 06/03/2015    Abnormal finding on MRI of brain 03/16/2015    DAVID (generalized anxiety disorder) 01/22/2015    Peripheral autonomic neuropathy due to DM ([address] 09/29/2014    Pre-ulcerative calluses 09/29/2014    Type 2 diabetes mellitus with pressure callus ([address] 09/29/2014    Hammer toe of right foot 09/29/2014    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 ([facility] 09/29/2014    Cataract 09/24/2014    Preop examination 09/24/2014    Need for varicella vaccine 09/23/2014    Screening for breast cancer 09/23/2014    Acute asthma exacerbation 08/31/2014    Elevated LFTs 04/16/2014    Screening for depression 04/16/2014    Risk for falls 04/16/2014    GERD (gastroesophageal reflux disease) 04/07/2014    Osteoarthritis of acromioclavicular joint 03/18/2014    Shoulder pain, left 03/18/2014    Rotator cuff (capsule) sprain and strain 09/20/2013    Foot pain, left 06/14/2013    Bilateral hip pain 05/20/2013    Postmenopausal disorder 05/20/2013    Numbness and tingling of right leg 05/20/2013    Postmenopausal state 01/21/2013    Vitamin D deficiency 01/21/2013    Colon cancer screening 01/02/2013    Visual floaters 02/01/2012    Acute bronchitis 11/30/2011    Hip pain, right 09/14/2011    Cyst of right kidney 08/10/2011    Anemia 04/22/2011    Chest pain 10/20/2010    Tingling sensation 09/13/2010    Spondylitis, cervical ([address] 04/05/2010    HTN (hypertension) 03/26/2010    Headache(784.0) 03/26/2010    Hyperlipemia 02/18/2010    Arthritis 02/18/2010      Mina Del Valle MD  Past Medical History:   Diagnosis Date    Abdominal mass, left upper quadrant 6/13/2017    Abnormal finding on MRI of brain 3/16/2015    evaluated by neurologist and no findings    Acute pharyngitis 5/26/2016    Anemia NEC     Arthralgia of right hip 4/25/2016    Arthritis 2/18/2010    Asthma 2/18/2010    Cataract 9/24/2014    Diabetes ([address]     Elevated LFTs 4/16/2014    DAVID (generalized anxiety disorder) 1/22/2015    GERD (gastroesophageal reflux disease) 4/7/2014    Hammer toe of right foot 9/29/2014    Headache(784.0) 4/64/4555    Helicobacter pylori (H. 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 ([address] 9/29/2014    Preop examination 9/24/2014    Right foot injury 5/2/2017    Risk for falls 4/16/2014    Screening for breast cancer 9/23/2014    Screening for depression 4/16/2014    Shoulder pain, left 3/18/2014    Spondylitis, cervical ([address] 4/5/2010    Tinea pedis 6/3/2015    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 straightened colon out    HX ROTATOR CUFF REPAIR  2009    left    OPEN REPAIR CLAVICLE FRACTURE  2009     Allergies   Allergen Reactions    Latex Rash    Amoxil [Amoxicillin] Itching     rash    Levaquin [Levofloxacin] Other (comments)     Dizziness      Percocet [Oxycodone-Acetaminophen] Nausea and Vomiting    Tetanus Vaccines And Toxoid Swelling    Tetracycline Hcl Rash      Family History   Problem Relation Age of Onset    Cancer Mother      mycosis fungoives    Stroke Father     Cancer Sister      one sister with breast cancer    Breast Cancer Sister     Cancer Paternal Aunt      2 paternal aunts with breast cancer    Thyroid Cancer Neg Hx       Social History     Social History    Marital status:      Spouse name: N/A    Number of children: N/A    Years of education: N/A     Occupational History    Not on file. Social History Main Topics    Smoking status: Former Smoker     Quit date: 6/14/1988    Smokeless tobacco: Never Used    Alcohol use No    Drug use: No    Sexual activity: No     Other Topics Concern    Not on file     Social History Narrative      Current Outpatient Prescriptions   Medication Sig    meclizine (ANTIVERT) 12.5 mg tablet Take 1 Tab by mouth three (3) times daily as needed.  apixaban (ELIQUIS) 5 mg tablet Take 1 Tab by mouth two (2) times a day.  propafenone (RYTHMOL) 150 mg tablet Take 1 Tab by mouth every eight (8) hours.  metoprolol succinate (TOPROL-XL) 50 mg XL tablet Take 1 Tab by mouth daily.     cetirizine (ZYRTEC) 10 mg tablet TAKE 1 TABLET BY MOUTH EVERY DAY    metFORMIN (GLUCOPHAGE) 500 mg tablet Take 1 Tab by mouth two (2) times daily (with meals).  rosuvastatin (CRESTOR) 10 mg tablet TAKE 1 TABLET BY MOUTH EVERY DAY    aspirin delayed-release 81 mg tablet Take 1 Tab by mouth daily.  fluticasone (FLONASE) 50 mcg/actuation nasal spray 2 Sprays by Both Nostrils route as needed for Rhinitis or Allergies. Indications: Allergic Rhinitis    hydroCHLOROthiazide (HYDRODIURIL) 25 mg tablet Take 1 Tab by mouth daily.  glucose blood VI test strips (FREESTYLE INSULINX) strip TEST EVERY DAY AND AS NEEDED    calcium-cholecalciferol, D3, (CALTRATE 600+D) tablet Take 1 Tab by mouth two (2) times a day.  MULTIVITAMIN PO Take 1 Tab by mouth daily.  Lancets (FREESTYLE LANCETS) Misc Test once daily. (diagnosis code: 250.00)    Blood-Glucose Meter monitoring kit Once daily before breakfast    glucose blood VI test strips (ACCU-CHEK DA) strip .  pantoprazole (PROTONIX) 40 mg tablet TAKE 1 TABLET BY MOUTH EVERY DAY     No current facility-administered medications for this visit. Review of Symptoms:  11 systems reviewed, negative other than as stated in the HPI    Physical ExamPhysical Exam:    Vitals:    06/27/18 0945   BP: 110/60   Pulse: 70   Resp: 16   SpO2: 97%   Weight: 205 lb 4.8 oz (93.1 kg)   Height: 5' 2\" (1.575 m)     Body mass index is 37.55 kg/(m^2). General PE   Gen:  NAD  Mental Status - Alert. General Appearance - Not in acute distress. Chest and Lung Exam   Inspection: Accessory muscles - No use of accessory muscles in breathing. Auscultation:   Breath sounds: - Normal.   Cardiovascular   Inspection: Jugular vein - Bilateral - Inspection Normal.   Palpation/Percussion:   Apical Impulse: - Normal.   Auscultation: Rhythm - Regular. Heart Sounds - S1 WNL and S2 WNL. No S3 or S4. Murmurs & Other Heart Sounds: Auscultation of the heart reveals - No Murmurs. Peripheral Vascular   Upper Extremity: Inspection - Bilateral - No Cyanotic nailbeds or Digital clubbing. Lower Extremity:   Palpation: Edema - Bilateral - No edema. Abdomen:   Soft, non-tender, bowel sounds are active. Neuro: A&O times 3, CN and motor grossly WNL    Labs:   Lab Results   Component Value Date/Time    Cholesterol, total 180 10/18/2017 10:24 AM    Cholesterol, total 159 05/02/2017 09:21 AM    Cholesterol, total 172 01/12/2017 04:20 AM    Cholesterol, total 166 11/29/2016 09:03 AM    Cholesterol, total 181 04/25/2016 08:22 AM    HDL Cholesterol 56 10/18/2017 10:24 AM    HDL Cholesterol 58 05/02/2017 09:21 AM    HDL Cholesterol 59 01/12/2017 04:20 AM    HDL Cholesterol 50 11/29/2016 09:03 AM    HDL Cholesterol 58 04/25/2016 08:22 AM    LDL, calculated 94 10/18/2017 10:24 AM    LDL, calculated 81 05/02/2017 09:21 AM    LDL, calculated 77.4 01/12/2017 04:20 AM    LDL, calculated 83 11/29/2016 09:03 AM    LDL, calculated 98 04/25/2016 08:22 AM    Triglyceride 150 (H) 10/18/2017 10:24 AM    Triglyceride 99 05/02/2017 09:21 AM    Triglyceride 178 (H) 01/12/2017 04:20 AM    Triglyceride 166 (H) 11/29/2016 09:03 AM    Triglyceride 124 04/25/2016 08:22 AM    CHOL/HDL Ratio 2.9 01/12/2017 04:20 AM    CHOL/HDL Ratio 5.5 (H) 09/13/2010 01:35 PM    CHOL/HDL Ratio 4.1 04/05/2010 10:02 AM     Lab Results   Component Value Date/Time     (H) 01/05/2017 07:42 AM     Lab Results   Component Value Date/Time    Sodium 142 04/19/2018 04:46 PM    Potassium 4.7 04/19/2018 04:46 PM    Chloride 96 04/19/2018 04:46 PM    CO2 27 04/19/2018 04:46 PM    Anion gap 7 10/14/2017 10:53 AM    Glucose 100 (H) 04/19/2018 04:46 PM    BUN 15 04/19/2018 04:46 PM    Creatinine 0.73 04/19/2018 04:46 PM    BUN/Creatinine ratio 21 04/19/2018 04:46 PM    GFR est AA 93 04/19/2018 04:46 PM    GFR est non-AA 81 04/19/2018 04:46 PM    Calcium 10.3 04/19/2018 04:46 PM    Bilirubin, total <0.2 04/19/2018 04:46 PM    AST (SGOT) 25 04/19/2018 04:46 PM    Alk.  phosphatase 49 04/19/2018 04:46 PM    Protein, total 7.3 04/19/2018 04:46 PM    Albumin 4.5 04/19/2018 04:46 PM    Globulin 4.3 (H) 10/14/2017 10:53 AM    A-G Ratio 1.6 04/19/2018 04:46 PM    ALT (SGPT) 20 04/19/2018 04:46 PM       EKG:  NSR      Assessment:     Assessment:      1. Paroxysmal atrial fibrillation (HCC)    2. Essential hypertension    3. Mixed hyperlipidemia        Orders Placed This Encounter    AMB POC EKG ROUTINE W/ 12 LEADS, INTER & REP     Order Specific Question:   Reason for Exam:     Answer:   routine        Plan:     Pt presents for f/u. Negative nuclear stress test 10/17    PAF  Normal EF, no significant valvular pathology per echo in 1/17  Maintaining SR. States improved episodes of palpitation  Continue BB, Rythmol, Eliquis for [address] [place] [address]    HTN  Controlled with current therapy    HLD  10/17 LDL at goal. On statin. Lipids and labs followed by PCP. Hx TIA  On ASA, statin    Continue current care and f/u in 6 months. Criss Nguyen NP       [facility]    6/28/2018         Patient seen, examined by me personally. Plan discussed as detailed. Agree with note as outlined by  NP. I confirm findings in history and physical exam. No additional findings noted. Agree with plan as outlined above.      Michael Loyola MD

## 2018-08-04 DIAGNOSIS — G43.119 INTRACTABLE MIGRAINE WITH AURA WITHOUT STATUS MIGRAINOSUS: ICD-10-CM

## 2018-08-04 DIAGNOSIS — E11.9 DIABETES MELLITUS WITHOUT COMPLICATION (HCC): ICD-10-CM

## 2018-08-04 DIAGNOSIS — Z23 ENCOUNTER FOR IMMUNIZATION: ICD-10-CM

## 2018-08-04 DIAGNOSIS — I10 ESSENTIAL HYPERTENSION: ICD-10-CM

## 2018-08-04 RX ORDER — METFORMIN HYDROCHLORIDE 500 MG/1
TABLET ORAL
Qty: 90 TAB | Refills: 1 | Status: SHIPPED | OUTPATIENT
Start: 2018-08-04 | End: 2019-04-07 | Stop reason: SDUPTHER

## 2018-08-23 ENCOUNTER — HOSPITAL ENCOUNTER (EMERGENCY)
Age: 78
Discharge: HOME | End: 2018-08-23
Attending: EMERGENCY MEDICINE
Payer: MEDICARE

## 2018-08-23 ENCOUNTER — APPOINTMENT (OUTPATIENT)
Dept: GENERAL RADIOLOGY | Age: 78
End: 2018-08-23
Attending: EMERGENCY MEDICINE
Payer: MEDICARE

## 2018-08-23 VITALS
SYSTOLIC BLOOD PRESSURE: 117 MMHG | TEMPERATURE: 97.8 F | HEIGHT: 62 IN | WEIGHT: 204.81 LBS | HEART RATE: 65 BPM | BODY MASS INDEX: 37.69 KG/M2 | OXYGEN SATURATION: 100 % | RESPIRATION RATE: 14 BRPM | DIASTOLIC BLOOD PRESSURE: 56 MMHG

## 2018-08-23 DIAGNOSIS — J02.9 PHARYNGITIS, UNSPECIFIED ETIOLOGY: Primary | ICD-10-CM

## 2018-08-23 DIAGNOSIS — R07.89 ATYPICAL CHEST PAIN: ICD-10-CM

## 2018-08-23 LAB
ALBUMIN SERPL-MCNC: 3.5 G/DL (ref 3.5–5)
ALBUMIN/GLOB SERPL: 0.9 {RATIO} (ref 1.1–2.2)
ALP SERPL-CCNC: 49 U/L (ref 45–117)
ALT SERPL-CCNC: 24 U/L (ref 12–78)
ANION GAP SERPL CALC-SCNC: 5 MMOL/L (ref 5–15)
AST SERPL-CCNC: 21 U/L (ref 15–37)
BASOPHILS # BLD: 0 K/UL (ref 0–0.1)
BASOPHILS NFR BLD: 0 % (ref 0–1)
BILIRUB SERPL-MCNC: 0.2 MG/DL (ref 0.2–1)
BUN SERPL-MCNC: 12 MG/DL (ref 6–20)
BUN/CREAT SERPL: 14 (ref 12–20)
CALCIUM SERPL-MCNC: 8.9 MG/DL (ref 8.5–10.1)
CHLORIDE SERPL-SCNC: 101 MMOL/L (ref 97–108)
CK SERPL-CCNC: 145 U/L (ref 26–192)
CO2 SERPL-SCNC: 32 MMOL/L (ref 21–32)
CREAT SERPL-MCNC: 0.86 MG/DL (ref 0.55–1.02)
DIFFERENTIAL METHOD BLD: NORMAL
EOSINOPHIL # BLD: 0.1 K/UL (ref 0–0.4)
EOSINOPHIL NFR BLD: 3 % (ref 0–7)
ERYTHROCYTE [DISTWIDTH] IN BLOOD BY AUTOMATED COUNT: 13.6 % (ref 11.5–14.5)
GLOBULIN SER CALC-MCNC: 3.7 G/DL (ref 2–4)
GLUCOSE SERPL-MCNC: 183 MG/DL (ref 65–100)
HCT VFR BLD AUTO: 36.2 % (ref 35–47)
HGB BLD-MCNC: 11.5 G/DL (ref 11.5–16)
IMM GRANULOCYTES # BLD: 0 K/UL (ref 0–0.04)
IMM GRANULOCYTES NFR BLD AUTO: 0 % (ref 0–0.5)
LYMPHOCYTES # BLD: 1.8 K/UL (ref 0.8–3.5)
LYMPHOCYTES NFR BLD: 40 % (ref 12–49)
MCH RBC QN AUTO: 28.1 PG (ref 26–34)
MCHC RBC AUTO-ENTMCNC: 31.8 G/DL (ref 30–36.5)
MCV RBC AUTO: 88.5 FL (ref 80–99)
MONOCYTES # BLD: 0.3 K/UL (ref 0–1)
MONOCYTES NFR BLD: 7 % (ref 5–13)
NEUTS SEG # BLD: 2.3 K/UL (ref 1.8–8)
NEUTS SEG NFR BLD: 49 % (ref 32–75)
NRBC # BLD: 0 K/UL (ref 0–0.01)
NRBC BLD-RTO: 0 PER 100 WBC
PLATELET # BLD AUTO: 266 K/UL (ref 150–400)
PMV BLD AUTO: 9.8 FL (ref 8.9–12.9)
POTASSIUM SERPL-SCNC: 3.8 MMOL/L (ref 3.5–5.1)
PROT SERPL-MCNC: 7.2 G/DL (ref 6.4–8.2)
RBC # BLD AUTO: 4.09 M/UL (ref 3.8–5.2)
SODIUM SERPL-SCNC: 138 MMOL/L (ref 136–145)
TROPONIN I SERPL-MCNC: <0.05 NG/ML
WBC # BLD AUTO: 4.6 K/UL (ref 3.6–11)

## 2018-08-23 PROCEDURE — 84484 ASSAY OF TROPONIN QUANT: CPT | Performed by: EMERGENCY MEDICINE

## 2018-08-23 PROCEDURE — 36415 COLL VENOUS BLD VENIPUNCTURE: CPT | Performed by: EMERGENCY MEDICINE

## 2018-08-23 PROCEDURE — 74011000250 HC RX REV CODE- 250: Performed by: EMERGENCY MEDICINE

## 2018-08-23 PROCEDURE — 82550 ASSAY OF CK (CPK): CPT | Performed by: EMERGENCY MEDICINE

## 2018-08-23 PROCEDURE — 99284 EMERGENCY DEPT VISIT MOD MDM: CPT

## 2018-08-23 PROCEDURE — 74011250637 HC RX REV CODE- 250/637: Performed by: EMERGENCY MEDICINE

## 2018-08-23 PROCEDURE — 85025 COMPLETE CBC W/AUTO DIFF WBC: CPT | Performed by: EMERGENCY MEDICINE

## 2018-08-23 PROCEDURE — 93005 ELECTROCARDIOGRAM TRACING: CPT

## 2018-08-23 PROCEDURE — 80053 COMPREHEN METABOLIC PANEL: CPT | Performed by: EMERGENCY MEDICINE

## 2018-08-23 PROCEDURE — 71046 X-RAY EXAM CHEST 2 VIEWS: CPT

## 2018-08-23 RX ORDER — ALBUTEROL SULFATE 90 UG/1
2 AEROSOL, METERED RESPIRATORY (INHALATION)
Qty: 1 INHALER | Refills: 1 | Status: SHIPPED | OUTPATIENT
Start: 2018-08-23 | End: 2019-09-05

## 2018-08-23 RX ADMIN — LIDOCAINE HYDROCHLORIDE 40 ML: 20 SOLUTION ORAL; TOPICAL at 22:14

## 2018-08-24 LAB
ATRIAL RATE: 74 BPM
CALCULATED P AXIS, ECG09: 65 DEGREES
CALCULATED R AXIS, ECG10: -59 DEGREES
CALCULATED T AXIS, ECG11: 23 DEGREES
DIAGNOSIS, 93000: NORMAL
P-R INTERVAL, ECG05: 198 MS
Q-T INTERVAL, ECG07: 398 MS
QRS DURATION, ECG06: 114 MS
QTC CALCULATION (BEZET), ECG08: 441 MS
VENTRICULAR RATE, ECG03: 74 BPM

## 2018-08-24 NOTE — ED PROVIDER NOTES
EMERGENCY DEPARTMENT HISTORY AND PHYSICAL EXAM      Date: 8/23/2018  Patient Name: Marsh Siemens    History of Presenting Illness     Chief Complaint   Patient presents with    Sore Throat     Pt ambulatory to triage x with c/o sore throat x 2 days; denies cough states congestion    Diarrhea     x today; denies abdominal pain, denies N/V    Chest Pain     \"My chest hurts when I swallow\"; pt describes pain as \"soreness in chest\"; denies SOB     History Provided By: Patient    HPI: Marsh Siemens, 78 y.o. female with PMHx significant for asthma, hyperlipidemia, HTN, anemia, DM, DAVID, morbid obesity, presents ambulatory to the ED with cc of constant moderate mid CP, ongoing since yesterday. Pt reports neck pain, diarrhea, and wheezing alongside cc. She denies any alleviating or exacerbating factors. Pt denies taking any OTC medications for relief. Pt specifically denies any nausea, vomiting, abdominal pain, back pain, fevers, chills, sore throat, cough or lightheadedness. Chief Complaint: CP  Duration: 1 Days  Timing:  Constant  Location: mid chest  Quality: N/A  Severity: Moderate  Modifying Factors: denies any modifying factors  Associated Symptoms: neck pain, diarrhea, wheezing     There are no other complaints, changes, or physical findings at this time. PCP: Leona Coronado MD    Current Outpatient Prescriptions   Medication Sig Dispense Refill    albuterol (PROVENTIL HFA, VENTOLIN HFA, PROAIR HFA) 90 mcg/actuation inhaler Take 2 Puffs by inhalation every four (4) hours as needed for Wheezing. 1 Inhaler 1    metFORMIN (GLUCOPHAGE) 500 mg tablet TAKE 1 TABLET BY MOUTH ONCE A DAY WITH MEALS 90 Tab 1    pantoprazole (PROTONIX) 40 mg tablet TAKE 1 TABLET BY MOUTH EVERY DAY      meclizine (ANTIVERT) 12.5 mg tablet Take 1 Tab by mouth three (3) times daily as needed. 30 Tab 0    apixaban (ELIQUIS) 5 mg tablet Take 1 Tab by mouth two (2) times a day.  180 Tab 3    propafenone (RYTHMOL) 150 mg tablet Take 1 Tab by mouth every eight (8) hours. 270 Tab 3    metoprolol succinate (TOPROL-XL) 50 mg XL tablet Take 1 Tab by mouth daily. 90 Tab 0    cetirizine (ZYRTEC) 10 mg tablet TAKE 1 TABLET BY MOUTH EVERY DAY 90 Tab 1    rosuvastatin (CRESTOR) 10 mg tablet TAKE 1 TABLET BY MOUTH EVERY DAY 90 Tab 2    aspirin delayed-release 81 mg tablet Take 1 Tab by mouth daily. 30 Tab 11    fluticasone (FLONASE) 50 mcg/actuation nasal spray 2 Sprays by Both Nostrils route as needed for Rhinitis or Allergies. Indications: Allergic Rhinitis 1 Bottle 11    hydroCHLOROthiazide (HYDRODIURIL) 25 mg tablet Take 1 Tab by mouth daily. 90 Tab 1    glucose blood VI test strips (FREESTYLE INSULINX) strip TEST EVERY DAY AND AS NEEDED 100 Strip 6    calcium-cholecalciferol, D3, (CALTRATE 600+D) tablet Take 1 Tab by mouth two (2) times a day. 60 Tab 11    MULTIVITAMIN PO Take 1 Tab by mouth daily.  Lancets (FREESTYLE LANCETS) Misc Test once daily. (diagnosis code: 250.00) 1 Package 11    Blood-Glucose Meter monitoring kit Once daily before breakfast 1 Kit 0    glucose blood VI test strips (ACCU-CHEK DA) strip .  1 Package 11       Past History     Past Medical History:  Past Medical History:   Diagnosis Date    Abdominal mass, left upper quadrant 6/13/2017    Abnormal finding on MRI of brain 3/16/2015    evaluated by neurologist and no findings    Acute pharyngitis 5/26/2016    Anemia NEC     Arthralgia of right hip 4/25/2016    Arthritis 2/18/2010    Asthma 2/18/2010    Cataract 9/24/2014    Diabetes ([facility] [address]     Elevated LFTs 4/16/2014    DAVID (generalized anxiety disorder) 1/22/2015    GERD (gastroesophageal reflux disease) 4/7/2014    Hammer toe of right foot 9/29/2014    Headache(784.0) 0/96/8690    Helicobacter pylori (H. 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 ([facility] [address] 9/29/2014    Preop examination 9/24/2014    Right foot injury 5/2/2017    Risk for falls 4/16/2014    Screening for breast cancer 9/23/2014    Screening for depression 4/16/2014    Shoulder pain, left 3/18/2014    Spondylitis, cervical ([facility] [address] 4/5/2010    Tinea pedis 6/3/2015    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 straightened colon out    HX ROTATOR CUFF REPAIR  2009    left    OPEN REPAIR CLAVICLE FRACTURE  2009       Family History:  Family History   Problem Relation Age of Onset    Cancer Mother      mycosis fungoives    Stroke Father     Cancer Sister      one sister with breast cancer    Breast Cancer Sister     Cancer Paternal Aunt      2 paternal aunts with breast cancer    Thyroid Cancer Neg Hx        Social History:  Social History   Substance Use Topics    Smoking status: Former Smoker     Quit date: 6/14/1988    Smokeless tobacco: Never Used    Alcohol use No       Allergies: Allergies   Allergen Reactions    Latex Rash    Amoxil [Amoxicillin] Itching     rash    Levaquin [Levofloxacin] Other (comments)     Dizziness      Percocet [Oxycodone-Acetaminophen] Nausea and Vomiting    Tetanus Vaccines And Toxoid Swelling    Tetracycline Hcl Rash     Review of Systems   Review of Systems   Constitutional: Negative for chills and fever. Respiratory: Positive for wheezing.  Negative for cough and shortness of breath. Cardiovascular: Positive for chest pain (mid). Gastrointestinal: Positive for diarrhea. Negative for constipation, nausea and vomiting. Musculoskeletal: Positive for neck pain. Negative for back pain. Neurological: Negative for weakness and numbness. All other systems reviewed and are negative. Physical Exam   Physical Exam   Constitutional: She is oriented to person, place, and time. She appears well-developed and well-nourished. HENT:   Head: Normocephalic and atraumatic. Eyes: Conjunctivae and EOM are normal.   Neck: Normal range of motion. Neck supple. Swelling of left anterior cervical lymph node   Cardiovascular: Normal rate and regular rhythm. Pulmonary/Chest: Effort normal and breath sounds normal. No respiratory distress. Abdominal: Soft. She exhibits no distension. There is no tenderness. Musculoskeletal: Normal range of motion. Neurological: She is alert and oriented to person, place, and time. Skin: Skin is warm and dry. Psychiatric: She has a normal mood and affect. Nursing note and vitals reviewed. Diagnostic Study Results     Labs -     Recent Results (from the past 12 hour(s))   EKG, 12 LEAD, INITIAL    Collection Time: 08/23/18  7:33 PM   Result Value Ref Range    Ventricular Rate 74 BPM    Atrial Rate 74 BPM    P-R Interval 198 ms    QRS Duration 114 ms    Q-T Interval 398 ms    QTC Calculation (Bezet) 441 ms    Calculated P Axis 65 degrees    Calculated R Axis -59 degrees    Calculated T Axis 23 degrees    Diagnosis       Normal sinus rhythm  Incomplete right bundle branch block  Left anterior fascicular block  Minimal voltage criteria for LVH, may be normal variant  Septal infarct , age undetermined  When compared with ECG of 14-OCT-2017 10:41,  Vent.  rate has decreased BY  61 BPM  Incomplete right bundle branch block is now present  Septal infarct is now present     CBC WITH AUTOMATED DIFF    Collection Time: 08/23/18  7:48 PM   Result Value Ref Range    WBC 4.6 3.6 - 11.0 K/uL    RBC 4.09 3.80 - 5.20 M/uL    HGB 11.5 11.5 - 16.0 g/dL    HCT 36.2 35.0 - 47.0 %    MCV 88.5 80.0 - 99.0 FL    MCH 28.1 26.0 - 34.0 PG    MCHC 31.8 30.0 - 36.5 g/dL    RDW 13.6 11.5 - 14.5 %    PLATELET 059 208 - 451 K/uL    MPV 9.8 8.9 - 12.9 FL    NRBC 0.0 0  WBC    ABSOLUTE NRBC 0.00 0.00 - 0.01 K/uL    NEUTROPHILS 49 32 - 75 %    LYMPHOCYTES 40 12 - 49 %    MONOCYTES 7 5 - 13 %    EOSINOPHILS 3 0 - 7 %    BASOPHILS 0 0 - 1 %    IMMATURE GRANULOCYTES 0 0.0 - 0.5 %    ABS. NEUTROPHILS 2.3 1.8 - 8.0 K/UL    ABS. LYMPHOCYTES 1.8 0.8 - 3.5 K/UL    ABS. MONOCYTES 0.3 0.0 - 1.0 K/UL    ABS. EOSINOPHILS 0.1 0.0 - 0.4 K/UL    ABS. BASOPHILS 0.0 0.0 - 0.1 K/UL    ABS. IMM. GRANS. 0.0 0.00 - 0.04 K/UL    DF AUTOMATED     METABOLIC PANEL, COMPREHENSIVE    Collection Time: 08/23/18  7:48 PM   Result Value Ref Range    Sodium 138 136 - 145 mmol/L    Potassium 3.8 3.5 - 5.1 mmol/L    Chloride 101 97 - 108 mmol/L    CO2 32 21 - 32 mmol/L    Anion gap 5 5 - 15 mmol/L    Glucose 183 (H) 65 - 100 mg/dL    BUN 12 6 - 20 MG/DL    Creatinine 0.86 0.55 - 1.02 MG/DL    BUN/Creatinine ratio 14 12 - 20      GFR est AA >60 >60 ml/min/1.73m2    GFR est non-AA >60 >60 ml/min/1.73m2    Calcium 8.9 8.5 - 10.1 MG/DL    Bilirubin, total 0.2 0.2 - 1.0 MG/DL    ALT (SGPT) 24 12 - 78 U/L    AST (SGOT) 21 15 - 37 U/L    Alk.  phosphatase 49 45 - 117 U/L    Protein, total 7.2 6.4 - 8.2 g/dL    Albumin 3.5 3.5 - 5.0 g/dL    Globulin 3.7 2.0 - 4.0 g/dL    A-G Ratio 0.9 (L) 1.1 - 2.2     CK W/ REFLX CKMB    Collection Time: 08/23/18  7:48 PM   Result Value Ref Range     26 - 192 U/L   TROPONIN I    Collection Time: 08/23/18  7:48 PM   Result Value Ref Range    Troponin-I, Qt. <0.05 <0.05 ng/mL       Radiologic Studies -   CXR Results  (Last 48 hours)               08/23/18 2041  XR CHEST PA LAT Final result    Impression:  IMPRESSION: Normal chest.                       Narrative:  EXAM:  XR CHEST PA LAT INDICATION:   Chest pain       COMPARISON: 10/14/2017. FINDINGS: PA and lateral radiographs of the chest demonstrate clear lungs. The   cardiac and mediastinal contours and pulmonary vascularity are normal.  The   bones and soft tissues are within normal limits. Medical Decision Making   I am the first provider for this patient. I reviewed the vital signs, available nursing notes, past medical history, past surgical history, family history and social history. Vital Signs-Reviewed the patient's vital signs. Patient Vitals for the past 12 hrs:   Temp Pulse Resp BP SpO2   08/23/18 2230 - 65 14 117/56 100 %   08/23/18 2200 - 65 18 128/64 96 %   08/23/18 2130 - 68 20 125/66 97 %   08/23/18 1932 97.8 °F (36.6 °C) 75 16 151/70 98 %     Pulse Oximetry Analysis - 100% on RA    Cardiac Monitor:   Rate: 74 bpm  Rhythm: Normal Sinus Rhythm     EKG interpretation: (Preliminary) 19:33  Rhythm: normal sinus rhythm; and irregular. Rate (approx.): 74 bpm; Axis: normal; CT interval: 198 ms; QRS interval: 114 ms; ST/T wave: normal; Other findings: left anterior fascicular block. Written by MALACHI Roqueibperico, as dictated by Kang Taylor MD.    Records Reviewed: Nursing Notes, Old Medical Records and Previous Laboratory Studies    Provider Notes (Medical Decision Making):   Patient presents with throat tightness and chest pain midsternal. Given that CP is radiating to neck is most likely GERD vs viral pharyngitis. DDx:  ACS, Aortic dissection, PNA, PE, PTX, pericarditis, myocarditis, costochondritis, anxiety. Concerned for  given the HPI and Physical exam. Will obtain labs, CXR, EKG and get Cardiology Consult PRN. ED Course:   Initial assessment performed. The patients presenting problems have been discussed, and they are in agreement with the care plan formulated and outlined with them. I have encouraged them to ask questions as they arise throughout their visit.     10:48 PM  No change after GI cocktail but given that everything else is negative could possibly be viral syndrome, bronchitis, or GERD. Will administer albuterol. Critical Care Time: 0    Disposition:  Discharge Note:  10:59 PM  The pt is ready for discharge. The pt's signs, symptoms, diagnosis, and discharge instructions have been discussed and pt has conveyed their understanding. The pt is to follow up as recommended or return to ER should their symptoms worsen. Plan has been discussed and pt is in agreement. PLAN:  1. Discharge Medication List as of 8/23/2018 10:59 PM      START taking these medications    Details   albuterol (PROVENTIL HFA, VENTOLIN HFA, PROAIR HFA) 90 mcg/actuation inhaler Take 2 Puffs by inhalation every four (4) hours as needed for Wheezing., Normal, Disp-1 Inhaler, R-1         CONTINUE these medications which have NOT CHANGED    Details   metFORMIN (GLUCOPHAGE) 500 mg tablet TAKE 1 TABLET BY MOUTH ONCE A DAY WITH MEALS, Normal, Disp-90 Tab, R-1      pantoprazole (PROTONIX) 40 mg tablet TAKE 1 TABLET BY MOUTH EVERY DAY, Historical Med      meclizine (ANTIVERT) 12.5 mg tablet Take 1 Tab by mouth three (3) times daily as needed., Normal, Disp-30 Tab, R-0      apixaban (ELIQUIS) 5 mg tablet Take 1 Tab by mouth two (2) times a day., Normal, Disp-180 Tab, R-3      propafenone (RYTHMOL) 150 mg tablet Take 1 Tab by mouth every eight (8) hours. , Normal, Disp-270 Tab, R-3      metoprolol succinate (TOPROL-XL) 50 mg XL tablet Take 1 Tab by mouth daily. , Normal, Disp-90 Tab, R-0      cetirizine (ZYRTEC) 10 mg tablet TAKE 1 TABLET BY MOUTH EVERY DAY, Normal, Disp-90 Tab, R-1      rosuvastatin (CRESTOR) 10 mg tablet TAKE 1 TABLET BY MOUTH EVERY DAY, Normal, Disp-90 Tab, R-2      aspirin delayed-release 81 mg tablet Take 1 Tab by mouth daily. , Normal, Disp-30 Tab, R-11      fluticasone (FLONASE) 50 mcg/actuation nasal spray 2 Sprays by Both Nostrils route as needed for Rhinitis or Allergies.  Indications: Allergic Rhinitis, Normal, Disp-1 Bottle, R-11      hydroCHLOROthiazide (HYDRODIURIL) 25 mg tablet Take 1 Tab by mouth daily. , Normal, Disp-90 Tab, R-1      !! glucose blood VI test strips (FREESTYLE INSULINX) strip TEST EVERY DAY AND AS NEEDED, Normal, Disp-100 Strip, R-6      calcium-cholecalciferol, D3, (CALTRATE 600+D) tablet Take 1 Tab by mouth two (2) times a day., Print, Disp-60 Tab, R-11      MULTIVITAMIN PO Take 1 Tab by mouth daily. , Historical Med      Lancets (FREESTYLE LANCETS) Misc Test once daily. (diagnosis code: 250.00), Normal, Disp-1 Package, R-11      Blood-Glucose Meter monitoring kit Once daily before breakfast, Normal, Disp-1 Kit, R-0      !! glucose blood VI test strips (ACCU-CHEK DA) strip ., Normal, Disp-1 Package, R-11       !! - Potential duplicate medications found. Please discuss with provider. 2.   Follow-up Information     Follow up With Details Comments Contact Info    Aquilino Sewell MD  As needed [address] [place] [address]   [address]  [phone]          Return to ED if worse   Diagnosis     Clinical Impression:   1. Pharyngitis, unspecified etiology    2. Atypical chest pain        Attestations: This note is prepared by Joshua Lee, acting as a Scribe for Vera Linn MD.    Vera Linn MD: The scribe's documentation has been prepared under my direction and personally reviewed by me in its entirety. I confirm that the notes above accurately reflects all work, treatment, procedures, and medical decision making performed by me.

## 2018-08-24 NOTE — DISCHARGE INSTRUCTIONS
Sore Throat: Care Instructions  Your Care Instructions    Infection by bacteria or a virus causes most sore throats. Cigarette smoke, dry air, air pollution, allergies, and yelling can also cause a sore throat. Sore throats can be painful and annoying. Fortunately, most sore throats go away on their own. If you have a bacterial infection, your doctor may prescribe antibiotics. Follow-up care is a key part of your treatment and safety. Be sure to make and go to all appointments, and call your doctor if you are having problems. It's also a good idea to know your test results and keep a list of the medicines you take. How can you care for yourself at home? · If your doctor prescribed antibiotics, take them as directed. Do not stop taking them just because you feel better. You need to take the full course of antibiotics. · Gargle with warm salt water once an hour to help reduce swelling and relieve discomfort. Use 1 teaspoon of salt mixed in 1 cup of warm water. · Take an over-the-counter pain medicine, such as acetaminophen (Tylenol), ibuprofen (Advil, Motrin), or naproxen (Aleve). Read and follow all instructions on the label. · Be careful when taking over-the-counter cold or flu medicines and Tylenol at the same time. Many of these medicines have acetaminophen, which is Tylenol. Read the labels to make sure that you are not taking more than the recommended dose. Too much acetaminophen (Tylenol) can be harmful. · Drink plenty of fluids. Fluids may help soothe an irritated throat. Hot fluids, such as tea or soup, may help decrease throat pain. · Use over-the-counter throat lozenges to soothe pain. Regular cough drops or hard candy may also help. These should not be given to young children because of the risk of choking. · Do not smoke or allow others to smoke around you. If you need help quitting, talk to your doctor about stop-smoking programs and medicines.  These can increase your chances of quitting for good. · Use a vaporizer or humidifier to add moisture to your bedroom. Follow the directions for cleaning the machine. When should you call for help? Call your doctor now or seek immediate medical care if:    · You have new or worse trouble swallowing.     · Your sore throat gets much worse on one side.    Watch closely for changes in your health, and be sure to contact your doctor if you do not get better as expected. Where can you learn more? Go to http://radha-tracy.info/. Enter [phone] [id number] [phone] in the search box to learn more about \"Sore Throat: Care Instructions. \"  Current as of: May 12, 2017  Content Version: 11.7  © 4345-7631 [employer]. Care instructions adapted under license by [employer] (which disclaims liability or warranty for this information). If you have questions about a medical condition or this instruction, always ask your healthcare professional. [place] [address] any warranty or liability for your use of this information. Chest Pain: Care Instructions  Your Care Instructions    There are many things that can cause chest pain. Some are not serious and will get better on their own in a few days. But some kinds of chest pain need more testing and treatment. Your doctor may have recommended a follow-up visit in the next 8 to 12 hours. If you are not getting better, you may need more tests or treatment. Even though your doctor has released you, you still need to watch for any problems. The doctor carefully checked you, but sometimes problems can develop later. If you have new symptoms or if your symptoms do not get better, get medical care right away. If you have worse or different chest pain or pressure that lasts more than 5 minutes or you passed out (lost consciousness), call 911 or seek other emergency help right away. A medical visit is only one step in your treatment.  Even if you feel better, you still need to do what your doctor recommends, such as going to all suggested follow-up appointments and taking medicines exactly as directed. This will help you recover and help prevent future problems. How can you care for yourself at home? · Rest until you feel better. · Take your medicine exactly as prescribed. Call your doctor if you think you are having a problem with your medicine. · Do not drive after taking a prescription pain medicine. When should you call for help? Call 911 if:    · You passed out (lost consciousness).     · You have severe difficulty breathing.     · You have symptoms of a heart attack. These may include:  ¨ Chest pain or pressure, or a strange feeling in your chest.  ¨ Sweating. ¨ Shortness of breath. ¨ Nausea or vomiting. ¨ Pain, pressure, or a strange feeling in your back, neck, jaw, or upper belly or in one or both shoulders or arms. ¨ Lightheadedness or sudden weakness. ¨ A fast or irregular heartbeat. After you call 911, the  may tell you to chew 1 adult-strength or 2 to 4 low-dose aspirin. Wait for an ambulance. Do not try to drive yourself.    Call your doctor today if:    · You have any trouble breathing.     · Your chest pain gets worse.     · You are dizzy or lightheaded, or you feel like you may faint.     · You are not getting better as expected.     · You are having new or different chest pain. Where can you learn more? Go to http://radha-tracy.info/. Enter A120 in the search box to learn more about \"Chest Pain: Care Instructions. \"  Current as of: November 20, 2017  Content Version: 11.7  © 9805-6386 [employer]. Care instructions adapted under license by [employer] (which disclaims liability or warranty for this information). If you have questions about a medical condition or this instruction, always ask your healthcare professional. [address] any warranty or liability for your use of this information.

## 2018-08-29 DIAGNOSIS — E11.65 POORLY CONTROLLED TYPE 2 DIABETES MELLITUS WITH CIRCULATORY DISORDER (HCC): ICD-10-CM

## 2018-08-29 DIAGNOSIS — E11.59 POORLY CONTROLLED TYPE 2 DIABETES MELLITUS WITH CIRCULATORY DISORDER (HCC): ICD-10-CM

## 2018-08-29 RX ORDER — BLOOD SUGAR DIAGNOSTIC
STRIP MISCELLANEOUS
Qty: 100 STRIP | Refills: 4 | Status: SHIPPED | OUTPATIENT
Start: 2018-08-29 | End: 2019-02-28 | Stop reason: ALTCHOICE

## 2018-09-17 ENCOUNTER — OFFICE VISIT (OUTPATIENT)
Dept: INTERNAL MEDICINE CLINIC | Age: 78
End: 2018-09-17

## 2018-09-17 VITALS
OXYGEN SATURATION: 96 % | WEIGHT: 204.2 LBS | TEMPERATURE: 98.5 F | HEART RATE: 86 BPM | HEIGHT: 62 IN | SYSTOLIC BLOOD PRESSURE: 122 MMHG | BODY MASS INDEX: 37.58 KG/M2 | DIASTOLIC BLOOD PRESSURE: 81 MMHG | RESPIRATION RATE: 17 BRPM

## 2018-09-17 DIAGNOSIS — J06.9 VIRAL URI WITH COUGH: Primary | ICD-10-CM

## 2018-09-17 RX ORDER — BENZONATATE 100 MG/1
100 CAPSULE ORAL
Qty: 30 CAP | Refills: 0 | Status: SHIPPED | OUTPATIENT
Start: 2018-09-17 | End: 2018-09-24

## 2018-09-17 NOTE — MR AVS SNAPSHOT
[address] [facility] [address] [place] 
[phone] Patient: Sanjay Martinez MRN: [id number] Visit Information Date & Time Provider Department Dept. Phone Encounter #  
 2018 12:00 PM Jose Conrad MD [facility] Internal Medicine [phone] [id number] Follow-up Instructions Return if symptoms worsen or fail to improve. Your Appointments 2018  8:30 AM  
Medicare Physical with Jose Conrad MD  
[facility] Internal Medicine [facility]) Appt Note: medicare wellness [address] [facility] [address] [facility] [address]  
[address] [facility] [place], [place]  
  
    
 2019  9:45 AM  
ESTABLISHED PATIENT with [address] [facility]) [address] [facility] [address]  
[phone] [address] [facility] [address]  
  
    
 3/6/2019  9:30 AM  
Follow Up with Alessandro Landis MD  
[facility]) Appt Note: 1yr f/u thyroid cp0.00; 1yr f/u thyroid cp0.00  
 [address] [address]  
[phone]  
  
   
 [address] [place] [address] Upcoming Health Maintenance Date Due  
 MEDICARE YEARLY EXAM 5/3/2018 HEMOGLOBIN A1C Q6M 2018 MICROALBUMIN Q1 2018 EYE EXAM RETINAL OR DILATED Q1 2018 Influenza Age 5 to Adult 2018 LIPID PANEL Q1 10/18/2018 FOOT EXAM Q1 3/6/2019 GLAUCOMA SCREENING Q2Y 2019 COLONOSCOPY 2021 DTaP/Tdap/Td series (2 - Td) 2026 Allergies as of 2018  Review Complete On: 2018 By: Lizet Rojas Severity Noted Reaction Type Reactions Latex  2011    Rash Amoxil [Amoxicillin]  2017    Itching  
 rash Levaquin [Levofloxacin]  2016    Other (comments) Dizziness Percocet [Oxycodone-acetaminophen]  02/18/2010   Intolerance Nausea and Vomiting Tetanus Vaccines And Toxoid  02/18/2010   Systemic Swelling Tetracycline Hcl  02/18/2010   Systemic Rash Current Immunizations  Reviewed on 5/2/2017 Name Date Influenza High Dose Vaccine PF 10/18/2017, 11/29/2016, 10/7/2015, 11/3/2014 Pneumococcal Conjugate (PCV-13) 10/7/2015 Pneumococcal Polysaccharide (PPSV-23) 2/5/2013 Zoster Vaccine, Live 9/29/2014 Not reviewed this visit You Were Diagnosed With   
  
 Codes Comments Viral URI with cough    -  Primary ICD-10-CM: J06.9, B97.89 ICD-9-CM: 465.9 Vitals BP Pulse Temp Resp Height(growth percentile) Weight(growth percentile) 122/81 (BP 1 Location: Right arm, BP Patient Position: Sitting) 86 98.5 °F (36.9 °C) (Oral) 17 5' 2\" (1.575 m) 204 lb 3.2 oz (92.6 kg) LMP SpO2 BMI OB Status Smoking Status 02/18/1983 96% 37.35 kg/m2 Hysterectomy Former Smoker Vitals History BMI and BSA Data Body Mass Index Body Surface Area  
 37.35 kg/m 2 2.01 m 2 Preferred Pharmacy Pharmacy Name Phone [id number] [place], [place] [phone] Your Updated Medication List  
  
   
This list is accurate as of 9/17/18 12:42 PM.  Always use your most recent med list.  
  
  
  
  
 albuterol 90 mcg/actuation inhaler Commonly known as:  PROVENTIL HFA, VENTOLIN HFA, PROAIR HFA Take 2 Puffs by inhalation every four (4) hours as needed for Wheezing. apixaban 5 mg tablet Commonly known as:  Chitra Shady Take 1 Tab by mouth two (2) times a day. aspirin delayed-release 81 mg tablet Take 1 Tab by mouth daily. benzonatate 100 mg capsule Commonly known as:  TESSALON Take 1 Cap by mouth three (3) times daily as needed for Cough for up to 7 days. Blood-Glucose Meter monitoring kit Once daily before breakfast  
  
 calcium-cholecalciferol (D3) tablet Commonly known as:  CALTRATE 600+D Take 1 Tab by mouth two (2) times a day. cetirizine 10 mg tablet Commonly known as:  ZYRTEC  
TAKE 1 TABLET BY MOUTH EVERY DAY  
  
 fluticasone 50 mcg/actuation nasal spray Commonly known as:  Cathlyn Ji 2 Sprays by Both Nostrils route as needed for Rhinitis or Allergies. Indications: Allergic Rhinitis * glucose blood VI test strips strip Commonly known as:  ACCU-CHEK DA Nicole Po * FREESTYLE INSULINX TEST STRIPS strip Generic drug:  glucose blood VI test strips USE TO TEST BLOOD SUGAR ONCE EVERY DAY AND AS NEEDED DX CODE E11.51  
  
 hydroCHLOROthiazide 25 mg tablet Commonly known as:  HYDRODIURIL Take 1 Tab by mouth daily. Lancets Misc Commonly known as:  FREESTYLE LANCETS Test once daily. (diagnosis code: 250.00) meclizine 12.5 mg tablet Commonly known as:  ANTIVERT Take 1 Tab by mouth three (3) times daily as needed. metFORMIN 500 mg tablet Commonly known as:  GLUCOPHAGE  
TAKE 1 TABLET BY MOUTH ONCE A DAY WITH MEALS  
  
 metoprolol succinate 50 mg XL tablet Commonly known as:  TOPROL-XL  
TAKE 1 TABLET BY MOUTH EVERY DAY  
  
 MULTIVITAMIN PO Take 1 Tab by mouth daily. pantoprazole 40 mg tablet Commonly known as:  PROTONIX  
TAKE 1 TABLET BY MOUTH EVERY DAY  
  
 propafenone 150 mg tablet Commonly known as:  RYTHMOL Take 1 Tab by mouth every eight (8) hours. rosuvastatin 10 mg tablet Commonly known as:  CRESTOR  
TAKE 1 TABLET BY MOUTH EVERY DAY  
  
 * Notice: This list has 2 medication(s) that are the same as other medications prescribed for you. Read the directions carefully, and ask your doctor or other care provider to review them with you. Prescriptions Sent to Pharmacy Refills  
 benzonatate (TESSALON) 100 mg capsule 0 Sig: Take 1 Cap by mouth three (3) times daily as needed for Cough for up to 7 days.   
 Class: Normal  
 Pharmacy: [id number] [facility] [place]  #: [phone] Route: Oral  
  
Follow-up Instructions Return if symptoms worsen or fail to improve. Patient Instructions Please purchase Zycam and Coricidin for your cold and congestion. Use according to directions. Introducing [facility]! Dear Lee Becerril: Thank you for requesting a [employer] account. Our records indicate that you already have an active [employer] account. You can access your account anytime at [email or web address] Did you know that you can access your hospital and ER discharge instructions at any time in [employer]? You can also review all of your test results from your hospital stay or ER visit. Additional Information If you have questions, please visit the Frequently Asked Questions section of the [employer] website at [email or web address] Remember, [employer] is NOT to be used for urgent needs. For medical emergencies, dial 911. Now available from your iPhone and Android! Please provide this summary of care documentation to your next provider. Your primary care clinician is listed as Shahida Neely. If you have any questions after today's visit, please call [phone].

## 2018-09-17 NOTE — PROGRESS NOTES
Acute Care Note    Akin Archibald is 78 y.o. female. she presents for evaluation of Nasal Congestion and Cough    URI Review  Marzena Taylor is a 78 y.o. female who is here to talk about: congestion and sore throat for 2 days ago, unchanged since that time. She also reports post nasal drip, sinus congestion and some coughing. She denies a history of fever and chills. Evaluation to date: none  . Treatment to date: OTC products, which have been not very effective. Relevant PMH: History of CAD, afib on Eliquis. Patient reports having recent sick contacts in her family whom she was staying with. Prior to Admission medications    Medication Sig Start Date End Date Taking? Authorizing Provider   metoprolol succinate (TOPROL-XL) 50 mg XL tablet TAKE 1 TABLET BY MOUTH EVERY DAY 9/5/18  Yes Florecita Matamoros MD   FREESTYLE INSULINX TEST STRIPS strip USE TO TEST BLOOD SUGAR ONCE EVERY DAY AND AS NEEDED DX CODE E11.51 8/29/18  Yes Florecita Matamoros MD   albuterol (PROVENTIL HFA, VENTOLIN HFA, PROAIR HFA) 90 mcg/actuation inhaler Take 2 Puffs by inhalation every four (4) hours as needed for Wheezing. 8/23/18  Yes Sydnee Rodriguez MD   metFORMIN (GLUCOPHAGE) 500 mg tablet TAKE 1 TABLET BY MOUTH ONCE A DAY WITH MEALS 8/4/18  Yes Florecita Matamoros MD   meclizine (ANTIVERT) 12.5 mg tablet Take 1 Tab by mouth three (3) times daily as needed. 6/5/18  Yes Nernapoleon Thomas MD   apixaban (ELIQUIS) 5 mg tablet Take 1 Tab by mouth two (2) times a day. 6/1/18  Yes Lorenzo QUEZADA MD   propafenone (RYTHMOL) 150 mg tablet Take 1 Tab by mouth every eight (8) hours. 5/3/18  Yes Lorenzo QUEZADA MD   cetirizine (ZYRTEC) 10 mg tablet TAKE 1 TABLET BY MOUTH EVERY DAY 3/7/18  Yes Florecita Matamorso MD   rosuvastatin (CRESTOR) 10 mg tablet TAKE 1 TABLET BY MOUTH EVERY DAY 3/5/18  Yes Florecita Matamoros MD   aspirin delayed-release 81 mg tablet Take 1 Tab by mouth daily.  11/21/17  Yes Jesús Celestin MD hydroCHLOROthiazide (HYDRODIURIL) 25 mg tablet Take 1 Tab by mouth daily. 10/18/17  Yes Julius Reynoso MD   calcium-cholecalciferol, D3, (CALTRATE 600+D) tablet Take 1 Tab by mouth two (2) times a day. 4/25/16  Yes Julius Reynoso MD   MULTIVITAMIN PO Take 1 Tab by mouth daily. Yes Historical Provider   Lancets (FREESTYLE LANCETS) Misc Test once daily. (diagnosis code: 250.00) 10/24/13  Yes Julius Reynoso MD   Blood-Glucose Meter monitoring kit Once daily before breakfast 9/20/13  Yes Julius Reynoso MD   glucose blood VI test strips (ACCU-CHEK DA) strip . 11/7/12  Yes Julius Reynoso MD   pantoprazole (PROTONIX) 40 mg tablet TAKE 1 TABLET BY MOUTH EVERY DAY    Historical Provider   fluticasone (FLONASE) 50 mcg/actuation nasal spray 2 Sprays by Both Nostrils route as needed for Rhinitis or Allergies. Indications: Allergic Rhinitis 11/6/17   Julius Reynoso MD         Patient Active Problem List   Diagnosis Code    Hyperlipemia E78.5    Arthritis M19.90    HTN (hypertension) I10    Headache(784.0) R51    Spondylitis, cervical (HCC) M46.92    Tingling sensation R20.2    Chest pain R07.9    Anemia D64.9    Cyst of right kidney N28.1    Hip pain, right M25.551    Acute bronchitis J20.9    Visual floaters H43.399    Colon cancer screening Z12.11    Postmenopausal state Z78.0    Vitamin D deficiency E55.9    Bilateral hip pain M25.551, M25.552    Postmenopausal disorder N95.1    Numbness and tingling of right leg R20.0, R20.2    Foot pain, left M79.672    Rotator cuff (capsule) sprain and strain [id number]    Osteoarthritis of acromioclavicular joint M19.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 0    Need for varicella vaccine Z23    Screening for breast cancer Z12.31    Cataract H26.9    Preop examination Z01.818    Peripheral autonomic neuropathy due to DM ([facility]) E11.43    Pre-ulcerative calluses L84    Type 2 diabetes mellitus with pressure callus ([facility]) E11.628, L84    Hammer toe of right foot M20.41    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 ([facility]) E11.69, M21.969    DAVID (generalized anxiety disorder) F41.1    Abnormal finding on MRI of brain R90.89    Tinea pedis B35.3    Arthralgia of right hip M25.551    Swollen gland R59.9    Acute pharyngitis J02.9    Diabetes mellitus type 2, controlled ([facility]) E11.9    Multiple thyroid nodules E04.2    Skipped heart beats B63.4    Helicobacter pylori (H. pylori) infection A04.8    Intractable migraine with aura without status migrainosus G43.119    Chronic asthma with status asthmaticus J45.902    Right foot injury S99.921A    Abdominal mass, left upper quadrant R19.02    Left upper quadrant pain R10.12    Paroxysmal atrial fibrillation ([facility]) I48.0    Severe obesity (BMI 35.0-39.9) ([facility]) E66.01         Review of Systems   Constitutional: Negative. Respiratory: Negative. Cardiovascular: Negative. Visit Vitals    /81 (BP 1 Location: Right arm, BP Patient Position: Sitting)    Pulse 86    Temp 98.5 °F (36.9 °C) (Oral)    Resp 17    Ht 5' 2\" (1.575 m)    Wt 204 lb 3.2 oz (92.6 kg)    LMP 02/18/1983    SpO2 96%    BMI 37.35 kg/m2       Physical Exam   Constitutional: She is oriented to person, place, and time. No distress. Cardiovascular: Normal rate and regular rhythm. Pulmonary/Chest: Effort normal and breath sounds normal.   Neurological: She is alert and oriented to person, place, and time. ASSESSMENT/PLAN  Diagnoses and all orders for this visit:    1. Viral URI with cough  -     benzonatate (TESSALON) 100 mg capsule; Take 1 Cap by mouth three (3) times daily as needed for Cough for up to 7 days.        Advised the patient to call back or return to office if symptoms worsen/change/persist.   Discussed expected course/resolution/complications of diagnosis in detail with patient. Medication risks/benefits/costs/interactions/alternatives discussed with patient. The patient was given an after visit summary which includes diagnoses, current medications, & vitals. They expressed understanding with the diagnosis and plan.

## 2018-09-18 ENCOUNTER — APPOINTMENT (OUTPATIENT)
Dept: GENERAL RADIOLOGY | Age: 78
End: 2018-09-18
Attending: EMERGENCY MEDICINE
Payer: MEDICARE

## 2018-09-18 ENCOUNTER — HOSPITAL ENCOUNTER (EMERGENCY)
Age: 78
Discharge: HOME | End: 2018-09-18
Attending: EMERGENCY MEDICINE
Payer: MEDICARE

## 2018-09-18 VITALS
OXYGEN SATURATION: 97 % | TEMPERATURE: 100.1 F | BODY MASS INDEX: 36.76 KG/M2 | WEIGHT: 199.74 LBS | DIASTOLIC BLOOD PRESSURE: 50 MMHG | RESPIRATION RATE: 18 BRPM | HEIGHT: 62 IN | SYSTOLIC BLOOD PRESSURE: 137 MMHG | HEART RATE: 95 BPM

## 2018-09-18 DIAGNOSIS — J20.9 ACUTE BRONCHITIS, UNSPECIFIED ORGANISM: Primary | ICD-10-CM

## 2018-09-18 LAB
ALBUMIN SERPL-MCNC: 3.7 G/DL (ref 3.5–5)
ALBUMIN/GLOB SERPL: 0.9 {RATIO} (ref 1.1–2.2)
ALP SERPL-CCNC: 49 U/L (ref 45–117)
ALT SERPL-CCNC: 21 U/L (ref 12–78)
ANION GAP SERPL CALC-SCNC: 7 MMOL/L (ref 5–15)
APPEARANCE UR: CLEAR
AST SERPL-CCNC: 12 U/L (ref 15–37)
ATRIAL RATE: 91 BPM
BACTERIA URNS QL MICRO: NEGATIVE /HPF
BASOPHILS # BLD: 0 K/UL (ref 0–0.1)
BASOPHILS NFR BLD: 0 % (ref 0–1)
BILIRUB SERPL-MCNC: 0.4 MG/DL (ref 0.2–1)
BILIRUB UR QL: NEGATIVE
BUN SERPL-MCNC: 10 MG/DL (ref 6–20)
BUN/CREAT SERPL: 13 (ref 12–20)
CALCIUM SERPL-MCNC: 9 MG/DL (ref 8.5–10.1)
CALCULATED P AXIS, ECG09: 65 DEGREES
CALCULATED R AXIS, ECG10: -61 DEGREES
CALCULATED T AXIS, ECG11: 10 DEGREES
CHLORIDE SERPL-SCNC: 99 MMOL/L (ref 97–108)
CO2 SERPL-SCNC: 31 MMOL/L (ref 21–32)
COLOR UR: NORMAL
CREAT SERPL-MCNC: 0.78 MG/DL (ref 0.55–1.02)
DIAGNOSIS, 93000: NORMAL
DIFFERENTIAL METHOD BLD: ABNORMAL
EOSINOPHIL # BLD: 0.1 K/UL (ref 0–0.4)
EOSINOPHIL NFR BLD: 2 % (ref 0–7)
EPITH CASTS URNS QL MICRO: NORMAL /LPF
ERYTHROCYTE [DISTWIDTH] IN BLOOD BY AUTOMATED COUNT: 13.8 % (ref 11.5–14.5)
FLUAV AG NPH QL IA: NEGATIVE
FLUBV AG NOSE QL IA: NEGATIVE
GLOBULIN SER CALC-MCNC: 4 G/DL (ref 2–4)
GLUCOSE SERPL-MCNC: 128 MG/DL (ref 65–100)
GLUCOSE UR STRIP.AUTO-MCNC: NEGATIVE MG/DL
HCT VFR BLD AUTO: 34.7 % (ref 35–47)
HGB BLD-MCNC: 11.3 G/DL (ref 11.5–16)
HGB UR QL STRIP: NEGATIVE
IMM GRANULOCYTES # BLD: 0 K/UL (ref 0–0.04)
IMM GRANULOCYTES NFR BLD AUTO: 0 % (ref 0–0.5)
KETONES UR QL STRIP.AUTO: NEGATIVE MG/DL
LACTATE SERPL-SCNC: 1 MMOL/L (ref 0.4–2)
LEUKOCYTE ESTERASE UR QL STRIP.AUTO: NEGATIVE
LYMPHOCYTES # BLD: 0.6 K/UL (ref 0.8–3.5)
LYMPHOCYTES NFR BLD: 10 % (ref 12–49)
MCH RBC QN AUTO: 28.2 PG (ref 26–34)
MCHC RBC AUTO-ENTMCNC: 32.6 G/DL (ref 30–36.5)
MCV RBC AUTO: 86.5 FL (ref 80–99)
MONOCYTES # BLD: 0.4 K/UL (ref 0–1)
MONOCYTES NFR BLD: 7 % (ref 5–13)
NEUTS SEG # BLD: 5.3 K/UL (ref 1.8–8)
NEUTS SEG NFR BLD: 81 % (ref 32–75)
NITRITE UR QL STRIP.AUTO: NEGATIVE
NRBC # BLD: 0 K/UL (ref 0–0.01)
NRBC BLD-RTO: 0 PER 100 WBC
P-R INTERVAL, ECG05: 168 MS
PH UR STRIP: 7.5 [PH] (ref 5–8)
PLATELET # BLD AUTO: 246 K/UL (ref 150–400)
PMV BLD AUTO: 10 FL (ref 8.9–12.9)
POTASSIUM SERPL-SCNC: 3.6 MMOL/L (ref 3.5–5.1)
PROT SERPL-MCNC: 7.7 G/DL (ref 6.4–8.2)
PROT UR STRIP-MCNC: NEGATIVE MG/DL
Q-T INTERVAL, ECG07: 362 MS
QRS DURATION, ECG06: 104 MS
QTC CALCULATION (BEZET), ECG08: 445 MS
RBC # BLD AUTO: 4.01 M/UL (ref 3.8–5.2)
RBC #/AREA URNS HPF: NORMAL /HPF (ref 0–5)
RBC MORPH BLD: ABNORMAL
SODIUM SERPL-SCNC: 137 MMOL/L (ref 136–145)
SP GR UR REFRACTOMETRY: 1.02 (ref 1–1.03)
UROBILINOGEN UR QL STRIP.AUTO: 1 EU/DL (ref 0.2–1)
VENTRICULAR RATE, ECG03: 91 BPM
WBC # BLD AUTO: 6.4 K/UL (ref 3.6–11)
WBC URNS QL MICRO: NORMAL /HPF (ref 0–4)

## 2018-09-18 PROCEDURE — 94640 AIRWAY INHALATION TREATMENT: CPT

## 2018-09-18 PROCEDURE — 81003 URINALYSIS AUTO W/O SCOPE: CPT | Performed by: EMERGENCY MEDICINE

## 2018-09-18 PROCEDURE — 74011000250 HC RX REV CODE- 250

## 2018-09-18 PROCEDURE — 87804 INFLUENZA ASSAY W/OPTIC: CPT | Performed by: EMERGENCY MEDICINE

## 2018-09-18 PROCEDURE — 96361 HYDRATE IV INFUSION ADD-ON: CPT

## 2018-09-18 PROCEDURE — 80053 COMPREHEN METABOLIC PANEL: CPT | Performed by: EMERGENCY MEDICINE

## 2018-09-18 PROCEDURE — 96360 HYDRATION IV INFUSION INIT: CPT

## 2018-09-18 PROCEDURE — 93005 ELECTROCARDIOGRAM TRACING: CPT

## 2018-09-18 PROCEDURE — 74011250636 HC RX REV CODE- 250/636: Performed by: EMERGENCY MEDICINE

## 2018-09-18 PROCEDURE — 36415 COLL VENOUS BLD VENIPUNCTURE: CPT | Performed by: EMERGENCY MEDICINE

## 2018-09-18 PROCEDURE — 71045 X-RAY EXAM CHEST 1 VIEW: CPT

## 2018-09-18 PROCEDURE — 87040 BLOOD CULTURE FOR BACTERIA: CPT | Performed by: EMERGENCY MEDICINE

## 2018-09-18 PROCEDURE — 85025 COMPLETE CBC W/AUTO DIFF WBC: CPT | Performed by: EMERGENCY MEDICINE

## 2018-09-18 PROCEDURE — 77030029684 HC NEB SM VOL KT MONA -A

## 2018-09-18 PROCEDURE — 83605 ASSAY OF LACTIC ACID: CPT | Performed by: EMERGENCY MEDICINE

## 2018-09-18 PROCEDURE — 74011250637 HC RX REV CODE- 250/637: Performed by: EMERGENCY MEDICINE

## 2018-09-18 PROCEDURE — 99284 EMERGENCY DEPT VISIT MOD MDM: CPT

## 2018-09-18 RX ORDER — PREDNISONE 10 MG/1
30 TABLET ORAL
Qty: 9 TAB | Refills: 0 | Status: SHIPPED | OUTPATIENT
Start: 2018-09-18 | End: 2018-09-21

## 2018-09-18 RX ORDER — SODIUM CHLORIDE 0.9 % (FLUSH) 0.9 %
5-10 SYRINGE (ML) INJECTION AS NEEDED
Status: DISCONTINUED | OUTPATIENT
Start: 2018-09-18 | End: 2018-09-18 | Stop reason: HOSPADM

## 2018-09-18 RX ORDER — IPRATROPIUM BROMIDE AND ALBUTEROL SULFATE 2.5; .5 MG/3ML; MG/3ML
3 SOLUTION RESPIRATORY (INHALATION) ONCE
Status: DISCONTINUED | OUTPATIENT
Start: 2018-09-18 | End: 2018-09-18 | Stop reason: HOSPADM

## 2018-09-18 RX ORDER — AZITHROMYCIN 250 MG/1
TABLET, FILM COATED ORAL
Qty: 6 TAB | Refills: 0 | Status: SHIPPED | OUTPATIENT
Start: 2018-09-18 | End: 2018-09-25 | Stop reason: ALTCHOICE

## 2018-09-18 RX ORDER — ACETAMINOPHEN 500 MG
1000 TABLET ORAL ONCE
Status: COMPLETED | OUTPATIENT
Start: 2018-09-18 | End: 2018-09-18

## 2018-09-18 RX ORDER — IPRATROPIUM BROMIDE AND ALBUTEROL SULFATE 2.5; .5 MG/3ML; MG/3ML
SOLUTION RESPIRATORY (INHALATION)
Status: COMPLETED
Start: 2018-09-18 | End: 2018-09-18

## 2018-09-18 RX ADMIN — ACETAMINOPHEN 1000 MG: 500 TABLET ORAL at 08:19

## 2018-09-18 RX ADMIN — SODIUM CHLORIDE 1000 ML: 900 INJECTION, SOLUTION INTRAVENOUS at 08:19

## 2018-09-18 RX ADMIN — IPRATROPIUM BROMIDE AND ALBUTEROL SULFATE 3 ML: .5; 3 SOLUTION RESPIRATORY (INHALATION) at 08:20

## 2018-09-18 NOTE — DISCHARGE INSTRUCTIONS
Bronchitis: Care Instructions  Your Care Instructions  Bronchitis is inflammation of the bronchial tubes, which carry air to the lungs. The tubes swell and produce mucus, or phlegm. The mucus and inflamed bronchial tubes make you cough. You may have trouble breathing. Most cases of bronchitis are caused by viruses like those that cause colds. Antibiotics usually do not help and they may be harmful. Bronchitis usually develops rapidly and lasts about 2 to 3 weeks in otherwise healthy people. Follow-up care is a key part of your treatment and safety. Be sure to make and go to all appointments, and call your doctor if you are having problems. It's also a good idea to know your test results and keep a list of the medicines you take. How can you care for yourself at home? · Take all medicines exactly as prescribed. Call your doctor if you think you are having a problem with your medicine. · Get some extra rest.  · Take an over-the-counter pain medicine, such as acetaminophen (Tylenol), ibuprofen (Advil, Motrin), or naproxen (Aleve) to reduce fever and relieve body aches. Read and follow all instructions on the label. · Do not take two or more pain medicines at the same time unless the doctor told you to. Many pain medicines have acetaminophen, which is Tylenol. Too much acetaminophen (Tylenol) can be harmful. · Take an over-the-counter cough medicine that contains dextromethorphan to help quiet a dry, hacking cough so that you can sleep. Avoid cough medicines that have more than one active ingredient. Read and follow all instructions on the label. · Breathe moist air from a humidifier, hot shower, or sink filled with hot water. The heat and moisture will thin mucus so you can cough it out. · Do not smoke. Smoking can make bronchitis worse. If you need help quitting, talk to your doctor about stop-smoking programs and medicines. These can increase your chances of quitting for good.   When should you call for help?  Call 911 anytime you think you may need emergency care. For example, call if:    · You have severe trouble breathing.    Call your doctor now or seek immediate medical care if:    · You have new or worse trouble breathing.     · You cough up dark brown or bloody mucus (sputum).     · You have a new or higher fever.     · You have a new rash.    Watch closely for changes in your health, and be sure to contact your doctor if:    · You cough more deeply or more often, especially if you notice more mucus or a change in the color of your mucus.     · You are not getting better as expected. Where can you learn more? Go to http://radha-tracy.info/. Enter H333 in the search box to learn more about \"Bronchitis: Care Instructions. \"  Current as of: December 6, 2017  Content Version: 11.7  © 8801-0488 [employer]. Care instructions adapted under license by [employer] (which disclaims liability or warranty for this information). If you have questions about a medical condition or this instruction, always ask your healthcare professional. [address] any warranty or liability for your use of this information.

## 2018-09-18 NOTE — ED PROVIDER NOTES
EMERGENCY DEPARTMENT HISTORY AND PHYSICAL EXAM      Date: 9/18/2018  Patient Name: Jayla Castro    History of Presenting Illness     Chief Complaint   Patient presents with    Fever     Chills and fever since yesterday, cough productive of yellow sputum       History Provided By: Patient    HPI: Jayla Castro, 78 y.o. female with PMHx significant for asthma, HTN, DM, and GERD, presents ambulatory to the ED with cc of fever, chills, and cough since yesterday (9/17/18). Pt reports sore throat, B/L rib pain, and mild associated nausea since onset. She also reports decreased appetite x \"a few days. \" Pt reports positive sick contacts. She denies interstate or international travel, or recent surgery. Pt states she has been taking Tylenol with mild temporary improvement. She specifically denies vomiting, diarrhea, dysuria, or CP. Pt notes she is a former smoker. There are no other complaints, changes, or physical findings at this time. PCP: Tru Mckeon MD    Current Facility-Administered Medications   Medication Dose Route Frequency Provider Last Rate Last Dose    sodium chloride (NS) flush 5-10 mL  5-10 mL IntraVENous PRN Becca Issa MD        albuterol-ipratropium (DUO-NEB) 2.5 MG-0.5 MG/3 ML  3 mL Nebulization ONCE Becca Issa MD         Current Outpatient Prescriptions   Medication Sig Dispense Refill    predniSONE (DELTASONE) 10 mg tablet Take 3 Tabs by mouth daily (with breakfast) for 3 days. 9 Tab 0    azithromycin (ZITHROMAX Z-TRA) 250 mg tablet 2 tabs day 1 followed by one tab daily 6 Tab 0    benzonatate (TESSALON) 100 mg capsule Take 1 Cap by mouth three (3) times daily as needed for Cough for up to 7 days.  30 Cap 0    metoprolol succinate (TOPROL-XL) 50 mg XL tablet TAKE 1 TABLET BY MOUTH EVERY DAY 90 Tab 1    FREESTYLE INSULINX TEST STRIPS strip USE TO TEST BLOOD SUGAR ONCE EVERY DAY AND AS NEEDED DX CODE E11.51 100 Strip 4    albuterol (PROVENTIL HFA, VENTOLIN HFA, PROAIR HFA) 90 mcg/actuation inhaler Take 2 Puffs by inhalation every four (4) hours as needed for Wheezing. 1 Inhaler 1    metFORMIN (GLUCOPHAGE) 500 mg tablet TAKE 1 TABLET BY MOUTH ONCE A DAY WITH MEALS 90 Tab 1    pantoprazole (PROTONIX) 40 mg tablet TAKE 1 TABLET BY MOUTH EVERY DAY      meclizine (ANTIVERT) 12.5 mg tablet Take 1 Tab by mouth three (3) times daily as needed. 30 Tab 0    apixaban (ELIQUIS) 5 mg tablet Take 1 Tab by mouth two (2) times a day. 180 Tab 3    propafenone (RYTHMOL) 150 mg tablet Take 1 Tab by mouth every eight (8) hours. 270 Tab 3    cetirizine (ZYRTEC) 10 mg tablet TAKE 1 TABLET BY MOUTH EVERY DAY 90 Tab 1    rosuvastatin (CRESTOR) 10 mg tablet TAKE 1 TABLET BY MOUTH EVERY DAY 90 Tab 2    aspirin delayed-release 81 mg tablet Take 1 Tab by mouth daily. 30 Tab 11    fluticasone (FLONASE) 50 mcg/actuation nasal spray 2 Sprays by Both Nostrils route as needed for Rhinitis or Allergies. Indications: Allergic Rhinitis 1 Bottle 11    hydroCHLOROthiazide (HYDRODIURIL) 25 mg tablet Take 1 Tab by mouth daily. 90 Tab 1    calcium-cholecalciferol, D3, (CALTRATE 600+D) tablet Take 1 Tab by mouth two (2) times a day. 60 Tab 11    MULTIVITAMIN PO Take 1 Tab by mouth daily.  Lancets (FREESTYLE LANCETS) Misc Test once daily. (diagnosis code: 250.00) 1 Package 11    Blood-Glucose Meter monitoring kit Once daily before breakfast 1 Kit 0    glucose blood VI test strips (ACCU-CHEK DA) strip .  1 Package 11       Past History     Past Medical History:  Past Medical History:   Diagnosis Date    Abdominal mass, left upper quadrant 6/13/2017    Abnormal finding on MRI of brain 3/16/2015    evaluated by neurologist and no findings    Acute pharyngitis 5/26/2016    Anemia NEC     Arthralgia of right hip 4/25/2016    Arthritis 2/18/2010    Asthma 2/18/2010    Cataract 9/24/2014    Diabetes ([facility] [address]     Elevated LFTs 4/16/2014    DAVID (generalized anxiety disorder) 1/22/2015    GERD (gastroesophageal reflux disease) 4/7/2014    Hammer toe of right foot 9/29/2014    Headache(784.0) 2/25/6685    Helicobacter pylori (H. 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 ([address] 9/29/2014    Preop examination 9/24/2014    Right foot injury 5/2/2017    Risk for falls 4/16/2014    Screening for breast cancer 9/23/2014    Screening for depression 4/16/2014    Shoulder pain, left 3/18/2014    Spondylitis, cervical ([address] 4/5/2010    Tinea pedis 6/3/2015    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 ([address] 9/29/2014       Past Surgical History:  Past Surgical History:   Procedure Laterality Date    ABDOMEN SURGERY PROC UNLISTED      inguinal hernia    ABDOMEN SURGERY PROC UNLISTED      ENDOSCOPY, COLON, DIAGNOSTIC      HX APPENDECTOMY      HX GYN  1983    total hysterectomy for uterine fibroid    HX HEENT      tonsillectomy    HX ORTHOPAEDIC      clavicle repair    HX OTHER SURGICAL      ? straightened colon out    HX ROTATOR CUFF REPAIR  2009    left    OPEN REPAIR CLAVICLE FRACTURE  2009       Family History:  Family History   Problem Relation Age of Onset    Cancer Mother      mycosis fungoives    Stroke Father     Cancer Sister      one sister with breast cancer    Breast Cancer Sister     Cancer Paternal Aunt      2 paternal aunts with breast cancer    Thyroid Cancer Neg Hx        Social History:  Social History   Substance Use Topics    Smoking status: Former Smoker     Quit date: 6/14/1988    Smokeless tobacco: Never Used    Alcohol use No       Allergies:   Allergies   Allergen Reactions    Latex Rash    Amoxil [Amoxicillin] Itching     rash    Levaquin [Levofloxacin] Other (comments)     Dizziness      Percocet [Oxycodone-Acetaminophen] Nausea and Vomiting    Tetanus Vaccines And Toxoid Swelling    Tetracycline Hcl Rash         Review of Systems   Review of Systems   Constitutional: Positive for appetite change (decrease), chills and fever. Negative for activity change and unexpected weight change. HENT: Positive for sore throat. Negative for congestion. Eyes: Negative for pain and visual disturbance. Respiratory: Positive for cough. Negative for shortness of breath. Cardiovascular: Negative for chest pain. Gastrointestinal: Positive for nausea. Negative for abdominal pain, diarrhea and vomiting. Genitourinary: Negative for dysuria. Musculoskeletal: Positive for myalgias (B/L ribs). Negative for back pain. Skin: Negative for rash. Neurological: Negative for headaches. Physical Exam   Physical Exam   Constitutional: She is oriented to person, place, and time. She appears well-developed and well-nourished. Elderly female, in mild to moderate stress due to symptoms   HENT:   Head: Normocephalic and atraumatic. Mouth/Throat: Oropharynx is clear and moist. Mucous membranes are dry. Eyes: Conjunctivae and EOM are normal. Pupils are equal, round, and reactive to light. Right eye exhibits no discharge. Left eye exhibits no discharge. Neck: Normal range of motion. Neck supple. Cardiovascular: Regular rhythm and normal heart sounds. No murmur heard. Borderline tachycardia   Pulmonary/Chest: Effort normal. No respiratory distress. She has wheezes (scattered, expiratory). She has no rales. Abdominal: Soft. Bowel sounds are normal. She exhibits no distension. There is no tenderness. Musculoskeletal: Normal range of motion. She exhibits no edema. Neurological: She is alert and oriented to person, place, and time. No cranial nerve deficit. She exhibits normal muscle tone. Skin: Skin is warm and dry. No rash noted.  She is not diaphoretic. Hot to touch   Nursing note and vitals reviewed. Diagnostic Study Results     Labs -     Recent Results (from the past 12 hour(s))   EKG, 12 LEAD, INITIAL    Collection Time: 09/18/18  8:12 AM   Result Value Ref Range    Ventricular Rate 91 BPM    Atrial Rate 91 BPM    P-R Interval 168 ms    QRS Duration 104 ms    Q-T Interval 362 ms    QTC Calculation (Bezet) 445 ms    Calculated P Axis 65 degrees    Calculated R Axis -61 degrees    Calculated T Axis 10 degrees    Diagnosis       Normal sinus rhythm  Left anterior fascicular block  Minimal voltage criteria for LVH, may be normal variant  When compared with ECG of 23-AUG-2018 19:33,  No significant change was found     LACTIC ACID    Collection Time: 09/18/18  8:16 AM   Result Value Ref Range    Lactic acid 1.0 0.4 - 2.0 MMOL/L   METABOLIC PANEL, COMPREHENSIVE    Collection Time: 09/18/18  8:16 AM   Result Value Ref Range    Sodium 137 136 - 145 mmol/L    Potassium 3.6 3.5 - 5.1 mmol/L    Chloride 99 97 - 108 mmol/L    CO2 31 21 - 32 mmol/L    Anion gap 7 5 - 15 mmol/L    Glucose 128 (H) 65 - 100 mg/dL    BUN 10 6 - 20 MG/DL    Creatinine 0.78 0.55 - 1.02 MG/DL    BUN/Creatinine ratio 13 12 - 20      GFR est AA >60 >60 ml/min/1.73m2    GFR est non-AA >60 >60 ml/min/1.73m2    Calcium 9.0 8.5 - 10.1 MG/DL    Bilirubin, total 0.4 0.2 - 1.0 MG/DL    ALT (SGPT) 21 12 - 78 U/L    AST (SGOT) 12 (L) 15 - 37 U/L    Alk.  phosphatase 49 45 - 117 U/L    Protein, total 7.7 6.4 - 8.2 g/dL    Albumin 3.7 3.5 - 5.0 g/dL    Globulin 4.0 2.0 - 4.0 g/dL    A-G Ratio 0.9 (L) 1.1 - 2.2     CBC WITH AUTOMATED DIFF    Collection Time: 09/18/18  8:16 AM   Result Value Ref Range    WBC 6.4 3.6 - 11.0 K/uL    RBC 4.01 3.80 - 5.20 M/uL    HGB 11.3 (L) 11.5 - 16.0 g/dL    HCT 34.7 (L) 35.0 - 47.0 %    MCV 86.5 80.0 - 99.0 FL    MCH 28.2 26.0 - 34.0 PG    MCHC 32.6 30.0 - 36.5 g/dL    RDW 13.8 11.5 - 14.5 %    PLATELET 020 885 - 218 K/uL    MPV 10.0 8.9 - 12.9 FL NRBC 0.0 0  WBC    ABSOLUTE NRBC 0.00 0.00 - 0.01 K/uL    NEUTROPHILS 81 (H) 32 - 75 %    LYMPHOCYTES 10 (L) 12 - 49 %    MONOCYTES 7 5 - 13 %    EOSINOPHILS 2 0 - 7 %    BASOPHILS 0 0 - 1 %    IMMATURE GRANULOCYTES 0 0.0 - 0.5 %    ABS. NEUTROPHILS 5.3 1.8 - 8.0 K/UL    ABS. LYMPHOCYTES 0.6 (L) 0.8 - 3.5 K/UL    ABS. MONOCYTES 0.4 0.0 - 1.0 K/UL    ABS. EOSINOPHILS 0.1 0.0 - 0.4 K/UL    ABS. BASOPHILS 0.0 0.0 - 0.1 K/UL    ABS. IMM. GRANS. 0.0 0.00 - 0.04 K/UL    DF AUTOMATED      RBC COMMENTS NORMOCYTIC, NORMOCHROMIC     INFLUENZA A & B AG (RAPID TEST)    Collection Time: 09/18/18  8:16 AM   Result Value Ref Range    Influenza A Antigen NEGATIVE  NEG      Influenza B Antigen NEGATIVE  NEG         Radiologic Studies -   CXR Results  (Last 48 hours)               09/18/18 0920  XR CHEST PORT Final result    Impression:  IMPRESSION: No airspace disease or other acute abnormality. Narrative:  EXAM:  XR CHEST PORT. INDICATION: Fever and chills with cough productive of yellow sputum. Meets SIRS   criteria. COMPARISON: .       FINDINGS:    A portable AP radiograph of the chest was obtained at 0914 hours. Lines and tubes: The patient is on a cardiac monitor. Lungs: The lungs are clear. Pleura: There is no pneumothorax or pleural effusion. Mediastinum: The cardiac and mediastinal contours and pulmonary vascularity are   normal. The aorta is atherosclerotic. Bones and soft tissues: The bones and soft tissues are grossly within normal   limits. Medical Decision Making   I am the first provider for this patient. I reviewed the vital signs, available nursing notes, past medical history, past surgical history, family history and social history. Vital Signs-Reviewed the patient's vital signs.   Patient Vitals for the past 12 hrs:   Temp Pulse Resp BP SpO2   09/18/18 0750 100.1 °F (37.8 °C) 95 18 137/50 97 %       Pulse Oximetry Analysis - 97% on RA    Cardiac Monitor: Rate: 91 bpm  Rhythm: Normal Sinus Rhythm      EKG interpretation: 08:12  Rhythm: normal sinus rhythm; and regular . Rate (approx.): 91; Axis: left axis deviation; NE interval: normal; QRS interval: normal ; ST/T wave: normal; Other findings: normal.    Records Reviewed: Nursing Notes and Old Medical Records    Provider Notes (Medical Decision Making):     Febrile elderly pt, currently hemodynamically stable, will initiate sepsis evaluation. ED Course:   Initial assessment performed. The patients presenting problems have been discussed, and they are in agreement with the care plan formulated and outlined with them. I have encouraged them to ask questions as they arise throughout their visit. 09:45 Patient doing well. Reviewed results with her and her daughter as well as prescriptions, home care, return precautions and follow up with PCP. Disposition:  DISCHARGE NOTE:  9:53 AM  The patient is ready for discharge. The patients signs, symptoms, diagnosis, and instructions for discharge have been discussed and the pt has conveyed their understanding. The patient is to follow up as recommended or return to the ER should their symptoms worsen. Plan has been discussed and patient has conveyed their agreement. PLAN:  1. Current Discharge Medication List      START taking these medications    Details   predniSONE (DELTASONE) 10 mg tablet Take 3 Tabs by mouth daily (with breakfast) for 3 days. Qty: 9 Tab, Refills: 0      azithromycin (ZITHROMAX Z-TRA) 250 mg tablet 2 tabs day 1 followed by one tab daily  Qty: 6 Tab, Refills: 0           2.    Follow-up Information     Follow up With Details Comments Contact Info    [facility] EMERGENCY DEPT  If symptoms worsen [address] [facility] [address]  [phone]    Judi Germain MD Schedule an appointment as soon as possible for a visit for recheck exam this week [address]   [address]  [address] [facility] [address]  [phone]          Return to ED if worse     Diagnosis     Clinical Impression:   1. Acute bronchitis, unspecified organism        Attestations: This note is prepared by Dennis Jeffries, acting as Scribe for Zeinab Mike. MD Luis Manuel.    Zeinab Mike. MD Luis Manuel: The scribe's documentation has been prepared under my direction and personally reviewed by me in its entirety. I confirm that the note above accurately reflects all work, treatment, procedures, and medical decision making performed by me.

## 2018-09-18 NOTE — ED TRIAGE NOTES
Pt. Reports having fever and chills and nausea beginning yesterday. Reports cough and pain on bilateral sides. Reports she has not been able to eat very much for two days  Denies chest pain, vomiting or SOB  Resting quietly with family at bedside.

## 2018-09-18 NOTE — ED NOTES
Dr. Tavares Pedraza reviewed discharge instructions with the patient. The patient verbalized understanding. All questions and concerns were addressed. The patient declined a wheelchair and is discharged ambulatory in the care of family members with instructions and prescriptions in hand. Pt is alert and oriented x 4. Respirations are clear and unlabored.

## 2018-09-24 LAB
BACTERIA SPEC CULT: NORMAL
BACTERIA SPEC CULT: NORMAL
SERVICE CMNT-IMP: NORMAL
SERVICE CMNT-IMP: NORMAL

## 2018-09-25 ENCOUNTER — HOSPITAL ENCOUNTER (OUTPATIENT)
Dept: LAB | Age: 78
Discharge: HOME | End: 2018-09-25
Payer: MEDICARE

## 2018-09-25 ENCOUNTER — OFFICE VISIT (OUTPATIENT)
Dept: INTERNAL MEDICINE CLINIC | Age: 78
End: 2018-09-25

## 2018-09-25 VITALS
RESPIRATION RATE: 18 BRPM | HEIGHT: 62 IN | BODY MASS INDEX: 36.8 KG/M2 | DIASTOLIC BLOOD PRESSURE: 72 MMHG | HEART RATE: 73 BPM | OXYGEN SATURATION: 95 % | SYSTOLIC BLOOD PRESSURE: 110 MMHG | WEIGHT: 200 LBS | TEMPERATURE: 98.1 F

## 2018-09-25 DIAGNOSIS — E78.2 MIXED HYPERLIPIDEMIA: ICD-10-CM

## 2018-09-25 DIAGNOSIS — E11.21 CONTROLLED TYPE 2 DIABETES MELLITUS WITH DIABETIC NEPHROPATHY, WITHOUT LONG-TERM CURRENT USE OF INSULIN (HCC): ICD-10-CM

## 2018-09-25 DIAGNOSIS — Z00.00 MEDICARE ANNUAL WELLNESS VISIT, SUBSEQUENT: Primary | ICD-10-CM

## 2018-09-25 PROCEDURE — 36415 COLL VENOUS BLD VENIPUNCTURE: CPT

## 2018-09-25 PROCEDURE — 83036 HEMOGLOBIN GLYCOSYLATED A1C: CPT

## 2018-09-25 PROCEDURE — 80061 LIPID PANEL: CPT

## 2018-09-25 PROCEDURE — 82043 UR ALBUMIN QUANTITATIVE: CPT

## 2018-09-25 NOTE — PROGRESS NOTES
This is a Subsequent Medicare Annual Wellness Visit providing Personalized Prevention Plan Services (PPPS) (Performed 12 months after initial AWV and PPPS )    I have reviewed the patient's medical history in detail and updated the computerized patient record. Following up for ED visit. She went for complaints of worsened cough and fever. She was diagnosed with bronchitis and was discharged with antibiotics and steroids. She notes feeling much better today. There is some residual cough but not severe. She will see Dr. Caterina Mendieta for cervical radiculopathy. This is most prominent in her left hand. She will return for follow up of this.       Optho appt Vinh sched    History     Past Medical History:   Diagnosis Date    Abdominal mass, left upper quadrant 6/13/2017    Abnormal finding on MRI of brain 3/16/2015    evaluated by neurologist and no findings    Acute pharyngitis 5/26/2016    Anemia NEC     Arthralgia of right hip 4/25/2016    Arthritis 2/18/2010    Asthma 2/18/2010    Cataract 9/24/2014    Diabetes ([address]     Elevated LFTs 4/16/2014    DAVID (generalized anxiety disorder) 1/22/2015    GERD (gastroesophageal reflux disease) 4/7/2014    Hammer toe of right foot 9/29/2014    Headache(784.0) 6/15/4909    Helicobacter pylori (H. 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 ([address] 9/29/2014    Preop examination 9/24/2014    Right foot injury 5/2/2017    Risk for falls 4/16/2014    Screening for breast cancer 9/23/2014    Screening for depression 4/16/2014    Shoulder pain, left 3/18/2014    Spondylitis, cervical ([address] 4/5/2010    Tinea pedis 6/3/2015    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 straightened colon out    HX ROTATOR CUFF REPAIR  2009    left    OPEN REPAIR CLAVICLE FRACTURE  2009     Current Outpatient Prescriptions   Medication Sig Dispense Refill    metoprolol succinate (TOPROL-XL) 50 mg XL tablet TAKE 1 TABLET BY MOUTH EVERY DAY 90 Tab 1    FREESTYLE INSULINX TEST STRIPS strip USE TO TEST BLOOD SUGAR ONCE EVERY DAY AND AS NEEDED DX CODE E11.51 100 Strip 4    albuterol (PROVENTIL HFA, VENTOLIN HFA, PROAIR HFA) 90 mcg/actuation inhaler Take 2 Puffs by inhalation every four (4) hours as needed for Wheezing. 1 Inhaler 1    metFORMIN (GLUCOPHAGE) 500 mg tablet TAKE 1 TABLET BY MOUTH ONCE A DAY WITH MEALS 90 Tab 1    meclizine (ANTIVERT) 12.5 mg tablet Take 1 Tab by mouth three (3) times daily as needed. 30 Tab 0    apixaban (ELIQUIS) 5 mg tablet Take 1 Tab by mouth two (2) times a day. 180 Tab 3    propafenone (RYTHMOL) 150 mg tablet Take 1 Tab by mouth every eight (8) hours. 270 Tab 3    cetirizine (ZYRTEC) 10 mg tablet TAKE 1 TABLET BY MOUTH EVERY DAY 90 Tab 1    rosuvastatin (CRESTOR) 10 mg tablet TAKE 1 TABLET BY MOUTH EVERY DAY 90 Tab 2    aspirin delayed-release 81 mg tablet Take 1 Tab by mouth daily. 30 Tab 11    fluticasone (FLONASE) 50 mcg/actuation nasal spray 2 Sprays by Both Nostrils route as needed for Rhinitis or Allergies. Indications: Allergic Rhinitis 1 Bottle 11    hydroCHLOROthiazide (HYDRODIURIL) 25 mg tablet Take 1 Tab by mouth daily. 90 Tab 1    calcium-cholecalciferol, D3, (CALTRATE 600+D) tablet Take 1 Tab by mouth two (2) times a day.  60 Tab 11  MULTIVITAMIN PO Take 1 Tab by mouth daily.  Lancets (FREESTYLE LANCETS) Misc Test once daily. (diagnosis code: 250.00) 1 Package 11    Blood-Glucose Meter monitoring kit Once daily before breakfast 1 Kit 0    glucose blood VI test strips (ACCU-CHEK DA) strip .  1 Package 11    pantoprazole (PROTONIX) 40 mg tablet TAKE 1 TABLET BY MOUTH EVERY DAY       Allergies   Allergen Reactions    Latex Rash    Amoxil [Amoxicillin] Itching     rash    Levaquin [Levofloxacin] Other (comments)     Dizziness      Percocet [Oxycodone-Acetaminophen] Nausea and Vomiting    Tetanus Vaccines And Toxoid Swelling    Tetracycline Hcl Rash     Family History   Problem Relation Age of Onset    Cancer Mother      mycosis fungoives    Stroke Father     Cancer Sister      one sister with breast cancer    Breast Cancer Sister     Cancer Paternal Aunt      2 paternal aunts with breast cancer    Thyroid Cancer Neg Hx      Social History   Substance Use Topics    Smoking status: Former Smoker     Quit date: 6/14/1988    Smokeless tobacco: Never Used    Alcohol use No     Patient Active Problem List   Diagnosis Code    Hyperlipemia E78.5    Arthritis M19.90    HTN (hypertension) I10    Headache(784.0) R51    Spondylitis, cervical (HCC) M46.92    Tingling sensation R20.2    Chest pain R07.9    Anemia D64.9    Cyst of right kidney N28.1    Hip pain, right M25.551    Acute bronchitis J20.9    Visual floaters H43.399    Colon cancer screening Z12.11    Postmenopausal state Z78.0    Vitamin D deficiency E55.9    Bilateral hip pain M25.551, M25.552    Postmenopausal disorder N95.1    Numbness and tingling of right leg R20.0, R20.2    Foot pain, left M79.672    Rotator cuff (capsule) sprain and strain [id number]    Osteoarthritis of acromioclavicular joint M19.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 pylori) infection A04.8    Intractable migraine with aura without status migrainosus G43.119    Chronic asthma with status asthmaticus J45.902    Right foot injury S99.921A    Abdominal mass, left upper quadrant R19.02    Left upper quadrant pain R10.12    Paroxysmal atrial fibrillation ([facility]) I48.0    Severe obesity (BMI 35.0-39.9) ([facility]) E66.01       Depression Risk Factor Screening:     PHQ over the last two weeks 9/25/2018   Little interest or pleasure in doing things Not at all   Feeling down, depressed, irritable, or hopeless Not at all   Total Score PHQ 2 0     Alcohol Risk Factor Screening: You do not drink alcohol or very rarely. Functional Ability and Level of Safety:     Hearing Loss   Hearing is good. Activities of Daily Living   Self-care. Requires assistance with: no ADLs    Fall Risk     Fall Risk Assessment, last 12 mths 9/25/2018   Able to walk? Yes   Fall in past 12 months? No     Abuse Screen   Patient is not abused    Review of Systems   A comprehensive review of systems was negative except for that written in the HPI.     Physical Examination     Evaluation of Cognitive Function:  Mood/affect:  happy  Appearance: age appropriate  Family member/caregiver input: n/a    Visit Vitals    /72 (BP 1 Location: Right arm, BP Patient Position: Sitting)    Pulse 73    Temp 98.1 °F (36.7 °C) (Oral)    Resp 18    Ht 5' 2\" (1.575 m)    Wt 200 lb (90.7 kg)    LMP 02/18/1983    SpO2 95%    BMI 36.58 kg/m2     Lungs: clear to auscultation bilaterally  Heart: regular rate and rhythm, S1, S2 normal, no murmur, click, rub or gallop  Abdomen: soft, non-tender. Bowel sounds normal. No masses,  no organomegaly    Patient Care Team:  Mouna Montiel MD as PCP - General (Internal Medicine)  Grace Cook MD (Neurology)  Latanya Deleon MD as Physician (Cardiology)  Rebecca Hooper MD as Consulting Provider (Endocrinology)  Cari Evans RN as Nurse Navigator  Farrah Vargas NP as Nurse Practitioner (Nurse Practitioner)  AMARIS Garcia (Nurse Practitioner)    Advice/Referrals/Counseling   Education and counseling provided:  Are appropriate based on today's review and evaluation      Assessment/Plan   Diagnoses and all orders for this visit:    1. Medicare annual wellness visit, subsequent  -     varicella-zoster recombinant, PF, (SHINGRIX) 50 mcg/0.5 mL susr injection; 0.5 mL by IntraMUSCular route once for 1 dose. 2. Mixed hyperlipidemia  -     LIPID PANEL    3. Controlled type 2 diabetes mellitus with diabetic nephropathy, without long-term current use of insulin (HCC)  -     MICROALBUMIN, UR, RAND W/ MICROALB/CREAT RATIO  -     HEMOGLOBIN A1C WITH EAG      Follow-up Disposition:  Return in about 6 months (around 3/25/2019) for Follow up. Candance Huger

## 2018-09-26 LAB
ALBUMIN/CREAT UR: 3.3 MG/G CREAT (ref 0–30)
CHOLEST SERPL-MCNC: 147 MG/DL (ref 100–199)
CREAT UR-MCNC: 161.2 MG/DL
EST. AVERAGE GLUCOSE BLD GHB EST-MCNC: 157 MG/DL
HBA1C MFR BLD: 7.1 % (ref 4.8–5.6)
HDLC SERPL-MCNC: 46 MG/DL
LDLC SERPL CALC-MCNC: 77 MG/DL (ref 0–99)
MICROALBUMIN UR-MCNC: 5.3 UG/ML
TRIGL SERPL-MCNC: 119 MG/DL (ref 0–149)
VLDLC SERPL CALC-MCNC: 24 MG/DL (ref 5–40)

## 2018-10-07 DIAGNOSIS — E11.9 DIABETES MELLITUS WITHOUT COMPLICATION (HCC): ICD-10-CM

## 2018-10-07 DIAGNOSIS — Z23 ENCOUNTER FOR IMMUNIZATION: ICD-10-CM

## 2018-10-07 DIAGNOSIS — G43.119 INTRACTABLE MIGRAINE WITH AURA WITHOUT STATUS MIGRAINOSUS: ICD-10-CM

## 2018-10-07 DIAGNOSIS — I10 ESSENTIAL HYPERTENSION: ICD-10-CM

## 2018-10-08 RX ORDER — HYDROCHLOROTHIAZIDE 25 MG/1
TABLET ORAL
Qty: 90 TAB | Refills: 1 | Status: SHIPPED | OUTPATIENT
Start: 2018-10-08 | End: 2019-04-28 | Stop reason: SDUPTHER

## 2018-11-23 RX ORDER — ROSUVASTATIN CALCIUM 10 MG/1
TABLET, COATED ORAL
Qty: 90 TAB | Refills: 1 | Status: SHIPPED | OUTPATIENT
Start: 2018-11-23 | End: 2019-06-16 | Stop reason: SDUPTHER

## 2019-01-04 ENCOUNTER — HOSPITAL ENCOUNTER (OUTPATIENT)
Dept: MAMMOGRAPHY | Age: 79
Discharge: HOME | End: 2019-01-04
Attending: INTERNAL MEDICINE
Payer: MEDICARE

## 2019-01-04 DIAGNOSIS — Z12.39 SCREENING BREAST EXAMINATION: ICD-10-CM

## 2019-01-04 PROCEDURE — 77067 SCR MAMMO BI INCL CAD: CPT

## 2019-01-08 ENCOUNTER — OFFICE VISIT (OUTPATIENT)
Dept: CARDIOLOGY CLINIC | Age: 79
End: 2019-01-08

## 2019-01-08 VITALS
SYSTOLIC BLOOD PRESSURE: 120 MMHG | HEART RATE: 70 BPM | WEIGHT: 207.1 LBS | HEIGHT: 62 IN | DIASTOLIC BLOOD PRESSURE: 78 MMHG | RESPIRATION RATE: 16 BRPM | BODY MASS INDEX: 38.11 KG/M2 | OXYGEN SATURATION: 99 %

## 2019-01-08 DIAGNOSIS — E78.2 MIXED HYPERLIPIDEMIA: ICD-10-CM

## 2019-01-08 DIAGNOSIS — I10 ESSENTIAL HYPERTENSION: ICD-10-CM

## 2019-01-08 DIAGNOSIS — R00.2 INTERMITTENT PALPITATIONS: ICD-10-CM

## 2019-01-08 DIAGNOSIS — I48.0 PAROXYSMAL ATRIAL FIBRILLATION (HCC): Primary | ICD-10-CM

## 2019-01-08 RX ORDER — GABAPENTIN 300 MG/1
CAPSULE ORAL
Refills: 1 | COMMUNITY
Start: 2018-12-27 | End: 2019-04-22

## 2019-01-08 RX ORDER — NYSTATIN AND TRIAMCINOLONE ACETONIDE 100000; 1 [USP'U]/G; MG/G
CREAM TOPICAL
COMMUNITY
End: 2019-04-22

## 2019-01-08 NOTE — PROGRESS NOTES
Chief Complaint   Patient presents with    Irregular Heart Beat     6 month follow up     1. Have you been to the ER, urgent care clinic since your last visit? Hospitalized since your last visit? No    2. Have you seen or consulted any other health care providers outside of the [address] [place] [address] since your last visit? Include any pap smears or colon screening.  No

## 2019-01-08 NOTE — PROGRESS NOTES
Danny Barrientos DNP, ANP-BC  Subjective/HPI:     Violet Izquierdo is a 70 y.o. female is here for routine f/u. She notes infrequent palpitations that last about 30 minutes. She notes shortness of breath while going up 14-steps in her daughter's house. She recently moved in with her for a short term period, and previously lived in a one-level house. She denies complaints of chest pains with exertion. She does note shortness of breath is actually improved since starting Rhythmol. The patient denies chest pain/ shortness of breath, orthopnea, PND, LE edema, palpitations, syncope, presyncope or fatigue.          PCP Provider  Allison Segura MD  Past Medical History:   Diagnosis Date    Abdominal mass, left upper quadrant 6/13/2017    Abnormal finding on MRI of brain 3/16/2015    evaluated by neurologist and no findings    Acute pharyngitis 5/26/2016    Anemia NEC     Arthralgia of right hip 4/25/2016    Arthritis 2/18/2010    Asthma 2/18/2010    Cataract 9/24/2014    Diabetes ([address]     Elevated LFTs 4/16/2014    DAVID (generalized anxiety disorder) 1/22/2015    GERD (gastroesophageal reflux disease) 4/7/2014    Hammer toe of right foot 9/29/2014    Headache(784.0) 8/33/1446    Helicobacter pylori (H. 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 ([address] 9/29/2014    Preop examination 9/24/2014    Right foot injury 5/2/2017    Risk for falls 4/16/2014    Screening for breast cancer 9/23/2014    Screening for depression 4/16/2014    Shoulder pain, left 3/18/2014    Spondylitis, cervical 4/5/2010    Tinea pedis 6/3/2015    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 straightened colon out    HX ROTATOR CUFF REPAIR  2009    left    OPEN REPAIR CLAVICLE FRACTURE  2009     Allergies   Allergen Reactions    Latex Rash    Amoxil [Amoxicillin] Itching     rash    Levaquin [Levofloxacin] Other (comments)     Dizziness      Percocet [Oxycodone-Acetaminophen] Nausea and Vomiting    Tetanus Vaccines And Toxoid Swelling    Tetracycline Hcl Rash      Family History   Problem Relation Age of Onset    Cancer Mother         mycosis fungoives    Stroke Father     Cancer Sister         one sister with breast cancer    Breast Cancer Sister     Cancer Paternal Aunt         2 paternal aunts with breast cancer    Breast Cancer Paternal Aunt     Breast Cancer Maternal Aunt     Thyroid Cancer Neg Hx       Current Outpatient Medications   Medication Sig    glucose blood VI test strips (FREESTYLE INSULINX TEST STRIPS) strip Freestyle [employer] Test Strips   USE TO TEST BLOOD SUGAR ONCE EVERY DAY AND AS NEEDED DX CODE E11.51    gabapentin (NEURONTIN) 300 mg capsule TAKE ONE CAPSULE BY MOUTH AT BEDTIME    aspirin delayed-release 81 mg tablet TAKE 1 TABLET BY MOUTH EVERY DAY    rosuvastatin (CRESTOR) 10 mg tablet TAKE 1 TABLET BY MOUTH EVERY DAY    hydroCHLOROthiazide (HYDRODIURIL) 25 mg tablet TAKE 1 TABLET BY MOUTH EVERY DAY    metoprolol succinate (TOPROL-XL) 50 mg XL tablet TAKE 1 TABLET BY MOUTH EVERY DAY    FREESTYLE INSULINX TEST STRIPS strip USE TO TEST BLOOD SUGAR ONCE EVERY DAY AND AS NEEDED DX CODE E11.51    albuterol (PROVENTIL HFA, VENTOLIN HFA, PROAIR HFA) 90 mcg/actuation inhaler Take 2 Puffs by inhalation every four (4) hours as needed for Wheezing.  metFORMIN (GLUCOPHAGE) 500 mg tablet TAKE 1 TABLET BY MOUTH ONCE A DAY WITH MEALS    meclizine (ANTIVERT) 12.5 mg tablet Take 1 Tab by mouth three (3) times daily as needed.  apixaban (ELIQUIS) 5 mg tablet Take 1 Tab by mouth two (2) times a day.  propafenone (RYTHMOL) 150 mg tablet Take 1 Tab by mouth every eight (8) hours.  cetirizine (ZYRTEC) 10 mg tablet TAKE 1 TABLET BY MOUTH EVERY DAY (Patient taking differently: Take 10 mg by mouth daily as needed. TAKE 1 TABLET BY MOUTH EVERY DAY)    fluticasone (FLONASE) 50 mcg/actuation nasal spray 2 Sprays by Both Nostrils route as needed for Rhinitis or Allergies. Indications: Allergic Rhinitis    calcium-cholecalciferol, D3, (CALTRATE 600+D) tablet Take 1 Tab by mouth two (2) times a day. (Patient taking differently: Take 1 Tab by mouth daily.)    MULTIVITAMIN PO Take 1 Tab by mouth daily.  Lancets (FREESTYLE LANCETS) Misc Test once daily. (diagnosis code: 250.00)    Blood-Glucose Meter monitoring kit Once daily before breakfast    glucose blood VI test strips (ACCU-CHEK DA) strip .  nystatin-emollient combo no. 54 100,000 unit/gram crea nystatin 100,000 unit/gram topical cream   APPLY TO AA BID X 10 DAYS OFF 1 WK REPEAT PRN    nystatin-triamcinolone (MYCOLOG II) topical cream nystatin-triamcinolone 100,000 unit/g-0.1 % topical cream   APPLY TO THE AFFECTED AREA(S) BY TOPICAL ROUTE 2 TIMES PER DAY IN THEMORNING AND EVENING    pantoprazole (PROTONIX) 40 mg tablet TAKE 1 TABLET BY MOUTH EVERY DAY     No current facility-administered medications for this visit.        Vitals:    01/08/19 0946 01/08/19 0955   BP: 120/80 120/78   Pulse: 70    Resp: 16    SpO2: 99%    Weight: 207 lb 1.6 oz (93.9 kg)    Height: 5' 2\" (1.575 m)      Social History     Socioeconomic History    Marital status:      Spouse name: Not on file    Number of children: Not on file    Years of education: Not on file    Highest education level: Not on file   Social Needs    Financial resource strain: Not on file    Food insecurity - worry: Not on file    Food insecurity - inability: Not on file    Transportation needs - medical: Not on file   [employer] needs - non-medical: Not on file   Occupational History    Not on file   Tobacco Use    Smoking status: Former Smoker     Last attempt to quit: 1988     Years since quittin.5    Smokeless tobacco: Never Used   Substance and Sexual Activity    Alcohol use: No    Drug use: No    Sexual activity: No   Other Topics Concern    Not on file   Social History Narrative    Not on file       I have reviewed the nurses notes, vitals, problem list, allergy list, medical history, family, social history and medications. Review of Symptoms:    General: Pt denies excessive weight gain or loss. Pt is able to conduct ADL's  HEENT: Denies blurred vision, headaches, epistaxis and difficulty swallowing. Respiratory: Denies shortness of breath, +SUGGS (unchanged), wheezing or stridor. Cardiovascular: Denies precordial pain, palpitations, edema or PND  Gastrointestinal: Denies poor appetite, indigestion, abdominal pain or blood in stool  Musculoskeletal: Denies pain or swelling from muscles or joints  Neurologic: Denies tremor, paresthesias, or sensory motor disturbance  Skin: Denies rash, itching or texture change. Physical Exam:      General: Well developed, in no acute distress, cooperative and alert. Obese. HEENT: No carotid bruits, no JVD, trach is midline. Neck Supple, PEERL, EOM intact. Heart:  Normal S1/S2 negative S3 or S4. Regular, no murmur, gallop or rub.   Respiratory: Clear bilaterally x 4, no wheezing or rales  Abdomen:   Soft, non-tender, no masses, bowel sounds are active.   Extremities:  normal cap refill, no cyanosis, atraumatic. Trace non-pitting edema BLE  Neuro: A&Ox3, speech clear, gait stable.    Skin: Skin color is normal. No rashes or lesions. Non diaphoretic  Vascular: 2+ pulses symmetric in all extremities    Cardiographics    ECG: normal sinus rhythm with vr 68 bpm; QTc 444 ms. Results for orders placed or performed during the hospital encounter of 09/18/18   EKG, 12 LEAD, INITIAL   Result Value Ref Range    Ventricular Rate 91 BPM    Atrial Rate 91 BPM    P-R Interval 168 ms    QRS Duration 104 ms    Q-T Interval 362 ms    QTC Calculation (Bezet) 445 ms    Calculated P Axis 65 degrees    Calculated R Axis -61 degrees    Calculated T Axis 10 degrees    Diagnosis       Normal sinus rhythm  Left anterior fascicular block  Minimal voltage criteria for LVH, may be normal variant  Poor R-wave Progression (consider lead placement or loss of anterior forces)  When compared with ECG of 23-AUG-2018 19:33,  No significant change was found  Confirmed by Ellis Dove MD, Nuris Dasilva ([id number]) on 9/18/2018 4:48:38 PM     Results for orders placed or performed in visit on 10/03/17   CARDIAC HOLTER MONITOR, 24 HOURS    Narrative    ECG Monitor/24 hours, Complete    Reason for Holter Monitor   PALPITATIONS    Heartbeat    Slowest 71  Average 86  Fastest  146      Results:   Underlying Rhythm: Normal sinus rhythm      Atrial Arrhythmias: paroxysmal atrial fibrillation            AV Conduction: normal    Ventricular Arrhythmias: premature ventricular contractions; rare     ST Segment Analysis:non-specific changes     Symptom Correlation:  yes    Comment:   Paroxysmal atrial fibrillation that correlates with patient symptoms.      Jason Hendrix MD           Results for orders placed or performed in visit on 10/20/10   AMB POC EKG ROUTINE W/ 12 LEADS, INTER & REP    Impression    SR, widen QRS, LAD,, IRBBB, ANT hemiblock, no st elevation, no st  depression, will investigate for underlying heart dz,ref to  cardiologist         Cardiology Labs:  Lab Results   Component Value Date/Time    Cholesterol, total 147 09/25/2018 09:33 AM    HDL Cholesterol 46 09/25/2018 09:33 AM    LDL, calculated 77 09/25/2018 09:33 AM    Triglyceride 119 09/25/2018 09:33 AM    CHOL/HDL Ratio 2.9 01/12/2017 04:20 AM       Lab Results   Component Value Date/Time    Sodium 137 09/18/2018 08:16 AM    Potassium 3.6 09/18/2018 08:16 AM    Chloride 99 09/18/2018 08:16 AM    CO2 31 09/18/2018 08:16 AM    Anion gap 7 09/18/2018 08:16 AM    Glucose 128 (H) 09/18/2018 08:16 AM    BUN 10 09/18/2018 08:16 AM    Creatinine 0.78 09/18/2018 08:16 AM    BUN/Creatinine ratio 13 09/18/2018 08:16 AM    GFR est AA >60 09/18/2018 08:16 AM    GFR est non-AA >60 09/18/2018 08:16 AM    Calcium 9.0 09/18/2018 08:16 AM    Bilirubin, total 0.4 09/18/2018 08:16 AM    AST (SGOT) 12 (L) 09/18/2018 08:16 AM    Alk. phosphatase 49 09/18/2018 08:16 AM    Protein, total 7.7 09/18/2018 08:16 AM    Albumin 3.7 09/18/2018 08:16 AM    Globulin 4.0 09/18/2018 08:16 AM    A-G Ratio 0.9 (L) 09/18/2018 08:16 AM    ALT (SGPT) 21 09/18/2018 08:16 AM           Assessment:     Assessment:     Diagnoses and all orders for this visit:    1. Paroxysmal atrial fibrillation (HCC)  -     AMB POC EKG ROUTINE W/ 12 LEADS, INTER & REP    2. Essential hypertension    3. Mixed hyperlipidemia    4. Intermittent palpitations        ICD-10-CM ICD-9-CM    1. Paroxysmal atrial fibrillation (HCC) I48.0 427.31 AMB POC EKG ROUTINE W/ 12 LEADS, INTER & REP   2. Essential hypertension I10 401.9    3. Mixed hyperlipidemia E78.2 272.2    4. Intermittent palpitations R00.2 785.1      Orders Placed This Encounter    AMB POC EKG ROUTINE W/ 12 LEADS, INTER & REP     Order Specific Question:   Reason for Exam:     Answer:   routine    glucose blood VI test strips (FREESTYLE INSULINX TEST STRIPS) strip     Sig: Freestyle [employer] Test Strips   USE TO TEST BLOOD SUGAR ONCE EVERY DAY AND AS NEEDED DX CODE E11.51    nystatin-emollient combo no. 54 100,000 unit/gram crea     Sig: nystatin 100,000 unit/gram topical cream   APPLY TO AA BID X 10 DAYS OFF 1 WK REPEAT PRN    gabapentin (NEURONTIN) 300 mg capsule     Sig: TAKE ONE CAPSULE BY MOUTH AT BEDTIME     Refill:  1    nystatin-triamcinolone (MYCOLOG II) topical cream     Sig: nystatin-triamcinolone 100,000 unit/g-0.1 % topical cream   APPLY TO THE AFFECTED AREA(S) BY TOPICAL ROUTE 2 TIMES PER DAY IN [facility] AND EVENING        Plan:     Patient presenting with hx of PAF on Rhythmol and Eliquis for [facility]. Doing well with infrequent palpitations. SUGGS likely secondary to deconditioning. Echo done in 2017 with preserved LVEF 60-65% without evidence of valvular pathology. Nuclear lexiscan done in 10/2017 was negative for ischemia with fixed defect likely 2/2 attentuation. Continue present medical management and encouraged regular exercise to improve physical conditioning. F/u in 6 months or sooner if worsening symptoms. Jared Giron NP    This note was created using voice recognition software. Despite editing, there may be syntax errors. [facility]    1/8/2019         Patient seen, examined by me personally. Plan discussed as detailed. Agree with note as outlined by  NP. I confirm findings in history and physical exam. No additional findings noted. Agree with plan as outlined above.      Purvi Eubanks MD

## 2019-01-28 ENCOUNTER — OFFICE VISIT (OUTPATIENT)
Dept: URGENT CARE | Age: 79
End: 2019-01-28

## 2019-01-28 VITALS
SYSTOLIC BLOOD PRESSURE: 145 MMHG | RESPIRATION RATE: 16 BRPM | TEMPERATURE: 97.8 F | DIASTOLIC BLOOD PRESSURE: 66 MMHG | BODY MASS INDEX: 38.09 KG/M2 | WEIGHT: 207 LBS | OXYGEN SATURATION: 98 % | HEIGHT: 62 IN | HEART RATE: 76 BPM

## 2019-01-28 DIAGNOSIS — R35.0 URINARY FREQUENCY: ICD-10-CM

## 2019-01-28 DIAGNOSIS — R10.32 LEFT LOWER QUADRANT PAIN: Primary | ICD-10-CM

## 2019-01-28 LAB
BILIRUB UR QL STRIP: NEGATIVE
GLUCOSE UR-MCNC: NEGATIVE MG/DL
KETONES P FAST UR STRIP-MCNC: NEGATIVE MG/DL
PH UR STRIP: 6 [PH] (ref 4.6–8)
PROT UR QL STRIP: NEGATIVE
SP GR UR STRIP: 1.02 (ref 1–1.03)
UA UROBILINOGEN AMB POC: NORMAL (ref 0.2–1)
URINALYSIS CLARITY POC: NORMAL
URINALYSIS COLOR POC: NORMAL
URINE BLOOD POC: NORMAL
URINE LEUKOCYTES POC: NORMAL
URINE NITRITES POC: NEGATIVE

## 2019-01-28 RX ORDER — CEPHALEXIN 500 MG/1
500 CAPSULE ORAL 2 TIMES DAILY
Qty: 20 CAP | Refills: 0 | Status: SHIPPED | OUTPATIENT
Start: 2019-01-28 | End: 2019-02-05

## 2019-01-28 NOTE — PATIENT INSTRUCTIONS
Abdominal Pain: Care Instructions  Your Care Instructions    Abdominal pain has many possible causes. Some aren't serious and get better on their own in a few days. Others need more testing and treatment. If your pain continues or gets worse, you need to be rechecked and may need more tests to find out what is wrong. You may need surgery to correct the problem. Don't ignore new symptoms, such as fever, nausea and vomiting, urination problems, pain that gets worse, and dizziness. These may be signs of a more serious problem. Your doctor may have recommended a follow-up visit in the next 8 to 12 hours. If you are not getting better, you may need more tests or treatment. The doctor has checked you carefully, but problems can develop later. If you notice any problems or new symptoms, get medical treatment right away. Follow-up care is a key part of your treatment and safety. Be sure to make and go to all appointments, and call your doctor if you are having problems. It's also a good idea to know your test results and keep a list of the medicines you take. How can you care for yourself at home? · Rest until you feel better. · To prevent dehydration, drink plenty of fluids, enough so that your urine is light yellow or clear like water. Choose water and other caffeine-free clear liquids until you feel better. If you have kidney, heart, or liver disease and have to limit fluids, talk with your doctor before you increase the amount of fluids you drink. · If your stomach is upset, eat mild foods, such as rice, dry toast or crackers, bananas, and applesauce. Try eating several small meals instead of two or three large ones. · Wait until 48 hours after all symptoms have gone away before you have spicy foods, alcohol, and drinks that contain caffeine. · Do not eat foods that are high in fat. · Avoid anti-inflammatory medicines such as aspirin, ibuprofen (Advil, Motrin), and naproxen (Aleve).  These can cause stomach upset. Talk to your doctor if you take daily aspirin for another health problem. When should you call for help? Call 911 anytime you think you may need emergency care. For example, call if:    · You passed out (lost consciousness).     · You pass maroon or very bloody stools.     · You vomit blood or what looks like coffee grounds.     · You have new, severe belly pain.    Call your doctor now or seek immediate medical care if:    · Your pain gets worse, especially if it becomes focused in one area of your belly.     · You have a new or higher fever.     · Your stools are black and look like tar, or they have streaks of blood.     · You have unexpected vaginal bleeding.     · You have symptoms of a urinary tract infection. These may include:  ? Pain when you urinate. ? Urinating more often than usual.  ? Blood in your urine.     · You are dizzy or lightheaded, or you feel like you may faint.    Watch closely for changes in your health, and be sure to contact your doctor if:    · You are not getting better after 1 day (24 hours). Where can you learn more? Go to http://rad[employer]icestracy.info/. Enter K391 in the search box to learn more about \"Abdominal Pain: Care Instructions. \"  Current as of: September 23, 2018  Content Version: 11.9  © 6349-1969 [employer]. Care instructions adapted under license by [employer] (which disclaims liability or warranty for this information). If you have questions about a medical condition or this instruction, always ask your healthcare professional. [place] [address] any warranty or liability for your use of this information. Urinary Tract Infection in Women: Care Instructions  Your Care Instructions    A urinary tract infection, or UTI, is a general term for an infection anywhere between the kidneys and the urethra (where urine comes out). Most UTIs are bladder infections.  They often cause pain or burning when you urinate. UTIs are caused by bacteria and can be cured with antibiotics. Be sure to complete your treatment so that the infection goes away. Follow-up care is a key part of your treatment and safety. Be sure to make and go to all appointments, and call your doctor if you are having problems. It's also a good idea to know your test results and keep a list of the medicines you take. How can you care for yourself at home? · Take your antibiotics as directed. Do not stop taking them just because you feel better. You need to take the full course of antibiotics. · Drink extra water and other fluids for the next day or two. This may help wash out the bacteria that are causing the infection. (If you have kidney, heart, or liver disease and have to limit fluids, talk with your doctor before you increase your fluid intake.)  · Avoid drinks that are carbonated or have caffeine. They can irritate the bladder. · Urinate often. Try to empty your bladder each time. · To relieve pain, take a hot bath or lay a heating pad set on low over your lower belly or genital area. Never go to sleep with a heating pad in place. To prevent UTIs  · Drink plenty of water each day. This helps you urinate often, which clears bacteria from your system. (If you have kidney, heart, or liver disease and have to limit fluids, talk with your doctor before you increase your fluid intake.)  · Urinate when you need to. · Urinate right after you have sex. · Change sanitary pads often. · Avoid douches, bubble baths, feminine hygiene sprays, and other feminine hygiene products that have deodorants. · After going to the bathroom, wipe from front to back. When should you call for help? Call your doctor now or seek immediate medical care if:    · Symptoms such as fever, chills, nausea, or vomiting get worse or appear for the first time.     · You have new pain in your back just below your rib cage.  This is called flank pain.     · There is new blood or pus in your urine.     · You have any problems with your antibiotic medicine.    Watch closely for changes in your health, and be sure to contact your doctor if:    · You are not getting better after taking an antibiotic for 2 days.     · Your symptoms go away but then come back. Where can you learn more? Go to http://radha-tracy.info/. Enter F161 in the search box to learn more about \"Urinary Tract Infection in Women: Care Instructions. \"  Current as of: March 20, 2018  Content Version: 11.9  © 4884-1046 [employer]. Care instructions adapted under license by [employer] (which disclaims liability or warranty for this information). If you have questions about a medical condition or this instruction, always ask your healthcare professional. [address] any warranty or liability for your use of this information.

## 2019-01-28 NOTE — PROGRESS NOTES
Abdominal Pain    The history is provided by the patient. This is a new problem. Episode onset: 3 weeks ago. The problem occurs constantly. The problem has not changed since onset. The pain is at a severity of 6/10. The pain is moderate. Associated symptoms include frequency ( x 3 wks). Pertinent negatives include no anorexia, no fever, no diarrhea, no nausea, no vomiting, no constipation, no dysuria, no myalgias and no chest pain. The patient's surgical history includes appendectomy and hysterectomy.        Past Medical History:   Diagnosis Date    Abdominal mass, left upper quadrant 6/13/2017    Abnormal finding on MRI of brain 3/16/2015    evaluated by neurologist and no findings    Acute pharyngitis 5/26/2016    Anemia NEC     Arthralgia of right hip 4/25/2016    Arthritis 2/18/2010    Asthma 2/18/2010    Cataract 9/24/2014    Diabetes ([address]     Elevated LFTs 4/16/2014    DAVID (generalized anxiety disorder) 1/22/2015    GERD (gastroesophageal reflux disease) 4/7/2014    Hammer toe of right foot 9/29/2014    Headache(784.0) 3/11/2233    Helicobacter pylori (H. 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 ([address] 9/29/2014    Preop examination 9/24/2014    Right foot injury 5/2/2017    Risk for falls 4/16/2014    Screening for breast cancer 9/23/2014    Screening for depression 4/16/2014    Shoulder pain, left 3/18/2014    Spondylitis, cervical 4/5/2010    Tinea pedis 6/3/2015    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 straightened colon out    HX ROTATOR CUFF REPAIR  2009    left    OPEN REPAIR CLAVICLE FRACTURE  2009         Family History   Problem Relation Age of Onset    Cancer Mother         mycosis fungoives    Stroke Father     Cancer Sister         one sister with breast cancer    Breast Cancer Sister     Cancer Paternal Aunt         2 paternal aunts with breast cancer    Breast Cancer Paternal Aunt     Breast Cancer Maternal Aunt     Thyroid Cancer Neg Hx         Social History     Socioeconomic History    Marital status:      Spouse name: Not on file    Number of children: Not on file    Years of education: Not on file    Highest education level: Not on file   Social Needs    Financial resource strain: Not on file    Food insecurity - worry: Not on file    Food insecurity - inability: Not on file   [place] needs - medical: Not on file   [place] needs - non-medical: Not on file   Occupational History    Not on file   Tobacco Use    Smoking status: Former Smoker     Last attempt to quit: 1988     Years since quittin.6    Smokeless tobacco: Never Used   Substance and Sexual Activity    Alcohol use: No    Drug use: No    Sexual activity: No   Other Topics Concern    Not on file   Social History Narrative    Not on file                ALLERGIES: Latex; Amoxil [amoxicillin]; Levaquin [levofloxacin]; Percocet [oxycodone-acetaminophen]; Tetanus vaccines and toxoid; and Tetracycline hcl    Review of Systems   Constitutional: Negative for chills and fever. Respiratory: Negative for shortness of breath and wheezing. Cardiovascular: Negative for chest pain and palpitations. Gastrointestinal: Positive for abdominal pain.  Negative for anorexia, constipation, diarrhea, nausea and vomiting. Genitourinary: Positive for frequency ( x 3 wks) and urgency. Negative for dysuria. Musculoskeletal: Negative for myalgias. Skin: Negative for rash. Hematological: Negative for adenopathy. Vitals:    01/28/19 1511   BP: 145/66   Pulse: 76   Resp: 16   Temp: 97.8 °F (36.6 °C)   SpO2: 98%   Weight: 207 lb (93.9 kg)   Height: 5' 2\" (1.575 m)       Physical Exam   Constitutional: She appears well-developed and well-nourished. No distress. Cardiovascular: Normal rate, regular rhythm and normal heart sounds. Pulmonary/Chest: Effort normal and breath sounds normal. No respiratory distress. She has no wheezes. She has no rales. Abdominal: Soft. Bowel sounds are normal. She exhibits no distension. There is no tenderness. There is no rigidity, no rebound, no guarding and no CVA tenderness. Neurological: She is alert. Skin: She is not diaphoretic. Psychiatric: She has a normal mood and affect. Her behavior is normal. Judgment and thought content normal.   Nursing note and vitals reviewed. MDM    ICD-10-CM ICD-9-CM    1. Left lower quadrant pain R10.32 789.04    2. Urinary frequency R35.0 788.41 AMB POC URINALYSIS DIP STICK AUTO W/O MICRO      CULTURE, URINE     Medications Ordered Today   Medications    cephALEXin (KEFLEX) 500 mg capsule     Sig: Take 1 Cap by mouth two (2) times a day for 10 days. Dispense:  20 Cap     Refill:  0     The patients condition was discussed with the patient and they understand. The patient is to follow up with PCP. If signs and symptoms become worse the pt is to go to the ER. The patient is to take medications as prescribed.        Results for orders placed or performed in visit on 01/28/19   AMB POC URINALYSIS DIP STICK AUTO W/O MICRO   Result Value Ref Range    Color (UA POC)      Clarity (UA POC)      Glucose (UA POC) Negative Negative    Bilirubin (UA POC) Negative Negative    Ketones (UA POC) Negative Negative    Specific gravity (UA POC) 1.020 1.001 - 1.035    Blood (UA POC) Trace Negative    pH (UA POC) 6.0 4.6 - 8.0    Protein (UA POC) Negative Negative    Urobilinogen (UA POC) 0.2 mg/dL 0.2 - 1    Nitrites (UA POC) Negative Negative    Leukocyte esterase (UA POC) Trace Negative         Procedures

## 2019-01-30 LAB — BACTERIA UR CULT: NO GROWTH

## 2019-02-05 ENCOUNTER — HOSPITAL ENCOUNTER (EMERGENCY)
Age: 79
Discharge: HOME | End: 2019-02-05
Attending: EMERGENCY MEDICINE
Payer: MEDICARE

## 2019-02-05 ENCOUNTER — APPOINTMENT (OUTPATIENT)
Dept: CT IMAGING | Age: 79
End: 2019-02-05
Attending: PHYSICIAN ASSISTANT
Payer: MEDICARE

## 2019-02-05 VITALS
WEIGHT: 205.91 LBS | OXYGEN SATURATION: 100 % | TEMPERATURE: 97.7 F | HEART RATE: 85 BPM | SYSTOLIC BLOOD PRESSURE: 174 MMHG | BODY MASS INDEX: 37.89 KG/M2 | RESPIRATION RATE: 16 BRPM | HEIGHT: 62 IN | DIASTOLIC BLOOD PRESSURE: 68 MMHG

## 2019-02-05 DIAGNOSIS — K57.30 DIVERTICULOSIS OF LARGE INTESTINE WITHOUT HEMORRHAGE: ICD-10-CM

## 2019-02-05 DIAGNOSIS — N30.00 ACUTE CYSTITIS WITHOUT HEMATURIA: Primary | ICD-10-CM

## 2019-02-05 DIAGNOSIS — K80.20 GALL STONES: ICD-10-CM

## 2019-02-05 LAB
ALBUMIN SERPL-MCNC: 3.7 G/DL (ref 3.5–5)
ALBUMIN/GLOB SERPL: 0.9 {RATIO} (ref 1.1–2.2)
ALP SERPL-CCNC: 47 U/L (ref 45–117)
ALT SERPL-CCNC: 22 U/L (ref 12–78)
ANION GAP SERPL CALC-SCNC: 5 MMOL/L (ref 5–15)
APPEARANCE UR: ABNORMAL
AST SERPL-CCNC: 15 U/L (ref 15–37)
BACTERIA URNS QL MICRO: ABNORMAL /HPF
BASOPHILS # BLD: 0 K/UL (ref 0–0.1)
BASOPHILS NFR BLD: 1 % (ref 0–1)
BILIRUB SERPL-MCNC: 0.3 MG/DL (ref 0.2–1)
BILIRUB UR QL: NEGATIVE
BUN SERPL-MCNC: 15 MG/DL (ref 6–20)
BUN/CREAT SERPL: 20 (ref 12–20)
CALCIUM SERPL-MCNC: 8.9 MG/DL (ref 8.5–10.1)
CHLORIDE SERPL-SCNC: 102 MMOL/L (ref 97–108)
CO2 SERPL-SCNC: 31 MMOL/L (ref 21–32)
COLOR UR: ABNORMAL
CREAT SERPL-MCNC: 0.75 MG/DL (ref 0.55–1.02)
DIFFERENTIAL METHOD BLD: NORMAL
EOSINOPHIL # BLD: 0.1 K/UL (ref 0–0.4)
EOSINOPHIL NFR BLD: 3 % (ref 0–7)
EPITH CASTS URNS QL MICRO: ABNORMAL /LPF
ERYTHROCYTE [DISTWIDTH] IN BLOOD BY AUTOMATED COUNT: 13.8 % (ref 11.5–14.5)
GLOBULIN SER CALC-MCNC: 3.9 G/DL (ref 2–4)
GLUCOSE SERPL-MCNC: 125 MG/DL (ref 65–100)
GLUCOSE UR STRIP.AUTO-MCNC: NEGATIVE MG/DL
HCT VFR BLD AUTO: 36.6 % (ref 35–47)
HGB BLD-MCNC: 11.8 G/DL (ref 11.5–16)
HGB UR QL STRIP: NEGATIVE
HYALINE CASTS URNS QL MICRO: ABNORMAL /LPF (ref 0–5)
IMM GRANULOCYTES # BLD AUTO: 0 K/UL (ref 0–0.04)
IMM GRANULOCYTES NFR BLD AUTO: 0 % (ref 0–0.5)
KETONES UR QL STRIP.AUTO: NEGATIVE MG/DL
LEUKOCYTE ESTERASE UR QL STRIP.AUTO: ABNORMAL
LIPASE SERPL-CCNC: 131 U/L (ref 73–393)
LYMPHOCYTES # BLD: 1.3 K/UL (ref 0.8–3.5)
LYMPHOCYTES NFR BLD: 37 % (ref 12–49)
MCH RBC QN AUTO: 28.5 PG (ref 26–34)
MCHC RBC AUTO-ENTMCNC: 32.2 G/DL (ref 30–36.5)
MCV RBC AUTO: 88.4 FL (ref 80–99)
MONOCYTES # BLD: 0.3 K/UL (ref 0–1)
MONOCYTES NFR BLD: 8 % (ref 5–13)
NEUTS SEG # BLD: 1.8 K/UL (ref 1.8–8)
NEUTS SEG NFR BLD: 52 % (ref 32–75)
NITRITE UR QL STRIP.AUTO: NEGATIVE
NRBC # BLD: 0 K/UL (ref 0–0.01)
NRBC BLD-RTO: 0 PER 100 WBC
PH UR STRIP: 6 [PH] (ref 5–8)
PLATELET # BLD AUTO: 284 K/UL (ref 150–400)
PMV BLD AUTO: 10.1 FL (ref 8.9–12.9)
POTASSIUM SERPL-SCNC: 3.9 MMOL/L (ref 3.5–5.1)
PROT SERPL-MCNC: 7.6 G/DL (ref 6.4–8.2)
PROT UR STRIP-MCNC: NEGATIVE MG/DL
RBC # BLD AUTO: 4.14 M/UL (ref 3.8–5.2)
RBC #/AREA URNS HPF: ABNORMAL /HPF (ref 0–5)
SODIUM SERPL-SCNC: 138 MMOL/L (ref 136–145)
SP GR UR REFRACTOMETRY: 1.02 (ref 1–1.03)
UROBILINOGEN UR QL STRIP.AUTO: 0.2 EU/DL (ref 0.2–1)
WBC # BLD AUTO: 3.6 K/UL (ref 3.6–11)
WBC URNS QL MICRO: ABNORMAL /HPF (ref 0–4)

## 2019-02-05 PROCEDURE — 74011636320 HC RX REV CODE- 636/320: Performed by: EMERGENCY MEDICINE

## 2019-02-05 PROCEDURE — 80053 COMPREHEN METABOLIC PANEL: CPT

## 2019-02-05 PROCEDURE — 36415 COLL VENOUS BLD VENIPUNCTURE: CPT

## 2019-02-05 PROCEDURE — 81001 URINALYSIS AUTO W/SCOPE: CPT

## 2019-02-05 PROCEDURE — 87086 URINE CULTURE/COLONY COUNT: CPT

## 2019-02-05 PROCEDURE — 83690 ASSAY OF LIPASE: CPT

## 2019-02-05 PROCEDURE — 74177 CT ABD & PELVIS W/CONTRAST: CPT

## 2019-02-05 PROCEDURE — 99283 EMERGENCY DEPT VISIT LOW MDM: CPT

## 2019-02-05 PROCEDURE — 85025 COMPLETE CBC W/AUTO DIFF WBC: CPT

## 2019-02-05 RX ORDER — SULFAMETHOXAZOLE AND TRIMETHOPRIM 800; 160 MG/1; MG/1
1 TABLET ORAL 2 TIMES DAILY
Qty: 14 TAB | Refills: 0 | Status: SHIPPED | OUTPATIENT
Start: 2019-02-05 | End: 2019-02-12

## 2019-02-05 RX ORDER — SODIUM CHLORIDE 0.9 % (FLUSH) 0.9 %
10 SYRINGE (ML) INJECTION
Status: COMPLETED | OUTPATIENT
Start: 2019-02-05 | End: 2019-02-05

## 2019-02-05 RX ADMIN — Medication 10 ML: at 11:12

## 2019-02-05 RX ADMIN — IOPAMIDOL 100 ML: 755 INJECTION, SOLUTION INTRAVENOUS at 11:12

## 2019-02-05 NOTE — ED NOTES
Patient prestents to ED with complaints of left sided flank pain. Patient states she has had nausea and urinary frequency. Patient states she went to UC last week and was diagnosed with blood in her urine and a UTI. She states she was given antibiotics, but she did not take them because she did not feel like it was a UTI. Patient sitting comfortably on stretcher. She did not follow up with her PCP. Patient has family at bedside. Call bell within reach.

## 2019-02-05 NOTE — DISCHARGE INSTRUCTIONS
Patient Education        Urinary Tract Infection in Women: Care Instructions  Your Care Instructions    A urinary tract infection, or UTI, is a general term for an infection anywhere between the kidneys and the urethra (where urine comes out). Most UTIs are bladder infections. They often cause pain or burning when you urinate. UTIs are caused by bacteria and can be cured with antibiotics. Be sure to complete your treatment so that the infection goes away. Follow-up care is a key part of your treatment and safety. Be sure to make and go to all appointments, and call your doctor if you are having problems. It's also a good idea to know your test results and keep a list of the medicines you take. How can you care for yourself at home? · Take your antibiotics as directed. Do not stop taking them just because you feel better. You need to take the full course of antibiotics. · Drink extra water and other fluids for the next day or two. This may help wash out the bacteria that are causing the infection. (If you have kidney, heart, or liver disease and have to limit fluids, talk with your doctor before you increase your fluid intake.)  · Avoid drinks that are carbonated or have caffeine. They can irritate the bladder. · Urinate often. Try to empty your bladder each time. · To relieve pain, take a hot bath or lay a heating pad set on low over your lower belly or genital area. Never go to sleep with a heating pad in place. To prevent UTIs  · Drink plenty of water each day. This helps you urinate often, which clears bacteria from your system. (If you have kidney, heart, or liver disease and have to limit fluids, talk with your doctor before you increase your fluid intake.)  · Urinate when you need to. · Urinate right after you have sex. · Change sanitary pads often. · Avoid douches, bubble baths, feminine hygiene sprays, and other feminine hygiene products that have deodorants.   · After going to the bathroom, wipe from front to back. When should you call for help? Call your doctor now or seek immediate medical care if:    · Symptoms such as fever, chills, nausea, or vomiting get worse or appear for the first time.     · You have new pain in your back just below your rib cage. This is called flank pain.     · There is new blood or pus in your urine.     · You have any problems with your antibiotic medicine.    Watch closely for changes in your health, and be sure to contact your doctor if:    · You are not getting better after taking an antibiotic for 2 days.     · Your symptoms go away but then come back. Where can you learn more? Go to http://radha-tracy.info/. Enter C198 in the search box to learn more about \"Urinary Tract Infection in Women: Care Instructions. \"  Current as of: March 20, 2018  Content Version: 11.9  © 1057-8664 [employer]. Care instructions adapted under license by [employer] (which disclaims liability or warranty for this information). If you have questions about a medical condition or this instruction, always ask your healthcare professional. [place] [address] any warranty or liability for your use of this information. Patient Education     Gallstones: After Your Visit to the Emergency Room  Your Care Instructions  Gallstones are stones made of cholesterol and other substances that form in the gallbladder. The gallbladder stores bile, which helps the body digest food. Gallstones also can develop in the tube (bile duct) that carries bile from the gallbladder and the liver to the small intestine. Gallstones may be as small as a grain of sand or as large as a golf ball. The doctor may have given you medicine for pain. You may need follow-up appointments for more testing and treatment. If you continue to have problems with gallstones, you may need surgery to remove your gallbladder.   Even though you have been released from the emergency room, you still need to watch for any problems. The doctor carefully checked you. But sometimes problems can develop later. If you have new symptoms, or if your symptoms do not get better, return to the emergency room or call your doctor right away. A visit to the emergency room is only one step in your treatment. Even if you feel better, you still need to do what your doctor recommends, such as going to all suggested follow-up appointments and taking medicines exactly as directed. This will help you recover and help prevent future problems. How can you care for yourself at home? · Rest until you feel better. · Take pain medicines exactly as directed. ¨ If the doctor gave you a prescription medicine for pain, take it as prescribed. ¨ If you are not taking a prescription pain medicine, ask your doctor if you can take an over-the-counter medicine. Read and follow all instructions on the label. · Avoid foods that cause symptoms, especially fatty foods. These can make the gallbladder tighten and cause pain. When should you call for help? Return to the emergency room now if:  · Your belly pain gets much worse, or you have new or different pain. · You have a fever. · You are vomiting and cannot keep any liquids down. Call your doctor today if:  · Your skin or the white part of your eyes looks yellow. · Your urine is dark yellow-brown, or your stools are light-colored. · You feel sick to your stomach. · You continue to have pain in your right side. Where can you learn more? Go to [employer].be  Enter X898 in the search box to learn more about \"Gallstones: After Your Visit to the Emergency Room. \"   © 3495-4568 [employer]. Care instructions adapted under license by [employer] (which disclaims liability or warranty for this information).  This care instruction is for use with your licensed healthcare professional. If you have questions about a medical condition or this instruction, always ask your healthcare professional. [place] [address] any warranty or liability for your use of this information. Content Version: 9.4.91072; Last Revised: September 30, 2010             Patient Education        Diverticulosis: Care Instructions  Your Care Instructions  In diverticulosis, pouches called diverticula form in the wall of the large intestine (colon). The pouches do not cause any pain or other symptoms. Most people who have diverticulosis do not know they have it. But the pouches sometimes bleed, and if they become infected, they can cause pain and other symptoms. When this happens, it is called diverticulitis. Diverticula form when pressure pushes the wall of the colon outward at certain weak points. A diet that is too low in fiber can cause diverticula. Follow-up care is a key part of your treatment and safety. Be sure to make and go to all appointments, and call your doctor if you are having problems. It's also a good idea to know your test results and keep a list of the medicines you take. How can you care for yourself at home? · Include fruits, leafy green vegetables, beans, and whole grains in your diet each day. These foods are high in fiber. · Take a fiber supplement, such as Citrucel or Metamucil, every day if needed. Read and follow all instructions on the label. · Drink plenty of fluids, enough so that your urine is light yellow or clear like water. If you have kidney, heart, or liver disease and have to limit fluids, talk with your doctor before you increase the amount of fluids you drink. · Get at least 30 minutes of exercise on most days of the week. Walking is a good choice. You also may want to do other activities, such as running, swimming, cycling, or playing tennis or team sports. · Cut out foods that cause gas, pain, or other symptoms. When should you call for help?   Call your doctor now or seek immediate medical care if:    · You have belly pain.     · You pass maroon or very bloody stools.     · You have a fever.     · You have nausea and vomiting.     · You have unusual changes in your bowel movements or abdominal swelling.     · You have burning pain when you urinate.     · You have abnormal vaginal discharge.     · You have shoulder pain.     · You have cramping pain that does not get better when you have a bowel movement or pass gas.     · You pass gas or stool from your urethra while urinating.    Watch closely for changes in your health, and be sure to contact your doctor if you have any problems. Where can you learn more? Go to http://radha-tracy.info/. Enter O155 in the search box to learn more about \"Diverticulosis: Care Instructions. \"  Current as of: March 27, 2018  Content Version: 11.9  © 0320-8640 [address]. Care instructions adapted under license by [employer] (which disclaims liability or warranty for this information). If you have questions about a medical condition or this instruction, always ask your healthcare professional. [address] any warranty or liability for your use of this information.

## 2019-02-05 NOTE — ED PROVIDER NOTES
EMERGENCY DEPARTMENT HISTORY AND PHYSICAL EXAM      Date: 2/5/2019  Patient Name: Jimena Soto    History of Presenting Illness     Chief Complaint   Patient presents with    Pelvic Pain     pt stated three weeks has pain in her lower left pelvic area, increase freq with urination, no n/v/d       History Provided By: Patient    HPI: Jimena Soto, 70 y.o. female presents to the ED with cc of LLQ abdominal pain x 3 weeks. She was seen on 1/28/19 at the [address] [place] [address] and put on Keflex for a UTI but the patient has not filled the Rx. She note she has both red and white cells in her urine and she was instructed to come to the ED for a CT scan but she waited to see if the pain would resolve without intervention first. She notes nausea without vomiting. She has not taken any over the counter medications for discomfort. The pain is a nagging discomfort that at times is worse and described as a \"pinching\" sensation. There are no other complaints, changes, or physical findings at this time. Social Hx: Tobacco (denies), EtOH (denies), Illicit drug use (denies)     PCP: Braxton Little MD    No current facility-administered medications on file prior to encounter.       Current Outpatient Medications on File Prior to Encounter   Medication Sig Dispense Refill    glucose blood VI test strips (FREESTYLE INSULINX TEST STRIPS) strip Freestyle [employer] Test Strips   USE TO TEST BLOOD SUGAR ONCE EVERY DAY AND AS NEEDED DX CODE E11.51      aspirin delayed-release 81 mg tablet TAKE 1 TABLET BY MOUTH EVERY DAY 90 Tab 1    rosuvastatin (CRESTOR) 10 mg tablet TAKE 1 TABLET BY MOUTH EVERY DAY 90 Tab 1    hydroCHLOROthiazide (HYDRODIURIL) 25 mg tablet TAKE 1 TABLET BY MOUTH EVERY DAY 90 Tab 1    metoprolol succinate (TOPROL-XL) 50 mg XL tablet TAKE 1 TABLET BY MOUTH EVERY DAY 90 Tab 1    FREESTYLE INSULINX TEST STRIPS strip USE TO TEST BLOOD SUGAR ONCE EVERY DAY AND AS NEEDED DX CODE E11.51 100 Strip 4    metFORMIN (GLUCOPHAGE) 500 mg tablet TAKE 1 TABLET BY MOUTH ONCE A DAY WITH MEALS 90 Tab 1    pantoprazole (PROTONIX) 40 mg tablet TAKE 1 TABLET BY MOUTH EVERY DAY      apixaban (ELIQUIS) 5 mg tablet Take 1 Tab by mouth two (2) times a day. 180 Tab 3    propafenone (RYTHMOL) 150 mg tablet Take 1 Tab by mouth every eight (8) hours. 270 Tab 3    fluticasone (FLONASE) 50 mcg/actuation nasal spray 2 Sprays by Both Nostrils route as needed for Rhinitis or Allergies. Indications: Allergic Rhinitis 1 Bottle 11    calcium-cholecalciferol, D3, (CALTRATE 600+D) tablet Take 1 Tab by mouth two (2) times a day. (Patient taking differently: Take 1 Tab by mouth daily.) 60 Tab 11    MULTIVITAMIN PO Take 1 Tab by mouth daily.  Lancets (FREESTYLE LANCETS) Misc Test once daily. (diagnosis code: 250.00) 1 Package 11    Blood-Glucose Meter monitoring kit Once daily before breakfast 1 Kit 0    glucose blood VI test strips (ACCU-CHEK DA) strip . 1 Package 11    nystatin-emollient combo no. 54 100,000 unit/gram crea nystatin 100,000 unit/gram topical cream   APPLY TO AA BID X 10 DAYS OFF 1 WK REPEAT PRN      gabapentin (NEURONTIN) 300 mg capsule TAKE ONE CAPSULE BY MOUTH AT BEDTIME  1    nystatin-triamcinolone (MYCOLOG II) topical cream nystatin-triamcinolone 100,000 unit/g-0.1 % topical cream   APPLY TO THE AFFECTED AREA(S) BY TOPICAL ROUTE 2 TIMES PER DAY IN THEMORNING AND EVENING      albuterol (PROVENTIL HFA, VENTOLIN HFA, PROAIR HFA) 90 mcg/actuation inhaler Take 2 Puffs by inhalation every four (4) hours as needed for Wheezing. 1 Inhaler 1    meclizine (ANTIVERT) 12.5 mg tablet Take 1 Tab by mouth three (3) times daily as needed. 30 Tab 0    cetirizine (ZYRTEC) 10 mg tablet TAKE 1 TABLET BY MOUTH EVERY DAY (Patient taking differently: Take 10 mg by mouth daily as needed.  TAKE 1 TABLET BY MOUTH EVERY DAY) 90 Tab 1       Past History     Past Medical History:  Past Medical History:   Diagnosis Date    Abdominal mass, left upper quadrant 6/13/2017    Abnormal finding on MRI of brain 3/16/2015    evaluated by neurologist and no findings    Acute pharyngitis 5/26/2016    Anemia NEC     Arthralgia of right hip 4/25/2016    Arthritis 2/18/2010    Asthma 2/18/2010    Cataract 9/24/2014    Diabetes ([address]     Elevated LFTs 4/16/2014    DAVID (generalized anxiety disorder) 1/22/2015    GERD (gastroesophageal reflux disease) 4/7/2014    Hammer toe of right foot 9/29/2014    Headache(784.0) 0/39/0673    Helicobacter pylori (H. 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 ([address] 9/29/2014    Preop examination 9/24/2014    Right foot injury 5/2/2017    Risk for falls 4/16/2014    Screening for breast cancer 9/23/2014    Screening for depression 4/16/2014    Shoulder pain, left 3/18/2014    Spondylitis, cervical 4/5/2010    Tinea pedis 6/3/2015    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 ([address] 9/29/2014       Past Surgical History:  Past Surgical History:   Procedure Laterality Date    ABDOMEN SURGERY PROC UNLISTED      inguinal hernia    ABDOMEN SURGERY PROC UNLISTED      ENDOSCOPY, COLON, DIAGNOSTIC      HX APPENDECTOMY      HX GYN  1983    total hysterectomy for uterine fibroid    HX HEENT      tonsillectomy    HX ORTHOPAEDIC      clavicle repair    HX OTHER SURGICAL      ? straightened colon out    HX ROTATOR CUFF REPAIR  2009    left    OPEN REPAIR CLAVICLE FRACTURE  2009       Family History:  Family History   Problem Relation Age of Onset    Cancer Mother         mycosis fungoives    Stroke Father     Cancer Sister         one sister with breast cancer    Breast Cancer Sister    [place] Cancer Paternal Aunt         2 paternal aunts with breast cancer    Breast Cancer Paternal Aunt     Breast Cancer Maternal Aunt     Thyroid Cancer Neg Hx        Social History:  Social History     Tobacco Use    Smoking status: Former Smoker     Last attempt to quit: 1988     Years since quittin.6    Smokeless tobacco: Never Used   Substance Use Topics    Alcohol use: No    Drug use: No       Allergies: Allergies   Allergen Reactions    Latex Rash    Amoxil [Amoxicillin] Itching     rash    Cephalosporins Unknown (comments)    Levaquin [Levofloxacin] Other (comments)     Dizziness      Percocet [Oxycodone-Acetaminophen] Nausea and Vomiting    Tetanus Vaccines And Toxoid Swelling    Tetracycline Hcl Rash         Review of Systems   Review of Systems   Constitutional: Negative for chills, diaphoresis and fever. HENT: Negative for congestion, ear pain, rhinorrhea and sore throat. Respiratory: Negative for cough and shortness of breath. Cardiovascular: Negative for chest pain. Gastrointestinal: Positive for abdominal pain. Negative for constipation, diarrhea, nausea and vomiting. Genitourinary: Positive for difficulty urinating, dysuria and frequency. Negative for hematuria. Musculoskeletal: Negative for arthralgias and myalgias. Neurological: Negative for headaches. All other systems reviewed and are negative. Physical Exam   Physical Exam   Constitutional: She is oriented to person, place, and time. She appears well-developed and well-nourished. No distress. 70 y.o. -American female    HENT:   Head: Normocephalic and atraumatic. Eyes: Conjunctivae are normal. Right eye exhibits no discharge. Left eye exhibits no discharge. Neck: Normal range of motion. Neck supple. Cardiovascular: Normal rate, regular rhythm and normal heart sounds. No murmur heard. Pulmonary/Chest: Effort normal and breath sounds normal. No respiratory distress. Abdominal: Soft. Bowel sounds are normal. She exhibits no distension. There is tenderness. There is no rebound, no guarding and no CVA tenderness. Neurological: She is alert and oriented to person, place, and time. Skin: Skin is warm and dry. She is not diaphoretic. Psychiatric: She has a normal mood and affect. Her behavior is normal.   Nursing note and vitals reviewed. Diagnostic Study Results     Labs -     Recent Results (from the past 12 hour(s))   URINALYSIS W/MICROSCOPIC    Collection Time: 02/05/19  9:42 AM   Result Value Ref Range    Color YELLOW/STRAW      Appearance CLOUDY (A) CLEAR      Specific gravity 1.023 1.003 - 1.030      pH (UA) 6.0 5.0 - 8.0      Protein NEGATIVE  NEG mg/dL    Glucose NEGATIVE  NEG mg/dL    Ketone NEGATIVE  NEG mg/dL    Bilirubin NEGATIVE  NEG      Blood NEGATIVE  NEG      Urobilinogen 0.2 0.2 - 1.0 EU/dL    Nitrites NEGATIVE  NEG      Leukocyte Esterase LARGE (A) NEG      WBC 20-50 0 - 4 /hpf    RBC 0-5 0 - 5 /hpf    Epithelial cells FEW FEW /lpf    Bacteria 1+ (A) NEG /hpf    Hyaline cast 0-2 0 - 5 /lpf   CBC WITH AUTOMATED DIFF    Collection Time: 02/05/19  9:56 AM   Result Value Ref Range    WBC 3.6 3.6 - 11.0 K/uL    RBC 4.14 3.80 - 5.20 M/uL    HGB 11.8 11.5 - 16.0 g/dL    HCT 36.6 35.0 - 47.0 %    MCV 88.4 80.0 - 99.0 FL    MCH 28.5 26.0 - 34.0 PG    MCHC 32.2 30.0 - 36.5 g/dL    RDW 13.8 11.5 - 14.5 %    PLATELET 863 676 - 358 K/uL    MPV 10.1 8.9 - 12.9 FL    NRBC 0.0 0  WBC    ABSOLUTE NRBC 0.00 0.00 - 0.01 K/uL    NEUTROPHILS 52 32 - 75 %    LYMPHOCYTES 37 12 - 49 %    MONOCYTES 8 5 - 13 %    EOSINOPHILS 3 0 - 7 %    BASOPHILS 1 0 - 1 %    IMMATURE GRANULOCYTES 0 0.0 - 0.5 %    ABS. NEUTROPHILS 1.8 1.8 - 8.0 K/UL    ABS. LYMPHOCYTES 1.3 0.8 - 3.5 K/UL    ABS. MONOCYTES 0.3 0.0 - 1.0 K/UL    ABS. EOSINOPHILS 0.1 0.0 - 0.4 K/UL    ABS. BASOPHILS 0.0 0.0 - 0.1 K/UL    ABS. IMM.  GRANS. 0.0 0.00 - 0.04 K/UL    DF AUTOMATED     METABOLIC PANEL, COMPREHENSIVE    Collection Time: 02/05/19  9:56 AM   Result Value Ref Range    Sodium 138 136 - 145 mmol/L    Potassium 3.9 3.5 - 5.1 mmol/L    Chloride 102 97 - 108 mmol/L    CO2 31 21 - 32 mmol/L    Anion gap 5 5 - 15 mmol/L    Glucose 125 (H) 65 - 100 mg/dL    BUN 15 6 - 20 MG/DL    Creatinine 0.75 0.55 - 1.02 MG/DL    BUN/Creatinine ratio 20 12 - 20      GFR est AA >60 >60 ml/min/1.73m2    GFR est non-AA >60 >60 ml/min/1.73m2    Calcium 8.9 8.5 - 10.1 MG/DL    Bilirubin, total 0.3 0.2 - 1.0 MG/DL    ALT (SGPT) 22 12 - 78 U/L    AST (SGOT) 15 15 - 37 U/L    Alk. phosphatase 47 45 - 117 U/L    Protein, total 7.6 6.4 - 8.2 g/dL    Albumin 3.7 3.5 - 5.0 g/dL    Globulin 3.9 2.0 - 4.0 g/dL    A-G Ratio 0.9 (L) 1.1 - 2.2     LIPASE    Collection Time: 02/05/19  9:56 AM   Result Value Ref Range    Lipase 131 73 - 393 U/L       Radiologic Studies -   CT ABD PELV W CONT   Final Result   IMPRESSION:   1. No evidence of an acute intra-abdominal process. 2.   Sigmoid diverticulosis without evidence of diverticulitis. 3.  Cholelithiasis without evidence of cholecystitis. CT Results  (Last 48 hours)               02/05/19 1112  CT ABD PELV W CONT Final result    Impression:  IMPRESSION:   1. No evidence of an acute intra-abdominal process. 2.   Sigmoid diverticulosis without evidence of diverticulitis. 3.  Cholelithiasis without evidence of cholecystitis. Narrative:  EXAM: CT ABD PELV W CONT       INDICATION: Abdominal pain       COMPARISON: CT abdomen pelvis 6/19/2017        CONTRAST: 100 mL of Isovue-370. TECHNIQUE:    Following the uneventful intravenous administration of contrast, thin axial   images were obtained through the abdomen and pelvis. Coronal and sagittal   reconstructions were generated. Oral contrast was not administered. CT dose   reduction was achieved through use of a standardized protocol tailored for this   examination and automatic exposure control for dose modulation.        FINDINGS:    LUNG BASES: Clear.   INCIDENTALLY IMAGED HEART AND MEDIASTINUM: Unremarkable. LIVER: No mass or biliary dilatation. Focal fat along the falciform ligament. GALLBLADDER: Small gallstones. No CT findings cholecystitis. SPLEEN: No mass. Not enlarged. Unchanged small anterior splenule. PANCREAS: No mass or ductal dilatation. ADRENALS: Unremarkable. KIDNEYS: Exophytic right lower pole simple appearing renal cyst measuring 6.1 x   5.8 cm. No enhancing mass, calculus, or hydronephrosis. STOMACH: Unremarkable. SMALL BOWEL: No dilatation or wall thickening. COLON: No dilatation or wall thickening. Few scattered diverticula in the   sigmoid colon. No evidence of diverticulitis. APPENDIX: Surgically absent. PERITONEUM: No ascites or pneumoperitoneum. RETROPERITONEUM: No lymphadenopathy or aortic aneurysm. Moderate atherosclerotic   disease of the aorta and its branches. REPRODUCTIVE ORGANS: Uterus is surgically absent. No adnexal lesion. URINARY BLADDER: No mass or calculus. BONES: No destructive bone lesion. ADDITIONAL COMMENTS: N/A               Medical Decision Making   I am the first provider for this patient. I reviewed the vital signs, available nursing notes, past medical history, past surgical history, family history and social history. Vital Signs-Reviewed the patient's vital signs. Patient Vitals for the past 12 hrs:   Temp Pulse Resp BP SpO2   02/05/19 0914 97.7 °F (36.5 °C) 85 16 174/68 100 %     Records Reviewed: Nursing Notes    Provider Notes (Medical Decision Making):   UTI, pyelonephritis, kidney stone, diverticulitis,     ED Course:   Initial assessment performed. The patients presenting problems have been discussed, and they are in agreement with the care plan formulated and outlined with them. I have encouraged them to ask questions as they arise throughout their visit. 10:37 AM  The patient has declined medications for pain and nausea.           Critical Care Time: none    Disposition:  DISCHARGE NOTE:  12:37 PM  The pt is ready for discharge. The pt's signs, symptoms, diagnosis, and discharge instructions have been discussed and pt has conveyed their understanding. The pt is to follow up as recommended or return to ER should their symptoms worsen. Plan has been discussed and pt is in agreement. PLAN:  1. Current Discharge Medication List      START taking these medications    Details   trimethoprim-sulfamethoxazole (BACTRIM DS) 160-800 mg per tablet Take 1 Tab by mouth two (2) times a day for 7 days. Qty: 14 Tab, Refills: 0         CONTINUE these medications which have NOT CHANGED    Details   !! glucose blood VI test strips (FREESTYLE INSULINX TEST STRIPS) strip Freestyle [employer] Test Strips   USE TO TEST BLOOD SUGAR ONCE EVERY DAY AND AS NEEDED DX CODE E11.51      aspirin delayed-release 81 mg tablet TAKE 1 TABLET BY MOUTH EVERY DAY  Qty: 90 Tab, Refills: 1    Associated Diagnoses: Vitamin D deficiency      rosuvastatin (CRESTOR) 10 mg tablet TAKE 1 TABLET BY MOUTH EVERY DAY  Qty: 90 Tab, Refills: 1      hydroCHLOROthiazide (HYDRODIURIL) 25 mg tablet TAKE 1 TABLET BY MOUTH EVERY DAY  Qty: 90 Tab, Refills: 1    Associated Diagnoses: Diabetes mellitus without complication ([address] Intractable migraine with aura without status migrainosus; Essential hypertension; Encounter for immunization      metoprolol succinate (TOPROL-XL) 50 mg XL tablet TAKE 1 TABLET BY MOUTH EVERY DAY  Qty: 90 Tab, Refills: 1      !! FREESTYLE INSULINX TEST STRIPS strip USE TO TEST BLOOD SUGAR ONCE EVERY DAY AND AS NEEDED DX CODE E11.51  Qty: 100 Strip, Refills: 4    Comments: DX Code Needed  . 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 Qty: 180 Tab, Refills: 3      propafenone (RYTHMOL) 150 mg tablet Take 1 Tab by mouth every eight (8) hours. Qty: 270 Tab, Refills: 3      fluticasone (FLONASE) 50 mcg/actuation nasal spray 2 Sprays by Both Nostrils route as needed for Rhinitis or Allergies. Indications: Allergic Rhinitis  Qty: 1 Bottle, Refills: 11    Associated Diagnoses: Moderate persistent asthma without complication; Intractable migraine with aura without status migrainosus      calcium-cholecalciferol, D3, (CALTRATE 600+D) tablet Take 1 Tab by mouth two (2) times a day. Qty: 60 Tab, Refills: 11      MULTIVITAMIN PO Take 1 Tab by mouth daily. Lancets (FREESTYLE LANCETS) Misc Test once daily. (diagnosis code: 250.00)  Qty: 1 Package, Refills: 11      Blood-Glucose Meter monitoring kit Once daily before breakfast  Qty: 1 Kit, Refills: 0      !! glucose blood VI test strips (ACCU-CHEK DA) strip . Qty: 1 Package, Refills: 11      nystatin-emollient combo no. 54 100,000 unit/gram crea nystatin 100,000 unit/gram topical cream   APPLY TO AA BID X 10 DAYS OFF 1 WK REPEAT PRN      gabapentin (NEURONTIN) 300 mg capsule TAKE ONE CAPSULE BY MOUTH AT BEDTIME  Refills: 1      nystatin-triamcinolone (MYCOLOG II) topical cream nystatin-triamcinolone 100,000 unit/g-0.1 % topical cream   APPLY TO THE AFFECTED AREA(S) BY TOPICAL ROUTE 2 TIMES PER DAY IN THEMORNING AND EVENING      albuterol (PROVENTIL HFA, VENTOLIN HFA, PROAIR HFA) 90 mcg/actuation inhaler Take 2 Puffs by inhalation every four (4) hours as needed for Wheezing. Qty: 1 Inhaler, Refills: 1      meclizine (ANTIVERT) 12.5 mg tablet Take 1 Tab by mouth three (3) times daily as needed.   Qty: 30 Tab, Refills: 0      cetirizine (ZYRTEC) 10 mg tablet TAKE 1 TABLET BY MOUTH EVERY DAY  Qty: 90 Tab, Refills: 1    Associated Diagnoses: Intractable migraine with aura without status migrainosus       ! ! - Potential duplicate medications found. Please discuss with provider. 2.   Follow-up Information     Follow up With Specialties Details Why Contact Info    Morgan Sung MD Internal Medicine In 1 week  [address] [place] [address]   [address]  [phone]          Return to ED if worse     Diagnosis     Clinical Impression:   1. Acute cystitis without hematuria    2. Gall stones    3. Diverticulosis of large intestine without hemorrhage        This note will not be viewable in MyChart.

## 2019-02-06 ENCOUNTER — TELEPHONE (OUTPATIENT)
Dept: INTERNAL MEDICINE CLINIC | Age: 79
End: 2019-02-06

## 2019-02-06 LAB
BACTERIA SPEC CULT: NORMAL
CC UR VC: NORMAL
SERVICE CMNT-IMP: NORMAL

## 2019-02-06 NOTE — TELEPHONE ENCOUNTER
Pranav Lopez (Self) [phone] (H)     Pt says that she went to [facility] yesterday and they told her that she had gall stones.   She wants to know if dr Arian Kim can recommend someone for her or should she call her gastro

## 2019-02-07 NOTE — TELEPHONE ENCOUNTER
Spoke with patient recently in ER for Gallstones and diverticulitis. She wanted to know if she should follow up with GI. Confirmed with patient to follow up with GI. Pt verbalized understanding.

## 2019-02-21 ENCOUNTER — OFFICE VISIT (OUTPATIENT)
Dept: URGENT CARE | Age: 79
End: 2019-02-21

## 2019-02-21 ENCOUNTER — HOSPITAL ENCOUNTER (OUTPATIENT)
Dept: ULTRASOUND IMAGING | Age: 79
Discharge: HOME | End: 2019-02-21
Attending: NURSE PRACTITIONER
Payer: MEDICARE

## 2019-02-21 VITALS
RESPIRATION RATE: 20 BRPM | WEIGHT: 205 LBS | HEIGHT: 62 IN | TEMPERATURE: 98.4 F | SYSTOLIC BLOOD PRESSURE: 143 MMHG | HEART RATE: 78 BPM | BODY MASS INDEX: 37.73 KG/M2 | OXYGEN SATURATION: 96 % | DIASTOLIC BLOOD PRESSURE: 63 MMHG

## 2019-02-21 DIAGNOSIS — M79.604 RIGHT LEG PAIN: Primary | ICD-10-CM

## 2019-02-21 DIAGNOSIS — M79.604 RIGHT LEG PAIN: ICD-10-CM

## 2019-02-21 PROCEDURE — 93971 EXTREMITY STUDY: CPT

## 2019-02-21 NOTE — PROGRESS NOTES
69 yo female presents to  with cc of sudden onset of discoloration and pinpointable pain of RLE on her calf. She is chronically on apixiban for a-fib. Denies extended periods of inactivity like flights, or long drives. The onset was rapid. She reports a pinching or itching sensation located at the area that she demonstrates. Skin Problem   This is a new problem. The current episode started 3 to 5 hours ago. The problem has not changed since onset. Pertinent negatives include no chest pain, no abdominal pain, no headaches and no shortness of breath. Nothing (touching) aggravates the symptoms.         Past Medical History:   Diagnosis Date    Abdominal mass, left upper quadrant 6/13/2017    Abnormal finding on MRI of brain 3/16/2015    evaluated by neurologist and no findings    Acute pharyngitis 5/26/2016    Anemia NEC     Arthralgia of right hip 4/25/2016    Arthritis 2/18/2010    Asthma 2/18/2010    Cataract 9/24/2014    Diabetes ([address]     Elevated LFTs 4/16/2014    DAVID (generalized anxiety disorder) 1/22/2015    GERD (gastroesophageal reflux disease) 4/7/2014    Hammer toe of right foot 9/29/2014    Headache(784.0) 2/42/5013    Helicobacter pylori (H. 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 ([address] 9/29/2014    Preop examination 9/24/2014    Right foot injury 5/2/2017    Risk for falls 4/16/2014    Screening for breast cancer 9/23/2014    Screening for depression 4/16/2014    Shoulder pain, left 3/18/2014    Spondylitis, cervical 4/5/2010    Tinea pedis 6/3/2015    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 straightened colon out    HX ROTATOR CUFF REPAIR  2009    left    OPEN REPAIR CLAVICLE FRACTURE  2009         Family History   Problem Relation Age of Onset    Cancer Mother         mycosis fungoives    Stroke Father     Cancer Sister         one sister with breast cancer    Breast Cancer Sister     Cancer Paternal Aunt         2 paternal aunts with breast cancer    Breast Cancer Paternal Aunt     Breast Cancer Maternal Aunt     Thyroid Cancer Neg Hx         Social History     Socioeconomic History    Marital status:      Spouse name: Not on file    Number of children: Not on file    Years of education: Not on file    Highest education level: Not on file   Social Needs    Financial resource strain: Not on file    Food insecurity - worry: Not on file    Food insecurity - inability: Not on file   [employer] needs - medical: Not on file   [employer] needs - non-medical: Not on file   Occupational History    Not on file   Tobacco Use    Smoking status: Former Smoker     Last attempt to quit: 1988     Years since quittin.7    Smokeless tobacco: Never Used   Substance and Sexual Activity    Alcohol use: No    Drug use: No    Sexual activity: No   Other Topics Concern    Not on file   Social History Narrative    Not on file                ALLERGIES: Latex; Amoxil [amoxicillin]; Cephalosporins; Levaquin [levofloxacin]; Percocet [oxycodone-acetaminophen]; Tetanus vaccines and toxoid; and Tetracycline hcl    Review of Systems   Constitutional: Negative. HENT: Negative. Respiratory: Negative for shortness of breath. Cardiovascular: Negative for chest pain. Gastrointestinal: Negative for abdominal pain. Musculoskeletal: Negative. Skin: Positive for color change. Lower calf   Neurological: Negative for headaches. Hematological:        +apixiban for afib       Vitals:    02/21/19 1013   BP: 143/63   Pulse: 78   Resp: 20   Temp: 98.4 °F (36.9 °C)   SpO2: 96%   Weight: 205 lb (93 kg)   Height: 5' 2\" (1.575 m)       Physical Exam   Constitutional: She is oriented to person, place, and time. She appears well-developed and well-nourished. No distress. HENT:   Head: Normocephalic and atraumatic. Cardiovascular: Normal rate and regular rhythm. Musculoskeletal: Normal range of motion. Right lower leg: She exhibits tenderness and swelling. She exhibits no edema. Left lower leg: Normal.        Legs:  Ambulates to exam bed without difficulty   Neurological: She is alert and oriented to person, place, and time. Skin: Skin is warm and dry. No bruising, no ecchymosis and no rash noted. She is not diaphoretic. No erythema. No pallor. Vitals reviewed. MDM     Differential Diagnosis; Clinical Impression; Plan: Moderate suspicion for DVT in obese woman with hx of afib. No evidence of cellulitis or bite. Low suspicion for contact dermatitis. Does not appear to be infectious in nature. Reviewed with patient that doppler of LE has been ordered to follow. Encouraged patient to attend appointment made. Patient will be called with the results of this appointment. IF negative for DVT- follow up with PCP for further evaluation of this complaint. No orders of the defined types were placed in this encounter. The patients condition was discussed with the patient and they understand. The patient is to follow up with PCP. If signs and symptoms become worse the pt is to go to the ER. The patient is to take medications as prescribed.          Procedures

## 2019-02-27 ENCOUNTER — PATIENT MESSAGE (OUTPATIENT)
Dept: ENDOCRINOLOGY | Age: 79
End: 2019-02-27

## 2019-02-28 NOTE — TELEPHONE ENCOUNTER
From: Lindsay Montero  To: Cherylene Abler, MD  Sent: 2/27/2019 4:55 PM EST  Subject: Prescription Question    [place] Dr. Wanetta Gowers. My daughter gave an extra glucose monitor that she never   used. Can you call in a prescription for strips for the meter   listed below? Monitor- Contour Next ONE  Strips - Contour Next    By the way, my old monitor is not working anymore and I  need to check my levels. My pharmacy is [facility] located  on [facility]. You may call me at [phone] if needed. Thank you.

## 2019-03-01 ENCOUNTER — TELEPHONE (OUTPATIENT)
Dept: INTERNAL MEDICINE CLINIC | Age: 79
End: 2019-03-01

## 2019-03-01 NOTE — TELEPHONE ENCOUNTER
[id number] [facility] - CAT scan done and showed several issues. Pt is still in pain and is now having diarrhea.   Advise - [phone]

## 2019-03-05 ENCOUNTER — OFFICE VISIT (OUTPATIENT)
Dept: INTERNAL MEDICINE CLINIC | Age: 79
End: 2019-03-05

## 2019-03-05 ENCOUNTER — OFFICE VISIT (OUTPATIENT)
Dept: ENDOCRINOLOGY | Age: 79
End: 2019-03-05

## 2019-03-05 ENCOUNTER — HOSPITAL ENCOUNTER (OUTPATIENT)
Dept: LAB | Age: 79
Discharge: HOME | End: 2019-03-05
Payer: MEDICARE

## 2019-03-05 ENCOUNTER — HOSPITAL ENCOUNTER (OUTPATIENT)
Dept: GENERAL RADIOLOGY | Age: 79
Discharge: HOME | End: 2019-03-05
Payer: MEDICARE

## 2019-03-05 ENCOUNTER — TELEPHONE (OUTPATIENT)
Dept: INTERNAL MEDICINE CLINIC | Age: 79
End: 2019-03-05

## 2019-03-05 VITALS
WEIGHT: 207.8 LBS | HEIGHT: 62 IN | SYSTOLIC BLOOD PRESSURE: 110 MMHG | TEMPERATURE: 97.7 F | RESPIRATION RATE: 19 BRPM | BODY MASS INDEX: 38.24 KG/M2 | HEART RATE: 69 BPM | DIASTOLIC BLOOD PRESSURE: 71 MMHG | OXYGEN SATURATION: 97 %

## 2019-03-05 VITALS
WEIGHT: 209 LBS | HEART RATE: 72 BPM | HEIGHT: 62 IN | DIASTOLIC BLOOD PRESSURE: 76 MMHG | BODY MASS INDEX: 38.46 KG/M2 | RESPIRATION RATE: 16 BRPM | OXYGEN SATURATION: 99 % | SYSTOLIC BLOOD PRESSURE: 125 MMHG

## 2019-03-05 DIAGNOSIS — R79.89 ELEVATED TSH: ICD-10-CM

## 2019-03-05 DIAGNOSIS — R10.9 LEFT FLANK PAIN: Primary | ICD-10-CM

## 2019-03-05 DIAGNOSIS — R10.9 LEFT FLANK PAIN: ICD-10-CM

## 2019-03-05 DIAGNOSIS — E11.9 DIABETES MELLITUS WITHOUT COMPLICATION (HCC): ICD-10-CM

## 2019-03-05 DIAGNOSIS — E04.2 MULTIPLE THYROID NODULES: Primary | ICD-10-CM

## 2019-03-05 LAB
BILIRUB UR QL STRIP: NEGATIVE
GLUCOSE UR-MCNC: NEGATIVE MG/DL
KETONES P FAST UR STRIP-MCNC: NEGATIVE MG/DL
PH UR STRIP: 6.5 [PH] (ref 4.6–8)
PROT UR QL STRIP: NEGATIVE
SP GR UR STRIP: 1.02 (ref 1–1.03)
UA UROBILINOGEN AMB POC: NORMAL (ref 0.2–1)
URINALYSIS CLARITY POC: NORMAL
URINALYSIS COLOR POC: YELLOW
URINE BLOOD POC: NEGATIVE
URINE LEUKOCYTES POC: NORMAL
URINE NITRITES POC: NEGATIVE

## 2019-03-05 PROCEDURE — 87086 URINE CULTURE/COLONY COUNT: CPT

## 2019-03-05 PROCEDURE — 74018 RADEX ABDOMEN 1 VIEW: CPT

## 2019-03-05 NOTE — PROGRESS NOTES
Please call the patient to let her know that XR shows no evidence of any ureter stone. At this point I would suggest she follow up with a urologist to be evaluated for any underlying pathology. Please refer her to Dr. Justin Viramontes at [address] [place] [address] urology. Her daughter knows him as a patient.

## 2019-03-05 NOTE — TELEPHONE ENCOUNTER
Informed patient per provider result note. Provided contact info to contact Dr Yehuda Mccann, urologist. Pt verbalized understanding.

## 2019-03-05 NOTE — TELEPHONE ENCOUNTER
----- Message from Brian Rizzo MD sent at 3/5/2019  1:19 PM EST -----  Please call the patient to let her know that XR shows no evidence of any ureter stone. At this point I would suggest she follow up with a urologist to be evaluated for any underlying pathology. Please refer her to Dr. Jaiden Doll at [place] [facility]. Her daughter knows him as a patient.

## 2019-03-05 NOTE — PROGRESS NOTES
Follow Up Visit    Latisha Szymanski is a 70 y.o. female. she presents for Pelvic Pain and ED Follow-up    The patient has had pelvic pain on the left side for the past 2 months. She denies diarrhea or constipation. She has not had nausea or vomiting associated with this. She gone to the ED recently and been evaluated with imaging however there was no evidence of acute illness noted. Of note, the patient did have surgery on her colon in the past.  She also has had a fibroid that caused a volvulus. This was corrected with surgery. Patient Active Problem List   Diagnosis Code    Hyperlipemia E78.5    Arthritis M19.90    HTN (hypertension) I10    Headache(784.0) R51    Spondylitis, cervical M47.812    Tingling sensation R20.2    Chest pain R07.9    Anemia D64.9    Cyst of right kidney N28.1    Hip pain, right M25.551    Acute bronchitis J20.9    Visual floaters H43.399    Colon cancer screening Z12.11    Postmenopausal state Z78.0    Vitamin D deficiency E55.9    Bilateral hip pain M25.551, M25.552    Postmenopausal disorder N95.1    Numbness and tingling of right leg R20.0, R20.2    Foot pain, left M79.672    Rotator cuff (capsule) sprain and strain [id number]    Osteoarthritis of acromioclavicular joint M19.019    Shoulder pain, left M25.512    GERD (gastroesophageal reflux disease) K21.9    Elevated LFTs R94.5    Screening for depression Z13.31    Risk for falls Z91.81    Acute asthma exacerbation J45. 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 pylori) infection A04.8    Intractable migraine with aura without status migrainosus G43.119    Chronic asthma with status asthmaticus J45.902    Right foot injury S99.921A    Abdominal mass, left upper quadrant R19.02    Left upper quadrant pain R10.12    Paroxysmal atrial fibrillation (HCC) I48.0    Severe obesity (BMI 35.0-39. 9) E66.01         Prior to Admission medications    Medication Sig Start Date End Date Taking? Authorizing Provider   glucose blood VI test strips (CONTOUR NEXT TEST STRIPS) strip Check blood sugar once a day 3/4/19  Yes Lilo Smith MD   nystatin-emollient combo no. 54 100,000 unit/gram crea nystatin 100,000 unit/gram topical cream   APPLY TO AA BID X 10 DAYS OFF 1 WK REPEAT PRN   Yes Provider, Historical   gabapentin (NEURONTIN) 300 mg capsule TAKE ONE CAPSULE BY MOUTH AT BEDTIME 12/27/18  Yes Provider, Historical   nystatin-triamcinolone (MYCOLOG II) topical cream nystatin-triamcinolone 100,000 unit/g-0.1 % topical cream   APPLY TO THE AFFECTED AREA(S) BY TOPICAL ROUTE 2 TIMES PER DAY IN [facility] AND EVENING   Yes Provider, Historical   aspirin delayed-release 81 mg tablet TAKE 1 TABLET BY MOUTH EVERY DAY 11/28/18  Yes Shirley Mack MD   rosuvastatin (CRESTOR) 10 mg tablet TAKE 1 TABLET BY MOUTH EVERY DAY 11/23/18  Yes Shirley Mack MD   hydroCHLOROthiazide (HYDRODIURIL) 25 mg tablet TAKE 1 TABLET BY MOUTH EVERY DAY 10/8/18  Yes Jorge Alberto Gomez MD   metoprolol succinate (TOPROL-XL) 50 mg XL tablet TAKE 1 TABLET BY MOUTH EVERY DAY 9/5/18  Yes Shirley Mack MD   albuterol (PROVENTIL HFA, VENTOLIN HFA, PROAIR HFA) 90 mcg/actuation inhaler Take 2 Puffs by inhalation every four (4) hours as needed for Wheezing. 8/23/18  Yes Frank Darnell MD   metFORMIN (GLUCOPHAGE) 500 mg tablet TAKE 1 TABLET BY MOUTH ONCE A DAY WITH MEALS 8/4/18  Yes Tara Morales MD   meclizine (ANTIVERT) 12.5 mg tablet Take 1 Tab by mouth three (3) times daily as needed. 6/5/18  Yes Darvin Hoyt MD   apixaban (ELIQUIS) 5 mg tablet Take 1 Tab by mouth two (2) times a day. 6/1/18  Yes John Gutierrez MD   propafenone (RYTHMOL) 150 mg tablet Take 1 Tab by mouth every eight (8) hours. 5/3/18  Yes Gbay Fields MD   cetirizine (ZYRTEC) 10 mg tablet TAKE 1 TABLET BY MOUTH EVERY DAY  Patient taking differently: Take 10 mg by mouth daily as needed. TAKE 1 TABLET BY MOUTH EVERY DAY 3/7/18  Yes Tara Morales MD   fluticasone [facility]) 50 mcg/actuation nasal spray 2 Sprays by Both Nostrils route as needed for Rhinitis or Allergies. Indications: Allergic Rhinitis 11/6/17  Yes Aleksey Rothman MD   calcium-cholecalciferol, D3, (CALTRATE 600+D) tablet Take 1 Tab by mouth two (2) times a day. Patient taking differently: Take 1 Tab by mouth daily. 4/25/16  Yes Aleksey Rothman MD   MULTIVITAMIN PO Take 1 Tab by mouth daily. Yes Provider, Historical   Lancets (FREESTYLE LANCETS) Misc Test once daily.  (diagnosis code: 250.00) 10/24/13  Yes Aleksey Rothman MD   Blood-Glucose Meter monitoring kit Once daily before breakfast 9/20/13  Yes Aleksey Rothman MD   pantoprazole (PROTONIX) 40 mg tablet TAKE 1 TABLET BY MOUTH EVERY DAY    Provider, Historical         Health Maintenance   Topic Date Due    Shingrix Vaccine Age 49> (1 of 2) 12/26/1992    Influenza Age 5 to Adult  08/01/2018    FOOT EXAM Q1  03/06/2019    HEMOGLOBIN A1C Q6M  03/25/2019    GLAUCOMA SCREENING Q2Y  07/17/2019    EYE EXAM RETINAL OR DILATED  07/17/2019    MICROALBUMIN Q1  09/25/2019    LIPID PANEL Q1  09/25/2019    MEDICARE YEARLY EXAM  09/26/2019    COLONOSCOPY  06/20/2021    DTaP/Tdap/Td series (2 - Td) 05/26/2026    Bone Densitometry (Dexa) Screening  Completed    Pneumococcal 65+ Low/Medium Risk  Completed       Review of Systems   Constitutional: Negative. Eyes: Negative. Respiratory: Negative. Cardiovascular: Negative. Results for orders placed or performed in visit on 03/05/19   AMB POC URINALYSIS DIP STICK MANUAL W/O MICRO     Status: None   Result Value Ref Range Status    Color (UA POC) Yellow  Final    Clarity (UA POC) Slightly Cloudy  Final    Glucose (UA POC) Negative Negative Final    Bilirubin (UA POC) Negative Negative Final    Ketones (UA POC) Negative Negative Final    Specific gravity (UA POC) 1.020 1.001 - 1.035 Final    Blood (UA POC) Negative Negative Final    pH (UA POC) 6.5 4.6 - 8.0 Final    Protein (UA POC) Negative Negative Final    Urobilinogen (UA POC) 0.2 mg/dL 0.2 - 1 Final    Nitrites (UA POC) Negative Negative Final    Leukocyte esterase (UA POC) Trace Negative Final   Results for orders placed or performed in visit on 01/28/19   AMB POC URINALYSIS DIP STICK AUTO W/O MICRO     Status: None   Result Value Ref Range Status    Color (UA POC)       Clarity (UA POC)       Glucose (UA POC) Negative Negative Final    Bilirubin (UA POC) Negative Negative Final    Ketones (UA POC) Negative Negative Final    Specific gravity (UA POC) 1.020 1.001 - 1.035 Final    Blood (UA POC) Trace Negative Final    pH (UA POC) 6.0 4.6 - 8.0 Final    Protein (UA POC) Negative Negative Final    Urobilinogen (UA POC) 0.2 mg/dL 0.2 - 1 Final    Nitrites (UA POC) Negative Negative Final    Leukocyte esterase (UA POC) Trace Negative Final       Visit Vitals  /71 (BP 1 Location: Right arm, BP Patient Position: Sitting)   Pulse 69   Temp 97.7 °F (36.5 °C) (Oral)   Resp 19   Ht 5' 2\" (1.575 m)   Wt 207 lb 12.8 oz (94.3 kg)   LMP 02/18/1983   SpO2 97%   BMI 38.01 kg/m²       Physical Exam   Constitutional: She appears well-nourished. Cardiovascular: Normal rate and regular rhythm.    Pulmonary/Chest: Effort normal and breath sounds normal.   Abdominal: Soft. There is tenderness (Present at the lower left abdomen). ASSESSMENT/PLAN    Diagnoses and all orders for this visit:    1. Left flank pain - Urinary stone vs colon issue? Recent diarrhea. We will obtain an x-ray of her abdomen.  -     AMB POC URINALYSIS DIP STICK MANUAL W/O MICRO  -     XR ABD (KUB);  Future

## 2019-03-05 NOTE — PROGRESS NOTES
Endocrinology Visit    CC: thyroid nodule    History of present illness:  Jaylan Larson is an 70 y.o. female with history of type 2 DM, asthma, and HTN who returns for thyroid nodules. I saw her in initial consultation in July 2016 at which time I recommended FNA of the dominant nodule on the left side. Pathology returned benign. She has had two annual follow-up ultrasounds which showed stability of the nodules' size. Today she denies denies dysphagia, neck soreness, supine compression, or voice hoarseness. She does report occasional hoarseness when she talks for a long duration of time (saw ENT and visual inspection of the vocal cords was normal). She denies racing heart, tremors, palpitations, heat or cold intolerance, weight loss, weight gain, fatigue, constipation, diarrhea, and excessive sweating. She does report occasional 'skipped beats' for which she has seen cardiology. She takes an 81mg aspirin daily but is not on any other anticoagulants. TSH values have always been normal with the exception of one slightly elevated value January 2017 (see labs below). TSH on 3 occasions since then has been normal. She is not taking any thyroid-specific medication. She also has a history of type 2 diabetes. Last A1c was 7.1% in Sept and she is only taking metformin 500mg once a day with breakfast, although I advised she take it BID. She says her sugar gets low in the 80s when she takes more, and she has symptoms of low blood sugars. Fasting blood sugars are usually around 120-130. She tries to watch her diet and avoids excessive carbohydrates. Denies GI s/e from metformin. Review of Systems - see hpi for pertinent positives and negatives, otherwise a 7 system review is negative.     Problem list:  Patient Active Problem List   Diagnosis Code    Hyperlipemia E78.5    Arthritis M19.90    HTN (hypertension) I10    Headache(784.0) R51    Spondylitis, cervical M47.812    Tingling sensation R20.2    Chest pain R07.9    Anemia D64.9    Cyst of right kidney N28.1    Hip pain, right M25.551    Acute bronchitis J20.9    Visual floaters H43.399    Colon cancer screening Z12.11    Postmenopausal state Z78.0    Vitamin D deficiency E55.9    Bilateral hip pain M25.551, M25.552    Postmenopausal disorder N95.1    Numbness and tingling of right leg R20.0, R20.2    Foot pain, left M79.672    Rotator cuff (capsule) sprain and strain [id number]    Osteoarthritis of acromioclavicular joint M19.019    Shoulder pain, left M25.512    GERD (gastroesophageal reflux disease) K21.9    Elevated LFTs R94.5    Screening for depression Z13.31    Risk for falls Z91.81    Acute asthma exacerbation J45. [address]    Need for varicella vaccine Z23    Screening for breast cancer Z12.31    Cataract H26.9    Preop examination Z01.818    Peripheral autonomic neuropathy due to DM (HCC) E11.43    Pre-ulcerative calluses L84    Type 2 diabetes mellitus with pressure callus (HCC) E11.628, L84    Hammer toe of right foot M20.41    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 (HCC) E11.69, M21.969    DAVID (generalized anxiety disorder) F41.1    Abnormal finding on MRI of brain R90.89    Tinea pedis B35.3    Arthralgia of right hip M25.551    Swollen gland R59.9    Acute pharyngitis J02.9    Diabetes mellitus type 2, controlled (HCC) E11.9    Multiple thyroid nodules E04.2    Skipped heart beats S22.9    Helicobacter pylori (H. pylori) infection A04.8    Intractable migraine with aura without status migrainosus G43.119    Chronic asthma with status asthmaticus J45.902    Right foot injury S99.921A    Abdominal mass, left upper quadrant R19.02    Left upper quadrant pain R10.12    Paroxysmal atrial fibrillation (HCC) I48.0    Severe obesity (BMI 35.0-39. 9) E66.01       Medical history:  Past Medical History:   Diagnosis Date    Abdominal mass, left upper quadrant 6/13/2017    Abnormal finding on MRI of brain 3/16/2015    evaluated by neurologist and no findings    Acute pharyngitis 5/26/2016    Anemia NEC     Arthralgia of right hip 4/25/2016    Arthritis 2/18/2010    Asthma 2/18/2010    Cataract 9/24/2014    Diabetes ([address]     Elevated LFTs 4/16/2014    DAVID (generalized anxiety disorder) 1/22/2015    GERD (gastroesophageal reflux disease) 4/7/2014    Hammer toe of right foot 9/29/2014    Headache(784.0) 9/53/2636    Helicobacter pylori (H. 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 ([address] 9/29/2014    Preop examination 9/24/2014    Right foot injury 5/2/2017    Risk for falls 4/16/2014    Screening for breast cancer 9/23/2014    Screening for depression 4/16/2014    Shoulder pain, left 3/18/2014    Spondylitis, cervical 4/5/2010    Tinea pedis 6/3/2015    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 ([address] 9/29/2014       Past surgical history:  Past Surgical History:   Procedure Laterality Date    ABDOMEN SURGERY PROC UNLISTED      inguinal hernia    ABDOMEN SURGERY PROC UNLISTED      ENDOSCOPY, COLON, DIAGNOSTIC      HX APPENDECTOMY      HX GYN  1983    total hysterectomy for uterine fibroid    HX HEENT      tonsillectomy    HX ORTHOPAEDIC      clavicle repair    HX OTHER SURGICAL      ? straightened colon out    HX ROTATOR CUFF REPAIR  2009    left    OPEN REPAIR CLAVICLE FRACTURE  2009       Medications:    Current Outpatient Medications:     glucose blood VI test strips (CONTOUR NEXT TEST STRIPS) strip, Check blood sugar once a day, Disp: 50 Strip, Rfl: 5    aspirin delayed-release 81 mg tablet, TAKE 1 TABLET BY MOUTH EVERY DAY, Disp: 90 Tab, Rfl: 1   rosuvastatin (CRESTOR) 10 mg tablet, TAKE 1 TABLET BY MOUTH EVERY DAY, Disp: 90 Tab, Rfl: 1    hydroCHLOROthiazide (HYDRODIURIL) 25 mg tablet, TAKE 1 TABLET BY MOUTH EVERY DAY, Disp: 90 Tab, Rfl: 1    metoprolol succinate (TOPROL-XL) 50 mg XL tablet, TAKE 1 TABLET BY MOUTH EVERY DAY, Disp: 90 Tab, Rfl: 1    albuterol (PROVENTIL HFA, VENTOLIN HFA, PROAIR HFA) 90 mcg/actuation inhaler, Take 2 Puffs by inhalation every four (4) hours as needed for Wheezing., Disp: 1 Inhaler, Rfl: 1    metFORMIN (GLUCOPHAGE) 500 mg tablet, TAKE 1 TABLET BY MOUTH ONCE A DAY WITH MEALS, Disp: 90 Tab, Rfl: 1    pantoprazole (PROTONIX) 40 mg tablet, TAKE 1 TABLET BY MOUTH EVERY DAY, Disp: , Rfl:     meclizine (ANTIVERT) 12.5 mg tablet, Take 1 Tab by mouth three (3) times daily as needed. , Disp: 30 Tab, Rfl: 0    apixaban (ELIQUIS) 5 mg tablet, Take 1 Tab by mouth two (2) times a day., Disp: 180 Tab, Rfl: 3    propafenone (RYTHMOL) 150 mg tablet, Take 1 Tab by mouth every eight (8) hours. , Disp: 270 Tab, Rfl: 3    cetirizine (ZYRTEC) 10 mg tablet, TAKE 1 TABLET BY MOUTH EVERY DAY (Patient taking differently: Take 10 mg by mouth daily as needed. TAKE 1 TABLET BY MOUTH EVERY DAY), Disp: 90 Tab, Rfl: 1    fluticasone (FLONASE) 50 mcg/actuation nasal spray, 2 Sprays by Both Nostrils route as needed for Rhinitis or Allergies. Indications: Allergic Rhinitis, Disp: 1 Bottle, Rfl: 11    calcium-cholecalciferol, D3, (CALTRATE 600+D) tablet, Take 1 Tab by mouth two (2) times a day. (Patient taking differently: Take 1 Tab by mouth daily.), Disp: 60 Tab, Rfl: 11    MULTIVITAMIN PO, Take 1 Tab by mouth daily. , Disp: , Rfl:     Lancets (FREESTYLE LANCETS) Misc, Test once daily. (diagnosis code: 250.00), Disp: 1 Package, Rfl: 11    Blood-Glucose Meter monitoring kit, Once daily before breakfast, Disp: 1 Kit, Rfl: 0    nystatin-emollient combo no. 54 100,000 unit/gram crea, nystatin 100,000 unit/gram topical cream  APPLY TO AA BID X 10 DAYS OFF 1 WK REPEAT PRN, Disp: , Rfl:     gabapentin (NEURONTIN) 300 mg capsule, TAKE ONE CAPSULE BY MOUTH AT BEDTIME, Disp: , Rfl: 1    nystatin-triamcinolone (MYCOLOG II) topical cream, nystatin-triamcinolone 100,000 unit/g-0.1 % topical cream  APPLY TO THE AFFECTED AREA(S) BY TOPICAL ROUTE 2 TIMES PER DAY IN [facility] AND EVENING, Disp: , Rfl:     Allergies:   Allergies   Allergen Reactions    Latex Rash    Amoxil [Amoxicillin] Itching     rash    Cephalosporins Unknown (comments)    Levaquin [Levofloxacin] Other (comments)     Dizziness      Percocet [Oxycodone-Acetaminophen] Nausea and Vomiting    Tetanus Vaccines And Toxoid Swelling    Tetracycline Hcl Rash       Social History:  Social History     Socioeconomic History    Marital status:      Spouse name: Not on file    Number of children: Not on file    Years of education: Not on file    Highest education level: Not on file   Social Needs    Financial resource strain: Not on file    Food insecurity - worry: Not on file    Food insecurity - inability: Not on file   [place]ong needs - medical: Not on file   [place]ong needs - non-medical: Not on file   Occupational History    Not on file   Tobacco Use    Smoking status: Former Smoker     Last attempt to quit: 1988     Years since quittin.7    Smokeless tobacco: Never Used   Substance and Sexual Activity    Alcohol use: No    Drug use: No    Sexual activity: No   Other Topics Concern    Not on file   Social History Narrative    Not on file       Family History:  Family History   Problem Relation Age of Onset    Cancer Mother         mycosis fungoives    Stroke Father     Cancer Sister         one sister with breast cancer    Breast Cancer Sister     Cancer Paternal Aunt         2 paternal aunts with breast cancer    Breast Cancer Paternal Aunt     Breast Cancer Maternal Aunt     Thyroid Cancer Neg Hx      Physical Exam:  Visit Vitals  /76   Pulse 72 Resp 16   Ht 5' 2\" (1.575 m)   Wt 209 lb (94.8 kg)   LMP 02/18/1983   SpO2 99%   BMI 38.23 kg/m²     Gen: NAD, pleasant  Heent: mucous membranes moist  Thyroid: moves well with swallowing, larger L>R with a 3cm palpable, mobile nodule on left; normal and soft texture  Heme/lymph: no cervical, supraclavicular or submandibular lymphadenopathy is appreciated. Pulmonary: clear to auscultation bilaterally, no wheezes/rhonchi or rales  Cardiovascular: regular rate and rhythm, no murmurs, rubs or gallops, good distal pulses  Extremities: tr nonpitting edema BL. No onocholysis. Neuro: no tremor of the outstretch hands, reflexes are normal with normal relaxation phase. Normal gait, normal concentration  Skin: normal texture, warm and dry  Psyche: normal affect with good insight into her medical conditions    Pertinent lab review:  Lab Results   Component Value Date/Time    TSH 1.78 10/14/2017 10:53 AM    Triiodothyronine (T3), free 3.1 03/01/2017 11:20 AM    T4, Free 1.15 03/01/2017 11:20 AM      Lab Results   Component Value Date/Time    Hemoglobin A1c 7.1 (H) 09/25/2018 09:33 AM    Hemoglobin A1c 6.8 (H) 12/04/2017 12:00 AM    Hemoglobin A1c 7.0 (H) 01/12/2017 04:20 AM      Thyroid Ultrasound May 2016  REPORT:  EXAM:  US THYROID PARATHYROID     INDICATION:   left sided swollen glands and thyromegaly     COMPARISON: None.     FINDINGS: Real-time sonography of the thyroid gland was performed with a    high frequency linear transducer. Multiple static images were obtained.     The thyroid is heterogeneous in echotexture. In the upper pole of the right    thyroid, there are multiple anechoic smooth subcentimeter structures. In the    lower pole of the right thyroid, there is a discrete 1.4 x 1.4 x 1.2 cm    isoechoic macrolobulated nodule with multiple internal microcystic changes.    There is no increased flow to this structure. The isthmus is slightly    prominent but does not contain a discrete mass.  The left thyroid is    comprised largely of 3 predominantly solid, but minimally cystic nodules.    These individually measure up to 3.2 cm; however, formal measurements were    not made. There is diffusely increased flow in the left thyroid lobe as    compared to the right.     The right lobe measures 5.0 x 1.9 x 1.7 cm and the left lobe measures 5.5 x    2.6 x 2.5 cm. The isthmus measures 4 mm.       IMPRESSION:     Multinodular goiter. Three large left thyroid nodules with increased flow.    Please correlate with clinical parameters. Thyroid US Feb 2018  FINDINGS:  The right thyroid contains a 7 x 8 x 8 mm stable upper pole cyst as well as a  2.2 x 1.5 x 1.5 cm lower pole solid nodule (previously 2.8 x 1.5 x 1.5 cm).     The left thyroid contains 3 solid nodules: 3.2 x 3.1 x 2.0 cm (previously 3.3 x  2.9 x 1.9 cm), 2.2 x 1.6 x 1.6 cm (previously 2.4 x 2.1 x 1.5 cm), and 1.9 x 1.8  x 1.4 cm (previously 2.2 x 2.2 x 1.5 cm).     The isthmus contains a single swallow nodule that measures 1.3 x 1.3 x 0.7 cm  (previously 1.2 x 1.2 x 0.8 cm).     None of the nodules contain calcifications or altered vascularity.     The right lobe measures 5.8 x 2.2 x 1.6 cm (previously 5.8 x 2.1 x 1.9 cm) and  the left lobe measures 5.7 x 2.9 x 2.7 cm (previously 5.7 x 3.0 x 2.8 cm). The  isthmus measures 5 mm (previously 4 mm).     IMPRESSION: Stable multinodular goiter. Assessment and Plan:  Patient is a pleasant 70 y.o. female here for f/u multiple thyroid nodules and diabetes. 1. Multiple thyroid nodules: s/p FNA of large left sided nodule with benign results. Two successive f/u ultrasounds have demonstrated stability of nodule size. She is not experiencing compressive symptoms. Plan to repeat US in early 2020 (order at next visit). If this shows stability of nodule size, we can likely stop surveillance. 2. Elevated TSH: isolated abnormal value. Successive TSH values have been normal ~1.7 and pt is clinically euthyroid on exam today. Check TSH/FT4 today. 3. Controlled type 2 diabetes mellitus without complication, unspecified long term insulin use status ([facility] [address]): recent A1c of 7.1% indicates fair control on metformin alone. Advised her to increase to 500mg BID since this drug is ideally dosed twice a day. I spent 15 minutes with the patient today and > 50% of the time was spent in counseling about thyroid nodules, treatment of hypothyroidism, and management of type 2 diabetes. She will f/u in 6 months. Thank you for the opportunity to partake in this patients care.   Saundra Martinez MD

## 2019-03-06 LAB
BACTERIA UR CULT: NORMAL
EST. AVERAGE GLUCOSE BLD GHB EST-MCNC: 151 MG/DL
HBA1C MFR BLD: 6.9 % (ref 4.8–5.6)
T4 FREE SERPL-MCNC: 1.16 NG/DL (ref 0.82–1.77)
TSH SERPL DL<=0.005 MIU/L-ACNC: 1.94 UIU/ML (ref 0.45–4.5)

## 2019-03-12 ENCOUNTER — TELEPHONE (OUTPATIENT)
Dept: INTERNAL MEDICINE CLINIC | Age: 79
End: 2019-03-12

## 2019-03-13 NOTE — TELEPHONE ENCOUNTER
Patient seen by Dr Shanthi Orellana on 3/12/2019. Need to know next steps, no problems associated with bladder. Informed patient will obtain last office note from Dr Shanthi Orellana, forward for review and call back. Pt verbalized understanding.

## 2019-03-15 DIAGNOSIS — R10.2 PELVIC PAIN: Primary | ICD-10-CM

## 2019-03-18 RX ORDER — METOPROLOL SUCCINATE 50 MG/1
TABLET, EXTENDED RELEASE ORAL
Qty: 90 TAB | Refills: 1 | Status: SHIPPED | OUTPATIENT
Start: 2019-03-18 | End: 2019-09-05

## 2019-03-22 ENCOUNTER — HOSPITAL ENCOUNTER (OUTPATIENT)
Dept: ULTRASOUND IMAGING | Age: 79
Discharge: HOME | End: 2019-03-22
Attending: INTERNAL MEDICINE
Payer: MEDICARE

## 2019-03-22 DIAGNOSIS — R10.2 PELVIC PAIN: ICD-10-CM

## 2019-03-22 PROCEDURE — 76882 US LMTD JT/FCL EVL NVASC XTR: CPT

## 2019-03-28 ENCOUNTER — TELEPHONE (OUTPATIENT)
Dept: INTERNAL MEDICINE CLINIC | Age: 79
End: 2019-03-28

## 2019-03-28 NOTE — TELEPHONE ENCOUNTER
Spoke with patient, informed Dr Andrade Centers recommends evaluation by GI. Patient is already seen by Dr Ruben Luther. Has has a CAT scan, US. She will reach out to GI office and schedule a follow up. Patient states pain has worsen.

## 2019-04-07 DIAGNOSIS — G43.119 INTRACTABLE MIGRAINE WITH AURA WITHOUT STATUS MIGRAINOSUS: ICD-10-CM

## 2019-04-07 DIAGNOSIS — I10 ESSENTIAL HYPERTENSION: ICD-10-CM

## 2019-04-07 DIAGNOSIS — Z23 ENCOUNTER FOR IMMUNIZATION: ICD-10-CM

## 2019-04-07 DIAGNOSIS — E11.9 DIABETES MELLITUS WITHOUT COMPLICATION (HCC): ICD-10-CM

## 2019-04-08 RX ORDER — METFORMIN HYDROCHLORIDE 500 MG/1
TABLET ORAL
Qty: 90 TAB | Refills: 1 | Status: SHIPPED | OUTPATIENT
Start: 2019-04-08 | End: 2019-04-22

## 2019-04-17 ENCOUNTER — TELEPHONE (OUTPATIENT)
Dept: CARDIOLOGY CLINIC | Age: 79
End: 2019-04-17

## 2019-04-17 NOTE — TELEPHONE ENCOUNTER
Verified patient with two identifiers. Spoke with pt advising her she can hold Eliquis 48 hrs before procedure, resume after procedure. Pt verbalized understanding. MD uRi Greco LPN   Caller: Unspecified (Today,  9:32 AM)             Yes.  Hold for 48 hrs and can restart after the procedure, thx.

## 2019-04-17 NOTE — TELEPHONE ENCOUNTER
Pt is scheduled for a colonoscopy on 4/23 and wants to know when her eliquis can be stopped.  Please give pt a call regarding this matter      thanks

## 2019-04-22 RX ORDER — METFORMIN HYDROCHLORIDE 500 MG/1
500 TABLET, EXTENDED RELEASE ORAL 2 TIMES DAILY
COMMUNITY
End: 2019-05-29 | Stop reason: SDUPTHER

## 2019-04-22 RX ORDER — CETIRIZINE HCL 10 MG
10 TABLET ORAL
COMMUNITY
End: 2020-01-15

## 2019-04-23 ENCOUNTER — ANESTHESIA (OUTPATIENT)
Dept: ENDOSCOPY | Age: 79
End: 2019-04-23
Payer: MEDICARE

## 2019-04-23 ENCOUNTER — ANESTHESIA EVENT (OUTPATIENT)
Dept: ENDOSCOPY | Age: 79
End: 2019-04-23
Payer: MEDICARE

## 2019-04-23 ENCOUNTER — HOSPITAL ENCOUNTER (OUTPATIENT)
Age: 79
Setting detail: OUTPATIENT SURGERY
Discharge: HOME | End: 2019-04-23
Attending: INTERNAL MEDICINE | Admitting: INTERNAL MEDICINE
Payer: MEDICARE

## 2019-04-23 VITALS
SYSTOLIC BLOOD PRESSURE: 144 MMHG | OXYGEN SATURATION: 100 % | RESPIRATION RATE: 18 BRPM | HEIGHT: 62 IN | BODY MASS INDEX: 36.8 KG/M2 | DIASTOLIC BLOOD PRESSURE: 71 MMHG | WEIGHT: 200 LBS | HEART RATE: 76 BPM | TEMPERATURE: 98.1 F

## 2019-04-23 PROCEDURE — 74011250637 HC RX REV CODE- 250/637: Performed by: INTERNAL MEDICINE

## 2019-04-23 PROCEDURE — 76040000019: Performed by: INTERNAL MEDICINE

## 2019-04-23 PROCEDURE — 74011250636 HC RX REV CODE- 250/636

## 2019-04-23 PROCEDURE — 76060000031 HC ANESTHESIA FIRST 0.5 HR: Performed by: INTERNAL MEDICINE

## 2019-04-23 PROCEDURE — 77030009426 HC FCPS BIOP ENDOSC BSC -B: Performed by: INTERNAL MEDICINE

## 2019-04-23 PROCEDURE — 77030027957 HC TBNG IRR ENDOGTR BUSS -B: Performed by: INTERNAL MEDICINE

## 2019-04-23 PROCEDURE — 88305 TISSUE EXAM BY PATHOLOGIST: CPT

## 2019-04-23 RX ORDER — SODIUM CHLORIDE 9 MG/ML
100 INJECTION, SOLUTION INTRAVENOUS CONTINUOUS
Status: DISCONTINUED | OUTPATIENT
Start: 2019-04-23 | End: 2019-04-23 | Stop reason: HOSPADM

## 2019-04-23 RX ORDER — FENTANYL CITRATE 50 UG/ML
200 INJECTION, SOLUTION INTRAMUSCULAR; INTRAVENOUS
Status: DISCONTINUED | OUTPATIENT
Start: 2019-04-23 | End: 2019-04-23 | Stop reason: HOSPADM

## 2019-04-23 RX ORDER — EPINEPHRINE 0.1 MG/ML
1 INJECTION INTRACARDIAC; INTRAVENOUS
Status: DISCONTINUED | OUTPATIENT
Start: 2019-04-23 | End: 2019-04-23 | Stop reason: HOSPADM

## 2019-04-23 RX ORDER — SODIUM CHLORIDE 0.9 % (FLUSH) 0.9 %
5-40 SYRINGE (ML) INJECTION EVERY 8 HOURS
Status: DISCONTINUED | OUTPATIENT
Start: 2019-04-23 | End: 2019-04-23 | Stop reason: HOSPADM

## 2019-04-23 RX ORDER — FLUMAZENIL 0.1 MG/ML
0.2 INJECTION INTRAVENOUS
Status: DISCONTINUED | OUTPATIENT
Start: 2019-04-23 | End: 2019-04-23 | Stop reason: HOSPADM

## 2019-04-23 RX ORDER — DEXTROMETHORPHAN/PSEUDOEPHED 2.5-7.5/.8
1.2 DROPS ORAL
Status: DISCONTINUED | OUTPATIENT
Start: 2019-04-23 | End: 2019-04-23 | Stop reason: HOSPADM

## 2019-04-23 RX ORDER — ATROPINE SULFATE 0.1 MG/ML
0.5 INJECTION INTRAVENOUS
Status: DISCONTINUED | OUTPATIENT
Start: 2019-04-23 | End: 2019-04-23 | Stop reason: HOSPADM

## 2019-04-23 RX ORDER — MIDAZOLAM HYDROCHLORIDE 1 MG/ML
.25-1 INJECTION, SOLUTION INTRAMUSCULAR; INTRAVENOUS
Status: DISCONTINUED | OUTPATIENT
Start: 2019-04-23 | End: 2019-04-23 | Stop reason: HOSPADM

## 2019-04-23 RX ORDER — SODIUM CHLORIDE 9 MG/ML
INJECTION, SOLUTION INTRAVENOUS
Status: DISCONTINUED | OUTPATIENT
Start: 2019-04-23 | End: 2019-04-23 | Stop reason: HOSPADM

## 2019-04-23 RX ORDER — PROPOFOL 10 MG/ML
INJECTION, EMULSION INTRAVENOUS AS NEEDED
Status: DISCONTINUED | OUTPATIENT
Start: 2019-04-23 | End: 2019-04-23 | Stop reason: HOSPADM

## 2019-04-23 RX ORDER — SODIUM CHLORIDE 0.9 % (FLUSH) 0.9 %
5-40 SYRINGE (ML) INJECTION AS NEEDED
Status: DISCONTINUED | OUTPATIENT
Start: 2019-04-23 | End: 2019-04-23 | Stop reason: HOSPADM

## 2019-04-23 RX ORDER — NALOXONE HYDROCHLORIDE 0.4 MG/ML
0.4 INJECTION, SOLUTION INTRAMUSCULAR; INTRAVENOUS; SUBCUTANEOUS
Status: DISCONTINUED | OUTPATIENT
Start: 2019-04-23 | End: 2019-04-23 | Stop reason: HOSPADM

## 2019-04-23 RX ADMIN — PROPOFOL 30 MG: 10 INJECTION, EMULSION INTRAVENOUS at 10:54

## 2019-04-23 RX ADMIN — PROPOFOL 50 MG: 10 INJECTION, EMULSION INTRAVENOUS at 10:38

## 2019-04-23 RX ADMIN — PROPOFOL 30 MG: 10 INJECTION, EMULSION INTRAVENOUS at 11:01

## 2019-04-23 RX ADMIN — SIMETHICONE 80 MG: 63.3; 3.7 SOLUTION/ DROPS ORAL at 10:57

## 2019-04-23 RX ADMIN — PROPOFOL 30 MG: 10 INJECTION, EMULSION INTRAVENOUS at 10:50

## 2019-04-23 RX ADMIN — PROPOFOL 30 MG: 10 INJECTION, EMULSION INTRAVENOUS at 10:46

## 2019-04-23 RX ADMIN — PROPOFOL 30 MG: 10 INJECTION, EMULSION INTRAVENOUS at 10:42

## 2019-04-23 RX ADMIN — SODIUM CHLORIDE: 9 INJECTION, SOLUTION INTRAVENOUS at 10:37

## 2019-04-23 NOTE — ANESTHESIA POSTPROCEDURE EVALUATION
Post-Anesthesia Evaluation and Assessment    Patient: Nathalie Hyatt MRN: [id number]  SSN: [id number]    YOB: 1940  Age: 70 y.o. Sex: female      I have evaluated the patient and they are stable and ready for discharge from the PACU. Cardiovascular Function/Vital Signs  Visit Vitals  /66   Pulse 89   Temp 36.8 °C (98.2 °F)   Resp 19   Ht 5' 2\" (1.575 m)   Wt 90.7 kg (200 lb)   SpO2 96%   Breastfeeding? No   BMI 36.58 kg/m²       Patient is status post MAC anesthesia for Procedure(s):  COLONOSCOPY  ENDOSCOPIC POLYPECTOMY. Nausea/Vomiting: None    Postoperative hydration reviewed and adequate. Pain:  Pain Scale 1: Visual (04/23/19 1118)  Pain Intensity 1: 0 (04/23/19 1118)   Managed    Neurological Status: At baseline    Mental Status, Level of Consciousness: Alert and  oriented to person, place, and time    Pulmonary Status:   O2 Device: Room air (04/23/19 1118)   Adequate oxygenation and airway patent    Complications related to anesthesia: None    Post-anesthesia assessment completed. No concerns    Signed By: Carson Meng MD     April 23, 2019              Procedure(s):  COLONOSCOPY  ENDOSCOPIC POLYPECTOMY. MAC    <BSHSIANPOST>    No vitals data found for the desired time range.

## 2019-04-23 NOTE — H&P
The patient is a 70year old female who presents with a complaint of Abdominal Pain. The patient presents for hospital follow up. The onset of the abdominal pain has been sudden and has been occurring in a persistent pattern for 3 months with a constant course . The abdominal pain is described as moderate and  is described as being located in the left lower quadrant .  The abdominal pain radiates to the left flank. , while the symptoms are not aggravated by meals (1/2 to 1 hour after eating). The symptoms are relieved by lying down. The symptoms have been associated with bloating and heartburn,  while the symptoms have not been associated with bloody stools, black stools, constipation, diarrhea, melena or vomiting. Previous diagnostic tests have included ultrasound and CT scan, but not colonoscopy. The patient had a colonoscopy 8 years ago and  had an EGD 2 year(s) ago . Kadeem Dean patient has been using pantoprazole (Protonix) and other medication (Gaviscon). The abdominal pain is characterized as not well controlled. Note for \"Abdominal Pain\": She was seen in the ER for left flank pain. She is on day 3 of antibiotic for UTI. She was told to be evaluated for cholecystectomy due to small gallstones incidentally found on CT. She has acid reflux and uses Protonix and Gaviscon. 3/29/19: She continues to have the same left sided pain. Her pain is in her left groin/hip and around her flank, affects walking. She denies constipation, has 3-4 bowel movements daily. Problem List/Past Medical (Valescia A. Johnette Severe; 3/29/2019 1:50 PM)  Afib (427.31  I48.91)    Hypertension    Asthma    Arthritis    Colonic Polyps    Diabetes Mellitus, Type II    Hypercholesterolemia    Gastritis, acute (535.00  K29.00)    Hiatal hernia (553.3  K44.9)    Abdominal swelling, generalized (789.37  R19.07)    Dyspepsia (536.8  K30)    Abdominal pain, RLQ (789.03  R10.31)    Heartburn (787.1) (787.1  R12)    Left upper quadrant pain (789.02 R10.12)    Abdominal pain (789.00  R10.9)   Gastric emptying scan 8/2017: 15 minutes EGD 7/2017: 2 mm hiatal hernia; biopsy normal Colonoscopy 6/2017: severe sigmoid diverticulosis M2A 2011: no bleeding Eliquis for atrial fibrillation  Gastroparesis (536.3  K31.84)    Epigastric abdominal pain (789.06  R10.13)    Acid reflux (530.81  K21.9)   GES 8/7/17: 15 minutes EGD 7/5/17: 2 mm hiatal hernia; biopsy normal Colonoscopy 6/2011: severe sigmoid diverticulosis; otherwise normal M2A 2011 for FRANCISCO JAVIER: no bleeding    Past Surgical History (Orly George; 3/29/2019 1:50 PM)  Tonsillectomy    Rotator cuff surgery   left  Appendectomy    Colon surgery   2001  Polypectomy    Adenoidectomy      Allergies (Orly George; 3/29/2019 1:50 PM)  Tetracyclines    Percocet *ANALGESICS - OPIOID*    Penicillins    Kiwi    Tetanus Toxoid Fluid *TOXOIDS*      Medication History (Orly George; 3/29/2019 1:50 PM)  Pantoprazole Sodium  (40MG Tablet DR, 1 (one) Oral take one pill daily, Taken starting 10/12/2018) Active. Eliquis  (5MG Tablet, Oral daily) Active. Propafenone HCl  (150MG Tablet, Oral three times daily) Active. Multivitamin  (1 Oral daily) Active. Calcium-Vitamin D  (600MG Tablet Chewable, 1 Oral daily) Active. MetFORMIN HCl  (500MG Tablet, 1 Oral daily) Active. Aspirin  (81MG Tablet DR, Oral daily) Active. Crestor  (10MG Tablet, Oral) Active. Albuterol Sulfate  (Inhalation prn) Specific strength unknown - Active. Hydrochlorothiazide  (25MG Tablet, 1/2 tab Oral daily) Active. Metoprolol Tartrate  (25MG Tablet, Oral daily) Active. Medications Reconciled     Family History (Orly George; 3/29/2019 1:50 PM)  Breast Cancer   First Degree Relatives. Stroke   Father. Heart Disease   First Degree Relatives. Diabetes Mellitus   First Degree Relatives. Social History (Orly George; 3/29/2019 1:50 PM)  Blood Transfusion   Yes. Tobacco Use   Former smoker.   Employment status   Retired. Marital status   . Alcohol Use   Has never drank. Diagnostic Studies History (Orly George; 3/29/2019 1:50 PM)  Endoscopy    Colonoscopy   2005    Health Maintenance History (Orly Betancourt; 3/29/2019 1:50 PM)  Flu Vaccine   Date: 2017. Pneumovax   Date: 2013. Other Problems (Orly Betancourt; 3/29/2019 1:50 PM)  Screening for colon cancer (V76.51  Z12.11)          Review of Systems (Orly Betancourt; 3/29/2019 1:51 PM)  General Not Present- Chronic Fatigue, Poor Appetite, Weight Gain and Weight Loss. Skin Not Present- Itching, Rash and Skin Color Changes. HEENT Not Present- Hearing Loss and Vertigo. Respiratory Not Present- Difficulty Breathing and TB exposure. Cardiovascular Not Present- Chest Pain, Use of Antibiotics before Dental Procedures and Use of Blood Thinners. Gastrointestinal Present- See HPI. Musculoskeletal Not Present- Arthritis, Hip Replacement Surgery and Knee Replacement Surgery. Neurological Not Present- Weakness. Psychiatric Not Present- Depression. Endocrine Not Present- Diabetes and Thyroid Problems. Hematology Not Present- Anemia. Vitals (Orly George; 3/29/2019 1:54 PM)  3/29/2019 1:50 PM  Weight: 209 lb   Height: 62 in   Body Surface Area: 1.95 m²   Body Mass Index: 38.23 kg/m²    Pulse: 80 (Regular)    Resp.: 20 (Unlabored)     BP: 130/80 (Sitting, Left Arm, Standard)              Physical Exam [facility] [place]; 3/29/2019 3:14 PM)  General  Mental Status - Alert. General Appearance - Cooperative, Pleasant, Not in acute distress. Orientation - Oriented X3. Build & Nutrition - Well nourished and Well developed. Integumentary  General Characteristics  Color - normal coloration of skin. Temperature - normal warmth is noted. Mobility & Turgor - normal mobility and turgor. Head and Neck  Head - normocephalic, atraumatic with no lesions or palpable masses.   Neck  Global Assessment - full range of motion and supple. Trachea - midline. Eye  Eyeball - Bilateral - No Exophthalmos. Sclera/Conjunctiva - Left - No Jaundice. Sclera/Conjunctiva - Right - No Jaundice. Chest and Lung Exam  Chest and lung exam reveals  - quiet, even and easy respiratory effort with no use of accessory muscles. Auscultation  Breath sounds - Normal. Adventitious sounds - No Adventitious sounds. Cardiovascular  Auscultation  Rhythm - Irregularly irregular, No Tachycardia, No Bradycardia . Heart Sounds - Normal heart sounds , S1 WNL and S2 WNL, No S3, No Summation Gallop. Murmurs & Other Heart Sounds - Auscultation of the heart reveals - No Murmurs. Abdomen  Inspection  Inspection of the abdomen reveals - Soft and Obese. Palpation/Percussion  Tenderness - Left Lower Quadrant. Rebound tenderness - No rebound. Rigidity (guarding) - No Rigidity. Dullness to percussion - No abnormal dullness to percussion. Liver - No hepatosplenomegaly. Abdominal Mass Palpable - No masses. Other Characteristics - No Ascites. Auscultation  Auscultation of the abdomen reveals - Bowel sounds normal, No Abdominal bruits and No Succussion splash. Rectal - Did not examine. Peripheral Vascular  Upper Extremity  Inspection - Left - Normal - No Clubbing, No Cyanosis, No Edema, Pulses Intact. Right - Normal - No Clubbing, No Cyanosis, No Edema, Pulses Intact. Palpation - Edema - Left - No edema. Right - No edema. Neurologic  Neurologic evaluation reveals  - Cranial nerves grossly intact, no focal neurologic deficits. Musculoskeletal  Global Assessment  Gait and Station - normal gait and station. Assessment & Plan ([facility]; 3/29/2019 3:15 PM)  Abdominal pain (789.00  R10.9)  Story: CT 2/2019: small gallstones  Gastric emptying scan 8/2017: 15 minutes  EGD 7/2017: 2 mm hiatal hernia; biopsy normal  Colonoscopy 6/2011: severe sigmoid diverticulosis  M2A 2011: no bleeding  Eliquis for atrial fibrillation  Impression: She continues to have LLQ/flank pain for almost 3 months now. Offered Bentyl to try to help with pain but she does not want at this time. Will further evaluate with colonoscopy. Will hold Eliquis for 2 days prior to procedure. Also recommend orthopedic evaluation. Current Plans  Colonoscopy (04196) (Discussed risks and benefits with the patient to include:; perforation, post polypectomy, or post biopsy bleeding, missed lesions, and sedation reactions.)  Started Suprep Bowel Prep Kit 17.5-3.13-1.6GM/177ML, 1 (one) Milliliter Take as directed before Colonoscopy, 1 Milliliter, 1 day starting 03/29/2019, No Refill. Pt Education - How to access health information online: discussed with patient and provided information. Patient is to call me for any questions or concerns. Case discussed personally with a physician in the office regarding the presentation, pertinent physical findings and development of a diagnostic and therapeutic plan. Follow up in 4-6 weeks  This patient was reviewed and seen in collaboration with Dr. Kandice Hernández. Date of Surgery Update:  Ava Lane was seen and examined. History and physical has been reviewed. The patient has been examined.  There have been no significant clinical changes since the completion of the originally dated History and Physical.    Signed By: Kandice Hernández MD     April 23, 2019 10:25 AM

## 2019-04-23 NOTE — PROGRESS NOTES

## 2019-04-23 NOTE — PROGRESS NOTES
Génesis Hayden  1940  [id number]    Situation:  Verbal report received from: Pj Romo  Procedure: Procedure(s):  COLONOSCOPY  ENDOSCOPIC POLYPECTOMY    Background:    Preoperative diagnosis: ABDOMINAL PAIN  Postoperative diagnosis: 1. - Colon Polyp  2. - Diverticulosis    :  Dr. Mario Crandall  Assistant(s): Endoscopy Technician-1: Maria Eugenia Chavarria IV  Endoscopy RN-1: Jamarcus Aaron RN    Specimens:   ID Type Source Tests Collected by Time Destination   1 : Sigmoid Colon Polyp Preservative   Bettye Adkins MD 4/23/2019 1105 Pathology     H. Pylori  no    Assessment:  Intra-procedure medications   Anesthesia gave intra-procedure sedation and medications, see anesthesia flow sheet yes    Intravenous fluids: NS@ KVO     Vital signs stable    Abdominal assessment: round and soft       Recommendation:  Discharge patient per MD order    Family or Friend   Permission to share finding with family or friend yes

## 2019-04-23 NOTE — ANESTHESIA PREPROCEDURE EVALUATION
Anesthetic History   No history of anesthetic complications            Review of Systems / Medical History  Patient summary reviewed, nursing notes reviewed and pertinent labs reviewed    Pulmonary            Asthma        Neuro/Psych             Comments: Diabetic neuropathy Cardiovascular    Hypertension                   GI/Hepatic/Renal     GERD          Comments: Ab pain  Hx gastric polyp Endo/Other    Diabetes: well controlled, type 2  Hypothyroidism  Obesity and arthritis     Other Findings              Physical Exam    Airway  Mallampati: I  TM Distance: > 6 cm  Neck ROM: normal range of motion   Mouth opening: Normal     Cardiovascular    Rhythm: regular  Rate: normal         Dental    Dentition: Edentulous     Pulmonary  Breath sounds clear to auscultation               Abdominal  Abdominal exam normal       Other Findings            Anesthetic Plan    ASA: 3  Anesthesia type: MAC          Induction: Intravenous  Anesthetic plan and risks discussed with: Patient

## 2019-04-23 NOTE — ANESTHESIA PREPROCEDURE EVALUATION
Relevant Problems No relevant active problems Anesthetic History No history of anesthetic complications Review of Systems / Medical History Patient summary reviewed, nursing notes reviewed and pertinent labs reviewed Pulmonary Asthma Neuro/Psych Comments: Diabetic neuropathy Cardiovascular Hypertension GI/Hepatic/Renal 
  
GERD Comments: Ab pain Hx gastric polyp Endo/Other Diabetes: well controlled, type 2 Hypothyroidism Obesity and arthritis Other Findings Physical Exam 
 
Airway Mallampati: I 
TM Distance: > 6 cm Neck ROM: normal range of motion Mouth opening: Normal 
 
 Cardiovascular Rhythm: regular Rate: normal 
 
 
 
 Dental 
 
Dentition: Edentulous Pulmonary Breath sounds clear to auscultation Abdominal 
Abdominal exam normal 
 
 
 Other Findings Anesthetic Plan ASA: 2 Anesthesia type: MAC Induction: Intravenous Anesthetic plan and risks discussed with: Patient Anesthetic History No history of anesthetic complications Review of Systems / Medical History Patient summary reviewed, nursing notes reviewed and pertinent labs reviewed Pulmonary Asthma Neuro/Psych Comments: Diabetic neuropathy Cardiovascular Hypertension GI/Hepatic/Renal 
  
GERD Comments: Ab pain Hx gastric polyp Endo/Other Diabetes: well controlled, type 2 Hypothyroidism Obesity and arthritis Other Findings Physical Exam 
 
Airway Mallampati: I 
TM Distance: > 6 cm Neck ROM: normal range of motion Mouth opening: Normal 
 
 Cardiovascular Rhythm: regular Rate: normal 
 
 
 
 Dental 
 
Dentition: Edentulous Pulmonary Breath sounds clear to auscultation Abdominal 
Abdominal exam normal 
 
 
 Other Findings Anesthetic Plan ASA: 2 Anesthesia type: MAC 
 
 Induction: Intravenous Anesthetic plan and risks discussed with: Patient Anesthesia Evaluation Anesthesia Plan

## 2019-04-23 NOTE — PROCEDURES
[address]  [facility], [address]      Colonoscopy Operative Report    Ava Lane  [id number]  1940      Procedure Type:   Colonoscopy with polypectomy (hot biopsy)     Indications:    Abdominal pain, LLQ       Pre-operative Diagnosis: see indication above    Post-operative Diagnosis:  See findings below    :  Kandice Hernández MD    Surgical Assistant: None    Implants:  None    Referring Provider: Vee Ruvalcaba MD      Sedation:  MAC anesthesia Propofol      Procedure Details:  After informed consent was obtained with all risks and benefits of procedure explained and preoperative exam completed, the patient was taken to the endoscopy suite and placed in the left lateral decubitus position. Upon sequential sedation as per above, a digital rectal exam was performed demonstrating internal hemorrhoids. The Olympus videocolonoscope  was inserted in the rectum and carefully advanced to the cecum, which was identified by the ileocecal valve and appendiceal orifice. The cecum was identified by the ileocecal valve and appendiceal orifice. The quality of preparation was good. The colonoscope was slowly withdrawn with careful evaluation between folds. Retroflexion in the rectum was completed . Findings:   Rectum: normal  Sigmoid: severe diverticulosis, no diverticulitis  9 mm sessile polyp removed by hot biopsy  Descending Colon: normal  Transverse Colon: normal  Ascending Colon: normal  Cecum: normal        Specimen Removed:  as above    Complications: None. EBL:  None. Impression:    see findings    Recommendations: --Await pathology.       Recommendation for next colonscopy in 5 versus 10 years  high fiber diet  No GI explanation to her pain, suspect it coming from her back, to follow up with her orthopedic surgeon, Dr. Jose Traylor  F/u as needed  Resume eliquis in am    Signed By: Kandice Hernández MD     4/23/2019  11:09 AM

## 2019-04-23 NOTE — DISCHARGE INSTRUCTIONS
[facility]    COLON DISCHARGE INSTRUCTIONS    Elieser Fermin  [id number]  1940    Discomfort:  Redness at IV site- apply warm compress to area; if redness or soreness persist- contact your physician  There may be a slight amount of blood passed from the rectum  Gaseous discomfort- walking, belching will help relieve any discomfort  You may not operate a vehicle for 12 hours  You may not engage in an occupation involving machinery or appliances for rest of today  You may not drink alcoholic beverages for at least 12 hours  Avoid making any critical decisions for at least 24 hour  DIET:  You may resume your regular diet - however -  remember your colon is empty and a heavy meal will produce gas. Avoid these foods:  vegetables, fried / greasy foods, carbonated drinks     ACTIVITY:  You may  resume your normal daily activities it is recommended that you spend the remainder of the day resting -  avoid any strenuous activity. CALL M.D. ANY SIGN OF:   Increasing pain, nausea, vomiting  Abdominal distension (swelling)  New increased bleeding (oral or rectal)  Fever (chills)  Pain in chest area  Bloody discharge from nose or mouth  Shortness of breath      Follow-up Instructions:   Call Dr. Darnell Pereyra for any questions or problems at [phone] and follow up with him as needed   High fiber diet   Suspect your pain is coming from the back, to follow up with Dr. Von Connelly on that  Resume eliquis on 4/24/2019            ENDOSCOPY FINDINGS:   Your colonoscopy showed one small polyp removed and diverticulosis, rest of exam was normal.  Telephone # [phone]      Signed By: Darnell Pereyra MD     4/23/2019  11:12 AM       DISCHARGE SUMMARY from Nurse    The following personal items collected during your admission are returned to you:   Dental Appliance: Dental Appliances:  At bedside  Vision: Visual Aid: None  Hearing Aid:    Jewelry:    Clothing: Other Valuables:    Valuables sent to safe:

## 2019-04-28 DIAGNOSIS — G43.119 INTRACTABLE MIGRAINE WITH AURA WITHOUT STATUS MIGRAINOSUS: ICD-10-CM

## 2019-04-28 DIAGNOSIS — I10 ESSENTIAL HYPERTENSION: ICD-10-CM

## 2019-04-28 DIAGNOSIS — E11.9 DIABETES MELLITUS WITHOUT COMPLICATION (HCC): ICD-10-CM

## 2019-04-28 DIAGNOSIS — Z23 ENCOUNTER FOR IMMUNIZATION: ICD-10-CM

## 2019-04-29 DIAGNOSIS — Z23 ENCOUNTER FOR IMMUNIZATION: ICD-10-CM

## 2019-04-29 DIAGNOSIS — E11.9 DIABETES MELLITUS WITHOUT COMPLICATION (HCC): ICD-10-CM

## 2019-04-29 DIAGNOSIS — G43.119 INTRACTABLE MIGRAINE WITH AURA WITHOUT STATUS MIGRAINOSUS: ICD-10-CM

## 2019-04-29 DIAGNOSIS — I10 ESSENTIAL HYPERTENSION: ICD-10-CM

## 2019-04-29 RX ORDER — HYDROCHLOROTHIAZIDE 25 MG/1
TABLET ORAL
Qty: 90 TAB | Refills: 1 | Status: SHIPPED | OUTPATIENT
Start: 2019-04-29 | End: 2019-11-26 | Stop reason: SDUPTHER

## 2019-04-30 RX ORDER — HYDROCHLOROTHIAZIDE 25 MG/1
TABLET ORAL
Qty: 90 TAB | Refills: 1 | Status: SHIPPED | OUTPATIENT
Start: 2019-04-30 | End: 2019-06-03

## 2019-05-20 RX ORDER — PROPAFENONE HYDROCHLORIDE 150 MG/1
TABLET, FILM COATED ORAL
Qty: 270 TAB | Refills: 3 | Status: SHIPPED | OUTPATIENT
Start: 2019-05-20 | End: 2019-12-15 | Stop reason: SDUPTHER

## 2019-05-21 DIAGNOSIS — E55.9 VITAMIN D DEFICIENCY: ICD-10-CM

## 2019-05-22 RX ORDER — ASPIRIN 81 MG/1
TABLET ORAL
Qty: 90 TAB | Refills: 1 | Status: SHIPPED | OUTPATIENT
Start: 2019-05-22 | End: 2019-12-10 | Stop reason: SDUPTHER

## 2019-05-29 ENCOUNTER — TELEPHONE (OUTPATIENT)
Dept: ENDOCRINOLOGY | Age: 79
End: 2019-05-29

## 2019-05-29 DIAGNOSIS — L84 TYPE 2 DIABETES MELLITUS WITH PRESSURE CALLUS (HCC): Primary | ICD-10-CM

## 2019-05-29 DIAGNOSIS — E11.69 TYPE 2 DIABETES MELLITUS WITH DIABETIC FOOT DEFORMITY (HCC): Primary | ICD-10-CM

## 2019-05-29 DIAGNOSIS — E11.628 TYPE 2 DIABETES MELLITUS WITH PRESSURE CALLUS (HCC): Primary | ICD-10-CM

## 2019-05-29 DIAGNOSIS — E11.21 CONTROLLED TYPE 2 DIABETES MELLITUS WITH DIABETIC NEPHROPATHY, WITHOUT LONG-TERM CURRENT USE OF INSULIN (HCC): ICD-10-CM

## 2019-05-29 DIAGNOSIS — M21.969 TYPE 2 DIABETES MELLITUS WITH DIABETIC FOOT DEFORMITY (HCC): Primary | ICD-10-CM

## 2019-05-29 RX ORDER — METFORMIN HYDROCHLORIDE 500 MG/1
500 TABLET ORAL 2 TIMES DAILY WITH MEALS
Qty: 180 TAB | Refills: 1 | Status: SHIPPED | OUTPATIENT
Start: 2019-05-29 | End: 2019-11-24 | Stop reason: SDUPTHER

## 2019-05-29 RX ORDER — METFORMIN HYDROCHLORIDE 500 MG/1
500 TABLET, EXTENDED RELEASE ORAL
Qty: 90 TAB | Refills: 1 | Status: SHIPPED | OUTPATIENT
Start: 2019-05-29 | End: 2019-06-03

## 2019-05-29 NOTE — TELEPHONE ENCOUNTER
Hi Dr Naga Matute,    Per Dr Wilmar Deleon last office note, Mrs Radha Ballesteros was told to increase the Metformin 500 mg to 1 tab BID. \"Controlled type 2 diabetes mellitus without complication, unspecified long term insulin use status [facility] recent A1c of 7.1% indicates fair control on metformin alone. Advised her to increase to 500mg BID since this drug is ideally dosed twice a day. \"

## 2019-05-29 NOTE — TELEPHONE ENCOUNTER
5/29/2019  4:05 PM    Patient called in stating that her PCP is confused as to how should she should be taking her metFormin. She would like a call back to discuss this further.       Thanks

## 2019-05-30 NOTE — TELEPHONE ENCOUNTER
HI Dr Theda Bence,    Dr Estrella Citizen never renewed her prescription for the (regurlar) Metformin once she became our pt, but recommended her to take the medication twice of day instead of once a day.

## 2019-06-03 ENCOUNTER — OFFICE VISIT (OUTPATIENT)
Dept: URGENT CARE | Age: 79
End: 2019-06-03

## 2019-06-03 VITALS
HEIGHT: 62 IN | OXYGEN SATURATION: 96 % | TEMPERATURE: 97.8 F | DIASTOLIC BLOOD PRESSURE: 67 MMHG | RESPIRATION RATE: 18 BRPM | BODY MASS INDEX: 38.28 KG/M2 | SYSTOLIC BLOOD PRESSURE: 143 MMHG | WEIGHT: 208 LBS | HEART RATE: 78 BPM

## 2019-06-03 DIAGNOSIS — J06.9 UPPER RESPIRATORY TRACT INFECTION, UNSPECIFIED TYPE: Primary | ICD-10-CM

## 2019-06-03 DIAGNOSIS — J45.30 MILD PERSISTENT ASTHMA WITHOUT COMPLICATION: ICD-10-CM

## 2019-06-03 RX ORDER — PREDNISONE 5 MG/1
TABLET ORAL
Qty: 21 TAB | Refills: 0 | Status: SHIPPED | OUTPATIENT
Start: 2019-06-03 | End: 2019-07-17

## 2019-06-03 RX ORDER — ALBUTEROL SULFATE 90 UG/1
2 AEROSOL, METERED RESPIRATORY (INHALATION)
Qty: 1 INHALER | Refills: 0 | Status: SHIPPED | OUTPATIENT
Start: 2019-06-03 | End: 2020-02-26

## 2019-06-03 NOTE — PATIENT INSTRUCTIONS
Viral Respiratory Infection: Care Instructions  Your Care Instructions    Viruses are very small organisms. They grow in number after they enter your body. There are many types that cause different illnesses, such as colds and the mumps. The symptoms of a viral respiratory infection often start quickly. They include a fever, sore throat, and runny nose. You may also just not feel well. Or you may not want to eat much. Most viral respiratory infections are not serious. They usually get better with time and self-care. Antibiotics are not used to treat a viral infection. That's because antibiotics will not help cure a viral illness. In some cases, antiviral medicine can help your body fight a serious viral infection. Follow-up care is a key part of your treatment and safety. Be sure to make and go to all appointments, and call your doctor if you are having problems. It's also a good idea to know your test results and keep a list of the medicines you take. How can you care for yourself at home? · Rest as much as possible until you feel better. · Be safe with medicines. Take your medicine exactly as prescribed. Call your doctor if you think you are having a problem with your medicine. You will get more details on the specific medicine your doctor prescribes. · Take an over-the-counter pain medicine, such as acetaminophen (Tylenol), ibuprofen (Advil, Motrin), or naproxen (Aleve), as needed for pain and fever. Read and follow all instructions on the label. Do not give aspirin to anyone younger than 20. It has been linked to Reye syndrome, a serious illness. · Drink plenty of fluids, enough so that your urine is light yellow or clear like water. Hot fluids, such as tea or soup, may help relieve congestion in your nose and throat. If you have kidney, heart, or liver disease and have to limit fluids, talk with your doctor before you increase the amount of fluids you drink.   · Try to clear mucus from your lungs by breathing deeply and coughing. · Gargle with warm salt water once an hour. This can help reduce swelling and throat pain. Use 1 teaspoon of salt mixed in 1 cup of warm water. · Do not smoke or allow others to smoke around you. If you need help quitting, talk to your doctor about stop-smoking programs and medicines. These can increase your chances of quitting for good. To avoid spreading the virus  · Cough or sneeze into a tissue. Then throw the tissue away. · If you don't have a tissue, use your hand to cover your cough or sneeze. Then clean your hand. You can also cough into your sleeve. · Wash your hands often. Use soap and warm water. Wash for 15 to 20 seconds each time. · If you don't have soap and water near you, you can clean your hands with alcohol wipes or gel. When should you call for help? Call your doctor now or seek immediate medical care if:    · You have a new or higher fever.     · Your fever lasts more than 48 hours.     · You have trouble breathing.     · You have a fever with a stiff neck or a severe headache.     · You are sensitive to light.     · You feel very sleepy or confused.    Watch closely for changes in your health, and be sure to contact your doctor if:    · You do not get better as expected. Where can you learn more? Go to http://radha-tracy.info/. Enter M528 in the search box to learn more about \"Viral Respiratory Infection: Care Instructions. \"  Current as of: September 5, 2018  Content Version: 11.9  © 7878-7011 [employer]. Care instructions adapted under license by [employer] (which disclaims liability or warranty for this information). If you have questions about a medical condition or this instruction, always ask your healthcare professional. [place] [address] any warranty or liability for your use of this information.          Asthma in Adults: Care Instructions  Your Care Instructions    During an asthma attack, your airways swell and narrow as a reaction to certain things (triggers). This makes it hard to breathe. You may be able to prevent asthma attacks if you avoid the things that set off your asthma symptoms. Keeping your asthma under control and treating symptoms before they get bad can help you avoid severe attacks. If you can control your asthma, you may be able to do all of your normal daily activities. You may also avoid asthma attacks and trips to the hospital.  Follow-up care is a key part of your treatment and safety. Be sure to make and go to all appointments, and call your doctor if you are having problems. It's also a good idea to know your test results and keep a list of the medicines you take. How can you care for yourself at home? · Follow your asthma action plan so you can manage your symptoms at home. An asthma action plan will help you prevent and control airway reactions and will tell you what to do during an asthma attack. If you do not have an asthma action plan, work with your doctor to build one. · Take your asthma medicine exactly as prescribed. Medicine plays an important role in controlling asthma. Talk to your doctor right away if you have any questions about what to take and how to take it. ? Use your quick-relief medicine when you have symptoms of an attack. Quick-relief medicine often is an albuterol inhaler. Some people need to use quick-relief medicine before they exercise. ? Take your controller medicine every day, not just when you have symptoms. Controller medicine is usually an inhaled corticosteroid. The goal is to prevent problems before they occur. Do not use your controller medicine to try to treat an attack that has already started. It does not work fast enough to help. ? If your doctor prescribed corticosteroid pills to use during an attack, take them as directed.  They may take hours to work, but they may shorten the attack and help you breathe better. ? Keep your quick-relief medicine with you at all times. · Talk to your doctor before using other medicines. Some medicines, such as aspirin, can cause asthma attacks in some people. · Check yourself for asthma symptoms to know which step to follow in your action plan. Watch for things like being short of breath, having chest tightness, coughing, and wheezing. Also notice if symptoms wake you up at night or if you get tired quickly when you exercise. · If you have a peak flow meter, use it to check how well you are breathing. This can help you predict when an asthma attack is going to occur. Then you can take medicine to prevent the asthma attack or make it less severe. · See your doctor regularly. These visits will help you learn more about asthma and what you can do to control it. Your doctor will monitor your treatment to make sure the medicine is helping you. · Keep track of your asthma attacks and your treatment. After you have had an attack, write down what triggered it, what helped end it, and any concerns you have about your asthma action plan. Take your diary when you see your doctor. You can then review your asthma action plan and decide if it is working. · Do not smoke or allow others to smoke around you. Avoid smoky places. Smoking makes asthma worse. If you need help quitting, talk to your doctor about stop-smoking programs and medicines. These can increase your chances of quitting for good. · Learn what triggers an asthma attack for you, and avoid the triggers when you can. Common triggers include colds, smoke, air pollution, dust, pollen, mold, pets, cockroaches, stress, and cold air. · Avoid colds and the flu. Get a pneumococcal vaccine shot. If you have had one before, ask your doctor whether you need a second dose. Get a flu vaccine every fall. If you must be around people with colds or the flu, wash your hands often. When should you call for help?   Call 911 anytime you think you may need emergency care. For example, call if:    · You have severe trouble breathing.    Call your doctor now or seek immediate medical care if:    · Your symptoms do not get better after you have followed your asthma action plan.     · You cough up yellow, dark brown, or bloody mucus (sputum).    Watch closely for changes in your health, and be sure to contact your doctor if:    · Your coughing and wheezing get worse.     · You need to use quick-relief medicine on more than 2 days a week (unless it is just for exercise).     · You need help figuring out what is triggering your asthma attacks. Where can you learn more? Go to http://radha-tracy.info/. Enter P597 in the search box to learn more about \"Asthma in Adults: Care Instructions. \"  Current as of: September 5, 2018  Content Version: 11.9  © 1600-2970 [employer]. Care instructions adapted under license by [employer] (which disclaims liability or warranty for this information). If you have questions about a medical condition or this instruction, always ask your healthcare professional. [address] any warranty or liability for your use of this information.

## 2019-06-10 NOTE — TELEPHONE ENCOUNTER
Confirmed with patient taking Metformin 500 mg BID. Not on XR. Patient does not need refill at this time. She will discuss referral to endo at next appointment. Pt verbalized understanding.

## 2019-06-18 RX ORDER — ROSUVASTATIN CALCIUM 10 MG/1
TABLET, COATED ORAL
Qty: 90 TAB | Refills: 1 | Status: SHIPPED | OUTPATIENT
Start: 2019-06-18 | End: 2019-12-15 | Stop reason: SDUPTHER

## 2019-06-19 RX ORDER — APIXABAN 5 MG/1
TABLET, FILM COATED ORAL
Qty: 180 TAB | Refills: 3 | Status: SHIPPED | OUTPATIENT
Start: 2019-06-19 | End: 2020-08-10

## 2019-07-03 ENCOUNTER — HOSPITAL ENCOUNTER (OUTPATIENT)
Dept: MRI IMAGING | Age: 79
Discharge: HOME | End: 2019-07-03
Attending: ORTHOPAEDIC SURGERY
Payer: MEDICARE

## 2019-07-03 DIAGNOSIS — M54.16 LEFT LUMBAR RADICULOPATHY: ICD-10-CM

## 2019-07-03 DIAGNOSIS — M47.816 LUMBAR SPONDYLOSIS: ICD-10-CM

## 2019-07-03 DIAGNOSIS — M48.062 LUMBAR STENOSIS WITH NEUROGENIC CLAUDICATION: ICD-10-CM

## 2019-07-03 PROCEDURE — 72148 MRI LUMBAR SPINE W/O DYE: CPT

## 2019-07-17 ENCOUNTER — OFFICE VISIT (OUTPATIENT)
Dept: CARDIOLOGY CLINIC | Age: 79
End: 2019-07-17

## 2019-07-17 VITALS
SYSTOLIC BLOOD PRESSURE: 130 MMHG | DIASTOLIC BLOOD PRESSURE: 70 MMHG | BODY MASS INDEX: 37.98 KG/M2 | HEART RATE: 75 BPM | HEIGHT: 62 IN | OXYGEN SATURATION: 96 % | WEIGHT: 206.4 LBS

## 2019-07-17 DIAGNOSIS — M79.89 LEG SWELLING: ICD-10-CM

## 2019-07-17 DIAGNOSIS — I10 ESSENTIAL HYPERTENSION: ICD-10-CM

## 2019-07-17 DIAGNOSIS — I49.9 IRREGULAR HEARTBEAT: ICD-10-CM

## 2019-07-17 DIAGNOSIS — I48.0 PAROXYSMAL ATRIAL FIBRILLATION (HCC): Primary | ICD-10-CM

## 2019-07-17 DIAGNOSIS — E78.2 MIXED HYPERLIPIDEMIA: ICD-10-CM

## 2019-07-17 NOTE — PROGRESS NOTES
[address] [place] [address], [address] [facility]  [phone]     Subjective:      Heike Rosales is a 70 y.o. female is here for routine f/u. She reports occasional \"fluttering\" that is short in duration, unchanged from previous. She also reports lower extremity varicose veins noted some swelling and night cramps, also has restless leg. She had Ultrasound of right leg in February which was negative for blood clot     The patient denies chest pain/ shortness of breath, orthopnea, PND, palpitations, syncope, or presyncope.        Patient Active Problem List    Diagnosis Date Noted    Severe obesity (BMI 35.0-39.9) 06/27/2018    Paroxysmal atrial fibrillation ([address] 01/02/2018    Abdominal mass, left upper quadrant 06/13/2017    Left upper quadrant pain 06/13/2017    Right foot injury 05/02/2017    Chronic asthma with status asthmaticus 01/26/2017    Intractable migraine with aura without status migrainosus 18/16/4635    Helicobacter pylori (H. pylori) infection 12/07/2016    Skipped heart beats 11/29/2016    Multiple thyroid nodules 07/14/2016    Swollen gland 05/26/2016    Acute pharyngitis 05/26/2016    Diabetes mellitus type 2, controlled ([address] 05/26/2016    Arthralgia of right hip 04/25/2016    Tinea pedis 06/03/2015    Abnormal finding on MRI of brain 03/16/2015    DAVID (generalized anxiety disorder) 01/22/2015    Peripheral autonomic neuropathy due to DM ([address] 09/29/2014    Pre-ulcerative calluses 09/29/2014    Type 2 diabetes mellitus with pressure callus ([address] 09/29/2014    Hammer toe of right foot 09/29/2014    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 ([address] 09/29/2014    Cataract 09/24/2014    Preop examination 09/24/2014    Need for varicella vaccine 09/23/2014    Screening for breast cancer 09/23/2014    Acute asthma exacerbation 08/31/2014    Elevated LFTs 04/16/2014    Screening for depression 04/16/2014    Risk for falls 04/16/2014    GERD (gastroesophageal reflux disease) 04/07/2014    Osteoarthritis of acromioclavicular joint 03/18/2014    Shoulder pain, left 03/18/2014    Rotator cuff (capsule) sprain and strain 09/20/2013    Foot pain, left 06/14/2013    Bilateral hip pain 05/20/2013    Postmenopausal disorder 05/20/2013    Numbness and tingling of right leg 05/20/2013    Postmenopausal state 01/21/2013    Vitamin D deficiency 01/21/2013    Colon cancer screening 01/02/2013    Visual floaters 02/01/2012    Acute bronchitis 11/30/2011    Hip pain, right 09/14/2011    Cyst of right kidney 08/10/2011    Anemia 04/22/2011    Chest pain 10/20/2010    Tingling sensation 09/13/2010    Spondylitis, cervical ([address] 04/05/2010    HTN (hypertension) 03/26/2010    Headache(784.0) 03/26/2010    Hyperlipemia 02/18/2010    Arthritis 02/18/2010      Luís Burciaga MD  Past Medical History:   Diagnosis Date    Abdominal mass, left upper quadrant 06/13/2017    no finding per pt    Abnormal finding on MRI of brain 3/16/2015    evaluated by neurologist and no findings    Acute pharyngitis 5/26/2016    Anemia NEC     Arrhythmia     a fib    Arthralgia of right hip 4/25/2016    Arthritis 2/18/2010    Asthma 2/18/2010    Cataract 9/24/2014    Diabetes ([address]     Elevated LFTs 4/16/2014    DAVID (generalized anxiety disorder) 1/22/2015    GERD (gastroesophageal reflux disease) 4/7/2014    Hammer toe of right foot 9/29/2014    Headache(784.0) 8/35/5246    Helicobacter pylori (H. 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 ([address] 9/29/2014    Preop examination 9/24/2014    Right foot injury 5/2/2017    Risk for falls 4/16/2014    Screening for breast cancer 9/23/2014    Screening for depression 4/16/2014    Shoulder pain, left 3/18/2014    Spondylitis, cervical ([facility] 4/5/2010    Tinea pedis 6/3/2015    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 ([facility] 9/29/2014      Past Surgical History:   Procedure Laterality Date    ABDOMEN SURGERY PROC UNLISTED      inguinal hernia    ABDOMEN SURGERY PROC UNLISTED      COLONOSCOPY N/A 4/23/2019    COLON[facility] by [facility]ann[facility]illard [facility] at [facility] ENDOSCOPY    ENDOSCOPY, COLON, DIAGNOSTIC      HX APPENDECTOMY      HX CATARACT REMOVAL Bilateral     [address]    total hysterectomy for uterine fibroid    HX HEENT      tonsillectomy    HX ORTHOPAEDIC      left foot hammertoe surgery    HX OTHER SURGICAL      ? straightened colon out    HX ROTATOR CUFF REPAIR  2009    left    OPEN REPAIR CLAVICLE FRACTURE  2009    did not have surgery for this/pt states she was in an accident and had RCR on left, but the clavicle was not repaired - injury was done at the same time     Allergies   Allergen Reactions    Latex Rash    Amoxil [Amoxicillin] Itching     rash    Cephalosporins Unknown (comments)    Levaquin [Levofloxacin] Other (comments)     Dizziness      Penicillins Rash     \"chickenpox like rash\" was in the 1980s    Percocet [Oxycodone-Acetaminophen] Nausea and Vomiting    Tetanus Vaccines And Toxoid Swelling    Tetracycline Hcl Rash     \"black fuzzy tongue\"      Family History   Problem Relation Age of Onset    Cancer Mother         mycosis fungoives - skin cancer/got into bloodstream    Stroke Father     Cancer Sister         one sister with breast cancer    Breast Cancer Sister     Cancer Paternal Aunt         2 paternal aunts with breast cancer    Breast Cancer Paternal Aunt     Thyroid Cancer Neg Hx       Social History     Socioeconomic History    Marital status:      Spouse name: Not on file    Number of children: Not on file    Years of education: Not on file    Highest education level: Not on file   Occupational History    Not on file   Social Needs    Financial resource strain: Not on file    Food insecurity:     Worry: Not on file     Inability: Not on file    Transportation needs:     Medical: Not on file     Non-medical: Not on file   Tobacco Use    Smoking status: Former Smoker     Last attempt to quit: 1988     Years since quittin.1    Smokeless tobacco: Never Used   Substance and Sexual Activity    Alcohol use: No    Drug use: No    Sexual activity: Never   Lifestyle    Physical activity:     Days per week: Not on file     Minutes per session: Not on file    Stress: Not on file   Relationships    Social connections:     Talks on phone: Not on file     Gets together: Not on file     Attends Congregation service: Not on file     Active member of club or organization: Not on file     Attends meetings of clubs or organizations: Not on file     Relationship status: Not on file    Intimate partner violence:     Fear of current or ex partner: Not on file     Emotionally abused: Not on file     Physically abused: Not on file     Forced sexual activity: Not on file   Other Topics Concern    Not on file   Social History Narrative    Not on file      Current Outpatient Medications   Medication Sig    ELIQUIS 5 mg tablet TAKE 1 TABLET BY MOUTH TWICE A DAY    rosuvastatin (CRESTOR) 10 mg tablet TAKE 1 TABLET BY MOUTH EVERY DAY    albuterol (PROVENTIL HFA, VENTOLIN HFA, PROAIR HFA) 90 mcg/actuation inhaler Take 2 Puffs by inhalation every four (4) hours as needed for Wheezing.  metFORMIN (GLUCOPHAGE) 500 mg tablet Take 1 Tab by mouth two (2) times daily (with meals).     aspirin delayed-release 81 mg tablet TAKE 1 TABLET BY MOUTH EVERY DAY    propafenone (RYTHMOL) 150 mg tablet TAKE 1 TABLET BY MOUTH EVERY 8 HOURS    hydroCHLOROthiazide (HYDRODIURIL) 25 mg tablet TAKE 1 TABLET BY MOUTH EVERY DAY    ASPIRIN PO Take 81 mg by mouth daily.  calcium carbonate/vitamin D3 (CALTRATE 600 + D PO) Take 1 Tab by mouth daily.  cetirizine (ZYRTEC) 10 mg tablet Take 10 mg by mouth daily as needed for Allergies.  metoprolol succinate (TOPROL-XL) 50 mg XL tablet TAKE 1 TABLET BY MOUTH EVERY DAY    glucose blood VI test strips (CONTOUR NEXT TEST STRIPS) strip Check blood sugar once a day e11.9    albuterol (PROVENTIL HFA, VENTOLIN HFA, PROAIR HFA) 90 mcg/actuation inhaler Take 2 Puffs by inhalation every four (4) hours as needed for Wheezing.  pantoprazole (PROTONIX) 40 mg tablet TAKE 1 TABLET BY MOUTH EVERY DAY    MULTIVITAMIN PO Take 1 Tab by mouth daily.  Lancets (FREESTYLE LANCETS) Misc Test once daily. (diagnosis code: 250.00)    Blood-Glucose Meter monitoring kit Once daily before breakfast    predniSONE (STERAPRED) 5 mg dose pack See administration instruction per 5mg dose pack     No current facility-administered medications for this visit. Review of Symptoms:  11 systems reviewed, negative other than as stated in the HPI    Physical ExamPhysical Exam:    Vitals:    07/17/19 0848   Weight: 206 lb 6.4 oz (93.6 kg)   Height: 5' 2\" (1.575 m)     Body mass index is 37.75 kg/m². General PE  Gen:  NAD  Mental Status - Alert. General Appearance - Not in acute distress. HEENT:  PERRL, no carotid bruits or JVD  Chest and Lung Exam   Inspection: Accessory muscles - No use of accessory muscles in breathing. Auscultation:   Breath sounds: - Normal.   Cardiovascular   Inspection: Jugular vein - Bilateral - Inspection Normal.   Palpation/Percussion:   Apical Impulse: - Normal.   Auscultation: Rhythm - Regular. Heart Sounds - S1 WNL and S2 WNL. No S3 or S4. Murmurs & Other Heart Sounds: Auscultation of the heart reveals - No Murmurs. Peripheral Vascular   Upper Extremity: Inspection - Bilateral - No Cyanotic nailbeds or Digital clubbing.    Lower Extremity:   Palpation: Edema - Bilateral - mild ankle swelling  Abdomen: Soft, non-tender, bowel sounds are active. Neuro: A&O times 3, CN and motor grossly WNL    Labs:   Lab Results   Component Value Date/Time    Cholesterol, total 147 09/25/2018 09:33 AM    Cholesterol, total 180 10/18/2017 10:24 AM    Cholesterol, total 159 05/02/2017 09:21 AM    Cholesterol, total 172 01/12/2017 04:20 AM    Cholesterol, total 166 11/29/2016 09:03 AM    HDL Cholesterol 46 09/25/2018 09:33 AM    HDL Cholesterol 56 10/18/2017 10:24 AM    HDL Cholesterol 58 05/02/2017 09:21 AM    HDL Cholesterol 59 01/12/2017 04:20 AM    HDL Cholesterol 50 11/29/2016 09:03 AM    LDL, calculated 77 09/25/2018 09:33 AM    LDL, calculated 94 10/18/2017 10:24 AM    LDL, calculated 81 05/02/2017 09:21 AM    LDL, calculated 77.4 01/12/2017 04:20 AM    LDL, calculated 83 11/29/2016 09:03 AM    Triglyceride 119 09/25/2018 09:33 AM    Triglyceride 150 (H) 10/18/2017 10:24 AM    Triglyceride 99 05/02/2017 09:21 AM    Triglyceride 178 (H) 01/12/2017 04:20 AM    Triglyceride 166 (H) 11/29/2016 09:03 AM    CHOL/HDL Ratio 2.9 01/12/2017 04:20 AM    CHOL/HDL Ratio 5.5 (H) 09/13/2010 01:35 PM    CHOL/HDL Ratio 4.1 04/05/2010 10:02 AM     Lab Results   Component Value Date/Time     (H) 01/05/2017 07:42 AM     Lab Results   Component Value Date/Time    Sodium 138 02/05/2019 09:56 AM    Potassium 3.9 02/05/2019 09:56 AM    Chloride 102 02/05/2019 09:56 AM    CO2 31 02/05/2019 09:56 AM    Anion gap 5 02/05/2019 09:56 AM    Glucose 125 (H) 02/05/2019 09:56 AM    BUN 15 02/05/2019 09:56 AM    Creatinine 0.75 02/05/2019 09:56 AM    BUN/Creatinine ratio 20 02/05/2019 09:56 AM    GFR est AA >60 02/05/2019 09:56 AM    GFR est non-AA >60 02/05/2019 09:56 AM    Calcium 8.9 02/05/2019 09:56 AM    Bilirubin, total 0.3 02/05/2019 09:56 AM    AST (SGOT) 15 02/05/2019 09:56 AM    Alk.  phosphatase 47 02/05/2019 09:56 AM    Protein, total 7.6 02/05/2019 09:56 AM    Albumin 3.7 02/05/2019 09:56 AM    Globulin 3.9 02/05/2019 09:56 AM    A-G Ratio 0.9 (L) 02/05/2019 09:56 AM    ALT (SGPT) 22 02/05/2019 09:56 AM       EKG:  NSR      Assessment:     Assessment:      1. Paroxysmal atrial fibrillation (HCC)    2. Essential hypertension    3. Mixed hyperlipidemia        No orders of the defined types were placed in this encounter. Plan:     Pt presents for f/u, doing well and stable from cardiac standpoint. She reports occasional \"fluttering\" that is short in duration, unchanged from previous. She also reports lower extremity varicose veins noted some swelling and night cramps, also has restless leg. She had Ultrasound of right leg in February which was negative for blood clot     PAF  Normal EF, no significant valvular pathology per echo in 1/17  Maintaining SR. Continue BB, Rythmol, Eliquis for OAC--no overt bleeding, bruises easily    Negative nuclear stress test 10/17.      HTN  Controlled with current therapy     HLD  9/18 LDL at 77. On statin. Lipids and labs followed by PCP.       DM  On Metformin    LE swelling, + cramps, varicose veins, RLS  Will obtain LE dopplers to r/u venous reflux  If +, will refer to Dr Brent Evans compression socks         Continue current care and f/u in 6 months. Shannon English NP       [address] [facility]    7/17/2019         Patient seen, examined by me personally. Plan discussed as detailed. Agree with note as outlined by  NP. I confirm findings in history and physical exam. No additional findings noted. Agree with plan as outlined above.      Abiel Capone MD

## 2019-07-17 NOTE — PROGRESS NOTES
Cherelle Benavides is a 70 y.o. female      Chief Complaint   Patient presents with    Irregular Heart Beat    Follow-up     1. Have you been to the ER, urgent care clinic since your last visit? Hospitalized since your last visit? No    2. Have you seen or consulted any other health care providers outside of the [address] [place] [address] since your last visit? Include any pap smears or colon screening.  No

## 2019-08-02 ENCOUNTER — HOSPITAL ENCOUNTER (OUTPATIENT)
Dept: INTERVENTIONAL RADIOLOGY/VASCULAR | Age: 79
Discharge: HOME | End: 2019-08-02
Attending: ORTHOPAEDIC SURGERY | Admitting: STUDENT IN AN ORGANIZED HEALTH CARE EDUCATION/TRAINING PROGRAM
Payer: MEDICARE

## 2019-08-02 VITALS
HEIGHT: 62 IN | BODY MASS INDEX: 36.8 KG/M2 | DIASTOLIC BLOOD PRESSURE: 66 MMHG | OXYGEN SATURATION: 97 % | SYSTOLIC BLOOD PRESSURE: 141 MMHG | WEIGHT: 200 LBS | TEMPERATURE: 97.6 F | HEART RATE: 75 BPM | RESPIRATION RATE: 16 BRPM

## 2019-08-02 DIAGNOSIS — M54.16 LEFT LUMBAR RADICULOPATHY: ICD-10-CM

## 2019-08-02 DIAGNOSIS — M48.061 LUMBAR STENOSIS: ICD-10-CM

## 2019-08-02 DIAGNOSIS — M47.816 LUMBAR SPONDYLOSIS: ICD-10-CM

## 2019-08-02 PROCEDURE — 74011636320 HC RX REV CODE- 636/320: Performed by: STUDENT IN AN ORGANIZED HEALTH CARE EDUCATION/TRAINING PROGRAM

## 2019-08-02 PROCEDURE — 74011250636 HC RX REV CODE- 250/636: Performed by: STUDENT IN AN ORGANIZED HEALTH CARE EDUCATION/TRAINING PROGRAM

## 2019-08-02 PROCEDURE — 64484 NJX AA&/STRD TFRM EPI L/S EA: CPT

## 2019-08-02 PROCEDURE — 64483 NJX AA&/STRD TFRM EPI L/S 1: CPT

## 2019-08-02 RX ORDER — DEXAMETHASONE SODIUM PHOSPHATE 10 MG/ML
10 INJECTION INTRAMUSCULAR; INTRAVENOUS ONCE
Status: COMPLETED | OUTPATIENT
Start: 2019-08-02 | End: 2019-08-02

## 2019-08-02 RX ORDER — LIDOCAINE HYDROCHLORIDE 10 MG/ML
2 INJECTION, SOLUTION EPIDURAL; INFILTRATION; INTRACAUDAL; PERINEURAL ONCE
Status: COMPLETED | OUTPATIENT
Start: 2019-08-02 | End: 2019-08-02

## 2019-08-02 RX ORDER — LIDOCAINE HYDROCHLORIDE 20 MG/ML
20 INJECTION, SOLUTION INFILTRATION; PERINEURAL ONCE
Status: COMPLETED | OUTPATIENT
Start: 2019-08-02 | End: 2019-08-02

## 2019-08-02 RX ADMIN — IOPAMIDOL 2 ML: 408 INJECTION, SOLUTION INTRATHECAL at 08:27

## 2019-08-02 RX ADMIN — LIDOCAINE HYDROCHLORIDE 2 ML: 10 INJECTION, SOLUTION EPIDURAL; INFILTRATION; INTRACAUDAL; PERINEURAL at 08:27

## 2019-08-02 RX ADMIN — DEXAMETHASONE SODIUM PHOSPHATE 4 MG: 10 INJECTION, SOLUTION INTRAMUSCULAR; INTRAVENOUS at 08:27

## 2019-08-02 RX ADMIN — DEXAMETHASONE SODIUM PHOSPHATE 10 MG: 10 INJECTION, SOLUTION INTRAMUSCULAR; INTRAVENOUS at 08:26

## 2019-08-02 RX ADMIN — LIDOCAINE HYDROCHLORIDE 10 ML: 20 INJECTION, SOLUTION INFILTRATION; PERINEURAL at 08:26

## 2019-08-02 RX ADMIN — LIDOCAINE HYDROCHLORIDE 2 ML: 10 INJECTION, SOLUTION EPIDURAL; INFILTRATION; INTRACAUDAL; PERINEURAL at 08:26

## 2019-08-02 NOTE — H&P
Radiology History and Physical    Patient: Fatou Carrillo 70 y.o. female       Chief Complaint: No chief complaint on file. History of Present Illness: Back pain radiating down the left leg.     History:    Past Medical History:   Diagnosis Date    Abdominal mass, left upper quadrant 06/13/2017    no finding per pt    Abnormal finding on MRI of brain 3/16/2015    evaluated by neurologist and no findings    Acute pharyngitis 5/26/2016    Anemia NEC     Arrhythmia     a fib    Arthralgia of right hip 4/25/2016    Arthritis 2/18/2010    Asthma 2/18/2010    Cataract 9/24/2014    Diabetes ([address]     Elevated LFTs 4/16/2014    DAVID (generalized anxiety disorder) 1/22/2015    GERD (gastroesophageal reflux disease) 4/7/2014    Hammer toe of right foot 9/29/2014    Headache(784.0) 0/34/3783    Helicobacter pylori (H. 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 ([address] 9/29/2014    Preop examination 9/24/2014    Right foot injury 5/2/2017    Risk for falls 4/16/2014    Screening for breast cancer 9/23/2014    Screening for depression 4/16/2014    Shoulder pain, left 3/18/2014    Spondylitis, cervical ([address] 4/5/2010    Tinea pedis 6/3/2015    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 ([address] 9/29/2014     Family History   Problem Relation Age of Onset    Cancer Mother         mycosis fungoives - skin cancer/got into bloodstream    Stroke Father     Cancer Sister         one sister with breast cancer    Breast Cancer Sister     Cancer Paternal Aunt         2 paternal aunts with breast cancer    Breast Cancer Paternal Aunt     Thyroid Cancer Neg Hx      Social History Socioeconomic History    Marital status:      Spouse name: Not on file    Number of children: Not on file    Years of education: Not on file    Highest education level: Not on file   Occupational History    Not on file   Social Needs    Financial resource strain: Not on file    Food insecurity:     Worry: Not on file     Inability: Not on file    Transportation needs:     Medical: Not on file     Non-medical: Not on file   Tobacco Use    Smoking status: Former Smoker     Last attempt to quit: 1988     Years since quittin.1    Smokeless tobacco: Never Used   Substance and Sexual Activity    Alcohol use: No    Drug use: No    Sexual activity: Never   Lifestyle    Physical activity:     Days per week: Not on file     Minutes per session: Not on file    Stress: Not on file   Relationships    Social connections:     Talks on phone: Not on file     Gets together: Not on file     Attends Rastafarian service: Not on file     Active member of club or organization: Not on file     Attends meetings of clubs or organizations: Not on file     Relationship status: Not on file    Intimate partner violence:     Fear of current or ex partner: Not on file     Emotionally abused: Not on file     Physically abused: Not on file     Forced sexual activity: Not on file   Other Topics Concern    Not on file   Social History Narrative    Not on file       Allergies:    Allergies   Allergen Reactions    Latex Rash    Amoxil [Amoxicillin] Itching     rash    Cephalosporins Unknown (comments)    Levaquin [Levofloxacin] Other (comments)     Dizziness      Penicillins Rash     \"chickenpox like rash\" was in the 1980s    Percocet [Oxycodone-Acetaminophen] Nausea and Vomiting    Tetanus Vaccines And Toxoid Swelling    Tetracycline Hcl Rash     \"black fuzzy tongue\"       Current Medications:  Current Outpatient Medications   Medication Sig    ELIQUIS 5 mg tablet TAKE 1 TABLET BY MOUTH TWICE A DAY    rosuvastatin (CRESTOR) 10 mg tablet TAKE 1 TABLET BY MOUTH EVERY DAY    albuterol (PROVENTIL HFA, VENTOLIN HFA, PROAIR HFA) 90 mcg/actuation inhaler Take 2 Puffs by inhalation every four (4) hours as needed for Wheezing.  metFORMIN (GLUCOPHAGE) 500 mg tablet Take 1 Tab by mouth two (2) times daily (with meals).  aspirin delayed-release 81 mg tablet TAKE 1 TABLET BY MOUTH EVERY DAY    propafenone (RYTHMOL) 150 mg tablet TAKE 1 TABLET BY MOUTH EVERY 8 HOURS    hydroCHLOROthiazide (HYDRODIURIL) 25 mg tablet TAKE 1 TABLET BY MOUTH EVERY DAY    ASPIRIN PO Take 81 mg by mouth daily.  calcium carbonate/vitamin D3 (CALTRATE 600 + D PO) Take 1 Tab by mouth daily.  cetirizine (ZYRTEC) 10 mg tablet Take 10 mg by mouth daily as needed for Allergies.  metoprolol succinate (TOPROL-XL) 50 mg XL tablet TAKE 1 TABLET BY MOUTH EVERY DAY    glucose blood VI test strips (CONTOUR NEXT TEST STRIPS) strip Check blood sugar once a day e11.9    albuterol (PROVENTIL HFA, VENTOLIN HFA, PROAIR HFA) 90 mcg/actuation inhaler Take 2 Puffs by inhalation every four (4) hours as needed for Wheezing.  pantoprazole (PROTONIX) 40 mg tablet TAKE 1 TABLET BY MOUTH EVERY DAY    MULTIVITAMIN PO Take 1 Tab by mouth daily.  Lancets (FREESTYLE LANCETS) Misc Test once daily.  (diagnosis code: 250.00)    Blood-Glucose Meter monitoring kit Once daily before breakfast     Current Facility-Administered Medications   Medication Dose Route Frequency    dexamethasone (PF) (DECADRON) injection 10 mg  10 mg Intra artICUlar ONCE    iopamidol (ISOVUE-M 200) 41 % contrast solution 3 mL  3 mL IntraSPINal ONCE    lidocaine (XYLOCAINE) 20 mg/mL (2 %) injection 400 mg  20 mL SubCUTAneous ONCE    lidocaine (PF) (XYLOCAINE) 10 mg/mL (1 %) injection 2 mL  2 mL SubCUTAneous ONCE    dexamethasone (PF) (DECADRON) injection 10 mg  10 mg Intra artICUlar ONCE    lidocaine (PF) (XYLOCAINE) 10 mg/mL (1 %) injection 2 mL  2 mL SubCUTAneous ONCE Physical Exam:  Blood pressure 141/66, pulse 75, temperature 97.6 °F (36.4 °C), resp. rate 16, height 5' 2\" (1.575 m), weight 90.7 kg (200 lb), last menstrual period 02/18/1983, SpO2 97 %, not currently breastfeeding. GENERAL: alert, cooperative, no distress, appears stated age  LUNG: clear to auscultation bilaterally  HEART: regular rate and rhythm  ABD: Non tender, non distended. Alerts:    Hospital Problems  Date Reviewed: 3/22/2019    None          Laboratory:    No results for input(s): HGB, HCT, WBC, PLT, INR, BUN, CREA, K, CRCLT, HGBEXT, HCTEXT, PLTEXT in the last 72 hours. No lab exists for component: PTT, PT, INREXT      Plan of Care/Planned Procedure:  Risks, benefits, and alternatives reviewed with patient and she agrees to proceed with the procedure.        Jamey Fay MD

## 2019-08-02 NOTE — DISCHARGE INSTRUCTIONS
[facility]  Special Procedures/Radiology Department      Steroidal Injection      Go home and rest.     No vigorous physical activity today. Be aware that numbness and/or tingling can occur up to 24 hours after the injection. No driving today. Resume your previous diet. Resume your previous medications. Depending on your job, you may return to work in 25 to 48 hours. It may take up to one week after the injection to see a change or an improvement in your symptoms. Be sure to follow up with your physician. Tell your physician if the injection helped with your symptoms or if the injection did nothing for your symptoms. For minor discomfort, you can take Tylenol, as directed on the label.       If you have any questions or concerns, please call [phone] and ask for the nurse on-call

## 2019-08-13 ENCOUNTER — OFFICE VISIT (OUTPATIENT)
Dept: CARDIOLOGY CLINIC | Age: 79
End: 2019-08-13

## 2019-08-13 VITALS
SYSTOLIC BLOOD PRESSURE: 118 MMHG | RESPIRATION RATE: 18 BRPM | DIASTOLIC BLOOD PRESSURE: 70 MMHG | HEART RATE: 85 BPM | OXYGEN SATURATION: 98 % | WEIGHT: 207 LBS | BODY MASS INDEX: 38.09 KG/M2 | HEIGHT: 62 IN

## 2019-08-13 DIAGNOSIS — I10 ESSENTIAL HYPERTENSION: ICD-10-CM

## 2019-08-13 DIAGNOSIS — I48.0 PAROXYSMAL ATRIAL FIBRILLATION (HCC): ICD-10-CM

## 2019-08-13 DIAGNOSIS — I83.813 VARICOSE VEINS OF BOTH LOWER EXTREMITIES WITH PAIN: Primary | ICD-10-CM

## 2019-08-13 DIAGNOSIS — I87.2 VENOUS INSUFFICIENCY OF BOTH LOWER EXTREMITIES: ICD-10-CM

## 2019-08-13 NOTE — PROGRESS NOTES
8/13/2019 12:05 PM      Subjective:     Deandra Mckeon is seen in vascular clinic today for further evaluation. Referred by Dr. Adeel Barron. She denies chest pain, chest pressure/discomfort, dyspnea, palpitations, irregular heart beats, near-syncope, syncope, fatigue, orthopnea, paroxysmal nocturnal dyspnea, exertional chest pressure/discomfort, tachypnea. 1.  Have you ever had vein stripping surgery NO       2. Have you ever had vein injections? NO        3. Have you ever had a blood clot? NO       4. Have you ever had phlebitis? NO                                                                      Does anyone in your family have (or used to have) varicose veins, spider veins, leg ulcers or swollen legs? Father  NO  Mother NO  Brother(s) NO  Sister(s) NO  Other NO           1. Do you experience any of the following in your legs? Aching/pain? [] One leg [x] Both legs  Heaviness? [] One leg [] Both legs  Tiredness/fatigue? [] One leg [] Both legs  Itching/burning? [] One leg [] Both legs  Swollen ankles? [] One leg [] Both legs  Leg cramps? [] One leg [x] Both legs  Restless legs? [] One leg [x] Both legs  Throbbing? [] One leg [] Both legs      2. Have your veins gotten worse in recent months? NO        3. Do you take any medication for pain (i.e., Advil, Motrin)  NO       4. Do you elevate your legs to relieve discomfort? NO        5. Do you exercise? NO        6. Do you wear prescription compression stockings? NO           7. Do you wear light support hose (i.e., [employer])? NO                   8.    Do you have any problem walking? NO                             9.   What type of work do you do? Retired. 10.  Have you ever had any test(s) done on your veins? YES            11.  Were you diagnosed with saphenous vein reflux?   YES         Visit Vitals  /70 (BP 1 Location: Right arm, BP Patient Position: Sitting)   Pulse 85   Resp 18   Ht 5' 2\" (1.575 m)   Wt 207 lb (93.9 kg)   LMP 02/18/1983   SpO2 98%   BMI 37.86 kg/m²       Current Outpatient Medications   Medication Sig    ELIQUIS 5 mg tablet TAKE 1 TABLET BY MOUTH TWICE A DAY    rosuvastatin (CRESTOR) 10 mg tablet TAKE 1 TABLET BY MOUTH EVERY DAY    metFORMIN (GLUCOPHAGE) 500 mg tablet Take 1 Tab by mouth two (2) times daily (with meals).  aspirin delayed-release 81 mg tablet TAKE 1 TABLET BY MOUTH EVERY DAY    propafenone (RYTHMOL) 150 mg tablet TAKE 1 TABLET BY MOUTH EVERY 8 HOURS    hydroCHLOROthiazide (HYDRODIURIL) 25 mg tablet TAKE 1 TABLET BY MOUTH EVERY DAY    ASPIRIN PO Take 81 mg by mouth daily.  calcium carbonate/vitamin D3 (CALTRATE 600 + D PO) Take 1 Tab by mouth daily.  cetirizine (ZYRTEC) 10 mg tablet Take 10 mg by mouth daily as needed for Allergies.  metoprolol succinate (TOPROL-XL) 50 mg XL tablet TAKE 1 TABLET BY MOUTH EVERY DAY    glucose blood VI test strips (CONTOUR NEXT TEST STRIPS) strip Check blood sugar once a day e11.9    albuterol (PROVENTIL HFA, VENTOLIN HFA, PROAIR HFA) 90 mcg/actuation inhaler Take 2 Puffs by inhalation every four (4) hours as needed for Wheezing.  pantoprazole (PROTONIX) 40 mg tablet TAKE 1 TABLET BY MOUTH EVERY DAY    MULTIVITAMIN PO Take 1 Tab by mouth daily.  Lancets (FREESTYLE LANCETS) Misc Test once daily. (diagnosis code: 250.00)    Blood-Glucose Meter monitoring kit Once daily before breakfast    albuterol (PROVENTIL HFA, VENTOLIN HFA, PROAIR HFA) 90 mcg/actuation inhaler Take 2 Puffs by inhalation every four (4) hours as needed for Wheezing. No current facility-administered medications for this visit.           Objective:      Visit Vitals  /70 (BP 1 Location: Right arm, BP Patient Position: Sitting)   Pulse 85   Resp 18   Ht 5' 2\" (1.575 m)   Wt 207 lb (93.9 kg)   SpO2 98%   BMI 37.86 kg/m²       Data Review:     Reviewed and/or ordered active problem list, medication list tests    Past Medical History:   Diagnosis Date    Abdominal mass, left upper quadrant 06/13/2017    no finding per pt    Abnormal finding on MRI of brain 3/16/2015    evaluated by neurologist and no findings    Acute pharyngitis 5/26/2016    Anemia NEC     Arrhythmia     a fib    Arthralgia of right hip 4/25/2016    Arthritis 2/18/2010    Asthma 2/18/2010    Cataract 9/24/2014    Diabetes ([address]     Elevated LFTs 4/16/2014    DAVID (generalized anxiety disorder) 1/22/2015    GERD (gastroesophageal reflux disease) 4/7/2014    Hammer toe of right foot 9/29/2014    Headache(784.0) 5/26/2940    Helicobacter pylori (H. 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 ([address] 9/29/2014    Preop examination 9/24/2014    Right foot injury 5/2/2017    Risk for falls 4/16/2014    Screening for breast cancer 9/23/2014    Screening for depression 4/16/2014    Shoulder pain, left 3/18/2014    Spondylitis, cervical ([address] 4/5/2010    Tinea pedis 6/3/2015    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 straightened colon out    HX ROTATOR CUFF REPAIR  2009    left  IR INJ FORAMIN EPID LUMB ANES/STER ADDL  2019    IR INJ FORAMIN EPID LUMB ANES/STER SNGL  2019    OPEN REPAIR CLAVICLE FRACTURE  2009    did not have surgery for this/pt states she was in an accident and had RCR on left, but the clavicle was not repaired - injury was done at the same time     Allergies   Allergen Reactions    Latex Rash    Amoxil [Amoxicillin] Itching     rash    Cephalosporins Unknown (comments)    Levaquin [Levofloxacin] Other (comments)     Dizziness      Penicillins Rash     \"chickenpox like rash\" was in the 1980s    Percocet [Oxycodone-Acetaminophen] Nausea and Vomiting    Tetanus Vaccines And Toxoid Swelling    Tetracycline Hcl Rash     \"black fuzzy tongue\"      Family History   Problem Relation Age of Onset    Cancer Mother         mycosis fungoives - skin cancer/got into bloodstream    Stroke Father     Cancer Sister         one sister with breast cancer    Breast Cancer Sister     Cancer Paternal Aunt         2 paternal aunts with breast cancer    Breast Cancer Paternal Aunt     Thyroid Cancer Neg Hx       Social History     Socioeconomic History    Marital status:      Spouse name: Not on file    Number of children: Not on file    Years of education: Not on file    Highest education level: Not on file   Occupational History    Not on file   Social Needs    Financial resource strain: Not on file    Food insecurity:     Worry: Not on file     Inability: Not on file    Transportation needs:     Medical: Not on file     Non-medical: Not on file   Tobacco Use    Smoking status: Former Smoker     Last attempt to quit: 1988     Years since quittin.1    Smokeless tobacco: Never Used   Substance and Sexual Activity    Alcohol use: No    Drug use: No    Sexual activity: Never   Lifestyle    Physical activity:     Days per week: Not on file     Minutes per session: Not on file    Stress: Not on file   Relationships    Social connections: Talks on phone: Not on file     Gets together: Not on file     Attends [employer] service: Not on file     Active member of club or organization: Not on file     Attends meetings of clubs or organizations: Not on file     Relationship status: Not on file    Intimate partner violence:     Fear of current or ex partner: Not on file     Emotionally abused: Not on file     Physically abused: Not on file     Forced sexual activity: Not on file   Other Topics Concern    Not on file   Social History Narrative    Not on file       Review of Systems     General: Not Present- Anorexia, Chills, Dietary Changes, Fatigue, Fever, Medication Changes, Night Sweats, Weight Gain > 10lbs. and Weight Loss > 10lbs. .  Skin: Not Present- Bruising and Excessive Sweating. HEENT: Not Present- Headache, Visual Loss and Vertigo. Respiratory: Not Present- Cough, Decreased Exercise Tolerance, Difficulty Breathing, Snoring and Wheezing. Cardiovascular: Not Present- Abnormal Blood Pressure, Chest Pain, Claudications, Difficulty Breathing On Exertion, Fainting / Blacking Out, Irregular Heart Beat, Orthopnea, Palpitations, Paroxysmal Nocturnal Dyspnea, Rapid Heart Rate, Shortness of Breath. Gastrointestinal: Not Present- Black, Tarry Stool, Bloody Stool, Diarrhea, Hematemesis, Rectal Bleeding and Vomiting. Musculoskeletal: Not Present- Muscle Pain and Muscle Weakness. Neurological: Not Present- Dizziness. Psychiatric: Not Present- Depression. Endocrine: Not Present- Cold Intolerance, Heat Intolerance and Thyroid Problems. Hematology: Not Present- Abnormal Bleeding, Anemia, Blood Clots and Easy Bruising.       Physical Exam   The physical exam findings are as follows:       General   Mental Status - Alert. General Appearance - Not in acute distress. Chest and Lung Exam   Inspection: Accessory muscles - No use of accessory muscles in breathing.   Auscultation:   Breath sounds: - Normal.      Cardiovascular   Inspection: Jugular vein - Bilateral - Inspection Normal.  Palpation/Percussion:   Apical Impulse: - Normal.  Auscultation: Rhythm - Regular. Heart Sounds - S1 WNL and S2 WNL. No S3 or S4. Murmurs & Other Heart Sounds: Auscultation of the heart reveals - No Murmurs. Carotid arteries - No Carotid bruit. Abdomen   Palpation/Percussion: Palpation and Percussion of the abdomen reveal - No Palpable abdominal masses. Auscultation: Auscultation of the abdomen reveals - Bowel sounds normal.      Neurologic   Mental Status: Affect - normal.  Motor: - Normal. Gait - Normal.      Peripheral Vascular   Upper Extremity: Inspection - Bilateral - No Cyanotic nailbeds or Digital clubbing. Lower Extremity:   Palpation:   Femoral pulse - Rt. [x] normal  [] weak  [] non palpable     Lt. [x] normal  [] weak  [] non palpable                             Popliteal pulse - Rt. [x] normal  [] weak  [] non palpable     Lt. [x] normal  [] weak  [] non palpable       Dorsalis pedis pulse - Rt. [x] normal  [] weak  [] non palpable     Lt. [x] normal  [] weak  [] non palpable    Posterior tibia pulse - Rt. [x] normal  [] weak  [] non palpable     Lt. [x] normal  [] weak  [] non palpable                                             Edema:       Rt. Leg  []             Lt. Leg  []  Telangiectasia & spider veins: Rt. Leg  [x]             Lt. Leg  [x]  Varicose veins:   Rt. Leg  [x]             Lt. Leg  [x]   Skin pigmentation:   Rt. Leg  [x]             Lt. Leg  [x]   Healed venous ulcer:   Rt. Leg  []             Lt. Leg  []                                            Assessment:       ICD-10-CM ICD-9-CM    1. Varicose veins of both lower extremities with pain I83.813 454.8    2. Venous insufficiency of both lower extremities I87.2 459.81    3. Paroxysmal atrial fibrillation (HCC) I48.0 427.31    4. Essential hypertension I10 401.9        Plan:     1. LE varicose veins with pain: CEAP class 4 A. compression stocking 20-30 mmHg, thigh high.  Photo taken. Instruction given on daily leg elevation, mild exercise and weight reduction. 2. BP controlled. 3. F/u with Dr. Brianda Edwards for cardiac care.      Ellyn Shah MD  8/13/2019  12:05 PM

## 2019-08-13 NOTE — PROGRESS NOTES
Verified patient with 2 identifiers   Reviewed record in preparation for visit and obtained necessary documentation. Chief Complaint   Patient presents with    Leg Swelling     here to discuss LE venous doppler - had done on 7/26/19 -     1. Have you been to the ER, urgent care clinic since your last visit? Hospitalized since your last visit? No     2. Have you seen or consulted any other health care providers outside of the [address] [place] [address] since your last visit? Include any pap smears or colon screening.   Yes Dr Carly Traore at [facility] for back injections

## 2019-08-29 ENCOUNTER — TELEPHONE (OUTPATIENT)
Dept: CARDIOLOGY CLINIC | Age: 79
End: 2019-08-29

## 2019-08-29 NOTE — TELEPHONE ENCOUNTER
Patient needs to be cleared for upcoming surgery on 9/10/2019 with Dr. Leilani Sicard  With [id number] [facility]. Also requesting copy of EKG for clearance for procedure.  Fax # [phone]        Thanks

## 2019-08-29 NOTE — TELEPHONE ENCOUNTER
Left message with Fani Lizarraga - Dr Savanna Agarwal Nurse-requesting a pre op clearance form.  To please fax to [phone]

## 2019-08-30 ENCOUNTER — HOSPITAL ENCOUNTER (OUTPATIENT)
Dept: LAB | Age: 79
Discharge: HOME | End: 2019-08-30
Payer: MEDICARE

## 2019-08-30 ENCOUNTER — OFFICE VISIT (OUTPATIENT)
Dept: INTERNAL MEDICINE CLINIC | Age: 79
End: 2019-08-30

## 2019-08-30 VITALS
DIASTOLIC BLOOD PRESSURE: 80 MMHG | TEMPERATURE: 98.6 F | RESPIRATION RATE: 19 BRPM | SYSTOLIC BLOOD PRESSURE: 130 MMHG | HEART RATE: 88 BPM | WEIGHT: 210 LBS | HEIGHT: 62 IN | BODY MASS INDEX: 38.64 KG/M2

## 2019-08-30 DIAGNOSIS — E78.2 MIXED HYPERLIPIDEMIA: ICD-10-CM

## 2019-08-30 DIAGNOSIS — E11.628 TYPE 2 DIABETES MELLITUS WITH PRESSURE CALLUS (HCC): ICD-10-CM

## 2019-08-30 DIAGNOSIS — E07.9 THYROID DISEASE: ICD-10-CM

## 2019-08-30 DIAGNOSIS — L84 TYPE 2 DIABETES MELLITUS WITH PRESSURE CALLUS (HCC): ICD-10-CM

## 2019-08-30 DIAGNOSIS — Z01.818 PREOP EXAM FOR INTERNAL MEDICINE: Primary | ICD-10-CM

## 2019-08-30 DIAGNOSIS — F41.9 ANXIETY: ICD-10-CM

## 2019-08-30 DIAGNOSIS — I48.0 PAROXYSMAL ATRIAL FIBRILLATION (HCC): ICD-10-CM

## 2019-08-30 PROCEDURE — 83036 HEMOGLOBIN GLYCOSYLATED A1C: CPT

## 2019-08-30 PROCEDURE — 80061 LIPID PANEL: CPT

## 2019-08-30 PROCEDURE — 80053 COMPREHEN METABOLIC PANEL: CPT

## 2019-08-30 PROCEDURE — 85025 COMPLETE CBC W/AUTO DIFF WBC: CPT

## 2019-08-30 PROCEDURE — 36415 COLL VENOUS BLD VENIPUNCTURE: CPT

## 2019-08-30 PROCEDURE — 84443 ASSAY THYROID STIM HORMONE: CPT

## 2019-08-30 PROCEDURE — 82043 UR ALBUMIN QUANTITATIVE: CPT

## 2019-08-30 NOTE — TELEPHONE ENCOUNTER
Patient called to ask if we sent over pre op clearance with her EKG-advised that we are awaiting a pre op clearance form from [address] [place] [address].

## 2019-08-30 NOTE — PROGRESS NOTES
Preoperative [facility] Jen is 70 y.o. female (1940) who presents for preoperative evaluation. Procedure/Surgery: Lumbar Laminectomy   Date of Procedure/Surgery:9/10/19   Surgeon: Dr. Gilbert Navarro: [facility]  Primary Physician: Tierney Beebe MD    The patient reports feeling well. No complaints other than her ongoing back pain which is causing worsened discomfort from her buttocks to her posterior leg. No other complaints today. No chest pain or shortness of breath. Patient Active Problem List   Diagnosis Code    Hyperlipemia E78.5    Arthritis M19.90    HTN (hypertension) I10    Headache(784.0) R51    Spondylitis, cervical (HCC) M46.92    Tingling sensation R20.2    Chest pain R07.9    Anemia D64.9    Cyst of right kidney N28.1    Hip pain, right M25.551    Acute bronchitis J20.9    Visual floaters H43.399    Colon cancer screening Z12.11    Postmenopausal state Z78.0    Vitamin D deficiency E55.9    Bilateral hip pain M25.551, M25.552    Postmenopausal disorder N95.1    Numbness and tingling of right leg R20.0, R20.2    Foot pain, left M79.672    Rotator cuff (capsule) sprain and strain [id number]    Osteoarthritis of acromioclavicular joint M19.019    Shoulder pain, left M25.512    GERD (gastroesophageal reflux disease) K21.9    Elevated LFTs R94.5    Screening for depression Z13.31    Risk for falls Z91.81    Acute asthma exacerbation J45. 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 pylori) infection A04.8    Intractable migraine with aura without status migrainosus G43.119    Chronic asthma with status asthmaticus J45.902    Right foot injury S99.921A    Abdominal mass, left upper quadrant R19.02    Left upper quadrant pain R10.12    Paroxysmal atrial fibrillation (HCC) I48.0    Severe obesity (BMI 35.0-39. 9) E66.01    Varicose veins of both lower extremities with pain I83.813    Venous insufficiency of both lower extremities I87.2       Prior to Admission medications    Medication Sig Start Date End Date Taking? Authorizing Provider   ELIQUIS 5 mg tablet TAKE 1 TABLET BY MOUTH TWICE A DAY 6/19/19  Yes Miladys QUEZADA MD   rosuvastatin (CRESTOR) 10 mg tablet TAKE 1 TABLET BY MOUTH EVERY DAY 6/18/19  Yes Vernette Kawasaki, MD   albuterol (PROVENTIL HFA, VENTOLIN HFA, PROAIR HFA) 90 mcg/actuation inhaler Take 2 Puffs by inhalation every four (4) hours as needed for Wheezing. 6/3/19  Yes Darvin Hoyt MD   metFORMIN (GLUCOPHAGE) 500 mg tablet Take 1 Tab by mouth two (2) times daily (with meals). 5/29/19  Yes Vernette Kawasaki, MD   aspirin delayed-release 81 mg tablet TAKE 1 TABLET BY MOUTH EVERY DAY 5/22/19  Yes Vernette Kawasaki, MD   propafenone (RYTHMOL) 150 mg tablet TAKE 1 TABLET BY MOUTH EVERY 8 HOURS 5/20/19  Yes Gaby Fields MD   hydroCHLOROthiazide (HYDRODIURIL) 25 mg tablet TAKE 1 TABLET BY MOUTH EVERY DAY 4/29/19  Yes Avery Dominguez MD   ASPIRIN PO Take 81 mg by mouth daily. Yes Provider, Historical   calcium carbonate/vitamin D3 (CALTRATE 600 + D PO) Take 1 Tab by mouth daily. Yes Provider, Historical   cetirizine (ZYRTEC) 10 mg tablet Take 10 mg by mouth daily as needed for Allergies. Yes Provider, Historical   metoprolol succinate (TOPROL-XL) 50 mg XL tablet TAKE 1 TABLET BY MOUTH EVERY DAY 3/18/19  Yes Jered Lara MD   glucose blood VI test strips (CONTOUR NEXT TEST STRIPS) strip Check blood sugar once a day e11.9 3/5/19  Yes Livia Velasco MD   albuterol (PROVENTIL HFA, VENTOLIN HFA, PROAIR HFA) 90 mcg/actuation inhaler Take 2 Puffs by inhalation every four (4) hours as needed for Wheezing. 8/23/18  Yes Taina Castillo MD   pantoprazole (PROTONIX) 40 mg tablet TAKE 1 TABLET BY MOUTH EVERY DAY   Yes Provider, Historical   MULTIVITAMIN PO Take 1 Tab by mouth daily. Yes Provider, Historical   Lancets (FREESTYLE LANCETS) Misc Test once daily. (diagnosis code: 250.00) 10/24/13  Yes Roman Smith MD   Blood-Glucose Meter monitoring kit Once daily before breakfast 9/20/13  Yes Roman Smith MD       Review of Systems   Musculoskeletal: Positive for back pain. Latex Allergy: Yes  Recent use of: ASA, NSAIDs and Eliquis  Tetanus up to date: No Tdap--pt is allergic  Anesthesia Complications: None  History of abnormal bleeding : None  History of Blood Transfusions: yes--years ago. Health Care Directive or Living Will: yes    Visit Vitals  /80 (BP 1 Location: Right arm, BP Patient Position: Sitting)   Pulse 88   Temp 98.6 °F (37 °C) (Oral)   Resp 19   Ht 5' 2\" (1.575 m)   Wt 210 lb (95.3 kg)   LMP 02/18/1983   BMI 38.41 kg/m²         Physical Exam   Constitutional: No distress. Cardiovascular: Normal rate and regular rhythm. No murmur heard. Pulmonary/Chest: Effort normal and breath sounds normal.       Labs: will order today      Diagnoses and all orders for this visit:    1. Preop exam for internal medicine  -     CBC WITH AUTOMATED DIFF    2. 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  Mixed hyperlipidemia  -     LIPID PANEL    4. Anxiety    5. Paroxysmal atrial fibrillation (HCC)  -     TSH 3RD GENERATION    6. Thyroid disease  -     REFERRAL TO ENDOCRINOLOGY        After reviewing the patient's history & exam she is low risk for cardiac complications.   No contraindications to planned surgery

## 2019-08-31 LAB
ALBUMIN SERPL-MCNC: 4.6 G/DL (ref 3.5–4.8)
ALBUMIN/CREAT UR: 12.6 MG/G CREAT (ref 0–30)
ALBUMIN/GLOB SERPL: 1.8 {RATIO} (ref 1.2–2.2)
ALP SERPL-CCNC: 46 IU/L (ref 39–117)
ALT SERPL-CCNC: 20 IU/L (ref 0–32)
AST SERPL-CCNC: 21 IU/L (ref 0–40)
BASOPHILS # BLD AUTO: 0 X10E3/UL (ref 0–0.2)
BASOPHILS NFR BLD AUTO: 1 %
BILIRUB SERPL-MCNC: 0.2 MG/DL (ref 0–1.2)
BUN SERPL-MCNC: 14 MG/DL (ref 8–27)
BUN/CREAT SERPL: 22 (ref 12–28)
CALCIUM SERPL-MCNC: 9.6 MG/DL (ref 8.7–10.3)
CHLORIDE SERPL-SCNC: 97 MMOL/L (ref 96–106)
CHOLEST SERPL-MCNC: 164 MG/DL (ref 100–199)
CO2 SERPL-SCNC: 30 MMOL/L (ref 20–29)
CREAT SERPL-MCNC: 0.64 MG/DL (ref 0.57–1)
CREAT UR-MCNC: 80.2 MG/DL
EOSINOPHIL # BLD AUTO: 0.1 X10E3/UL (ref 0–0.4)
EOSINOPHIL NFR BLD AUTO: 2 %
ERYTHROCYTE [DISTWIDTH] IN BLOOD BY AUTOMATED COUNT: 13.9 % (ref 12.3–15.4)
EST. AVERAGE GLUCOSE BLD GHB EST-MCNC: 151 MG/DL
GLOBULIN SER CALC-MCNC: 2.6 G/DL (ref 1.5–4.5)
GLUCOSE SERPL-MCNC: 111 MG/DL (ref 65–99)
HBA1C MFR BLD: 6.9 % (ref 4.8–5.6)
HCT VFR BLD AUTO: 35.8 % (ref 34–46.6)
HDLC SERPL-MCNC: 55 MG/DL
HGB BLD-MCNC: 11.5 G/DL (ref 11.1–15.9)
IMM GRANULOCYTES # BLD AUTO: 0 X10E3/UL (ref 0–0.1)
IMM GRANULOCYTES NFR BLD AUTO: 1 %
LDLC SERPL CALC-MCNC: 81 MG/DL (ref 0–99)
LYMPHOCYTES # BLD AUTO: 1.5 X10E3/UL (ref 0.7–3.1)
LYMPHOCYTES NFR BLD AUTO: 35 %
MCH RBC QN AUTO: 27.2 PG (ref 26.6–33)
MCHC RBC AUTO-ENTMCNC: 32.1 G/DL (ref 31.5–35.7)
MCV RBC AUTO: 85 FL (ref 79–97)
MICROALBUMIN UR-MCNC: 10.1 UG/ML
MONOCYTES # BLD AUTO: 0.3 X10E3/UL (ref 0.1–0.9)
MONOCYTES NFR BLD AUTO: 8 %
NEUTROPHILS # BLD AUTO: 2.5 X10E3/UL (ref 1.4–7)
NEUTROPHILS NFR BLD AUTO: 53 %
PLATELET # BLD AUTO: 319 X10E3/UL (ref 150–450)
POTASSIUM SERPL-SCNC: 4.2 MMOL/L (ref 3.5–5.2)
PROT SERPL-MCNC: 7.2 G/DL (ref 6–8.5)
RBC # BLD AUTO: 4.23 X10E6/UL (ref 3.77–5.28)
SODIUM SERPL-SCNC: 140 MMOL/L (ref 134–144)
TRIGL SERPL-MCNC: 138 MG/DL (ref 0–149)
TSH SERPL DL<=0.005 MIU/L-ACNC: 1.78 UIU/ML (ref 0.45–4.5)
VLDLC SERPL CALC-MCNC: 28 MG/DL (ref 5–40)
WBC # BLD AUTO: 4.4 X10E3/UL (ref 3.4–10.8)

## 2019-09-04 NOTE — H&P
Jeffrey Fair  Location: - [facility]  Patient #: [id number]  : 1940   / Language: [place] / Race: Black or   Female      History of Present Illness  The patient is a 70year old female who presents for a Recheck of Chronic lumbar back pain. This condition occurred without any known injury. The last clinic visit was 2 week(s) ago. Management changes made at the last visit include adding medication (Howie L3/4, L4/5 SHARAD). Symptoms include pain. Symptoms are located in the low back and on the left side more than the right. The pain radiates to the left posterior thigh and left lateral thigh. The patient describes the pain as aching. Onset was gradual. The symptoms occur constantly and intermittently. The patient describes symptoms as severe and worsening. Past evaluation has included x-ray of the lumbar spine. Past treatment has included epidural injection. Note for \"Chronic lumbar back pain\": She has had some lower back pain with radiation to the left hip and left thigh region.  Is worse with prolonged standing and walking. Jose Stroud is already been evaluated for her left hip and back. Jose Maximus is done physical therapy without improvement. *  *  (19) Patient presents for lumbar MRI follow up. She continues to struggle with lower back pain with radiation to the left hip and left thigh region. She is accompanied by her daughter. Images from previous visit reviewed. *  *  (19) Patient presents for follow up after injections. She had bilateral ESIs L3-4, L4-5 on 19. She states she did not get any relief from the injections and it was a very unpleasant experience. She is not interested in trying them for a second pily. She continues to struggle with lower back pain with radiation to the left hip and left thigh region. She is accompanied by her daughter. Images from previous visit reviewed.       Problem List/Past Medical  REVIEW OF SYSTEMS: Systems were reviewed by the provider.    Left leg pain (729.5  M79.605)    Left Impingement (726.2  M75.42)    Radiculitis, cervical (723.4  M54.12)    Neck pain (723.1  M54.2)    Patient was referred to their primary care physician for Blood Pressure screening.    Lumbar spondylosis (721.3  M47.816)    Left lumbar radiculopathy (724.4  M54.16)    Lumbar stenosis with neurogenic claudication (724.03  M48.062)    Weight above 97th percentile (V49.89  Z78.9)    DDD (degenerative disc disease), lumbar (722.52  M51.36)   PT  HNP (722.10)    Tendonitis (726.10  M75.82)    Pain of left shoulder region (719.41  M25.512)      Allergies  Tetracyclines   black fuzzy tongue  Penicillins   Hives. Percocet *ANALGESICS - OPIOID*   Dizziness, Nausea. Tetanus Toxoids   Hives. PredniSONE *CORTICOSTEROIDS*   intolerance    Medication History   Neurontin  (300MG Capsule, 1 (one) Capsule Oral at bedtime, Taken starting 06/27/2019) Active. Medrol  (4MG Tab Ther Pack, 1 (one) tablet Oral as directed per package instructions, Taken starting 04/15/2019) Active. Tylenol with Codeine #3  (300-30MG Tablet, 1 (one) Oral every 4-6 hours, as needed, Taken starting 08/21/2019) Active. Ventolin HFA  (Inhalation) Specific strength unknown - Active. Multivitamin Adult  (Oral) Active. Calcium Plus Vitamin D  (Oral) Specific strength unknown - Active. MetFORMIN HCl  (Oral) Specific strength unknown - Active. HydroCHLOROthiazide  (Oral) Specific strength unknown - Active. Metoprolol Succinate ER  (Oral) Specific strength unknown - Active. Aspirin  (Oral) Specific strength unknown - Active. Rosuvastatin Calcium  (Oral) Specific strength unknown - Active. Propafenone HCl  (Oral) Specific strength unknown - Active. Eliquis  (Oral) Specific strength unknown - Active.   Medications Reconciled     Diagnostic Studies History  Lumbar Spine X-ray   Date: 8/29/2019, Results: Grade 1 anterolisthesis at L3-4 and L4-5; spondylosis ; no fractures or lytic lesions    Other Problems   Unspecified Diagnosis    Treatment options were discussed with the patient in full.    Unspecified Diagnosis      Vitals   Weight: 200 lb   Height: 62 in   Body Surface Area: 1.91 m²   Body Mass Index: 36.58 kg/m²                Physical Exam  Neurologic  Sensory  Light Touch - Intact - Globally. Overall Assessment of Muscle Strength and Tone reveals  Lower Extremities - Right Iliopsoas - 5/5. Left Iliopsoas - 5/5. Right Tibialis Anterior - 5/5. Left Tibialis Anterior - 4-/5. Right Gastroc-Soleus - 5/5. Left Gastroc-Soleus - 5/5. Right EHL - 4-/5. Left EHL - 4-/5. General Assessment of Reflexes  Right Ankle - Clonus is not present. Left Ankle - Clonus is not present. Reflexes (Dermatomes)  2/2 Normal - Left Achilles (L5-S2), Left Knee (L2-4), Right Achilles (L5-S2) and Right Knee (L2-4). Musculoskeletal  Global Assessment  Examination of related systems reveals - well-developed, well-nourished, in no acute distress, alert and oriented x 3. Gait and Station - normal gait and station and normal posture. Right Lower Extremity - normal strength and tone, normal range of motion without pain and no instability, subluxation or laxity. Left Lower Extremity - normal strength and tone, normal range of motion without pain and no instability, subluxation or laxity. Spine/Ribs/Pelvis  Cervical Spine - Examination of the cervical spine reveals - no tenderness to palpation, no pain, no swelling, edema or erythema, normal cervical spine movements and normal sensation. Thoracic (Dorsal) Spine - Examination of the thoracic spine reveals - no tenderness over thoracic vertebrae, no pain, normal sensation and normal thoracic spine movements. Lumbosacral Spine - Examination of the lumbosacral spine reveals - no known fractures or deformities. Inspection and Palpation - Tenderness - moderate. Assessment of pain reveals the following findings - The pain is characterized as - moderate.  Location - pain refers to hip on affected side and pain refers laterally to left lower back. ROJM - Trunk Extension - 15 degrees. Lumbar Spine Flexion - 35 °. Lumbosacral Spine - Functional Testing - Babinski Test negative, Prone Knee Bending Test negative, Slump Test negative, Straight Leg Raising Test negative. Assessment & Plan   Spondylolisthesis (Acquired) (738.4  M43.10)  Impression: She is struggling with severe stenosis at L3-4 and L4-5 with spondylolisthesis at those levels. Injections have not helped her at all. She is a candidate for a lumbar laminectomy L3-4 and L4-5 and a posterior lumbar fusion. The risks and benefits were discussed at length with the patient and the patient has elected to proceed. Indications for surgery include failed conservative treatment. Alternative treatments, risks and the perioperative course were discussed with the patient. All questions were answered. The risks and benefits of the procedure were explained. Benefits include definitive diagnosis, relief of pain, elimination of deformity and improved function. Risks of surgery including bleeding, infection, weakness, numbness, CSF leak, failure to improve symptoms, exacerbation of medical co-morbidities and even death were discussed with the patient. Current Plans  X-RAY EXAM OF LUMBAR SPINE, 2 OR 3 VIEWS (40355) (AP and Lateral views were taken today using Computered Radiography.)  Pt Education - How to access health information online: discussed with patient and provided information. Discussed the importance of a well-balanced healthy diet and regular exercise.   Lumbar stenosis with neurogenic claudication (724.03  M48.062)  DDD (degenerative disc disease), lumbar (722.52  M51.36)    Treatment options were discussed with the patient in full.(V65.49)  Current Plans  Presurgical planning was preformed with the patient today  Surgery to be scheduled  Procedure: Lumbar Laminectomy L3-4 and L4-5 with PLF L3-5    Date of Surgery Update:  Ángel Ludwig was seen and examined. History and physical has been reviewed. The patient has been examined.  There have been no significant clinical changes since the completion of the originally dated History and Physical.    Signed By: Amparo Loo MD     September 10, 2019 1:29 PM             Signed by Amparo Loo MD

## 2019-09-04 NOTE — TELEPHONE ENCOUNTER
Patient last seen on 7/17/19. Scheduled to have a lumbar laminectomy L3-L4 with posterior lumbar fusion L3-L5. Currently takes eliquis. Dr Toni Jimenez will be performing surgery on September 10 2019.  Please advise

## 2019-09-04 NOTE — TELEPHONE ENCOUNTER
Ortho called for clearance, Dr FLAHERTY usually just makes a note that patient is cleared and instructions for meds if needing to stop /start blood thinners.

## 2019-09-05 RX ORDER — ACETAMINOPHEN AND CODEINE PHOSPHATE 300; 30 MG/1; MG/1
1 TABLET ORAL
COMMUNITY
End: 2019-09-14

## 2019-09-05 NOTE — TELEPHONE ENCOUNTER
May hold Eliquis 3 days prior to procedure   Low operative risk from cardiac standpoint to proceed with lumbar surgery.      Thanks    Routing

## 2019-09-05 NOTE — PERIOP NOTES
PAT PHONE INTERVIEW COMPLETED WITH PT.  PT WAS GIVEN INFECTION PREVENTION INFORMATION VERBALLY; PT VOICED UNDERSTANDING. PT WAS GIVEN THE OPPORTUNITY TO ASK ADDITIONAL QUESTIONS.     PT WILL  CHG SOAP AND DIRECTIONS FROM PAT TOMORROW    PT STATES PRE OP TESTING DONE WITH PCP

## 2019-09-09 ENCOUNTER — ANESTHESIA EVENT (OUTPATIENT)
Dept: SURGERY | Age: 79
DRG: 460 | End: 2019-09-09
Payer: MEDICARE

## 2019-09-10 ENCOUNTER — APPOINTMENT (OUTPATIENT)
Dept: GENERAL RADIOLOGY | Age: 79
DRG: 460 | End: 2019-09-10
Attending: ORTHOPAEDIC SURGERY
Payer: MEDICARE

## 2019-09-10 ENCOUNTER — HOSPITAL ENCOUNTER (INPATIENT)
Age: 79
LOS: 4 days | Discharge: NURSING, REHAB OR LONG-TERM CARE | DRG: 460 | End: 2019-09-14
Attending: ORTHOPAEDIC SURGERY | Admitting: ORTHOPAEDIC SURGERY
Payer: MEDICARE

## 2019-09-10 ENCOUNTER — ANESTHESIA (OUTPATIENT)
Dept: SURGERY | Age: 79
DRG: 460 | End: 2019-09-10
Payer: MEDICARE

## 2019-09-10 DIAGNOSIS — M48.061 SPINAL STENOSIS OF LUMBAR REGION WITHOUT NEUROGENIC CLAUDICATION: Primary | ICD-10-CM

## 2019-09-10 DIAGNOSIS — I10 ESSENTIAL HYPERTENSION: Primary | ICD-10-CM

## 2019-09-10 LAB
GLUCOSE BLD STRIP.AUTO-MCNC: 110 MG/DL (ref 65–100)
GLUCOSE BLD STRIP.AUTO-MCNC: 145 MG/DL (ref 65–100)
GLUCOSE BLD STRIP.AUTO-MCNC: 150 MG/DL (ref 65–100)
SERVICE CMNT-IMP: ABNORMAL

## 2019-09-10 PROCEDURE — 77030004391 HC BUR FLUT MEDT -C: Performed by: ORTHOPAEDIC SURGERY

## 2019-09-10 PROCEDURE — 77030040361 HC SLV COMPR DVT MDII -B: Performed by: ORTHOPAEDIC SURGERY

## 2019-09-10 PROCEDURE — 77030018836 HC SOL IRR NACL ICUM -A: Performed by: ORTHOPAEDIC SURGERY

## 2019-09-10 PROCEDURE — 77030026438 HC STYL ET INTUB CARD -A: Performed by: ANESTHESIOLOGY

## 2019-09-10 PROCEDURE — 77030037713 HC CLOSR DEV INCIS ZIP STRY -B: Performed by: ORTHOPAEDIC SURGERY

## 2019-09-10 PROCEDURE — 82962 GLUCOSE BLOOD TEST: CPT

## 2019-09-10 PROCEDURE — 86900 BLOOD TYPING SEROLOGIC ABO: CPT

## 2019-09-10 PROCEDURE — 74011250636 HC RX REV CODE- 250/636: Performed by: ANESTHESIOLOGY

## 2019-09-10 PROCEDURE — 77030005538 HC CATH URETH FOL44 BARD -B: Performed by: ORTHOPAEDIC SURGERY

## 2019-09-10 PROCEDURE — 76210000017 HC OR PH I REC 1.5 TO 2 HR: Performed by: ORTHOPAEDIC SURGERY

## 2019-09-10 PROCEDURE — 77030012406 HC DRN WND PENRS BARD -A: Performed by: ORTHOPAEDIC SURGERY

## 2019-09-10 PROCEDURE — 0SG3071 FUSION OF LUMBOSACRAL JOINT WITH AUTOLOGOUS TISSUE SUBSTITUTE, POSTERIOR APPROACH, POSTERIOR COLUMN, OPEN APPROACH: ICD-10-PCS | Performed by: ORTHOPAEDIC SURGERY

## 2019-09-10 PROCEDURE — 76010000173 HC OR TIME 3 TO 3.5 HR INTENSV-TIER 1: Performed by: ORTHOPAEDIC SURGERY

## 2019-09-10 PROCEDURE — 74011000250 HC RX REV CODE- 250: Performed by: PHYSICIAN ASSISTANT

## 2019-09-10 PROCEDURE — 77030034094 HC GRFT BN SUB ELITE TRNTY MUSC -I1: Performed by: ORTHOPAEDIC SURGERY

## 2019-09-10 PROCEDURE — 76060000038 HC ANESTHESIA 3.5 TO 4 HR: Performed by: ORTHOPAEDIC SURGERY

## 2019-09-10 PROCEDURE — 77030013079 HC BLNKT BAIR HGGR 3M -A: Performed by: ANESTHESIOLOGY

## 2019-09-10 PROCEDURE — 77030020782 HC GWN BAIR PAWS FLX 3M -B

## 2019-09-10 PROCEDURE — 74011000250 HC RX REV CODE- 250: Performed by: NURSE ANESTHETIST, CERTIFIED REGISTERED

## 2019-09-10 PROCEDURE — 0SG1071 FUSION OF 2 OR MORE LUMBAR VERTEBRAL JOINTS WITH AUTOLOGOUS TISSUE SUBSTITUTE, POSTERIOR APPROACH, POSTERIOR COLUMN, OPEN APPROACH: ICD-10-PCS | Performed by: ORTHOPAEDIC SURGERY

## 2019-09-10 PROCEDURE — C1713 ANCHOR/SCREW BN/BN,TIS/BN: HCPCS | Performed by: ORTHOPAEDIC SURGERY

## 2019-09-10 PROCEDURE — 74011250636 HC RX REV CODE- 250/636: Performed by: PHYSICIAN ASSISTANT

## 2019-09-10 PROCEDURE — 74011250637 HC RX REV CODE- 250/637: Performed by: ANESTHESIOLOGY

## 2019-09-10 PROCEDURE — 74011250636 HC RX REV CODE- 250/636: Performed by: NURSE ANESTHETIST, CERTIFIED REGISTERED

## 2019-09-10 PROCEDURE — 77030014007 HC SPNG HEMSTAT J&J -B: Performed by: ORTHOPAEDIC SURGERY

## 2019-09-10 PROCEDURE — 65270000029 HC RM PRIVATE

## 2019-09-10 PROCEDURE — 86923 COMPATIBILITY TEST ELECTRIC: CPT

## 2019-09-10 PROCEDURE — 74011000250 HC RX REV CODE- 250: Performed by: ORTHOPAEDIC SURGERY

## 2019-09-10 PROCEDURE — 01NB0ZZ RELEASE LUMBAR NERVE, OPEN APPROACH: ICD-10-PCS | Performed by: ORTHOPAEDIC SURGERY

## 2019-09-10 PROCEDURE — 77030029099 HC BN WAX SSPC -A: Performed by: ORTHOPAEDIC SURGERY

## 2019-09-10 PROCEDURE — 77030031139 HC SUT VCRL2 J&J -A: Performed by: ORTHOPAEDIC SURGERY

## 2019-09-10 PROCEDURE — 77030037722 HC GRFT BN SUB BGNX -G1: Performed by: ORTHOPAEDIC SURGERY

## 2019-09-10 PROCEDURE — 77030012893

## 2019-09-10 PROCEDURE — 72100 X-RAY EXAM L-S SPINE 2/3 VWS: CPT

## 2019-09-10 PROCEDURE — 36415 COLL VENOUS BLD VENIPUNCTURE: CPT

## 2019-09-10 PROCEDURE — 77030008684 HC TU ET CUF COVD -B: Performed by: ANESTHESIOLOGY

## 2019-09-10 PROCEDURE — 74011250636 HC RX REV CODE- 250/636: Performed by: ORTHOPAEDIC SURGERY

## 2019-09-10 PROCEDURE — 74011250637 HC RX REV CODE- 250/637: Performed by: PHYSICIAN ASSISTANT

## 2019-09-10 PROCEDURE — 77030038600 HC TU BPLR IRR DISP STRY -B: Performed by: ORTHOPAEDIC SURGERY

## 2019-09-10 DEVICE — ROD 1553201060 5.5 TI CP4 NS CURV 60MM
Type: IMPLANTABLE DEVICE | Site: SPINE LUMBAR | Status: FUNCTIONAL
Brand: CD HORIZON® SPINAL SYSTEM

## 2019-09-10 DEVICE — GRAFT BNE SUB L CANC FRZN MORSELIZED W/ VIABLE CELL TRINITY: Type: IMPLANTABLE DEVICE | Site: SPINE LUMBAR | Status: FUNCTIONAL

## 2019-09-10 DEVICE — GRAFT BNE SUB M CANC FRZN MORSELIZED W/ VIABLE CELL TRINITY: Type: IMPLANTABLE DEVICE | Site: SPINE LUMBAR | Status: FUNCTIONAL

## 2019-09-10 RX ORDER — ONDANSETRON 2 MG/ML
4 INJECTION INTRAMUSCULAR; INTRAVENOUS AS NEEDED
Status: DISCONTINUED | OUTPATIENT
Start: 2019-09-10 | End: 2019-09-10 | Stop reason: HOSPADM

## 2019-09-10 RX ORDER — FENTANYL CITRATE 50 UG/ML
50 INJECTION, SOLUTION INTRAMUSCULAR; INTRAVENOUS AS NEEDED
Status: DISCONTINUED | OUTPATIENT
Start: 2019-09-10 | End: 2019-09-10 | Stop reason: HOSPADM

## 2019-09-10 RX ORDER — SODIUM CHLORIDE 0.9 % (FLUSH) 0.9 %
5-40 SYRINGE (ML) INJECTION AS NEEDED
Status: DISCONTINUED | OUTPATIENT
Start: 2019-09-10 | End: 2019-09-10 | Stop reason: HOSPADM

## 2019-09-10 RX ORDER — DEXMEDETOMIDINE HYDROCHLORIDE 100 UG/ML
INJECTION, SOLUTION INTRAVENOUS AS NEEDED
Status: DISCONTINUED | OUTPATIENT
Start: 2019-09-10 | End: 2019-09-10 | Stop reason: HOSPADM

## 2019-09-10 RX ORDER — PROPOFOL 10 MG/ML
VIAL (ML) INTRAVENOUS
Status: DISCONTINUED | OUTPATIENT
Start: 2019-09-10 | End: 2019-09-10 | Stop reason: HOSPADM

## 2019-09-10 RX ORDER — HYDROMORPHONE HYDROCHLORIDE 1 MG/ML
0.5 INJECTION, SOLUTION INTRAMUSCULAR; INTRAVENOUS; SUBCUTANEOUS
Status: ACTIVE | OUTPATIENT
Start: 2019-09-10 | End: 2019-09-11

## 2019-09-10 RX ORDER — FERROUS SULFATE, DRIED 160(50) MG
1 TABLET, EXTENDED RELEASE ORAL DAILY
Status: DISCONTINUED | OUTPATIENT
Start: 2019-09-11 | End: 2019-09-14 | Stop reason: HOSPADM

## 2019-09-10 RX ORDER — ASPIRIN 81 MG/1
81 TABLET ORAL DAILY
Status: DISCONTINUED | OUTPATIENT
Start: 2019-09-11 | End: 2019-09-14 | Stop reason: HOSPADM

## 2019-09-10 RX ORDER — HYDROMORPHONE HCL/0.9% NACL/PF 0.5 MG/ML
PLASTIC BAG, INJECTION (ML) INTRAVENOUS
Status: DISCONTINUED | OUTPATIENT
Start: 2019-09-10 | End: 2019-09-11

## 2019-09-10 RX ORDER — ONDANSETRON 2 MG/ML
INJECTION INTRAMUSCULAR; INTRAVENOUS AS NEEDED
Status: DISCONTINUED | OUTPATIENT
Start: 2019-09-10 | End: 2019-09-10 | Stop reason: HOSPADM

## 2019-09-10 RX ORDER — SODIUM CHLORIDE 0.9 % (FLUSH) 0.9 %
5-40 SYRINGE (ML) INJECTION EVERY 8 HOURS
Status: DISCONTINUED | OUTPATIENT
Start: 2019-09-10 | End: 2019-09-10 | Stop reason: HOSPADM

## 2019-09-10 RX ORDER — ACETAMINOPHEN 500 MG
1000 TABLET ORAL EVERY 6 HOURS
Status: DISCONTINUED | OUTPATIENT
Start: 2019-09-10 | End: 2019-09-12

## 2019-09-10 RX ORDER — ROSUVASTATIN CALCIUM 10 MG/1
10 TABLET, COATED ORAL DAILY
Status: DISCONTINUED | OUTPATIENT
Start: 2019-09-11 | End: 2019-09-14 | Stop reason: HOSPADM

## 2019-09-10 RX ORDER — DIPHENHYDRAMINE HYDROCHLORIDE 50 MG/ML
12.5 INJECTION, SOLUTION INTRAMUSCULAR; INTRAVENOUS AS NEEDED
Status: DISCONTINUED | OUTPATIENT
Start: 2019-09-10 | End: 2019-09-10 | Stop reason: HOSPADM

## 2019-09-10 RX ORDER — PROPOFOL 10 MG/ML
INJECTION, EMULSION INTRAVENOUS AS NEEDED
Status: DISCONTINUED | OUTPATIENT
Start: 2019-09-10 | End: 2019-09-10 | Stop reason: HOSPADM

## 2019-09-10 RX ORDER — HYDROMORPHONE HYDROCHLORIDE 2 MG/1
4 TABLET ORAL
Status: DISCONTINUED | OUTPATIENT
Start: 2019-09-10 | End: 2019-09-14 | Stop reason: HOSPADM

## 2019-09-10 RX ORDER — CYCLOBENZAPRINE HCL 10 MG
10 TABLET ORAL
Status: DISCONTINUED | OUTPATIENT
Start: 2019-09-10 | End: 2019-09-14 | Stop reason: HOSPADM

## 2019-09-10 RX ORDER — METFORMIN HYDROCHLORIDE 500 MG/1
500 TABLET ORAL 2 TIMES DAILY WITH MEALS
Status: DISCONTINUED | OUTPATIENT
Start: 2019-09-11 | End: 2019-09-14 | Stop reason: HOSPADM

## 2019-09-10 RX ORDER — SODIUM CHLORIDE 0.9 % (FLUSH) 0.9 %
5-40 SYRINGE (ML) INJECTION AS NEEDED
Status: DISCONTINUED | OUTPATIENT
Start: 2019-09-10 | End: 2019-09-14 | Stop reason: HOSPADM

## 2019-09-10 RX ORDER — HYDROCHLOROTHIAZIDE 25 MG/1
25 TABLET ORAL DAILY
Status: DISCONTINUED | OUTPATIENT
Start: 2019-09-11 | End: 2019-09-14 | Stop reason: HOSPADM

## 2019-09-10 RX ORDER — PANTOPRAZOLE SODIUM 40 MG/1
40 TABLET, DELAYED RELEASE ORAL
Status: DISCONTINUED | OUTPATIENT
Start: 2019-09-11 | End: 2019-09-14 | Stop reason: HOSPADM

## 2019-09-10 RX ORDER — HYDROMORPHONE HYDROCHLORIDE 2 MG/1
2 TABLET ORAL
Status: DISCONTINUED | OUTPATIENT
Start: 2019-09-10 | End: 2019-09-12 | Stop reason: ALTCHOICE

## 2019-09-10 RX ORDER — ROCURONIUM BROMIDE 10 MG/ML
INJECTION, SOLUTION INTRAVENOUS AS NEEDED
Status: DISCONTINUED | OUTPATIENT
Start: 2019-09-10 | End: 2019-09-10 | Stop reason: HOSPADM

## 2019-09-10 RX ORDER — DEXAMETHASONE SODIUM PHOSPHATE 4 MG/ML
INJECTION, SOLUTION INTRA-ARTICULAR; INTRALESIONAL; INTRAMUSCULAR; INTRAVENOUS; SOFT TISSUE AS NEEDED
Status: DISCONTINUED | OUTPATIENT
Start: 2019-09-10 | End: 2019-09-10 | Stop reason: HOSPADM

## 2019-09-10 RX ORDER — HYDROMORPHONE HYDROCHLORIDE 2 MG/ML
INJECTION, SOLUTION INTRAMUSCULAR; INTRAVENOUS; SUBCUTANEOUS AS NEEDED
Status: DISCONTINUED | OUTPATIENT
Start: 2019-09-10 | End: 2019-09-10 | Stop reason: HOSPADM

## 2019-09-10 RX ORDER — FENTANYL CITRATE 50 UG/ML
25 INJECTION, SOLUTION INTRAMUSCULAR; INTRAVENOUS
Status: DISCONTINUED | OUTPATIENT
Start: 2019-09-10 | End: 2019-09-10 | Stop reason: HOSPADM

## 2019-09-10 RX ORDER — ONDANSETRON 2 MG/ML
4 INJECTION INTRAMUSCULAR; INTRAVENOUS
Status: DISPENSED | OUTPATIENT
Start: 2019-09-10 | End: 2019-09-11

## 2019-09-10 RX ORDER — VANCOMYCIN/0.9 % SOD CHLORIDE 1.5G/250ML
1500 PLASTIC BAG, INJECTION (ML) INTRAVENOUS ONCE
Status: COMPLETED | OUTPATIENT
Start: 2019-09-10 | End: 2019-09-10

## 2019-09-10 RX ORDER — FACIAL-BODY WIPES
10 EACH TOPICAL DAILY PRN
Status: DISCONTINUED | OUTPATIENT
Start: 2019-09-12 | End: 2019-09-14 | Stop reason: HOSPADM

## 2019-09-10 RX ORDER — VANCOMYCIN HYDROCHLORIDE 1 G/20ML
INJECTION, POWDER, LYOPHILIZED, FOR SOLUTION INTRAVENOUS AS NEEDED
Status: DISCONTINUED | OUTPATIENT
Start: 2019-09-10 | End: 2019-09-10 | Stop reason: HOSPADM

## 2019-09-10 RX ORDER — MORPHINE SULFATE 10 MG/ML
2 INJECTION, SOLUTION INTRAMUSCULAR; INTRAVENOUS
Status: DISCONTINUED | OUTPATIENT
Start: 2019-09-10 | End: 2019-09-10 | Stop reason: HOSPADM

## 2019-09-10 RX ORDER — MIDAZOLAM HYDROCHLORIDE 1 MG/ML
1 INJECTION, SOLUTION INTRAMUSCULAR; INTRAVENOUS AS NEEDED
Status: DISCONTINUED | OUTPATIENT
Start: 2019-09-10 | End: 2019-09-10 | Stop reason: HOSPADM

## 2019-09-10 RX ORDER — SUCCINYLCHOLINE CHLORIDE 20 MG/ML
INJECTION INTRAMUSCULAR; INTRAVENOUS AS NEEDED
Status: DISCONTINUED | OUTPATIENT
Start: 2019-09-10 | End: 2019-09-10 | Stop reason: HOSPADM

## 2019-09-10 RX ORDER — SODIUM CHLORIDE 9 MG/ML
125 INJECTION, SOLUTION INTRAVENOUS CONTINUOUS
Status: DISPENSED | OUTPATIENT
Start: 2019-09-10 | End: 2019-09-11

## 2019-09-10 RX ORDER — NALOXONE HYDROCHLORIDE 0.4 MG/ML
0.4 INJECTION, SOLUTION INTRAMUSCULAR; INTRAVENOUS; SUBCUTANEOUS AS NEEDED
Status: DISCONTINUED | OUTPATIENT
Start: 2019-09-10 | End: 2019-09-14 | Stop reason: HOSPADM

## 2019-09-10 RX ORDER — ACETAMINOPHEN 325 MG/1
650 TABLET ORAL ONCE
Status: COMPLETED | OUTPATIENT
Start: 2019-09-10 | End: 2019-09-10

## 2019-09-10 RX ORDER — MIDAZOLAM HYDROCHLORIDE 1 MG/ML
1 INJECTION, SOLUTION INTRAMUSCULAR; INTRAVENOUS
Status: DISCONTINUED | OUTPATIENT
Start: 2019-09-10 | End: 2019-09-10 | Stop reason: HOSPADM

## 2019-09-10 RX ORDER — MIDAZOLAM HYDROCHLORIDE 1 MG/ML
INJECTION, SOLUTION INTRAMUSCULAR; INTRAVENOUS AS NEEDED
Status: DISCONTINUED | OUTPATIENT
Start: 2019-09-10 | End: 2019-09-10 | Stop reason: HOSPADM

## 2019-09-10 RX ORDER — PROPAFENONE HYDROCHLORIDE 150 MG/1
150 TABLET, FILM COATED ORAL EVERY 8 HOURS
Status: DISCONTINUED | OUTPATIENT
Start: 2019-09-10 | End: 2019-09-14 | Stop reason: HOSPADM

## 2019-09-10 RX ORDER — CETIRIZINE HCL 10 MG
10 TABLET ORAL
Status: DISCONTINUED | OUTPATIENT
Start: 2019-09-10 | End: 2019-09-14 | Stop reason: HOSPADM

## 2019-09-10 RX ORDER — SODIUM CHLORIDE, SODIUM LACTATE, POTASSIUM CHLORIDE, CALCIUM CHLORIDE 600; 310; 30; 20 MG/100ML; MG/100ML; MG/100ML; MG/100ML
125 INJECTION, SOLUTION INTRAVENOUS CONTINUOUS
Status: DISCONTINUED | OUTPATIENT
Start: 2019-09-10 | End: 2019-09-10 | Stop reason: HOSPADM

## 2019-09-10 RX ORDER — METOPROLOL SUCCINATE 50 MG/1
50 TABLET, EXTENDED RELEASE ORAL DAILY
Status: DISCONTINUED | OUTPATIENT
Start: 2019-09-11 | End: 2019-09-14 | Stop reason: HOSPADM

## 2019-09-10 RX ORDER — POLYETHYLENE GLYCOL 3350 17 G/17G
17 POWDER, FOR SOLUTION ORAL DAILY
Status: DISCONTINUED | OUTPATIENT
Start: 2019-09-11 | End: 2019-09-14 | Stop reason: HOSPADM

## 2019-09-10 RX ORDER — VANCOMYCIN/0.9 % SOD CHLORIDE 1.5G/250ML
1500 PLASTIC BAG, INJECTION (ML) INTRAVENOUS EVERY 12 HOURS
Status: COMPLETED | OUTPATIENT
Start: 2019-09-11 | End: 2019-09-11

## 2019-09-10 RX ORDER — ONDANSETRON 4 MG/1
4 TABLET, ORALLY DISINTEGRATING ORAL
Status: DISCONTINUED | OUTPATIENT
Start: 2019-09-10 | End: 2019-09-14 | Stop reason: HOSPADM

## 2019-09-10 RX ORDER — KETAMINE HYDROCHLORIDE 10 MG/ML
INJECTION, SOLUTION INTRAMUSCULAR; INTRAVENOUS AS NEEDED
Status: DISCONTINUED | OUTPATIENT
Start: 2019-09-10 | End: 2019-09-10 | Stop reason: HOSPADM

## 2019-09-10 RX ORDER — SODIUM CHLORIDE 0.9 % (FLUSH) 0.9 %
5-40 SYRINGE (ML) INJECTION EVERY 8 HOURS
Status: DISCONTINUED | OUTPATIENT
Start: 2019-09-10 | End: 2019-09-14 | Stop reason: HOSPADM

## 2019-09-10 RX ORDER — DIPHENHYDRAMINE HYDROCHLORIDE 50 MG/ML
12.5 INJECTION, SOLUTION INTRAMUSCULAR; INTRAVENOUS
Status: ACTIVE | OUTPATIENT
Start: 2019-09-10 | End: 2019-09-11

## 2019-09-10 RX ORDER — AMOXICILLIN 250 MG
1 CAPSULE ORAL 2 TIMES DAILY
Status: DISCONTINUED | OUTPATIENT
Start: 2019-09-10 | End: 2019-09-14 | Stop reason: HOSPADM

## 2019-09-10 RX ORDER — LIDOCAINE HYDROCHLORIDE 10 MG/ML
0.5 INJECTION, SOLUTION EPIDURAL; INFILTRATION; INTRACAUDAL; PERINEURAL AS NEEDED
Status: DISCONTINUED | OUTPATIENT
Start: 2019-09-10 | End: 2019-09-10 | Stop reason: HOSPADM

## 2019-09-10 RX ORDER — OXYCODONE HYDROCHLORIDE 5 MG/1
5 TABLET ORAL AS NEEDED
Status: DISCONTINUED | OUTPATIENT
Start: 2019-09-10 | End: 2019-09-10 | Stop reason: HOSPADM

## 2019-09-10 RX ORDER — ALBUTEROL SULFATE 0.83 MG/ML
2.5 SOLUTION RESPIRATORY (INHALATION)
Status: DISCONTINUED | OUTPATIENT
Start: 2019-09-10 | End: 2019-09-14 | Stop reason: HOSPADM

## 2019-09-10 RX ORDER — ALBUTEROL SULFATE 90 UG/1
2 AEROSOL, METERED RESPIRATORY (INHALATION)
Status: DISCONTINUED | OUTPATIENT
Start: 2019-09-10 | End: 2019-09-10 | Stop reason: CLARIF

## 2019-09-10 RX ORDER — LIDOCAINE HYDROCHLORIDE 20 MG/ML
INJECTION, SOLUTION EPIDURAL; INFILTRATION; INTRACAUDAL; PERINEURAL AS NEEDED
Status: DISCONTINUED | OUTPATIENT
Start: 2019-09-10 | End: 2019-09-10 | Stop reason: HOSPADM

## 2019-09-10 RX ORDER — FENTANYL CITRATE 50 UG/ML
INJECTION, SOLUTION INTRAMUSCULAR; INTRAVENOUS AS NEEDED
Status: DISCONTINUED | OUTPATIENT
Start: 2019-09-10 | End: 2019-09-10 | Stop reason: HOSPADM

## 2019-09-10 RX ORDER — DEXTROSE, SODIUM CHLORIDE, SODIUM LACTATE, POTASSIUM CHLORIDE, AND CALCIUM CHLORIDE 5; .6; .31; .03; .02 G/100ML; G/100ML; G/100ML; G/100ML; G/100ML
125 INJECTION, SOLUTION INTRAVENOUS CONTINUOUS
Status: DISCONTINUED | OUTPATIENT
Start: 2019-09-10 | End: 2019-09-10 | Stop reason: HOSPADM

## 2019-09-10 RX ADMIN — ROCURONIUM BROMIDE 20 MG: 10 SOLUTION INTRAVENOUS at 15:21

## 2019-09-10 RX ADMIN — ACETAMINOPHEN 1000 MG: 500 TABLET ORAL at 22:26

## 2019-09-10 RX ADMIN — DEXAMETHASONE SODIUM PHOSPHATE 4 MG: 4 INJECTION, SOLUTION INTRAMUSCULAR; INTRAVENOUS at 14:52

## 2019-09-10 RX ADMIN — ROCURONIUM BROMIDE 5 MG: 10 SOLUTION INTRAVENOUS at 14:38

## 2019-09-10 RX ADMIN — DEXMEDETOMIDINE HYDROCHLORIDE 8 MCG: 100 INJECTION, SOLUTION, CONCENTRATE INTRAVENOUS at 16:46

## 2019-09-10 RX ADMIN — ACETAMINOPHEN 650 MG: 325 TABLET, FILM COATED ORAL at 14:10

## 2019-09-10 RX ADMIN — VANCOMYCIN HYDROCHLORIDE 1500 MG: 10 INJECTION, POWDER, LYOPHILIZED, FOR SOLUTION INTRAVENOUS at 14:23

## 2019-09-10 RX ADMIN — DEXMEDETOMIDINE HYDROCHLORIDE 4 MCG: 100 INJECTION, SOLUTION, CONCENTRATE INTRAVENOUS at 17:02

## 2019-09-10 RX ADMIN — HYDROMORPHONE HYDROCHLORIDE 1 MG: 2 INJECTION, SOLUTION INTRAMUSCULAR; INTRAVENOUS; SUBCUTANEOUS at 15:13

## 2019-09-10 RX ADMIN — Medication 10 ML: at 22:27

## 2019-09-10 RX ADMIN — SUCCINYLCHOLINE CHLORIDE 160 MG: 20 INJECTION, SOLUTION INTRAMUSCULAR; INTRAVENOUS at 14:38

## 2019-09-10 RX ADMIN — ONDANSETRON HYDROCHLORIDE 4 MG: 2 INJECTION, SOLUTION INTRAMUSCULAR; INTRAVENOUS at 17:06

## 2019-09-10 RX ADMIN — KETAMINE HYDROCHLORIDE 30 MG: 10 INJECTION, SOLUTION INTRAMUSCULAR; INTRAVENOUS at 14:50

## 2019-09-10 RX ADMIN — MIDAZOLAM 1 MG: 1 INJECTION INTRAMUSCULAR; INTRAVENOUS at 14:36

## 2019-09-10 RX ADMIN — FENTANYL CITRATE 50 MCG: 50 INJECTION, SOLUTION INTRAMUSCULAR; INTRAVENOUS at 15:15

## 2019-09-10 RX ADMIN — DEXMEDETOMIDINE HYDROCHLORIDE 8 MCG: 100 INJECTION, SOLUTION, CONCENTRATE INTRAVENOUS at 16:00

## 2019-09-10 RX ADMIN — SODIUM CHLORIDE 125 ML/HR: 900 INJECTION, SOLUTION INTRAVENOUS at 19:00

## 2019-09-10 RX ADMIN — PROPOFOL 100 MCG/KG/MIN: 10 INJECTION, EMULSION INTRAVENOUS at 14:44

## 2019-09-10 RX ADMIN — PROPAFENONE HYDROCHLORIDE 150 MG: 150 TABLET, COATED ORAL at 22:29

## 2019-09-10 RX ADMIN — DEXMEDETOMIDINE HYDROCHLORIDE 8 MCG: 100 INJECTION, SOLUTION, CONCENTRATE INTRAVENOUS at 17:06

## 2019-09-10 RX ADMIN — SENNOSIDES, DOCUSATE SODIUM 1 TABLET: 50; 8.6 TABLET, FILM COATED ORAL at 22:26

## 2019-09-10 RX ADMIN — SODIUM CHLORIDE, SODIUM LACTATE, POTASSIUM CHLORIDE, AND CALCIUM CHLORIDE 125 ML/HR: 600; 310; 30; 20 INJECTION, SOLUTION INTRAVENOUS at 14:10

## 2019-09-10 RX ADMIN — Medication: at 19:01

## 2019-09-10 RX ADMIN — FENTANYL CITRATE 50 MCG: 50 INJECTION, SOLUTION INTRAMUSCULAR; INTRAVENOUS at 14:35

## 2019-09-10 RX ADMIN — DEXMEDETOMIDINE HYDROCHLORIDE 8 MCG: 100 INJECTION, SOLUTION, CONCENTRATE INTRAVENOUS at 15:27

## 2019-09-10 RX ADMIN — LIDOCAINE HYDROCHLORIDE 80 MG: 20 INJECTION, SOLUTION EPIDURAL; INFILTRATION; INTRACAUDAL; PERINEURAL at 14:36

## 2019-09-10 RX ADMIN — PROPOFOL 50 MG: 10 INJECTION, EMULSION INTRAVENOUS at 16:00

## 2019-09-10 RX ADMIN — MIDAZOLAM 1 MG: 1 INJECTION INTRAMUSCULAR; INTRAVENOUS at 14:33

## 2019-09-10 RX ADMIN — KETAMINE HYDROCHLORIDE 10 MG: 10 INJECTION, SOLUTION INTRAMUSCULAR; INTRAVENOUS at 15:55

## 2019-09-10 RX ADMIN — PROPOFOL 150 MG: 10 INJECTION, EMULSION INTRAVENOUS at 14:37

## 2019-09-10 RX ADMIN — DEXMEDETOMIDINE HYDROCHLORIDE 4 MCG: 100 INJECTION, SOLUTION, CONCENTRATE INTRAVENOUS at 15:44

## 2019-09-10 RX ADMIN — SODIUM CHLORIDE, SODIUM LACTATE, POTASSIUM CHLORIDE, AND CALCIUM CHLORIDE: 600; 310; 30; 20 INJECTION, SOLUTION INTRAVENOUS at 16:48

## 2019-09-10 RX ADMIN — DEXMEDETOMIDINE HYDROCHLORIDE 8 MCG: 100 INJECTION, SOLUTION, CONCENTRATE INTRAVENOUS at 15:21

## 2019-09-10 NOTE — PERIOP NOTES
Patient: Esau Keller MRN: [id number]  SSN: [id number]   YOB: 1940  Age: 70 y.o. Sex: female     Patient is status post Procedure(s):  LUMBAR LAMINECTOMY L3-4 AND L4-5 WITH POSTERIOR LUMBAR FUSION L3-5 (LATEX ALLERGY). Surgeon(s) and Role: Lou Dow MD - Primary    Local/Dose/Irrigation:  80 ml surgical pain solution injected to back. Peripheral IV 09/10/19 Left Forearm (Active)   Site Assessment Clean, dry, & intact 9/10/2019  2:09 PM   Phlebitis Assessment 0 9/10/2019  2:09 PM   Infiltration Assessment 0 9/10/2019  2:09 PM   Dressing Status Clean, dry, & intact 9/10/2019  2:09 PM   Dressing Type Transparent 9/10/2019  2:09 PM   Hub Color/Line Status Pink 9/10/2019  2:09 PM   Alcohol Cap Used Yes 9/10/2019  2:09 PM            Airway - Endotracheal Tube 09/10/19 Oral (Active)               Dressing/Packing:  Wound Back Lower-Dressing Type: ABD pad;Special tape (comment); Other (Comment)(Zip line, ABD pad and hypafix tape) (09/10/19 1700)

## 2019-09-10 NOTE — PERIOP NOTES
TRANSFER - OUT REPORT:    Verbal report given to Gabriela Menchaca (name) on Rosario Evans  being transferred to [place] [id number] (unit) for routine post - op       Report consisted of patients Situation, Background, Assessment and   Recommendations(SBAR). Time Pre op antibiotic given:1423  Anesthesia Stop time: 4141  Hernández Present on Transfer to floor:no  Order for Hernández on Chart:no  Discharge Prescriptions with Chart:no    Information from the following report(s) SBAR, Kardex, OR Summary, Intake/Output, MAR, Med Rec Status and Cardiac Rhythm SR was reviewed with the receiving nurse. Opportunity for questions and clarification was provided. Is the patient on 02? YES       L/Min 2L        Is the patient on a monitor? NO    Is the nurse transporting with the patient? NO    Surgical Waiting Area notified of patient's transfer from PACU? YES      The following personal items collected during your admission accompanied patient upon transfer:   Dental Appliance: Dental Appliances: Uppers, Lowers, With patient  Vision: Visual Aid: Glasses, With patient  Hearing Aid:    Jewelry: Jewelry: None  Clothing: Clothing: (bag of clothes returned to patient)  Other Valuables:  Other Valuables: Eyeglasses(returned to patient)  Valuables sent to safe:

## 2019-09-10 NOTE — BRIEF OP NOTE
BRIEF OPERATIVE NOTE    Date of Procedure: 9/10/2019   Preoperative Diagnosis: SPONDYLOLISTHESIS, STENOSIS  Postoperative Diagnosis: SPONDYLOLISTHESIS, STENOSIS    Procedure(s):  LUMBAR LAMINECTOMY L3-4 AND L4-5 WITH POSTERIOR LUMBAR FUSION L3-5 (LATEX ALLERGY)  Surgeon(s) and Role: Rosas Garcia MD - Primary         Surgical Assistant: Jeramy Monroe PA-C    Surgical Staff:  Circ-1: Radha Morton RN  Circ-Relief: Demarcus Fregoso RN  Physician Assistant: [facility] Lord [facility]  Scrub Tech-1: Armaan Wilson  Scrub RN-Relief: Quang Martin RN  Event Time In Time Out   Incision Start 1518    Incision Close 1718      Anesthesia: General   Estimated Blood Loss: 200mL  Specimens: * No specimens in log *   Findings: stenosis   Complications: none  Implants:   Implant Name Type Inv.  Item Serial No.  Lot No. LRB No. Used Action   GRAFT [employer]NE [employer] --  - [id number]  G[employer] --  [id number] MUSCULOSKELETAL TRANS NA N/A 1 Implanted   GRAFT [employer]NE [employer] LG --  - [id number]  GRAFT [employer]NE [employer] LG --  [id number] MUSCULOSKELETAL TRANS NA N/A 1 Implanted   GRAFT [employer] LG --  - [id number]  GRAFT [employer]NE [employer] LG --  [id number] MUSCULOSKELETAL TRANS NA N/A 1 Implanted   SCR SPNE MAS 6.5X45 CC -- CD HORIZON SOLERA - SNA  SCR SPNE MAS 6.5X45 CC -- CD HORIZON SOLERA NA MEDTRONIC SOFAMOR DANEK NA N/A 6 Implanted   SCR SET SPNE BRK-OFF 5.5MM TI --  - SNA  SCR SET SPNE BRK-OFF 5.5MM TI --  NA MEDTRONIC SOFAMOR DANEK NA N/A 6 Implanted   ABELINO SPNE CP4 NS CRV 5.5X60MM -- CD HORIZON SOLERA - SNA  AEBLINO SPNE CP4 NS CRV 5.5X60MM -- CD HORIZON SOLERA NA MEDTRONIC SOFAMOR DANEK NA N/A 2 Implanted   Morpheus Vol. 12 cc Biogennix   NA BIOGENNIX [id number] N/A 1 Implanted

## 2019-09-10 NOTE — ANESTHESIA POSTPROCEDURE EVALUATION
Post-Anesthesia Evaluation and Assessment    Patient: Akila Jurado MRN: [id number]  SSN: [id number]    YOB: 1940  Age: 70 y.o. Sex: female      I have evaluated the patient and they are stable and ready for discharge from the PACU. Cardiovascular Function/Vital Signs  Visit Vitals  /67   Pulse 84   Temp 36.7 °C (98 °F)   Resp 13   Ht 5' 2\" (1.575 m)   Wt 93 kg (205 lb)   SpO2 99%   BMI 37.49 kg/m²       Patient is status post General anesthesia for Procedure(s):  LUMBAR LAMINECTOMY L3-4 AND L4-5 WITH POSTERIOR LUMBAR FUSION L3-5 (LATEX ALLERGY). Nausea/Vomiting: None    Postoperative hydration reviewed and adequate. Pain:  Pain Scale 1: Numeric (0 - 10) (09/10/19 1800)  Pain Intensity 1: 0 (09/10/19 1800)   Managed    Neurological Status:   Neuro (WDL): Exceptions to WDL (09/10/19 1800)  Neuro  Neurologic State: Drowsy; Eyes open to voice; Pharmacologically induced (comment); Lethargic (09/10/19 1800)  LUE Motor Response: Spontaneous  (09/10/19 1800)  LLE Motor Response: Spontaneous  (09/10/19 1800)  RUE Motor Response: Spontaneous  (09/10/19 1800)  RLE Motor Response: Spontaneous  (09/10/19 1800)   At baseline    Mental Status, Level of Consciousness: Alert and  oriented to person, place, and time    Pulmonary Status:   O2 Device: Nasal cannula (09/10/19 1800)   Adequate oxygenation and airway patent    Complications related to anesthesia: None    Post-anesthesia assessment completed. No concerns    Signed By: Clark Blankenship MD     September 10, 2019              Procedure(s):  LUMBAR LAMINECTOMY L3-4 AND L4-5 WITH POSTERIOR LUMBAR FUSION L3-5 (LATEX ALLERGY). general    Anesthesia Post Evaluation        Patient location during evaluation: PACU  Note status: Adequate.   Level of consciousness: responsive to verbal stimuli and sleepy but conscious  Pain management: satisfactory to patient  Airway patency: patent  Anesthetic complications: no  Cardiovascular status: acceptable  Respiratory status: acceptable  Hydration status: acceptable  Comments: +Post-Anesthesia Evaluation and Assessment    Patient: Deandra Mckeon MRN: [id number]  SSN: [id number]   YOB: 1940  Age: 70 y.o. Sex: female          Cardiovascular Function/Vital Signs    /76 (BP 1 Location: Left arm)   Pulse 86   Temp 36.9 °C (98.5 °F)   Resp 17   Ht 5' 2\" (1.575 m)   Wt 93 kg (205 lb)   SpO2 97%   Breastfeeding? No   BMI 37.49 kg/m²     Patient is status post Procedure(s):  LUMBAR LAMINECTOMY L3-4 AND L4-5 WITH POSTERIOR LUMBAR FUSION L3-5 (LATEX ALLERGY). Nausea/Vomiting: Controlled. Postoperative hydration reviewed and adequate. Pain:  Pain Scale 1: Numeric (0 - 10) (09/14/19 1029)  Pain Intensity 1: 9 (09/14/19 1029)   Managed. Neurological Status:   Neuro (WDL): Within Defined Limits (09/10/19 1900)   At baseline. Mental Status and Level of Consciousness: Arousable. Pulmonary Status:   O2 Device: Room air (09/14/19 0806)   Adequate oxygenation and airway patent. Complications related to anesthesia: None    Post-anesthesia assessment completed. No concerns. I have evaluated the patient and the patient is stable and ready to be discharged from PACU . Signed By: Citlaly Reid MD    9/17/2019        Vitals Value Taken Time   /69 9/10/2019  7:00 PM   Temp 36.7 °C (98 °F) 9/10/2019  6:00 PM   Pulse 95 9/10/2019  7:07 PM   Resp 13 9/10/2019  7:07 PM   SpO2 98 % 9/10/2019  7:07 PM   Vitals shown include unvalidated device data.

## 2019-09-10 NOTE — ANESTHESIA PREPROCEDURE EVALUATION
Anesthetic History   No history of anesthetic complications            Review of Systems / Medical History  Patient summary reviewed, nursing notes reviewed and pertinent labs reviewed    Pulmonary  Within defined limits          Asthma        Neuro/Psych   Within defined limits          Comments: Diabetic neuropathy Cardiovascular  Within defined limits  Hypertension                   GI/Hepatic/Renal  Within defined limits   GERD          Comments: Ab pain  Hx gastric polyp Endo/Other  Within defined limits  Diabetes: well controlled, type 2  Hypothyroidism  Obesity and arthritis     Other Findings              Physical Exam    Airway  Mallampati: I  TM Distance: > 6 cm  Neck ROM: normal range of motion   Mouth opening: Normal     Cardiovascular    Rhythm: regular  Rate: normal         Dental    Dentition: Edentulous     Pulmonary  Breath sounds clear to auscultation               Abdominal  Abdominal exam normal       Other Findings            Anesthetic Plan    ASA: 3  Anesthesia type: general          Induction: Intravenous  Anesthetic plan and risks discussed with: Patient

## 2019-09-11 LAB
ANION GAP SERPL CALC-SCNC: 8 MMOL/L (ref 5–15)
BUN SERPL-MCNC: 16 MG/DL (ref 6–20)
BUN/CREAT SERPL: 23 (ref 12–20)
CALCIUM SERPL-MCNC: 8.3 MG/DL (ref 8.5–10.1)
CHLORIDE SERPL-SCNC: 105 MMOL/L (ref 97–108)
CO2 SERPL-SCNC: 27 MMOL/L (ref 21–32)
CREAT SERPL-MCNC: 0.71 MG/DL (ref 0.55–1.02)
GLUCOSE BLD STRIP.AUTO-MCNC: 121 MG/DL (ref 65–100)
GLUCOSE BLD STRIP.AUTO-MCNC: 126 MG/DL (ref 65–100)
GLUCOSE BLD STRIP.AUTO-MCNC: 158 MG/DL (ref 65–100)
GLUCOSE SERPL-MCNC: 145 MG/DL (ref 65–100)
HGB BLD-MCNC: 8.8 G/DL (ref 11.5–16)
POTASSIUM SERPL-SCNC: 4.5 MMOL/L (ref 3.5–5.1)
SERVICE CMNT-IMP: ABNORMAL
SODIUM SERPL-SCNC: 140 MMOL/L (ref 136–145)

## 2019-09-11 PROCEDURE — 97535 SELF CARE MNGMENT TRAINING: CPT

## 2019-09-11 PROCEDURE — 74011250636 HC RX REV CODE- 250/636: Performed by: PHYSICIAN ASSISTANT

## 2019-09-11 PROCEDURE — 97162 PT EVAL MOD COMPLEX 30 MIN: CPT

## 2019-09-11 PROCEDURE — 97530 THERAPEUTIC ACTIVITIES: CPT

## 2019-09-11 PROCEDURE — 51798 US URINE CAPACITY MEASURE: CPT

## 2019-09-11 PROCEDURE — 74011250637 HC RX REV CODE- 250/637: Performed by: PHYSICIAN ASSISTANT

## 2019-09-11 PROCEDURE — 82962 GLUCOSE BLOOD TEST: CPT

## 2019-09-11 PROCEDURE — 36415 COLL VENOUS BLD VENIPUNCTURE: CPT

## 2019-09-11 PROCEDURE — 65270000029 HC RM PRIVATE

## 2019-09-11 PROCEDURE — 85018 HEMOGLOBIN: CPT

## 2019-09-11 PROCEDURE — 97165 OT EVAL LOW COMPLEX 30 MIN: CPT

## 2019-09-11 PROCEDURE — 80048 BASIC METABOLIC PNL TOTAL CA: CPT

## 2019-09-11 RX ADMIN — ACETAMINOPHEN 1000 MG: 500 TABLET ORAL at 06:18

## 2019-09-11 RX ADMIN — ONDANSETRON 4 MG: 2 INJECTION INTRAMUSCULAR; INTRAVENOUS at 14:56

## 2019-09-11 RX ADMIN — HYDROMORPHONE HYDROCHLORIDE 2 MG: 2 TABLET ORAL at 08:27

## 2019-09-11 RX ADMIN — SODIUM CHLORIDE 125 ML/HR: 900 INJECTION, SOLUTION INTRAVENOUS at 04:45

## 2019-09-11 RX ADMIN — APIXABAN 5 MG: 5 TABLET, FILM COATED ORAL at 22:07

## 2019-09-11 RX ADMIN — ONDANSETRON 4 MG: 2 INJECTION INTRAMUSCULAR; INTRAVENOUS at 09:14

## 2019-09-11 RX ADMIN — Medication 10 ML: at 14:56

## 2019-09-11 RX ADMIN — SENNOSIDES, DOCUSATE SODIUM 1 TABLET: 50; 8.6 TABLET, FILM COATED ORAL at 17:10

## 2019-09-11 RX ADMIN — VANCOMYCIN HYDROCHLORIDE 1500 MG: 10 INJECTION, POWDER, LYOPHILIZED, FOR SOLUTION INTRAVENOUS at 01:20

## 2019-09-11 RX ADMIN — ASPIRIN 81 MG: 81 TABLET, COATED ORAL at 08:26

## 2019-09-11 RX ADMIN — PANTOPRAZOLE SODIUM 40 MG: 40 TABLET, DELAYED RELEASE ORAL at 06:18

## 2019-09-11 RX ADMIN — SENNOSIDES, DOCUSATE SODIUM 1 TABLET: 50; 8.6 TABLET, FILM COATED ORAL at 08:25

## 2019-09-11 RX ADMIN — METFORMIN HYDROCHLORIDE 500 MG: 500 TABLET ORAL at 08:27

## 2019-09-11 RX ADMIN — CALCIUM CARBONATE-VITAMIN D TAB 500 MG-200 UNIT 1 TABLET: 500-200 TAB at 08:27

## 2019-09-11 RX ADMIN — PROPAFENONE HYDROCHLORIDE 150 MG: 150 TABLET, COATED ORAL at 14:56

## 2019-09-11 RX ADMIN — Medication 10 ML: at 06:19

## 2019-09-11 RX ADMIN — APIXABAN 5 MG: 5 TABLET, FILM COATED ORAL at 08:26

## 2019-09-11 RX ADMIN — PROPAFENONE HYDROCHLORIDE 150 MG: 150 TABLET, COATED ORAL at 22:07

## 2019-09-11 RX ADMIN — PROPAFENONE HYDROCHLORIDE 150 MG: 150 TABLET, COATED ORAL at 06:19

## 2019-09-11 RX ADMIN — METFORMIN HYDROCHLORIDE 500 MG: 500 TABLET ORAL at 18:17

## 2019-09-11 RX ADMIN — ROSUVASTATIN CALCIUM 10 MG: 10 TABLET, COATED ORAL at 08:25

## 2019-09-11 RX ADMIN — ACETAMINOPHEN 1000 MG: 500 TABLET ORAL at 17:10

## 2019-09-11 RX ADMIN — ACETAMINOPHEN 1000 MG: 500 TABLET ORAL at 22:07

## 2019-09-11 RX ADMIN — POLYETHYLENE GLYCOL 3350 17 G: 17 POWDER, FOR SOLUTION ORAL at 08:27

## 2019-09-11 NOTE — PROGRESS NOTES
Orthopedics Spine Incoming Shift Nursing Note    9:12 PM    9/10/2019      INCOMING SHIFT REPORT:    Verbal and/or Written report received from Oj Noel RN by Kennedy Naylor RN on Ronnie Tucker a 70 y.o. female who was admitted on 9/10/2019 11:22 AM and currently admitted to unit. Code Status: Full Code     Infections: No current active infections     Allergies: Latex; Kiwi; Amoxil [amoxicillin]; Cephalosporins; Levaquin [levofloxacin]; Penicillins; Percocet [oxycodone-acetaminophen]; Tetanus vaccines and toxoid; Tetracycline hcl; and [place]     Current diet: DIET ONE TIME MESSAGE     Lines:   Peripheral IV 09/10/19 Left Forearm (Active)   Site Assessment Clean, dry, & intact 9/10/2019  2:09 PM   Phlebitis Assessment 0 9/10/2019  2:09 PM   Infiltration Assessment 0 9/10/2019  2:09 PM   Dressing Status Clean, dry, & intact 9/10/2019  2:09 PM   Dressing Type Transparent 9/10/2019  2:09 PM   Hub Color/Line Status Pink 9/10/2019  2:09 PM   Alcohol Cap Used Yes 9/10/2019  2:09 PM          Report consisted of the following information Kardex, OR Summary and Dual Neuro Assessment and the information was reviewed with the reporting nurse. Opportunity for questions and clarifications were provided. Patient's bed locked and in low position, side rails up x 2, door open PRN, call bell within patient's reach and patient is not in distress. A complete nursing assessment will be completed by the receiving nurse.       Kennedy Naylor RN BSN, [facility] Hernández

## 2019-09-11 NOTE — PROGRESS NOTES
Orthopedic Spine Progress Note  Post Op day: 1 Day Post-Op    2019 7:48 AM   Admit Date: 9/10/2019  Procedure: Procedure(s):  LUMBAR LAMINECTOMY L3-4 AND L4-5 WITH POSTERIOR LUMBAR FUSION L3-5 (LATEX ALLERGY)    Subjective:     Rosario Evans appears well. Pain seems to be managed   No N/V  Drain in place    Pain Control:   Pain Assessment  Pain Scale 1: Numeric (0 - 10)  Pain Intensity 1: 0  Pain Location 1: Back, Hip  Pain Orientation 1: Lower, Left, Right  Pain Description 1: Constant, Sharp, Pressure(PINCHING)  Pain Intervention(s) 1: Repositioned, Exercise, Medication (see MAR)    Objective:          Physical Exam:  General:  Alert and oriented. No acute distress. Heart:  Respirations unlabored. Abdomen:   Extremities: Soft, non-tender. No evidence of cyanosis. Pulses palpable in both upper and lower extremities. Neurologic:  Musculoskeletal:  No new motor deficits. Neurovascular exam within normal limits. Sensation stable. Motor: unchanged C5-T1 and L2-S1. Jesus's sign negative in bilateral lower extremities. Calves soft, nontender upon palpation and with passive twitch. Moves both upper and lower extremities. Incision: clean, dry, and intact. No significant erythema or swelling. No active drainage noted. Vital Signs:    Blood pressure 105/59, pulse 72, temperature 98.3 °F (36.8 °C), resp. rate 18, height 5' 2\" (1.575 m), weight 93 kg (205 lb), last menstrual period 1983, SpO2 99 %, not currently breastfeeding.   Temp (24hrs), Av °F (36.7 °C), Min:97.4 °F (36.3 °C), Max:98.3 °F (36.8 °C)      LAB:    Recent Labs     19  0147   HGB 8.8*     Lab Results   Component Value Date/Time    Sodium 140 2019 01:47 AM    Potassium 4.5 2019 01:47 AM    Chloride 105 2019 01:47 AM    CO2 27 2019 01:47 AM    Glucose 145 (H) 2019 01:47 AM    BUN 16 2019 01:47 AM    Creatinine 0.71 2019 01:47 AM    Calcium 8.3 (L) 2019 01:47 AM       Intake/Output:No intake/output data recorded. 09/09 1901 - 09/11 0700  In: 1450 [P.O.:200; I.V.:1250]  Out: 765 [Urine:275; Drains:240]      Assessment:   Patient is 1 Day Post-Op s/p Procedure(s):  LUMBAR LAMINECTOMY L3-4 AND L4-5 WITH POSTERIOR LUMBAR FUSION L3-5 (LATEX ALLERGY)    Plan:     1. PT/OT - off-the-shelf Quick Draw. If unable to fit we will order a custom TLSO  2. D/C PCA and begin established methods of pain control  3. VTE Prophylaxes - TEDS & SCDs   4. Encouraged use of ICS  5. Remove drain once output is <80  6.   Discharge pending    Signed By: Brennan Naik PA-C

## 2019-09-11 NOTE — PROGRESS NOTES
Pt refusing PM treatment, citing pain all the way down her leg. Pt is also upset that we cant tell her if the doctor stated drink more or drink less, a conversation she apparently had with Dr Jeannine Zambrano. Referred to nursing to clarify.      Lord Marion, DPT, PT

## 2019-09-11 NOTE — PROGRESS NOTES
Orthopedic & Surgical Nursing Dual Skin Assessment        Primary Nurse Tyesha Badillo RN and Trent Burk RN performed a dual skin assessment on this patient Impairment noted- see wound doc flow sheet. Wound care consult will be placed if appropriate.       Signed by: Tyesha Badillo RN BSN, [address] [place] [address] Drive                       9/10/2019 at 9:30 PM

## 2019-09-11 NOTE — PROGRESS NOTES
Orthopedic & Surgical Nursing Communication Tool        Bedside and Verbal shift change report given to [facility], RN (incoming nurse) by Ar Mckeon RN (outgoing nurse) on Maryam Mata a 70 y.o. female and born 1940 who arrived at the hospital on 9/10/2019 11:22 AM. Report included the following information SBAR, Kardex, OR Summary and Med Rec Status. Significant changes during shift: Patient has not voided when handed off to receiving RN. Issues for physician to address: none          Pain Controlled          [x] yes          [] no    Bowel Movement          [] yes          [x] no          Code Status: Full Code     Infections: No current active infections     Allergies: Latex; Kiwi; Amoxil [amoxicillin]; Cephalosporins; Levaquin [levofloxacin]; Penicillins; Percocet [oxycodone-acetaminophen]; Tetanus vaccines and toxoid;  Tetracycline hcl; and [place]     Current diet: DIET ONE TIME MESSAGE           Vital Signs:     Patient Vitals for the past 12 hrs:   Temp Pulse Resp BP SpO2   09/11/19 0248 98.3 °F (36.8 °C) 72 18 105/59 99 %   09/10/19 2338 97.6 °F (36.4 °C) 72 16 113/62 100 %   09/10/19 2237 97.9 °F (36.6 °C) 71 14 116/73 99 %   09/10/19 2137 97.4 °F (36.3 °C) 76 14 118/72 99 %   09/10/19 2037 98.2 °F (36.8 °C) 69 16 120/70 100 %   09/10/19 1937 98 °F (36.7 °C) 79 16 146/88 98 %   09/10/19 1915 -- 81 21 126/58 97 %   09/10/19 1900 98.1 °F (36.7 °C) 82 13 131/69 98 %   09/10/19 1845 -- 84 13 146/67 99 %   09/10/19 1830 -- 80 13 143/71 99 %   09/10/19 1815 -- 82 18 127/80 97 %   09/10/19 1805 -- 77 13 110/80 98 %   09/10/19 1800 98 °F (36.7 °C) 81 13 143/72 98 %   09/10/19 1758 98 °F (36.7 °C) 82 16 147/67 95 %   09/10/19 1755 -- 83 9 147/67 98 %      Intake & Output:       Intake/Output Summary (Last 24 hours) at 9/11/2019 0346  Last data filed at 9/11/2019 0125  Gross per 24 hour   Intake 1450 ml   Output 505 ml   Net 945 ml      Laboratory Results:     Recent Results (from the past 12 hour(s))   GLUCOSE, POC    Collection Time: 09/10/19  7:22 PM   Result Value Ref Range    Glucose (POC) 150 (H) 65 - 100 mg/dL    Performed by [address] [place] [address], POC    Collection Time: 09/10/19  8:50 PM   Result Value Ref Range    Glucose (POC) 145 (H) 65 - 100 mg/dL    Performed by Aida Villaseñor    METABOLIC PANEL, BASIC    Collection Time: 09/11/19  1:47 AM   Result Value Ref Range    Sodium 140 136 - 145 mmol/L    Potassium 4.5 3.5 - 5.1 mmol/L    Chloride 105 97 - 108 mmol/L    CO2 27 21 - 32 mmol/L    Anion gap 8 5 - 15 mmol/L    Glucose 145 (H) 65 - 100 mg/dL    BUN 16 6 - 20 MG/DL    Creatinine 0.71 0.55 - 1.02 MG/DL    BUN/Creatinine ratio 23 (H) 12 - 20      GFR est AA >60 >60 ml/min/1.73m2    GFR est non-AA >60 >60 ml/min/1.73m2    Calcium 8.3 (L) 8.5 - 10.1 MG/DL   HEMOGLOBIN    Collection Time: 09/11/19  1:47 AM   Result Value Ref Range    HGB 8.8 (L) 11.5 - 16.0 g/dL            Opportunity for questions and clarifications were given to the incoming nurse. Patient's bed locked and is in low position, side rails up x2, door open PRN, call bell within reach of patient and patient not in distress.       Signed by: Marlee Adams RN BSN, [facility]                       9/11/2019 at 3:46 AM

## 2019-09-11 NOTE — PROGRESS NOTES
Problem: Diabetes Self-Management  Goal: *Disease process and treatment process  Description  Define diabetes and identify own type of diabetes; list 3 options for treating diabetes. Outcome: Progressing Towards Goal  Goal: *Using medications safely  Description  State effect of diabetes medications on diabetes; name diabetes medication taking, action and side effects. Outcome: Progressing Towards Goal  Goal: *Prevention, detection, treatment of acute complications  Description  List symptoms of hyper- and hypoglycemia; describe how to treat low blood sugar and actions for lowering  high blood glucose level. Outcome: Progressing Towards Goal     Problem: Falls - Risk of  Goal: *Absence of Falls  Description  Document Candida Lightning Fall Risk and appropriate interventions in the flowsheet.   Outcome: Progressing Towards Goal  Note:   Fall Risk Interventions:            Medication Interventions: Evaluate medications/consider consulting pharmacy, Patient to call before getting OOB, Teach patient to arise slowly                   Problem: Complex Spine Procedure:  Day of Surgery  Goal: Activity/Safety  Outcome: Progressing Towards Goal  Goal: Medications  Outcome: Progressing Towards Goal  Goal: Treatments/Interventions/Procedures  Outcome: Progressing Towards Goal  Goal: *Optimal pain control at patient's stated goal  Outcome: Progressing Towards Goal
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
## 2019-09-11 NOTE — PROGRESS NOTES
Problem: Mobility Impaired (Adult and Pediatric)  Goal: *Acute Goals and Plan of Care (Insert Text)  Description  FUNCTIONAL STATUS PRIOR TO ADMISSION: Patient was independent and active without use of DME.    HOME SUPPORT PRIOR TO ADMISSION: The patient lived with family but did not require assist.    Physical Therapy Goals  Initiated 9/11/2019    1. Patient will move from supine to sit and sit to supine , scoot up and down and roll side to side in bed with modified independence within 4 days. 2. Patient will perform sit to stand with modified independence within 4 days. 3. Patient will ambulate with independence for 300 feet with the least restrictive device within 4 days. 4. Patient will verbalize and demonstrate understanding of spinal precautions (No bending, lifting greater than 5 lbs, or twisting; log-roll technique; frequent repositioning as instructed) within 4 days. Outcome: Progressing Towards Goal    PHYSICAL THERAPY EVALUATION  Patient: Ángel Ludwig (44 y.o. female)  Date: 9/11/2019  Primary Diagnosis: Lumbar stenosis [M48.061]  Procedure(s) (LRB):  LUMBAR LAMINECTOMY L3-4 AND L4-5 WITH POSTERIOR LUMBAR FUSION L3-5 (LATEX ALLERGY) (N/A) 1 Day Post-Op   Precautions:   Spinal, Fall      ASSESSMENT  Based on the objective data described below, the patient presents with decreased mobility and movement s/p L3-5 lami and fusion. Pt demos decreased tolerance for PT-- initially nauseous and had recently vomited however stable throughout session with mild orthostatic hypotension that resolves after transfer with sitting. Pt requires min A to get to EOB, able to tolerate sitting and donning of brace. Pt transferred to chair with min A and above noted drop in BP. Pt left in chair with OT performing evaluation. Discussed precautions, brace schedule, and home mobility and expectations. Pt is not cleared to DC at this time.      Current Level of Function Impacting Discharge (mobility/balance): min A for mobility, no gait at this time    Functional Outcome Measure: The patient scored Total: 50/100 on the Barthel Index which is indicative of moderate impaired ability to care for basic self needs/dependency on others. Patient will benefit from skilled therapy intervention to address the above noted impairments. PLAN :  Recommendations and Planned Interventions: bed mobility training, transfer training, gait training, therapeutic exercises, neuromuscular re-education, patient and family training/education and therapeutic activities      Frequency/Duration: Patient will be followed by physical therapy:  twice daily to address goals. Recommendation for discharge: (in order for the patient to meet his/her long term goals)  To be determined: patient may require rehab stay    This discharge recommendation:  Has been made in collaboration with the attending provider and/or case management    Equipment recommendations for successful discharge (if) home: TBD         SUBJECTIVE:   Patient stated I just feel awful.     OBJECTIVE DATA SUMMARY:   HISTORY:    Past Medical History:   Diagnosis Date    Abdominal mass, left upper quadrant 06/13/2017    no finding per pt    Abnormal finding on MRI of brain 3/16/2015    evaluated by neurologist and no findings    Acute pharyngitis 5/26/2016    Anemia NEC     Arrhythmia     a fib    Arthralgia of right hip 4/25/2016    Arthritis 2/18/2010    Cataract 9/24/2014    [facility] [address]     Elevated LFTs 4/16/2014    DAVID (generalized anxiety disorder) 1/22/2015    GERD (gastroesophageal reflux disease) 4/7/2014    Hammer toe of right foot 9/29/2014    Headache(784.0) 8/27/6480    Helicobacter pylori (H. 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 ([facility] [address] 9/29/2014    Preop examination 9/24/2014    Right foot injury 5/2/2017    Risk for falls 4/16/2014    Screening for breast cancer 9/23/2014    Screening for depression 4/16/2014    Shoulder pain, left 3/18/2014    Spondylitis, cervical ([facility] [address] 4/5/2010    Tinea pedis 6/3/2015    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 straightened colon out    HX ROTATOR CUFF REPAIR  2009    left    IR INJ FORAMIN EPID LUMB ANES/STER ADDL  8/2/2019    IR INJ FORAMIN EPID LUMB ANES/STER SNGL  8/2/2019    OPEN REPAIR CLAVICLE FRACTURE  2009    did not have surgery for this/pt states she was in an accident and had RCR on left, but the clavicle was not repaired - injury was done at the same time       Personal factors and/or comorbidities impacting plan of care:     Home Situation  Home Environment: Private residence(CURRENTLY STAYING AT A HOTEL)  # Steps to Enter: 0  One/Two Story Residence: One story  Living Alone: No  Support Systems: Friends \ neighbors, Family member(s)  Patient Expects to be Discharged to[de-identified] Private residence  Current DME Used/Available at Home: None  Tub or Shower Type: Tub/Shower combination    EXAMINATION/PRESENTATION/DECISION MAKING:   Critical Behavior:  Neurologic State: Alert  Orientation Level: Oriented X4  Cognition: Decreased attention/concentration, Follows commands  Safety/Judgement: Awareness of environment, Fall prevention  Hearing: Auditory  Auditory Impairment: None  Hearing Aids/Status: (NA)  Skin:  intact  Edema: none  Range Of Motion:  AROM: Within functional limits           PROM: Within functional limits           Strength:    Strength: Generally decreased, functional                    Tone & Sensation:   Tone: Normal              Sensation: Intact               Coordination:  Coordination: Within functional limits  Vision:   Diplopia: No  Functional Mobility:  Bed Mobility:  Rolling: Minimum assistance(cues for log roll technique)  Supine to Sit: (Pt received sitting up in chair)  Sit to Supine: Minimum assistance(for LE management)  Scooting: Minimum assistance  Transfers:  Sit to Stand: Minimum assistance  Stand to Sit: Minimum assistance        Bed to Chair: Minimum assistance              Balance:   Sitting: Intact; Without support  Standing: Impaired; With support  Standing - Static: Fair  Standing - Dynamic : Fair  Ambulation/Gait Training:  Distance (ft): 3 Feet (ft)  Assistive Device: Brace/Splint; Other (comment)(bue)  Ambulation - Level of Assistance: Minimal assistance     Gait Description (WDL): Exceptions to WDL  Gait Abnormalities: Shuffling gait        Base of Support: Widened     Speed/Tona: Shuffled; Slow  Step Length: Right shortened;Left shortened          Functional Measure:  Barthel Index:    Bathin  Bladder: 5  Bowels: 10  Groomin  Dressin  Feeding: 10  Mobility: 0  Stairs: 0  Toilet Use: 5  Transfer (Bed to Chair and Back): 10  Total: 50/100       The Barthel ADL Index: Guidelines  1. The index should be used as a record of what a patient does, not as a record of what a patient could do. 2. The main aim is to establish degree of independence from any help, physical or verbal, however minor and for whatever reason. 3. The need for supervision renders the patient not independent. 4. A patient's performance should be established using the best available evidence. Asking the patient, friends/relatives and nurses are the usual sources, but direct observation and common sense are also important. However direct testing is not needed. 5. Usually the patient's performance over the preceding 24-48 hours is important, but occasionally longer periods will be relevant. 6. Middle categories imply that the patient supplies over 50 per cent of the effort. 7. Use of aids to be independent is allowed. José Manuel Mendenhall., Barthel, DCAROLYN. (6150). Functional evaluation: the Barthel Index. [facility] (14)2. GRISEL Meade, Allison Ratliff., Anjali Post., Daphnie, [address] (1999). Measuring the change indisability after inpatient rehabilitation; comparison of the responsiveness of the Barthel Index and Functional [place] Measure. Journal of Neurology, Neurosurgery, and Psychiatry, 66(4), 856-700. Luz Maria Rojas, NNaraJ.A, HARSHAD Carter, & Aleks Berry M.A. (2004.) Assessment of post-stroke quality of life in cost-effectiveness studies: The usefulness of the Barthel Index and the EuroQoL-5D. Quality of Life Research, 15, 457-28        Physical Therapy Evaluation Charge Determination   History Examination Presentation Decision-Making   HIGH Complexity :3+ comorbidities / personal factors will impact the outcome/ POC  HIGH Complexity : 4+ Standardized tests and measures addressing body structure, function, activity limitation and / or participation in recreation  MEDIUM Complexity : Evolving with changing characteristics  Other outcome measures barthel  MEDIUM      Based on the above components, the patient evaluation is determined to be of the following complexity level: MEDIUM    Pain Rating: Moderate pain, but chief complaint is nausea    Activity Tolerance:   Fair, requires frequent rest breaks, signs and symptoms of orthostatic hypotension and nausea  Please refer to the flowsheet for vital signs taken during this treatment.     After treatment patient left in no apparent distress:   Sitting in chair, Call bell within reach, Caregiver / family present and with OT    COMMUNICATION/EDUCATION:   The patients plan of care was discussed with: Registered Nurse and . Fall prevention education was provided and the patient/caregiver indicated understanding. and Patient/family have participated as able in goal setting and plan of care.     Thank you for this referral.  Yamilex Maradiaga, PT   Time Calculation: 27 mins

## 2019-09-11 NOTE — PROGRESS NOTES
Problem: Diabetes Self-Management  Goal: *Disease process and treatment process  Description  Define diabetes and identify own type of diabetes; list 3 options for treating diabetes. Outcome: Progressing Towards Goal  Goal: *Using medications safely  Description  State effect of diabetes medications on diabetes; name diabetes medication taking, action and side effects. Outcome: Progressing Towards Goal  Goal: *Monitoring blood glucose, interpreting and using results  Description  Identify recommended blood glucose targets  and personal targets. Outcome: Progressing Towards Goal     Problem: Patient Education: Go to Patient Education Activity  Goal: Patient/Family Education  Outcome: Progressing Towards Goal     Problem: Falls - Risk of  Goal: *Absence of Falls  Description  Document Ashwin Reddy Fall Risk and appropriate interventions in the flowsheet.   Outcome: Progressing Towards Goal  Note:   Fall Risk Interventions:            Medication Interventions: Patient to call before getting OOB

## 2019-09-11 NOTE — PROGRESS NOTES
Occupational Therapy:  09/11/19    Orders received and appreciated, chart reviewed, spoke to RN. Patient seen and evaluated by Occupational Therapy. ADL participation limited secondary to nausea/vomiting/orthostatic BP. Patient reporting they are staying in hotel with her daughter at this time and also have responsibility of paraplegic family member. Patient is not cleared from OT at this time. Patient will benefit from continued skilled services to address safety and independence with ADLs and mobility. Full note to follow.     Thank you,  Connor Goode, OTR/L

## 2019-09-11 NOTE — PROGRESS NOTES
Problem: Falls - Risk of  Goal: *Absence of Falls  Description  Document Sajan Mederos Fall Risk and appropriate interventions in the flowsheet.   Outcome: Progressing Towards Goal  Note:   Fall Risk Interventions:  Mobility Interventions: OT consult for ADLs, Communicate number of staff needed for ambulation/transfer, Patient to call before getting OOB, PT Consult for mobility concerns, PT Consult for assist device competence         Medication Interventions: Patient to call before getting OOB, Teach patient to arise slowly    Elimination Interventions: Call light in reach, Patient to call for help with toileting needs, Toileting schedule/hourly rounds              Problem: Patient Education: Go to Patient Education Activity  Goal: Patient/Family Education  Outcome: Progressing Towards Goal     Problem: Complex Spine Procedure:  Day of Surgery  Goal: Off Pathway (Use only if patient is Off Pathway)  Outcome: Progressing Towards Goal  Goal: Activity/Safety  Outcome: Progressing Towards Goal  Goal: Consults, if ordered  Outcome: Progressing Towards Goal  Goal: Diagnostic Test/Procedures  Outcome: Progressing Towards Goal  Goal: Nutrition/Diet  Outcome: Progressing Towards Goal  Goal: Discharge Planning  Outcome: Progressing Towards Goal  Goal: Medications  Outcome: Progressing Towards Goal  Goal: Respiratory  Outcome: Progressing Towards Goal  Goal: Treatments/Interventions/Procedures  Outcome: Progressing Towards Goal  Goal: Psychosocial  Outcome: Progressing Towards Goal  Goal: *Optimal pain control at patient's stated goal  Outcome: Progressing Towards Goal  Goal: *Demonstrates progressive activity  Outcome: Progressing Towards Goal  Goal: *Respiratory status stable  Outcome: Progressing Towards Goal

## 2019-09-12 LAB
GLUCOSE BLD STRIP.AUTO-MCNC: 122 MG/DL (ref 65–100)
GLUCOSE BLD STRIP.AUTO-MCNC: 130 MG/DL (ref 65–100)
GLUCOSE BLD STRIP.AUTO-MCNC: 131 MG/DL (ref 65–100)
GLUCOSE BLD STRIP.AUTO-MCNC: 134 MG/DL (ref 65–100)
HGB BLD-MCNC: 7.7 G/DL (ref 11.5–16)
SERVICE CMNT-IMP: ABNORMAL

## 2019-09-12 PROCEDURE — 65270000029 HC RM PRIVATE

## 2019-09-12 PROCEDURE — 74011250637 HC RX REV CODE- 250/637: Performed by: NURSE PRACTITIONER

## 2019-09-12 PROCEDURE — 85018 HEMOGLOBIN: CPT

## 2019-09-12 PROCEDURE — 74011250637 HC RX REV CODE- 250/637: Performed by: PHYSICIAN ASSISTANT

## 2019-09-12 PROCEDURE — 36415 COLL VENOUS BLD VENIPUNCTURE: CPT

## 2019-09-12 PROCEDURE — 82962 GLUCOSE BLOOD TEST: CPT

## 2019-09-12 PROCEDURE — 97116 GAIT TRAINING THERAPY: CPT

## 2019-09-12 PROCEDURE — 97535 SELF CARE MNGMENT TRAINING: CPT

## 2019-09-12 PROCEDURE — P9016 RBC LEUKOCYTES REDUCED: HCPCS

## 2019-09-12 PROCEDURE — L0628 LSO FLEX NO RI STAYS PRE OTS: HCPCS

## 2019-09-12 PROCEDURE — 36430 TRANSFUSION BLD/BLD COMPNT: CPT

## 2019-09-12 RX ORDER — HYDROCODONE BITARTRATE AND ACETAMINOPHEN 5; 325 MG/1; MG/1
1 TABLET ORAL
Status: DISCONTINUED | OUTPATIENT
Start: 2019-09-12 | End: 2019-09-14 | Stop reason: HOSPADM

## 2019-09-12 RX ORDER — MAGNESIUM SULFATE 100 %
4 CRYSTALS MISCELLANEOUS AS NEEDED
Status: DISCONTINUED | OUTPATIENT
Start: 2019-09-12 | End: 2019-09-14 | Stop reason: HOSPADM

## 2019-09-12 RX ORDER — METOPROLOL SUCCINATE 50 MG/1
TABLET, EXTENDED RELEASE ORAL
Qty: 90 TAB | Refills: 1 | Status: SHIPPED | OUTPATIENT
Start: 2019-09-12 | End: 2020-03-18

## 2019-09-12 RX ORDER — DEXTROSE 50 % IN WATER (D50W) INTRAVENOUS SYRINGE
12.5-25 AS NEEDED
Status: DISCONTINUED | OUTPATIENT
Start: 2019-09-12 | End: 2019-09-12 | Stop reason: CLARIF

## 2019-09-12 RX ORDER — INSULIN LISPRO 100 [IU]/ML
INJECTION, SOLUTION INTRAVENOUS; SUBCUTANEOUS
Status: DISCONTINUED | OUTPATIENT
Start: 2019-09-12 | End: 2019-09-14 | Stop reason: HOSPADM

## 2019-09-12 RX ORDER — SODIUM CHLORIDE 9 MG/ML
250 INJECTION, SOLUTION INTRAVENOUS AS NEEDED
Status: DISCONTINUED | OUTPATIENT
Start: 2019-09-12 | End: 2019-09-14 | Stop reason: HOSPADM

## 2019-09-12 RX ADMIN — APIXABAN 5 MG: 5 TABLET, FILM COATED ORAL at 21:32

## 2019-09-12 RX ADMIN — ROSUVASTATIN CALCIUM 10 MG: 10 TABLET, COATED ORAL at 08:59

## 2019-09-12 RX ADMIN — POLYETHYLENE GLYCOL 3350 17 G: 17 POWDER, FOR SOLUTION ORAL at 08:59

## 2019-09-12 RX ADMIN — HYDROCODONE BITARTRATE AND ACETAMINOPHEN 1 TABLET: 5; 325 TABLET ORAL at 21:32

## 2019-09-12 RX ADMIN — HYDROCODONE BITARTRATE AND ACETAMINOPHEN 1 TABLET: 5; 325 TABLET ORAL at 17:12

## 2019-09-12 RX ADMIN — METFORMIN HYDROCHLORIDE 500 MG: 500 TABLET ORAL at 17:12

## 2019-09-12 RX ADMIN — PROPAFENONE HYDROCHLORIDE 150 MG: 150 TABLET, COATED ORAL at 14:17

## 2019-09-12 RX ADMIN — PROPAFENONE HYDROCHLORIDE 150 MG: 150 TABLET, COATED ORAL at 21:34

## 2019-09-12 RX ADMIN — Medication 10 ML: at 14:00

## 2019-09-12 RX ADMIN — Medication 10 ML: at 07:12

## 2019-09-12 RX ADMIN — ASPIRIN 81 MG: 81 TABLET, COATED ORAL at 08:59

## 2019-09-12 RX ADMIN — HYDROCODONE BITARTRATE AND ACETAMINOPHEN 1 TABLET: 5; 325 TABLET ORAL at 09:00

## 2019-09-12 RX ADMIN — METFORMIN HYDROCHLORIDE 500 MG: 500 TABLET ORAL at 08:59

## 2019-09-12 RX ADMIN — ONDANSETRON 4 MG: 4 TABLET, ORALLY DISINTEGRATING ORAL at 09:00

## 2019-09-12 RX ADMIN — CALCIUM CARBONATE-VITAMIN D TAB 500 MG-200 UNIT 1 TABLET: 500-200 TAB at 08:59

## 2019-09-12 RX ADMIN — Medication 10 ML: at 22:00

## 2019-09-12 RX ADMIN — HYDROCODONE BITARTRATE AND ACETAMINOPHEN 1 TABLET: 5; 325 TABLET ORAL at 12:52

## 2019-09-12 RX ADMIN — APIXABAN 5 MG: 5 TABLET, FILM COATED ORAL at 09:00

## 2019-09-12 RX ADMIN — ACETAMINOPHEN 1000 MG: 500 TABLET ORAL at 03:26

## 2019-09-12 RX ADMIN — METOPROLOL SUCCINATE 50 MG: 50 TABLET, EXTENDED RELEASE ORAL at 08:59

## 2019-09-12 RX ADMIN — PANTOPRAZOLE SODIUM 40 MG: 40 TABLET, DELAYED RELEASE ORAL at 07:11

## 2019-09-12 RX ADMIN — PROPAFENONE HYDROCHLORIDE 150 MG: 150 TABLET, COATED ORAL at 07:11

## 2019-09-12 RX ADMIN — SENNOSIDES, DOCUSATE SODIUM 1 TABLET: 50; 8.6 TABLET, FILM COATED ORAL at 09:00

## 2019-09-12 RX ADMIN — SENNOSIDES, DOCUSATE SODIUM 1 TABLET: 50; 8.6 TABLET, FILM COATED ORAL at 17:12

## 2019-09-12 RX ADMIN — HYDROCHLOROTHIAZIDE 25 MG: 25 TABLET ORAL at 08:59

## 2019-09-12 NOTE — PROGRESS NOTES
Problem: Mobility Impaired (Adult and Pediatric)  Goal: *Acute Goals and Plan of Care (Insert Text)  Description  FUNCTIONAL STATUS PRIOR TO ADMISSION: Patient was independent and active without use of DME.    HOME SUPPORT PRIOR TO ADMISSION: The patient lived with family but did not require assist.    Physical Therapy Goals  Initiated 9/11/2019    1. Patient will move from supine to sit and sit to supine , scoot up and down and roll side to side in bed with modified independence within 4 days. 2. Patient will perform sit to stand with modified independence within 4 days. 3. Patient will ambulate with independence for 300 feet with the least restrictive device within 4 days. 4. Patient will verbalize and demonstrate understanding of spinal precautions (No bending, lifting greater than 5 lbs, or twisting; log-roll technique; frequent repositioning as instructed) within 4 days. Outcome: Progressing Towards Goal     PHYSICAL THERAPY TREATMENT  Patient: Nickie Samson (89 y.o. female)  Date: 9/12/2019  Diagnosis: Lumbar stenosis [M48.061] <principal problem not specified>  Procedure(s) (LRB):  LUMBAR LAMINECTOMY L3-4 AND L4-5 WITH POSTERIOR LUMBAR FUSION L3-5 (LATEX ALLERGY) (N/A) 2 Days Post-Op  Precautions: Spinal, Fall No bending, no lifting greater than 5 lbs, no twisting, log-roll technique, repositioning every 20-30 min except when sleeping, brace when OOB (if ordered)  Chart, physical therapy assessment, plan of care and goals were reviewed. ASSESSMENT  Based on the objective data described below, pt demos increased tolerance for OOB with no decrease in BP observed. Pt initially refusing treatment, states \"I thought we werent doing anything today\" educated pt that we deferred AM due to blood transfusion, but we do need to perform mobility or pain will increase and other benefits of treatment. Pt eventually agreed to minimal amount of walking to the door and back.  Pt participates with CHRISTINA PETER and furniture walking R, attempted to increase WB through legs with HHA. Pt educated not to furniture walk however appears too anxious. Will attempt again later. Pt is not cleared to DC home at this time. Current Level of Function Impacting Discharge (mobility/balance): min A, decreased following of cues         PLAN :  Patient continues to benefit from skilled intervention to address the above impairments. Continue treatment per established plan of care. to address goals. Recommendation for discharge: (in order for the patient to meet his/her long term goals)  Therapy 3 hours per day 5-7 days per week    This discharge recommendation:  Has been made in collaboration with the attending provider and/or case management    Equipment recommendations for successful discharge (if) home: to be determined by rehab facility       SUBJECTIVE:   Patient stated I thought we werent doing anything, it hurts too bad pt mildly uncooperative, stating \"I didn't tell you yesterday and I wont tell you today\" when asked for a reminder of how many steps she will encounter at home. OBJECTIVE DATA SUMMARY:   Critical Behavior:  Neurologic State: Irritable  Orientation Level: Oriented X4  Cognition: Follows commands  Safety/Judgement: Awareness of environment, Fall prevention    Spinal diagnosis intervention:  The patient required moderate  cues to maintain back precautions during functional activity. Reviewed back brace application and wear schedule. Brace donned with maximal assistance to total assistance    Functional Mobility Training:    Bed Mobility:  Log Rolling: Minimum assistance  Supine to Sit: Minimum assistance     Scooting: Minimum assistance        Transfers:  Sit to Stand: Minimum assistance  Stand to Sit: Minimum assistance        Bed to Chair: Minimum assistance                    Balance:  Sitting: Intact; Without support  Standing: Impaired; With support  Standing - Static: Fair  Standing - Dynamic : Fair  Ambulation/Gait Training:  Distance (ft): 25 Feet (ft)  Assistive Device: Brace/Splint; Other (comment)(HHA)  Ambulation - Level of Assistance: Minimal assistance; Moderate assistance        Gait Abnormalities: Shuffling gait        Base of Support: Widened     Speed/Tona: Shuffled; Slow  Step Length: Right shortened;Left shortened            Pain Rating:  Pt c/o severe pain, FLACC F1 L0 A0 C1 C2 for 4/10 pain objectively     Activity Tolerance:   Good, Fair, SpO2 stable on RA and requires frequent rest breaks  Please refer to the flowsheet for vital signs taken during this treatment.     After treatment patient left in no apparent distress:   Sitting in chair, Call bell within reach, Caregiver / family present and with OT     COMMUNICATION/COLLABORATION:   The patients plan of care was discussed with: Occupational Therapist, Registered Nurse and     Lilo Maradiaga, PT   Time Calculation: 13 mins

## 2019-09-12 NOTE — PROGRESS NOTES
Care Management Interventions  PCP Verified by CM: Yes  Palliative Care Criteria Met (RRAT>21 & CHF Dx)?: No  Mode of Transport at Discharge: Other (see comment)  Transition of Care Consult (CM Consult): Discharge Planning  MyChart Signup: Yes  Discharge Durable Medical Equipment: No  Health Maintenance Reviewed: Yes  Physical Therapy Consult: Yes  Occupational Therapy Consult: Yes  Speech Therapy Consult: No  Current Support Network: Lives with Spouse, Family Lives [place], Own Home  Confirm Follow Up Transport: Family  Plan discussed with Pt/Family/Caregiver: Yes  [facility] Resource Information Provided?: No  Discharge Location  Discharge Placement: Home with home health      Reason for Admission:   Lumbar stenosis               RRAT Score:     29              Resources/supports as identified by patient/family:   Her , daughter, sisters and sister in law. Top Challenges facing patient (as identified by patient/family and CM): Finances/Medication cost?      Patient confirmed she has Medicare and [employer]. Transportation? Her  or family will transport home at discharge. Support system or lack thereof? See above. Living arrangements? She lives with her  in their private residence. Self-care/ADLs/Cognition? Patient is independent prior to admissions. Current Advanced Directive/Advance Care Plan: On file                          Plan for utilizing home health:    Patient would benefit from a IPR stay. Transition of Care Plan:                  Reviewed chart for transitions of care, and discussed in rounds. CM met with patient at bedside to explain role and offer support. Patient is alert and oriented x4, and confirmed demographics. She has 1 supportive daughter, and a grandson who is disabled.  She is currently living in a hotel with her daughter while she waits for her house to be completed. Patient does not use any DME at home prior to admission. CM sent referral to [facility], pending response.     [facility]

## 2019-09-12 NOTE — PROGRESS NOTES
Problem: Falls - Risk of  Goal: *Absence of Falls  Description  Document Marisol Mahajan Fall Risk and appropriate interventions in the flowsheet.   Outcome: Progressing Towards Goal  Note:   Fall Risk Interventions:  Mobility Interventions: OT consult for ADLs, Patient to call before getting OOB, PT Consult for mobility concerns, PT Consult for assist device competence, Utilize walker, cane, or other assistive device, Communicate number of staff needed for ambulation/transfer         Medication Interventions: Patient to call before getting OOB, Teach patient to arise slowly    Elimination Interventions: Call light in reach, Patient to call for help with toileting needs, Toileting schedule/hourly rounds              Problem: Patient Education: Go to Patient Education Activity  Goal: Patient/Family Education  Outcome: Progressing Towards Goal     Problem: Complex Spine Procedure:  Post Op Day 2  Goal: Off Pathway (Use only if patient is Off Pathway)  Outcome: Progressing Towards Goal  Goal: Activity/Safety  Outcome: Progressing Towards Goal  Goal: Diagnostic Test/Procedures  Outcome: Progressing Towards Goal  Goal: Nutrition/Diet  Outcome: Progressing Towards Goal  Goal: Discharge Planning  Outcome: Progressing Towards Goal  Goal: Medications  Outcome: Progressing Towards Goal  Goal: Respiratory  Outcome: Progressing Towards Goal  Goal: Treatments/Interventions/Procedures  Outcome: Progressing Towards Goal  Goal: Psychosocial  Outcome: Progressing Towards Goal  Goal: *Progress independence mobility/activities (eg: Mobility precautions)  Outcome: Progressing Towards Goal  Goal: *Verbalizes/demonstrates understanding of proper body mechanics and use of stabilization device if ordered  Outcome: Progressing Towards Goal  Goal: *Optimal pain control at patient's stated goal  Outcome: Progressing Towards Goal  Goal: *Resumes normal function of bladder and bowel  Outcome: Progressing Towards Goal  Goal: *Hemodynamically stable  Outcome: Progressing Towards Goal  Goal: *Tolerating diet  Outcome: Progressing Towards Goal  Goal: *Labs within defined limits  Outcome: Progressing Towards Goal  Goal: *Able to place stabilization device  Outcome: Progressing Towards Goal

## 2019-09-12 NOTE — PROGRESS NOTES
Problem: Self Care Deficits Care Plan (Adult)  Goal: *Acute Goals and Plan of Care (Insert Text)  Description  FUNCTIONAL STATUS PRIOR TO ADMISSION: Patient lives with her daughter and grandson and are currently staying in a hotel. Reeda Officer is a paraplegic and w/c bound and daughter works. Patient independent at baseline for mobility and ADLs. Occupational Therapy Goals  Initiated 9/11/2019    1. Patient will perform lower body dressing with supervision/set-up using AE PRN within 7 days. 2.  Patient will perform toilet transfer with supervision/set-up using most appropriate DME within 7 days. 3.  Patient will perform all aspects of toileting at minimal assistance/contact guard assist within 7 days. 4.  Patient will perform grooming, standing at sink for >3 minutes, with supervision/set up within 7 days. 5.  Patient will don/doff back brace at modified independence within 7 days. 6.  Patient will verbalize/demonstrate 3/3 back precautions during ADL tasks without cues within 7 days. Outcome: Progressing Towards Goal       OCCUPATIONAL THERAPY TREATMENT  Patient: Ignacio Aguero (84 y.o. female)  Date: 9/12/2019  Diagnosis: Lumbar stenosis [M48.061] <principal problem not specified>  Procedure(s) (LRB):  LUMBAR LAMINECTOMY L3-4 AND L4-5 WITH POSTERIOR LUMBAR FUSION L3-5 (LATEX ALLERGY) (N/A) 2 Days Post-Op  Precautions: Spinal, Fall  Chart, occupational therapy assessment, plan of care, and goals were reviewed. ASSESSMENT  Based on the objective data described below, patient is limited in mobility and ADL due to pain. Patient had just finished walking to door and back to chair with PT. Verbally educated on AE and provided ADL handout. Patient stated would rather practice using equipment another day.     Current Level of Function Impacting Discharge (ADLs): needs Max A with LB ADLS    Other factors to consider for discharge: staying in hotel room         PLAN :  Patient continues to benefit from skilled intervention to address the above impairments. Continue treatment per established plan of care. to address goals. Recommend with staff: encourage OOB    Recommend next OT session: AE Training    Recommendation for discharge: (in order for the patient to meet his/her long term goals)  Therapy 3 hours per day 5-7 days per week    This discharge recommendation:  Has been made in collaboration with the attending provider and/or case management    Equipment recommendations for successful discharge (if) home: to be determined by rehab facility       SUBJECTIVE:   Patient stated i'm in too much pain right now.     OBJECTIVE DATA SUMMARY:   Cognitive/Behavioral Status:  Neurologic State: Irritable        Patient seated in chair post PT treatment. ADL Intervention:    ADL Spine handout issues and high points emphasized. Lower Body Dressing Assistance  Socks: Total assistance (dependent)(will need sock aide training, declined at this moment)    Reviewed all 3 back precautions with patient. Toileting: Patient instructed on the benefits of using flushable wet wipes and toilet tongs if decreased reach or pain for kimberly care. Also, the benefits of a reacher to aid in clothing management. Also introduced to concept of toilet tongs. Patient instructed and indicated understanding the benefits of maintaining activity tolerance, functional mobility, and independence with self care tasks during acute stay  to ensure safe return home and to baseline. Encouraged patient to increase frequency and duration OOB, not sitting longer than 30 mins without marching/walking with staff, be out of bed for all meals, perform daily ADLs (as approved by RN/MD regarding bathing etc), and performing functional mobility to/from bathroom.  Patient instruction and indicated understanding on body mechanics, ergonomics and gravitational force on the spine during different body positions to plan activities in prep for return home to complete instrumental ADLs and back to work safely. Pain:  Reports having a lot of pain post walking    Activity Tolerance:   Fair  Please refer to the flowsheet for vital signs taken during this treatment.     After treatment patient left in no apparent distress:   Sitting in chair and Caregiver / family present    COMMUNICATION/COLLABORATION:   The patients plan of care was discussed with: Physical Therapist    Erika Medina  Time Calculation: 11 mins

## 2019-09-12 NOTE — PROGRESS NOTES
Pt continues to feel dizzy and weak.  Noted transfusion for this AM-- will follow and continue treatment after transfusion    Bright Villarreal, DEEPAKT, PT

## 2019-09-12 NOTE — OP NOTES
[address]   OPERATIVE REPORT    Name:  Charlette John  MR#:  [id number]  :  1940  ACCOUNT #:  [de-identified]  DATE OF SERVICE:  09/10/2019    PREOPERATIVE DIAGNOSES:  Lumbar spondylolisthesis, L4-5, L5-S1, lumbar stenosis L3-4, L4-5, L5-S1. POSTOPERATIVE DIAGNOSES:  Lumbar spondylolisthesis, L4-5, L5-S1, lumbar stenosis L3-4, L4-5, L5-S1. PROCEDURES PERFORMED:  Lumbar laminectomy, L3-4, L4-5, L5-S1, posterior lumbar fusion, L3-4, L4-5, posterior segmental instrumentation, L3 to L5.    SURGEON:  Radha Lima MD    ASSISTANT:  Alondra Martínez PA-C    ANESTHESIA:  General.    COMPLICATIONS:  None. SPECIMENS REMOVED:  None. IMPLANTS:  Instrumentation includes Medtronic Solera pedicle screw instrumentation. ESTIMATED BLOOD LOSS:  150 mL. DRAIN:  One Mini Hemovac. INDICATIONS:  This is a 61-year-old female with chronic back pain, bilateral leg pain, neurogenic claudication, severe spinal stenosis, L3-4 and L4-5 from spondylolisthesis as well as central and lateral recess stenosis. She failed conservative treatment including medications, physical therapy, and injection therapy. She is here for decompression and stabilization. The patient understood the risks and benefits and elected to proceed. PROCEDURE:  The patient was identified, brought to the operative suite, and underwent general anesthesia without difficulty. Preoperative neuromonitoring placed. Baselines were obtained. These remained stable throughout the surgical procedure. We also placed Hernández catheter. Preoperative antibiotics were given. The patient was placed in the prone position on the Mika frame with all bony prominences well padded. Back was prepped and draped sterilely. Time-out was performed. After time-out was confirmed, we did a midline incision. Dissection was carried down to the thoracodorsal fascia, which was then split.   We then removed and exposed the posterior spinous processes from L3, L4, L5, and the superior portion of the sacrum. We confirmed this on lateral fluoroscopy. We cleared the transverse processes of L3, L4, and L5 for subsequent posterolateral bony fusion and placed deep radiolucent retractors. We started with wide decompression with removal of L5 spinous process, L4, and inferior half L3 and then started central laminectomy with undercutting the L5 lamina, L4 lamina, and then the L3 lamina. Hypertrophic ligamentum and facet hypertrophy causing severe spinal stenosis, particularly at L3-4 where the stenosis severe in the lateral recesses. Partial facet resection was performed bilaterally at those levels at L3-4 and L4-5 for decompression of lateral recess extending nerve roots. Nerve roots were visualized in the canal and foramen and in that which could pass the Minus Latonya ball probe. Hemostasis with FloSeal as well as bipolar cautery, because the patient's history of Eliquis. After satisfactory [id number] decompression bilaterally, we then cannulated the pedicles at L3, L4, L5 using standard bony landmarks and tested with the ball-tip probe as well as neuromonitoring. We placed Medtronic Solera pedicle screws 6.5 x 45 mm screws at 45 mm length at each level. .  Wound was thoroughly irrigated with bacitracin 3000 mL of pulse lavage that had mixed with saline with bacitracin. We then decorticated the remainder of the facets as well as transverse processes. Local bone graft was morselized and mixed with Alia allograft and packed into the lateral gutters followed by 60 mm longitudinal rods passed with pedicle screws and set screws. Final tightening was performed. The patient had final x-rays demonstrated stable fixation. Mini Hemovac placed. 0 Vicryl in the fascial layer, 2-0 Vicryl in the subcutaneous layer, and 3-0 Vicryl on the skin.     The physician assistant was present during the entire operative procedure and assisted in all critical elements of the surgery. No surgical assist or resident was available.          MD BRANDON Membreno/[email or web address]  D:  09/11/2019 9:26  T:  09/11/2019 18:25  JOB #:  [id number]

## 2019-09-12 NOTE — PROGRESS NOTES
CM attempted to leave VM for patient daughter, Gabriel Bound [phone].     David Yu [facility]

## 2019-09-12 NOTE — PROGRESS NOTES
Orthopedic Spine Progress Note  Post Op day: 2 Days Post-Op    2019 7:59 AM   Admit Date: 9/10/2019  Procedure: Procedure(s):  LUMBAR LAMINECTOMY L3-4 AND L4-5 WITH POSTERIOR LUMBAR FUSION L3-5 (LATEX ALLERGY)    Subjective:     Tammy Hobjudie c/o back and left hip/leg pain. Moderate control. Episode of dizziness yesterday when ambulating with PT. Drain in place. Tolerating diet. No N/V. Pain Control:   Pain Assessment  Pain Scale 1: Numeric (0 - 10)  Pain Intensity 1: 8  Pain Location 1: Back  Pain Orientation 1: Lower, Left, Right  Pain Description 1: Constant, Sharp, Pressure(PINCHING)  Pain Intervention(s) 1: Medication (see MAR)    Objective:          Physical Exam:  General:  Alert and oriented. No acute distress. Heart:  Respirations unlabored. Abdomen:   Extremities: Soft, non-tender. No evidence of cyanosis. Pulses palpable in both upper and lower extremities. Neurologic:  Musculoskeletal:  No new motor deficits. Neurovascular exam within normal limits. Sensation stable. Motor: unchanged C5-T1 and L2-S1. Jesus's sign negative in bilateral lower extremities. Calves soft, nontender upon palpation and with passive twitch. Moves both upper and lower extremities. Incision: clean, dry, and intact. No significant erythema or swelling. No active drainage noted. Vital Signs:    Blood pressure 103/48, pulse 93, temperature 98.8 °F (37.1 °C), resp. rate 18, height 5' 2\" (1.575 m), weight 93 kg (205 lb), last menstrual period 1983, SpO2 92 %, not currently breastfeeding.   Temp (24hrs), Av.6 °F (37 °C), Min:97.8 °F (36.6 °C), Max:99.1 °F (37.3 °C)      LAB:    Recent Labs     19  0338   HGB 7.7*     Lab Results   Component Value Date/Time    Sodium 140 2019 01:47 AM    Potassium 4.5 2019 01:47 AM    Chloride 105 2019 01:47 AM    CO2 27 2019 01:47 AM    Glucose 145 (H) 2019 01:47 AM    BUN 16 2019 01:47 AM    Creatinine 0.71 09/11/2019 01:47 AM    Calcium 8.3 (L) 09/11/2019 01:47 AM       Intake/Output:09/12 0701 - 09/12 1900  In: -   Out: 90 [Drains:90]  09/10 1901 - 09/12 0700  In: 200 [P.O.:200]  Out: 660 [Urine:200; Drains:460]    PT/OT:   Gait:  Gait  Base of Support: Widened  Speed/Tona: Shuffled, Slow  Step Length: Right shortened, Left shortened  Gait Abnormalities: Shuffling gait  Ambulation - Level of Assistance: Minimal assistance  Distance (ft): 3 Feet (ft)  Assistive Device: Brace/Splint, Other (comment)(bue)                 Assessment:   Patient is 2 Days Post-Op s/p Procedure(s):  LUMBAR LAMINECTOMY L3-4 AND L4-5 WITH POSTERIOR LUMBAR FUSION L3-5 (LATEX ALLERGY)    Plan:     1. Continue PT/OT  2. Continue established methods of pain control        Norco for pain control. Nauseous with Dilaudid. 3.  VTE Prophylaxes - TEDS &/or SCDs   4. Remove drain once output <80cc  5. Hgb 8.8 > 7.7 today       Expected acute blood loss anemia in post-op setting       Transfuse 1 unit PRBCs       Continue to monitor  6.   Discharge pending      Signed By: DELONTE Estrada

## 2019-09-12 NOTE — PROGRESS NOTES
Pt continues to feel dizzy and weak.  Noted transfusion for this AM-- will follow and continue treatment after transfusion      Bhavin Wilcox MS. OTR/L

## 2019-09-13 LAB
ABO + RH BLD: NORMAL
BLD PROD TYP BPU: NORMAL
BLOOD GROUP ANTIBODIES SERPL: NORMAL
BPU ID: NORMAL
CROSSMATCH RESULT,%XM: NORMAL
GLUCOSE BLD STRIP.AUTO-MCNC: 109 MG/DL (ref 65–100)
GLUCOSE BLD STRIP.AUTO-MCNC: 127 MG/DL (ref 65–100)
GLUCOSE BLD STRIP.AUTO-MCNC: 130 MG/DL (ref 65–100)
GLUCOSE BLD STRIP.AUTO-MCNC: 145 MG/DL (ref 65–100)
HCT VFR BLD AUTO: 27.3 % (ref 35–47)
HGB BLD-MCNC: 8.5 G/DL (ref 11.5–16)
SERVICE CMNT-IMP: ABNORMAL
SPECIMEN EXP DATE BLD: NORMAL
STATUS OF UNIT,%ST: NORMAL
UNIT DIVISION, %UDIV: 0

## 2019-09-13 PROCEDURE — 82962 GLUCOSE BLOOD TEST: CPT

## 2019-09-13 PROCEDURE — 97116 GAIT TRAINING THERAPY: CPT

## 2019-09-13 PROCEDURE — 97530 THERAPEUTIC ACTIVITIES: CPT

## 2019-09-13 PROCEDURE — 36415 COLL VENOUS BLD VENIPUNCTURE: CPT

## 2019-09-13 PROCEDURE — 97535 SELF CARE MNGMENT TRAINING: CPT

## 2019-09-13 PROCEDURE — 65270000029 HC RM PRIVATE

## 2019-09-13 PROCEDURE — 74011250637 HC RX REV CODE- 250/637: Performed by: NURSE PRACTITIONER

## 2019-09-13 PROCEDURE — 85018 HEMOGLOBIN: CPT

## 2019-09-13 PROCEDURE — 74011636637 HC RX REV CODE- 636/637: Performed by: NURSE PRACTITIONER

## 2019-09-13 PROCEDURE — 74011250637 HC RX REV CODE- 250/637: Performed by: PHYSICIAN ASSISTANT

## 2019-09-13 RX ORDER — HYDROCODONE BITARTRATE AND ACETAMINOPHEN 5; 325 MG/1; MG/1
1 TABLET ORAL
Qty: 50 TAB | Refills: 0 | Status: SHIPPED | OUTPATIENT
Start: 2019-09-13 | End: 2019-09-27

## 2019-09-13 RX ADMIN — APIXABAN 5 MG: 5 TABLET, FILM COATED ORAL at 08:23

## 2019-09-13 RX ADMIN — CALCIUM CARBONATE-VITAMIN D TAB 500 MG-200 UNIT 1 TABLET: 500-200 TAB at 08:23

## 2019-09-13 RX ADMIN — PROPAFENONE HYDROCHLORIDE 150 MG: 150 TABLET, COATED ORAL at 15:35

## 2019-09-13 RX ADMIN — ASPIRIN 81 MG: 81 TABLET, COATED ORAL at 08:22

## 2019-09-13 RX ADMIN — HYDROCODONE BITARTRATE AND ACETAMINOPHEN 1 TABLET: 5; 325 TABLET ORAL at 02:51

## 2019-09-13 RX ADMIN — APIXABAN 5 MG: 5 TABLET, FILM COATED ORAL at 22:25

## 2019-09-13 RX ADMIN — Medication 10 ML: at 22:25

## 2019-09-13 RX ADMIN — HYDROCODONE BITARTRATE AND ACETAMINOPHEN 1 TABLET: 5; 325 TABLET ORAL at 12:51

## 2019-09-13 RX ADMIN — HYDROCODONE BITARTRATE AND ACETAMINOPHEN 1 TABLET: 5; 325 TABLET ORAL at 07:28

## 2019-09-13 RX ADMIN — Medication 10 ML: at 15:36

## 2019-09-13 RX ADMIN — PANTOPRAZOLE SODIUM 40 MG: 40 TABLET, DELAYED RELEASE ORAL at 07:28

## 2019-09-13 RX ADMIN — ROSUVASTATIN CALCIUM 10 MG: 10 TABLET, COATED ORAL at 08:23

## 2019-09-13 RX ADMIN — ONDANSETRON 4 MG: 4 TABLET, ORALLY DISINTEGRATING ORAL at 09:46

## 2019-09-13 RX ADMIN — HYDROCODONE BITARTRATE AND ACETAMINOPHEN 1 TABLET: 5; 325 TABLET ORAL at 19:31

## 2019-09-13 RX ADMIN — SENNOSIDES, DOCUSATE SODIUM 1 TABLET: 50; 8.6 TABLET, FILM COATED ORAL at 17:27

## 2019-09-13 RX ADMIN — PROPAFENONE HYDROCHLORIDE 150 MG: 150 TABLET, COATED ORAL at 07:27

## 2019-09-13 RX ADMIN — INSULIN LISPRO 2 UNITS: 100 INJECTION, SOLUTION INTRAVENOUS; SUBCUTANEOUS at 17:27

## 2019-09-13 RX ADMIN — POLYETHYLENE GLYCOL 3350 17 G: 17 POWDER, FOR SOLUTION ORAL at 08:23

## 2019-09-13 RX ADMIN — Medication 10 ML: at 07:28

## 2019-09-13 RX ADMIN — SENNOSIDES, DOCUSATE SODIUM 1 TABLET: 50; 8.6 TABLET, FILM COATED ORAL at 08:23

## 2019-09-13 RX ADMIN — HYDROCHLOROTHIAZIDE 25 MG: 25 TABLET ORAL at 08:23

## 2019-09-13 RX ADMIN — METFORMIN HYDROCHLORIDE 500 MG: 500 TABLET ORAL at 17:27

## 2019-09-13 RX ADMIN — METOPROLOL SUCCINATE 50 MG: 50 TABLET, EXTENDED RELEASE ORAL at 08:23

## 2019-09-13 RX ADMIN — PROPAFENONE HYDROCHLORIDE 150 MG: 150 TABLET, COATED ORAL at 22:25

## 2019-09-13 RX ADMIN — METFORMIN HYDROCHLORIDE 500 MG: 500 TABLET ORAL at 08:23

## 2019-09-13 NOTE — PROGRESS NOTES
Problem: Self Care Deficits Care Plan (Adult)  Goal: *Acute Goals and Plan of Care (Insert Text)  Description  FUNCTIONAL STATUS PRIOR TO ADMISSION: Patient lives with her daughter and grandson and are currently staying in a hotel. Mckenzie Mitchell is a paraplegic and w/c bound and daughter works. Patient independent at baseline for mobility and ADLs. Occupational Therapy Goals  Initiated 9/11/2019    1. Patient will perform lower body dressing with supervision/set-up using AE PRN within 7 days. 2.  Patient will perform toilet transfer with supervision/set-up using most appropriate DME within 7 days. 3.  Patient will perform all aspects of toileting at minimal assistance/contact guard assist within 7 days. 4.  Patient will perform grooming, standing at sink for >3 minutes, with supervision/set up within 7 days. 5.  Patient will don/doff back brace at modified independence within 7 days. 6.  Patient will verbalize/demonstrate 3/3 back precautions during ADL tasks without cues within 7 days. Outcome: Progressing Towards Goal  OCCUPATIONAL THERAPY TREATMENT  Patient: Chaparro Doherty (50 y.o. female)  Date: 9/13/2019  Diagnosis: Lumbar stenosis [M48.061] <principal problem not specified>  Procedure(s) (LRB):  LUMBAR LAMINECTOMY L3-4 AND L4-5 WITH POSTERIOR LUMBAR FUSION L3-5 (LATEX ALLERGY) (N/A) 3 Days Post-Op  Precautions: Spinal, Fall  Chart, occupational therapy assessment, plan of care, and goals were reviewed. ASSESSMENT  Based on the objective data described below, participation impacted by impaired reach to LEs, impaired static and dynamic standing, impaired functional strength and endurance. Current Level of Function Impacting Discharge (ADLs): UE self care with set up to mod I; LE self care with max assist (has not used AE at this point secondary to latex allergy report by patient), functional transfers to toilet with min assist of 1.     Other factors to consider for discharge: none         PLAN :  Patient continues to benefit from skilled intervention to address the above impairments. Continue treatment per established plan of care. to address goals. Recommend with staff: Aster Pérez in chair for all meals and self care, toileting in bathroom with min assist, assist for toileting hygiene    Recommend next OT session: toileting hygiene, standing at sink for grooming    Recommendation for discharge: (in order for the patient to meet his/her long term goals)  Therapy 3 hours per day 5-7 days per week    This discharge recommendation:  Has been made in collaboration with the attending provider and/or case management    Equipment recommendations for successful discharge (if) home: to be determined by rehab facility       SUBJECTIVE:   Patient stated My daughter works during the day.     OBJECTIVE DATA SUMMARY:   Cognitive/Behavioral Status:  Neurologic State: Alert  Orientation Level: Oriented X4  Cognition: Appropriate decision making; Appropriate for age attention/concentration; Appropriate safety awareness; Follows commands  Perception: Appears intact  Perseveration: No perseveration noted  Safety/Judgement: Awareness of environment    Functional Mobility and Transfers for ADLs:  Bed Mobility:  Rolling: Minimum assistance  Supine to Sit: Moderate assistance(for trunk)  Sit to Supine: Minimum assistance;Assist x1    Transfers:  Sit to Stand: Minimum assistance;Assist x1  Functional Transfers  Toilet Transfer : Minimum assistance;Assist x1  Adaptive Equipment: Bedside commode;Grab bars(instructed in use as a 3 in 1; does not have a shower chair)  Bed to Chair: Contact guard assistance;Assist x1    Balance:  Sitting: Intact  Standing: Impaired; With support  Standing - Static: Fair  Standing - Dynamic : Fair    ADL Intervention:       Grooming  Washing Hands: Contact guard assistance(standing at sink)         Lower Body Bathing  Perineal  : Maximum assistance  Adaptive Equipment: Toilet tongs(instructed in use)  Adaptive Equipment: Long handled sponge(instructed in use)    Upper Body Dressing Assistance  Orthotics(Brace): Stand-by assistance(to doff)  Front Opened Shirt: Contact guard assistance(front open robe)    Lower Body Dressing Assistance  Leg Crossed Method Used: No  Position Performed: Seated in chair;Standing  Adaptive Equipment Used: Long handled shoe horn;Reacher;Sock aid(verbal and visual demonstration given on AE; in agreement with patient to avoid patient use secondary to patient report of latex allergy and know latex in parts of AE. Patient states she has used her grandson's reacher before and is not really worried )    Toileting  Bladder Hygiene: Maximum assistance  Bowel Hygiene: Maximum assistance  Clothing Management: Contact guard assistance(gown and robe)  Adaptive Equipment: Grab bars;Elevated seat    Cognitive Retraining  Safety/Judgement: Awareness of environment    The patient recalled and demonstrated 3/3 back precautions. Reviewed all 3 with patient. Dressing brace: Patient instructed and demonstrated to don/doff velcro on brace using dominant side, keeping non-dominant side intact. Patient instructed and demonstrated in meantime of being able to stand with back against wall to don/doff brace, to don/doff seated using lap and bed/chair surface to support brace while manipulating. Dressing lower body: Patient instructed to don brace first and on the benefits to remain seated to don all clothing to increase independence with precautions and pain management. Toileting: Patient instructed on the benefits of using flushable wet wipes and toilet tongs if decreased reach or pain for kimberly care. Also, the benefits of a reacher to aid in clothing management.        Standing: Patient instructed and indicated understanding to walk up to sink/counter top/surfaces, step into walker, square off while using objects, slide objects along surfaces, to increase adherence to back precautions and fall prevention. Patient instructed to increase amount of time standing in order to increase independence and tolerance with ADLs. During prolonged standing, can open cabinet door or place foot on stool to decrease spinal pressure/increase pain. Patient instructed and indicated understanding the benefits of maintaining activity tolerance, functional mobility, and independence with self care tasks during acute stay  to ensure safe return home and to baseline. Encouraged patient to increase frequency and duration OOB, not sitting longer than 30 mins without marching/walking with staff, be out of bed for all meals, perform daily ADLs (as approved by RN/MD regarding bathing etc), and performing functional mobility to/from bathroom. Patient instruction and indicated understanding on body mechanics, ergonomics and gravitational force on the spine during different body positions to plan activities in prep for return home to complete instrumental ADLs and back to work safely. Patient instructed on supine hip ER stretch and hold 10 seconds to increase ROM in prep for lower body ADLs daily. Activity Tolerance:   Fair  Please refer to the flowsheet for vital signs taken during this treatment.     After treatment patient left in no apparent distress:   Supine in bed and Call bell within reach    COMMUNICATION/COLLABORATION:   The patients plan of care was discussed with: Physical Therapist and Registered Nurse    ROMY Martin  Time Calculation: 37 mins

## 2019-09-13 NOTE — PROGRESS NOTES
Orthopedic Spine Progress Note  Post Op day: 3 Days Post-Op    2019 7:38 AM   Admit Date: 9/10/2019  Procedure: Procedure(s):  LUMBAR LAMINECTOMY L3-4 AND L4-5 WITH POSTERIOR LUMBAR FUSION L3-5 (LATEX ALLERGY)    Subjective:     Nathalie Meier appears well. Pain seems to be managed. Tolerating diet  No N/V  Voiding  Drain in place    Pain Control:   Pain Assessment  Pain Scale 1: Numeric (0 - 10)  Pain Intensity 1: 6  Pain Location 1: Back  Pain Orientation 1: Posterior  Pain Description 1: Aching  Pain Intervention(s) 1: Medication (see MAR)    Objective:          Physical Exam:  General:  Alert and oriented. No acute distress. Heart:  Respirations unlabored. Abdomen:   Extremities: Soft, non-tender. No evidence of cyanosis. Pulses palpable in both upper and lower extremities. Neurologic:  Musculoskeletal:  No new motor deficits. Neurovascular exam within normal limits. Sensation stable. Motor: unchanged C5-T1 and L2-S1. Jesus's sign negative in bilateral lower extremities. Calves soft, nontender upon palpation and with passive twitch. Moves both upper and lower extremities. Incision: clean, dry, and intact. No significant erythema or swelling. No active drainage noted. Vital Signs:    Blood pressure 129/58, pulse 94, temperature 99.7 °F (37.6 °C), resp. rate 16, height 5' 2\" (1.575 m), weight 93 kg (205 lb), last menstrual period 1983, SpO2 92 %, not currently breastfeeding.   Temp (24hrs), Av.9 °F (37.2 °C), Min:98.3 °F (36.8 °C), Max:99.7 °F (37.6 °C)      LAB:    Recent Labs     19  0256   HCT 27.3*   HGB 8.5*     Lab Results   Component Value Date/Time    Sodium 140 2019 01:47 AM    Potassium 4.5 2019 01:47 AM    Chloride 105 2019 01:47 AM    CO2 27 2019 01:47 AM    Glucose 145 (H) 2019 01:47 AM    BUN 16 2019 01:47 AM    Creatinine 0.71 2019 01:47 AM    Calcium 8.3 (L) 2019 01:47 AM Intake/Output:No intake/output data recorded. 09/11 1901 - 09/13 0700  In: -   Out: 1150 [Urine:750; Drains:400]    PT/OT:   Gait:  Gait  Base of Support: Widened  Speed/Tona: Shuffled, Slow  Step Length: Right shortened, Left shortened  Gait Abnormalities: Shuffling gait  Ambulation - Level of Assistance: Minimal assistance, Moderate assistance  Distance (ft): 25 Feet (ft)  Assistive Device: Brace/Splint, Other (comment)(HHA)                 Assessment:   Patient is 3 Days Post-Op s/p Procedure(s):  LUMBAR LAMINECTOMY L3-4 AND L4-5 WITH POSTERIOR LUMBAR FUSION L3-5 (LATEX ALLERGY)    Plan:     1. Continue PT/OT  2. Continue established methods of pain control  3. VTE Prophylaxes - TEDS & SCDs   4. Encouraged use of ICS  5. Expected anemia in the postoperative setting. Hgb 8.5. She received 1 unit PRBCs yesterday  6.   Discharge pending      Signed By: Brennan Naik PA-C

## 2019-09-13 NOTE — PROGRESS NOTES
CM noted patient is accepted by [facility], pending bed availability.      Renzo David [facility]

## 2019-09-13 NOTE — DISCHARGE INSTRUCTIONS
After Hospital Care Plan:  Discharge Instructions Lumbar Fusion Surgery   Dr. Lexi Ruby   Patient Name: Shanelle Brownlee    Date of procedure: 9/10/2019  Date of discharge:     Procedure: Procedure(s):  LUMBAR LAMINECTOMY L3-4 AND L4-5 WITH POSTERIOR LUMBAR FUSION L3-5 (LATEX ALLERGY)  PCP: Lonnie Yarbrough MD    Follow up appointments  -follow up with Dr. Dr. Lexi Ruby in 2 weeks. Call [phone] to make an appointment as soon as you get home from the hospital.    [address] [place], [place] [address]y: ____________________   phone: _______________________  The agency will contact you to arrange dates/times for visits. Please call them if you do not hear from them within 24 hours after you are discharged  Physical therapy 3 times a week for 3 weeks  Nursing-initial assessment and as needed    When to call your Orthopaedic Surgeon:  -Signs of infection-if your incision is red; continues to have drainage; drainage has a foul odor or if you have a persistent fever over 101 degrees for 24 hours  -Nausea or vomiting, severe headache  -Loss of bowel or bladder function, inability to urinate  -Changes in sensation in your arms or legs (numbness, tingling, loss of color)  -Increased weakness-greater than before your surgery  -Severe pain or pain not relieved by medications  -Signs of a blood clot in your leg-calf pain, tenderness, redness, swelling of lower leg    When to call your Primary Care Physician:  -Concerns about medical conditions such as diabetes, high blood pressure, asthma, congestive heart failure  -Call if blood sugars are elevated, persistent headache or dizziness, coughing or congestion, constipation or diarrhea, burning with urination, abnormal heart rate    When to call 911 and go to the nearest emergency room:  -Acute onset of chest pain, shortness of breath, difficulty breathing    Activity  -You are going home a well person, be as active as possible. Your only exercise should be walking.   Start with short frequent walks and increase your walking distance each day.  -Limit the amount of time you sit to 20-30 minute intervals. Sitting for prolonged periods of time will be uncomfortable for you following surgery.  -Do NOT lift anything over 5 pounds  -Do NOT do any straining, twisting or bending  -When you are in bed, you may lay on your back or on either side. Do NOT lie on your stomach    Brace  -If you have a back brace, you should wear your brace at all times when you are out of bed. Do not wear the brace while in bed or showering.  -Remember to always wear a cotton t-shirt underneath your brace.  -Do not bend or twist when your brace is off    Diet  -Resume usual diet; drink plenty of fluids; eat foods high in fiber  -It is important to have regular bowel movements. Pain medications may cause constipation. You may want to take a stool softener (such as Senokot-S or Colace) to prevent constipation.   -If constipation occurs, take a laxative (such as Dulcolax tablets, Milk of Magnesia, or a suppository). Laxatives should only be used if the above preventable measures have failed and you still have not had a bowel movement after three days    Driving  -You may not drive or return to work until instructed by your physician. However, you may ride in the car for short periods of time. Incision Care  Your incision has been closed with absorbable sutures and the Zipline skin closure system. This will assist with healing. The Darleene Mano is to remain on your incision for 2 weeks. A dry dressing (ABD and tape) will be placed over it and should be changed daily, for at least the first several days after your surgery. If you have no incisional drainage, you may leave the incision open to air if you wish, still leaving the Zipline in place. Please make sure to wash your hands prior to touching your dressing. You may take brief showers but do not run the water directly onto the wound.  After your shower, blot your incision dry with a soft towel and replace the dry dressing. Do not allow the tape to come in contact with the Zipline. Do not rub or apply any lotions or ointments to your incision site. Do not soak or scrub your wound. The Zipline dressing will be removed during your two week follow-up appointment. If you experience drainage leaking from underneath the Zipline or if it peels off before 2 weeks, please contact your orthopedic surgeons office. Showering  -You may shower in approximately 4 days after your surgery.    -Leave the dressing on during your shower. Do NOT allow the water to run directly onto your dressing. Once you get out of the shower, gently pat the dressing dry. -Reminder- your brace can be removed while showering. Remember to not bend or twist while your brace is off.    -Do not take a tub bath. Preventing blood clots  -You have been given T.E.D. stockings to wear. Continue to wear these for 7 days after your discharge. Put them on in the morning and take them off at night.    -They are used to increase your circulation and prevent blood clots from forming in your legs  -T. E.D. stockings can be machine washed, temperature not to exceed 160° F (71°C) and machine dried for 15 to 20 minutes, temperature not to exceed 250° F (121°C). Pain management  -Take pain medication as prescribed; decrease the amount you use as your pain lessens  -DO not wait until you are in extreme pain to take your medication.  -Avoid alcoholic beverages while taking pain medication    Pain Medication Safety  DO:  -Read the Medication Guide   -Take your medicine exactly as prescribed   -Store your medicine away from children and in a safe place   -Flush unused medicine down the toilet   -Call your healthcare provider for medical advice about side effects. You may report side effects to FDA at 8-848-FDA-8310.   -Please be aware that many medications contain Tylenol.   We do not want you to over medicate so please read the information below as a guide. Do not take more than 4 Grams of Tylenol in a 24 hour period. (There are 1000 milligrams in one Gram)                                                                                                                                                                                                                                                Percocet contains 325 mg of Tylenol per tablet (do not take more than 12 tablets in 24 hours)  Lortab contains 500 mg of Tylenol per tablet (do not take more than 8 tablets in 24 hours)  Norco contains 325 mg of Tylenol per tablet (do not take more than 12 tablets in 24 hours). DO NOT:  -Do not give your medicine to others   -Do not take medicine unless it was prescribed for you   -Do not stop taking your medicine without talking to your healthcare provider   -Do not break, chew, crush, dissolve, or inject your medicine. If you cannot swallow your medicine whole, talk to your healthcare provider.  -Do not drink alcohol while taking this medicine  -Do not take anti-inflammatory medications or aspirin unless instructed by your     physician.

## 2019-09-13 NOTE — PROGRESS NOTES
Problem: Mobility Impaired (Adult and Pediatric)  Goal: *Acute Goals and Plan of Care (Insert Text)  Description  FUNCTIONAL STATUS PRIOR TO ADMISSION: Patient was independent and active without use of DME.    HOME SUPPORT PRIOR TO ADMISSION: The patient lived with family but did not require assist.    Physical Therapy Goals  Initiated 9/11/2019    1. Patient will move from supine to sit and sit to supine , scoot up and down and roll side to side in bed with modified independence within 4 days. 2. Patient will perform sit to stand with modified independence within 4 days. 3. Patient will ambulate with independence for 300 feet with the least restrictive device within 4 days. 4. Patient will verbalize and demonstrate understanding of spinal precautions (No bending, lifting greater than 5 lbs, or twisting; log-roll technique; frequent repositioning as instructed) within 4 days. 9/13/2019 1020 by Edmund Barrios PTA  Outcome: Progressing Towards Goal    PHYSICAL THERAPY TREATMENT  Patient: Conchita Oliva (70 y.o. female)  Date: 9/13/2019  Diagnosis: Lumbar stenosis [M48.061] <principal problem not specified>  Procedure(s) (LRB):  LUMBAR LAMINECTOMY L3-4 AND L4-5 WITH POSTERIOR LUMBAR FUSION L3-5 (LATEX ALLERGY) (N/A) 3 Days Post-Op  Precautions: Spinal, Fall No bending, no lifting greater than 5 lbs, no twisting, log-roll technique, repositioning every 20-30 min except when sleeping, brace when OOB (if ordered)  Chart, physical therapy assessment, plan of care and goals were reviewed. ASSESSMENT  Based on the objective data described below, pt agreeable to OOB. Pt is requiring increase time for task due to pain and decrease balance. Pt is able to verbalize and perform log roll technique but required assistance for trunk. .    Current Level of Function Impacting Discharge (mobility/balance): min to mod assist fro functional mobility     Other factors to consider for discharge: back precautions.  Below baseline         PLAN :  Patient continues to benefit from skilled intervention to address the above impairments. Continue treatment per established plan of care. to address goals. Recommendation for discharge: (in order for the patient to meet his/her long term goals)  Therapy 3 hours per day 5-7 days per week    This discharge recommendation:  Has been made in collaboration with the attending provider and/or case management    Equipment recommendations for successful discharge (if) home: to be determined by rehab facility       SUBJECTIVE:   Patient stated Antonio Goins was hoping to rest longer.     OBJECTIVE DATA SUMMARY:   Critical Behavior:  Neurologic State: Alert  Orientation Level: Oriented X4  Cognition: Follows commands  Safety/Judgement: Awareness of environment, Fall prevention    Spinal diagnosis intervention:  Reviewed back brace application and wear schedule. Brace donned with maximal assistance quickdraw      Functional Mobility Training:    Bed Mobility:  Log Rolling: Minimum assistance  Supine to Sit: Moderate assistance(for trunk)              Transfers:  Sit to Stand: Minimum assistance  Stand to Sit: Minimum assistance        Bed to Chair: Minimum assistance(BSC to chair)                    Balance:  Sitting: Intact; With support  Standing: Impaired  Standing - Static: Fair  Standing - Dynamic : Fair  Ambulation/Gait Training:  Distance (ft): 50 Feet (ft)  Assistive Device: Brace/Splint;Gait belt(HHA)  Ambulation - Level of Assistance: Minimal assistance; Moderate assistance        Gait Abnormalities: Antalgic;Decreased step clearance        Base of Support: Center of gravity altered        Step Length: Left shortened;Right shortened                   Stairs: Therapeutic Exercises:     Pain Rating:  Back pain tolerable     Activity Tolerance:   Limited   Please refer to the flowsheet for vital signs taken during this treatment.     After treatment patient left in no apparent distress: Sitting in chair and Call bell within reach    COMMUNICATION/COLLABORATION:   The patients plan of care was discussed with: Certified Occupational Therapy Assistant and Registered Nurse    Arline Memory, PTA   Time Calculation: 27 mins

## 2019-09-13 NOTE — PROGRESS NOTES
Problem: Mobility Impaired (Adult and Pediatric)  Goal: *Acute Goals and Plan of Care (Insert Text)  Description  FUNCTIONAL STATUS PRIOR TO ADMISSION: Patient was independent and active without use of DME.    HOME SUPPORT PRIOR TO ADMISSION: The patient lived with family but did not require assist.    Physical Therapy Goals  Initiated 9/11/2019    1. Patient will move from supine to sit and sit to supine , scoot up and down and roll side to side in bed with modified independence within 4 days. 2. Patient will perform sit to stand with modified independence within 4 days. 3. Patient will ambulate with independence for 300 feet with the least restrictive device within 4 days. 4. Patient will verbalize and demonstrate understanding of spinal precautions (No bending, lifting greater than 5 lbs, or twisting; log-roll technique; frequent repositioning as instructed) within 4 days. Outcome: Progressing Towards Goal       PHYSICAL THERAPY TREATMENT  Patient: Susu Pickett (76 y.o. female)  Date: 9/13/2019  Diagnosis: Lumbar stenosis [M48.061] <principal problem not specified>  Procedure(s) (LRB):  LUMBAR LAMINECTOMY L3-4 AND L4-5 WITH POSTERIOR LUMBAR FUSION L3-5 (LATEX ALLERGY) (N/A) 3 Days Post-Op  Precautions: Spinal, Fall No bending, no lifting greater than 5 lbs, no twisting, log-roll technique, repositioning every 20-30 min except when sleeping, brace when OOB (if ordered)  Chart, physical therapy assessment, plan of care and goals were reviewed. ASSESSMENT  Based on the objective data described below, pt was able to increase gait tolerance. Pt is limited by pain and nausea. At this time pt is requiring min to mod assistance for all functional mobility. Pt required educations on back precautions and adherence.     Current Level of Function Impacting Discharge (mobility/balance): min A for sit to stand, min/mod A for gait with HHA, Bed mobility mod A     Other factors to consider for discharge: back precautions and adherence, below baseline          PLAN :  Patient continues to benefit from skilled intervention to address the above impairments. Continue treatment per established plan of care. to address goals. Recommendation for discharge: (in order for the patient to meet his/her long term goals)  Therapy 3 hours per day 5-7 days per week    This discharge recommendation:  Has been made in collaboration with the attending provider and/or case management    Equipment recommendations for successful discharge (if) home: to be determined by rehab facility       SUBJECTIVE:   Patient stated Delfina Cassette are we going.     OBJECTIVE DATA SUMMARY:   Critical Behavior:  Neurologic State: Alert  Orientation Level: Oriented X4  Cognition: Follows commands  Safety/Judgement: Awareness of environment, Fall prevention    Spinal diagnosis intervention:  The patient stated 1/3 back precautions when prompted. Reviewed all 3 back precautions, log roll technique, and sitting for 30 minutes at a time. Reviewed back brace application and wear schedule. Brace donned with maximal assistance quick draw     Functional Mobility Training:    Bed Mobility:  Log Rolling: Minimum assistance  Supine to Sit: Moderate assistance(for trunk)              Transfers:  Sit to Stand: Minimum assistance  Stand to Sit: Minimum assistance        Bed to Chair: Minimum assistance(BSC to chair)         Pt required assistance for self care           Balance:  Sitting: Intact; With support  Standing: Impaired  Standing - Static: Fair  Standing - Dynamic : Fair  Ambulation/Gait Training:  Distance (ft): 40 Feet (ft)  Assistive Device: Brace/Splint;Gait belt(HHA)  Ambulation - Level of Assistance: Minimal assistance; Moderate assistance        Gait Abnormalities: Antalgic;Decreased step clearance        Base of Support: Center of gravity altered        Step Length: Left shortened;Right shortened                   Stairs:               Therapeutic Exercises: Pain Ratin/10 back    Activity Tolerance:   Limited   Please refer to the flowsheet for vital signs taken during this treatment.     After treatment patient left in no apparent distress:   Sitting in chair and Call bell within reach    COMMUNICATION/COLLABORATION:   The patients plan of care was discussed with: Registered Nurse    Jony Saavedra PTA   Time Calculation: 19 mins

## 2019-09-14 ENCOUNTER — HOSPITAL ENCOUNTER (OUTPATIENT)
Dept: REHABILITATION | Age: 79
End: 2019-09-25
Attending: PHYSICAL MEDICINE & REHABILITATION | Admitting: PHYSICAL MEDICINE & REHABILITATION

## 2019-09-14 VITALS
OXYGEN SATURATION: 97 % | WEIGHT: 205 LBS | SYSTOLIC BLOOD PRESSURE: 142 MMHG | HEART RATE: 86 BPM | DIASTOLIC BLOOD PRESSURE: 76 MMHG | BODY MASS INDEX: 37.73 KG/M2 | RESPIRATION RATE: 17 BRPM | TEMPERATURE: 98.5 F | HEIGHT: 62 IN

## 2019-09-14 LAB
GLUCOSE BLD STRIP.AUTO-MCNC: 101 MG/DL (ref 65–100)
GLUCOSE BLD STRIP.AUTO-MCNC: 123 MG/DL (ref 65–100)
GLUCOSE BLD STRIP.AUTO-MCNC: 131 MG/DL (ref 65–100)
SERVICE CMNT-IMP: ABNORMAL

## 2019-09-14 PROCEDURE — 74011250637 HC RX REV CODE- 250/637: Performed by: PHYSICIAN ASSISTANT

## 2019-09-14 PROCEDURE — 97535 SELF CARE MNGMENT TRAINING: CPT

## 2019-09-14 PROCEDURE — 74011250637 HC RX REV CODE- 250/637: Performed by: NURSE PRACTITIONER

## 2019-09-14 PROCEDURE — 82962 GLUCOSE BLOOD TEST: CPT

## 2019-09-14 PROCEDURE — 97116 GAIT TRAINING THERAPY: CPT

## 2019-09-14 RX ADMIN — Medication 10 ML: at 06:18

## 2019-09-14 RX ADMIN — APIXABAN 5 MG: 5 TABLET, FILM COATED ORAL at 08:09

## 2019-09-14 RX ADMIN — HYDROCODONE BITARTRATE AND ACETAMINOPHEN 1 TABLET: 5; 325 TABLET ORAL at 10:28

## 2019-09-14 RX ADMIN — CALCIUM CARBONATE-VITAMIN D TAB 500 MG-200 UNIT 1 TABLET: 500-200 TAB at 08:09

## 2019-09-14 RX ADMIN — SENNOSIDES, DOCUSATE SODIUM 1 TABLET: 50; 8.6 TABLET, FILM COATED ORAL at 08:09

## 2019-09-14 RX ADMIN — HYDROCODONE BITARTRATE AND ACETAMINOPHEN 1 TABLET: 5; 325 TABLET ORAL at 06:04

## 2019-09-14 RX ADMIN — ROSUVASTATIN CALCIUM 10 MG: 10 TABLET, COATED ORAL at 08:09

## 2019-09-14 RX ADMIN — Medication 10 ML: at 14:23

## 2019-09-14 RX ADMIN — PROPAFENONE HYDROCHLORIDE 150 MG: 150 TABLET, COATED ORAL at 06:04

## 2019-09-14 RX ADMIN — HYDROCODONE BITARTRATE AND ACETAMINOPHEN 1 TABLET: 5; 325 TABLET ORAL at 00:24

## 2019-09-14 RX ADMIN — ASPIRIN 81 MG: 81 TABLET, COATED ORAL at 08:23

## 2019-09-14 RX ADMIN — METFORMIN HYDROCHLORIDE 500 MG: 500 TABLET ORAL at 08:09

## 2019-09-14 RX ADMIN — METOPROLOL SUCCINATE 50 MG: 50 TABLET, EXTENDED RELEASE ORAL at 08:09

## 2019-09-14 RX ADMIN — PANTOPRAZOLE SODIUM 40 MG: 40 TABLET, DELAYED RELEASE ORAL at 06:04

## 2019-09-14 RX ADMIN — POLYETHYLENE GLYCOL 3350 17 G: 17 POWDER, FOR SOLUTION ORAL at 08:08

## 2019-09-14 RX ADMIN — HYDROCHLOROTHIAZIDE 25 MG: 25 TABLET ORAL at 08:09

## 2019-09-14 RX ADMIN — HYDROCODONE BITARTRATE AND ACETAMINOPHEN 1 TABLET: 5; 325 TABLET ORAL at 14:25

## 2019-09-14 RX ADMIN — PROPAFENONE HYDROCHLORIDE 150 MG: 150 TABLET, COATED ORAL at 14:22

## 2019-09-14 NOTE — PROGRESS NOTES
Resting comfortably. GEN:  NAD.  AOx3   ABD:  S/NT/ND   Back:  Dressing C/D/I , grossly neurovascularly intact, Calf nttp (Bilat), 1+ dp/pt pulses, foot perfused    Patient Vitals for the past 24 hrs:   Temp Pulse Resp BP SpO2   09/14/19 0806 98.2 °F (36.8 °C) 84 18 136/77 --   09/14/19 0306 98.8 °F (37.1 °C) 86 16 136/66 91 %   [birth year]/1942 98.7 °F (37.1 °C) 82 16 125/70 92 %   09/13/19 1415 98.5 °F (36.9 °C) 77 16 113/53 97 %       Current Facility-Administered Medications   Medication Dose Route Frequency    HYDROcodone-acetaminophen (NORCO) 5-325 mg per tablet 1 Tab  1 Tab Oral Q4H PRN    insulin lispro (HUMALOG) injection   SubCUTAneous AC&HS    glucose chewable tablet 16 g  4 Tab Oral PRN    glucagon (GLUCAGEN) injection 1 mg  1 mg IntraMUSCular PRN    dextrose 10 % infusion 125-250 mL  125-250 mL IntraVENous PRN    0.9% sodium chloride infusion 250 mL  250 mL IntraVENous PRN    aspirin delayed-release tablet 81 mg  81 mg Oral DAILY    calcium-vitamin D (OS-ABELINO) 500 mg-200 unit tablet  1 Tab Oral DAILY    cetirizine (ZYRTEC) tablet 10 mg  10 mg Oral DAILY PRN    apixaban (ELIQUIS) tablet 5 mg  5 mg Oral BID    hydroCHLOROthiazide (HYDRODIURIL) tablet 25 mg  25 mg Oral DAILY    metFORMIN (GLUCOPHAGE) tablet 500 mg  500 mg Oral BID WITH MEALS    metoprolol succinate (TOPROL-XL) XL tablet 50 mg  50 mg Oral DAILY    pantoprazole (PROTONIX) tablet 40 mg  40 mg Oral ACB    rosuvastatin (CRESTOR) tablet 10 mg  10 mg Oral DAILY    propafenone (RYTHMOL) tablet 150 mg  150 mg Oral Q8H    sodium chloride (NS) flush 5-40 mL  5-40 mL IntraVENous Q8H    sodium chloride (NS) flush 5-40 mL  5-40 mL IntraVENous PRN    naloxone (NARCAN) injection 0.4 mg  0.4 mg IntraVENous PRN    senna-docusate (PERICOLACE) 8.6-50 mg per tablet 1 Tab  1 Tab Oral BID    polyethylene glycol (MIRALAX) packet 17 g  17 g Oral DAILY    bisacodyl (DULCOLAX) suppository 10 mg  10 mg Rectal DAILY PRN    ondansetron (ZOFRAN ODT) tablet 4 mg  4 mg Oral Q6H PRN    phenol throat spray (CHLORASEPTIC) 1 Spray  1 Spray Oral PRN    benzocaine-menthol (CEPACOL) lozenge 1 Lozenge  1 Lozenge Oral PRN    cyclobenzaprine (FLEXERIL) tablet 10 mg  10 mg Oral BID PRN    HYDROmorphone (DILAUDID) tablet 4 mg  4 mg Oral Q3H PRN    albuterol (PROVENTIL VENTOLIN) nebulizer solution 2.5 mg  2.5 mg Nebulization Q4H PRN       Lab Results   Component Value Date/Time    HGB 8.5 (L) 09/13/2019 02:56 AM    INR (POC) 1.0 10/02/2017 11:02 AM       Lab Results   Component Value Date/Time    Sodium 140 09/11/2019 01:47 AM    Potassium 4.5 09/11/2019 01:47 AM    Chloride 105 09/11/2019 01:47 AM    CO2 27 09/11/2019 01:47 AM    BUN 16 09/11/2019 01:47 AM    Creatinine 0.71 09/11/2019 01:47 AM    Calcium 8.3 (L) 09/11/2019 01:47 AM    Magnesium 2.3 04/19/2018 04:46 PM            70 y.o. female s/p lumbar spine surgery on 9/10/2019  . Doing well.      PT/OT  Pain control  SCD's    If drain present, can dc when <80/shift  DC planning- [facility] when bed available

## 2019-09-14 NOTE — PROGRESS NOTES
Enriqueta Collins called from [facility]. Ms. Valerie Cornejo has a bed for today. She would like her after 2:00pm   She is going to bed 134. The number to call report is [phone]. Ms. Valerie Cornejo was seen. She was informed that she could go to [facility] today. She requested to go by ambulance. She was informed that we would complete the paperwork. If she gets a bill for the ambulance, she will need to appeal the denial.  She stated that she understood. Nursing was informed of the discharge. [facility] was called. A 4:00pm discharge time was arranged. Ms. Valerie Cornejo was informed. The discharge packet was initiated. Nursing was informed.     Signed By: Danisha Rehman LCSW     September 14, 2019

## 2019-09-14 NOTE — PROGRESS NOTES
Report was called to [facility] at [facility]. Time for questions and clarification was given.

## 2019-09-14 NOTE — PROGRESS NOTES
Problem: Mobility Impaired (Adult and Pediatric)  Goal: *Acute Goals and Plan of Care (Insert Text)  Description  FUNCTIONAL STATUS PRIOR TO ADMISSION: Patient was independent and active without use of DME.    HOME SUPPORT PRIOR TO ADMISSION: The patient lived with family but did not require assist.    Physical Therapy Goals  Initiated 9/11/2019    1. Patient will move from supine to sit and sit to supine , scoot up and down and roll side to side in bed with modified independence within 4 days. 2. Patient will perform sit to stand with modified independence within 4 days. 3. Patient will ambulate with independence for 300 feet with the least restrictive device within 4 days. 4. Patient will verbalize and demonstrate understanding of spinal precautions (No bending, lifting greater than 5 lbs, or twisting; log-roll technique; frequent repositioning as instructed) within 4 days. Outcome: Progressing Towards Goal     PHYSICAL THERAPY TREATMENT  Patient: Lakhwinder Gutierres (45 y.o. female)  Date: 9/14/2019  Diagnosis: Lumbar stenosis [M48.061] <principal problem not specified>  Procedure(s) (LRB):  LUMBAR LAMINECTOMY L3-4 AND L4-5 WITH POSTERIOR LUMBAR FUSION L3-5 (LATEX ALLERGY) (N/A) 4 Days Post-Op  Precautions: Spinal, Fall No bending, no lifting greater than 5 lbs, no twisting, log-roll technique, repositioning every 20-30 min except when sleeping, brace when OOB (if ordered)  Chart, physical therapy assessment, plan of care and goals were reviewed. ASSESSMENT  Based on the objective data described below, patient continues to present decreased strength, impaired sensation in L foot, increased pain down R LE, decreased balance requiring B UE support to RW, and overall decreased independence from baseline. .Received up in chair. Continues to require min A for sit to stand with increased time. Improved ambulation to 60 feet with RW and CGA, slow antalgic gait noted.   Patient returned to sitting up in chair, requires increased time for stand to sit 2* p!.  Patient continues to be appropriate for IP rehab at discharge. Current Level of Function Impacting Discharge (mobility/balance): min A for transfers    Other factors to consider for discharge: None         PLAN :  Patient continues to benefit from skilled intervention to address the above impairments. Continue treatment per established plan of care. to address goals. Recommendation for discharge: (in order for the patient to meet his/her long term goals)  Therapy 3 hours per day 5-7 days per week    This discharge recommendation:  Has been made in collaboration with the attending provider and/or case management    Equipment recommendations for successful discharge (if) home: to be determined by rehab facility       SUBJECTIVE:   Patient stated I didn't use a walker before.     OBJECTIVE DATA SUMMARY:   Critical Behavior:  Neurologic State: Alert  Orientation Level: Oriented X4  Cognition: Appropriate decision making, Appropriate for age attention/concentration, Appropriate safety awareness, Follows commands  Safety/Judgement: Awareness of environment    Spinal diagnosis intervention:  The patient stated 3/3 back precautions when prompted. Reviewed all 3 back precautions, log roll technique, and sitting for 30 minutes at a time. Functional Mobility Training:    Bed Mobility:  Log    Supine to Sit: (received up in chair)              Transfers:  Sit to Stand: Minimum assistance  Stand to Sit: Minimum assistance  Stand Pivot Transfers: Contact guard assistance                          Balance:  Sitting: Intact  Standing: Intact; With support  Ambulation/Gait Training:  Distance (ft): 60 Feet (ft)  Assistive Device: Gait belt;Walker, rolling;Brace/Splint  Ambulation - Level of Assistance: Contact guard assistance        Gait Abnormalities: Antalgic;Decreased step clearance        Base of Support: Widened     Speed/Tona: Pace decreased (<100 feet/min)  Step Length: Right shortened;Left shortened                    Stairs: Therapeutic Exercises:     Pain Rating:      Activity Tolerance:   Good  Please refer to the flowsheet for vital signs taken during this treatment.     After treatment patient left in no apparent distress:   Sitting in chair and Call bell within reach    COMMUNICATION/COLLABORATION:   The patients plan of care was discussed with: Occupational Therapist and Registered Nurse    Carine Main, PT   Time Calculation: 20 mins

## 2019-09-14 NOTE — PROGRESS NOTES
Problem: Self Care Deficits Care Plan (Adult)  Goal: *Acute Goals and Plan of Care (Insert Text)  Description  FUNCTIONAL STATUS PRIOR TO ADMISSION: Patient lives with her daughter and grandson and are currently staying in a hotel. Sally Aguilar is a paraplegic and w/c bound and daughter works. Patient independent at baseline for mobility and ADLs. Occupational Therapy Goals  Initiated 9/11/2019    1. Patient will perform lower body dressing with supervision/set-up using AE PRN within 7 days. 2.  Patient will perform toilet transfer with supervision/set-up using most appropriate DME within 7 days. 3.  Patient will perform all aspects of toileting at minimal assistance/contact guard assist within 7 days. 4.  Patient will perform grooming, standing at sink for >3 minutes, with supervision/set up within 7 days. 5.  Patient will don/doff back brace at modified independence within 7 days. 6.  Patient will verbalize/demonstrate 3/3 back precautions during ADL tasks without cues within 7 days. Outcome: Progressing Towards Goal       OCCUPATIONAL THERAPY TREATMENT  Patient: Major Perry (80 y.o. female)  Date: 9/14/2019  Diagnosis: Lumbar stenosis [M48.061] <principal problem not specified>  Procedure(s) (LRB):  LUMBAR LAMINECTOMY L3-4 AND L4-5 WITH POSTERIOR LUMBAR FUSION L3-5 (LATEX ALLERGY) (N/A) 4 Days Post-Op  Precautions: Spinal, Fall  Chart, occupational therapy assessment, plan of care, and goals were reviewed. ASSESSMENT  Based on the objective data described below, pt presents with decreased functional mobility, decreased activity tolerance, expected post op back pain, decreased functional reach to feet and decline in functional status s/p POD 4 lumbar laminectomy L3-4, posterior fusion L3-5. Pt recalled 3/3 back precautions and performed standing ADLs at sink x 4 minutes with SBA.  Good to fair dynamic balance with removal of UE support for ADL task, one episode of postural swaying while wiping sink area. Current Level of Function Impacting Discharge (ADLs): min A x 1 for sit to stand, min A to don back brace, SBA for balance at sink, decreased functional reach to feet    Other factors to consider for discharge: lives in hotel with family, independent         PLAN :  Patient continues to benefit from skilled intervention to address the above impairments. Continue treatment per established plan of care. to address goals. Recommend with staff: Dairl Conrado for meals, mobilize to bathroom for ADLs and toileting with A x 1 and RW    Recommend next OT session: standing ADLs    Recommendation for discharge: (in order for the patient to meet his/her long term goals)  Therapy 3 hours per day 5-7 days per week    This discharge recommendation:  Has been made in collaboration with the attending provider and/or case management    Equipment recommendations for successful discharge (if) home: to be determined by rehab facility       SUBJECTIVE:   Patient stated my left leg is numb.     OBJECTIVE DATA SUMMARY:   Cognitive/Behavioral Status:           Perception: Appears intact  Perseveration: No perseveration noted  Safety/Judgement: Awareness of environment    Functional Mobility and Transfers for ADLs:  Bed Mobility:  Supine to Sit: (received up in chair)    Transfers:  Sit to Stand: Minimum assistance          Balance:  Sitting: Intact  Standing: Intact; With support  Standing - Static: Good  Standing - Dynamic : Fair    ADL Intervention:       Grooming  Brushing Teeth: Stand-by assistance(one minor postural sway with removal of UE support)              Upper Body Dressing Assistance  Orthotics(Brace): Minimum assistance(to don seated)              Cognitive Retraining  Safety/Judgement: Awareness of environment        Activity Tolerance:   Good  Please refer to the flowsheet for vital signs taken during this treatment.     After treatment patient left in no apparent distress:   Sitting in chair and Call bell within reach    COMMUNICATION/COLLABORATION:   The patients plan of care was discussed with: Physical Therapist and Registered Nurse    Pee Yoo OT  Time Calculation: 13 mins

## 2019-09-15 LAB
25(OH)D3 SERPL-MCNC: 18.8 NG/ML (ref 30–100)
ALBUMIN SERPL-MCNC: 2.7 G/DL (ref 3.5–5)
ALBUMIN/GLOB SERPL: 0.7 {RATIO} (ref 1.1–2.2)
ALP SERPL-CCNC: 46 U/L (ref 45–117)
ALT SERPL-CCNC: 17 U/L (ref 12–78)
ANION GAP SERPL CALC-SCNC: 2 MMOL/L (ref 5–15)
APPEARANCE UR: CLEAR
AST SERPL-CCNC: 17 U/L (ref 15–37)
BACTERIA URNS QL MICRO: NEGATIVE /HPF
BILIRUB SERPL-MCNC: 0.6 MG/DL (ref 0.2–1)
BILIRUB UR QL: NEGATIVE
BUN SERPL-MCNC: 12 MG/DL (ref 6–20)
BUN/CREAT SERPL: 15 (ref 12–20)
CALCIUM SERPL-MCNC: 8.6 MG/DL (ref 8.5–10.1)
CHLORIDE SERPL-SCNC: 98 MMOL/L (ref 97–108)
CO2 SERPL-SCNC: 36 MMOL/L (ref 21–32)
COLOR UR: NORMAL
CREAT SERPL-MCNC: 0.82 MG/DL (ref 0.55–1.02)
EPITH CASTS URNS QL MICRO: NORMAL /LPF
ERYTHROCYTE [DISTWIDTH] IN BLOOD BY AUTOMATED COUNT: 13.7 % (ref 11.5–14.5)
GLOBULIN SER CALC-MCNC: 3.9 G/DL (ref 2–4)
GLUCOSE SERPL-MCNC: 116 MG/DL (ref 65–100)
GLUCOSE UR STRIP.AUTO-MCNC: NEGATIVE MG/DL
HCT VFR BLD AUTO: 28.1 % (ref 35–47)
HGB BLD-MCNC: 9 G/DL (ref 11.5–16)
HGB UR QL STRIP: NEGATIVE
HYALINE CASTS URNS QL MICRO: NORMAL /LPF (ref 0–5)
KETONES UR QL STRIP.AUTO: NEGATIVE MG/DL
LEUKOCYTE ESTERASE UR QL STRIP.AUTO: NEGATIVE
MCH RBC QN AUTO: 28 PG (ref 26–34)
MCHC RBC AUTO-ENTMCNC: 32 G/DL (ref 30–36.5)
MCV RBC AUTO: 87.3 FL (ref 80–99)
NITRITE UR QL STRIP.AUTO: NEGATIVE
NRBC # BLD: 0 K/UL (ref 0–0.01)
NRBC BLD-RTO: 0 PER 100 WBC
PH UR STRIP: 7.5 [PH] (ref 5–8)
PLATELET # BLD AUTO: 259 K/UL (ref 150–400)
PMV BLD AUTO: 9.8 FL (ref 8.9–12.9)
POTASSIUM SERPL-SCNC: 3.7 MMOL/L (ref 3.5–5.1)
PROT SERPL-MCNC: 6.6 G/DL (ref 6.4–8.2)
PROT UR STRIP-MCNC: NEGATIVE MG/DL
RBC # BLD AUTO: 3.22 M/UL (ref 3.8–5.2)
RBC #/AREA URNS HPF: NORMAL /HPF (ref 0–5)
SODIUM SERPL-SCNC: 136 MMOL/L (ref 136–145)
SP GR UR REFRACTOMETRY: 1.01 (ref 1–1.03)
UROBILINOGEN UR QL STRIP.AUTO: 0.2 EU/DL (ref 0.2–1)
WBC # BLD AUTO: 6.9 K/UL (ref 3.6–11)
WBC URNS QL MICRO: NORMAL /HPF (ref 0–4)

## 2019-09-15 PROCEDURE — 36415 COLL VENOUS BLD VENIPUNCTURE: CPT

## 2019-09-15 PROCEDURE — 81001 URINALYSIS AUTO W/SCOPE: CPT

## 2019-09-15 PROCEDURE — 82306 VITAMIN D 25 HYDROXY: CPT

## 2019-09-15 PROCEDURE — 85027 COMPLETE CBC AUTOMATED: CPT

## 2019-09-15 PROCEDURE — 80053 COMPREHEN METABOLIC PANEL: CPT

## 2019-09-23 NOTE — DISCHARGE SUMMARY
Procedure(s):  LUMBAR LAMINECTOMY L3-4 AND L4-5 WITH POSTERIOR LUMBAR FUSION L3-5 (LATEX ALLERGY) Operative Report      Date of Surgery: 9/10/2019     Preoperative Diagnosis:  SPONDYLOLISTHESIS, STENOSIS    Postoperative Diagnosis: SPONDYLOLISTHESIS, STENOSIS    Procedure: Procedure(s):  LUMBAR LAMINECTOMY L3-4 AND L4-5 WITH POSTERIOR LUMBAR FUSION L3-5 (LATEX ALLERGY)     Surgeon: Radha Harding MD    History and Hospital Course:  Brando Rosales is a pleasant 70y.o. year old female who has complaints of back pain. Diagnostic testing found severe stenosis at L3-4 and L4-5 with spondylolisthesis at those levels. Having failed conservative treatment, the patient elected to undergo operative intervention. She tolerated the procedure well and was admitted post-operatively for antibiotics and pain control. On post-op day 1, the patient was doing well. She was started on a regular diet. The PCA was discontinued and the patient was started on oral pain medications. She was allowed to started getting out of bed with physical therapy. IV antibiotics were continued. On post-op day 2, the patient continued to progress well. C/o back and left hip/leg pain. Moderate control. Episode of dizziness yesterday when ambulating with PT. Expected acute blood loss anemia in post-op setting. Hgb 7.7. Drain in place. Underwent transfusion 1 unit PRBCs. On post-op day 3, patient's hgb normalized. Continued to progress with PT/OT. On post-op day 4, the patient was deemed ready for discharge. She was discharged to [facility] inpatient rehab. She was tolerating a regular diet and pain was well controlled with oral pain medications. The patient was discharged with prescriptions for pain medications. She was instructed to wear her brace at all times when out of bed. She was instructed to limit her bending, lifting and twisting.   The patient will follow-up in 10-14 days for repeat x-rays and a wound check.      Signed By: Florencio Whittaker MD

## 2019-09-24 LAB
ANION GAP SERPL CALC-SCNC: 6 MMOL/L (ref 5–15)
BUN SERPL-MCNC: 16 MG/DL (ref 6–20)
BUN/CREAT SERPL: 19 (ref 12–20)
CALCIUM SERPL-MCNC: 9.5 MG/DL (ref 8.5–10.1)
CHLORIDE SERPL-SCNC: 97 MMOL/L (ref 97–108)
CO2 SERPL-SCNC: 31 MMOL/L (ref 21–32)
CREAT SERPL-MCNC: 0.85 MG/DL (ref 0.55–1.02)
ERYTHROCYTE [DISTWIDTH] IN BLOOD BY AUTOMATED COUNT: 13.5 % (ref 11.5–14.5)
GLUCOSE SERPL-MCNC: 121 MG/DL (ref 65–100)
HCT VFR BLD AUTO: 34.5 % (ref 35–47)
HGB BLD-MCNC: 11 G/DL (ref 11.5–16)
MCH RBC QN AUTO: 27.3 PG (ref 26–34)
MCHC RBC AUTO-ENTMCNC: 31.9 G/DL (ref 30–36.5)
MCV RBC AUTO: 85.6 FL (ref 80–99)
NRBC # BLD: 0 K/UL (ref 0–0.01)
NRBC BLD-RTO: 0 PER 100 WBC
PLATELET # BLD AUTO: 565 K/UL (ref 150–400)
PMV BLD AUTO: 8.9 FL (ref 8.9–12.9)
POTASSIUM SERPL-SCNC: 3.4 MMOL/L (ref 3.5–5.1)
RBC # BLD AUTO: 4.03 M/UL (ref 3.8–5.2)
SODIUM SERPL-SCNC: 134 MMOL/L (ref 136–145)
WBC # BLD AUTO: 6.6 K/UL (ref 3.6–11)

## 2019-09-24 PROCEDURE — 36415 COLL VENOUS BLD VENIPUNCTURE: CPT

## 2019-09-24 PROCEDURE — 80048 BASIC METABOLIC PNL TOTAL CA: CPT

## 2019-09-24 PROCEDURE — 85027 COMPLETE CBC AUTOMATED: CPT

## 2019-09-25 LAB
ANION GAP SERPL CALC-SCNC: 5 MMOL/L (ref 5–15)
BUN SERPL-MCNC: 16 MG/DL (ref 6–20)
BUN/CREAT SERPL: 19 (ref 12–20)
CALCIUM SERPL-MCNC: 9.3 MG/DL (ref 8.5–10.1)
CHLORIDE SERPL-SCNC: 97 MMOL/L (ref 97–108)
CO2 SERPL-SCNC: 30 MMOL/L (ref 21–32)
CREAT SERPL-MCNC: 0.83 MG/DL (ref 0.55–1.02)
GLUCOSE SERPL-MCNC: 120 MG/DL (ref 65–100)
POTASSIUM SERPL-SCNC: 4.1 MMOL/L (ref 3.5–5.1)
SODIUM SERPL-SCNC: 132 MMOL/L (ref 136–145)

## 2019-09-25 PROCEDURE — 36415 COLL VENOUS BLD VENIPUNCTURE: CPT

## 2019-09-25 PROCEDURE — 80048 BASIC METABOLIC PNL TOTAL CA: CPT

## 2019-09-30 ENCOUNTER — HOSPITAL ENCOUNTER (OUTPATIENT)
Dept: ULTRASOUND IMAGING | Age: 79
Discharge: HOME | End: 2019-09-30
Attending: ORTHOPAEDIC SURGERY
Payer: MEDICARE

## 2019-09-30 DIAGNOSIS — I82.409 DVT OF LEG (DEEP VENOUS THROMBOSIS) (HCC): ICD-10-CM

## 2019-09-30 PROCEDURE — 93971 EXTREMITY STUDY: CPT

## 2019-10-08 ENCOUNTER — OFFICE VISIT (OUTPATIENT)
Dept: INTERNAL MEDICINE CLINIC | Age: 79
End: 2019-10-08

## 2019-10-08 VITALS
RESPIRATION RATE: 16 BRPM | SYSTOLIC BLOOD PRESSURE: 100 MMHG | HEIGHT: 62 IN | DIASTOLIC BLOOD PRESSURE: 70 MMHG | BODY MASS INDEX: 37.02 KG/M2 | WEIGHT: 201.2 LBS | TEMPERATURE: 98.2 F | HEART RATE: 86 BPM | OXYGEN SATURATION: 96 %

## 2019-10-08 DIAGNOSIS — L84 TYPE 2 DIABETES MELLITUS WITH PRESSURE CALLUS (HCC): ICD-10-CM

## 2019-10-08 DIAGNOSIS — M48.061 SPINAL STENOSIS OF LUMBAR REGION, UNSPECIFIED WHETHER NEUROGENIC CLAUDICATION PRESENT: ICD-10-CM

## 2019-10-08 DIAGNOSIS — Z00.00 MEDICARE ANNUAL WELLNESS VISIT, SUBSEQUENT: Primary | ICD-10-CM

## 2019-10-08 DIAGNOSIS — E11.628 TYPE 2 DIABETES MELLITUS WITH PRESSURE CALLUS (HCC): ICD-10-CM

## 2019-10-08 DIAGNOSIS — I10 ESSENTIAL HYPERTENSION: ICD-10-CM

## 2019-10-08 RX ORDER — POTASSIUM CHLORIDE 750 MG/1
TABLET, FILM COATED, EXTENDED RELEASE ORAL
Refills: 0 | COMMUNITY
Start: 2019-09-25 | End: 2020-01-15

## 2019-10-08 RX ORDER — ERGOCALCIFEROL 1.25 MG/1
CAPSULE ORAL
Refills: 1 | COMMUNITY
Start: 2019-09-25 | End: 2020-01-15 | Stop reason: ALTCHOICE

## 2019-10-08 RX ORDER — HYDROCODONE BITARTRATE AND ACETAMINOPHEN 5; 325 MG/1; MG/1
TABLET ORAL
COMMUNITY
End: 2020-07-21

## 2019-10-08 NOTE — PROGRESS NOTES
This is the Subsequent Medicare Annual Wellness Exam, performed 12 months or more after the Initial AWV or the last Subsequent AWV    I have reviewed the patient's medical history in detail and updated the computerized patient record. The patient his following up after having had surgery for spinal stenosis. She notes that she was somewhat better after her rehab stay but continues to have pain in her bilateral thighs. She has difficulty sitting for too long due to this discomfort. She has discussed this with orthopedics but no timeline for resolution has been given yet. She will follow up.      History     Past Medical History:   Diagnosis Date    Abdominal mass, left upper quadrant 06/13/2017    no finding per pt    Abnormal finding on MRI of brain 3/16/2015    evaluated by neurologist and no findings    Acute pharyngitis 5/26/2016    Anemia NEC     Arrhythmia     a fib    Arthralgia of right hip 4/25/2016    Arthritis 2/18/2010    Cataract 9/24/2014    Diabetes ([address]     Elevated LFTs 4/16/2014    DAVID (generalized anxiety disorder) 1/22/2015    GERD (gastroesophageal reflux disease) 4/7/2014    Hammer toe of right foot 9/29/2014    Headache(784.0) 8/59/3912    Helicobacter pylori (H. 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 ([address] 9/29/2014    Preop examination 9/24/2014    Right foot injury 5/2/2017    Risk for falls 4/16/2014    Screening for breast cancer 9/23/2014    Screening for depression 4/16/2014    Shoulder pain, left 3/18/2014    Spondylitis, cervical ([address] 4/5/2010    Tinea pedis 6/3/2015    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 straightened colon out    HX ROTATOR CUFF REPAIR  2009    left    IR INJ FORAMIN EPID LUMB ANES/STER ADDL  8/2/2019    IR INJ FORAMIN EPID LUMB ANES/STER SNGL  8/2/2019    OPEN REPAIR CLAVICLE FRACTURE  2009    did not have surgery for this/pt states she was in an accident and had RCR on left, but the clavicle was not repaired - injury was done at the same time     Current Outpatient Medications   Medication Sig Dispense Refill    KLOR-CON 10 10 mEq tablet TAKE 1 TABLET BY MOUTH EVERY DAY  0    ergocalciferol (ERGOCALCIFEROL) 50,000 unit capsule TAKE ONE CAPSULE BY MOUTH EVERY 7 DAYS  1    HYDROcodone-acetaminophen (NORCO) 5-325 mg per tablet Take  by mouth.  metoprolol succinate (TOPROL-XL) 50 mg XL tablet TAKE 1 TABLET BY MOUTH EVERY DAY 90 Tab 1    metoprolol succinate 50 mg CSpX Take  by mouth daily.  ELIQUIS 5 mg tablet TAKE 1 TABLET BY MOUTH TWICE A  Tab 3    rosuvastatin (CRESTOR) 10 mg tablet TAKE 1 TABLET BY MOUTH EVERY DAY 90 Tab 1    albuterol (PROVENTIL HFA, VENTOLIN HFA, PROAIR HFA) 90 mcg/actuation inhaler Take 2 Puffs by inhalation every four (4) hours as needed for Wheezing. 1 Inhaler 0    metFORMIN (GLUCOPHAGE) 500 mg tablet Take 1 Tab by mouth two (2) times daily (with meals).  180 Tab 1    aspirin delayed-release 81 mg tablet TAKE 1 TABLET BY MOUTH EVERY DAY 90 Tab 1    propafenone (RYTHMOL) 150 mg tablet TAKE 1 TABLET BY MOUTH EVERY 8 HOURS 270 Tab 3    hydroCHLOROthiazide (HYDRODIURIL) 25 mg tablet TAKE 1 TABLET BY MOUTH EVERY DAY 90 Tab 1    calcium carbonate/vitamin D3 (CALTRATE 600 + D PO) Take 1 Tab by mouth daily.  cetirizine (ZYRTEC) 10 mg tablet Take 10 mg by mouth daily as needed for Allergies.  glucose blood VI test strips (CONTOUR NEXT TEST STRIPS) strip Check blood sugar once a day e11.9 50 Strip 11    pantoprazole (PROTONIX) 40 mg tablet TAKE 1 TABLET BY MOUTH EVERY DAY      MULTIVITAMIN PO Take 1 Tab by mouth daily.  Lancets (FREESTYLE LANCETS) Misc Test once daily.  (diagnosis code: 250.00) 1 Package 11    Blood-Glucose Meter monitoring kit Once daily before breakfast 1 Kit 0     Allergies   Allergen Reactions    Latex Rash    Kiwi Anaphylaxis     \"FEELS LIKE THROAT IS CLOSING UP\"    Amoxil [Amoxicillin] Itching     rash    Cephalosporins Unknown (comments)     PT NOT SURE OF REACTIONS    Levaquin [Levofloxacin] Other (comments)     Dizziness      Penicillins Rash     \"chickenpox like rash\" was in the 1980s    Percocet [Oxycodone-Acetaminophen] Nausea and Vomiting    Tetanus Vaccines And Toxoid Swelling    Tetracycline Hcl Rash     \"black fuzzy tongue\"   [address] Other (comments)     RAW TONGUE     Family History   Problem Relation Age of Onset    Cancer Mother         mycosis fungoives - skin cancer/got into bloodstream    Hypertension Mother     Arthritis-osteo Mother     Stroke Father     Cancer Sister         one sister with breast cancer    Breast Cancer Sister     MS Sister     Heart Disease Brother     Diabetes Brother     Diabetes Daughter     Hypertension Daughter     Psychiatric Disorder Daughter         ANXIETY    Cancer Paternal Aunt         2 paternal aunts with breast cancer    Breast Cancer Paternal Aunt     No Known Problems Sister     Arthritis-osteo Sister     Headache Brother     No Known Problems Brother     No Known Problems Brother     Thyroid Cancer Neg Hx     Anesth Problems Neg Hx      Social History     Tobacco Use    Smoking status: Former Smoker     Packs/day: 0.50     Years: 20.00     Pack years: 10.00     Last attempt to quit: 1988     Years since quittin.3    Smokeless tobacco: Never Used   Substance Use Topics    Alcohol use: No     Patient Active Problem List   Diagnosis Code    Hyperlipemia E78.5    Arthritis M19.90    HTN (hypertension) I10    Headache(784.0) R51    Spondylitis, cervical (HCC) M46.92    Tingling sensation R20.2    Chest pain R07.9    Anemia D64.9    Cyst of right kidney N28.1    Hip pain, right M25.551    Acute bronchitis J20.9    Visual floaters H43.399    Postmenopausal state Z78.0    Vitamin D deficiency E55.9    Bilateral hip pain M25.551, M25.552    Postmenopausal disorder N95.1    Numbness and tingling of right leg R20.0, R20.2    Foot pain, left M79.672    Rotator cuff (capsule) sprain and strain [id number]    Osteoarthritis of acromioclavicular joint M19.019    Shoulder pain, left M25.512    GERD (gastroesophageal reflux disease) K21.9    Elevated LFTs R94.5    Screening for depression Z13.31    Risk for falls Z91.81    Acute asthma exacerbation J45. 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 pylori) infection A04.8    Intractable migraine with aura without status migrainosus G43.119    Chronic asthma with status asthmaticus J45.902    Right foot injury S99.921A    Abdominal mass, left upper quadrant R19.02    Left upper quadrant pain R10.12    Paroxysmal atrial fibrillation (HCC) I48.0    Severe obesity (BMI 35.0-39. 9) E66.01    Varicose veins of both lower extremities with pain I83.813    Venous insufficiency of both lower extremities I87.2    Lumbar stenosis M48.061       Depression Risk Factor Screening:     3 most recent PHQ Screens 1/8/2019   Little interest or pleasure in doing things Not at all   Feeling down, depressed, irritable, or hopeless Not at all   Total Score PHQ 2 0     Alcohol Risk Factor Screening: You do not drink alcohol or very rarely. Functional Ability and Level of Safety:   Hearing Loss  Hearing is good. Activities of Daily Living  The home contains: no safety equipment. Patient does total self care    Fall Risk  Fall Risk Assessment, last 12 mths 7/17/2019   Able to walk? Yes   Fall in past 12 months? No       Abuse Screen  Patient is not abused    Cognitive Screening   Evaluation of Cognitive Function:  Has your family/caregiver stated any concerns about your memory: no      Patient Care Team   Patient Care Team:  Jered Lara MD as PCP - General (Internal Medicine)  Kaushik Meza MD (Neurology)  Chava Fischer MD as Physician (Cardiology)  Livia Velasco MD as Consulting Provider (Endocrinology)  Kailash Mathews RN as Nurse Navigator  Nohemi Colin NP as Nurse Practitioner (Nurse Practitioner)  AMARIS Alexander (Nurse Practitioner)    Assessment/Plan   Education and counseling provided:  Are appropriate based on today's review and evaluation    Diagnoses and all orders for this visit:    1. Medicare annual wellness visit, subsequent    2. Spinal stenosis of lumbar region, unspecified whether neurogenic claudication present    3. Type 2 diabetes mellitus with pressure callus ([facility]    4.  Essential hypertension        Health Maintenance Due   Topic Date Due    Shingrix Vaccine Age 50> (1 of 2) 12/26/1992    FOOT EXAM Q1  03/06/2019    GLAUCOMA SCREENING Q2Y  07/17/2019    EYE EXAM RETINAL OR DILATED  07/17/2019    Influenza Age 5 to Adult  08/01/2019    MEDICARE YEARLY EXAM  09/26/2019

## 2019-10-08 NOTE — PATIENT INSTRUCTIONS
Medicare Wellness Visit, Female     The best way to live healthy is to have a lifestyle where you eat a well-balanced diet, exercise regularly, limit alcohol use, and quit all forms of tobacco/nicotine, if applicable. Regular preventive services are another way to keep healthy. Preventive services (vaccines, screening tests, monitoring & exams) can help personalize your care plan, which helps you manage your own care. Screening tests can find health problems at the earliest stages, when they are easiest to treat. [facility] follows the current, evidence-based guidelines published by the [facility] Amandeep Bacilio ([facility]) when recommending preventive services for our patients. Because we follow these guidelines, sometimes recommendations change over time as research supports it. (For example, mammograms used to be recommended annually. Even though Medicare will still pay for an annual mammogram, the newer guidelines recommend a mammogram every two years for women of average risk.)  Of course, you and your doctor may decide to screen more often for some diseases, based on your risk and your health status. Preventive services for you include:  - Medicare offers their members a free annual wellness visit, which is time for you and your primary care provider to discuss and plan for your preventive service needs. Take advantage of this benefit every year!  -All adults over the age of 72 should receive the recommended pneumonia vaccines. Current USPSTF guidelines recommend a series of two vaccines for the best pneumonia protection.   -All adults should have a flu vaccine yearly and a tetanus vaccine every 10 years. All adults age 61 and older should receive a shingles vaccine once in their lifetime.    -A bone mass density test is recommended when a woman turns 65 to screen for osteoporosis. This test is only recommended one time, as a screening.  Some providers will use this same test as a disease monitoring tool if you already have osteoporosis. -All adults age 38-68 who are overweight should have a diabetes screening test once every three years.   -Other screening tests and preventive services for persons with diabetes include: an eye exam to screen for diabetic retinopathy, a kidney function test, a foot exam, and stricter control over your cholesterol.   -Cardiovascular screening for adults with routine risk involves an electrocardiogram (ECG) at intervals determined by your doctor.   -Colorectal cancer screenings should be done for adults age 54-65 with no increased risk factors for colorectal cancer. There are a number of acceptable methods of screening for this type of cancer. Each test has its own benefits and drawbacks. Discuss with your doctor what is most appropriate for you during your annual wellness visit. The different tests include: colonoscopy (considered the best screening method), a fecal occult blood test, a fecal DNA test, and sigmoidoscopy. -Breast cancer screenings are recommended every other year for women of normal risk, age 54-69.  -Cervical cancer screenings for women over age 72 are only recommended with certain risk factors.   -All adults born between [facility] should be screened once for Hepatitis C.      Here is a list of your current Health Maintenance items (your personalized list of preventive services) with a due date:  Health Maintenance Due   Topic Date Due    Shingles Vaccine (1 of 2) 12/26/1992    Diabetic Foot Care  03/06/2019    Glaucoma Screening   07/17/2019    Eye Exam  07/17/2019    Flu Vaccine  08/01/2019    Annual Well Visit  09/26/2019

## 2019-10-22 ENCOUNTER — HOSPITAL ENCOUNTER (OUTPATIENT)
Dept: VASCULAR SURGERY | Age: 79
Discharge: HOME | End: 2019-10-22
Attending: ORTHOPAEDIC SURGERY
Payer: MEDICARE

## 2019-10-22 DIAGNOSIS — I82.409 DVT OF LEG (DEEP VENOUS THROMBOSIS) (HCC): ICD-10-CM

## 2019-10-22 PROCEDURE — 93971 EXTREMITY STUDY: CPT

## 2019-11-24 DIAGNOSIS — E11.21 CONTROLLED TYPE 2 DIABETES MELLITUS WITH DIABETIC NEPHROPATHY, WITHOUT LONG-TERM CURRENT USE OF INSULIN (HCC): ICD-10-CM

## 2019-11-24 DIAGNOSIS — I10 ESSENTIAL HYPERTENSION: ICD-10-CM

## 2019-11-24 DIAGNOSIS — G43.119 INTRACTABLE MIGRAINE WITH AURA WITHOUT STATUS MIGRAINOSUS: ICD-10-CM

## 2019-11-24 DIAGNOSIS — Z23 ENCOUNTER FOR IMMUNIZATION: ICD-10-CM

## 2019-11-24 DIAGNOSIS — E11.9 DIABETES MELLITUS WITHOUT COMPLICATION (HCC): ICD-10-CM

## 2019-11-26 RX ORDER — METFORMIN HYDROCHLORIDE 500 MG/1
TABLET ORAL
Qty: 180 TAB | Refills: 1 | Status: SHIPPED | OUTPATIENT
Start: 2019-11-26 | End: 2020-04-01

## 2019-11-26 RX ORDER — HYDROCHLOROTHIAZIDE 25 MG/1
TABLET ORAL
Qty: 90 TAB | Refills: 1 | Status: SHIPPED | OUTPATIENT
Start: 2019-11-26 | End: 2020-04-01

## 2019-12-10 DIAGNOSIS — E55.9 VITAMIN D DEFICIENCY: ICD-10-CM

## 2019-12-13 RX ORDER — ASPIRIN 81 MG/1
TABLET ORAL
Qty: 30 TAB | Refills: 5 | Status: SHIPPED | OUTPATIENT
Start: 2019-12-13 | End: 2020-05-07

## 2019-12-16 RX ORDER — ROSUVASTATIN CALCIUM 10 MG/1
TABLET, COATED ORAL
Qty: 90 TAB | Refills: 1 | Status: SHIPPED | OUTPATIENT
Start: 2019-12-16 | End: 2020-04-16

## 2019-12-17 RX ORDER — PROPAFENONE HYDROCHLORIDE 150 MG/1
TABLET, FILM COATED ORAL
Qty: 270 TAB | Refills: 3 | Status: SHIPPED | OUTPATIENT
Start: 2019-12-17 | End: 2020-12-11

## 2019-12-27 ENCOUNTER — HOSPITAL ENCOUNTER (EMERGENCY)
Age: 79
Discharge: HOME | End: 2019-12-27
Attending: EMERGENCY MEDICINE
Payer: MEDICARE

## 2019-12-27 VITALS
OXYGEN SATURATION: 99 % | DIASTOLIC BLOOD PRESSURE: 77 MMHG | WEIGHT: 206.57 LBS | RESPIRATION RATE: 15 BRPM | BODY MASS INDEX: 38.01 KG/M2 | SYSTOLIC BLOOD PRESSURE: 165 MMHG | TEMPERATURE: 97.8 F | HEART RATE: 77 BPM | HEIGHT: 62 IN

## 2019-12-27 DIAGNOSIS — G43.009 MIGRAINE WITHOUT AURA AND WITHOUT STATUS MIGRAINOSUS, NOT INTRACTABLE: Primary | ICD-10-CM

## 2019-12-27 PROCEDURE — 96374 THER/PROPH/DIAG INJ IV PUSH: CPT

## 2019-12-27 PROCEDURE — 74011000250 HC RX REV CODE- 250: Performed by: EMERGENCY MEDICINE

## 2019-12-27 PROCEDURE — 96375 TX/PRO/DX INJ NEW DRUG ADDON: CPT

## 2019-12-27 PROCEDURE — 74011250636 HC RX REV CODE- 250/636: Performed by: EMERGENCY MEDICINE

## 2019-12-27 PROCEDURE — 99283 EMERGENCY DEPT VISIT LOW MDM: CPT

## 2019-12-27 RX ORDER — DIPHENHYDRAMINE HYDROCHLORIDE 50 MG/ML
25 INJECTION, SOLUTION INTRAMUSCULAR; INTRAVENOUS
Status: COMPLETED | OUTPATIENT
Start: 2019-12-27 | End: 2019-12-27

## 2019-12-27 RX ADMIN — SODIUM CHLORIDE 10 MG: 9 INJECTION INTRAMUSCULAR; INTRAVENOUS; SUBCUTANEOUS at 17:25

## 2019-12-27 RX ADMIN — DIPHENHYDRAMINE HYDROCHLORIDE 25 MG: 50 INJECTION, SOLUTION INTRAMUSCULAR; INTRAVENOUS at 17:26

## 2019-12-27 RX ADMIN — SODIUM CHLORIDE 500 ML: 900 INJECTION, SOLUTION INTRAVENOUS at 17:25

## 2019-12-27 NOTE — ED TRIAGE NOTES
C/o dizziness and headaches x 2 days. Has had similar issues in the past with dx of vertigo and inner ear infections, only difference is her headache.

## 2019-12-29 NOTE — ED PROVIDER NOTES
68-year-old female with a history of migraines, including complicated migrainesHTN, DM, with no history of prior CVA, presents to the emergency department noting a 2-day history of right-sided headache accompanied by some intermittent self resolving vertiginous symptoms and pounding headache that she rates as 8/10 in severity. She took a dose of Tylenol with only slight improvement in her symptoms. She denies any significant photophobia, nausea, numbness, weakness, vision changes, difficulty speaking or walking. She states that she has been under a lot of stress recently around the holidays and her family and she states that this has precipitated migraine headaches in the past.  She denies any falls or head trauma, fever, chills or any other medical concerns.            Past Medical History:   Diagnosis Date    Abdominal mass, left upper quadrant 06/13/2017    no finding per pt    Abnormal finding on MRI of brain 3/16/2015    evaluated by neurologist and no findings    Acute pharyngitis 5/26/2016    Anemia NEC     Arrhythmia     a fib    Arthralgia of right hip 4/25/2016    Arthritis 2/18/2010    Cataract 9/24/2014    Diabetes ([address]     Elevated LFTs 4/16/2014    DAVID (generalized anxiety disorder) 1/22/2015    GERD (gastroesophageal reflux disease) 4/7/2014    Hammer toe of right foot 9/29/2014    Headache(784.0) 5/57/8470    Helicobacter pylori (H. 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 ([address] 9/29/2014    Preop examination 9/24/2014    Right foot injury 5/2/2017    Risk for falls 4/16/2014    Screening for breast cancer 9/23/2014    Screening for depression 4/16/2014    Shoulder pain, left 3/18/2014    Spondylitis, cervical ([facility] 4/5/2010    Tinea pedis 6/3/2015    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 straightened colon out    HX ROTATOR CUFF REPAIR  2009    left    IR INJ FORAMIN EPID LUMB ANES/STER ADDL  8/2/2019    IR INJ FORAMIN EPID LUMB ANES/STER SNGL  8/2/2019    OPEN REPAIR CLAVICLE FRACTURE  2009    did not have surgery for this/pt states she was in an accident and had RCR on left, but the clavicle was not repaired - injury was done at the same time         Family History:   Problem Relation Age of Onset    Cancer Mother         mycosis fungoives - skin cancer/got into bloodstream    Hypertension Mother     Arthritis-osteo Mother     Stroke Father     Cancer Sister         one sister with breast cancer    Breast Cancer Sister     MS Sister     Heart Disease Brother     Diabetes Brother     Diabetes Daughter     Hypertension Daughter     Psychiatric Disorder Daughter         ANXIETY    Cancer Paternal Aunt         2 paternal aunts with breast cancer    Breast Cancer Paternal Aunt     No Known Problems Sister     Arthritis-osteo Sister     Headache Brother     No Known Problems Brother     No Known Problems Brother     Thyroid Cancer Neg Hx     Anesth Problems Neg Hx        Social History     Socioeconomic History    Marital status:      Spouse name: Not on file    Number of children: Not on file    Years of education: Not on file    Highest education level: Not on file   Occupational History    Not on file   Social Needs    Financial resource strain: Not on file    Food insecurity:     Worry: Not on file     Inability: Not on file    Transportation needs:     Medical: Not on file     Non-medical: Not on file   Tobacco Use    Smoking status: Former Smoker     Packs/day: 0.50     Years: 20.00     Pack years: 10.00     Last attempt to quit: 1988     Years since quittin.5    Smokeless tobacco: Never Used   Substance and Sexual Activity    Alcohol use: No    Drug use: No    Sexual activity: Never   Lifestyle    Physical activity:     Days per week: Not on file     Minutes per session: Not on file    Stress: Not on file   Relationships    Social connections:     Talks on phone: Not on file     Gets together: Not on file     Attends Judaism service: Not on file     Active member of club or organization: Not on file     Attends meetings of clubs or organizations: Not on file     Relationship status: Not on file    Intimate partner violence:     Fear of current or ex partner: Not on file     Emotionally abused: Not on file     Physically abused: Not on file     Forced sexual activity: Not on file   Other Topics Concern    Not on file   Social History Narrative    Not on file         ALLERGIES: Latex; Kiwi; Amoxil [amoxicillin]; Cephalosporins; Levaquin [levofloxacin]; Penicillins; Percocet [oxycodone-acetaminophen]; Tetanus vaccines and toxoid; Tetracycline hcl; and [place]    Review of Systems   Constitutional: Negative for activity change, appetite change, chills and fever. HENT: Negative for congestion, rhinorrhea, sinus pressure, sneezing and sore throat. Eyes: Negative for photophobia and visual disturbance. Respiratory: Negative for cough and shortness of breath. Cardiovascular: Negative for chest pain.    Gastrointestinal: Negative for abdominal pain, blood in stool, constipation, diarrhea, nausea and vomiting. Genitourinary: Negative for difficulty urinating, dysuria, flank pain, frequency, hematuria, menstrual problem, urgency, vaginal bleeding and vaginal discharge. Musculoskeletal: Negative for arthralgias, back pain, myalgias and neck pain. Skin: Negative for rash and wound. Neurological: Positive for dizziness and headaches. Negative for syncope, facial asymmetry, speech difficulty, weakness, light-headedness and numbness. Psychiatric/Behavioral: Negative for self-injury and suicidal ideas. All other systems reviewed and are negative. Vitals:    12/27/19 1635 12/27/19 1730   BP: 152/72 165/77   Pulse: 76 77   Resp: 16 15   Temp: 98.2 °F (36.8 °C) 97.8 °F (36.6 °C)   SpO2: 100% 99%   Weight: 93.7 kg (206 lb 9.1 oz)    Height: 5' 2\" (1.575 m)             Physical Exam  Vitals signs and nursing note reviewed. Constitutional:       General: She is not in acute distress. Appearance: She is well-developed. She is not diaphoretic. HENT:      Head: Normocephalic and atraumatic. Right Ear: Tympanic membrane normal.      Left Ear: Tympanic membrane normal.      Nose: Nose normal.   Eyes:      Extraocular Movements: Extraocular movements intact. Conjunctiva/sclera: Conjunctivae normal.      Pupils: Pupils are equal, round, and reactive to light. Neck:      Musculoskeletal: Neck supple. Cardiovascular:      Rate and Rhythm: Normal rate and regular rhythm. Heart sounds: Normal heart sounds. Pulmonary:      Effort: Pulmonary effort is normal.      Breath sounds: Normal breath sounds. Abdominal:      General: There is no distension. Palpations: Abdomen is soft. Tenderness: There is no tenderness. Musculoskeletal:         General: No tenderness. Skin:     General: Skin is warm and dry. Neurological:      General: No focal deficit present. Mental Status: She is alert and oriented to person, place, and time. GCS: GCS eye subscore is 4.  GCS verbal subscore is 5. GCS motor subscore is 6. Cranial Nerves: No cranial nerve deficit. Sensory: No sensory deficit. Coordination: Coordination normal.      Comments: Intact sensation, 5/5 strength in all 4 extremities, intact finger to nose, neg pronator drift, fluent speech, CN intact. MDM   82-year-old female with a history of migraine headaches presents to the emergency department noting right-sided headache precipitated by increased stress. No focal neuro deficits noted. She was given migraine cocktail with Compazine and Benadryl, however unable to give Toradol or Decadron due to anticoagulated status and DM. Patient had improvement in her symptoms.   She was recommended to follow-up with her    Procedures

## 2020-01-02 ENCOUNTER — HOSPITAL ENCOUNTER (OUTPATIENT)
Dept: MRI IMAGING | Age: 80
Discharge: HOME | End: 2020-01-02
Payer: MEDICARE

## 2020-01-02 DIAGNOSIS — Z98.1 S/P LUMBAR FUSION: ICD-10-CM

## 2020-01-02 PROCEDURE — 72148 MRI LUMBAR SPINE W/O DYE: CPT

## 2020-01-06 ENCOUNTER — HOSPITAL ENCOUNTER (OUTPATIENT)
Dept: MAMMOGRAPHY | Age: 80
Discharge: HOME | End: 2020-01-06
Attending: INTERNAL MEDICINE
Payer: MEDICARE

## 2020-01-06 DIAGNOSIS — Z12.31 VISIT FOR SCREENING MAMMOGRAM: ICD-10-CM

## 2020-01-06 PROCEDURE — 77063 BREAST TOMOSYNTHESIS BI: CPT

## 2020-01-15 ENCOUNTER — OFFICE VISIT (OUTPATIENT)
Dept: CARDIOLOGY CLINIC | Age: 80
End: 2020-01-15

## 2020-01-15 VITALS
DIASTOLIC BLOOD PRESSURE: 80 MMHG | OXYGEN SATURATION: 96 % | HEART RATE: 76 BPM | RESPIRATION RATE: 16 BRPM | SYSTOLIC BLOOD PRESSURE: 120 MMHG | BODY MASS INDEX: 37.23 KG/M2 | HEIGHT: 62 IN | WEIGHT: 202.3 LBS

## 2020-01-15 DIAGNOSIS — E78.2 MIXED HYPERLIPIDEMIA: ICD-10-CM

## 2020-01-15 DIAGNOSIS — I87.2 VENOUS INSUFFICIENCY OF BOTH LOWER EXTREMITIES: ICD-10-CM

## 2020-01-15 DIAGNOSIS — I48.0 PAROXYSMAL ATRIAL FIBRILLATION (HCC): Primary | ICD-10-CM

## 2020-01-15 DIAGNOSIS — I10 ESSENTIAL HYPERTENSION: ICD-10-CM

## 2020-01-15 RX ORDER — GABAPENTIN 300 MG/1
CAPSULE ORAL
COMMUNITY
Start: 2020-01-09 | End: 2020-01-15

## 2020-01-15 RX ORDER — IPRATROPIUM BROMIDE AND ALBUTEROL SULFATE 2.5; .5 MG/3ML; MG/3ML
SOLUTION RESPIRATORY (INHALATION)
COMMUNITY
End: 2020-01-15

## 2020-01-15 NOTE — PROGRESS NOTES
1. Have you been to the ER, urgent care clinic since your last visit? Hospitalized since your last visit? Yes 12/2019 ED Headache and dizziness    2. Have you seen or consulted any other health care providers outside of the [address] [place] [address] since your last visit? Include any pap smears or colon screening.  9/2019 [facility]. Chief Complaint   Patient presents with    Follow-up    Irregular Heart Beat     Patient C/O dizzy headaches.

## 2020-01-15 NOTE — PROGRESS NOTES
[address] [facility], [address] [facility]  [phone]     Subjective:      Denise Simpson is a 70 y.o. female is here for routine f/u on PAF HTN HLD   Since last OV, she underwent back sx 9/19, completed physical therapy. Had some on/off dizziness, h/a around December and she went to ED 12/27. She had had similar issues in the past with dx of vertigo and inner ear infections, only difference is her headache. It has improved after they gave her migraine cocktail. Today, she reports still having intermittent lightheadedness plus a lot of Post nasal drainage, she has stopped her zyrtec and will   Restart it and see if it helps with her symptoms. Her BP is nonorthostatic. The patient denies chest pain/ shortness of breath, orthopnea, PND, palpitations, syncope, or presyncope. 12/27/19 ED  MDM   68-year-old female with a history of migraine headaches presents to the emergency department noting right-sided headache precipitated by increased stress. No focal neuro deficits noted.     She was given migraine cocktail with Compazine and Benadryl, however unable to give Toradol or Decadron due to anticoagulated status and DM.     Patient had improvement in her symptoms.   She was recommended to follow-up with her PCP    Patient Active Problem List    Diagnosis Date Noted    Lumbar stenosis 09/10/2019    Varicose veins of both lower extremities with pain 08/13/2019    Venous insufficiency of both lower extremities 08/13/2019    Severe obesity (BMI 35.0-39.9) 06/27/2018    Paroxysmal atrial fibrillation ([address] 01/02/2018    Abdominal mass, left upper quadrant 06/13/2017    Left upper quadrant pain 06/13/2017    Right foot injury 05/02/2017    Chronic asthma with status asthmaticus 01/26/2017    Intractable migraine with aura without status migrainosus 54/15/1314    Helicobacter pylori (H. pylori) infection 12/07/2016    Skipped heart beats 11/29/2016    Multiple thyroid nodules 07/14/2016    Swollen gland 05/26/2016    Acute pharyngitis 05/26/2016    Diabetes mellitus type 2, controlled ([address] 05/26/2016    Arthralgia of right hip 04/25/2016    Tinea pedis 06/03/2015    Abnormal finding on MRI of brain 03/16/2015    DAVID (generalized anxiety disorder) 01/22/2015    Peripheral autonomic neuropathy due to DM ([address] 09/29/2014    Pre-ulcerative calluses 09/29/2014    Type 2 diabetes mellitus with pressure callus ([address] 09/29/2014    Hammer toe of right foot 09/29/2014    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 ([address] 09/29/2014    Cataract 09/24/2014    Acute asthma exacerbation 08/31/2014    Elevated LFTs 04/16/2014    Screening for depression 04/16/2014    Risk for falls 04/16/2014    GERD (gastroesophageal reflux disease) 04/07/2014    Osteoarthritis of acromioclavicular joint 03/18/2014    Shoulder pain, left 03/18/2014    Rotator cuff (capsule) sprain and strain 09/20/2013    Foot pain, left 06/14/2013    Bilateral hip pain 05/20/2013    Postmenopausal disorder 05/20/2013    Numbness and tingling of right leg 05/20/2013    Postmenopausal state 01/21/2013    Vitamin D deficiency 01/21/2013    Visual floaters 02/01/2012    Acute bronchitis 11/30/2011    Hip pain, right 09/14/2011    Cyst of right kidney 08/10/2011    Anemia 04/22/2011    Chest pain 10/20/2010    Tingling sensation 09/13/2010    Spondylitis, cervical ([address] 04/05/2010    HTN (hypertension) 03/26/2010    Headache(784.0) 03/26/2010    Hyperlipemia 02/18/2010    Arthritis 02/18/2010      Francella Frankel, MD  Past Medical History:   Diagnosis Date    Abdominal mass, left upper quadrant 06/13/2017    no finding per pt    Abnormal finding on MRI of brain 3/16/2015    evaluated by neurologist and no findings    Acute pharyngitis 5/26/2016    Anemia NEC     Arrhythmia     a fib    Arthralgia of right hip 4/25/2016    Arthritis 2/18/2010    Cataract 9/24/2014    Diabetes ([address]  Elevated LFTs 4/16/2014    DAVID (generalized anxiety disorder) 1/22/2015    GERD (gastroesophageal reflux disease) 4/7/2014    Hammer toe of right foot 9/29/2014    Headache(784.0) 3/37/9855    Helicobacter pylori (H. 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 ([address] 9/29/2014    Preop examination 9/24/2014    Right foot injury 5/2/2017    Risk for falls 4/16/2014    Screening for breast cancer 9/23/2014    Screening for depression 4/16/2014    Shoulder pain, left 3/18/2014    Spondylitis, cervical ([address] 4/5/2010    Tinea pedis 6/3/2015    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 straightened colon out    HX ROTATOR CUFF REPAIR  2009    left    IR INJ FORAMIN EPID LUMB ANES/STER ADDL  8/2/2019    IR INJ FORAMIN EPID LUMB ANES/STER SNGL  8/2/2019    TN OPEN REPAIR CLAVICLE FRACTURE  2009    did not have surgery for this/pt states she was in an accident and had RCR on left, but the clavicle was not repaired - injury was done at the same time     Allergies   Allergen Reactions    Latex Rash    Kiwi Anaphylaxis     \"FEELS LIKE THROAT IS CLOSING UP\"    Amoxil [Amoxicillin] Itching     rash    Cephalosporins Unknown (comments)     PT NOT SURE OF REACTIONS    Levaquin [Levofloxacin] Other (comments)     Dizziness      Penicillins Rash     \"chickenpox like rash\" was in the 1980s    Percocet [Oxycodone-Acetaminophen] Nausea and Vomiting    Tetanus Vaccines And Toxoid Swelling    Tetracycline Hcl Rash     \"black fuzzy tongue\"   [address] Other (comments)     RAW TONGUE      Family History   Problem Relation Age of Onset    Cancer Mother         mycosis fungoives - skin cancer/got into bloodstream    Hypertension Mother     Arthritis-osteo Mother     Stroke Father     Cancer Sister         one sister with breast cancer    Breast Cancer Sister         42's    MS Sister     Heart Disease Brother     Diabetes Brother     Diabetes Daughter     Hypertension Daughter     Psychiatric Disorder Daughter         ANXIETY    Cancer Paternal Aunt         2 paternal aunts with breast cancer    Breast Cancer Paternal Aunt         over 48    No Known Problems Sister     Arthritis-osteo Sister     Headache Brother     No Known Problems Brother     No Known Problems Brother     Breast Cancer Paternal Aunt         over 48    Thyroid Cancer Neg Hx     Anesth Problems Neg Hx       Social History     Socioeconomic History    Marital status:      Spouse name: Not on file    Number of children: Not on file    Years of education: Not on file    Highest education level: Not on file   Occupational History    Not on file   Social Needs    Financial resource strain: Not on file    Food insecurity:     Worry: Not on file     Inability: Not on file    Transportation needs:     Medical: Not on file     Non-medical: Not on file   Tobacco Use    Smoking status: Former Smoker     Packs/day: 0.50     Years: 20.00     Pack years: 10.00     Last attempt to quit: 1988     Years since quittin.6    Smokeless tobacco: Never Used   Substance and Sexual Activity    Alcohol use: No    Drug use: No    Sexual activity: Never   Lifestyle    Physical activity:     Days per week: Not on file     Minutes per session: Not on file    Stress: Not on file   Relationships    Social connections:     Talks on phone: Not on file     Gets together: Not on file     Attends [employer] service: Not on file     Active member of club or organization: Not on file     Attends meetings of clubs or organizations: Not on file     Relationship status: Not on file    Intimate partner violence:     Fear of current or ex partner: Not on file     Emotionally abused: Not on file     Physically abused: Not on file     Forced sexual activity: Not on file   Other Topics Concern    Not on file   Social History Narrative    Not on file      Current Outpatient Medications   Medication Sig    propafenone (RYTHMOL) 150 mg tablet TAKE 1 TABLET BY MOUTH EVERY 8 HOURS    rosuvastatin (CRESTOR) 10 mg tablet TAKE 1 TABLET BY MOUTH EVERY DAY    aspirin delayed-release 81 mg tablet TAKE 1 TABLET BY MOUTH EVERY DAY    metFORMIN (GLUCOPHAGE) 500 mg tablet TAKE 1 TABLET BY MOUTH TWICE A DAY WITH MEALS    hydroCHLOROthiazide (HYDRODIURIL) 25 mg tablet TAKE 1 TABLET BY MOUTH EVERY DAY    HYDROcodone-acetaminophen (NORCO) 5-325 mg per tablet Take  by mouth.  metoprolol succinate (TOPROL-XL) 50 mg XL tablet TAKE 1 TABLET BY MOUTH EVERY DAY    ELIQUIS 5 mg tablet TAKE 1 TABLET BY MOUTH TWICE A DAY    albuterol (PROVENTIL HFA, VENTOLIN HFA, PROAIR HFA) 90 mcg/actuation inhaler Take 2 Puffs by inhalation every four (4) hours as needed for Wheezing.  glucose blood VI test strips (CONTOUR NEXT TEST STRIPS) strip Check blood sugar once a day e11.9    pantoprazole (PROTONIX) 40 mg tablet TAKE 1 TABLET BY MOUTH EVERY DAY    MULTIVITAMIN PO Take 1 Tab by mouth daily.  Lancets (FREESTYLE LANCETS) Misc Test once daily.  (diagnosis code: 250.00)    Blood-Glucose Meter monitoring kit Once daily before breakfast No current facility-administered medications for this visit. Review of Symptoms:  11 systems reviewed, negative other than as stated in the HPI    Physical ExamPhysical Exam:    Vitals:    01/15/20 0951 01/15/20 0952 01/15/20 0953 01/15/20 0954   BP: 130/70 120/80 130/80 120/80   Pulse:  76     Resp:  16     SpO2:  96%     Weight:  202 lb 4.8 oz (91.8 kg)     Height:  5' 2\" (1.575 m)       Body mass index is 37 kg/m². General PE  Gen:  NAD  Mental Status - Alert. General Appearance - Not in acute distress. HEENT:  PERRL, no carotid bruits or JVD  Chest and Lung Exam   Inspection: Accessory muscles - No use of accessory muscles in breathing. Auscultation:   Breath sounds: - Normal.   Cardiovascular   Inspection: Jugular vein - Bilateral - Inspection Normal.   Palpation/Percussion:   Apical Impulse: - Normal.   Auscultation: Rhythm - Regular. Heart Sounds - S1 WNL and S2 WNL. No S3 or S4. Murmurs & Other Heart Sounds: Auscultation of the heart reveals - No Murmurs. Peripheral Vascular   Upper Extremity: Inspection - Bilateral - No Cyanotic nailbeds or Digital clubbing. Lower Extremity:   Palpation: Edema - Bilateral - No edema. Abdomen:   Soft, non-tender, bowel sounds are active.   Neuro: A&O times 3, CN and motor grossly WNL    Labs:   Lab Results   Component Value Date/Time    Cholesterol, total 164 08/30/2019 12:40 PM    Cholesterol, total 147 09/25/2018 09:33 AM    Cholesterol, total 180 10/18/2017 10:24 AM    Cholesterol, total 159 05/02/2017 09:21 AM    Cholesterol, total 172 01/12/2017 04:20 AM    HDL Cholesterol 55 08/30/2019 12:40 PM    HDL Cholesterol 46 09/25/2018 09:33 AM    HDL Cholesterol 56 10/18/2017 10:24 AM    HDL Cholesterol 58 05/02/2017 09:21 AM    HDL Cholesterol 59 01/12/2017 04:20 AM    LDL, calculated 81 08/30/2019 12:40 PM    LDL, calculated 77 09/25/2018 09:33 AM    LDL, calculated 94 10/18/2017 10:24 AM    LDL, calculated 81 05/02/2017 09:21 AM    LDL, calculated 77.4 01/12/2017 04:20 AM    Triglyceride 138 08/30/2019 12:40 PM    Triglyceride 119 09/25/2018 09:33 AM    Triglyceride 150 (H) 10/18/2017 10:24 AM    Triglyceride 99 05/02/2017 09:21 AM    Triglyceride 178 (H) 01/12/2017 04:20 AM    CHOL/HDL Ratio 2.9 01/12/2017 04:20 AM    CHOL/HDL Ratio 5.5 (H) 09/13/2010 01:35 PM    CHOL/HDL Ratio 4.1 04/05/2010 10:02 AM     Lab Results   Component Value Date/Time     (H) 01/05/2017 07:42 AM     Lab Results   Component Value Date/Time    Sodium 132 (L) 09/25/2019 09:45 AM    Potassium 4.1 09/25/2019 09:45 AM    Chloride 97 09/25/2019 09:45 AM    CO2 30 09/25/2019 09:45 AM    Anion gap 5 09/25/2019 09:45 AM    Glucose 120 (H) 09/25/2019 09:45 AM    BUN 16 09/25/2019 09:45 AM    Creatinine 0.83 09/25/2019 09:45 AM    BUN/Creatinine ratio 19 09/25/2019 09:45 AM    GFR est AA >60 09/25/2019 09:45 AM    GFR est non-AA >60 09/25/2019 09:45 AM    Calcium 9.3 09/25/2019 09:45 AM    Bilirubin, total 0.6 09/15/2019 03:15 AM    AST (SGOT) 17 09/15/2019 03:15 AM    Alk. phosphatase 46 09/15/2019 03:15 AM    Protein, total 6.6 09/15/2019 03:15 AM    Albumin 2.7 (L) 09/15/2019 03:15 AM    Globulin 3.9 09/15/2019 03:15 AM    A-G Ratio 0.7 (L) 09/15/2019 03:15 AM    ALT (SGPT) 17 09/15/2019 03:15 AM       EKG:  NSR      Assessment:     Assessment:      1. Paroxysmal atrial fibrillation (HCC)    2. Essential hypertension    3. Mixed hyperlipidemia    4.  Venous insufficiency of both lower extremities        Orders Placed This Encounter    AMB POC EKG ROUTINE W/ 12 LEADS, INTER & REP     Order Specific Question:   Reason for Exam:     Answer:   routine    DISCONTD: gabapentin (NEURONTIN) 300 mg capsule    DISCONTD: albuterol-ipratropium (DUO-NEB) 2.5 mg-0.5 mg/3 ml nebu     Sig: ipratropium 0.5 mg-albuterol 3 mg (2.5 mg base)/3 mL nebulization soln   USE 1 VIAL IN NEBULIZER EVERY 4 HOURS        Plan:     Patient presents for f/u, doing well and stable from cardiac standpoint.    Since last OV, she underwent back sx 9/19, completed physical therapy. Had some on/off dizziness, h/a around December and she went to ED 12/27. She had had similar issues in the past with dx of vertigo and inner ear infections, only difference is her headache. It has improved after they gave her migraine cocktail. Today, she reports still having intermittent lightheadedness plus a lot of Post nasal drainage, she has stopped her zyrtec and will   Restart it and see if it helps with her symptoms. Her BP is nonorthostatic. PAF  Normal EF, no significant valvular pathology per echo in 1/17  Maintaining SR. Continue BB, Rythmol  Continue Eliquis for OAC--no overt bleeding, denies any fall     Negative nuclear stress test 10/17.      HTN  Controlled with current therapy  Stable kidney fxn 9/19     HLD  8/19 LDL at 81 On statin.  Lipids and labs followed by PCP.        DM  On Metformin     Venous insufficiency  LE swelling, + cramps, varicose veins, RLS  Continue compression socks   Followed by Dr Zac Grimaldo current care and f/u in 6 months. Vimal Gonzalez NP       [facility]    1/15/2020         Patient seen, examined by me personally. Plan discussed as detailed. Agree with note as outlined by  NP. I confirm findings in history and physical exam. No additional findings noted. Agree with plan as outlined above.      Pamela Lew MD

## 2020-01-21 ENCOUNTER — PATIENT OUTREACH (OUTPATIENT)
Dept: INTERNAL MEDICINE CLINIC | Age: 80
End: 2020-01-21

## 2020-01-21 NOTE — PROGRESS NOTES
Ambulatory Care Management Note      Date/Time:  1/21/2020 12:08 PM    This patient was received as a referral from [address] [place] [address] reviewed with the provider   Patient attending PT with [facility] 2 x weekly due to continued pain in left hip, leg and foot. She has scheduled an appointment with Dr. Jc Bill for a second opinion 1/22/2020. Was seen in the ED 12/27/19 for HA and dizziness. Migraine cocktail with compazine and benadryl was administered. Toradol or Decadron unable to be administered due to anticoagulated status and DM. The patient stated today she felt this really didn't do much to improve HA, but it finally resolved a couple of days afterward. Still complains of feeling of fullness in ear. Will begin taking Zyrtec again after she talks to pharmacist friend for recommendation. BS on 1/21/2020 was 123. Ambulatory  contacted patient for discussion and case management of spinal stenosis of lumbar region, DM type 2, HTN   Summary of patients top problems:   1. Spinal Stenosis. Patient has appointment scheduled with Dr. Jc Bill for second opinion on 1/22/2020 and is attending PT with [facility]. 2. DM type 2. Patient reports DM is stable.  this morning. She is aware of the cause when her BS is elevated and contributes to not adhering to her diet. 3. HTN. Patient reports BP is stable.   Seen by Dr. Margo Leavitt on 1/15/2020  Patient's challenges to self management identified:   lack of knowledge about disease      Medication Management:  good adherence    Advance Care Planning:   Does patient have an Advance Directive:  reviewed and current    [employer], Referrals, and Durable Medical Equipment: none    PCP/Specialist follow up:   Future Appointments   Date Time Provider Maggie Molina   6/3/2020  8:30 AM Angel Nelson MD  [facility]   7/21/2020  9:00 AM Julia Fields MD [address] [place] [address] Patient/Family verbalizes understanding of self-management of chronic disease.      1/21/2020 Skills and education necessary to properly manage spinal stenosis of lumbar region, DM type 2, and HTN  History: LOV with PCP: 10/8/19 for annual [address] Last ED visit: 12/27/19 for migraine. Last cardio OV: 1/15/2020 routine FU. Patient had spinal surgery 9/10/19. Lumbar Laminectomy L3-4 and L4-5 with posterior lumbar fusion L3-L5 performed by Dr. Morgan Lawson. In hospital for 4 days and discharged to [facility] rehab for 11 days. Now attends PT with [facility]. Working on left hip, leg and foot pain. Attended therapy this morning and will go back on Thursday. Patient has appointment with Dr. Royer Sarmiento tomorrow for second opinion on left foot pain which patient describes as shooting pain and states top of foot appears to be swollen. Patient reports DM and HTN are stable.  this morning. Current level of understanding:patient apears to have good understanding of her health   Desired Outcome: patient will decrease pain in left hip, leg and foot over the next 90 days. Plan: patient will continue to attend [facility], FU with Ortho. ACM will educate patient on alternative pain relief methods. Francisca Fernandez RN  Ambulatory Care Manager                 Patient verbalized understanding of all information discussed. Patient has this Ambulatory Care Manager's contact information for any further questions, concerns, or needs.

## 2020-02-03 ENCOUNTER — PATIENT OUTREACH (OUTPATIENT)
Dept: INTERNAL MEDICINE CLINIC | Age: 80
End: 2020-02-03

## 2020-02-03 NOTE — PROGRESS NOTES
Goals      Patient/Family verbalizes understanding of self-management of chronic disease. 2/3/2020 Patient states:  - continues with physical therapy 2 x weekly. Doesn't feel like it is really helping. Wonders if gout could be what she is experiencing. Describes pain as shooting down leg through to big toe. Not constant, but occasional ever since her surgery. PT suggested nerves may just need to settle down. - attended appointment with Dr. Harlo Nissen for second opinion. Patient was given cortisone injection around ankle, but reports it has not helped and she did not care for bedside manor of physician so will not be going back. - offered to schedule appointment with PCP. Patient will have to check with daughter who provides transportation. ACM encouraged patient to contact [employer] and sign up for help with transportation. Patient stated she would call them. - contact information supplied  Leela Motta RN  Ambulatory Care Manager     1/21/2020 Skills and education necessary to properly manage spinal stenosis of lumbar region, DM type 2, and HTN  History: LOV with PCP: 10/8/19 for annual [address] Last ED visit: 12/27/19 for migraine. Last cardio OV: 1/15/2020 routine FU. Patient had spinal surgery 9/10/19. Lumbar Laminectomy L3-4 and L4-5 with posterior lumbar fusion L3-L5 performed by Dr. Kayce Wade. In hospital for 4 days and discharged to [facility] rehab for 11 days. Now attends PT with [facility]. Working on left hip, leg and foot pain. Attended therapy this morning and will go back on Thursday. Patient has appointment with Dr. Negrita Medley tomorrow for second opinion on left foot pain which patient describes as shooting pain and states top of foot appears to be swollen. Patient reports DM and HTN are stable.  this morning.   Current level of understanding:patient apears to have good understanding of her health   Desired Outcome: patient will decrease pain in left hip, leg and foot over the next 90 days. Plan: patient will continue to attend [facility], FU with Ortho. ACM will educate patient on alternative pain relief methods.   Carlo Ricketts RN  Ambulatory Care Manager

## 2020-02-05 ENCOUNTER — TELEPHONE (OUTPATIENT)
Dept: INTERNAL MEDICINE CLINIC | Age: 80
End: 2020-02-05

## 2020-02-05 NOTE — TELEPHONE ENCOUNTER
Pt daughter Anu Mix called said that she sent a my chart message this morning please see my chart message from pt      Pt number      [phone]

## 2020-02-05 NOTE — TELEPHONE ENCOUNTER
Message was somehow sent to [facility] so we did not receive it in our in-basket. [address] MAIN OFF[facility]-JOSUE Arvizu MD   Internal Medicine    Mohinder Sky   to Lexa Arvizu MD            2/5/20 9:01 AM   Good Morning Dr. Jesu Urrutia. I am concerned about two things to including pain an swelling in my left foot. Pain is also shooting through the big toe and sensitive to touch. It also swells from side of the ankle to the other. I had x-rays done from Dr. Caterina Mendieta and Dr. Forrest Tirado who did not find the problem. Dr. Jai Norwood did a cortisone shot and it did not help. Additionally, I am having symptoms of nervous stomach which feels jittery and shaky in the abdomen and I also feel it throughout my body. Please advise if I need to see you. Thank you.

## 2020-02-05 NOTE — TELEPHONE ENCOUNTER
Verified patient identity with two identifiers. Spoke with patient daughter by phone. For some reason when they selected Dr. Naga Matute the location was incorrect. Will send this to [address] [place] [address] team to get fixed. They request a Wed appt to see [facility], first appt is 2/19/20, daughter states pt can wait until then. She is eating and voiding fine. Extremity pain is not extreme, no h/o gout but wonders if this is gout. Red flags to warrant ER or earlier clinical evaluation reviewed. Patient daughter verbalized understanding.

## 2020-02-19 ENCOUNTER — HOSPITAL ENCOUNTER (OUTPATIENT)
Dept: LAB | Age: 80
Discharge: HOME | End: 2020-02-19
Payer: MEDICARE

## 2020-02-19 ENCOUNTER — PATIENT OUTREACH (OUTPATIENT)
Dept: INTERNAL MEDICINE CLINIC | Age: 80
End: 2020-02-19

## 2020-02-19 ENCOUNTER — OFFICE VISIT (OUTPATIENT)
Dept: INTERNAL MEDICINE CLINIC | Age: 80
End: 2020-02-19

## 2020-02-19 VITALS
BODY MASS INDEX: 37.87 KG/M2 | DIASTOLIC BLOOD PRESSURE: 86 MMHG | HEIGHT: 62 IN | OXYGEN SATURATION: 98 % | RESPIRATION RATE: 16 BRPM | WEIGHT: 205.8 LBS | HEART RATE: 64 BPM | SYSTOLIC BLOOD PRESSURE: 164 MMHG | TEMPERATURE: 98.1 F

## 2020-02-19 DIAGNOSIS — E11.21 CONTROLLED TYPE 2 DIABETES MELLITUS WITH DIABETIC NEPHROPATHY, WITHOUT LONG-TERM CURRENT USE OF INSULIN (HCC): Primary | ICD-10-CM

## 2020-02-19 DIAGNOSIS — F41.9 ANXIETY: ICD-10-CM

## 2020-02-19 DIAGNOSIS — I10 ESSENTIAL HYPERTENSION: ICD-10-CM

## 2020-02-19 DIAGNOSIS — J30.9 ALLERGIC RHINITIS, UNSPECIFIED SEASONALITY, UNSPECIFIED TRIGGER: ICD-10-CM

## 2020-02-19 DIAGNOSIS — E78.2 MIXED HYPERLIPIDEMIA: ICD-10-CM

## 2020-02-19 DIAGNOSIS — M48.061 SPINAL STENOSIS OF LUMBAR REGION, UNSPECIFIED WHETHER NEUROGENIC CLAUDICATION PRESENT: ICD-10-CM

## 2020-02-19 DIAGNOSIS — M79.672 LEFT FOOT PAIN: ICD-10-CM

## 2020-02-19 PROCEDURE — 83036 HEMOGLOBIN GLYCOSYLATED A1C: CPT

## 2020-02-19 PROCEDURE — 36415 COLL VENOUS BLD VENIPUNCTURE: CPT

## 2020-02-19 PROCEDURE — 84550 ASSAY OF BLOOD/URIC ACID: CPT

## 2020-02-19 PROCEDURE — 80061 LIPID PANEL: CPT

## 2020-02-19 PROCEDURE — 80053 COMPREHEN METABOLIC PANEL: CPT

## 2020-02-19 RX ORDER — ALPRAZOLAM 0.5 MG/1
0.5 TABLET ORAL
Qty: 30 TAB | Refills: 0 | Status: SHIPPED | OUTPATIENT
Start: 2020-02-19 | End: 2020-04-01

## 2020-02-19 RX ORDER — CETIRIZINE HCL 10 MG
10 TABLET ORAL DAILY
Qty: 30 TAB | Refills: 3 | Status: SHIPPED | OUTPATIENT
Start: 2020-02-19 | End: 2020-05-07

## 2020-02-19 NOTE — PROGRESS NOTES
Follow Up Visit    Arpita Gilbert is a 70 y.o. female. she presents for Hypertension      Cardiovascular Review  The patient has hypertension and Atrial Fibrillation. She reports taking medications as instructed, no medication side effects noted, no chest pain on exertion, no dyspnea on exertion. Diet and Lifestyle: generally follows a low fat low cholesterol diet, generally follows a low sodium diet, sedentary. Lab review: orders written for new lab studies as appropriate; see orders. Endocrine Review  She is seen for diabetes. Since last visit: she has remained on medications. Home glucose monitoring: is performed regularly, values are usually normal.   She reports medication compliance: compliant all of the time. She reports the following medication side effects: none. Diabetic diet compliance: compliant most of the time. Further diabetic ROS: no chest pain or dyspnea, no hypoglycemia. Lab review: orders written for new lab studies as appropriate; see orders. Eye exam: is upcoming. She has had a spinal surgery. Still having some left foot pain/discomfort and is questioning if she needs further evaluation in this regard. She will follow up with ortho.      Patient Active Problem List   Diagnosis Code    Hyperlipemia E78.5    Arthritis M19.90    HTN (hypertension) I10    Headache(784.0) R51    Spondylitis, cervical (HCC) M46.92    Tingling sensation R20.2    Chest pain R07.9    Anemia D64.9    Cyst of right kidney N28.1    Hip pain, right M25.551    Acute bronchitis J20.9    Visual floaters H43.399    Postmenopausal state Z78.0    Vitamin D deficiency E55.9    Bilateral hip pain M25.551, M25.552    Postmenopausal disorder N95.1    Numbness and tingling of right leg R20.0, R20.2    Foot pain, left M79.672    Rotator cuff (capsule) sprain and strain [id number]    Osteoarthritis of acromioclavicular joint M19.019    Shoulder pain, left M25.512    GERD (gastroesophageal reflux disease) K21.9    Elevated LFTs R94.5    Screening for depression Z13.31    Risk for falls Z91.81    Acute asthma exacerbation J45. 0    Cataract H26.9    Peripheral autonomic neuropathy due to DM (HCC) E11.43    Pre-ulcerative calluses L84    Type 2 diabetes mellitus with pressure callus (HCC) E11.628, L84    Hammer toe of right foot M20.41    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 (HCC) E11.69, M21.969    DAVID (generalized anxiety disorder) F41.1    Abnormal finding on MRI of brain R90.89    Tinea pedis B35.3    Arthralgia of right hip M25.551    Swollen gland R59.9    Acute pharyngitis J02.9    Diabetes mellitus type 2, controlled (HCC) E11.9    Multiple thyroid nodules E04.2    Skipped heart beats F03.0    Helicobacter pylori (H. pylori) infection A04.8    Intractable migraine with aura without status migrainosus G43.119    Chronic asthma with status asthmaticus J45.902    Right foot injury S99.921A    Abdominal mass, left upper quadrant R19.02    Left upper quadrant pain R10.12    Paroxysmal atrial fibrillation (HCC) I48.0    Severe obesity (BMI 35.0-39. 9) E66.01    Varicose veins of both lower extremities with pain I83.813    Venous insufficiency of both lower extremities I87.2    Lumbar stenosis M48.061         Prior to Admission medications    Medication Sig Start Date End Date Taking?  Authorizing Provider   propafenone (RYTHMOL) 150 mg tablet TAKE 1 TABLET BY MOUTH EVERY 8 HOURS 12/17/19  Yes Alphonse Mg NP   rosuvastatin (CRESTOR) 10 mg tablet TAKE 1 TABLET BY MOUTH EVERY DAY 12/16/19  Yes Wes Olszewski, MD   aspirin delayed-release 81 mg tablet TAKE 1 TABLET BY MOUTH EVERY DAY 12/13/19  Yes Wes Olszewski, MD   metFORMIN (GLUCOPHAGE) 500 mg tablet TAKE 1 TABLET BY MOUTH TWICE A DAY WITH MEALS 11/26/19  Yes Wes Olszewski, MD   hydroCHLOROthiazide (HYDRODIURIL) 25 mg tablet TAKE 1 TABLET BY MOUTH EVERY DAY 11/26/19  Yes Wes Olszewski, MD HYDROcodone-acetaminophen (NORCO) 5-325 mg per tablet Take  by mouth. Yes Provider, Historical   metoprolol succinate (TOPROL-XL) 50 mg XL tablet TAKE 1 TABLET BY MOUTH EVERY DAY 9/12/19  Yes Nanci Mccord MD   ELIQUIS 5 mg tablet TAKE 1 TABLET BY MOUTH TWICE A DAY 6/19/19  Yes Gaby Fields MD   albuterol (PROVENTIL HFA, VENTOLIN HFA, PROAIR HFA) 90 mcg/actuation inhaler Take 2 Puffs by inhalation every four (4) hours as needed for Wheezing. 6/3/19  Yes Francisco Aguilera MD   glucose blood VI test strips (CONTOUR NEXT TEST STRIPS) strip Check blood sugar once a day e11.9 3/5/19  Yes Sivan Villagomez MD   pantoprazole (PROTONIX) 40 mg tablet TAKE 1 TABLET BY MOUTH EVERY DAY   Yes Provider, Historical   MULTIVITAMIN PO Take 1 Tab by mouth daily. Yes Provider, Historical   Lancets (FREESTYLE LANCETS) Misc Test once daily. (diagnosis code: 250.00) 10/24/13  Yes Jazmin So MD   Blood-Glucose Meter monitoring kit Once daily before breakfast 9/20/13  Yes Jazmin So MD         Health Maintenance   Topic Date Due    Shingrix Vaccine Age 49> (1 of 2) 12/26/1992    Foot Exam Q1  03/06/2019    GLAUCOMA SCREENING Q2Y  07/17/2019    Eye Exam Retinal or Dilated  07/17/2019    Influenza Age 5 to Adult  08/01/2019    MICROALBUMIN Q1  08/30/2020    Lipid Screen  08/30/2020    Medicare Yearly Exam  10/08/2020    DTaP/Tdap/Td series (2 - Td) 05/26/2026    Colonoscopy  04/23/2029    Bone Densitometry (Dexa) Screening  Completed    Pneumococcal 65+ years  Completed       Review of Systems   Constitutional: Negative. Respiratory: Negative. Cardiovascular: Negative. Gastrointestinal: Negative.             Visit Vitals  /86 (BP 1 Location: Right arm, BP Patient Position: Sitting)   Pulse 64   Temp 98.1 °F (36.7 °C) (Oral)   Resp 16   Ht 5' 2\" (1.575 m)   Wt 205 lb 12.8 oz (93.4 kg)   LMP 02/18/1983   SpO2 98%   BMI 37.64 kg/m²       Physical Exam  Constitutional: Appearance: She is well-developed. Cardiovascular:      Rate and Rhythm: Normal rate and regular rhythm. Pulmonary:      Effort: Pulmonary effort is normal.      Breath sounds: Normal breath sounds. Musculoskeletal:         General: Swelling (at the left lower extremity) present. No tenderness. ASSESSMENT/PLAN    Diagnoses and all orders for this visit:    1. Controlled type 2 diabetes mellitus with diabetic nephropathy, without long-term current use of insulin (HCC)  -     HEMOGLOBIN A1C WITH EAG    2. Essential hypertension    3. Spinal stenosis of lumbar region, unspecified whether neurogenic claudication present    4. Mixed hyperlipidemia  -     LIPID PANEL  -     METABOLIC PANEL, COMPREHENSIVE    5. Left foot pain  -     URIC ACID    6. Anxiety  -     ALPRAZolam (XANAX) 0.5 mg tablet; Take 1 Tab by mouth three (3) times daily as needed for Anxiety. Max Daily Amount: 1.5 mg.    7. Allergic rhinitis, unspecified seasonality, unspecified trigger  -     cetirizine (ZYRTEC) 10 mg tablet; Take 1 Tab by mouth daily. Follow-up and Dispositions    · Return in about 3 months (around 5/19/2020) for Follow up.

## 2020-02-19 NOTE — PROGRESS NOTES
Goals      Patient/Family verbalizes understanding of self-management of chronic disease. 2/19/2020  AC apologized for not being able to meet with patient while she was in office today due to ACM in meeting. Patient reports:  - continues with complaint of toe aching and swelling.   - Labs ordered today, PCP will review and notify patient if any abnormalities. - patient appreciative of call and hopes to meet F2F with ACM in the future   Carlo Ricketts RN  Ambulatory Care Manager    2/3/2020 Patient states:  - continues with physical therapy 2 x weekly. Doesn't feel like it is really helping. Wonders if gout could be what she is experiencing. Describes pain as shooting down leg through to big toe. Not constant, but occasional ever since her surgery. PT suggested nerves may just need to settle down. - attended appointment with Dr. Reagan Boyce for second opinion. Patient was given cortisone injection around ankle, but reports it has not helped and she did not care for bedside manor of physician so will not be going back. - offered to schedule appointment with PCP. Patient will have to check with daughter who provides transportation. ACM encouraged patient to contact [employer] and sign up for help with transportation. Patient stated she would call them. - contact information supplied  Carlo Ricketts RN  Ambulatory Care Manager     1/21/2020 Skills and education necessary to properly manage spinal stenosis of lumbar region, DM type 2, and HTN  History: LOV with PCP: 10/8/19 for annual [address] Last ED visit: 12/27/19 for migraine. Last cardio OV: 1/15/2020 routine FU. Patient had spinal surgery 9/10/19. Lumbar Laminectomy L3-4 and L4-5 with posterior lumbar fusion L3-L5 performed by Dr. Sami Meeks. In hospital for 4 days and discharged to [facility] rehab for 11 days. Now attends PT with [facility]. Working on left hip, leg and foot pain. Attended therapy this morning and will go back on Thursday.  Patient has appointment with Dr. Tala Hawley tomorrow for second opinion on left foot pain which patient describes as shooting pain and states top of foot appears to be swollen. Patient reports DM and HTN are stable.  this morning. Current level of understanding:patient apears to have good understanding of her health   Desired Outcome: patient will decrease pain in left hip, leg and foot over the next 90 days. Plan: patient will continue to attend [facility], FU with Ortho. ACM will educate patient on alternative pain relief methods.   Ronit Page RN  Ambulatory Care Manager

## 2020-02-20 LAB
ALBUMIN SERPL-MCNC: 4.5 G/DL (ref 3.7–4.7)
ALBUMIN/GLOB SERPL: 1.8 {RATIO} (ref 1.2–2.2)
ALP SERPL-CCNC: 55 IU/L (ref 39–117)
ALT SERPL-CCNC: 13 IU/L (ref 0–32)
AST SERPL-CCNC: 18 IU/L (ref 0–40)
BILIRUB SERPL-MCNC: 0.2 MG/DL (ref 0–1.2)
BUN SERPL-MCNC: 12 MG/DL (ref 8–27)
BUN/CREAT SERPL: 17 (ref 12–28)
CALCIUM SERPL-MCNC: 9.7 MG/DL (ref 8.7–10.3)
CHLORIDE SERPL-SCNC: 100 MMOL/L (ref 96–106)
CHOLEST SERPL-MCNC: 173 MG/DL (ref 100–199)
CO2 SERPL-SCNC: 26 MMOL/L (ref 20–29)
CREAT SERPL-MCNC: 0.71 MG/DL (ref 0.57–1)
EST. AVERAGE GLUCOSE BLD GHB EST-MCNC: 154 MG/DL
GLOBULIN SER CALC-MCNC: 2.5 G/DL (ref 1.5–4.5)
GLUCOSE SERPL-MCNC: 113 MG/DL (ref 65–99)
HBA1C MFR BLD: 7 % (ref 4.8–5.6)
HDLC SERPL-MCNC: 58 MG/DL
LDLC SERPL CALC-MCNC: 90 MG/DL (ref 0–99)
POTASSIUM SERPL-SCNC: 4.9 MMOL/L (ref 3.5–5.2)
PROT SERPL-MCNC: 7 G/DL (ref 6–8.5)
SODIUM SERPL-SCNC: 143 MMOL/L (ref 134–144)
TRIGL SERPL-MCNC: 123 MG/DL (ref 0–149)
URATE SERPL-MCNC: 6.1 MG/DL (ref 2.5–7.1)
VLDLC SERPL CALC-MCNC: 25 MG/DL (ref 5–40)

## 2020-02-24 ENCOUNTER — TELEPHONE (OUTPATIENT)
Dept: INTERNAL MEDICINE CLINIC | Age: 80
End: 2020-02-24

## 2020-02-26 RX ORDER — ALBUTEROL SULFATE 90 UG/1
AEROSOL, METERED RESPIRATORY (INHALATION)
Qty: 18 INHALER | Refills: 0 | Status: SHIPPED | OUTPATIENT
Start: 2020-02-26 | End: 2020-04-01

## 2020-03-10 ENCOUNTER — PATIENT OUTREACH (OUTPATIENT)
Dept: INTERNAL MEDICINE CLINIC | Age: 80
End: 2020-03-10

## 2020-03-10 NOTE — PROGRESS NOTES
Goals      Patient/Family verbalizes understanding of self-management of chronic disease. 3/10/2020 Patient reports:  - no change in toe pain of left foot. - has appt set for Dr. Caro Zabala on March 19th  - elevation does not seem to relieve pain. Pt has not taken any medication for pain. ACM encouraged trying Tylenol. Patient will consider.   - new contact information supplied  Carin Bryan RN  Ambulatory Care Manager     2/19/2020  ACM apologized for not being able to meet with patient while she was in office today due to [place] in meeting. Patient reports:  - continues with complaint of toe aching and swelling.   - Labs ordered today, PCP will review and notify patient if any abnormalities. - patient appreciative of call and hopes to meet F2F with ACM in the future   Carin Bryan RN  Ambulatory Care Manager    2/3/2020 Patient states:  - continues with physical therapy 2 x weekly. Doesn't feel like it is really helping. Wonders if gout could be what she is experiencing. Describes pain as shooting down leg through to big toe. Not constant, but occasional ever since her surgery. PT suggested nerves may just need to settle down. - attended appointment with Dr. Martin Armando for second opinion. Patient was given cortisone injection around ankle, but reports it has not helped and she did not care for bedside manor of physician so will not be going back. - offered to schedule appointment with PCP. Patient will have to check with daughter who provides transportation. ACM encouraged patient to contact [employer] and sign up for help with transportation. Patient stated she would call them. - contact information supplied  Carin Bryan RN  Ambulatory Care Manager     1/21/2020 Skills and education necessary to properly manage spinal stenosis of lumbar region, DM type 2, and HTN  History: LOV with PCP: 10/8/19 for annual [address] Last ED visit: 12/27/19 for migraine. Last cardio OV: 1/15/2020 routine FU.  Patient had spinal surgery 9/10/19. Lumbar Laminectomy L3-4 and L4-5 with posterior lumbar fusion L3-L5 performed by Dr. Susan Mancuso. In hospital for 4 days and discharged to [facility] rehab for 11 days. Now attends PT with [facility]. Working on left hip, leg and foot pain. Attended therapy this morning and will go back on Thursday. Patient has appointment with Dr. Milena Meier tomorrow for second opinion on left foot pain which patient describes as shooting pain and states top of foot appears to be swollen. Patient reports DM and HTN are stable.  this morning. Current level of understanding:patient apears to have good understanding of her health   Desired Outcome: patient will decrease pain in left hip, leg and foot over the next 90 days. Plan: patient will continue to attend [facility], FU with Ortho. ACM will educate patient on alternative pain relief methods.   Rachael Mckeon RN  Ambulatory Care Manager

## 2020-03-18 DIAGNOSIS — I10 ESSENTIAL HYPERTENSION: ICD-10-CM

## 2020-03-18 RX ORDER — METOPROLOL SUCCINATE 50 MG/1
TABLET, EXTENDED RELEASE ORAL
Qty: 90 TAB | Refills: 1 | Status: SHIPPED | OUTPATIENT
Start: 2020-03-18 | End: 2020-09-09

## 2020-03-19 ENCOUNTER — TELEPHONE (OUTPATIENT)
Dept: CARDIOLOGY CLINIC | Age: 80
End: 2020-03-19

## 2020-03-19 NOTE — TELEPHONE ENCOUNTER
Patient surgery having an injection done in her back on 3/27/20, needs to know when she can stop eliquis, please call .  Thanks

## 2020-03-20 NOTE — TELEPHONE ENCOUNTER
Spoke to patient using 2 identifiers. Per Darius Fisher NP, patient was made aware of the following:    MORAIMA Conroy LPN 20 hours ago (7:76 PM)      May hold Eliquis 2-3 days prior to procedure      Patient verbalized understanding.

## 2020-04-01 DIAGNOSIS — G43.119 INTRACTABLE MIGRAINE WITH AURA WITHOUT STATUS MIGRAINOSUS: ICD-10-CM

## 2020-04-01 DIAGNOSIS — Z23 ENCOUNTER FOR IMMUNIZATION: ICD-10-CM

## 2020-04-01 DIAGNOSIS — I10 ESSENTIAL HYPERTENSION: ICD-10-CM

## 2020-04-01 DIAGNOSIS — E11.9 DIABETES MELLITUS WITHOUT COMPLICATION (HCC): ICD-10-CM

## 2020-04-01 DIAGNOSIS — F41.9 ANXIETY: ICD-10-CM

## 2020-04-01 DIAGNOSIS — E11.21 CONTROLLED TYPE 2 DIABETES MELLITUS WITH DIABETIC NEPHROPATHY, WITHOUT LONG-TERM CURRENT USE OF INSULIN (HCC): ICD-10-CM

## 2020-04-01 RX ORDER — HYDROCHLOROTHIAZIDE 25 MG/1
TABLET ORAL
Qty: 90 TAB | Refills: 1 | Status: SHIPPED | OUTPATIENT
Start: 2020-04-01 | End: 2020-10-26

## 2020-04-01 RX ORDER — METFORMIN HYDROCHLORIDE 500 MG/1
TABLET ORAL
Qty: 180 TAB | Refills: 1 | Status: ON HOLD | OUTPATIENT
Start: 2020-04-01 | End: 2020-08-20 | Stop reason: SDUPTHER

## 2020-04-01 RX ORDER — ALPRAZOLAM 0.5 MG/1
TABLET ORAL
Qty: 30 TAB | Refills: 0 | Status: SHIPPED | OUTPATIENT
Start: 2020-04-01 | End: 2022-01-27

## 2020-04-01 RX ORDER — ALBUTEROL SULFATE 90 UG/1
AEROSOL, METERED RESPIRATORY (INHALATION)
Qty: 18 INHALER | Refills: 0 | Status: SHIPPED | OUTPATIENT
Start: 2020-04-01 | End: 2020-11-17 | Stop reason: SDUPTHER

## 2020-04-16 RX ORDER — ROSUVASTATIN CALCIUM 10 MG/1
TABLET, COATED ORAL
Qty: 90 TAB | Refills: 1 | Status: SHIPPED | OUTPATIENT
Start: 2020-04-16 | End: 2020-08-12 | Stop reason: SDUPTHER

## 2020-04-20 NOTE — TELEPHONE ENCOUNTER
Requested Prescriptions     Pending Prescriptions Disp Refills    glucose blood VI test strips (Contour Next Test Strips) strip 50 Strip 11     Sig: Check blood sugar once a day e11.9       Former patient of Dr. Carlen Meckel.

## 2020-05-05 ENCOUNTER — TELEPHONE (OUTPATIENT)
Dept: ENDOCRINOLOGY | Age: 80
End: 2020-05-05

## 2020-05-05 NOTE — TELEPHONE ENCOUNTER
Spoke to Mrs. Carr's daughter, Gallo Quinonez (HIPPA verified), and informed her that her mother's visit will be a virtual visit. Gallo Quinonez stated that they do have camera and microphone capability to do a video visit. No further actions required.

## 2020-05-05 NOTE — TELEPHONE ENCOUNTER
----- Message from Dorothy Louis sent at 5/5/2020 10:44 AM EDT -----  Regarding: /telephone  General Message/Vendor Calls    Caller's first and last name:      Reason for call: The pt's daughter would like to know if the pt needs to come in to the office for her [facility] appt on June 3rd, or will it be a virtual visit.       Callback required yes/no and why: yes      Best contact number(s): [phone]

## 2020-05-07 DIAGNOSIS — J30.9 ALLERGIC RHINITIS, UNSPECIFIED SEASONALITY, UNSPECIFIED TRIGGER: ICD-10-CM

## 2020-05-07 DIAGNOSIS — E55.9 VITAMIN D DEFICIENCY: ICD-10-CM

## 2020-05-07 RX ORDER — CETIRIZINE HCL 10 MG
TABLET ORAL
Qty: 30 TAB | Refills: 3 | Status: SHIPPED | OUTPATIENT
Start: 2020-05-07 | End: 2020-07-21

## 2020-05-07 RX ORDER — ASPIRIN 81 MG/1
TABLET ORAL
Qty: 30 TAB | Refills: 5 | Status: SHIPPED | OUTPATIENT
Start: 2020-05-07 | End: 2020-10-20

## 2020-05-14 ENCOUNTER — VIRTUAL VISIT (OUTPATIENT)
Dept: INTERNAL MEDICINE CLINIC | Age: 80
End: 2020-05-14

## 2020-05-14 DIAGNOSIS — J01.90 ACUTE SINUSITIS, RECURRENCE NOT SPECIFIED, UNSPECIFIED LOCATION: Primary | ICD-10-CM

## 2020-05-14 RX ORDER — CLINDAMYCIN HYDROCHLORIDE 300 MG/1
300 CAPSULE ORAL 3 TIMES DAILY
Qty: 21 CAP | Refills: 0 | Status: SHIPPED | OUTPATIENT
Start: 2020-05-14 | End: 2020-05-21

## 2020-05-14 NOTE — PROGRESS NOTES
Chief Complaint   Patient presents with    Sore Throat     1. Have you been to the ER, urgent care clinic since your last visit? Hospitalized since your last visit? No    2. Have you seen or consulted any other health care providers outside of the [address] [place] [address] since your last visit? Include any pap smears or colon screening.  No    BS reading 130

## 2020-05-14 NOTE — PROGRESS NOTES
Kumar Urrutia is a 70 y.o. female who was seen by synchronous (real-time) audio-video technology on 5/14/2020. She confirmed that, for purposes of billing, this is a virtual visit with her provider for which we will submit a claim for reimbursement to her insurance company. She is aware that she will be responsible for any copays, coinsurance amounts or other amounts not covered by her insurance company. Do you accept - YES    This visit was completed was completed virtually using [employer]        Subjective:   Kumar Urrutia was seen for Sore Throat      Sore Throat  Patient complains of sore throat. Associated symptoms include sinus and nasal congestion. Onset of symptoms was 1 week ago, gradually worsening since that time. She is drinking plenty of fluids. . She has not had recent close exposure to someone with proven streptococcal pharyngitis. She has a history of GERD and wonders if her GERD symptoms may be causing her discomfort. Prior to Admission medications    Medication Sig Start Date End Date Taking?  Authorizing Provider   aspirin delayed-release 81 mg tablet TAKE 1 TABLET BY MOUTH EVERY DAY 5/7/20  Yes Toan Nagel MD   glucose blood VI test strips (Contour Next Test Strips) strip Check blood sugar once a day e11.9 4/20/20  Yes Alec Perez MD   rosuvastatin (CRESTOR) 10 mg tablet TAKE 1 TABLET BY MOUTH EVERY DAY 4/16/20  Yes Toan Nagel MD   albuterol (PROVENTIL HFA, VENTOLIN HFA, PROAIR HFA) 90 mcg/actuation inhaler INHALE 2 PUFFS BY MOUTH EVERY 4 HOURS AS NEEDED FOR WHEEZING 4/1/20  Yes Toan Nagel MD   metFORMIN (GLUCOPHAGE) 500 mg tablet TAKE 1 TABLET BY MOUTH TWICE A DAY WITH MEALS 4/1/20  Yes Toan Nagel MD   hydroCHLOROthiazide (HYDRODIURIL) 25 mg tablet TAKE 1 TABLET BY MOUTH EVERY DAY 4/1/20  Yes Toan Nagel MD   ALPRAZolam [facility]) 0.5 mg tablet TAKE 1 TABLET BY MOUTH 3 TIMES A DAY AS NEEDED FOR ANXIETY 4/1/20  Yes Toan Nagel MD   metoprolol succinate (TOPROL-XL) 50 mg XL tablet TAKE 1 TABLET BY MOUTH EVERY DAY 3/18/20  Yes Sanjay Monteiro MD   propafenone (RYTHMOL) 150 mg tablet TAKE 1 TABLET BY MOUTH EVERY 8 HOURS 12/17/19  Yes Ese Mg NP   ELIQUIS 5 mg tablet TAKE 1 TABLET BY MOUTH TWICE A DAY 6/19/19  Yes Gaby Fields MD   pantoprazole (PROTONIX) 40 mg tablet TAKE 1 TABLET BY MOUTH EVERY DAY   Yes Provider, Historical   MULTIVITAMIN PO Take 1 Tab by mouth daily. Yes Provider, Historical   Lancets (FREESTYLE LANCETS) Misc Test once daily. (diagnosis code: 250.00) 10/24/13  Yes Donte Stone MD   Blood-Glucose Meter monitoring kit Once daily before breakfast 9/20/13  Yes Donte Stone MD   cetirizine (ZYRTEC) 10 mg tablet TAKE 1 TABLET BY MOUTH EVERY DAY 5/7/20   Sanjay Monteiro MD   HYDROcodone-acetaminophen [facility]) 5-325 mg per tablet Take  by mouth.     Provider, Historical       Allergies   Allergen Reactions    Latex Rash    Kiwi Anaphylaxis     \"FEELS LIKE THROAT IS CLOSING UP\"    Amoxil [Amoxicillin] Itching     rash    Cephalosporins Unknown (comments)     PT NOT SURE OF REACTIONS    Levaquin [Levofloxacin] Other (comments)     Dizziness      Penicillins Rash     \"chickenpox like rash\" was in the 1980s    Percocet [Oxycodone-Acetaminophen] Nausea and Vomiting    Tetanus Vaccines And Toxoid Swelling    Tetracycline Hcl Rash     \"black fuzzy tongue\"    [address] Other (comments)     RAW TONGUE       Patient Active Problem List    Diagnosis Date Noted    Lumbar stenosis 09/10/2019    Varicose veins of both lower extremities with pain 08/13/2019    Venous insufficiency of both lower extremities 08/13/2019    Severe obesity (BMI 35.0-39.9) 06/27/2018    Paroxysmal atrial fibrillation ([address] 01/02/2018    Abdominal mass, left upper quadrant 06/13/2017    Left upper quadrant pain 06/13/2017    Right foot injury 05/02/2017    Chronic asthma with status asthmaticus 01/26/2017    Intractable migraine with aura without status migrainosus 45/25/7083    Helicobacter pylori (H. pylori) infection 12/07/2016    Skipped heart beats 11/29/2016    Multiple thyroid nodules 07/14/2016    Swollen gland 05/26/2016    Acute pharyngitis 05/26/2016    Diabetes mellitus type 2, controlled ([facility] 05/26/2016    Arthralgia of right hip 04/25/2016    Tinea pedis 06/03/2015    Abnormal finding on MRI of brain 03/16/2015    DAVID (generalized anxiety disorder) 01/22/2015    Peripheral autonomic neuropathy due to DM ([facility] [facility] 09/29/2014    Pre-ulcerative calluses 09/29/2014    Type 2 diabetes mellitus with pressure callus ([facility] 09/29/2014    Hammer toe of right foot 09/29/2014    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 ([facility] [facility] 09/29/2014    Cataract 09/24/2014    Acute asthma exacerbation 08/31/2014    Elevated LFTs 04/16/2014    Screening for depression 04/16/2014    Risk for falls 04/16/2014    GERD (gastroesophageal reflux disease) 04/07/2014    Osteoarthritis of acromioclavicular joint 03/18/2014    Shoulder pain, left 03/18/2014    Rotator cuff (capsule) sprain and strain 09/20/2013    Foot pain, left 06/14/2013    Bilateral hip pain 05/20/2013    Postmenopausal disorder 05/20/2013    Numbness and tingling of right leg 05/20/2013    Postmenopausal state 01/21/2013    Vitamin D deficiency 01/21/2013    Visual floaters 02/01/2012    Acute bronchitis 11/30/2011    Hip pain, right 09/14/2011    Cyst of right kidney 08/10/2011    Anemia 04/22/2011    Chest pain 10/20/2010    Tingling sensation 09/13/2010    Spondylitis, cervical ([address] 04/05/2010    HTN (hypertension) 03/26/2010    Headache(784.0) 03/26/2010    Hyperlipemia 02/18/2010    Arthritis 02/18/2010     Current Outpatient Medications   Medication Sig Dispense Refill    clindamycin (CLEOCIN) 300 mg capsule Take 1 Cap by mouth three (3) times daily for 7 days.  21 Cap 0    aspirin delayed-release 81 mg tablet TAKE 1 TABLET BY MOUTH EVERY DAY 30 Tab 5    glucose blood VI test strips (Contour Next Test Strips) strip Check blood sugar once a day e11.9 50 Strip 11    rosuvastatin (CRESTOR) 10 mg tablet TAKE 1 TABLET BY MOUTH EVERY DAY 90 Tab 1    albuterol (PROVENTIL HFA, VENTOLIN HFA, PROAIR HFA) 90 mcg/actuation inhaler INHALE 2 PUFFS BY MOUTH EVERY 4 HOURS AS NEEDED FOR WHEEZING 18 Inhaler 0    metFORMIN (GLUCOPHAGE) 500 mg tablet TAKE 1 TABLET BY MOUTH TWICE A DAY WITH MEALS 180 Tab 1    hydroCHLOROthiazide (HYDRODIURIL) 25 mg tablet TAKE 1 TABLET BY MOUTH EVERY DAY 90 Tab 1    ALPRAZolam (XANAX) 0.5 mg tablet TAKE 1 TABLET BY MOUTH 3 TIMES A DAY AS NEEDED FOR ANXIETY 30 Tab 0    metoprolol succinate (TOPROL-XL) 50 mg XL tablet TAKE 1 TABLET BY MOUTH EVERY DAY 90 Tab 1    propafenone (RYTHMOL) 150 mg tablet TAKE 1 TABLET BY MOUTH EVERY 8 HOURS 270 Tab 3    ELIQUIS 5 mg tablet TAKE 1 TABLET BY MOUTH TWICE A  Tab 3    pantoprazole (PROTONIX) 40 mg tablet TAKE 1 TABLET BY MOUTH EVERY DAY      MULTIVITAMIN PO Take 1 Tab by mouth daily.  Lancets (FREESTYLE LANCETS) Misc Test once daily. (diagnosis code: 250.00) 1 Package 11    Blood-Glucose Meter monitoring kit Once daily before breakfast 1 Kit 0    cetirizine (ZYRTEC) 10 mg tablet TAKE 1 TABLET BY MOUTH EVERY DAY 30 Tab 3    HYDROcodone-acetaminophen (NORCO) 5-325 mg per tablet Take  by mouth.        Allergies   Allergen Reactions    Latex Rash    Kiwi Anaphylaxis     \"FEELS LIKE THROAT IS CLOSING UP\"    Amoxil [Amoxicillin] Itching     rash    Cephalosporins Unknown (comments)     PT NOT SURE OF REACTIONS    Levaquin [Levofloxacin] Other (comments)     Dizziness      Penicillins Rash     \"chickenpox like rash\" was in the 1980s    Percocet [Oxycodone-Acetaminophen] Nausea and Vomiting    Tetanus Vaccines And Toxoid Swelling    Tetracycline Hcl Rash     \"black fuzzy tongue\"    [place] Other (comments)     RAW TONGUE     Past Medical History:   Diagnosis Date    Abdominal mass, left upper quadrant 06/13/2017    no finding per pt    Abnormal finding on MRI of brain 3/16/2015    evaluated by neurologist and no findings    Acute pharyngitis 5/26/2016    Anemia NEC     Arrhythmia     a fib    Arthralgia of right hip 4/25/2016    Arthritis 2/18/2010    Cataract 9/24/2014    Diabetes ([address]     Elevated LFTs 4/16/2014    DAVID (generalized anxiety disorder) 1/22/2015    GERD (gastroesophageal reflux disease) 4/7/2014    Hammer toe of right foot 9/29/2014    Headache(784.0) 4/91/1991    Helicobacter pylori (H. 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 ([address] 9/29/2014    Preop examination 9/24/2014    Right foot injury 5/2/2017    Risk for falls 4/16/2014    Screening for breast cancer 9/23/2014    Screening for depression 4/16/2014    Shoulder pain, left 3/18/2014    Spondylitis, cervical ([address] 4/5/2010    Tinea pedis 6/3/2015    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 ([address] 9/29/2014     Family History   Problem Relation Age of Onset    Cancer Mother         mycosis fungoives - skin cancer/got into bloodstream    Hypertension Mother     Arthritis-osteo Mother     Stroke Father     Cancer Sister         one sister with breast cancer    Breast Cancer Sister         42's    MS Sister     Heart Disease Brother     Diabetes Brother     Diabetes Daughter     Hypertension Daughter     Psychiatric Disorder Daughter         ANXIETY    Cancer Paternal Aunt         2 paternal aunts with breast cancer    Breast Cancer Paternal Aunt         over 48    No Known Problems Sister     Arthritis-osteo Sister     Headache Brother     No Known Problems Brother     No Known Problems Brother     Breast Cancer Paternal Aunt         over 48    Thyroid Cancer Neg Hx     Anesth Problems Neg Hx           ROS - per HPI      Objective:     General: alert, no distress   Mental  status: pe mental status_general use: normal mood, behavior, speech, dress, motor activity, and thought processes, able to follow commands   Eyes: EOM intact, normal sclera   Mouth: mucous membranes moist   Neck: no visualized mass   Resp: PULM - obs findings: normal effort and no respiratory distress   Neuro: neuro - obs: no gross deficits   Musculoskeletal: normal ROM of neck   Skin: skin exam: no discoloration or lesions of concern on visible areas   Psychiatric: normal affect           Assessment & Plan:   Diagnoses and all orders for this visit:    1. Acute sinusitis, recurrence not specified, unspecified location - Discussed with the patient, she will begin an antibiotic for now and also start taking her pantoprazole twice a day. If no improvement, we will consider alternative therapy. -     clindamycin (CLEOCIN) 300 mg capsule; Take 1 Cap by mouth three (3) times daily for 7 days. Follow-up and Dispositions    · Return if symptoms worsen or fail to improve. Due to this being a TeleHealth evaluation, many elements of the physical examination are unable to be assessed. Pursuant to the emergency declaration under the [facility] waiver authority and the [employer] Act, this Virtual  Visit was conducted, with patient's consent, to reduce the patient's risk of exposure to COVID-19 and provide continuity of care for an established patient. Services were provided through a video synchronous discussion virtually to substitute for in-person clinic visit.       We discussed the expected course, resolution and complications of the diagnosis(es) in detail. Medication risks, benefits, costs, interactions, and alternatives were discussed as indicated. I advised her to contact the office if her condition worsens, changes or fails to improve as anticipated. She expressed understanding with the diagnosis(es) and plan.      Layton Marlow MD

## 2020-05-19 ENCOUNTER — PATIENT OUTREACH (OUTPATIENT)
Dept: INTERNAL MEDICINE CLINIC | Age: 80
End: 2020-05-19

## 2020-05-19 NOTE — PROGRESS NOTES
Goals      Patient/Family verbalizes understanding of self-management of chronic disease. 5/19/20 Patient reports:  - LOV 5/14/20 (virtual) for sore throat. Pt thought she would be getting Z-jossie but when picked up at pharmacy discovered it was clindamycin. Due to possible side effects of diarrhea, patient has not taken this medication. She reports she will send PCP a [employer] message inquiring about this and let him know she is feeling somewhat better. She has been using home therapies of tea and gargling.   - ACM explained change in workflow to COVID-19 Response Team and supplied new contact number.  - AC offered to supply COVID-19 resources. Patient declined. Stated she lost a brother-in-law in April to the virus and doesn't feel she needs the information.   - ACM will FU with patient when pandemic resolves or is instructed to return to previous [facility] duties. Toya Keene RN  Ambulatory Care Manager     3/10/2020 Patient reports:  - no change in toe pain of left foot. - has appt set for Dr. Jeanine Segovia on March 19th  - elevation does not seem to relieve pain. Pt has not taken any medication for pain. ACM encouraged trying Tylenol. Patient will consider.   - new contact information supplied  Toya Keene RN  Ambulatory Care Manager     2/19/2020  AC apologized for not being able to meet with patient while she was in office today due to "[employer] in meeting. Patient reports:  - continues with complaint of toe aching and swelling.   - Labs ordered today, PCP will review and notify patient if any abnormalities. - patient appreciative of call and hopes to meet F2F with AC in the future   Toya Keene RN  Ambulatory Care Manager    2/3/2020 Patient states:  - continues with physical therapy 2 x weekly. Doesn't feel like it is really helping. Wonders if gout could be what she is experiencing. Describes pain as shooting down leg through to big toe. Not constant, but occasional ever since her surgery.  PT suggested nerves may just need to settle down. - attended appointment with Dr. Gali Youssef for second opinion. Patient was given cortisone injection around ankle, but reports it has not helped and she did not care for bedside manor of physician so will not be going back. - offered to schedule appointment with PCP. Patient will have to check with daughter who provides transportation. ACM encouraged patient to contact [employer] and sign up for help with transportation. Patient stated she would call them. - contact information supplied  Alicia Mandel RN  Ambulatory Care Manager     1/21/2020 Skills and education necessary to properly manage spinal stenosis of lumbar region, DM type 2, and HTN  History: LOV with PCP: 10/8/19 for annual [address] Last ED visit: 12/27/19 for migraine. Last cardio OV: 1/15/2020 routine FU. Patient had spinal surgery 9/10/19. Lumbar Laminectomy L3-4 and L4-5 with posterior lumbar fusion L3-L5 performed by Dr. Rhodna Barrientos. In hospital for 4 days and discharged to [facility] rehab for 11 days. Now attends PT with [facility]. Working on left hip, leg and foot pain. Attended therapy this morning and will go back on Thursday. Patient has appointment with Dr. Sheryl Anderson tomorrow for second opinion on left foot pain which patient describes as shooting pain and states top of foot appears to be swollen. Patient reports DM and HTN are stable.  this morning. Current level of understanding:patient apears to have good understanding of her health   Desired Outcome: patient will decrease pain in left hip, leg and foot over the next 90 days. Plan: patient will continue to attend [facility], FU with Ortho. ACM will educate patient on alternative pain relief methods.   Alicia Mandel RN  Ambulatory Care Manager

## 2020-06-03 ENCOUNTER — VIRTUAL VISIT (OUTPATIENT)
Dept: ENDOCRINOLOGY | Age: 80
End: 2020-06-03

## 2020-06-03 DIAGNOSIS — E11.9 DIABETES MELLITUS WITHOUT COMPLICATION (HCC): ICD-10-CM

## 2020-06-03 DIAGNOSIS — E04.2 MULTIPLE THYROID NODULES: Primary | ICD-10-CM

## 2020-06-03 NOTE — PROGRESS NOTES
This is an established visit conducted via telemedicine using [email or web address] video. The patient has been instructed that this meets HIPAA criteria ,that they may receive a bill for these services and acknowledges and agrees to this method of visitation. Berry Huynh is an 70 y.o. female with history of type 2 DM, asthma, and HTN who returns for thyroid nodules previolusly followed by DR Gabi Hall and last seen March 2019. Mone Villafana She saw her first in July 2016 at which time she recommended FNA of the dominant nodule on the left side. Pathology returned benign. She has had two annual follow-up ultrasounds which showed stability of the nodules' size. Most recent TSH is 1.78 and that was in August 2019. Her last ultrasound was in 2018.      She also has a history of type 2 diabetes. Last A1c was 7.0% in February and she is only taking metformin 500mg BID. She says her sugar gets low in the 80s when she takes more, and she has symptoms of low blood sugars. Fasting blood sugars are usually around 120-130. She tries to watch her diet and avoids excessive carbohydrates. Denies GI s/e from metformin. She is a [facility] so her diet is excellent. Breakfast is eggs and oatmeal or occasionally a biscuit. Lunch is a sandwich with chips. Dinner is usually baked chicken with spinach and either potatoes or rice or pasta. She does not have any episodes of hypoglycemia and no symptoms of hyperglycemia. She has a past medical history of atrial fibrillation which is stable. She has occasional symptoms of neuropathic pain but these are very few. She has a history of back pain and is recently undergone a steroid injection into the back. She says her blood sugar went to 164 a day but has since come back down.     Examination    GENERAL: NCAT, Appears well nourished   EYES: EOMI, non-icteric, no proptosis   Ear/Nose/Throat: NCAT, no visible inflammation or masses   CARDIOVASCULAR: no cyanosis, no visible JVD   RESPIRATORY: comfortable respirations observed, no cyanosis   MUSCULOSKELETAL: Normal ROM of upper extremities observed   SKIN: No edema, rash, or other significant changes observed   NEUROLOGIC:  AAOx3   PSYCHIATRIC: Normal affect, Normal insight and judgement       Impression  1. Type 2 diabetes mellitus with very reasonable glucose control on metformin 500 mg twice daily. 2.  Multinodular goiter with a stable ultrasound and negative FNAs in the past    Plan  1. She is doing very well with respect to her diabetes on a modest regimen. Her creatinine is very stable so the metformin can safely be continued at this dosing. 2.  We will repeat her thyroid ultrasound when it is safe for her to go out. She will contact me via my chart and I will schedule the thyroid ultrasound. The last one was in 2018 so she is due. If this 1 is stable, I would suggest further ultrasounds would only be if there is a change in her symptoms.   3.  Follow-up will be with her primary care physician.

## 2020-06-04 ENCOUNTER — DOCUMENTATION ONLY (OUTPATIENT)
Dept: CARDIOLOGY CLINIC | Age: 80
End: 2020-06-04

## 2020-06-04 NOTE — PROGRESS NOTES
Faxed clearance note to [facility] @ [phone]. Pt is cleared for procedure, may hold Eliquis 2-3 days prior and aspirin 5-7 days prior.

## 2020-07-09 ENCOUNTER — OFFICE VISIT (OUTPATIENT)
Dept: URGENT CARE | Age: 80
End: 2020-07-09

## 2020-07-09 VITALS — OXYGEN SATURATION: 96 % | TEMPERATURE: 98.1 F | HEART RATE: 80 BPM | RESPIRATION RATE: 18 BRPM

## 2020-07-09 DIAGNOSIS — Z20.822 ENCOUNTER FOR LABORATORY TESTING FOR COVID-19 VIRUS: Primary | ICD-10-CM

## 2020-07-14 ENCOUNTER — HOSPITAL ENCOUNTER (OUTPATIENT)
Age: 80
Setting detail: OUTPATIENT SURGERY
Discharge: HOME | End: 2020-07-14
Attending: INTERNAL MEDICINE | Admitting: INTERNAL MEDICINE
Payer: MEDICARE

## 2020-07-14 ENCOUNTER — ANESTHESIA (OUTPATIENT)
Dept: ENDOSCOPY | Age: 80
End: 2020-07-14
Payer: MEDICARE

## 2020-07-14 ENCOUNTER — ANESTHESIA EVENT (OUTPATIENT)
Dept: ENDOSCOPY | Age: 80
End: 2020-07-14
Payer: MEDICARE

## 2020-07-14 VITALS
HEART RATE: 72 BPM | HEIGHT: 62 IN | TEMPERATURE: 97.9 F | BODY MASS INDEX: 36.8 KG/M2 | SYSTOLIC BLOOD PRESSURE: 142 MMHG | RESPIRATION RATE: 12 BRPM | DIASTOLIC BLOOD PRESSURE: 68 MMHG | OXYGEN SATURATION: 100 % | WEIGHT: 200 LBS

## 2020-07-14 PROCEDURE — 74011250636 HC RX REV CODE- 250/636: Performed by: INTERNAL MEDICINE

## 2020-07-14 PROCEDURE — 74011000258 HC RX REV CODE- 258: Performed by: NURSE ANESTHETIST, CERTIFIED REGISTERED

## 2020-07-14 PROCEDURE — 76040000019: Performed by: INTERNAL MEDICINE

## 2020-07-14 PROCEDURE — 76060000031 HC ANESTHESIA FIRST 0.5 HR: Performed by: INTERNAL MEDICINE

## 2020-07-14 PROCEDURE — 74011250636 HC RX REV CODE- 250/636: Performed by: NURSE ANESTHETIST, CERTIFIED REGISTERED

## 2020-07-14 PROCEDURE — 74011000250 HC RX REV CODE- 250: Performed by: NURSE ANESTHETIST, CERTIFIED REGISTERED

## 2020-07-14 RX ORDER — NALOXONE HYDROCHLORIDE 0.4 MG/ML
0.4 INJECTION, SOLUTION INTRAMUSCULAR; INTRAVENOUS; SUBCUTANEOUS
Status: DISCONTINUED | OUTPATIENT
Start: 2020-07-14 | End: 2020-07-14 | Stop reason: HOSPADM

## 2020-07-14 RX ORDER — LIDOCAINE HYDROCHLORIDE 20 MG/ML
INJECTION, SOLUTION EPIDURAL; INFILTRATION; INTRACAUDAL; PERINEURAL AS NEEDED
Status: DISCONTINUED | OUTPATIENT
Start: 2020-07-14 | End: 2020-07-14 | Stop reason: HOSPADM

## 2020-07-14 RX ORDER — PROPOFOL 10 MG/ML
INJECTION, EMULSION INTRAVENOUS AS NEEDED
Status: DISCONTINUED | OUTPATIENT
Start: 2020-07-14 | End: 2020-07-14 | Stop reason: HOSPADM

## 2020-07-14 RX ORDER — EPINEPHRINE 0.1 MG/ML
1 INJECTION INTRACARDIAC; INTRAVENOUS
Status: DISCONTINUED | OUTPATIENT
Start: 2020-07-14 | End: 2020-07-14 | Stop reason: HOSPADM

## 2020-07-14 RX ORDER — FLUMAZENIL 0.1 MG/ML
0.2 INJECTION INTRAVENOUS
Status: DISCONTINUED | OUTPATIENT
Start: 2020-07-14 | End: 2020-07-14 | Stop reason: HOSPADM

## 2020-07-14 RX ORDER — FENTANYL CITRATE 50 UG/ML
25-200 INJECTION, SOLUTION INTRAMUSCULAR; INTRAVENOUS
Status: DISCONTINUED | OUTPATIENT
Start: 2020-07-14 | End: 2020-07-14 | Stop reason: HOSPADM

## 2020-07-14 RX ORDER — ATROPINE SULFATE 0.1 MG/ML
0.5 INJECTION INTRAVENOUS
Status: DISCONTINUED | OUTPATIENT
Start: 2020-07-14 | End: 2020-07-14 | Stop reason: HOSPADM

## 2020-07-14 RX ORDER — SODIUM CHLORIDE 0.9 % (FLUSH) 0.9 %
5-40 SYRINGE (ML) INJECTION AS NEEDED
Status: DISCONTINUED | OUTPATIENT
Start: 2020-07-14 | End: 2020-07-14 | Stop reason: HOSPADM

## 2020-07-14 RX ORDER — MIDAZOLAM HYDROCHLORIDE 1 MG/ML
.25-5 INJECTION, SOLUTION INTRAMUSCULAR; INTRAVENOUS
Status: DISCONTINUED | OUTPATIENT
Start: 2020-07-14 | End: 2020-07-14 | Stop reason: HOSPADM

## 2020-07-14 RX ORDER — SODIUM CHLORIDE 0.9 % (FLUSH) 0.9 %
5-40 SYRINGE (ML) INJECTION EVERY 8 HOURS
Status: DISCONTINUED | OUTPATIENT
Start: 2020-07-14 | End: 2020-07-14 | Stop reason: HOSPADM

## 2020-07-14 RX ORDER — DEXTROMETHORPHAN/PSEUDOEPHED 2.5-7.5/.8
1.2 DROPS ORAL
Status: DISCONTINUED | OUTPATIENT
Start: 2020-07-14 | End: 2020-07-14 | Stop reason: HOSPADM

## 2020-07-14 RX ORDER — SODIUM CHLORIDE 9 MG/ML
50 INJECTION, SOLUTION INTRAVENOUS CONTINUOUS
Status: DISCONTINUED | OUTPATIENT
Start: 2020-07-14 | End: 2020-07-14 | Stop reason: HOSPADM

## 2020-07-14 RX ORDER — SODIUM CHLORIDE 9 MG/ML
INJECTION, SOLUTION INTRAVENOUS
Status: DISCONTINUED | OUTPATIENT
Start: 2020-07-14 | End: 2020-07-14 | Stop reason: HOSPADM

## 2020-07-14 RX ADMIN — PROPOFOL 100 MG: 10 INJECTION, EMULSION INTRAVENOUS at 08:09

## 2020-07-14 RX ADMIN — SODIUM CHLORIDE: 9 INJECTION, SOLUTION INTRAVENOUS at 08:09

## 2020-07-14 RX ADMIN — PROPOFOL 20 MG: 10 INJECTION, EMULSION INTRAVENOUS at 08:13

## 2020-07-14 RX ADMIN — PROPOFOL 30 MG: 10 INJECTION, EMULSION INTRAVENOUS at 08:11

## 2020-07-14 RX ADMIN — LIDOCAINE HYDROCHLORIDE 50 MG: 20 INJECTION, SOLUTION EPIDURAL; INFILTRATION; INTRACAUDAL; PERINEURAL at 08:09

## 2020-07-14 NOTE — PERIOP NOTES

## 2020-07-14 NOTE — H&P
The patient is a 70year old female who presents with a complaint of Acid Reflux. The patient presents due to worsening symptoms. The onset of the acid reflux has been gradual and has been occurring in an increasing pattern for 1 week. The course has been gradually worsening. The acid reflux is described as a moderate burning, sharp pain, tightness, pressure and globus sensation. The disease is described as being located in the retrosternal area and upper epigastrium. Aggravating factors include bending over. The symptoms have been associated with abdominal pain, belching, dysphagia, heartburn and waterbrash,  while the symptoms have not been associated with melena, nausea or NSAIDs. The acid reflux is characterized as not well controlled. The patient's current medication use includes: pantoprazole (Protonix) (and Gaviscon PRN) and PPI is once a day and is  considered partially effective by patient, compliant with dosing regimen and experiencing no side effects .  Failed treatments have included: antacids, lifestyle modifications, pantoprazole (Protonix) and PPI is once a day. The patient had an EGD 3 year(s) ago. Note for \"Acid Reflux\": She is on Eliquis for atrial fibrillation. For the past week she has significant epigastric discomfort, belching, gas pain. She also reports dysphagia, mostly to pills but also occurring daily with solid foods. She has back issues, had surgery last September and is getting injections. She denies constipation. She is on daily PPI, also uses Gaviscon PRN with some relief. Problem List/Past Medical (Naina Mercado; 6/3/2020 1:36 PM)  Afib (427.31  I48.91)    Hypertension    Asthma    Arthritis    Colonic Polyps    Diabetes Mellitus, Type II    Hypercholesterolemia    Gastritis, acute (535.00  K29.00)    Hiatal hernia (553.3  K44.9)    Abdominal swelling, generalized (789.37  R19.07)    Dyspepsia (536.8  K30)    Abdominal pain, RLQ (789.03  R10.31)    Heartburn (787.1) (787.1  R12)    Left upper quadrant pain (789.02  R10.12)    Gastroparesis (536.3  K31.84)    Epigastric abdominal pain (789.06  R10.13)    Acid reflux (530.81  K21.9)   GES 8/7/17: 15 minutes  EGD 7/5/17: 2 mm hiatal hernia; biopsy normal  Colonoscopy 6/2011: severe sigmoid diverticulosis; otherwise normal  M2A 2011 for FRANCISCO JAVIER: no bleeding  Abdominal pain (789.00  R10.9)   CT 2/2019: small gallstones  Gastric emptying scan 8/2017: 15 minutes  EGD 7/2017: 2 mm hiatal hernia; biopsy normal  Colonoscopy 6/2011: severe sigmoid diverticulosis  M2A 2011: no bleeding  Eliquis for atrial fibrillation    Past Surgical History (Naina Almendarez; 6/3/2020 1:36 PM)  Tonsillectomy    Rotator cuff surgery   left  Appendectomy    Colon surgery   2001  Polypectomy    Adenoidectomy      Allergies (Naina Mitchell; 6/3/2020 1:36 PM)  Tetracyclines    Percocet *ANALGESICS - OPIOID*    Penicillins    Kiwi    Tetanus Toxoid Fluid *TOXOIDS*      Medication History (Naina Mitchell; 6/3/2020 1:37 PM)  Pantoprazole Sodium  (40MG Tablet DR, 1 (one) Oral take one pill daily, Taken starting 01/28/2020) Active. Eliquis  (5MG Tablet, Oral daily) Active. Propafenone HCl  (150MG Tablet, Oral three times daily) Active. Multivitamin  (1 Oral daily) Active. MetFORMIN HCl  (500MG Tablet, 1 Oral daily) Active. Aspirin  (81MG Tablet DR, Oral daily) Active. Crestor  (10MG Tablet, Oral) Active. Albuterol Sulfate  (Inhalation prn) Specific strength unknown - Active. Hydrochlorothiazide  (25MG Tablet, 1/2 tab Oral daily) Active. Metoprolol Tartrate  (25MG Tablet, Oral daily) Active. Medications Reconciled     Family History (Naina Almendarez; 6/3/2020 1:36 PM)  Breast Cancer   First Degree Relatives. Stroke   Father. Heart Disease   First Degree Relatives. Diabetes Mellitus   First Degree Relatives. Social History (Naina Almendarez; 6/3/2020 1:36 PM)  Blood Transfusion   Yes.   Tobacco Use   Former smoker. Employment status   Retired. Marital status   . Alcohol Use   Has never drank. Diagnostic Studies History (Naina Monroe; 6/3/2020 1:36 PM)  Endoscopy    Colonoscopy   2005    Health Maintenance History (Naina Monroe; 6/3/2020 1:36 PM)  Flu Vaccine   Date: 2017. Pneumovax   Date: 2013. Other Problems (Naina Monroe; 6/3/2020 1:36 PM)  Screening for colon cancer (V76.51  Z12.11)          Review of Systems (Naina Monroe; 6/3/2020 1:36 PM)  General Not Present- Chronic Fatigue, Poor Appetite, Weight Gain and Weight Loss. Skin Not Present- Itching, Rash and Skin Color Changes. HEENT Not Present- Hearing Loss and Vertigo. Respiratory Present- Asthma. Not Present- Difficulty Breathing and TB exposure. Cardiovascular Present- Hypertension and Use of Blood Thinners. Not Present- Chest Pain and Use of Antibiotics before Dental Procedures. Gastrointestinal Present- See HPI. Musculoskeletal Not Present- Arthritis, Hip Replacement Surgery and Knee Replacement Surgery. Neurological Not Present- Weakness. Psychiatric Not Present- Depression. Endocrine Present- Diabetes (II). Not Present- Thyroid Problems. Hematology Not Present- Anemia. Vitals (Naina Monroe; 6/3/2020 1:35 PM)  6/3/2020 1:34 PM  Weight: 198 lb   Height: 62 in   Body Surface Area: 1.9 m²   Body Mass Index: 36.21 kg/m²                Physical Exam Iris Ha AGATIFFANY; 6/3/2020 2:24 PM)  The physical exam findings are as follows:  Note: telehealth        Assessment & Plan Iris Ha AGACNP; 6/3/2020 2:29 PM)  Acid reflux (530.81  K21.9)  Story: GES 8/7/17: 15 minutes  EGD 7/5/17: 2 cm hiatal hernia; biopsy normal  Colonoscopy 4/2019: severe sigmoid diverticulosis, sigmoid 9 mm hyperplastic polyp; 10 years  M2A 2011 for FRANCISCO JAVIER: no bleeding  Current Plans  Started Famotidine 40MG, 1 (one) Tablet at bedtime, #30, 30 days starting 06/03/2020, Ref. x2.  Epigastric abdominal pain (789.06  R10.13)  Impression: she c/o for months of epigastric/LUQ pain, mild, wiht small bulge, very worry about it, s/p negative CXR and CT scan of abdomen, stopped protonix, gerd well controlled on diet    suspect muscular pain, needs also to r/o any ulcers  EGD to r/o any ulcers, gastritis, or any UGI tract neoplasm  Dysphagia (787.20  R13.10)  Impression: She has a week of increased reflux, epigastric pain, and belching. She also complains of dysphagia, mostly to pills but also solids at least daily. Will continue PPI, add H2 blocker at bedtime, and use Gaviscon PRN. Will further evaluate with EGD, will request to hold Eliquis for 2 days prior to procedure from Dr. Jaylan Ogden. Current Plans  Due to this being a TeleHealth encounter (During FKILB-31 public health emergency), evaluation of the following organ systems was limited: Vitals/Constitutional/EENT/Resp/CV/GI//MS/Neuro/Skin/Heme-Lymph-Imm. Pursuant to the emergency declaration under the [address] [place] [address], [address] [place] [address] authority and the [employer] and [employer] Act, this Virtual Visit was conducted with patient's (and/or legal guardian's) consent, to reduce the risk of exposure to COVID-19 and provide necessary medical care. Services were provided through a video synchronous discussion virtually to substitute for in-person encounter. Pt Education - How to access health information online: discussed with patient and provided information. ENDOSCOPY, UPPER GI, DIAGNOSTIC (90044)  I have instructed the patient to discontinue the use of Eliquis for 2 days, due to procedure. Patient is to call me for any questions or concerns. Follow up office visit after procedure  This patient was reviewed and seen in collaboration with Dr. Luciana Jimenez. Date of Surgery Update:  Ganesh Vicente was seen and examined. History and physical has been reviewed. The patient has been examined. There have been no significant clinical changes since the completion of the originally dated History and Physical.  The patient was counseled at length about the risks of jonnathan Covid-19 in the kimberly-operative and post-operative states including the recovery window of their procedure. The patient was made aware that jonnathan Covid-19 after a surgical procedure may worsen their prognosis for recovering from the virus and lend to a higher morbidity and or mortality risk. The patient was given the options of postponing their procedure. All of the risks, benefits, and alternatives were discussed. The patient does  wish to proceed with the procedure.     Signed By: Jacque Rees MD     July 14, 2020 8:04 AM

## 2020-07-14 NOTE — ANESTHESIA PREPROCEDURE EVALUATION
Relevant Problems   No relevant active problems       Anesthetic History   No history of anesthetic complications            Review of Systems / Medical History  Patient summary reviewed, nursing notes reviewed and pertinent labs reviewed    Pulmonary            Asthma        Neuro/Psych   Within defined limits           Cardiovascular    Hypertension        Dysrhythmias       Exercise tolerance: >4 METS     GI/Hepatic/Renal     GERD          Comments: Ab pain  Hx gastric polyp Endo/Other    Diabetes  Hypothyroidism  Arthritis     Other Findings   Comments: Diabetic neuropathy         Physical Exam    Airway  Mallampati: II  TM Distance: > 6 cm  Neck ROM: normal range of motion   Mouth opening: Normal     Cardiovascular    Rhythm: regular  Rate: normal         Dental  No notable dental hx       Pulmonary  Breath sounds clear to auscultation               Abdominal         Other Findings            Anesthetic Plan    ASA: 3  Anesthesia type: MAC          Induction: Intravenous  Anesthetic plan and risks discussed with: Patient

## 2020-07-14 NOTE — ANESTHESIA POSTPROCEDURE EVALUATION
Post-Anesthesia Evaluation and Assessment    Patient: Armand Sherman MRN: [id number]  SSN: [id number]    YOB: 1940  Age: 70 y.o. Sex: female      I have evaluated the patient and they are stable and ready for discharge from the PACU. Cardiovascular Function/Vital Signs  Visit Vitals  /58   Pulse 79   Temp 36.6 °C (97.9 °F)   Resp 21   Ht 5' 2\" (1.575 m)   Wt 90.7 kg (200 lb)   SpO2 95%   Breastfeeding No   BMI 36.58 kg/m²       Patient is status post MAC anesthesia for Procedure(s):  ESOPHAGOGASTRODUODENOSCOPY (EGD) (LATEX ALLERGY) awaiting 7/9. Nausea/Vomiting: None    Postoperative hydration reviewed and adequate. Pain:  Pain Scale 1: Numeric (0 - 10) (07/14/20 0819)  Pain Intensity 1: 0 (07/14/20 0819)   Managed    Neurological Status: At baseline    Mental Status, Level of Consciousness: Alert and  oriented to person, place, and time    Pulmonary Status:   O2 Device: Room air (07/14/20 0819)   Adequate oxygenation and airway patent    Complications related to anesthesia: None    Post-anesthesia assessment completed. No concerns    Signed By: Haresh Gomez MD     July 14, 2020              Procedure(s):  ESOPHAGOGASTRODUODENOSCOPY (EGD) (LATEX ALLERGY) awaiting 7/9. MAC    <BSHSIANPOST>    INITIAL Post-op Vital signs:   Vitals Value Taken Time   /66 7/14/2020  8:33 AM   Temp 36.6 °C (97.9 °F) 7/14/2020  8:19 AM   Pulse 72 7/14/2020  8:35 AM   Resp 19 7/14/2020  8:35 AM   SpO2 100 % 7/14/2020  8:35 AM   Vitals shown include unvalidated device data.

## 2020-07-14 NOTE — ROUTINE PROCESS
Randee Wiley  1940  [id number]    Situation:  Verbal report received from: Yasmeen  Procedure: Procedure(s):  ESOPHAGOGASTRODUODENOSCOPY (EGD) (LATEX ALLERGY) awaiting 7/9    Background:    Preoperative diagnosis: DYSPHAGIA/EPIGASTRIC PAIN  Postoperative diagnosis: 1.  Small Hiatal Hernia    :  Dr. Cecilio Olguin  Assistant(s): Endoscopy Technician-1: Cecy Solano  Endoscopy RN-1: Ignacia Paul    Specimens  H. Pylori      Assessment:  Intra-procedure medications     Anesthesia gave intra-procedure sedation and medications, see anesthesia flow sheet     Intravenous fluids: NS@ KVO     Vital signs stable yes    Abdominal assessment: round and soft yes    Recommendation:  Discharge patient per MD order   Return to floor Family or Friend  yes  Permission to share finding with family or friend yes

## 2020-07-14 NOTE — DISCHARGE INSTRUCTIONS
[address]  [address] [facility], [address]    EGD DISCHARGE INSTRUCTIONS    Randee Menchaca  [id number]  1940    Discomfort:  Sore throat- throat lozenges or warm salt water gargle  redness at IV site- apply warm compress to area; if redness or soreness persist- contact your physician  Gaseous discomfort- walking, belching will help relieve any discomfort  You may not operate a vehicle for 12 hours  You may not engage in an occupation involving machinery or appliances for rest of today  You may not drink alcoholic beverages for at least 12 hours  Avoid making any critical decisions for at least 24 hour  DIET  You may resume your regular diet - however -  remember your colon is empty and a heavy meal will produce gas. Avoid these foods:  vegetables, fried / greasy foods, carbonated drinks    ACTIVITY  You may resume your normal daily activities   Spend the remainder of the day resting -  avoid any strenuous activity. CALL M.D. ANY SIGN OF   Increasing pain, nausea, vomiting  Abdominal distension (swelling)  New increased bleeding (oral or rectal)  Fever (chills)  Pain in chest area  Bloody discharge from nose or mouth  Shortness of breath    Follow-up Instructions:   Call Dr. Jordan Torres for any questions or problems and follow up with him in 6 to 8 weeks  Telephone # [phone]  Will order Ultrasound of abdomen to complete the work up of your pain    ENDOSCOPY FINDINGS:   Your endoscopy showed small hiatal hernia, unchanged in size, rest of exam was normal.    Signed By: Jordan Torres MD     7/14/2020  8:22 AM         Learning About Coronavirus (COVID-19)  Coronavirus (COVID-19): Overview  What is coronavirus ([id number])? The coronavirus disease (COVID-19) is caused by a virus. It is an illness that was first found in [place], [place], in December 2019. It has since spread worldwide. The virus can cause fever, cough, and trouble breathing.  In severe cases, it can cause pneumonia and make it hard to breathe without help. It can cause death. Coronaviruses are a large group of viruses. They cause the common cold. They also cause more serious illnesses like Middle East respiratory syndrome (MERS) and severe acute respiratory syndrome (SARS). COVID-19 is caused by a novel coronavirus. That means it's a new type that has not been seen in people before. This virus spreads person-to-person through droplets from coughing and sneezing. It can also spread when you are close to someone who is infected. And it can spread when you touch something that has the virus on it, such as a doorknob or a tabletop. What can you do to protect yourself from coronavirus (COVID-19)? The best way to protect yourself from getting sick is to:  · Avoid areas where there is an outbreak. · Avoid contact with people who may be infected. · Wash your hands often with soap or alcohol-based hand sanitizers. · Avoid crowds and try to stay at least 6 feet away from other people. · Wash your hands often, especially after you cough or sneeze. Use soap and water, and scrub for at least 20 seconds. If soap and water aren't available, use an alcohol-based hand . · Avoid touching your mouth, nose, and eyes. What can you do to avoid spreading the virus to others? To help avoid spreading the virus to others:  · Cover your mouth with a tissue when you cough or sneeze. Then throw the tissue in the trash. · Use a disinfectant to clean things that you touch often. · Stay home if you are sick or have been exposed to the virus. Don't go to school, work, or public areas. And don't use public transportation. · If you are sick:  ? Leave your home only if you need to get medical care. But call the doctor's office first so they know you're coming. And wear a face mask, if you have one.  ? If you have a face mask, wear it whenever you're around other people.  It can help stop the spread of the virus when you cough or sneeze. ? Clean and disinfect your home every day. Use household  and disinfectant wipes or sprays. Take special care to clean things that you grab with your hands. These include doorknobs, remote controls, phones, and handles on your refrigerator and microwave. And don't forget countertops, tabletops, bathrooms, and computer keyboards. When to call for help  Call 911 anytime you think you may need emergency care. For example, call if:  · You have severe trouble breathing. (You can't talk at all.)  · You have constant chest pain or pressure. · You are severely dizzy or lightheaded. · You are confused or can't think clearly. · Your face and lips have a blue color. · You pass out (lose consciousness) or are very hard to wake up. Call your doctor now if you develop symptoms such as:  · Shortness of breath. · Fever. · Cough. If you need to get care, call ahead to the doctor's office for instructions before you go. Make sure you wear a face mask, if you have one, to prevent exposing other people to the virus. Where can you get the latest information? The following health organizations are tracking and studying this virus. Their websites contain the most up-to-date information. Rohan Ty also learn what to do if you think you may have been exposed to the virus. · U.S. Centers for Disease Control and Prevention (CDC): The CDC provides updated news about the disease and travel advice. The website also tells you how to prevent the spread of infection. www.cdc.gov  · World Health Organization [facility]): WHO offers information about the virus outbreaks. WHO also has travel advice. www.who.int  Current as of: April 1, 2020               Content Version: 12.4  © 3017-6764 [employer].    Care instructions adapted under license by your healthcare professional. If you have questions about a medical condition or this instruction, always ask your healthcare professional. Mary Jane Mullen disclaims any warranty or liability for your use of this information.

## 2020-07-14 NOTE — PROCEDURES
[address]  [address] [facility], [address] [place] [address]        Esophago- Gastroduodenoscopy (EGD) Procedure Note    Bill Flor  1940  [id number]      Procedure: Endoscopic Gastroduodenoscopy --diagnostic    Indication:  Abdominal pain, epigastric     Pre-operative Diagnosis: see indication above    Post-operative Diagnosis: see findings below    : Stephenie Nissen, MD    Surgical Assistant: None    Implants:  None    Referring Provider:  Joes Sales MD      Anesthesia/Sedation:  MAC anesthesia Propofol        Procedure Details     After infomed consent was obtained for the procedure, with all risks and benefits of procedure explained the patient was taken to the endoscopy suite and placed in the left lateral decubitus position. Following sequential administration of sedation as per above, the endoscope was inserted into the mouth and advanced under direct vision to third portion of the duodenum. A careful inspection was made as the gastroscope was withdrawn, including a retroflexed view of the proximal stomach; findings and interventions are described below. Findings:   Esophagus:hiatal hernia 2 cm in size   Stomach: normal   Duodenum/jejunum: normal      Therapies:  none    Specimens: none         EBL: None      Complications:   None; patient tolerated the procedure well. Impression:    -See post-procedure diagnoses.     Recommendations:  -Continue acid suppression.  -she has h/o gallstones seen on CT scan last year, will order US of abdomen to complete the w/u of her pain  -f/u in 6 to 8 weeks    Signed By: Stephenie Nissen, MD     7/14/2020  8:19 AM

## 2020-07-15 LAB — SARS-COV-2, NAA: NOT DETECTED

## 2020-07-17 RX ORDER — FAMOTIDINE 40 MG/1
40 TABLET, FILM COATED ORAL
COMMUNITY
Start: 2020-06-03 | End: 2021-04-02

## 2020-07-21 ENCOUNTER — HOSPITAL ENCOUNTER (OUTPATIENT)
Dept: ULTRASOUND IMAGING | Age: 80
Discharge: HOME | End: 2020-07-21
Attending: INTERNAL MEDICINE
Payer: MEDICARE

## 2020-07-21 ENCOUNTER — CLINICAL SUPPORT (OUTPATIENT)
Dept: CARDIOLOGY CLINIC | Age: 80
End: 2020-07-21

## 2020-07-21 ENCOUNTER — OFFICE VISIT (OUTPATIENT)
Dept: CARDIOLOGY CLINIC | Age: 80
End: 2020-07-21

## 2020-07-21 VITALS
BODY MASS INDEX: 37.13 KG/M2 | HEART RATE: 70 BPM | DIASTOLIC BLOOD PRESSURE: 68 MMHG | RESPIRATION RATE: 18 BRPM | SYSTOLIC BLOOD PRESSURE: 128 MMHG | HEIGHT: 62 IN | OXYGEN SATURATION: 98 % | WEIGHT: 201.8 LBS

## 2020-07-21 DIAGNOSIS — I48.0 PAROXYSMAL ATRIAL FIBRILLATION (HCC): Primary | ICD-10-CM

## 2020-07-21 DIAGNOSIS — I10 ESSENTIAL HYPERTENSION: ICD-10-CM

## 2020-07-21 DIAGNOSIS — I48.0 PAF (PAROXYSMAL ATRIAL FIBRILLATION) (HCC): Primary | ICD-10-CM

## 2020-07-21 DIAGNOSIS — E78.2 MIXED HYPERLIPIDEMIA: ICD-10-CM

## 2020-07-21 DIAGNOSIS — R07.89 OTHER CHEST PAIN: ICD-10-CM

## 2020-07-21 DIAGNOSIS — I48.0 PAROXYSMAL ATRIAL FIBRILLATION (HCC): ICD-10-CM

## 2020-07-21 DIAGNOSIS — R10.13 EPIGASTRIC PAIN: ICD-10-CM

## 2020-07-21 PROCEDURE — 76700 US EXAM ABDOM COMPLETE: CPT

## 2020-07-21 NOTE — PROGRESS NOTES
Alexandrea Pena, FNP-BC    Subjective/HPI:     Brendan Bernstein is a 70 y.o. female is here for routine f/u. She has a PMHx of PAF, HTN, HLD, DM2 and GERD. She reports a fluttering sensation in her chest at times. She had an episode a few weeks ago that lasted an entire week. Other times it can last a day or two. She has no associated light-headedness, shortness of breath. She also notes pain in the center of her chest, independent of fluttering sensations. This can come on with or without activity. Sometimes worsened with leaning forward. She had endoscopy done which showed esophageal hernia. She is maintained on PPI. She has abdominal ultrasound scheduled. She notes symptoms are somewhat improving, but she still experiences this pain from time to time.          PCP Provider  Krista Cazares MD    Past Medical History:   Diagnosis Date    Abdominal mass, left upper quadrant 06/13/2017    no finding per pt    Abnormal finding on MRI of brain 3/16/2015    evaluated by neurologist and no findings    Acute pharyngitis 5/26/2016    Anemia NEC     Arrhythmia     a fib    Arthralgia of right hip 4/25/2016    Arthritis 2/18/2010    Cataract 9/24/2014    Diabetes ([address]     Elevated LFTs 4/16/2014    DAVID (generalized anxiety disorder) 1/22/2015    GERD (gastroesophageal reflux disease) 4/7/2014    Hammer toe of right foot 9/29/2014    Headache(784.0) 2/63/0822    Helicobacter pylori (H. 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 ([address] 9/29/2014    Preop examination 9/24/2014    Right foot injury 5/2/2017    Risk for falls 4/16/2014    Screening for breast cancer 9/23/2014    Screening for depression 4/16/2014    Shoulder pain, left 3/18/2014    Spondylitis, cervical ([facility] 4/5/2010    Tinea pedis 6/3/2015    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 (HCC) 9/29/2014        Allergies   Allergen Reactions    Latex Rash    Kiwi Anaphylaxis     \"FEELS LIKE THROAT IS CLOSING UP\"    Amoxil [Amoxicillin] Itching     rash    Cephalosporins Unknown (comments)     PT NOT SURE OF REACTIONS    Levaquin [Levofloxacin] Other (comments)     Dizziness      Penicillins Rash     \"chickenpox like rash\" was in the 1980s    Percocet [Oxycodone-Acetaminophen] Nausea and Vomiting    Tetanus Vaccines And Toxoid Swelling    Tetracycline Hcl Rash     \"black fuzzy tongue\"    [address] Other (comments)     RAW TONGUE        Outpatient Encounter Medications as of 7/21/2020   Medication Sig Dispense Refill    famotidine (PEPCID) 40 mg tablet Take 40 mg by mouth two (2) times daily as needed.       aspirin delayed-release 81 mg tablet TAKE 1 TABLET BY MOUTH EVERY DAY 30 Tab 5    glucose blood VI test strips (Contour Next Test Strips) strip Check blood sugar once a day e11.9 50 Strip 11    rosuvastatin (CRESTOR) 10 mg tablet TAKE 1 TABLET BY MOUTH EVERY DAY 90 Tab 1    albuterol (PROVENTIL HFA, VENTOLIN HFA, PROAIR HFA) 90 mcg/actuation inhaler INHALE 2 PUFFS BY MOUTH EVERY 4 HOURS AS NEEDED FOR WHEEZING 18 Inhaler 0    metFORMIN (GLUCOPHAGE) 500 mg tablet TAKE 1 TABLET BY MOUTH TWICE A DAY WITH MEALS 180 Tab 1    hydroCHLOROthiazide (HYDRODIURIL) 25 mg tablet TAKE 1 TABLET BY MOUTH EVERY DAY 90 Tab 1    ALPRAZolam (XANAX) 0.5 mg tablet TAKE 1 TABLET BY MOUTH 3 TIMES A DAY AS NEEDED FOR ANXIETY 30 Tab 0    metoprolol succinate (TOPROL-XL) 50 mg XL tablet TAKE 1 TABLET BY MOUTH EVERY DAY 90 Tab 1    propafenone (RYTHMOL) 150 mg tablet TAKE 1 TABLET BY MOUTH EVERY 8 HOURS 270 Tab 3    ELIQUIS 5 mg tablet TAKE 1 TABLET BY MOUTH TWICE A  Tab 3    pantoprazole (PROTONIX) 40 mg tablet TAKE 1 TABLET BY MOUTH EVERY DAY      MULTIVITAMIN PO Take 1 Tab by mouth daily.  Lancets (FREESTYLE LANCETS) Misc Test once daily. (diagnosis code: 250.00) 1 Package 11    Blood-Glucose Meter monitoring kit Once daily before breakfast 1 Kit 0    [DISCONTINUED] cetirizine (ZYRTEC) 10 mg tablet TAKE 1 TABLET BY MOUTH EVERY DAY 30 Tab 3    [DISCONTINUED] HYDROcodone-acetaminophen (NORCO) 5-325 mg per tablet Take  by mouth. No facility-administered encounter medications on file as of 7/21/2020. Review of Symptoms:    Review of Systems   Constitutional: Negative for chills, fever and weight loss. HENT: Negative for nosebleeds. Eyes: Negative for blurred vision and double vision. Respiratory: Negative for cough, shortness of breath and wheezing. Cardiovascular: Negative for chest pain, palpitations, orthopnea, leg swelling and PND. Gastrointestinal: Negative for abdominal pain, blood in stool, diarrhea, nausea and vomiting. Musculoskeletal: Negative for joint pain. Skin: Negative for rash. Neurological: Negative for dizziness, tingling and loss of consciousness. Endo/Heme/Allergies: Does not bruise/bleed easily. Physical Exam:      General: Well developed, in no acute distress, cooperative and alert  HEENT: No carotid bruits, no JVD, trach is midline. Neck Supple, PEERL, EOM intact. Heart:  reg rate and rhythm; normal S1/S2; no murmurs, no gallops or rubs. Respiratory: Clear bilaterally x 4, no wheezing or rales  Abdomen:   Soft, non-tender, no distention, no masses. + BS. Extremities:  Normal cap refill, no cyanosis, atraumatic. No edema. Neuro: A&Ox3, speech clear, gait stable. Skin: Skin color is normal. No rashes or lesions.  Non diaphoretic  Vascular: 2+ pulses symmetric in all extremities    Vitals:    07/21/20 0844 07/21/20 0856   BP: 134/72 128/68   Pulse: 70    Resp: 18    SpO2: 98%    Weight: 201 lb 12.8 oz (91.5 kg)    Height: 5' 2\" (1.575 m)        ECG: sinus rhythm    Cardiology Labs:    Lab Results   Component Value Date/Time    Cholesterol, total 173 02/19/2020 10:11 AM    HDL Cholesterol 58 02/19/2020 10:11 AM    LDL, calculated 90 02/19/2020 10:11 AM    LDL, calculated 81 08/30/2019 12:40 PM    LDL, calculated 77 09/25/2018 09:33 AM    VLDL, calculated 25 02/19/2020 10:11 AM    CHOL/HDL Ratio 2.9 01/12/2017 04:20 AM       Lab Results   Component Value Date/Time    Hemoglobin A1c 7.0 (H) 02/19/2020 10:11 AM    Hemoglobin A1c (POC) 6.4 10/18/2017 10:21 AM       Lab Results   Component Value Date/Time    Sodium 143 02/19/2020 10:11 AM    Potassium 4.9 02/19/2020 10:11 AM    Chloride 100 02/19/2020 10:11 AM    CO2 26 02/19/2020 10:11 AM    Glucose 113 (H) 02/19/2020 10:11 AM    BUN 12 02/19/2020 10:11 AM    Creatinine 0.71 02/19/2020 10:11 AM    BUN/Creatinine ratio 17 02/19/2020 10:11 AM    GFR est AA 95 02/19/2020 10:11 AM    GFR est non-AA 82 02/19/2020 10:11 AM    Calcium 9.7 02/19/2020 10:11 AM    Anion gap 5 09/25/2019 09:45 AM    Bilirubin, total 0.2 02/19/2020 10:11 AM    ALT (SGPT) 13 02/19/2020 10:11 AM    Alk. phosphatase 55 02/19/2020 10:11 AM    Protein, total 7.0 02/19/2020 10:11 AM    Albumin 4.5 02/19/2020 10:11 AM    Globulin 3.9 09/15/2019 03:15 AM    A-G Ratio 1.8 02/19/2020 10:11 AM          Assessment:       ICD-10-CM ICD-9-CM    1. Paroxysmal atrial fibrillation (HCC)  I48.0 427.31    2. Essential hypertension  I10 401.9 AMB POC EKG ROUTINE W/ 12 LEADS, INTER & REP   3. Mixed hyperlipidemia  E78.2 272.2         Plan:     1. Paroxysmal atrial fibrillation (HCC)  On Rhythmol and Toprol  New fluttering symptoms, sometimes lasting a whole week  Will evaluate for recurrent A fib with 2-week event monitor  Continue Eliquis    2. Essential hypertension  BP controlled. Continue anti-hypertensive therapy and low sodium diet    3.  Mixed hyperlipidemia  LDL 90 in 2/2020  Continue statin therapy and low fat, low cholesterol diet  Lipids managed by PCP     4. Atypical chest pain  Will evaluate with lexiscan stress test  Endoscopy done recently with esophageal hernia; further workup pending    F/u with Dr. Mooney Dear in 6 months if testing normal    Phitimothys MORAIMA Gonzalez       [facility]    7/21/2020         Patient seen, examined by me personally. Plan discussed as detailed. Agree with note as outlined by  NP. I confirm findings in history and physical exam. No additional findings noted. Agree with plan as outlined above.      Ale Waldrop MD

## 2020-07-21 NOTE — PROGRESS NOTES
Patient received a 2 week event monitor. Instructions given verbally as well as an instruction sheet. Pt verbalized understanding.     [facility] Event Monitoring

## 2020-07-21 NOTE — LETTER
7/21/20 Patient: Christian Saez YOB: 1940 Date of Visit: 7/21/2020 Beryle Brady, MD 
[address] [facility] [address] [facility] [phone] VIA In Basket Dear Beryle Brady, MD, Thank you for referring Ms. Malissa Mckinnon to [address] [facility] for evaluation. My notes for this consultation are attached. If you have questions, please do not hesitate to call me. I look forward to following your patient along with you. Sincerely, Irma Zhu MD

## 2020-07-21 NOTE — PROGRESS NOTES
1. Have you been to the ER, urgent care clinic since your last visit? Hospitalized since your last visit? No    2. Have you seen or consulted any other health care providers outside of the [address] [place] [address] since your last visit? Include any pap smears or colon screening. Endoscopy July 2020. Chief Complaint   Patient presents with    Irregular Heart Beat     6 mo appt PAF, HTN.  C/O palps rest and exertion.

## 2020-08-03 ENCOUNTER — ANCILLARY PROCEDURE (OUTPATIENT)
Dept: CARDIOLOGY CLINIC | Age: 80
End: 2020-08-03
Payer: MEDICARE

## 2020-08-03 VITALS — HEIGHT: 62 IN | BODY MASS INDEX: 36.99 KG/M2 | WEIGHT: 201 LBS

## 2020-08-03 DIAGNOSIS — R07.89 OTHER CHEST PAIN: ICD-10-CM

## 2020-08-03 DIAGNOSIS — I48.0 PAROXYSMAL ATRIAL FIBRILLATION (HCC): ICD-10-CM

## 2020-08-03 PROCEDURE — 78452 HT MUSCLE IMAGE SPECT MULT: CPT | Performed by: INTERNAL MEDICINE

## 2020-08-04 ENCOUNTER — APPOINTMENT (OUTPATIENT)
Dept: CARDIOLOGY CLINIC | Age: 80
End: 2020-08-04

## 2020-08-04 PROCEDURE — 93018 CV STRESS TEST I&R ONLY: CPT | Performed by: INTERNAL MEDICINE

## 2020-08-04 PROCEDURE — 93016 CV STRESS TEST SUPVJ ONLY: CPT | Performed by: INTERNAL MEDICINE

## 2020-08-05 LAB
STRESS BASELINE DIAS BP: 75 MMHG
STRESS BASELINE HR: 71 BPM
STRESS BASELINE SYS BP: 125 MMHG
STRESS PEAK DIAS BP: 80 MMHG
STRESS PEAK SYS BP: 135 MMHG
STRESS PERCENT HR ACHIEVED: 71 %
STRESS POST PEAK HR: 102 BPM
STRESS RATE PRESSURE PRODUCT: NORMAL BPM*MMHG
STRESS ST DEPRESSION: 0 MM
STRESS ST ELEVATION: 0 MM
STRESS TARGET HR: 143 BPM

## 2020-08-05 NOTE — PROGRESS NOTES
Spoke to patient using 2 identifiers. Per April Hong NP, patient was made aware that stress test is abnormal.   Will need to f/u with Dr. Ирина Salazar to discuss further. Patient verbalized understanding.

## 2020-08-07 ENCOUNTER — TELEPHONE (OUTPATIENT)
Dept: CARDIOLOGY CLINIC | Age: 80
End: 2020-08-07

## 2020-08-07 NOTE — TELEPHONE ENCOUNTER
Verified patient with two identifiers. Pt informed of event monitor results. Pt verbalized understanding.

## 2020-08-10 RX ORDER — APIXABAN 5 MG/1
TABLET, FILM COATED ORAL
Qty: 180 TAB | Refills: 3 | Status: SHIPPED | OUTPATIENT
Start: 2020-08-10 | End: 2021-07-23

## 2020-08-12 ENCOUNTER — HOSPITAL ENCOUNTER (OUTPATIENT)
Dept: LAB | Age: 80
Discharge: HOME | End: 2020-08-12
Payer: MEDICARE

## 2020-08-12 ENCOUNTER — OFFICE VISIT (OUTPATIENT)
Dept: CARDIOLOGY CLINIC | Age: 80
End: 2020-08-12
Payer: MEDICARE

## 2020-08-12 VITALS
RESPIRATION RATE: 16 BRPM | WEIGHT: 202.2 LBS | HEART RATE: 80 BPM | SYSTOLIC BLOOD PRESSURE: 130 MMHG | DIASTOLIC BLOOD PRESSURE: 80 MMHG | OXYGEN SATURATION: 98 % | HEIGHT: 62 IN | BODY MASS INDEX: 37.21 KG/M2

## 2020-08-12 DIAGNOSIS — I10 ESSENTIAL HYPERTENSION: ICD-10-CM

## 2020-08-12 DIAGNOSIS — I48.0 PAROXYSMAL ATRIAL FIBRILLATION (HCC): ICD-10-CM

## 2020-08-12 DIAGNOSIS — R94.39 ABNORMAL STRESS TEST: Primary | ICD-10-CM

## 2020-08-12 DIAGNOSIS — E78.2 MIXED HYPERLIPIDEMIA: ICD-10-CM

## 2020-08-12 PROCEDURE — G8752 SYS BP LESS 140: HCPCS | Performed by: INTERNAL MEDICINE

## 2020-08-12 PROCEDURE — G8536 NO DOC ELDER MAL SCRN: HCPCS | Performed by: INTERNAL MEDICINE

## 2020-08-12 PROCEDURE — 99214 OFFICE O/P EST MOD 30 MIN: CPT | Performed by: INTERNAL MEDICINE

## 2020-08-12 PROCEDURE — G8427 DOCREV CUR MEDS BY ELIG CLIN: HCPCS | Performed by: INTERNAL MEDICINE

## 2020-08-12 PROCEDURE — G8417 CALC BMI ABV UP PARAM F/U: HCPCS | Performed by: INTERNAL MEDICINE

## 2020-08-12 PROCEDURE — G8399 PT W/DXA RESULTS DOCUMENT: HCPCS | Performed by: INTERNAL MEDICINE

## 2020-08-12 PROCEDURE — G8754 DIAS BP LESS 90: HCPCS | Performed by: INTERNAL MEDICINE

## 2020-08-12 PROCEDURE — 80053 COMPREHEN METABOLIC PANEL: CPT

## 2020-08-12 PROCEDURE — 1101F PT FALLS ASSESS-DOCD LE1/YR: CPT | Performed by: INTERNAL MEDICINE

## 2020-08-12 PROCEDURE — 36415 COLL VENOUS BLD VENIPUNCTURE: CPT

## 2020-08-12 PROCEDURE — 1090F PRES/ABSN URINE INCON ASSESS: CPT | Performed by: INTERNAL MEDICINE

## 2020-08-12 PROCEDURE — 85610 PROTHROMBIN TIME: CPT

## 2020-08-12 PROCEDURE — G8432 DEP SCR NOT DOC, RNG: HCPCS | Performed by: INTERNAL MEDICINE

## 2020-08-12 PROCEDURE — 85027 COMPLETE CBC AUTOMATED: CPT

## 2020-08-12 RX ORDER — ROSUVASTATIN CALCIUM 20 MG/1
20 TABLET, COATED ORAL
Qty: 90 TAB | Refills: 3 | Status: SHIPPED | OUTPATIENT
Start: 2020-08-12 | End: 2021-08-02

## 2020-08-12 NOTE — PROGRESS NOTES
Winston Mehta, VICKYP-BC    Subjective/HPI:     Lay Durbin is a 70 y.o. female is here for test result f/u. She has a PMHx of PAF, HTN, HLD, DM2 and GERD. At last visit, she underwent lexiscan stress test and 2-week event monitor for complaints of fluttering sensations in her chest and atypical chest pain. She continues to have fluttering sensations and chest pain symptoms.          PCP Provider  Angela Judge MD    Past Medical History:   Diagnosis Date    Abdominal mass, left upper quadrant 06/13/2017    no finding per pt    Abnormal finding on MRI of brain 3/16/2015    evaluated by neurologist and no findings    Acute pharyngitis 5/26/2016    Anemia NEC     Arrhythmia     a fib    Arthralgia of right hip 4/25/2016    Arthritis 2/18/2010    Cataract 9/24/2014    Diabetes ([address]     Elevated LFTs 4/16/2014    DAVID (generalized anxiety disorder) 1/22/2015    GERD (gastroesophageal reflux disease) 4/7/2014    Hammer toe of right foot 9/29/2014    Headache(784.0) 2/09/7287    Helicobacter pylori (H. 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 ([address] 9/29/2014    Preop examination 9/24/2014    Right foot injury 5/2/2017    Risk for falls 4/16/2014    Screening for breast cancer 9/23/2014    Screening for depression 4/16/2014    Shoulder pain, left 3/18/2014    Spondylitis, cervical ([address] 4/5/2010    Tinea pedis 6/3/2015    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 (HCC) 9/29/2014        Allergies   Allergen Reactions    Latex Rash    Kiwi Anaphylaxis     \"FEELS LIKE THROAT IS CLOSING UP\"    Amoxil [Amoxicillin] Itching     rash    Cephalosporins Unknown (comments)     PT NOT SURE OF REACTIONS    Levaquin [Levofloxacin] Other (comments)     Dizziness      Penicillins Rash     \"chickenpox like rash\" was in the 1980s    Percocet [Oxycodone-Acetaminophen] Nausea and Vomiting    Tetanus Vaccines And Toxoid Swelling    Tetracycline Hcl Rash     \"black fuzzy tongue\"    [place] Other (comments)     RAW TONGUE        Outpatient Encounter Medications as of 8/12/2020   Medication Sig Dispense Refill    Eliquis 5 mg tablet TAKE 1 TABLET BY MOUTH TWICE A  Tab 3    famotidine (PEPCID) 40 mg tablet Take 40 mg by mouth two (2) times daily as needed.  aspirin delayed-release 81 mg tablet TAKE 1 TABLET BY MOUTH EVERY DAY 30 Tab 5    glucose blood VI test strips (Contour Next Test Strips) strip Check blood sugar once a day e11.9 50 Strip 11    rosuvastatin (CRESTOR) 10 mg tablet TAKE 1 TABLET BY MOUTH EVERY DAY 90 Tab 1    albuterol (PROVENTIL HFA, VENTOLIN HFA, PROAIR HFA) 90 mcg/actuation inhaler INHALE 2 PUFFS BY MOUTH EVERY 4 HOURS AS NEEDED FOR WHEEZING 18 Inhaler 0    metFORMIN (GLUCOPHAGE) 500 mg tablet TAKE 1 TABLET BY MOUTH TWICE A DAY WITH MEALS 180 Tab 1    hydroCHLOROthiazide (HYDRODIURIL) 25 mg tablet TAKE 1 TABLET BY MOUTH EVERY DAY 90 Tab 1    ALPRAZolam (XANAX) 0.5 mg tablet TAKE 1 TABLET BY MOUTH 3 TIMES A DAY AS NEEDED FOR ANXIETY 30 Tab 0    metoprolol succinate (TOPROL-XL) 50 mg XL tablet TAKE 1 TABLET BY MOUTH EVERY DAY 90 Tab 1    propafenone (RYTHMOL) 150 mg tablet TAKE 1 TABLET BY MOUTH EVERY 8 HOURS 270 Tab 3    pantoprazole (PROTONIX) 40 mg tablet TAKE 1 TABLET BY MOUTH EVERY DAY      MULTIVITAMIN PO Take 1 Tab by mouth daily.  Lancets (FREESTYLE LANCETS) Misc Test once daily. (diagnosis code: 250.00) 1 Package 11    Blood-Glucose Meter monitoring kit Once daily before breakfast 1 Kit 0     No facility-administered encounter medications on file as of 8/12/2020.          Review of Symptoms:    Review of Systems   Constitutional: Negative for chills, fever and weight loss. HENT: Negative for nosebleeds. Eyes: Negative for blurred vision and double vision. Respiratory: Negative for cough, shortness of breath and wheezing. Cardiovascular: Positive for chest pain and palpitations. Negative for orthopnea, leg swelling and PND. Gastrointestinal: Negative for abdominal pain, blood in stool, diarrhea, nausea and vomiting. Musculoskeletal: Negative for joint pain. Skin: Negative for rash. Neurological: Negative for dizziness, tingling and loss of consciousness. Endo/Heme/Allergies: Does not bruise/bleed easily. Physical Exam:      General: Well developed, in no acute distress, cooperative and alert  HEENT: No carotid bruits, no JVD, trach is midline. Neck Supple, PEERL, EOM intact. Heart:  reg rate and rhythm; normal S1/S2; no murmurs, no gallops or rubs. Respiratory: Clear bilaterally x 4, no wheezing or rales  Abdomen:   Soft, non-tender, no distention, no masses. + BS. Extremities:  Normal cap refill, no cyanosis, atraumatic. No edema. Neuro: A&Ox3, speech clear, gait stable. Skin: Skin color is normal. No rashes or lesions.  Non diaphoretic  Vascular: 2+ pulses symmetric in all extremities    Vitals:    08/12/20 1011   BP: 130/80   Pulse: 80   Resp: 16   SpO2: 98%   Weight: 202 lb 3.2 oz (91.7 kg)   Height: 5' 2\" (1.575 m)       ECG: sinus rhythm    Cardiology Labs:    Lab Results   Component Value Date/Time    Cholesterol, total 173 02/19/2020 10:11 AM    HDL Cholesterol 58 02/19/2020 10:11 AM    LDL, calculated 90 02/19/2020 10:11 AM    LDL, calculated 81 08/30/2019 12:40 PM    LDL, calculated 77 09/25/2018 09:33 AM    VLDL, calculated 25 02/19/2020 10:11 AM    CHOL/HDL Ratio 2.9 01/12/2017 04:20 AM       Lab Results   Component Value Date/Time    Hemoglobin A1c 7.0 (H) 02/19/2020 10:11 AM    Hemoglobin A1c (POC) 6.4 10/18/2017 10:21 AM       Lab Results   Component Value Date/Time Sodium 143 02/19/2020 10:11 AM    Potassium 4.9 02/19/2020 10:11 AM    Chloride 100 02/19/2020 10:11 AM    CO2 26 02/19/2020 10:11 AM    Glucose 113 (H) 02/19/2020 10:11 AM    BUN 12 02/19/2020 10:11 AM    Creatinine 0.71 02/19/2020 10:11 AM    BUN/Creatinine ratio 17 02/19/2020 10:11 AM    GFR est AA 95 02/19/2020 10:11 AM    GFR est non-AA 82 02/19/2020 10:11 AM    Calcium 9.7 02/19/2020 10:11 AM    Anion gap 5 09/25/2019 09:45 AM    Bilirubin, total 0.2 02/19/2020 10:11 AM    ALT (SGPT) 13 02/19/2020 10:11 AM    Alk. phosphatase 55 02/19/2020 10:11 AM    Protein, total 7.0 02/19/2020 10:11 AM    Albumin 4.5 02/19/2020 10:11 AM    Globulin 3.9 09/15/2019 03:15 AM    A-G Ratio 1.8 02/19/2020 10:11 AM          Assessment:       ICD-10-CM ICD-9-CM    1. Abnormal stress test  R94.39 794.39    2. Paroxysmal atrial fibrillation (HCC)  I48.0 427.31    3. Essential hypertension  I10 401.9    4. Mixed hyperlipidemia  E78.2 272.2         Plan:     1. Abnormal stress test  Lexiscan stress test done 8/2020 with reversible defect of the inferior and apex location  Remains symptomatic  On metoprolol, ASA and statin therapy  Plan for cardiac catheterization  The patient was counseled at length about the risks of jonnathan Covid-19 during their perioperative period and any recovery window from their procedure. The patient was made aware that jonnathan Covid-19  may worsen their prognosis for recovering from their procedure and lend to a higher morbidity and/or mortality risk. All material risks, benefits, and reasonable alternatives including postponing the procedure were discussed. The patient does  wish to proceed with the procedure at this time. CPT 33646 40 [id number] 31, [id number]    2. Paroxysmal atrial fibrillation (HCC)  2-week event monitor without recurrent A fib  Continue Rhythmol and Toprol  Continue Eliquis for stroke prevention    3. Essential hypertension  BP controlled.   Continue anti-hypertensive therapy and low sodium diet    4. Mixed hyperlipidemia  LDL 90 in 2/2020  Increase rosuvastatin 20 mg daily    F/u with Dr. Merlyn Sims after cath      [facility]    8/12/2020         Patient seen, examined by me personally. Plan discussed as detailed. Agree with note as outlined by  NP. I confirm findings in history and physical exam. No additional findings noted. Agree with plan as outlined above. Discussed cat/PCI.     Glenys Mahoney MD

## 2020-08-12 NOTE — LETTER
8/12/20 Patient: Christal Mahoney YOB: 1940 Date of Visit: 8/12/2020 Joo Hough MD 
[address] [facility] [address] [facility] [address] VIA In Basket Dear Joo Hough MD, Thank you for referring Ms. Che Mora to [address] [place], [place] [address] for evaluation. My notes for this consultation are attached. If you have questions, please do not hesitate to call me. I look forward to following your patient along with you. Sincerely, Tamia Olguin MD

## 2020-08-12 NOTE — H&P (VIEW-ONLY)
Chery Ramirez, P-BC Subjective/HPI:  
 
Mehdi Sosa is a 70 y.o. female is here for test result f/u. She has a PMHx of PAF, HTN, HLD, DM2 and GERD. At last visit, she underwent lexiscan stress test and 2-week event monitor for complaints of fluttering sensations in her chest and atypical chest pain. She continues to have fluttering sensations and chest pain symptoms. PCP Provider Anupam Perez MD 
 
Past Medical History:  
Diagnosis Date  Abdominal mass, left upper quadrant 06/13/2017  
 no finding per pt  Abnormal finding on MRI of brain 3/16/2015  
 evaluated by neurologist and no findings  Acute pharyngitis 5/26/2016  Anemia NEC  Arrhythmia   
 a fib  Arthralgia of right hip 4/25/2016  Arthritis 2/18/2010  Cataract 9/24/2014  Diabetes ([address]  Elevated LFTs 4/16/2014  DAVID (generalized anxiety disorder) 1/22/2015  GERD (gastroesophageal reflux disease) 4/7/2014  Hammer toe of right foot 9/29/2014  NMHXDDUA(608.2) 3/26/2010  Helicobacter pylori (H. pylori) infection 4/16/2014  
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  
  rash  Cephalosporins Unknown (comments) PT NOT SURE OF REACTIONS  Levaquin [Levofloxacin] Other (comments) Dizziness  Penicillins Rash \"chickenpox like rash\" was in the 1980s  Percocet [Oxycodone-Acetaminophen] Nausea and Vomiting  Tetanus Vaccines And Toxoid Swelling  Tetracycline Hcl Rash \"black fuzzy tongue\" [address] Other (comments) RAW TONGUE Outpatient Encounter Medications as of 8/12/2020 Medication Sig Dispense Refill  Eliquis 5 mg tablet TAKE 1 TABLET BY MOUTH TWICE A  Tab 3  
 famotidine (PEPCID) 40 mg tablet Take 40 mg by mouth two (2) times daily as needed.  aspirin delayed-release 81 mg tablet TAKE 1 TABLET BY MOUTH EVERY DAY 30 Tab 5  
 glucose blood VI test strips (Contour Next Test Strips) strip Check blood sugar once a day e11.9 50 Strip 11  
 rosuvastatin (CRESTOR) 10 mg tablet TAKE 1 TABLET BY MOUTH EVERY DAY 90 Tab 1  
 albuterol (PROVENTIL HFA, VENTOLIN HFA, PROAIR HFA) 90 mcg/actuation inhaler INHALE 2 PUFFS BY MOUTH EVERY 4 HOURS AS NEEDED FOR WHEEZING 18 Inhaler 0  
 metFORMIN (GLUCOPHAGE) 500 mg tablet TAKE 1 TABLET BY MOUTH TWICE A DAY WITH MEALS 180 Tab 1  
 hydroCHLOROthiazide (HYDRODIURIL) 25 mg tablet TAKE 1 TABLET BY MOUTH EVERY DAY 90 Tab 1  ALPRAZolam (XANAX) 0.5 mg tablet TAKE 1 TABLET BY MOUTH 3 TIMES A DAY AS NEEDED FOR ANXIETY 30 Tab 0  
 metoprolol succinate (TOPROL-XL) 50 mg XL tablet TAKE 1 TABLET BY MOUTH EVERY DAY 90 Tab 1  propafenone (RYTHMOL) 150 mg tablet TAKE 1 TABLET BY MOUTH EVERY 8 HOURS 270 Tab 3  pantoprazole (PROTONIX) 40 mg tablet TAKE 1 TABLET BY MOUTH EVERY DAY  MULTIVITAMIN PO Take 1 Tab by mouth daily.  Lancets (FREESTYLE LANCETS) Misc Test once daily. (diagnosis code: 250.00) 1 Package 11  Blood-Glucose Meter monitoring kit Once daily before breakfast 1 Kit 0 No facility-administered encounter medications on file as of 8/12/2020. Review of Symptoms: Review of Systems Constitutional: Negative for chills, fever and weight loss. HENT: Negative for nosebleeds. Eyes: Negative for blurred vision and double vision. Respiratory: Negative for cough, shortness of breath and wheezing. Cardiovascular: Positive for chest pain and palpitations. Negative for orthopnea, leg swelling and PND. Gastrointestinal: Negative for abdominal pain, blood in stool, diarrhea, nausea and vomiting. Musculoskeletal: Negative for joint pain. Skin: Negative for rash. Neurological: Negative for dizziness, tingling and loss of consciousness. Endo/Heme/Allergies: Does not bruise/bleed easily. Physical Exam:   
 
General: Well developed, in no acute distress, cooperative and alert HEENT: No carotid bruits, no JVD, trach is midline. Neck Supple, PEERL, EOM intact. Heart:  reg rate and rhythm; normal S1/S2; no murmurs, no gallops or rubs. Respiratory: Clear bilaterally x 4, no wheezing or rales Abdomen:   Soft, non-tender, no distention, no masses. + BS. Extremities:  Normal cap refill, no cyanosis, atraumatic. No edema. Neuro: A&Ox3, speech clear, gait stable. Skin: Skin color is normal. No rashes or lesions. Non diaphoretic Vascular: 2+ pulses symmetric in all extremities Vitals:  
 08/12/20 1011 BP: 130/80 Pulse: 80 Resp: 16 SpO2: 98% Weight: 202 lb 3.2 oz (91.7 kg) Height: 5' 2\" (1.575 m) ECG: sinus rhythm Cardiology Labs: 
 
Lab Results Component Value Date/Time Cholesterol, total 173 02/19/2020 10:11 AM  
 HDL Cholesterol 58 02/19/2020 10:11 AM  
 LDL, calculated 90 02/19/2020 10:11 AM  
 LDL, calculated 81 08/30/2019 12:40 PM  
 LDL, calculated 77 09/25/2018 09:33 AM  
 VLDL, calculated 25 02/19/2020 10:11 AM  
 CHOL/HDL Ratio 2.9 01/12/2017 04:20 AM  
 
 
Lab Results Component Value Date/Time Hemoglobin A1c 7.0 (H) 02/19/2020 10:11 AM  
 Hemoglobin A1c (POC) 6.4 10/18/2017 10:21 AM  
 
 
Lab Results Component Value Date/Time Sodium 143 02/19/2020 10:11 AM  
 Potassium 4.9 02/19/2020 10:11 AM  
 Chloride 100 02/19/2020 10:11 AM  
 CO2 26 02/19/2020 10:11 AM  
 Glucose 113 (H) 02/19/2020 10:11 AM  
 BUN 12 02/19/2020 10:11 AM  
 Creatinine 0.71 02/19/2020 10:11 AM  
 BUN/Creatinine ratio 17 02/19/2020 10:11 AM  
 GFR est AA 95 02/19/2020 10:11 AM  
 GFR est non-AA 82 02/19/2020 10:11 AM  
 Calcium 9.7 02/19/2020 10:11 AM  
 Anion gap 5 09/25/2019 09:45 AM  
 Bilirubin, total 0.2 02/19/2020 10:11 AM  
 ALT (SGPT) 13 02/19/2020 10:11 AM  
 Alk. phosphatase 55 02/19/2020 10:11 AM  
 Protein, total 7.0 02/19/2020 10:11 AM  
 Albumin 4.5 02/19/2020 10:11 AM  
 Globulin 3.9 09/15/2019 03:15 AM  
 A-G Ratio 1.8 02/19/2020 10:11 AM  
 
 
 
 Assessment: ICD-10-CM ICD-9-CM 1. Abnormal stress test  R94.39 794.39   
2. Paroxysmal atrial fibrillation (HCC)  I48.0 427.31   
3. Essential hypertension  I10 401.9 4. Mixed hyperlipidemia  E78.2 272.2 Plan: 1. Abnormal stress test 
Lexiscan stress test done 8/2020 with reversible defect of the inferior and apex location Remains symptomatic On metoprolol, ASA and statin therapy Plan for cardiac catheterization The patient was counseled at length about the risks of jonnathan Covid-19 during their perioperative period and any recovery window from their procedure. The patient was made aware that jonnathan Covid-19  may worsen their prognosis for recovering from their procedure and lend to a higher morbidity and/or mortality risk. All material risks, benefits, and reasonable alternatives including postponing the procedure were discussed. The patient does  wish to proceed with the procedure at this time. CPT [id number], [id number] 2. Paroxysmal atrial fibrillation (HCC) 2-week event monitor without recurrent A fib Continue Rhythmol and Toprol Continue Eliquis for stroke prevention 3. Essential hypertension BP controlled. Continue anti-hypertensive therapy and low sodium diet 4. Mixed hyperlipidemia LDL 90 in 2/2020 Increase rosuvastatin 20 mg daily F/u with Dr. Salbador Elizondo after [facility] 8/12/2020 Patient seen, examined by me personally. Plan discussed as detailed. Agree with note as outlined by  NP. I confirm findings in history and physical exam. No additional findings noted. Agree with plan as outlined above. Discussed cat/PCI.  
 
Nerissa Cuenca MD

## 2020-08-12 NOTE — PROGRESS NOTES
Chief Complaint   Patient presents with    Results    Irregular Heart Beat       1. Have you been to the ER, urgent care clinic since your last visit? Hospitalized since your last visit? No    2. Have you seen or consulted any other health care providers outside of the [address] [place] [address] since your last visit? Include any pap smears or colon screening.   No

## 2020-08-13 LAB
ALBUMIN SERPL-MCNC: 4.5 G/DL (ref 3.7–4.7)
ALBUMIN/GLOB SERPL: 1.9 {RATIO} (ref 1.2–2.2)
ALP SERPL-CCNC: 49 IU/L (ref 39–117)
ALT SERPL-CCNC: 14 IU/L (ref 0–32)
AST SERPL-CCNC: 18 IU/L (ref 0–40)
BILIRUB SERPL-MCNC: 0.3 MG/DL (ref 0–1.2)
BUN SERPL-MCNC: 15 MG/DL (ref 8–27)
BUN/CREAT SERPL: 21 (ref 12–28)
CALCIUM SERPL-MCNC: 10.1 MG/DL (ref 8.7–10.3)
CHLORIDE SERPL-SCNC: 98 MMOL/L (ref 96–106)
CO2 SERPL-SCNC: 30 MMOL/L (ref 20–29)
CREAT SERPL-MCNC: 0.72 MG/DL (ref 0.57–1)
ERYTHROCYTE [DISTWIDTH] IN BLOOD BY AUTOMATED COUNT: 15 % (ref 11.7–15.4)
GLOBULIN SER CALC-MCNC: 2.4 G/DL (ref 1.5–4.5)
GLUCOSE SERPL-MCNC: 113 MG/DL (ref 65–99)
HCT VFR BLD AUTO: 32.8 % (ref 34–46.6)
HGB BLD-MCNC: 10.7 G/DL (ref 11.1–15.9)
INR PPP: 1 (ref 0.8–1.2)
MCH RBC QN AUTO: 26.5 PG (ref 26.6–33)
MCHC RBC AUTO-ENTMCNC: 32.6 G/DL (ref 31.5–35.7)
MCV RBC AUTO: 81 FL (ref 79–97)
PLATELET # BLD AUTO: 310 X10E3/UL (ref 150–450)
POTASSIUM SERPL-SCNC: 4.5 MMOL/L (ref 3.5–5.2)
PROT SERPL-MCNC: 6.9 G/DL (ref 6–8.5)
PROTHROMBIN TIME: 10.5 SEC (ref 9.1–12)
RBC # BLD AUTO: 4.04 X10E6/UL (ref 3.77–5.28)
SODIUM SERPL-SCNC: 142 MMOL/L (ref 134–144)
WBC # BLD AUTO: 4.1 X10E3/UL (ref 3.4–10.8)

## 2020-08-14 ENCOUNTER — DOCUMENTATION ONLY (OUTPATIENT)
Dept: CARDIOLOGY CLINIC | Age: 80
End: 2020-08-14

## 2020-08-14 NOTE — PROGRESS NOTES
Pt called yesterday needing reminder of when to stop metformin and eliquis. Reminded that she stops them 48hrs prior to procedure. Pt understood.

## 2020-08-16 ENCOUNTER — HOSPITAL ENCOUNTER (OUTPATIENT)
Dept: PREADMISSION TESTING | Age: 80
Discharge: HOME | End: 2020-08-16
Payer: MEDICARE

## 2020-08-16 PROCEDURE — 87635 SARS-COV-2 COVID-19 AMP PRB: CPT

## 2020-08-17 LAB
HEALTH STATUS, XMCV2T: NORMAL
SARS-COV-2, COV2NT: NOT DETECTED
SOURCE, COVRS: NORMAL
SPECIMEN SOURCE, FCOV2M: NORMAL
SPECIMEN TYPE, XMCV1T: NORMAL

## 2020-08-20 ENCOUNTER — HOSPITAL ENCOUNTER (OUTPATIENT)
Age: 80
Discharge: HOME | End: 2020-08-20
Attending: INTERNAL MEDICINE | Admitting: INTERNAL MEDICINE
Payer: MEDICARE

## 2020-08-20 VITALS
WEIGHT: 203 LBS | RESPIRATION RATE: 18 BRPM | TEMPERATURE: 98.5 F | HEART RATE: 67 BPM | DIASTOLIC BLOOD PRESSURE: 53 MMHG | BODY MASS INDEX: 37.36 KG/M2 | SYSTOLIC BLOOD PRESSURE: 128 MMHG | OXYGEN SATURATION: 98 % | HEIGHT: 62 IN

## 2020-08-20 DIAGNOSIS — E11.21 CONTROLLED TYPE 2 DIABETES MELLITUS WITH DIABETIC NEPHROPATHY, WITHOUT LONG-TERM CURRENT USE OF INSULIN (HCC): ICD-10-CM

## 2020-08-20 DIAGNOSIS — R07.9 CHEST PAIN, UNSPECIFIED TYPE: ICD-10-CM

## 2020-08-20 PROBLEM — Z98.890 S/P CARDIAC CATH: Status: ACTIVE | Noted: 2020-08-20

## 2020-08-20 LAB
GLUCOSE BLD STRIP.AUTO-MCNC: 121 MG/DL (ref 65–100)
GLUCOSE BLD STRIP.AUTO-MCNC: 122 MG/DL (ref 65–100)
SERVICE CMNT-IMP: ABNORMAL
SERVICE CMNT-IMP: ABNORMAL

## 2020-08-20 PROCEDURE — 74011000636 HC RX REV CODE- 636: Performed by: INTERNAL MEDICINE

## 2020-08-20 PROCEDURE — 74011000250 HC RX REV CODE- 250: Performed by: INTERNAL MEDICINE

## 2020-08-20 PROCEDURE — C1769 GUIDE WIRE: HCPCS | Performed by: INTERNAL MEDICINE

## 2020-08-20 PROCEDURE — 77030008543 HC TBNG MON PRSS MRTM -A: Performed by: INTERNAL MEDICINE

## 2020-08-20 PROCEDURE — 93458 L HRT ARTERY/VENTRICLE ANGIO: CPT | Performed by: INTERNAL MEDICINE

## 2020-08-20 PROCEDURE — 82962 GLUCOSE BLOOD TEST: CPT

## 2020-08-20 PROCEDURE — 74011250636 HC RX REV CODE- 250/636: Performed by: INTERNAL MEDICINE

## 2020-08-20 PROCEDURE — 99152 MOD SED SAME PHYS/QHP 5/>YRS: CPT | Performed by: INTERNAL MEDICINE

## 2020-08-20 PROCEDURE — C1894 INTRO/SHEATH, NON-LASER: HCPCS | Performed by: INTERNAL MEDICINE

## 2020-08-20 PROCEDURE — 77030015766: Performed by: INTERNAL MEDICINE

## 2020-08-20 PROCEDURE — 77030010221 HC SPLNT WR POS TELE -B: Performed by: INTERNAL MEDICINE

## 2020-08-20 PROCEDURE — 77030019569 HC BND COMPR RAD TERU -B: Performed by: INTERNAL MEDICINE

## 2020-08-20 PROCEDURE — 77030004549 HC CATH ANGI DX PRF MRTM -A: Performed by: INTERNAL MEDICINE

## 2020-08-20 PROCEDURE — 74011250637 HC RX REV CODE- 250/637: Performed by: INTERNAL MEDICINE

## 2020-08-20 PROCEDURE — 77030028837 HC SYR ANGI PWR INJ COEU -A: Performed by: INTERNAL MEDICINE

## 2020-08-20 RX ORDER — FENTANYL CITRATE 50 UG/ML
INJECTION, SOLUTION INTRAMUSCULAR; INTRAVENOUS AS NEEDED
Status: DISCONTINUED | OUTPATIENT
Start: 2020-08-20 | End: 2020-08-20 | Stop reason: HOSPADM

## 2020-08-20 RX ORDER — MIDAZOLAM HYDROCHLORIDE 1 MG/ML
INJECTION, SOLUTION INTRAMUSCULAR; INTRAVENOUS AS NEEDED
Status: DISCONTINUED | OUTPATIENT
Start: 2020-08-20 | End: 2020-08-20 | Stop reason: HOSPADM

## 2020-08-20 RX ORDER — LIDOCAINE HYDROCHLORIDE 10 MG/ML
INJECTION, SOLUTION EPIDURAL; INFILTRATION; INTRACAUDAL; PERINEURAL AS NEEDED
Status: DISCONTINUED | OUTPATIENT
Start: 2020-08-20 | End: 2020-08-20 | Stop reason: HOSPADM

## 2020-08-20 RX ORDER — HEPARIN SODIUM 1000 [USP'U]/ML
INJECTION, SOLUTION INTRAVENOUS; SUBCUTANEOUS AS NEEDED
Status: DISCONTINUED | OUTPATIENT
Start: 2020-08-20 | End: 2020-08-20 | Stop reason: HOSPADM

## 2020-08-20 RX ORDER — HEPARIN SODIUM 200 [USP'U]/100ML
INJECTION, SOLUTION INTRAVENOUS
Status: COMPLETED | OUTPATIENT
Start: 2020-08-20 | End: 2020-08-20

## 2020-08-20 RX ORDER — VERAPAMIL HYDROCHLORIDE 2.5 MG/ML
INJECTION, SOLUTION INTRAVENOUS AS NEEDED
Status: DISCONTINUED | OUTPATIENT
Start: 2020-08-20 | End: 2020-08-20 | Stop reason: HOSPADM

## 2020-08-20 RX ORDER — METFORMIN HYDROCHLORIDE 500 MG/1
TABLET ORAL
Qty: 180 TAB | Refills: 1 | Status: SHIPPED | OUTPATIENT
Start: 2020-08-20 | End: 2020-10-26

## 2020-08-20 RX ORDER — GUAIFENESIN 100 MG/5ML
81 LIQUID (ML) ORAL ONCE
Status: COMPLETED | OUTPATIENT
Start: 2020-08-20 | End: 2020-08-20

## 2020-08-20 RX ADMIN — ASPIRIN 81 MG CHEWABLE TABLET 81 MG: 81 TABLET CHEWABLE at 08:32

## 2020-08-20 NOTE — DISCHARGE INSTRUCTIONS
[address]  [phone]        Patient ID:  Pop Frank  [id number]  6 y.o.  1940    Admit Date: 8/20/2020    Discharge Date: 8/20/2020     Admitting Physician: Sudarshan Vigil MD     Discharge Physician: Sudarshan Vigil MD    Admission Diagnoses:   Chest pain, unspecified type [R07.9]    Discharge Diagnoses: Active Problems:    Essential hypertension (3/26/2010)      Diabetes mellitus type 2, controlled ([address] (5/26/2016)      S/P cardiac cath (8/20/2020)      Overview: 8/120/2020 cardiac cath with noninterventional PBL, otherwise neg        Discharge Condition: Good    Cardiology Procedures this Admission:  Diagnostic left heart catheterization    Disposition: home    Reference discharge instructions provided by nursing for diet and activity. Signed:  Lisha Tee NP  8/20/2020  2:20 PM        Radial Cardiac Catheterization/Angiography Discharge Instructions    It is normal to feel tired the first couple days. Take it easy and follow the physicians instructions. CHECK THE CATHETER INSERTION SITE DAILY:    Remove the wrist dressing 24 hours after the procedure. You may shower 24 hours after the procedure. Wash with soap and water and pat dry. Gentle cleaning of the site with soap and water is sufficient, cover with a dry clean dressing or bandage. Do not apply creams or powders to the area. No soaking the wrist for 3 days. Leave the puncture site open to air after 24 hours post-procedure. CALL THE PHYSICIANS:     If the site becomes red, swollen or feels warm to the touch  If there is bleeding or drainage or if there is unusual pain at the radial site. If there is any minor oozing, you may apply a band-aid and remove after 12 hours.    If the bleeding continues, hold pressure with the middle finger against the puncture site and the thumb against the back of the wrist,call 911 to be transported to the hospital.  DO NOT DRIVE YOURSELF, OR HAVE ANYONE ELSE DRIVE YOU - CALL 365. ACTIVITY:   For the first 24 hours do not manipulate the wrist.  No lifting, pushing or pulling over 3-5 pounds with the affected wrist for 7 daysand no straining the insertion site. Do not life grocery bags or the garbage can, do not run the vacuum  or  for 7 days. Start with short walks as in the hospital and gradually increase as tolerated each day. It is recommended to walk 30 minutes 5-7 days per week. Follow your physicians instructions on activity. Avoid walking outside in extremes of heat or cold. Walk inside when it is cold and windy or hot and humid. Things to keep in mind:  No driving for at least 24 hours, or as designated by your physician. Limit the number of times you go up and down the stairs  Take rests and pace yourself with activity. Be careful and do not strain with bowel movements. MEDICATIONS:    Take all medications as prescribed  Call your physician if you have any questions  Keep an updated list of your medications with you at all times and give a list to your physician and pharmacist    SIGNS AND SYMPTOMS:   Be cautious of symptoms of angina or recurrent symptoms such as chest discomfort, unusual shortness of breath or fatigue. These could be symptoms of restenosis, a new blockage or a heart attack. If your symptoms are relieved with rest it is still recommended that you notify your physician of recurrent chest pain or discomfort. For CHEST PAIN or symptoms of angina not relieved with rest:  If the discomfort is not relieved with rest, and you have been prescribed Nitroglycerin, take as directed (taken under the tongue, one at a time 5 minutes apart for a total of 3 doses). If the discomfort is not relieved after the 3rd nitroglycerin, call 911. If you have not been prescribed Nitroglycerin  and your chest discomfort is not relieved with rest, call 911.      AFTER CARE: Follow up with your physician as instructed. Follow a heart healthy diet with proper portion control, daily stress management, daily exercise, blood pressure and cholesterol control , and smoking cessation.

## 2020-08-20 NOTE — PROGRESS NOTES
I have reviewed discharge instructions with the patient. The patient verbalized understanding. Discharge medications reviewed with patient and appropriate educational materials regarding medications and side effects teaching were provided. Site care instructions reviewed with patient. Pain management teaching completed. Patient instructed to make follow up appointments per discharge instructions. Patient belongings packed up and accounted for with patient and family. All patient belongings sent home with patient. Telemetry monitor and wires removed      Patient signed discharge instructions after reviewing them, and duplicate copy placed in chart.

## 2020-08-20 NOTE — PROGRESS NOTES
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 Too small for PCI. Reviewed with Dr. Loi Don. Medical management.

## 2020-08-20 NOTE — INTERVAL H&P NOTE
Update History & Physical    The Patient's History and Physical of August 12,2020,   Cath/PCI was reviewed with the patient and I examined the patient. There was no change. The surgical site was confirmed by the patient and me. Plan:  The risk, benefits, expected outcome, and alternative to the recommended procedure have been discussed with the patient. Patient understands and wants to proceed with the procedure.     Electronically signed by Yoel Gloria MD on 8/20/2020 at 9:02 AM

## 2020-08-20 NOTE — PROGRESS NOTES
Right radial site D/I, no hematoma    Cardiac cath with 95% PLB, could not intervene on. Otherwise negative. OK for discharge. F/u with Dr. Salbador Elizondo 2 weeks.

## 2020-08-20 NOTE — Clinical Note
TRANSFER - OUT REPORT:     Verbal report given to: Alejandra Fierro. Report consisted of patient's Situation, Background, Assessment and   Recommendations(SBAR). Opportunity for questions and clarification was provided. Patient transported with a Registered Nurse. Patient transported to: [facility].

## 2020-09-06 DIAGNOSIS — I10 ESSENTIAL HYPERTENSION: ICD-10-CM

## 2020-09-09 ENCOUNTER — OFFICE VISIT (OUTPATIENT)
Dept: CARDIOLOGY CLINIC | Age: 80
End: 2020-09-09
Payer: MEDICARE

## 2020-09-09 VITALS
WEIGHT: 203.1 LBS | OXYGEN SATURATION: 96 % | HEIGHT: 62 IN | RESPIRATION RATE: 16 BRPM | BODY MASS INDEX: 37.37 KG/M2 | SYSTOLIC BLOOD PRESSURE: 140 MMHG | HEART RATE: 70 BPM | DIASTOLIC BLOOD PRESSURE: 70 MMHG

## 2020-09-09 DIAGNOSIS — I25.10 CORONARY ARTERY DISEASE DUE TO LIPID RICH PLAQUE: ICD-10-CM

## 2020-09-09 DIAGNOSIS — Z98.890 S/P CARDIAC CATH: Primary | ICD-10-CM

## 2020-09-09 DIAGNOSIS — I25.83 CORONARY ARTERY DISEASE DUE TO LIPID RICH PLAQUE: ICD-10-CM

## 2020-09-09 DIAGNOSIS — I48.0 PAROXYSMAL ATRIAL FIBRILLATION (HCC): ICD-10-CM

## 2020-09-09 DIAGNOSIS — I10 ESSENTIAL HYPERTENSION: ICD-10-CM

## 2020-09-09 DIAGNOSIS — E78.2 MIXED HYPERLIPIDEMIA: ICD-10-CM

## 2020-09-09 PROCEDURE — G8432 DEP SCR NOT DOC, RNG: HCPCS | Performed by: INTERNAL MEDICINE

## 2020-09-09 PROCEDURE — 99213 OFFICE O/P EST LOW 20 MIN: CPT | Performed by: INTERNAL MEDICINE

## 2020-09-09 PROCEDURE — 1101F PT FALLS ASSESS-DOCD LE1/YR: CPT | Performed by: INTERNAL MEDICINE

## 2020-09-09 PROCEDURE — G8536 NO DOC ELDER MAL SCRN: HCPCS | Performed by: INTERNAL MEDICINE

## 2020-09-09 PROCEDURE — 93005 ELECTROCARDIOGRAM TRACING: CPT | Performed by: INTERNAL MEDICINE

## 2020-09-09 PROCEDURE — 93010 ELECTROCARDIOGRAM REPORT: CPT | Performed by: INTERNAL MEDICINE

## 2020-09-09 PROCEDURE — G8754 DIAS BP LESS 90: HCPCS | Performed by: INTERNAL MEDICINE

## 2020-09-09 PROCEDURE — 1090F PRES/ABSN URINE INCON ASSESS: CPT | Performed by: INTERNAL MEDICINE

## 2020-09-09 PROCEDURE — G8399 PT W/DXA RESULTS DOCUMENT: HCPCS | Performed by: INTERNAL MEDICINE

## 2020-09-09 PROCEDURE — G8427 DOCREV CUR MEDS BY ELIG CLIN: HCPCS | Performed by: INTERNAL MEDICINE

## 2020-09-09 PROCEDURE — G8753 SYS BP > OR = 140: HCPCS | Performed by: INTERNAL MEDICINE

## 2020-09-09 PROCEDURE — G8417 CALC BMI ABV UP PARAM F/U: HCPCS | Performed by: INTERNAL MEDICINE

## 2020-09-09 PROCEDURE — G0463 HOSPITAL OUTPT CLINIC VISIT: HCPCS | Performed by: INTERNAL MEDICINE

## 2020-09-09 RX ORDER — METOPROLOL SUCCINATE 50 MG/1
TABLET, EXTENDED RELEASE ORAL
Qty: 90 TAB | Refills: 1 | Status: SHIPPED | OUTPATIENT
Start: 2020-09-09 | End: 2021-03-16

## 2020-09-09 RX ORDER — HYDROCODONE BITARTRATE AND ACETAMINOPHEN 5; 325 MG/1; MG/1
1 TABLET ORAL
COMMUNITY
Start: 2020-08-26 | End: 2021-04-02

## 2020-09-09 NOTE — PROGRESS NOTES
Martin Carias [place], [address] [facility]  [phone]     Subjective:      Timoteo Wade is a 70 y.o. female is here for post cardiac catheterization follow up. Feels good from cardiac standpoint, c/o sciatic pain. Had hx back surgery last year. Palpitation well controlled. No exertional cp or voss. The patient denies chest pain/ shortness of breath, orthopnea, PND, LE edema, palpitations, syncope, or presyncope. Cardiac catheterization 8/20/2020  · Significant small vessel disease. medical management. · Normal LVEF.          Patient Active Problem List    Diagnosis Date Noted    S/P cardiac cath 08/20/2020    Lumbar stenosis 09/10/2019    Varicose veins of both lower extremities with pain 08/13/2019    Venous insufficiency of both lower extremities 08/13/2019    Severe obesity (BMI 35.0-39.9) 06/27/2018    Paroxysmal atrial fibrillation ([address] 01/02/2018    Abdominal mass, left upper quadrant 06/13/2017    Left upper quadrant pain 06/13/2017    Right foot injury 05/02/2017    Chronic asthma with status asthmaticus 01/26/2017    Intractable migraine with aura without status migrainosus 31/66/9526    Helicobacter pylori (H. pylori) infection 12/07/2016    Skipped heart beats 11/29/2016    Multiple thyroid nodules 07/14/2016    Swollen gland 05/26/2016    Acute pharyngitis 05/26/2016    Diabetes mellitus type 2, controlled ([address] 05/26/2016    Arthralgia of right hip 04/25/2016    Tinea pedis 06/03/2015    Abnormal finding on MRI of brain 03/16/2015    DAVID (generalized anxiety disorder) 01/22/2015    Peripheral autonomic neuropathy due to DM ([address] 09/29/2014    Pre-ulcerative calluses 09/29/2014    Type 2 diabetes mellitus with pressure callus ([address] 09/29/2014    Hammer toe of right foot 09/29/2014    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 ([address] 09/29/2014    Cataract 09/24/2014    Acute asthma exacerbation 08/31/2014    Elevated LFTs 04/16/2014    Screening for depression 04/16/2014    Risk for falls 04/16/2014    GERD (gastroesophageal reflux disease) 04/07/2014    Osteoarthritis of acromioclavicular joint 03/18/2014    Shoulder pain, left 03/18/2014    Rotator cuff (capsule) sprain and strain 09/20/2013    Foot pain, left 06/14/2013    Bilateral hip pain 05/20/2013    Postmenopausal disorder 05/20/2013    Numbness and tingling of right leg 05/20/2013    Postmenopausal state 01/21/2013    Vitamin D deficiency 01/21/2013    Visual floaters 02/01/2012    Acute bronchitis 11/30/2011    Hip pain, right 09/14/2011    Cyst of right kidney 08/10/2011    Anemia 04/22/2011    Chest pain 10/20/2010    Tingling sensation 09/13/2010    Spondylitis, cervical ([address] 04/05/2010    Essential hypertension 03/26/2010    Headache(784.0) 03/26/2010    Hyperlipemia 02/18/2010    Arthritis 02/18/2010      Jose Sales MD  Past Medical History:   Diagnosis Date    Abdominal mass, left upper quadrant 06/13/2017    no finding per pt    Abnormal finding on MRI of brain 3/16/2015    evaluated by neurologist and no findings    Acute pharyngitis 5/26/2016    Anemia NEC     Arrhythmia     a fib    Arthralgia of right hip 4/25/2016    Arthritis 2/18/2010    Cataract 9/24/2014    Diabetes ([address]     Elevated LFTs 4/16/2014    DAVID (generalized anxiety disorder) 1/22/2015    GERD (gastroesophageal reflux disease) 4/7/2014    Hammer toe of right foot 9/29/2014    Headache(784.0) 5/86/0077    Helicobacter pylori (H. 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 ([address] 9/29/2014    Preop examination 9/24/2014    Right foot injury 5/2/2017    Risk for falls 4/16/2014    S/P cardiac cath 8/20/2020 8/120/2020 cardiac cath with noninterventional PBL, otherwise neg    Screening for breast cancer 9/23/2014    Screening for depression 4/16/2014    Shoulder pain, left 3/18/2014    Spondylitis, cervical ([facility] 4/5/2010    Tinea pedis 6/3/2015    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 ([facility] 9/29/2014      Past Surgical History:   Procedure Laterality Date    ABDOMEN SURGERY PROC UNLISTED      inguinal hernia    COLONOSCOPY N/A 4/23/2019    [facility] ENDOSCOPY    ENDOSCOPY, COLON, DIAGNOSTIC      HX APPENDECTOMY      HX BACK SURGERY      HX CATARACT REMOVAL Bilateral     HX GYN  1983    total hysterectomy for uterine fibroid    HX HEENT      tonsillectomy    HX ORTHOPAEDIC      left foot hammertoe surgery    HX OTHER SURGICAL      ? straightened colon out    HX ROTATOR CUFF REPAIR  2009    left    IR INJ FORAMIN EPID LUMB ANES/STER ADDL  8/2/2019    IR INJ FORAMIN EPID LUMB ANES/STER SNGL  8/2/2019    GA OPEN REPAIR CLAVICLE FRACTURE  2009    did not have surgery for this/pt states she was in an accident and had RCR on left, but the clavicle was not repaired - injury was done at the same time     Allergies   Allergen Reactions    Latex Rash    Kiwi Anaphylaxis     \"FEELS LIKE THROAT IS CLOSING UP\"    Amoxil [Amoxicillin] Itching     rash    Cephalosporins Unknown (comments)     PT NOT SURE OF REACTIONS    Levaquin [Levofloxacin] Other (comments)     Dizziness      Penicillins Rash     \"chickenpox like rash\" was in the 1980s    Percocet [Oxycodone-Acetaminophen] Nausea and Vomiting    Tetanus Vaccines And Toxoid Swelling    Tetracycline Hcl Rash     \"black fuzzy tongue\"    [place] Other (comments)     RAW TONGUE      Family History   Problem Relation Age of Onset    Cancer Mother         mycosis fungoives - skin cancer/got into bloodstream    Hypertension Mother     Arthritis-osteo Mother     Stroke Father    Vincent Kiran Sister         one sister with breast cancer    Breast Cancer Sister         42's    MS Sister     Heart Disease Brother     Diabetes Brother     Diabetes Daughter     Hypertension Daughter     Psychiatric Disorder Daughter         ANXIETY    Cancer Paternal Aunt         2 paternal aunts with breast cancer    Breast Cancer Paternal Aunt         over 48    No Known Problems Sister     Arthritis-osteo Sister     Headache Brother     No Known Problems Brother     No Known Problems Brother     Breast Cancer Paternal Aunt         over 48    Thyroid Cancer Neg Hx     Anesth Problems Neg Hx       Social History     Socioeconomic History    Marital status:      Spouse name: Not on file    Number of children: Not on file    Years of education: Not on file    Highest education level: Not on file   Occupational History    Not on file   Social Needs    Financial resource strain: Not on file    Food insecurity     Worry: Not on file     Inability: Not on file   [employer] needs     Medical: Not on file     Non-medical: Not on file   Tobacco Use    Smoking status: Former Smoker     Packs/day: 0.50     Years: 20.00     Pack years: 10.00     Last attempt to quit: 1988     Years since quittin.2    Smokeless tobacco: Never Used   Substance and Sexual Activity    Alcohol use: No    Drug use: No    Sexual activity: Never   Lifestyle    Physical activity     Days per week: Not on file     Minutes per session: Not on file    Stress: Not on file   Relationships    Social connections     Talks on phone: Not on file     Gets together: Not on file     Attends Jain service: Not on file     Active member of club or organization: Not on file     Attends meetings of clubs or organizations: Not on file     Relationship status: Not on file    Intimate partner violence     Fear of current or ex partner: Not on file     Emotionally abused: Not on file     Physically abused: Not on file     Forced sexual activity: Not on file   Other Topics Concern    Not on file   Social History Narrative    Not on file      Current Outpatient Medications   Medication Sig    HYDROcodone-acetaminophen (NORCO) 5-325 mg per tablet Take 1 Tab by mouth every six (6) hours as needed.  metFORMIN (GLUCOPHAGE) 500 mg tablet Please restart Metformin on Saturday, 8/22/2020  TAKE 1 TABLET BY MOUTH TWICE A DAY WITH MEALS    rosuvastatin (CRESTOR) 20 mg tablet Take 1 Tab by mouth nightly.  Eliquis 5 mg tablet TAKE 1 TABLET BY MOUTH TWICE A DAY    famotidine (PEPCID) 40 mg tablet Take 40 mg by mouth two (2) times daily as needed.  aspirin delayed-release 81 mg tablet TAKE 1 TABLET BY MOUTH EVERY DAY    glucose blood VI test strips (Contour Next Test Strips) strip Check blood sugar once a day e11.9    albuterol (PROVENTIL HFA, VENTOLIN HFA, PROAIR HFA) 90 mcg/actuation inhaler INHALE 2 PUFFS BY MOUTH EVERY 4 HOURS AS NEEDED FOR WHEEZING    hydroCHLOROthiazide (HYDRODIURIL) 25 mg tablet TAKE 1 TABLET BY MOUTH EVERY DAY    ALPRAZolam (XANAX) 0.5 mg tablet TAKE 1 TABLET BY MOUTH 3 TIMES A DAY AS NEEDED FOR ANXIETY    metoprolol succinate (TOPROL-XL) 50 mg XL tablet TAKE 1 TABLET BY MOUTH EVERY DAY    propafenone (RYTHMOL) 150 mg tablet TAKE 1 TABLET BY MOUTH EVERY 8 HOURS    pantoprazole (PROTONIX) 40 mg tablet TAKE 1 TABLET BY MOUTH EVERY DAY    MULTIVITAMIN PO Take 1 Tab by mouth daily.  Lancets (FREESTYLE LANCETS) Misc Test once daily. (diagnosis code: 250.00)    Blood-Glucose Meter monitoring kit Once daily before breakfast     No current facility-administered medications for this visit.           Review of Symptoms:  11 systems reviewed, negative other than as stated in the HPI    Physical ExamPhysical Exam:    Vitals:    09/09/20 1025   BP: 140/70   Pulse: 70   Resp: 16   SpO2: 96%   Weight: 203 lb 1.6 oz (92.1 kg)   Height: 5' 2\" (1.575 m)     Body mass index is 37.15 kg/m². General PE  Gen:  NAD  Mental Status - Alert. General Appearance - Not in acute distress. HEENT:  PERRL, no carotid bruits or JVD  Chest and Lung Exam   Inspection: Accessory muscles - No use of accessory muscles in breathing. Auscultation:   Breath sounds: - Normal.   Cardiovascular   Inspection: Jugular vein - Bilateral - Inspection Normal.   Palpation/Percussion:   Apical Impulse: - Normal.   Auscultation: Rhythm - Regular. Heart Sounds - S1 WNL and S2 WNL. No S3 or S4. Murmurs & Other Heart Sounds: Auscultation of the heart reveals - No Murmurs. Peripheral Vascular   Upper Extremity: Inspection - Bilateral - No Cyanotic nailbeds or Digital clubbing. Lower Extremity:   Palpation: Edema - Bilateral - No edema. Abdomen:   Soft, non-tender, bowel sounds are active.   Neuro: A&O times 3, CN and motor grossly WNL    Labs:   Lab Results   Component Value Date/Time    Cholesterol, total 173 02/19/2020 10:11 AM    Cholesterol, total 164 08/30/2019 12:40 PM    Cholesterol, total 147 09/25/2018 09:33 AM    Cholesterol, total 180 10/18/2017 10:24 AM    Cholesterol, total 159 05/02/2017 09:21 AM    HDL Cholesterol 58 02/19/2020 10:11 AM    HDL Cholesterol 55 08/30/2019 12:40 PM    HDL Cholesterol 46 09/25/2018 09:33 AM    HDL Cholesterol 56 10/18/2017 10:24 AM    HDL Cholesterol 58 05/02/2017 09:21 AM    LDL, calculated 90 02/19/2020 10:11 AM    LDL, calculated 81 08/30/2019 12:40 PM    LDL, calculated 77 09/25/2018 09:33 AM    LDL, calculated 94 10/18/2017 10:24 AM    LDL, calculated 81 05/02/2017 09:21 AM    Triglyceride 123 02/19/2020 10:11 AM    Triglyceride 138 08/30/2019 12:40 PM    Triglyceride 119 09/25/2018 09:33 AM    Triglyceride 150 (H) 10/18/2017 10:24 AM    Triglyceride 99 05/02/2017 09:21 AM    CHOL/HDL Ratio 2.9 01/12/2017 04:20 AM    CHOL/HDL Ratio 5.5 (H) 09/13/2010 01:35 PM    CHOL/HDL Ratio 4.1 04/05/2010 10:02 AM     Lab Results   Component Value Date/Time     (H) 01/05/2017 07:42 AM     Lab Results   Component Value Date/Time    Sodium 142 08/12/2020 11:46 AM    Potassium 4.5 08/12/2020 11:46 AM    Chloride 98 08/12/2020 11:46 AM    CO2 30 (H) 08/12/2020 11:46 AM    Anion gap 5 09/25/2019 09:45 AM    Glucose 113 (H) 08/12/2020 11:46 AM    BUN 15 08/12/2020 11:46 AM    Creatinine 0.72 08/12/2020 11:46 AM    BUN/Creatinine ratio 21 08/12/2020 11:46 AM    GFR est AA 93 08/12/2020 11:46 AM    GFR est non-AA 81 08/12/2020 11:46 AM    Calcium 10.1 08/12/2020 11:46 AM    Bilirubin, total 0.3 08/12/2020 11:46 AM    Alk. phosphatase 49 08/12/2020 11:46 AM    Protein, total 6.9 08/12/2020 11:46 AM    Albumin 4.5 08/12/2020 11:46 AM    Globulin 3.9 09/15/2019 03:15 AM    A-G Ratio 1.9 08/12/2020 11:46 AM    ALT (SGPT) 14 08/12/2020 11:46 AM       EKG:  NSR      Assessment:     Assessment:      1. S/P cardiac cath    2. Essential hypertension    3. Mixed hyperlipidemia    4. Paroxysmal atrial fibrillation (HCC)    5. Coronary artery disease due to lipid rich plaque        Orders Placed This Encounter    AMB POC EKG ROUTINE W/ 12 LEADS, INTER & REP     Order Specific Question:   Reason for Exam:     Answer:   routine    HYDROcodone-acetaminophen (NORCO) 5-325 mg per tablet     Sig: Take 1 Tab by mouth every six (6) hours as needed. Plan:       ASHD  Significant small vessel disease per cardiac cath 8/2020: 1st RPL lesion, 95% stenosed. Small for PCI. Continue medical mgmt: ASA, BB statin    Paroxysmal atrial fibrillation  8/2020 2-week event monitor without recurrent A fib  Continue Rhythmol and Toprol  Continue Eliquis for stroke prevention     Essential hypertension  BP controlled. Continue anti-hypertensive therapy and low sodium diet     Mixed hyperlipidemia  LDL 90 in 2/2020 Her rosuvastatin was increased then to 20 mg  Labs and lipids per PCP       Continue current care and f/u in 6 months.     [facility]    9/9/2020         Patient seen, examined by me personally. Plan discussed as detailed. Agree with note as outlined by  NP. I confirm findings in history and physical exam. No additional findings noted. Agree with plan as outlined above. Radial site has healed well.     Javi Lion MD

## 2020-09-09 NOTE — PROGRESS NOTES
Chief Complaint   Patient presents with   [facility] Follow Up     S/P left heart cath       1. Have you been to the ER, urgent care clinic since your last visit? Hospitalized since your last visit? Yes 8/20    2. Have you seen or consulted any other health care providers outside of the [address] [place] [address] since your last visit? Include any pap smears or colon screening. No    Patient C/O palpitation cath site feels fine.

## 2020-09-09 NOTE — LETTER
9/9/20 Patient: Brendan Bernstein YOB: 1940 Date of Visit: 9/9/2020 Yi Rothman MD 
[address]  [facility] [address] [facility] [address] [phone] VIA In Basket Dear Yi Rothman MD, Thank you for referring Ms. Larissa Mckeon to [address] for evaluation. My notes for this consultation are attached. If you have questions, please do not hesitate to call me. I look forward to following your patient along with you. Sincerely, [address]unez MD

## 2020-09-21 ENCOUNTER — OFFICE VISIT (OUTPATIENT)
Dept: INTERNAL MEDICINE CLINIC | Age: 80
End: 2020-09-21
Payer: MEDICARE

## 2020-09-21 VITALS
HEART RATE: 80 BPM | OXYGEN SATURATION: 99 % | DIASTOLIC BLOOD PRESSURE: 80 MMHG | WEIGHT: 204.8 LBS | SYSTOLIC BLOOD PRESSURE: 140 MMHG | TEMPERATURE: 98 F | BODY MASS INDEX: 37.69 KG/M2 | HEIGHT: 62 IN | RESPIRATION RATE: 16 BRPM

## 2020-09-21 DIAGNOSIS — E78.2 MIXED HYPERLIPIDEMIA: ICD-10-CM

## 2020-09-21 DIAGNOSIS — I10 ESSENTIAL HYPERTENSION: Primary | ICD-10-CM

## 2020-09-21 DIAGNOSIS — M48.061 SPINAL STENOSIS OF LUMBAR REGION, UNSPECIFIED WHETHER NEUROGENIC CLAUDICATION PRESENT: ICD-10-CM

## 2020-09-21 DIAGNOSIS — E11.21 CONTROLLED TYPE 2 DIABETES MELLITUS WITH DIABETIC NEPHROPATHY, WITHOUT LONG-TERM CURRENT USE OF INSULIN (HCC): ICD-10-CM

## 2020-09-21 DIAGNOSIS — E66.01 SEVERE OBESITY WITH BODY MASS INDEX (BMI) OF 35.0 TO 39.9 WITH SERIOUS COMORBIDITY (HCC): ICD-10-CM

## 2020-09-21 DIAGNOSIS — M46.92 SPONDYLITIS, CERVICAL (HCC): ICD-10-CM

## 2020-09-21 DIAGNOSIS — R79.89 LOW VITAMIN D LEVEL: ICD-10-CM

## 2020-09-21 DIAGNOSIS — Z23 NEEDS FLU SHOT: ICD-10-CM

## 2020-09-21 DIAGNOSIS — Z23 ENCOUNTER FOR IMMUNIZATION: ICD-10-CM

## 2020-09-21 PROCEDURE — G8754 DIAS BP LESS 90: HCPCS | Performed by: INTERNAL MEDICINE

## 2020-09-21 PROCEDURE — G8427 DOCREV CUR MEDS BY ELIG CLIN: HCPCS | Performed by: INTERNAL MEDICINE

## 2020-09-21 PROCEDURE — G0463 HOSPITAL OUTPT CLINIC VISIT: HCPCS | Performed by: INTERNAL MEDICINE

## 2020-09-21 PROCEDURE — G8432 DEP SCR NOT DOC, RNG: HCPCS | Performed by: INTERNAL MEDICINE

## 2020-09-21 PROCEDURE — G8417 CALC BMI ABV UP PARAM F/U: HCPCS | Performed by: INTERNAL MEDICINE

## 2020-09-21 PROCEDURE — 3051F HG A1C>EQUAL 7.0%<8.0%: CPT | Performed by: INTERNAL MEDICINE

## 2020-09-21 PROCEDURE — G8399 PT W/DXA RESULTS DOCUMENT: HCPCS | Performed by: INTERNAL MEDICINE

## 2020-09-21 PROCEDURE — G8753 SYS BP > OR = 140: HCPCS | Performed by: INTERNAL MEDICINE

## 2020-09-21 PROCEDURE — 1090F PRES/ABSN URINE INCON ASSESS: CPT | Performed by: INTERNAL MEDICINE

## 2020-09-21 PROCEDURE — 1101F PT FALLS ASSESS-DOCD LE1/YR: CPT | Performed by: INTERNAL MEDICINE

## 2020-09-21 PROCEDURE — 99214 OFFICE O/P EST MOD 30 MIN: CPT | Performed by: INTERNAL MEDICINE

## 2020-09-21 PROCEDURE — 90694 VACC AIIV4 NO PRSRV 0.5ML IM: CPT

## 2020-09-21 PROCEDURE — G8536 NO DOC ELDER MAL SCRN: HCPCS | Performed by: INTERNAL MEDICINE

## 2020-09-21 NOTE — PATIENT INSTRUCTIONS
Vaccine Information Statement    Influenza (Flu) Vaccine (Inactivated or Recombinant): What You Need to Know    Many Vaccine Information Statements are available in [place] and other languages. See www.immunize.org/vis  Hojas de información sobre vacunas están disponibles en español y en muchos otros idiomas. Visite www.immunize.org/vis    1. Why get vaccinated? Influenza vaccine can prevent influenza (flu). Flu is a contagious disease that spreads around the [facility] every year, usually between October and May. Anyone can get the flu, but it is more dangerous for some people. Infants and young children, people 72years of age and older, pregnant women, and people with certain health conditions or a weakened immune system are at greatest risk of flu complications. Pneumonia, bronchitis, sinus infections and ear infections are examples of flu-related complications. If you have a medical condition, such as heart disease, cancer or diabetes, flu can make it worse. Flu can cause fever and chills, sore throat, muscle aches, fatigue, cough, headache, and runny or stuffy nose. Some people may have vomiting and diarrhea, though this is more common in children than adults. Each year thousands of people in the [facility] die from flu, and many more are hospitalized. Flu vaccine prevents millions of illnesses and flu-related visits to the doctor each year. 2. Influenza vaccines     CDC recommends everyone 10months of age and older get vaccinated every flu season. Children 6 months through 6years of age may need 2 doses during a single flu season. Everyone else needs only 1 dose each flu season. It takes about 2 weeks for protection to develop after vaccination. There are many flu viruses, and they are always changing. Each year a new flu vaccine is made to protect against three or four viruses that are likely to cause disease in the upcoming flu season.  Even when the vaccine doesnt exactly match these viruses, it may still provide some protection. Influenza vaccine does not cause flu. Influenza vaccine may be given at the same time as other vaccines. 3. Talk with your health care provider    Tell your vaccine provider if the person getting the vaccine:   Has had an allergic reaction after a previous dose of influenza vaccine, or has any severe, life-threatening allergies.  Has ever had Guillain-Barré Syndrome (also called GBS). In some cases, your health care provider may decide to postpone influenza vaccination to a future visit. People with minor illnesses, such as a cold, may be vaccinated. People who are moderately or severely ill should usually wait until they recover before getting influenza vaccine. Your health care provider can give you more information. 4. Risks of a reaction     Soreness, redness, and swelling where shot is given, fever, muscle aches, and headache can happen after influenza vaccine.  There may be a very small increased risk of Guillain-Barré Syndrome (GBS) after inactivated influenza vaccine (the flu shot). Sterling Xavi children who get the flu shot along with pneumococcal vaccine (PCV13), and/or DTaP vaccine at the same time might be slightly more likely to have a seizure caused by fever. Tell your health care provider if a child who is getting flu vaccine has ever had a seizure. People sometimes faint after medical procedures, including vaccination. Tell your provider if you feel dizzy or have vision changes or ringing in the ears. As with any medicine, there is a very remote chance of a vaccine causing a severe allergic reaction, other serious injury, or death. 5. What if there is a serious problem? An allergic reaction could occur after the vaccinated person leaves the clinic.  If you see signs of a severe allergic reaction (hives, swelling of the face and throat, difficulty breathing, a fast heartbeat, dizziness, or weakness), call 9-1-1 and get the person to the nearest hospital.    For other signs that concern you, call your health care provider. Adverse reactions should be reported to the Vaccine Adverse Event Reporting System (VAERS). Your health care provider will usually file this report, or you can do it yourself. Visit the VAERS website at www.vaers. [facility].gov or call 6-264.670.8902. VAERS is only for reporting reactions, and VAERS staff do not give medical advice. 6. The National Vaccine Injury Compensation Program    The [facility] Vaccine Injury Compensation Program (VICP) is a federal program that was created to compensate people who may have been injured by certain vaccines. Visit the VICP website at www.[facility].gov/vaccinecompensation or call 4-478.838.2807 to learn about the program and about filing a claim. There is a time limit to file a claim for compensation. 7. How can I learn more?  Ask your health care provider.  Call your local or state health department.  Contact the Centers for Disease Control and Prevention (CDC):  - Call 3-199.555.9888 (1-800-CDC-INFO) or  - Visit CDCs influenza website at www.cdc.gov/flu    Vaccine Information Statement (Interim)  Inactivated Influenza Vaccine   8/15/2019  [address] [id number]   Department of Health and Human Services  Centers for Disease Control and Prevention    Office Use Only

## 2020-09-21 NOTE — PROGRESS NOTES
Chief Complaint   Patient presents with    Medication Evaluation     Reviewed record in preparation for visit and have obtained necessary documentation. Identified pt with two pt identifiers(name and ).       Health Maintenance Due   Topic    Shingrix Vaccine Age 50> (1 of 2)    Foot Exam Q1     GLAUCOMA SCREENING Q2Y     Eye Exam Retinal or Dilated     A1C test (Diabetic or Prediabetic)     MICROALBUMIN Q1     Flu Vaccine (1)    Medicare Yearly Exam          Chief Complaint   Patient presents with    Medication Evaluation        Wt Readings from Last 3 Encounters:   20 204 lb 12.8 oz (92.9 kg)   20 203 lb 1.6 oz (92.1 kg)   20 203 lb (92.1 kg)     Temp Readings from Last 3 Encounters:   20 98 °F (36.7 °C) (Temporal)   20 98.5 °F (36.9 °C)   20 97.9 °F (36.6 °C)     BP Readings from Last 3 Encounters:   20 (!) 140/80   20 140/70   20 128/53     Pulse Readings from Last 3 Encounters:   20 80   20 70   20 67           Learning Assessment:  :     Learning Assessment 2018 2017 3/1/2017 2015 2015 2014   PRIMARY LEARNER Patient Patient Patient Patient Patient Patient   HIGHEST LEVEL OF EDUCATION - PRIMARY LEARNER  - GRADUATED HIGH SCHOOL OR GED GRADUATED HIGH SCHOOL OR GED GRADUATED HIGH SCHOOL OR GED GRADUATED HIGH SCHOOL OR GED GRADUATED HIGH SCHOOL OR GED   BARRIERS PRIMARY LEARNER - NONE NONE NONE - NONE   CO-LEARNER CAREGIVER - - - No - No   PRIMARY LANGUAGE ENGLISH ENGLISH ENGLISH ENGLISH ENGLISH ENGLISH    NEED - - - - - No   LEARNER PREFERENCE PRIMARY READING READING READING READING READING READING     - - LISTENING DEMONSTRATION - -   LEARNING SPECIAL TOPICS - - - - - no   ANSWERED BY patient patient  patient griselda patient patient   RELATIONSHIP SELF SELF SELF SELF SELF SELF       Depression Screening:  :     3 most recent [facility] Screens 2020   Little interest or pleasure in doing things Not at all   Feeling down, depressed, irritable, or hopeless Not at all   Total Score PHQ 2 0       Fall Risk Assessment:  :     Fall Risk Assessment, last 12 mths 7/21/2020   Able to walk? Yes   Fall in past 12 months? No       Abuse Screening:  :     Abuse Screening Questionnaire 7/21/2020 1/15/2020 1/8/2019 9/25/2018 12/4/2017 10/18/2017 6/13/2017   Do you ever feel afraid of your partner? N N N N N N N   Are you in a relationship with someone who physically or mentally threatens you? N N N N N N N   Is it safe for you to go home? Y Y Y Y Y Y Y       Coordination of Care Questionnaire:  :     1) Have you been to an emergency room, urgent care clinic since your last visit? no   Hospitalized since your last visit? no             2) Have you seen or consulted any other health care providers outside of [address] [place] [address] since your last visit? no  (Include any pap smears or colon screenings in this section.)    3) Do you have an Advance Directive on file? no    4) Are you interested in receiving information on Advance Directives? NO      Patient is accompanied by self I have received verbal consent from Pamela Patten to discuss any/all medical information while they are present in the room. Reviewed record  In preparation for visit and have obtained necessary documentation. Hydroxychloroquine Pregnancy And Lactation Text: This medication has been shown to cause fetal harm but it isn't assigned a Pregnancy Risk Category. There are small amounts excreted in breast milk.

## 2020-10-19 DIAGNOSIS — E55.9 VITAMIN D DEFICIENCY: ICD-10-CM

## 2020-10-19 NOTE — PROGRESS NOTES
Follow Up Visit    Jayda Castorena is a 70 y.o. female. she presents for Medication Evaluation    Cardiovascular Review  The patient has hypertension, atrial fibrillation and hyperlipidemia. She reports taking medications as instructed, no medication side effects noted. Diet and Lifestyle: generally follows a low fat low cholesterol diet, generally follows a low sodium diet. Lab review: labs reviewed and discussed with patient. Endocrine Review  She is seen for diabetes. Since last visit: she has been doing well. Home glucose monitoring: is performed regularly. She reports medication compliance: compliant all of the time. She reports the following medication side effects: none. Diabetic diet compliance: compliant most of the time. Further diabetic ROS: no hypoglycemia. Lab review: labs ordered today. She will have the flu shot today. Patient Active Problem List   Diagnosis Code    Hyperlipemia E78.5    Arthritis M19.90    Essential hypertension I10    Headache(784.0) R51    Spondylitis, cervical (HCC) M46.92    Tingling sensation R20.2    Chest pain R07.9    Anemia D64.9    Cyst of right kidney N28.1    Hip pain, right M25.551    Acute bronchitis J20.9    Visual floaters H43.399    Postmenopausal state Z78.0    Vitamin D deficiency E55.9    Bilateral hip pain M25.551, M25.552    Postmenopausal disorder N95.1    Numbness and tingling of right leg R20.0, R20.2    Foot pain, left M79.672    Rotator cuff (capsule) sprain and strain [id number]    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 901    Cataract H26.9    Peripheral autonomic neuropathy due to DM ([facility]) E11.43    Pre-ulcerative calluses L84    Type 2 diabetes mellitus with pressure callus ([facility]) E11.628, L84    Hammer toe of right foot M20.41    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 (HCC) E11.69, M21.969    DAVID (generalized anxiety disorder) F41.1    Abnormal finding on MRI of brain R90.89    Tinea pedis B35.3    Arthralgia of right hip M25.551    Swollen gland R59.9    Acute pharyngitis J02.9    Diabetes mellitus type 2, controlled (HCC) E11.9    Multiple thyroid nodules E04.2    Skipped heart beats R01.9    Helicobacter pylori (H. pylori) infection A04.8    Intractable migraine with aura without status migrainosus G43.119    Chronic asthma with status asthmaticus J45.902    Right foot injury S99.921A    Abdominal mass, left upper quadrant R19.02    Left upper quadrant pain R10.12    Paroxysmal atrial fibrillation (HCC) I48.0    Severe obesity (BMI 35.0-39. 9) E66.01    Varicose veins of both lower extremities with pain I83.813    Venous insufficiency of both lower extremities I87.2    Lumbar stenosis M48.061    S/P cardiac cath Z98.890         Prior to Admission medications    Medication Sig Start Date End Date Taking? Authorizing Provider   metoprolol succinate (TOPROL-XL) 50 mg XL tablet TAKE 1 TABLET BY MOUTH EVERY DAY 9/9/20  Yes Sofía Ardon MD   rosuvastatin (CRESTOR) 20 mg tablet Take 1 Tab by mouth nightly. 8/12/20  Yes Janelle Mg NP   Eliquis 5 mg tablet TAKE 1 TABLET BY MOUTH TWICE A DAY 8/10/20  Yes Janelle Mg NP   aspirin delayed-release 81 mg tablet TAKE 1 TABLET BY MOUTH EVERY DAY 5/7/20  Yes Sofía Ardon MD   hydroCHLOROthiazide (HYDRODIURIL) 25 mg tablet TAKE 1 TABLET BY MOUTH EVERY DAY 4/1/20  Yes Sofía Ardon MD   propafenone (RYTHMOL) 150 mg tablet TAKE 1 TABLET BY MOUTH EVERY 8 HOURS 12/17/19  Yes Janelle Mg NP   pantoprazole (PROTONIX) 40 mg tablet TAKE 1 TABLET BY MOUTH EVERY DAY   Yes Provider, Historical   MULTIVITAMIN PO Take 1 Tab by mouth daily.    Yes Provider, Historical   HYDROcodone-acetaminophen ([place]) 5-325 mg per tablet Take 1 Tab by mouth every six (6) hours as needed. 8/26/20   Provider, Historical   metFORMIN (GLUCOPHAGE) 500 mg tablet Please restart Metformin on Saturday, 8/22/2020  TAKE 1 TABLET BY MOUTH TWICE A DAY WITH MEALS 8/20/20   Tiffani Freedman NP   famotidine (PEPCID) 40 mg tablet Take 40 mg by mouth two (2) times daily as needed. 6/3/20   Provider, Historical   glucose blood VI test strips (Contour Next Test Strips) strip Check blood sugar once a day e11.9 4/20/20   Halima Calderón MD   albuterol (PROVENTIL HFA, VENTOLIN HFA, PROAIR HFA) 90 mcg/actuation inhaler INHALE 2 PUFFS BY MOUTH EVERY 4 HOURS AS NEEDED FOR WHEEZING 4/1/20   Jose Antonio Dey MD   ALPRAZolam Georganna Glory) 0.5 mg tablet TAKE 1 TABLET BY MOUTH 3 TIMES A DAY AS NEEDED FOR ANXIETY 4/1/20   Jose Antonio Dey MD   Lancets (FREESTYLE LANCETS) Misc Test once daily. (diagnosis code: 250.00) 10/24/13   Pebbles Flores MD   Blood-Glucose Meter monitoring kit Once daily before breakfast 9/20/13   Pebbles Flores MD         Health Maintenance   Topic Date Due    Shingrix Vaccine Age 50> (1 of 2) 12/26/1992    Foot Exam Q1  03/06/2019    GLAUCOMA SCREENING Q2Y  07/17/2019    Eye Exam Retinal or Dilated  07/17/2019    A1C test (Diabetic or Prediabetic)  08/19/2020    MICROALBUMIN Q1  08/30/2020    Medicare Yearly Exam  10/08/2020    Lipid Screen  02/19/2021    DTaP/Tdap/Td series (2 - Td) 05/26/2026    Bone Densitometry (Dexa) Screening  Completed    Flu Vaccine  Completed    Pneumococcal 65+ years  Completed       Review of Systems   Constitutional: Negative. Respiratory: Negative. Cardiovascular: Negative. Genitourinary: Negative.             Visit Vitals  BP (!) 140/80 (BP 1 Location: Right arm, BP Patient Position: Sitting)   Pulse 80   Temp 98 °F (36.7 °C) (Temporal)   Resp 16   Ht 5' 2\" (1.575 m)   Wt 204 lb 12.8 oz (92.9 kg)   LMP 02/18/1983   SpO2 99%   BMI 37.46 kg/m²       Physical Exam  Constitutional: Appearance: Normal appearance. Cardiovascular:      Rate and Rhythm: Normal rate and regular rhythm. Pulses: Normal pulses. Heart sounds: Normal heart sounds. Pulmonary:      Effort: Pulmonary effort is normal.      Breath sounds: Normal breath sounds. Neurological:      Mental Status: She is alert. ASSESSMENT/PLAN    Diagnoses and all orders for this visit:    1. Essential hypertension  -     CBC WITH AUTOMATED DIFF  -     METABOLIC PANEL, COMPREHENSIVE  -     FLU (FLUAD QUAD INFLUENZA VACCINE,QUAD,ADJUVANTED)    2. Controlled type 2 diabetes mellitus with diabetic nephropathy, without long-term current use of insulin (HCC)  -     HEMOGLOBIN A1C WITH EAG  -     TSH 3RD GENERATION    3. Mixed hyperlipidemia  -     LIPID PANEL    4. Spinal stenosis of lumbar region, unspecified whether neurogenic claudication present    5. Low vitamin D level  -     VITAMIN D, 25 HYDROXY    6. Spondylitis, cervical (HCC)    7. Severe obesity with body mass index (BMI) of 35.0 to 39.9 with serious comorbidity ([facility] [address]    8. Encounter for immunization  -     FLU (FLUAD QUAD INFLUENZA VACCINE,QUAD,ADJUVANTED)    9. Needs flu shot  -     FLU (FLUAD QUAD INFLUENZA VACCINE,QUAD,ADJUVANTED)        Follow-up and Dispositions    · Return in about 3 months (around 12/21/2020) for Medicare Wellness Visit- 30 minute appointment.

## 2020-10-20 ENCOUNTER — TELEPHONE (OUTPATIENT)
Dept: CARDIOLOGY CLINIC | Age: 80
End: 2020-10-20

## 2020-10-20 RX ORDER — ASPIRIN 81 MG/1
TABLET ORAL
Qty: 30 TAB | Refills: 5 | Status: SHIPPED | OUTPATIENT
Start: 2020-10-20 | End: 2021-07-21

## 2020-10-20 NOTE — TELEPHONE ENCOUNTER
Spoke to patient using 2 identifiers. Per Miller Ronquillo NP, patient was made aware of the following:    Paresh Gonzalez NP  You 39 minutes ago (2:50 PM)       Bruising is expected from being on Eliquis. Unfortunately nothing we can do, just recommend to be extra cautious.  It's possible that she unintentionally injured herself in her sleep. Would recommend she continue Eliquis, no dose changes, as it is helping prevent a stroke. Patient verbalized understanding.

## 2020-10-20 NOTE — TELEPHONE ENCOUNTER
Spoke to patient using 2 identifiers. Patient reported R arm bruising about quarter size and 3 other dime size bruises. No bumps/lumps. Noticed on 10/17/20. Dark red in appearance at first, now lighter in color. Denies fever/chills. Patient stated she has not done anything to cause bruising - such as hitting her arm, getting injections, etc.. Regan Medellin Takes Eliquis 5 mg twice daily. Please advise.

## 2020-10-20 NOTE — TELEPHONE ENCOUNTER
Please call patient has bruising thinks that Eliquis might be causing this    [phone]    Thanks  Marita Price

## 2020-10-22 DIAGNOSIS — E11.9 DIABETES MELLITUS WITHOUT COMPLICATION (HCC): ICD-10-CM

## 2020-10-22 DIAGNOSIS — Z23 ENCOUNTER FOR IMMUNIZATION: ICD-10-CM

## 2020-10-22 DIAGNOSIS — E11.21 CONTROLLED TYPE 2 DIABETES MELLITUS WITH DIABETIC NEPHROPATHY, WITHOUT LONG-TERM CURRENT USE OF INSULIN (HCC): ICD-10-CM

## 2020-10-22 DIAGNOSIS — I10 ESSENTIAL HYPERTENSION: ICD-10-CM

## 2020-10-22 DIAGNOSIS — G43.119 INTRACTABLE MIGRAINE WITH AURA WITHOUT STATUS MIGRAINOSUS: ICD-10-CM

## 2020-10-23 ENCOUNTER — PATIENT OUTREACH (OUTPATIENT)
Dept: CASE MANAGEMENT | Age: 80
End: 2020-10-23

## 2020-10-23 NOTE — PROGRESS NOTES
Patient has graduated from the Complex Case Management  program on 10/23/20. Patient/family has the ability to self-manage at this time Care management goals have been completed. No further Ambulatory Care Manager follow up scheduled. Goals Addressed                 This Visit's Progress     COMPLETED: Patient/Family verbalizes understanding of self-management of chronic disease. 10/23/20 Patient reports:  - Patient has been experiencing bruising on arms recently and does not know what is causing them. Currently on Eliquis. States she does not remember bumping into anything and bruises seem to appear randomly. States she did discuss with NP at cardiologist office and was recommended to follow up with PCP. ACM also encouraged follow up with PCP. Patient verbalized understanding.    - ACM supplied contact information, discussed change in [facility] workflow to COVID-19 Response Team, length of time since outreaches and length of COVID-19 pandemic.   - Episode of care will be resolved at this time. PCP may send referral to case management referral pool if patient needs additional case management. Rickie Castro RN  Ambulatory Care Manager      5/19/20 Patient reports:  - LOV 5/14/20 (virtual) for sore throat. Pt thought she would be getting Z-jossie but when picked up at pharmacy discovered it was clindamycin. Due to possible side effects of diarrhea, patient has not taken this medication. She reports she will send PCP a [place] message inquiring about this and let him know she is feeling somewhat better. She has been using home therapies of tea and gargling.   - ACM explained change in workflow to COVID-19 Response Team and supplied new contact number.  - [facility] offered to supply COVID-19 resources. Patient declined. Stated she lost a brother-in-law in April to the virus and doesn't feel she needs the information.   - ACM will FU with patient when pandemic resolves or is instructed to return to previous CCM duties. Joel Gupta RN  Ambulatory Care Manager     3/10/2020 Patient reports:  - no change in toe pain of left foot. - has appt set for Dr. Lucia Glez on March 19th  - elevation does not seem to relieve pain. Pt has not taken any medication for pain. ACM encouraged trying Tylenol. Patient will consider.   - new contact information supplied  Joel Gupta RN  Ambulatory Care Manager     2/19/2020  ACM apologized for not being able to meet with patient while she was in office today due to tarpipe in meeting. Patient reports:  - continues with complaint of toe aching and swelling.   - Labs ordered today, PCP will review and notify patient if any abnormalities. - patient appreciative of call and hopes to meet F2F with ACM in the future   Joel Gupta RN  Ambulatory Care Manager    2/3/2020 Patient states:  - continues with physical therapy 2 x weekly. Doesn't feel like it is really helping. Wonders if gout could be what she is experiencing. Describes pain as shooting down leg through to big toe. Not constant, but occasional ever since her surgery. PT suggested nerves may just need to settle down. - attended appointment with Dr. Kip Perez for second opinion. Patient was given cortisone injection around ankle, but reports it has not helped and she did not care for bedside manor of physician so will not be going back. - offered to schedule appointment with PCP. Patient will have to check with daughter who provides transportation. ACM encouraged patient to contact [employer] and sign up for help with transportation. Patient stated she would call them. - contact information supplied  Joel Gupta RN  Ambulatory Care Manager     1/21/2020 Skills and education necessary to properly manage spinal stenosis of lumbar region, DM type 2, and HTN  History: LOV with PCP: 10/8/19 for annual [address] Last ED visit: 12/27/19 for migraine. Last cardio OV: 1/15/2020 routine FU. Patient had spinal surgery 9/10/19.  Lumbar Laminectomy L3-4 and L4-5 with posterior lumbar fusion L3-L5 performed by Dr. Richa Hicks. In hospital for 4 days and discharged to [facility] rehab for 11 days. Now attends PT with [facility]. Working on left hip, leg and foot pain. Attended therapy this morning and will go back on Thursday. Patient has appointment with Dr. Santos Agee tomorrow for second opinion on left foot pain which patient describes as shooting pain and states top of foot appears to be swollen. Patient reports DM and HTN are stable.  this morning. Current level of understanding:patient apears to have good understanding of her health   Desired Outcome: patient will decrease pain in left hip, leg and foot over the next 90 days. Plan: patient will continue to attend [facility], FU with Ortho. ACM will educate patient on alternative pain relief methods. Rickie Castro, DAVE  Ambulatory Care Manager                Patient has Ambulatory Care Manager's contact information for any further questions, concerns, or needs.   Patients upcoming visits:    Future Appointments   Date Time Provider Maggie Molina   12/7/2020  9:00 AM MD BRITTNEY Lugo BS AMB   3/15/2021  9:00 AM Yuan Mccauley MD [facility] BS AMB      Rickie Castro, RN  Ambulatory Care Manager

## 2020-10-26 ENCOUNTER — HOSPITAL ENCOUNTER (OUTPATIENT)
Dept: LAB | Age: 80
Discharge: HOME | End: 2020-10-26
Payer: MEDICARE

## 2020-10-26 ENCOUNTER — OFFICE VISIT (OUTPATIENT)
Dept: INTERNAL MEDICINE CLINIC | Age: 80
End: 2020-10-26
Payer: MEDICARE

## 2020-10-26 VITALS
OXYGEN SATURATION: 97 % | HEIGHT: 62 IN | SYSTOLIC BLOOD PRESSURE: 130 MMHG | WEIGHT: 205 LBS | TEMPERATURE: 97.3 F | RESPIRATION RATE: 16 BRPM | BODY MASS INDEX: 37.73 KG/M2 | DIASTOLIC BLOOD PRESSURE: 80 MMHG | HEART RATE: 72 BPM

## 2020-10-26 DIAGNOSIS — T14.8XXA BRUISING: Primary | ICD-10-CM

## 2020-10-26 PROCEDURE — G8427 DOCREV CUR MEDS BY ELIG CLIN: HCPCS | Performed by: INTERNAL MEDICINE

## 2020-10-26 PROCEDURE — G8417 CALC BMI ABV UP PARAM F/U: HCPCS | Performed by: INTERNAL MEDICINE

## 2020-10-26 PROCEDURE — G8536 NO DOC ELDER MAL SCRN: HCPCS | Performed by: INTERNAL MEDICINE

## 2020-10-26 PROCEDURE — G8754 DIAS BP LESS 90: HCPCS | Performed by: INTERNAL MEDICINE

## 2020-10-26 PROCEDURE — 36415 COLL VENOUS BLD VENIPUNCTURE: CPT

## 2020-10-26 PROCEDURE — 83036 HEMOGLOBIN GLYCOSYLATED A1C: CPT

## 2020-10-26 PROCEDURE — 80061 LIPID PANEL: CPT

## 2020-10-26 PROCEDURE — G8432 DEP SCR NOT DOC, RNG: HCPCS | Performed by: INTERNAL MEDICINE

## 2020-10-26 PROCEDURE — 80053 COMPREHEN METABOLIC PANEL: CPT

## 2020-10-26 PROCEDURE — 84443 ASSAY THYROID STIM HORMONE: CPT

## 2020-10-26 PROCEDURE — 99214 OFFICE O/P EST MOD 30 MIN: CPT | Performed by: INTERNAL MEDICINE

## 2020-10-26 PROCEDURE — 85025 COMPLETE CBC W/AUTO DIFF WBC: CPT

## 2020-10-26 PROCEDURE — 1090F PRES/ABSN URINE INCON ASSESS: CPT | Performed by: INTERNAL MEDICINE

## 2020-10-26 PROCEDURE — 82306 VITAMIN D 25 HYDROXY: CPT

## 2020-10-26 PROCEDURE — G8399 PT W/DXA RESULTS DOCUMENT: HCPCS | Performed by: INTERNAL MEDICINE

## 2020-10-26 PROCEDURE — 85610 PROTHROMBIN TIME: CPT

## 2020-10-26 PROCEDURE — 1101F PT FALLS ASSESS-DOCD LE1/YR: CPT | Performed by: INTERNAL MEDICINE

## 2020-10-26 PROCEDURE — G0463 HOSPITAL OUTPT CLINIC VISIT: HCPCS | Performed by: INTERNAL MEDICINE

## 2020-10-26 PROCEDURE — G8752 SYS BP LESS 140: HCPCS | Performed by: INTERNAL MEDICINE

## 2020-10-26 RX ORDER — HYDROCHLOROTHIAZIDE 25 MG/1
TABLET ORAL
Qty: 90 TAB | Refills: 1 | Status: SHIPPED | OUTPATIENT
Start: 2020-10-26 | End: 2021-03-31

## 2020-10-26 RX ORDER — METFORMIN HYDROCHLORIDE 500 MG/1
TABLET ORAL
Qty: 180 TAB | Refills: 1 | Status: SHIPPED | OUTPATIENT
Start: 2020-10-26 | End: 2021-03-31

## 2020-10-26 NOTE — PROGRESS NOTES
Chief Complaint   Patient presents with    Skin Problem     bruises     Reviewed record in preparation for visit and have obtained necessary documentation. Identified pt with two pt identifiers(name and ).       Health Maintenance Due   Topic    Shingrix Vaccine Age 50> (1 of 2)    Foot Exam Q1     GLAUCOMA SCREENING Q2Y     Eye Exam Retinal or Dilated     A1C test (Diabetic or Prediabetic)     MICROALBUMIN Q1     Medicare Yearly Exam          Chief Complaint   Patient presents with    Skin Problem     bruises        Wt Readings from Last 3 Encounters:   10/26/20 205 lb (93 kg)   20 204 lb 12.8 oz (92.9 kg)   20 203 lb 1.6 oz (92.1 kg)     Temp Readings from Last 3 Encounters:   10/26/20 97.3 °F (36.3 °C) (Oral)   20 98 °F (36.7 °C) (Temporal)   20 98.5 °F (36.9 °C)     BP Readings from Last 3 Encounters:   10/26/20 130/80   20 (!) 140/80   20 140/70     Pulse Readings from Last 3 Encounters:   10/26/20 72   20 80   20 70           Learning Assessment:  :     Learning Assessment 2018 2017 3/1/2017 2015 2015 2014   PRIMARY LEARNER Patient Patient Patient Patient Patient Patient   HIGHEST LEVEL OF EDUCATION - PRIMARY LEARNER  - GRADUATED HIGH SCHOOL OR GED GRADUATED HIGH SCHOOL OR GED GRADUATED HIGH SCHOOL OR GED GRADUATED HIGH SCHOOL OR GED GRADUATED HIGH SCHOOL OR GED   BARRIERS PRIMARY LEARNER - NONE NONE NONE - NONE   CO-LEARNER CAREGIVER - - - No - No   PRIMARY LANGUAGE ENGLISH ENGLISH ENGLISH ENGLISH ENGLISH ENGLISH    NEED - - - - - No   LEARNER PREFERENCE PRIMARY READING READING READING READING READING READING     - - LISTENING DEMONSTRATION - -   LEARNING SPECIAL TOPICS - - - - - no   ANSWERED BY patient patient  patient griselda patient patient   RELATIONSHIP SELF SELF SELF SELF SELF SELF       Depression Screening:  :     3 most recent [facility] Screens 2020   Little interest or pleasure in doing things Not at all Feeling down, depressed, irritable, or hopeless Not at all   Total Score PHQ 2 0       Fall Risk Assessment:  :     Fall Risk Assessment, last 12 mths 7/21/2020   Able to walk? Yes   Fall in past 12 months? No       Abuse Screening:  :     Abuse Screening Questionnaire 7/21/2020 1/15/2020 1/8/2019 9/25/2018 12/4/2017 10/18/2017 6/13/2017   Do you ever feel afraid of your partner? N N N N N N N   Are you in a relationship with someone who physically or mentally threatens you? N N N N N N N   Is it safe for you to go home? Y Y Y Y Y Y Y       Coordination of Care Questionnaire:  :     1) Have you been to an emergency room, urgent care clinic since your last visit? no   Hospitalized since your last visit? no             2) Have you seen or consulted any other health care providers outside of [address] [place] [address] since your last visit? yes  (Include any pap smears or colon screenings in this section.)    3) Do you have an Advance Directive on file? no    4) Are you interested in receiving information on Advance Directives? NO      Patient is accompanied by daughter I have received verbal consent from Nichole Werner to discuss any/all medical information while they are present in the room. Reviewed record  In preparation for visit and have obtained necessary documentation.

## 2020-10-26 NOTE — PROGRESS NOTES
Acute Care Note    Brian Curtis is 70 y.o. female. she presents for evaluation of Skin Problem (bruises)    The patient is concerned about bruising that has appeared on her arms and forearms in the past several days. She denies any knowledge of trauma. She has no known bleeding disorders. She has not had this as a symptom previously      Prior to Admission medications    Medication Sig Start Date End Date Taking? Authorizing Provider   metFORMIN (GLUCOPHAGE) 500 mg tablet TAKE 1 TABLET BY MOUTH TWICE A DAY WITH MEALS 10/26/20  Yes Marlon Ny MD   hydroCHLOROthiazide (HYDRODIURIL) 25 mg tablet TAKE 1 TABLET BY MOUTH EVERY DAY 10/26/20  Yes Marlon Ny MD   aspirin delayed-release 81 mg tablet TAKE 1 TABLET BY MOUTH EVERY DAY 10/20/20  Yes Marlon Ny MD   metoprolol succinate (TOPROL-XL) 50 mg XL tablet TAKE 1 TABLET BY MOUTH EVERY DAY 9/9/20  Yes Marlon Ny MD   rosuvastatin (CRESTOR) 20 mg tablet Take 1 Tab by mouth nightly. 8/12/20  Yes Sammie Mg NP   Eliquis 5 mg tablet TAKE 1 TABLET BY MOUTH TWICE A DAY 8/10/20  Yes Sammie Mg NP   glucose blood VI test strips (Contour Next Test Strips) strip Check blood sugar once a day e11.9 4/20/20  Yes Mima Espinoza MD   propafenone (RYTHMOL) 150 mg tablet TAKE 1 TABLET BY MOUTH EVERY 8 HOURS 12/17/19  Yes Sammie Mg NP   pantoprazole (PROTONIX) 40 mg tablet TAKE 1 TABLET BY MOUTH EVERY DAY   Yes Provider, Historical   MULTIVITAMIN PO Take 1 Tab by mouth daily. Yes Provider, Historical   Lancets (FREESTYLE LANCETS) Misc Test once daily. (diagnosis code: 250.00) 10/24/13  Yes Rose Lawson MD   Blood-Glucose Meter monitoring kit Once daily before breakfast 9/20/13  Yes Rose Lawson MD   HYDROcodone-acetaminophen ([place]) 5-325 mg per tablet Take 1 Tab by mouth every six (6) hours as needed.  8/26/20   Provider, Historical   metFORMIN (GLUCOPHAGE) 500 mg tablet Please restart Metformin on Saturday, 8/22/2020  TAKE 1 TABLET BY MOUTH TWICE A DAY WITH MEALS 8/20/20 10/26/20  Amilcar Shepherd NP   famotidine (PEPCID) 40 mg tablet Take 40 mg by mouth two (2) times daily as needed. 6/3/20   Provider, Historical   albuterol (PROVENTIL HFA, VENTOLIN HFA, PROAIR HFA) 90 mcg/actuation inhaler INHALE 2 PUFFS BY MOUTH EVERY 4 HOURS AS NEEDED FOR WHEEZING 4/1/20   Adrianna Torres MD   ALPRAZolam Alric Shiver) 0.5 mg tablet TAKE 1 TABLET BY MOUTH 3 TIMES A DAY AS NEEDED FOR ANXIETY 4/1/20   Adrianna Torres MD   hydroCHLOROthiazide (HYDRODIURIL) 25 mg tablet TAKE 1 TABLET BY MOUTH EVERY DAY 4/1/20 10/26/20  Adrianna Torres MD         Patient Active Problem List   Diagnosis Code    Hyperlipemia E78.5    Arthritis M19.90    Essential hypertension I10    Headache(784.0) R51    Spondylitis, cervical ([facility]) M46.92    Tingling sensation R20.2    Chest pain R07.9    Anemia D64.9    Cyst of right kidney N28.1    Hip pain, right M25.551    Acute bronchitis J20.9    Visual floaters H43.399    Postmenopausal state Z78.0    Vitamin D deficiency E55.9    Bilateral hip pain M25.551, M25.552    Postmenopausal disorder N95.1    Numbness and tingling of right leg R20.0, R20.2    Foot pain, left M79.672    Rotator cuff (capsule) sprain and strain [id number]    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 0    Cataract H26.9    Peripheral autonomic neuropathy due to DM ([facility]) E11.43    Pre-ulcerative calluses L84    Type 2 diabetes mellitus with pressure callus ([facility]) E11.628, L84    Hammer toe of right foot M20.41    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 ([facility]) E11.69, M21.969    DAVID (generalized anxiety disorder) F41.1    Abnormal finding on MRI of brain R90.89    Tinea pedis B35.3    Arthralgia of right hip M25.551    Swollen gland R59.9    Acute pharyngitis J02.9    Diabetes mellitus type 2, controlled (HCC) E11.9    Multiple thyroid nodules E04.2    Skipped heart beats B72.7    Helicobacter pylori (H. pylori) infection A04.8    Intractable migraine with aura without status migrainosus G43.119    Chronic asthma with status asthmaticus J45.902    Right foot injury S99.921A    Abdominal mass, left upper quadrant R19.02    Left upper quadrant pain R10.12    Paroxysmal atrial fibrillation (HCC) I48.0    Severe obesity (BMI 35.0-39. 9) E66.01    Varicose veins of both lower extremities with pain I83.813    Venous insufficiency of both lower extremities I87.2    Lumbar stenosis M48.061    S/P cardiac cath Z98.890         Review of Systems   Respiratory: Negative. Cardiovascular: Negative. Skin:        Bruising as per hpi         Visit Vitals  /80 (BP 1 Location: Left arm, BP Patient Position: Sitting)   Pulse 72   Temp 97.3 °F (36.3 °C) (Oral)   Resp 16   Ht 5' 2\" (1.575 m)   Wt 205 lb (93 kg)   LMP 02/18/1983   SpO2 97%   BMI 37.49 kg/m²       Physical Exam  Constitutional:       Appearance: Normal appearance. Skin:     Findings: Bruising (present at the forearms and arms bilaterally) present. Neurological:      Mental Status: She is alert. ASSESSMENT/PLAN  Diagnoses and all orders for this visit:    1. Bruising  -     PROTHROMBIN TIME + INR         Advised the patient to call back or return to office if symptoms worsen/change/persist.   Discussed expected course/resolution/complications of diagnosis in detail with patient. Medication risks/benefits/costs/interactions/alternatives discussed with patient. The patient was given an after visit summary which includes diagnoses, current medications, & vitals. They expressed understanding with the diagnosis and plan.

## 2020-10-27 LAB
25(OH)D3+25(OH)D2 SERPL-MCNC: 26.9 NG/ML (ref 30–100)
ALBUMIN SERPL-MCNC: 4.3 G/DL (ref 3.7–4.7)
ALBUMIN/GLOB SERPL: 2 {RATIO} (ref 1.2–2.2)
ALP SERPL-CCNC: 49 IU/L (ref 39–117)
ALT SERPL-CCNC: 15 IU/L (ref 0–32)
AST SERPL-CCNC: 19 IU/L (ref 0–40)
BASOPHILS # BLD AUTO: 0 X10E3/UL (ref 0–0.2)
BASOPHILS NFR BLD AUTO: 1 %
BILIRUB SERPL-MCNC: 0.2 MG/DL (ref 0–1.2)
BUN SERPL-MCNC: 13 MG/DL (ref 8–27)
BUN/CREAT SERPL: 17 (ref 12–28)
CALCIUM SERPL-MCNC: 9.5 MG/DL (ref 8.7–10.3)
CHLORIDE SERPL-SCNC: 99 MMOL/L (ref 96–106)
CHOLEST SERPL-MCNC: 152 MG/DL (ref 100–199)
CO2 SERPL-SCNC: 29 MMOL/L (ref 20–29)
CREAT SERPL-MCNC: 0.75 MG/DL (ref 0.57–1)
EOSINOPHIL # BLD AUTO: 0.1 X10E3/UL (ref 0–0.4)
EOSINOPHIL NFR BLD AUTO: 2 %
ERYTHROCYTE [DISTWIDTH] IN BLOOD BY AUTOMATED COUNT: 15.5 % (ref 11.7–15.4)
EST. AVERAGE GLUCOSE BLD GHB EST-MCNC: 151 MG/DL
GLOBULIN SER CALC-MCNC: 2.2 G/DL (ref 1.5–4.5)
GLUCOSE SERPL-MCNC: 104 MG/DL (ref 65–99)
HBA1C MFR BLD: 6.9 % (ref 4.8–5.6)
HCT VFR BLD AUTO: 33 % (ref 34–46.6)
HDLC SERPL-MCNC: 55 MG/DL
HGB BLD-MCNC: 10.5 G/DL (ref 11.1–15.9)
IMM GRANULOCYTES # BLD AUTO: 0 X10E3/UL (ref 0–0.1)
IMM GRANULOCYTES NFR BLD AUTO: 0 %
INR PPP: 1 (ref 0.9–1.2)
LDLC SERPL CALC-MCNC: 74 MG/DL (ref 0–99)
LYMPHOCYTES # BLD AUTO: 1.3 X10E3/UL (ref 0.7–3.1)
LYMPHOCYTES NFR BLD AUTO: 32 %
MCH RBC QN AUTO: 27.1 PG (ref 26.6–33)
MCHC RBC AUTO-ENTMCNC: 31.8 G/DL (ref 31.5–35.7)
MCV RBC AUTO: 85 FL (ref 79–97)
MONOCYTES # BLD AUTO: 0.3 X10E3/UL (ref 0.1–0.9)
MONOCYTES NFR BLD AUTO: 9 %
NEUTROPHILS # BLD AUTO: 2.3 X10E3/UL (ref 1.4–7)
NEUTROPHILS NFR BLD AUTO: 56 %
PLATELET # BLD AUTO: 290 X10E3/UL (ref 150–450)
POTASSIUM SERPL-SCNC: 4.7 MMOL/L (ref 3.5–5.2)
PROT SERPL-MCNC: 6.5 G/DL (ref 6–8.5)
PROTHROMBIN TIME: 10.8 SEC (ref 9.1–12)
RBC # BLD AUTO: 3.88 X10E6/UL (ref 3.77–5.28)
SODIUM SERPL-SCNC: 140 MMOL/L (ref 134–144)
TRIGL SERPL-MCNC: 130 MG/DL (ref 0–149)
TSH SERPL DL<=0.005 MIU/L-ACNC: 1.38 UIU/ML (ref 0.45–4.5)
VLDLC SERPL CALC-MCNC: 23 MG/DL (ref 5–40)
WBC # BLD AUTO: 4 X10E3/UL (ref 3.4–10.8)

## 2020-10-29 ENCOUNTER — TELEPHONE (OUTPATIENT)
Dept: INTERNAL MEDICINE CLINIC | Age: 80
End: 2020-10-29

## 2020-10-29 NOTE — TELEPHONE ENCOUNTER
Patient is requesting a call from the nurse to explain her lab results -- sees them on mychart but does not understand them.

## 2020-10-30 NOTE — TELEPHONE ENCOUNTER
Returned patient call. Notified provided was not in the office. She would like a call back next week or a lab letter.

## 2020-11-06 NOTE — TELEPHONE ENCOUNTER
Tonya Srinivasan MD  You 25 minutes ago (4:04 PM)       Pls inform the patient that her labs are predominantly normal.     She has no blood clotting abnormalities   Her A1c is controlled   Metabolic panel is normal.     Thank you

## 2020-11-17 RX ORDER — ALBUTEROL SULFATE 90 UG/1
2 AEROSOL, METERED RESPIRATORY (INHALATION)
Qty: 18 G | Refills: 3 | Status: SHIPPED | OUTPATIENT
Start: 2020-11-17 | End: 2021-10-26

## 2020-11-17 NOTE — TELEPHONE ENCOUNTER
Future Appointments:  Future Appointments   Date Time Provider Maggie Molina   12/7/2020  9:00 AM Sofía Ardon MD [facility] BS AMB   3/15/2021  9:00 AM Rudolph Moseley MD [facility] BS AMB        Last Appointment With Me:  10/26/2020     Requested Prescriptions     Pending Prescriptions Disp Refills    albuterol (PROVENTIL HFA, VENTOLIN HFA, PROAIR HFA) 90 mcg/actuation inhaler 18 Inhaler 0

## 2020-12-07 ENCOUNTER — OFFICE VISIT (OUTPATIENT)
Dept: INTERNAL MEDICINE CLINIC | Age: 80
End: 2020-12-07
Payer: MEDICARE

## 2020-12-07 VITALS
WEIGHT: 205.8 LBS | HEART RATE: 79 BPM | BODY MASS INDEX: 37.87 KG/M2 | OXYGEN SATURATION: 97 % | RESPIRATION RATE: 16 BRPM | TEMPERATURE: 97.1 F | SYSTOLIC BLOOD PRESSURE: 130 MMHG | DIASTOLIC BLOOD PRESSURE: 78 MMHG | HEIGHT: 62 IN

## 2020-12-07 DIAGNOSIS — E11.21 CONTROLLED TYPE 2 DIABETES MELLITUS WITH DIABETIC NEPHROPATHY, WITHOUT LONG-TERM CURRENT USE OF INSULIN (HCC): ICD-10-CM

## 2020-12-07 DIAGNOSIS — Z13.39 SCREENING FOR ALCOHOLISM: ICD-10-CM

## 2020-12-07 DIAGNOSIS — Z00.00 MEDICARE ANNUAL WELLNESS VISIT, SUBSEQUENT: Primary | ICD-10-CM

## 2020-12-07 PROCEDURE — G8427 DOCREV CUR MEDS BY ELIG CLIN: HCPCS | Performed by: INTERNAL MEDICINE

## 2020-12-07 PROCEDURE — G8417 CALC BMI ABV UP PARAM F/U: HCPCS | Performed by: INTERNAL MEDICINE

## 2020-12-07 PROCEDURE — G8399 PT W/DXA RESULTS DOCUMENT: HCPCS | Performed by: INTERNAL MEDICINE

## 2020-12-07 PROCEDURE — G8754 DIAS BP LESS 90: HCPCS | Performed by: INTERNAL MEDICINE

## 2020-12-07 PROCEDURE — G8536 NO DOC ELDER MAL SCRN: HCPCS | Performed by: INTERNAL MEDICINE

## 2020-12-07 PROCEDURE — G0439 PPPS, SUBSEQ VISIT: HCPCS | Performed by: INTERNAL MEDICINE

## 2020-12-07 PROCEDURE — G8752 SYS BP LESS 140: HCPCS | Performed by: INTERNAL MEDICINE

## 2020-12-07 PROCEDURE — 1101F PT FALLS ASSESS-DOCD LE1/YR: CPT | Performed by: INTERNAL MEDICINE

## 2020-12-07 PROCEDURE — G8432 DEP SCR NOT DOC, RNG: HCPCS | Performed by: INTERNAL MEDICINE

## 2020-12-07 NOTE — PATIENT INSTRUCTIONS
Medicare Wellness Visit, Female     The best way to live healthy is to have a lifestyle where you eat a well-balanced diet, exercise regularly, limit alcohol use, and quit all forms of tobacco/nicotine, if applicable. Regular preventive services are another way to keep healthy. Preventive services (vaccines, screening tests, monitoring & exams) can help personalize your care plan, which helps you manage your own care. Screening tests can find health problems at the earliest stages, when they are easiest to treat. [facility] follows the current, evidence-based guidelines published by the [facility] ([facility]) when recommending preventive services for our patients. Because we follow these guidelines, sometimes recommendations change over time as research supports it. (For example, mammograms used to be recommended annually. Even though Medicare will still pay for an annual mammogram, the newer guidelines recommend a mammogram every two years for women of average risk). Of course, you and your doctor may decide to screen more often for some diseases, based on your risk and your co-morbidities (chronic disease you are already diagnosed with). Preventive services for you include:  - Medicare offers their members a free annual wellness visit, which is time for you and your primary care provider to discuss and plan for your preventive service needs. Take advantage of this benefit every year!  -All adults over the age of 72 should receive the recommended pneumonia vaccines. Current USPSTF guidelines recommend a series of two vaccines for the best pneumonia protection.   -All adults should have a flu vaccine yearly and a tetanus vaccine every 10 years.   -All adults age 48 and older should receive the shingles vaccines (series of two vaccines).       -All adults age 38-68 who are overweight should have a diabetes screening test once every three years.   -All adults born between [birth year] and 1965 should be screened once for Hepatitis C.  -Other screening tests and preventive services for persons with diabetes include: an eye exam to screen for diabetic retinopathy, a kidney function test, a foot exam, and stricter control over your cholesterol.   -Cardiovascular screening for adults with routine risk involves an electrocardiogram (ECG) at intervals determined by your doctor.   -Colorectal cancer screenings should be done for adults age 54-65 with no increased risk factors for colorectal cancer. There are a number of acceptable methods of screening for this type of cancer. Each test has its own benefits and drawbacks. Discuss with your doctor what is most appropriate for you during your annual wellness visit. The different tests include: colonoscopy (considered the best screening method), a fecal occult blood test, a fecal DNA test, and sigmoidoscopy.    -A bone mass density test is recommended when a woman turns 65 to screen for osteoporosis. This test is only recommended one time, as a screening. Some providers will use this same test as a disease monitoring tool if you already have osteoporosis. -Breast cancer screenings are recommended every other year for women of normal risk, age 54-69.  -Cervical cancer screenings for women over age 72 are only recommended with certain risk factors.      Here is a list of your current Health Maintenance items (your personalized list of preventive services) with a due date:  Health Maintenance Due   Topic Date Due    Shingles Vaccine (1 of 2) 12/26/1992    Diabetic Foot Care  03/06/2019    Glaucoma Screening   07/17/2019    Eye Exam  07/17/2019    Albumin Urine Test  08/30/2020    Annual Well Visit  10/08/2020

## 2020-12-07 NOTE — PROGRESS NOTES
Chief Complaint   Patient presents with    Diabetes    Hypertension     Reviewed record in preparation for visit and have obtained necessary documentation. Identified pt with two pt identifiers(name and ).       Health Maintenance Due   Topic    Shingrix Vaccine Age 50> (1 of 2)    Foot Exam Q1     GLAUCOMA SCREENING Q2Y     Eye Exam Retinal or Dilated     MICROALBUMIN Q1     Medicare Yearly Exam          Chief Complaint   Patient presents with    Diabetes    Hypertension        Wt Readings from Last 3 Encounters:   20 205 lb 12.8 oz (93.4 kg)   10/26/20 205 lb (93 kg)   20 204 lb 12.8 oz (92.9 kg)     Temp Readings from Last 3 Encounters:   20 97.1 °F (36.2 °C) (Oral)   10/26/20 97.3 °F (36.3 °C) (Oral)   20 98 °F (36.7 °C) (Temporal)     BP Readings from Last 3 Encounters:   20 (!) 140/88   10/26/20 130/80   20 (!) 140/80     Pulse Readings from Last 3 Encounters:   20 79   10/26/20 72   20 80           Learning Assessment:  :     Learning Assessment 2018 2017 3/1/2017 2015 2015 2014   PRIMARY LEARNER Patient Patient Patient Patient Patient Patient   HIGHEST LEVEL OF EDUCATION - PRIMARY LEARNER  - GRADUATED HIGH SCHOOL OR GED GRADUATED HIGH SCHOOL OR GED GRADUATED HIGH SCHOOL OR GED GRADUATED HIGH SCHOOL OR GED GRADUATED HIGH SCHOOL OR GED   BARRIERS PRIMARY LEARNER - NONE NONE NONE - NONE   CO-LEARNER CAREGIVER - - - No - No   PRIMARY LANGUAGE ENGLISH ENGLISH ENGLISH ENGLISH ENGLISH ENGLISH    NEED - - - - - No   LEARNER PREFERENCE PRIMARY READING READING READING READING READING READING     - - LISTENING DEMONSTRATION - -   LEARNING SPECIAL TOPICS - - - - - no   ANSWERED BY patient patient  patient griselda patient patient   RELATIONSHIP SELF SELF SELF SELF SELF SELF       Depression Screening:  :     3 most recent [facility] Screens 2020   Little interest or pleasure in doing things Not at all   Feeling down, depressed, irritable, or hopeless Not at all   Total Score PHQ 2 0       Fall Risk Assessment:  :     Fall Risk Assessment, last 12 mths 7/21/2020   Able to walk? Yes   Fall in past 12 months? No       Abuse Screening:  :     Abuse Screening Questionnaire 7/21/2020 1/15/2020 1/8/2019 9/25/2018 12/4/2017 10/18/2017 6/13/2017   Do you ever feel afraid of your partner? N N N N N N N   Are you in a relationship with someone who physically or mentally threatens you? N N N N N N N   Is it safe for you to go home? Y Y Y Y Y Y Y       Coordination of Care Questionnaire:  :     1) Have you been to an emergency room, urgent care clinic since your last visit? no   Hospitalized since your last visit? no             2) Have you seen or consulted any other health care providers outside of [address] [place] [address] since your last visit? no  (Include any pap smears or colon screenings in this section.)    3) Do you have an Advance Directive on file? no    4) Are you interested in receiving information on Advance Directives? NO      Patient is accompanied by self I have received verbal consent from Jhon Nelson to discuss any/all medical information while they are present in the room. Reviewed record  In preparation for visit and have obtained necessary documentation.

## 2020-12-11 RX ORDER — PROPAFENONE HYDROCHLORIDE 150 MG/1
TABLET, FILM COATED ORAL
Qty: 270 TAB | Refills: 3 | Status: SHIPPED | OUTPATIENT
Start: 2020-12-11 | End: 2022-01-10

## 2020-12-14 ENCOUNTER — TRANSCRIBE ORDER (OUTPATIENT)
Dept: SCHEDULING | Age: 80
End: 2020-12-14

## 2020-12-14 DIAGNOSIS — Z12.31 VISIT FOR SCREENING MAMMOGRAM: Primary | ICD-10-CM

## 2020-12-17 RX ORDER — BLOOD SUGAR DIAGNOSTIC
STRIP MISCELLANEOUS
Qty: 50 STRIP | Refills: 11 | Status: SHIPPED | OUTPATIENT
Start: 2020-12-17 | End: 2022-02-14

## 2021-02-08 ENCOUNTER — HOSPITAL ENCOUNTER (OUTPATIENT)
Dept: MAMMOGRAPHY | Age: 81
Discharge: HOME | End: 2021-02-08
Attending: INTERNAL MEDICINE
Payer: MEDICARE

## 2021-02-08 DIAGNOSIS — Z12.31 VISIT FOR SCREENING MAMMOGRAM: ICD-10-CM

## 2021-02-08 PROCEDURE — 77063 BREAST TOMOSYNTHESIS BI: CPT

## 2021-03-13 DIAGNOSIS — I10 ESSENTIAL HYPERTENSION: ICD-10-CM

## 2021-03-16 RX ORDER — METOPROLOL SUCCINATE 50 MG/1
TABLET, EXTENDED RELEASE ORAL
Qty: 90 TAB | Refills: 1 | Status: SHIPPED | OUTPATIENT
Start: 2021-03-16 | End: 2021-09-12

## 2021-03-25 ENCOUNTER — TELEPHONE (OUTPATIENT)
Dept: CARDIOLOGY CLINIC | Age: 81
End: 2021-03-25

## 2021-03-30 NOTE — TELEPHONE ENCOUNTER
Spoke with patient. Verified patient with two patient identifiers. Advised may take Mucinex. States there are different ones, advised to check with pharmacist to pick one. Patient verbalized understanding.

## 2021-03-31 DIAGNOSIS — G43.119 INTRACTABLE MIGRAINE WITH AURA WITHOUT STATUS MIGRAINOSUS: ICD-10-CM

## 2021-03-31 DIAGNOSIS — I10 ESSENTIAL HYPERTENSION: ICD-10-CM

## 2021-03-31 DIAGNOSIS — E11.9 DIABETES MELLITUS WITHOUT COMPLICATION (HCC): ICD-10-CM

## 2021-03-31 DIAGNOSIS — Z23 ENCOUNTER FOR IMMUNIZATION: ICD-10-CM

## 2021-03-31 DIAGNOSIS — E11.21 CONTROLLED TYPE 2 DIABETES MELLITUS WITH DIABETIC NEPHROPATHY, WITHOUT LONG-TERM CURRENT USE OF INSULIN (HCC): ICD-10-CM

## 2021-03-31 RX ORDER — METFORMIN HYDROCHLORIDE 500 MG/1
TABLET ORAL
Qty: 180 TAB | Refills: 1 | Status: SHIPPED | OUTPATIENT
Start: 2021-03-31 | End: 2021-10-12

## 2021-03-31 RX ORDER — HYDROCHLOROTHIAZIDE 25 MG/1
TABLET ORAL
Qty: 90 TAB | Refills: 1 | Status: SHIPPED | OUTPATIENT
Start: 2021-03-31 | End: 2021-10-25

## 2021-04-02 ENCOUNTER — OFFICE VISIT (OUTPATIENT)
Dept: CARDIOLOGY CLINIC | Age: 81
End: 2021-04-02
Payer: MEDICARE

## 2021-04-02 VITALS
WEIGHT: 209.2 LBS | SYSTOLIC BLOOD PRESSURE: 130 MMHG | HEIGHT: 62 IN | OXYGEN SATURATION: 99 % | RESPIRATION RATE: 16 BRPM | DIASTOLIC BLOOD PRESSURE: 70 MMHG | BODY MASS INDEX: 38.5 KG/M2 | HEART RATE: 70 BPM

## 2021-04-02 DIAGNOSIS — I10 ESSENTIAL HYPERTENSION: ICD-10-CM

## 2021-04-02 DIAGNOSIS — E78.2 MIXED HYPERLIPIDEMIA: ICD-10-CM

## 2021-04-02 DIAGNOSIS — I48.0 PAROXYSMAL ATRIAL FIBRILLATION (HCC): ICD-10-CM

## 2021-04-02 DIAGNOSIS — I25.10 CORONARY ARTERY DISEASE INVOLVING NATIVE CORONARY ARTERY OF NATIVE HEART WITHOUT ANGINA PECTORIS: Primary | ICD-10-CM

## 2021-04-02 PROCEDURE — 93005 ELECTROCARDIOGRAM TRACING: CPT | Performed by: INTERNAL MEDICINE

## 2021-04-02 PROCEDURE — G8399 PT W/DXA RESULTS DOCUMENT: HCPCS | Performed by: INTERNAL MEDICINE

## 2021-04-02 PROCEDURE — 1090F PRES/ABSN URINE INCON ASSESS: CPT | Performed by: INTERNAL MEDICINE

## 2021-04-02 PROCEDURE — G8754 DIAS BP LESS 90: HCPCS | Performed by: INTERNAL MEDICINE

## 2021-04-02 PROCEDURE — G8417 CALC BMI ABV UP PARAM F/U: HCPCS | Performed by: INTERNAL MEDICINE

## 2021-04-02 PROCEDURE — 93010 ELECTROCARDIOGRAM REPORT: CPT | Performed by: INTERNAL MEDICINE

## 2021-04-02 PROCEDURE — G8510 SCR DEP NEG, NO PLAN REQD: HCPCS | Performed by: INTERNAL MEDICINE

## 2021-04-02 PROCEDURE — G8536 NO DOC ELDER MAL SCRN: HCPCS | Performed by: INTERNAL MEDICINE

## 2021-04-02 PROCEDURE — 99214 OFFICE O/P EST MOD 30 MIN: CPT | Performed by: INTERNAL MEDICINE

## 2021-04-02 PROCEDURE — G8427 DOCREV CUR MEDS BY ELIG CLIN: HCPCS | Performed by: INTERNAL MEDICINE

## 2021-04-02 PROCEDURE — G8752 SYS BP LESS 140: HCPCS | Performed by: INTERNAL MEDICINE

## 2021-04-02 PROCEDURE — G0463 HOSPITAL OUTPT CLINIC VISIT: HCPCS | Performed by: INTERNAL MEDICINE

## 2021-04-02 PROCEDURE — 1101F PT FALLS ASSESS-DOCD LE1/YR: CPT | Performed by: INTERNAL MEDICINE

## 2021-04-02 NOTE — PROGRESS NOTES
[address]  [phone]     Subjective:      Cain Khoury is a 68 y.o. female is here for a f/u appt. Last seen 9/9/2020. She denies chest pain/ shortness of breath, orthopnea, PND, LE edema, palpitations, syncope, or presyncope. She is doing well.             Patient Active Problem List    Diagnosis Date Noted    S/P cardiac cath 08/20/2020    Lumbar stenosis 09/10/2019    Varicose veins of both lower extremities with pain 08/13/2019    Venous insufficiency of both lower extremities 08/13/2019    Severe obesity (BMI 35.0-39.9) 06/27/2018    Paroxysmal atrial fibrillation ([address] 01/02/2018    Abdominal mass, left upper quadrant 06/13/2017    Left upper quadrant pain 06/13/2017    Right foot injury 05/02/2017    Chronic asthma with status asthmaticus 01/26/2017    Intractable migraine with aura without status migrainosus 28/46/4592    Helicobacter pylori (H. pylori) infection 12/07/2016    Skipped heart beats 11/29/2016    Multiple thyroid nodules 07/14/2016    Swollen gland 05/26/2016    Acute pharyngitis 05/26/2016    Diabetes mellitus type 2, controlled ([address] 05/26/2016    Arthralgia of right hip 04/25/2016    Tinea pedis 06/03/2015    Abnormal finding on MRI of brain 03/16/2015    DAVID (generalized anxiety disorder) 01/22/2015    Peripheral autonomic neuropathy due to DM ([address] 09/29/2014    Pre-ulcerative calluses 09/29/2014    Type 2 diabetes mellitus with pressure callus ([address] 09/29/2014    Hammer toe of right foot 09/29/2014    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 ([address] 09/29/2014    Cataract 09/24/2014    Acute asthma exacerbation 08/31/2014    Elevated LFTs 04/16/2014    Screening for depression 04/16/2014    Risk for falls 04/16/2014    GERD (gastroesophageal reflux disease) 04/07/2014    Osteoarthritis of acromioclavicular joint 03/18/2014    Shoulder pain, left 03/18/2014    Rotator cuff (capsule) sprain and strain 09/20/2013    Foot pain, left 06/14/2013    Bilateral hip pain 05/20/2013    Postmenopausal disorder 05/20/2013    Numbness and tingling of right leg 05/20/2013    Postmenopausal state 01/21/2013    Vitamin D deficiency 01/21/2013    Visual floaters 02/01/2012    Acute bronchitis 11/30/2011    Hip pain, right 09/14/2011    Cyst of right kidney 08/10/2011    Anemia 04/22/2011    Chest pain 10/20/2010    Tingling sensation 09/13/2010    Spondylitis, cervical ([address] 04/05/2010    Essential hypertension 03/26/2010    Headache(784.0) 03/26/2010    Hyperlipemia 02/18/2010    Arthritis 02/18/2010      Phillip Johnson MD  Past Medical History:   Diagnosis Date    Abdominal mass, left upper quadrant 06/13/2017    no finding per pt    Abnormal finding on MRI of brain 3/16/2015    evaluated by neurologist and no findings    Acute pharyngitis 5/26/2016    Anemia NEC     Arrhythmia     a fib    Arthralgia of right hip 4/25/2016    Arthritis 2/18/2010    Cataract 9/24/2014    Diabetes ([address]     Elevated LFTs 4/16/2014    DAVID (generalized anxiety disorder) 1/22/2015    GERD (gastroesophageal reflux disease) 4/7/2014    Hammer toe of right foot 9/29/2014    Headache(784.0) 1/90/6257    Helicobacter pylori (H. pylori) infection 4/16/2014    HTN (hypertension) 3/26/2010    Hyperlipemia 2/18/2010    Intractable migraine with aura without status migrainosus 1/25/2017    Menopause     Morbid obesity ([address]     Need for varicella vaccine 9/23/2014    Peripheral autonomic neuropathy due to DM ([address] 9/29/2014    Poorly controlled type 2 diabetes mellitus with circulatory disorder ([address] 9/29/2014    Preop examination 9/24/2014    Right foot injury 5/2/2017    Risk for falls 4/16/2014    S/P cardiac cath 8/20/2020 8/120/2020 cardiac cath with noninterventional PBL, otherwise neg    Screening for breast cancer 9/23/2014    Screening for depression 4/16/2014    Shoulder pain, left 3/18/2014    Spondylitis, cervical [facility] 4/5/2010    Tinea pedis 6/3/2015    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 ([facility] 9/29/2014      Past Surgical History:   Procedure Laterality Date    COLONOSCOPY N/A 4/23/2019    COLONOSCOPY performed by [facility] Uribe MD at [facility]    ENDOSCOPY, COLON, DIAGNOSTIC      HX APPENDECTOMY      HX BACK SURGERY      HX CATARACT REMOVAL Bilateral     HX GYN  1983    total hysterectomy for uterine fibroid    HX HEENT      tonsillectomy    HX HYSTERECTOMY      HX OOPHORECTOMY      HX ORTHOPAEDIC      left foot hammertoe surgery    HX OTHER SURGICAL      ? straightened colon out    HX ROTATOR CUFF REPAIR  2009    left    IR INJ FORAMIN EPID LUMB ANES/STER ADDL  8/2/2019    IR INJ FORAMIN EPID LUMB ANES/STER SNGL  8/2/2019    MS ABDOMEN SURGERY PROC UNLISTED      inguinal hernia    MS OPEN REPAIR CLAVICLE FRACTURE  2009    did not have surgery for this/pt states she was in an accident and had RCR on left, but the clavicle was not repaired - injury was done at the same time     Allergies   Allergen Reactions    Latex Rash    Kiwi Anaphylaxis     \"FEELS LIKE THROAT IS CLOSING UP\"    Amoxil [Amoxicillin] Itching     rash    Cephalosporins Unknown (comments)     PT NOT SURE OF REACTIONS    Levaquin [Levofloxacin] Other (comments)     Dizziness      Penicillins Rash     \"chickenpox like rash\" was in the 1980s    Percocet [Oxycodone-Acetaminophen] Nausea and Vomiting    Tetanus Vaccines And Toxoid Swelling    Tetracycline Hcl Rash     \"black fuzzy tongue\"   [address] Other (comments)     RAW TONGUE      Family History   Problem Relation Age of Onset    Cancer Mother         mycosis fungoives - skin cancer/got into bloodstream    Hypertension Mother     Arthritis-osteo Mother     Stroke Father     Cancer Sister         one sister with breast cancer    Breast Cancer Sister         42's    MS Sister     Heart Disease Brother     Diabetes Brother     Diabetes Daughter     Hypertension Daughter     Psychiatric Disorder Daughter         ANXIETY    Cancer Paternal Aunt         2 paternal aunts with breast cancer    Breast Cancer Paternal Aunt         over 48    No Known Problems Sister     Arthritis-osteo Sister     Headache Brother     No Known Problems Brother     No Known Problems Brother     Breast Cancer Paternal Aunt         over 48    Thyroid Cancer Neg Hx     Anesth Problems Neg Hx       Social History     Socioeconomic History    Marital status:      Spouse name: Not on file    Number of children: Not on file    Years of education: Not on file    Highest education level: Not on file   Occupational History    Not on file   Social Needs    Financial resource strain: Not on file    Food insecurity     Worry: Not on file     Inability: Not on file   [employer] needs     Medical: Not on file     Non-medical: Not on file   Tobacco Use    Smoking status: Former Smoker     Packs/day: 0.50     Years: 20.00     Pack years: 10.00     Types: Cigarettes     Quit date: 1988     Years since quittin.8    Smokeless tobacco: Never Used   Substance and Sexual Activity    Alcohol use: No    Drug use: No    Sexual activity: Never   Lifestyle    Physical activity     Days per week: Not on file     Minutes per session: Not on file    Stress: Not on file   Relationships    Social connections     Talks on phone: Not on file     Gets together: Not on file     Attends Scientologist service: Not on file     Active member of club or organization: Not on file     Attends meetings of clubs or organizations: Not on file     Relationship status: Not on file    Intimate partner violence     Fear of current or ex partner: Not on file     Emotionally abused: Not on file     Physically abused: Not on file     Forced sexual activity: Not on file   Other Topics Concern    Not on file   Social History Narrative    Not on file Current Outpatient Medications   Medication Sig    hydroCHLOROthiazide (HYDRODIURIL) 25 mg tablet TAKE 1 TABLET BY MOUTH EVERY DAY    metFORMIN (GLUCOPHAGE) 500 mg tablet TAKE 1 TABLET BY MOUTH TWICE A DAY WITH MEALS    metoprolol succinate (TOPROL-XL) 50 mg XL tablet TAKE 1 TABLET BY MOUTH EVERY DAY    glucose blood VI test strips (Contour Next Test Strips) strip Check blood sugar once a day e11.9    propafenone (RYTHMOL) 150 mg tablet TAKE 1 TABLET BY MOUTH EVERY 8 HOURS    albuterol (PROVENTIL HFA, VENTOLIN HFA, PROAIR HFA) 90 mcg/actuation inhaler Take 2 Puffs by inhalation every four (4) hours as needed for Wheezing.  aspirin delayed-release 81 mg tablet TAKE 1 TABLET BY MOUTH EVERY DAY    rosuvastatin (CRESTOR) 20 mg tablet Take 1 Tab by mouth nightly.  Eliquis 5 mg tablet TAKE 1 TABLET BY MOUTH TWICE A DAY    ALPRAZolam (XANAX) 0.5 mg tablet TAKE 1 TABLET BY MOUTH 3 TIMES A DAY AS NEEDED FOR ANXIETY    pantoprazole (PROTONIX) 40 mg tablet TAKE 1 TABLET BY MOUTH EVERY DAY    MULTIVITAMIN PO Take 1 Tab by mouth daily.  Lancets (FREESTYLE LANCETS) Misc Test once daily. (diagnosis code: 250.00)    Blood-Glucose Meter monitoring kit Once daily before breakfast     No current facility-administered medications for this visit. Review of Symptoms:  11 systems reviewed, negative other than as stated in the HPI    Physical ExamPhysical Exam:    Vitals:    04/02/21 1034   BP: 130/70   Pulse: 70   Resp: 16   SpO2: 99%   Weight: 209 lb 3.2 oz (94.9 kg)   Height: 5' 2\" (1.575 m)     Body mass index is 38.26 kg/m². General PE  Gen:  NAD  Mental Status - Alert. General Appearance - Not in acute distress. HEENT:  PERRL, no carotid bruits or JVD  Chest and Lung Exam   Inspection: Accessory muscles - No use of accessory muscles in breathing.    Auscultation:   Breath sounds: - Normal.   Cardiovascular   Inspection: Jugular vein - Bilateral - Inspection Normal.   Palpation/Percussion:   Apical Impulse: - Normal.   Auscultation: Rhythm - Regular. Heart Sounds - S1 WNL and S2 WNL. No S3 or S4. Murmurs & Other Heart Sounds: Auscultation of the heart reveals - No Murmurs. Peripheral Vascular   Upper Extremity: Inspection - Bilateral - No Cyanotic nailbeds or Digital clubbing. Lower Extremity:   Palpation: Edema - Bilateral - No edema. Abdomen:   Soft, non-tender, bowel sounds are active.   Neuro: A&O times 3, CN and motor grossly WNL    Labs:   Lab Results   Component Value Date/Time    Cholesterol, total 152 10/26/2020 12:48 PM    Cholesterol, total 173 02/19/2020 10:11 AM    Cholesterol, total 164 08/30/2019 12:40 PM    Cholesterol, total 147 09/25/2018 09:33 AM    Cholesterol, total 180 10/18/2017 10:24 AM    HDL Cholesterol 55 10/26/2020 12:48 PM    HDL Cholesterol 58 02/19/2020 10:11 AM    HDL Cholesterol 55 08/30/2019 12:40 PM    HDL Cholesterol 46 09/25/2018 09:33 AM    HDL Cholesterol 56 10/18/2017 10:24 AM    LDL, calculated 74 10/26/2020 12:48 PM    LDL, calculated 90 02/19/2020 10:11 AM    LDL, calculated 81 08/30/2019 12:40 PM    LDL, calculated 77 09/25/2018 09:33 AM    LDL, calculated 94 10/18/2017 10:24 AM    LDL, calculated 81 05/02/2017 09:21 AM    Triglyceride 130 10/26/2020 12:48 PM    Triglyceride 123 02/19/2020 10:11 AM    Triglyceride 138 08/30/2019 12:40 PM    Triglyceride 119 09/25/2018 09:33 AM    Triglyceride 150 (H) 10/18/2017 10:24 AM    CHOL/HDL Ratio 2.9 01/12/2017 04:20 AM    CHOL/HDL Ratio 5.5 (H) 09/13/2010 01:35 PM    CHOL/HDL Ratio 4.1 04/05/2010 10:02 AM     Lab Results   Component Value Date/Time     (H) 01/05/2017 07:42 AM     Lab Results   Component Value Date/Time    Sodium 140 10/26/2020 12:48 PM    Potassium 4.7 10/26/2020 12:48 PM    Chloride 99 10/26/2020 12:48 PM    CO2 29 10/26/2020 12:48 PM    Anion gap 5 09/25/2019 09:45 AM    Glucose 104 (H) 10/26/2020 12:48 PM    BUN 13 10/26/2020 12:48 PM    Creatinine 0.75 10/26/2020 12:48 PM    BUN/Creatinine ratio 17 10/26/2020 12:48 PM    GFR est AA 89 10/26/2020 12:48 PM    GFR est non-AA 77 10/26/2020 12:48 PM    Calcium 9.5 10/26/2020 12:48 PM    Bilirubin, total 0.2 10/26/2020 12:48 PM    Alk. phosphatase 49 10/26/2020 12:48 PM    Protein, total 6.5 10/26/2020 12:48 PM    Albumin 4.3 10/26/2020 12:48 PM    Globulin 3.9 09/15/2019 03:15 AM    A-G Ratio 2.0 10/26/2020 12:48 PM    ALT (SGPT) 15 10/26/2020 12:48 PM       EKG:  Sinus  Rhythm   Left axis -anterior fascicular block. Worsening inferior T wave abnormality. Assessment:     Assessment:      1. Coronary artery disease involving native coronary artery of native heart without angina pectoris    2. Essential hypertension    3. Mixed hyperlipidemia    4. Paroxysmal atrial fibrillation (HCC)        Orders Placed This Encounter    AMB POC EKG ROUTINE W/ 12 LEADS, INTER & REP     Order Specific Question:   Reason for Exam:     Answer:   routine        Plan:   CAD  Per cardiac cath 8/2020 Significant small vessel disease: 1st RPL lesion, 95% stenosed. Too small for PCI. Medical management. EKG today SR with increased T wave inversions in the inferior leads consistent with RPL lesion. Denies angina or anginal equivalent symptoms of concern. Cont ASA, Metoprolol 50mg XL, and Crestor 20mg    Paroxysmal atrial fibrillation  8/2020 2-week event monitor without recurrent A fib  Denies symptoms today concerning for recurrence. ekg SR  Cont Rhythmol 150mg every 8 hrs and Toprol  Continue Eliquis 5mg bid for stroke prevention     Essential hypertension  BP controlled. Continue anti-hypertensive therapy and low sodium diet     Mixed hyperlipidemia  LDL 90 in 2/2020 Her rosuvastatin was increased then to 20 mg  LDL 74 10/2020. Labs and lipids per PCP       Continue current care and f/u in 6 months. Rhiannon Devi NP       [facility]    4/2/2021         Patient seen, examined by me personally. Plan discussed as detailed.  Agree with note as outlined by  NP with modifications as noted. My independent physical exam reveals : Physical Exam   Constitutional: She is oriented to person, place, and time. She appears well-developed and well-nourished. Neck: Neck supple. Cardiovascular: Normal rate, regular rhythm and normal heart sounds. No murmur heard. Pulmonary/Chest: Effort normal and breath sounds normal.   Neurological: She is alert and oriented to person, place, and time. Skin: Skin is warm and dry. Psychiatric: She has a normal mood and affect. Nursing note and vitals reviewed. New EKG changes in inferior leads, she is asymptomatic. Had 95% lesion in PLB, small vessel. Continue current therapy. Discussed with patient. No additional findings noted. Agree with plan as outlined above with modifications as noted.      Mira oRdriguez MD

## 2021-04-02 NOTE — LETTER
4/2/2021    Patient: Marlyn Lima   YOB: 1940   Date of Visit: 4/2/2021     Madalyn Abbott MD  [address] [facility]  [address]  [facility] [address] [id number]  [address] [employer]    Dear Madalyn Abbott MD,      Thank you for referring Ms. Claudean Mew to [address] [place] [address] [facility] for evaluation. My notes for this consultation are attached. If you have questions, please do not hesitate to call me. I look forward to following your patient along with you.       Sincerely,    Madeline Noel MD

## 2021-04-02 NOTE — PROGRESS NOTES
1. Have you been to the ER, urgent care clinic since your last visit? Hospitalized since your last visit? No.    2. Have you seen or consulted any other health care providers outside of the [address] [place] [address] since your last visit? Include any pap smears or colon screening.    No.      Chief Complaint   Patient presents with    Hypertension     6 month- pt denies any cardiac symptoms    Irregular Heart Beat

## 2021-07-16 DIAGNOSIS — E55.9 VITAMIN D DEFICIENCY: ICD-10-CM

## 2021-07-21 RX ORDER — ASPIRIN 81 MG/1
TABLET ORAL
Qty: 30 TABLET | Refills: 5 | Status: SHIPPED | OUTPATIENT
Start: 2021-07-21 | End: 2021-12-22

## 2021-08-16 ENCOUNTER — TRANSCRIBE ORDER (OUTPATIENT)
Dept: SCHEDULING | Age: 81
End: 2021-08-16

## 2021-08-16 DIAGNOSIS — R10.32 LLQ ABDOMINAL PAIN: Primary | ICD-10-CM

## 2021-08-19 ENCOUNTER — OFFICE VISIT (OUTPATIENT)
Dept: INTERNAL MEDICINE CLINIC | Age: 81
End: 2021-08-19
Payer: MEDICARE

## 2021-08-19 VITALS
HEIGHT: 62 IN | OXYGEN SATURATION: 97 % | RESPIRATION RATE: 16 BRPM | DIASTOLIC BLOOD PRESSURE: 80 MMHG | BODY MASS INDEX: 37.73 KG/M2 | WEIGHT: 205 LBS | SYSTOLIC BLOOD PRESSURE: 150 MMHG | TEMPERATURE: 98.4 F | HEART RATE: 71 BPM

## 2021-08-19 DIAGNOSIS — R07.9 CHEST PAIN, UNSPECIFIED TYPE: Primary | ICD-10-CM

## 2021-08-19 DIAGNOSIS — E66.01 SEVERE OBESITY (BMI 35.0-35.9 WITH COMORBIDITY) (HCC): ICD-10-CM

## 2021-08-19 DIAGNOSIS — K21.9 GASTROESOPHAGEAL REFLUX DISEASE WITHOUT ESOPHAGITIS: ICD-10-CM

## 2021-08-19 PROCEDURE — 1090F PRES/ABSN URINE INCON ASSESS: CPT | Performed by: INTERNAL MEDICINE

## 2021-08-19 PROCEDURE — G8427 DOCREV CUR MEDS BY ELIG CLIN: HCPCS | Performed by: INTERNAL MEDICINE

## 2021-08-19 PROCEDURE — G8417 CALC BMI ABV UP PARAM F/U: HCPCS | Performed by: INTERNAL MEDICINE

## 2021-08-19 PROCEDURE — 1101F PT FALLS ASSESS-DOCD LE1/YR: CPT | Performed by: INTERNAL MEDICINE

## 2021-08-19 PROCEDURE — G8753 SYS BP > OR = 140: HCPCS | Performed by: INTERNAL MEDICINE

## 2021-08-19 PROCEDURE — G0463 HOSPITAL OUTPT CLINIC VISIT: HCPCS | Performed by: INTERNAL MEDICINE

## 2021-08-19 PROCEDURE — 93005 ELECTROCARDIOGRAM TRACING: CPT | Performed by: INTERNAL MEDICINE

## 2021-08-19 PROCEDURE — G8399 PT W/DXA RESULTS DOCUMENT: HCPCS | Performed by: INTERNAL MEDICINE

## 2021-08-19 PROCEDURE — G8754 DIAS BP LESS 90: HCPCS | Performed by: INTERNAL MEDICINE

## 2021-08-19 PROCEDURE — G8510 SCR DEP NEG, NO PLAN REQD: HCPCS | Performed by: INTERNAL MEDICINE

## 2021-08-19 PROCEDURE — 93010 ELECTROCARDIOGRAM REPORT: CPT | Performed by: INTERNAL MEDICINE

## 2021-08-19 PROCEDURE — 99213 OFFICE O/P EST LOW 20 MIN: CPT | Performed by: INTERNAL MEDICINE

## 2021-08-19 PROCEDURE — G8536 NO DOC ELDER MAL SCRN: HCPCS | Performed by: INTERNAL MEDICINE

## 2021-08-19 RX ORDER — SUCRALFATE 1 G/1
1 TABLET ORAL 3 TIMES DAILY
Qty: 42 TABLET | Refills: 0 | Status: SHIPPED | OUTPATIENT
Start: 2021-08-19 | End: 2021-09-02

## 2021-08-19 NOTE — PROGRESS NOTES
Acute Care Note    Abiel Hernandez is 68 y.o. female. she presents for evaluation of GI Problem    The patient reports two weeks of chest discomfort which she notes is in the epigastric area. She has noted that this is mainly after a meal.  She describes the pain as episodic and sharp but with no relation to exertion. She has had no cough or shortness of breath. She went to GI and was evaluated in the office. Told that her symptoms were likely not GERD. She was advised to follow up with me and with cardiology. Prior to Admission medications    Medication Sig Start Date End Date Taking? Authorizing Provider   rosuvastatin (CRESTOR) 20 mg tablet TAKE 1 TABLET BY MOUTH EVERY DAY AT NIGHT 8/2/21  Yes Tera Mg NP   Eliquis 5 mg tablet TAKE 1 TABLET BY MOUTH TWICE A DAY 7/23/21  Yes Tera Mg NP   aspirin delayed-release 81 mg tablet TAKE 1 TABLET BY MOUTH EVERY DAY 7/21/21  Yes Mbale Garcia MD   hydroCHLOROthiazide (HYDRODIURIL) 25 mg tablet TAKE 1 TABLET BY MOUTH EVERY DAY 3/31/21  Yes Mable Garcia MD   metFORMIN (GLUCOPHAGE) 500 mg tablet TAKE 1 TABLET BY MOUTH TWICE A DAY WITH MEALS 3/31/21  Yes Mable Garcia MD   glucose blood VI test strips (Contour Next Test Strips) strip Check blood sugar once a day e11.9 12/17/20  Yes Mable Garcia MD   propafenone (RYTHMOL) 150 mg tablet TAKE 1 TABLET BY MOUTH EVERY 8 HOURS 12/11/20  Yes Tera Mg NP   albuterol (PROVENTIL HFA, VENTOLIN HFA, PROAIR HFA) 90 mcg/actuation inhaler Take 2 Puffs by inhalation every four (4) hours as needed for Wheezing. 11/17/20  Yes Mable Garcia MD   pantoprazole (PROTONIX) 40 mg tablet TAKE 1 TABLET BY MOUTH EVERY DAY   Yes Provider, Historical   MULTIVITAMIN PO Take 1 Tab by mouth daily. Yes Provider, Historical   Lancets (FREESTYLE LANCETS) Misc Test once daily.  (diagnosis code: 250.00) 10/24/13  Yes Victor M Bellamy MD   Blood-Glucose Meter monitoring kit Once daily before breakfast 9/20/13  Yes Marlen Devlin MD   metoprolol succinate (TOPROL-XL) 50 mg XL tablet TAKE 1 TABLET BY MOUTH EVERY DAY 3/16/21   Daniella Dumont MD   ALPRAZolam Tejinder Fisher) 0.5 mg tablet TAKE 1 TABLET BY MOUTH 3 TIMES A DAY AS NEEDED FOR ANXIETY  Patient not taking: Reported on 8/19/2021 4/1/20   Daniella Dumont MD         Patient Active Problem List   Diagnosis Code    Hyperlipemia E78.5    Arthritis M19.90    Essential hypertension I10    Headache(784.0) R51    Spondylitis, cervical (HCC) M46.92    Tingling sensation R20.2    Chest pain R07.9    Anemia D64.9    Cyst of right kidney N28.1    Hip pain, right M25.551    Acute bronchitis J20.9    Visual floaters H43.399    Postmenopausal state Z78.0    Vitamin D deficiency E55.9    Bilateral hip pain M25.551, M25.552    Postmenopausal disorder N95.1    Numbness and tingling of right leg R20.0, R20.2    Foot pain, left M79.672    Rotator cuff (capsule) sprain and strain [id number]    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 0    Cataract H26.9    Peripheral autonomic neuropathy due to DM ([facility]) E11.43    Pre-ulcerative calluses L84    Type 2 diabetes mellitus with pressure callus ([facility]) E11.628, L84    Hammer toe of right foot M20.41    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 ([facility]) E11.69, M21.969    DAVID (generalized anxiety disorder) F41.1    Abnormal finding on MRI of brain R90.89    Tinea pedis B35.3    Arthralgia of right hip M25.551    Swollen gland R59.9    Acute pharyngitis J02.9    Diabetes mellitus type 2, controlled ([facility]) E11.9    Multiple thyroid nodules E04.2    Skipped heart beats T48.6    Helicobacter pylori (H. pylori) infection A04.8    Intractable migraine with aura without status migrainosus G43.119    Chronic asthma with status asthmaticus J45.902    Right foot injury S99.921A    Abdominal mass, left upper quadrant R19.02    Left upper quadrant pain R10.12    Paroxysmal atrial fibrillation (HCC) I48.0    Severe obesity (BMI 35.0-39. 9) E66.01    Varicose veins of both lower extremities with pain I83.813    Venous insufficiency of both lower extremities I87.2    Lumbar stenosis M48.061    S/P cardiac cath Z98.890         Review of Systems   Constitutional: Negative. Respiratory: Negative. Cardiovascular: Positive for chest pain. Musculoskeletal: Negative. Visit Vitals  BP (!) 150/80 (BP 1 Location: Left arm, BP Patient Position: Sitting, BP Cuff Size: Adult)   Pulse 71   Temp 98.4 °F (36.9 °C) (Temporal)   Resp 16   Ht 5' 2\" (1.575 m)   Wt 205 lb (93 kg)   LMP 02/18/1983   SpO2 97%   BMI 37.49 kg/m²       Physical Exam  Constitutional:       Appearance: Normal appearance. Cardiovascular:      Rate and Rhythm: Normal rate and regular rhythm. Pulses: Normal pulses. Heart sounds: Normal heart sounds. Neurological:      General: No focal deficit present. Mental Status: She is alert and oriented to person, place, and time. EKG: normal EKG, normal sinus rhythm, unchanged from previous tracings, left axis deviation. ASSESSMENT/PLAN  Diagnoses and all orders for this visit:    1. Chest pain, unspecified type - I suspect that her discomfort is primarily GI in nature. She had a small hiatal hernia noted on an EGD a year ago. Discussed that this may have changed in the ensuing year. Discussed that we will begin additional acid suppression and consider re-eval with GI if improved. -     AMB POC EKG ROUTINE W/ 12 LEADS, INTER & REP    2. Gastroesophageal reflux disease without esophagitis  -     sucralfate (CARAFATE) 1 gram tablet; Take 1 Tablet by mouth three (3) times daily for 14 days.     3. Severe obesity (BMI 35.0-35.9 with comorbidity) [facility])         Advised the patient to call back or return to office if symptoms worsen/change/persist.   Discussed expected course/resolution/complications of diagnosis in detail with patient. Medication risks/benefits/costs/interactions/alternatives discussed with patient. The patient was given an after visit summary which includes diagnoses, current medications, & vitals. They expressed understanding with the diagnosis and plan.

## 2021-08-19 NOTE — PROGRESS NOTES
Chief Complaint   Patient presents with    GI Problem     Reviewed record in preparation for visit and have obtained necessary documentation. Identified pt with two pt identifiers(name and ).       Health Maintenance Due   Topic    Hepatitis C Screening     MICROALBUMIN Q1     A1C test (Diabetic or Prediabetic)          Chief Complaint   Patient presents with    GI Problem        Wt Readings from Last 3 Encounters:   21 205 lb (93 kg)   21 209 lb 3.2 oz (94.9 kg)   20 205 lb 12.8 oz (93.4 kg)     Temp Readings from Last 3 Encounters:   21 98.4 °F (36.9 °C) (Temporal)   20 97.1 °F (36.2 °C) (Oral)   10/26/20 97.3 °F (36.3 °C) (Oral)     BP Readings from Last 3 Encounters:   21 (!) 150/80   21 130/70   20 130/78     Pulse Readings from Last 3 Encounters:   21 71   21 70   20 79           Learning Assessment:  :     Learning Assessment 2018 2017 3/1/2017 2015 2015 2014   PRIMARY LEARNER Patient Patient Patient Patient Patient Patient   HIGHEST LEVEL OF EDUCATION - PRIMARY LEARNER  - GRADUATED HIGH SCHOOL OR GED GRADUATED HIGH SCHOOL OR GED GRADUATED HIGH SCHOOL OR GED GRADUATED HIGH SCHOOL OR GED GRADUATED HIGH SCHOOL OR GED   BARRIERS PRIMARY LEARNER - NONE NONE NONE - NONE   CO-LEARNER CAREGIVER - - - No - No   PRIMARY LANGUAGE ENGLISH ENGLISH ENGLISH ENGLISH ENGLISH ENGLISH    NEED - - - - - No   LEARNER PREFERENCE PRIMARY READING READING READING READING READING READING     - - LISTENING DEMONSTRATION - -   LEARNING SPECIAL TOPICS - - - - - no   ANSWERED BY patient patient  patient griselda patient patient   RELATIONSHIP SELF SELF SELF SELF SELF SELF       Depression Screening:  :     3 most recent PHQ Screens 2021   Little interest or pleasure in doing things Not at all   Feeling down, depressed, irritable, or hopeless Not at all   Total Score PHQ 2 0       Fall Risk Assessment:  :     Fall Risk Assessment, last 12 mths 8/19/2021   Able to walk? Yes   Fall in past 12 months? 0   Do you feel unsteady? 0   Are you worried about falling 0       Abuse Screening:  :     Abuse Screening Questionnaire 4/2/2021 7/21/2020 1/15/2020 1/8/2019 9/25/2018 12/4/2017 10/18/2017   Do you ever feel afraid of your partner? N N N N N N N   Are you in a relationship with someone who physically or mentally threatens you? N N N N N N N   Is it safe for you to go home? Y Y Y Y Y Y Y       Coordination of Care Questionnaire:  :     1) Have you been to an emergency room, urgent care clinic since your last visit? no   Hospitalized since your last visit? no             2) Have you seen or consulted any other health care providers outside of [address] [place] [address] since your last visit? yes  (Include any pap smears or colon screenings in this section.)    3) Do you have an Advance Directive on file? no    4) Are you interested in receiving information on Advance Directives? NO      Patient is accompanied by self I have received verbal consent from Amy Felder to discuss any/all medical information while they are present in the room. Reviewed record  In preparation for visit and have obtained necessary documentation.

## 2021-08-23 ENCOUNTER — HOSPITAL ENCOUNTER (OUTPATIENT)
Dept: CT IMAGING | Age: 81
Discharge: HOME | End: 2021-08-23
Attending: NURSE PRACTITIONER
Payer: MEDICARE

## 2021-08-23 DIAGNOSIS — R10.32 LLQ ABDOMINAL PAIN: ICD-10-CM

## 2021-08-23 LAB — CREAT BLD-MCNC: 0.8 MG/DL (ref 0.6–1.3)

## 2021-08-23 PROCEDURE — 74011000250 HC RX REV CODE- 250: Performed by: NURSE PRACTITIONER

## 2021-08-23 PROCEDURE — 74177 CT ABD & PELVIS W/CONTRAST: CPT

## 2021-08-23 PROCEDURE — 74011000636 HC RX REV CODE- 636: Performed by: NURSE PRACTITIONER

## 2021-08-23 PROCEDURE — 82565 ASSAY OF CREATININE: CPT

## 2021-08-23 RX ORDER — BARIUM SULFATE 20 MG/ML
900 SUSPENSION ORAL
Status: COMPLETED | OUTPATIENT
Start: 2021-08-23 | End: 2021-08-23

## 2021-08-23 RX ADMIN — IOPAMIDOL 100 ML: 755 INJECTION, SOLUTION INTRAVENOUS at 07:00

## 2021-08-23 RX ADMIN — BARIUM SULFATE 900 ML: 21 SUSPENSION ORAL at 07:00

## 2021-09-12 DIAGNOSIS — I10 ESSENTIAL HYPERTENSION: ICD-10-CM

## 2021-09-12 RX ORDER — METOPROLOL SUCCINATE 50 MG/1
TABLET, EXTENDED RELEASE ORAL
Qty: 90 TABLET | Refills: 1 | Status: SHIPPED | OUTPATIENT
Start: 2021-09-12 | End: 2022-03-23

## 2021-10-07 ENCOUNTER — TELEPHONE (OUTPATIENT)
Dept: CARDIOLOGY CLINIC | Age: 81
End: 2021-10-07

## 2021-10-07 NOTE — TELEPHONE ENCOUNTER
Please call pt she wants to know if she can take propafenone (RYTHMOL) 150 mg tablet with Azithromycin 250 mg for 5 days.  Pt wants to start taking it today 10/7/21      [phone]            Thanks True Danita

## 2021-10-07 NOTE — TELEPHONE ENCOUNTER
Spoke with patient. Verified with two patient identifiers. Advised pt per [employer] NP, Okay to take for 5 days.  Last EKG done on 8/2021 with QTc < 500 ms. Patient verbalized understanding.

## 2021-10-07 NOTE — TELEPHONE ENCOUNTER
Cami Please Advise     pt called and she wants to know if she can take propafenone (RYTHMOL) 150 mg tablet with Azithromycin 250 mg for 5 days. Pt wants to start taking it today 10/7/21.  Please Advise

## 2021-10-11 DIAGNOSIS — E11.21 CONTROLLED TYPE 2 DIABETES MELLITUS WITH DIABETIC NEPHROPATHY, WITHOUT LONG-TERM CURRENT USE OF INSULIN (HCC): ICD-10-CM

## 2021-10-12 RX ORDER — METFORMIN HYDROCHLORIDE 500 MG/1
TABLET ORAL
Qty: 180 TABLET | Refills: 1 | Status: ON HOLD | OUTPATIENT
Start: 2021-10-12 | End: 2022-01-27 | Stop reason: SDUPTHER

## 2021-10-25 ENCOUNTER — TRANSCRIBE ORDER (OUTPATIENT)
Dept: SCHEDULING | Age: 81
End: 2021-10-25

## 2021-10-25 DIAGNOSIS — R11.0 NAUSEA: ICD-10-CM

## 2021-10-25 DIAGNOSIS — E11.9 DIABETES MELLITUS WITHOUT COMPLICATION (HCC): ICD-10-CM

## 2021-10-25 DIAGNOSIS — R10.13 EPIGASTRIC ABDOMINAL PAIN: Primary | ICD-10-CM

## 2021-10-25 DIAGNOSIS — I10 ESSENTIAL HYPERTENSION: ICD-10-CM

## 2021-10-25 DIAGNOSIS — Z23 ENCOUNTER FOR IMMUNIZATION: ICD-10-CM

## 2021-10-25 DIAGNOSIS — G43.119 INTRACTABLE MIGRAINE WITH AURA WITHOUT STATUS MIGRAINOSUS: ICD-10-CM

## 2021-10-25 RX ORDER — HYDROCHLOROTHIAZIDE 25 MG/1
TABLET ORAL
Qty: 90 TABLET | Refills: 1 | Status: SHIPPED | OUTPATIENT
Start: 2021-10-25 | End: 2022-05-29

## 2021-10-26 RX ORDER — ALBUTEROL SULFATE 90 UG/1
AEROSOL, METERED RESPIRATORY (INHALATION)
Qty: 18 EACH | Refills: 2 | Status: SHIPPED | OUTPATIENT
Start: 2021-10-26 | End: 2022-09-02

## 2021-10-29 ENCOUNTER — TELEPHONE (OUTPATIENT)
Dept: INTERNAL MEDICINE CLINIC | Age: 81
End: 2021-10-29

## 2021-10-29 NOTE — TELEPHONE ENCOUNTER
Patient report back, hip and thigh pain. Called Dr Paulina Wu, hx back surgery. No available appt until Feb at Ortho. Suggested to call PCP. Sx started 2 weeks ago. 7/10 pain scale. Worse when sitting due to pressure. Feel like cramp in thigh. Patient taking Tylenol but not helping. Offered Ortho On-Call, patient declined. Offered Monday appt with Dr. Maren Mcdonnell, pt declined. She will visit ER center in [facility].

## 2021-11-02 ENCOUNTER — HOSPITAL ENCOUNTER (OUTPATIENT)
Dept: NUCLEAR MEDICINE | Age: 81
Discharge: HOME | End: 2021-11-02
Attending: INTERNAL MEDICINE
Payer: MEDICARE

## 2021-11-02 DIAGNOSIS — R11.0 NAUSEA: ICD-10-CM

## 2021-11-02 DIAGNOSIS — R10.13 EPIGASTRIC ABDOMINAL PAIN: ICD-10-CM

## 2021-11-02 PROCEDURE — 78264 GASTRIC EMPTYING IMG STUDY: CPT

## 2021-11-02 RX ORDER — TECHNETIUM TC 99M SULFUR COLLOID 2 MG
0.5 KIT MISCELLANEOUS
Status: COMPLETED | OUTPATIENT
Start: 2021-11-02 | End: 2021-11-02

## 2021-11-02 RX ADMIN — TECHNETIUM TC 99M SULFUR COLLOID 0.5 MILLICURIE: KIT at 08:00

## 2021-11-09 ENCOUNTER — TELEPHONE (OUTPATIENT)
Dept: CARDIOLOGY CLINIC | Age: 81
End: 2021-11-09

## 2021-11-09 NOTE — TELEPHONE ENCOUNTER
Patient had to get rescheduled from 12.23.2021 due to Dr. Amando Khan having afternoon clinic. Patient was unhappy with this change and requested that the nurse give her a call. Patient stated that she has been rescheduled three times and that she is unhappy with this. Please callback and advise.             Callback Number: [phone]          Thanks,  Tash Logan

## 2021-11-09 NOTE — TELEPHONE ENCOUNTER
This writer contacted patient in reference to appointment rescheduling. Two patient identifiers verified. Ms. Jamie George states she just received a call from our office offering her an appointment on Monday November 15, 2021 with NP Gary Morton. Patient states she is frustrated, due to appointment being rescheduled three times. Patient states she was been seen by GI due to chest pain without resolution and was recommended to follow up with cardiology for further evaluation. Patient feel her concern is not urgent enough to be seen sooner, but will be in the office on Monday to see NP. No further concerns voiced.

## 2021-11-15 ENCOUNTER — CLINICAL SUPPORT (OUTPATIENT)
Dept: CARDIOLOGY CLINIC | Age: 81
End: 2021-11-15
Payer: MEDICARE

## 2021-11-15 ENCOUNTER — OFFICE VISIT (OUTPATIENT)
Dept: CARDIOLOGY CLINIC | Age: 81
End: 2021-11-15
Payer: MEDICARE

## 2021-11-15 VITALS
HEART RATE: 74 BPM | WEIGHT: 206.3 LBS | HEIGHT: 62 IN | BODY MASS INDEX: 37.97 KG/M2 | DIASTOLIC BLOOD PRESSURE: 78 MMHG | OXYGEN SATURATION: 99 % | SYSTOLIC BLOOD PRESSURE: 128 MMHG | RESPIRATION RATE: 18 BRPM

## 2021-11-15 DIAGNOSIS — I48.0 PAROXYSMAL ATRIAL FIBRILLATION (HCC): Primary | ICD-10-CM

## 2021-11-15 DIAGNOSIS — E78.2 MIXED HYPERLIPIDEMIA: ICD-10-CM

## 2021-11-15 DIAGNOSIS — I10 ESSENTIAL HYPERTENSION: ICD-10-CM

## 2021-11-15 DIAGNOSIS — I25.10 CORONARY ARTERY DISEASE INVOLVING NATIVE CORONARY ARTERY OF NATIVE HEART WITHOUT ANGINA PECTORIS: ICD-10-CM

## 2021-11-15 PROCEDURE — G8417 CALC BMI ABV UP PARAM F/U: HCPCS | Performed by: INTERNAL MEDICINE

## 2021-11-15 PROCEDURE — 93005 ELECTROCARDIOGRAM TRACING: CPT | Performed by: INTERNAL MEDICINE

## 2021-11-15 PROCEDURE — 93228 REMOTE 30 DAY ECG REV/REPORT: CPT | Performed by: INTERNAL MEDICINE

## 2021-11-15 PROCEDURE — 1101F PT FALLS ASSESS-DOCD LE1/YR: CPT | Performed by: INTERNAL MEDICINE

## 2021-11-15 PROCEDURE — G8399 PT W/DXA RESULTS DOCUMENT: HCPCS | Performed by: INTERNAL MEDICINE

## 2021-11-15 PROCEDURE — G8754 DIAS BP LESS 90: HCPCS | Performed by: INTERNAL MEDICINE

## 2021-11-15 PROCEDURE — 1090F PRES/ABSN URINE INCON ASSESS: CPT | Performed by: INTERNAL MEDICINE

## 2021-11-15 PROCEDURE — G8536 NO DOC ELDER MAL SCRN: HCPCS | Performed by: INTERNAL MEDICINE

## 2021-11-15 PROCEDURE — G8427 DOCREV CUR MEDS BY ELIG CLIN: HCPCS | Performed by: INTERNAL MEDICINE

## 2021-11-15 PROCEDURE — G0463 HOSPITAL OUTPT CLINIC VISIT: HCPCS | Performed by: INTERNAL MEDICINE

## 2021-11-15 PROCEDURE — 99213 OFFICE O/P EST LOW 20 MIN: CPT | Performed by: INTERNAL MEDICINE

## 2021-11-15 PROCEDURE — G8752 SYS BP LESS 140: HCPCS | Performed by: INTERNAL MEDICINE

## 2021-11-15 PROCEDURE — G8510 SCR DEP NEG, NO PLAN REQD: HCPCS | Performed by: INTERNAL MEDICINE

## 2021-11-15 PROCEDURE — 93010 ELECTROCARDIOGRAM REPORT: CPT | Performed by: INTERNAL MEDICINE

## 2021-11-15 RX ORDER — FAMOTIDINE 20 MG/1
40 TABLET, FILM COATED ORAL
COMMUNITY
Start: 2021-08-13

## 2021-11-15 RX ORDER — ISOSORBIDE MONONITRATE 30 MG/1
30 TABLET, EXTENDED RELEASE ORAL
Qty: 30 TABLET | Refills: 1 | Status: SHIPPED | OUTPATIENT
Start: 2021-11-15 | End: 2021-11-26

## 2021-11-15 NOTE — LETTER
11/15/2021    Patient: Zahra Aden   YOB: 1940   Date of Visit: 11/15/2021     Stuart Hammond MD  [address] [place] [id number]  [address] In [facility] [address]    Dear Stuart Hammond MD,      Thank you for referring Ms. Aung Patiño to [address] [place] [address] for evaluation. My notes for this consultation are attached. If you have questions, please do not hesitate to call me. I look forward to following your patient along with you.       Sincerely,    Michelle Norman MD

## 2021-11-15 NOTE — PROGRESS NOTES
Kristi Villafuerte, [facility]    Subjective/HPI:     Eulalio Escoto is a 68 y.o. female is here for routine f/u. She has a PMHx of CAD, PAF, HTN, HLD, DM2 and GERD. Here with complaints of chest pain. Seen by GI, felt that symptoms are cardiac related, wanted her to be seen. She reports sharp mid-sternal pain that radiates to her back. Pains occur primarily while at rest.  No symptoms with exertion. Does not occur around mealtimes. Feels this is different type of pain from her GERD, which she has struggled with for year. Nevertheless, did try an increased dose of Pepcid, but did not find relief. Also notes increased palpitation symptoms, a few times a week. Can occur with exertion. Don't last very long. Current Outpatient Medications on File Prior to Visit   Medication Sig Dispense Refill    famotidine (PEPCID) 20 mg tablet Take 20 mg by mouth nightly.       albuterol (PROVENTIL HFA, VENTOLIN HFA, PROAIR HFA) 90 mcg/actuation inhaler INHALE 2 PUFFS BY MOUTH EVERY 4 HOURS AS NEEDED FOR WHEEZE 18 Each 2    hydroCHLOROthiazide (HYDRODIURIL) 25 mg tablet TAKE 1 TABLET BY MOUTH EVERY DAY 90 Tablet 1    metFORMIN (GLUCOPHAGE) 500 mg tablet TAKE 1 TABLET BY MOUTH TWICE A DAY WITH MEALS 180 Tablet 1    metoprolol succinate (TOPROL-XL) 50 mg XL tablet TAKE 1 TABLET BY MOUTH EVERY DAY 90 Tablet 1    rosuvastatin (CRESTOR) 20 mg tablet TAKE 1 TABLET BY MOUTH EVERY DAY AT NIGHT 90 Tablet 3    Eliquis 5 mg tablet TAKE 1 TABLET BY MOUTH TWICE A  Tablet 3    aspirin delayed-release 81 mg tablet TAKE 1 TABLET BY MOUTH EVERY DAY 30 Tablet 5    glucose blood VI test strips (Contour Next Test Strips) strip Check blood sugar once a day e11.9 50 Strip 11    propafenone (RYTHMOL) 150 mg tablet TAKE 1 TABLET BY MOUTH EVERY 8 HOURS 270 Tab 3    ALPRAZolam (XANAX) 0.5 mg tablet TAKE 1 TABLET BY MOUTH 3 TIMES A DAY AS NEEDED FOR ANXIETY 30 Tab 0    pantoprazole (PROTONIX) 40 mg tablet TAKE 1 TABLET BY MOUTH EVERY DAY      MULTIVITAMIN PO Take 1 Tab by mouth daily.  Lancets (FREESTYLE LANCETS) Misc Test once daily. (diagnosis code: 250.00) 1 Package 11    Blood-Glucose Meter monitoring kit Once daily before breakfast 1 Kit 0     No current facility-administered medications on file prior to visit. Review of Symptoms:    Review of Systems   Constitutional: Negative for chills, fever and weight loss. HENT: Negative for nosebleeds. Eyes: Negative for blurred vision and double vision. Respiratory: Negative for cough, shortness of breath and wheezing. Cardiovascular: Positive for chest pain and palpitations. Negative for orthopnea, leg swelling and PND. Skin: Negative for rash. Neurological: Negative for dizziness and loss of consciousness. Physical Exam:      General: Well developed, in no acute distress, cooperative and alert  Heart:  reg rate and rhythm; normal S1/S2; no murmurs, no gallops or rubs. Respiratory: Clear bilaterally x 4, no wheezing or rales  Extremities:  Normal cap refill, no cyanosis, atraumatic. No edema. Vascular: 2+ pulses symmetric in all extremities    Vitals:    11/15/21 0947   BP: 128/78   BP 1 Location: Right arm   BP Patient Position: Sitting   BP Cuff Size: Large adult   Pulse: 74   Resp: 18   Height: 5' 2\" (1.575 m)   Weight: 206 lb 4.8 oz (93.6 kg)   SpO2: 99%       ECG done today shows sinus rhythm     Assessment:       ICD-10-CM ICD-9-CM    1. Paroxysmal atrial fibrillation (HCC)  I48.0 427.31 AMB POC EKG ROUTINE W/ 12 LEADS, INTER & REP   2. Coronary artery disease involving native coronary artery of native heart without angina pectoris  I25.10 414.01    3. Essential hypertension  I10 401.9 AMB POC EKG ROUTINE W/ 12 LEADS, INTER & REP   4. Mixed hyperlipidemia  E78.2 272.2         Plan:     1.  Coronary artery disease involving native coronary artery of native heart without angina pectoris  Cardiac cath done 8/2020 with 95% RPL lesion, vessel is not amenable to PCI. Mild RCA disease. With new anginal symptoms, primarily at rest, not associated with exertion  On BB. Start Imdur 30 mg daily, obtain lexiscan stress test  Cotinue ASA, statin therapy    2. Paroxysmal atrial fibrillation (HCC)  Maintaining sinus rhythm  Check 2-week event for new palpitation symptoms  Conitnue Rhythmol, Toprol  On Eliquis for stroke prevention    3. Essential hypertension  BP controlled. Continue anti-hypertensive therapy and low sodium diet    4. Mixed hyperlipidemia  LDL 74 in 10/2020  Continue statin therapy and low fat, low cholesterol diet  Lipids managed by PCP    F/u with Dr. Marshall Perez in 1 month    Amandeep Burrows NP       [facility]             Patient seen, examined by me personally. Plan discussed as detailed. Agree with note as outlined by  NP with modifications as noted. My independent physical exam reveals : Physical Exam  Vitals and nursing note reviewed. Cardiovascular:      Rate and Rhythm: Normal rate and regular rhythm. Heart sounds: Normal heart sounds. Pulmonary:      Breath sounds: Normal breath sounds. Musculoskeletal:      Right lower leg: No edema. Left lower leg: No edema. Skin:     General: Skin is warm. Neurological:      Mental Status: She is alert and oriented to person, place, and time. Psychiatric:         Mood and Affect: Mood normal.          No additional findings noted. Agree with plan as outlined above with modifications as noted.      Fransisco Cuellar MD

## 2021-11-26 ENCOUNTER — TELEPHONE (OUTPATIENT)
Dept: CARDIOLOGY CLINIC | Age: 81
End: 2021-11-26

## 2021-11-26 RX ORDER — AMLODIPINE BESYLATE 2.5 MG/1
2.5 TABLET ORAL DAILY
Qty: 30 TABLET | Refills: 1 | Status: SHIPPED | OUTPATIENT
Start: 2021-11-26 | End: 2021-12-20

## 2021-11-26 NOTE — TELEPHONE ENCOUNTER
Incoming call from patient, two patient identifiers verified. Patient states she spoke with on call physician a couple of days ago and was advised to discontinue Isosorbide Mononitrate due to severe headaches and follow up with the office today. Patient states she was advised prior to starting medication on 11/15/2021, that medication may cause headaches and was instructed to take Tylenol as needed. Patient states she followed instructions, however Tylenol was no effective for headache relief, which warranted the call to the on call physician.  States headaches have resolved since discontinuing medication

## 2021-11-26 NOTE — TELEPHONE ENCOUNTER
This writer contacted patient, two patient identifiers verified. Patient informed per Aiden Adair of the following  Please start amlodipine 2.5 mg daily instead   Please take out Imdur from [facility]     Patient verbalized understanding of the above information and denies questions at this time. MAR previously updated to reflect medication discontinuation.

## 2021-12-01 ENCOUNTER — TELEPHONE (OUTPATIENT)
Dept: CARDIOLOGY CLINIC | Age: 81
End: 2021-12-01

## 2021-12-01 NOTE — PROGRESS NOTES
Please call patient and inform that event monitor did not show any recurrence of A fib. Keep fu scheduled with us to see if symptoms improved since starting Imdur.

## 2021-12-01 NOTE — TELEPHONE ENCOUNTER
This writer contacted patient, two patient identifiers verified. Mrs. Kip Gunter was informed of the following results as noted below    ----- Message from Debbie Jay NP sent at 12/1/2021  1:42 PM EST -----  Please call patient and inform that event monitor did not show any recurrence of A fib. Keep fu scheduled with us to see if symptoms improved since starting Imdur. Patient verbalized understanding of the above results and recommendations. Patient states Imdur was changed to Amlodipine due to headaches. Follow up appointment information verified with patient. Patient denies questions at this time.

## 2021-12-14 ENCOUNTER — OFFICE VISIT (OUTPATIENT)
Dept: CARDIOLOGY CLINIC | Age: 81
End: 2021-12-14
Payer: MEDICARE

## 2021-12-14 VITALS
SYSTOLIC BLOOD PRESSURE: 134 MMHG | WEIGHT: 207.3 LBS | DIASTOLIC BLOOD PRESSURE: 80 MMHG | BODY MASS INDEX: 38.15 KG/M2 | HEIGHT: 62 IN | HEART RATE: 76 BPM | OXYGEN SATURATION: 97 % | RESPIRATION RATE: 18 BRPM

## 2021-12-14 DIAGNOSIS — I48.0 PAROXYSMAL ATRIAL FIBRILLATION (HCC): ICD-10-CM

## 2021-12-14 DIAGNOSIS — E78.2 MIXED HYPERLIPIDEMIA: ICD-10-CM

## 2021-12-14 DIAGNOSIS — I10 ESSENTIAL HYPERTENSION: ICD-10-CM

## 2021-12-14 DIAGNOSIS — I25.10 CORONARY ARTERY DISEASE INVOLVING NATIVE CORONARY ARTERY OF NATIVE HEART WITHOUT ANGINA PECTORIS: Primary | ICD-10-CM

## 2021-12-14 PROCEDURE — G8417 CALC BMI ABV UP PARAM F/U: HCPCS | Performed by: INTERNAL MEDICINE

## 2021-12-14 PROCEDURE — G8536 NO DOC ELDER MAL SCRN: HCPCS | Performed by: INTERNAL MEDICINE

## 2021-12-14 PROCEDURE — G8427 DOCREV CUR MEDS BY ELIG CLIN: HCPCS | Performed by: INTERNAL MEDICINE

## 2021-12-14 PROCEDURE — G8754 DIAS BP LESS 90: HCPCS | Performed by: INTERNAL MEDICINE

## 2021-12-14 PROCEDURE — 93010 ELECTROCARDIOGRAM REPORT: CPT | Performed by: INTERNAL MEDICINE

## 2021-12-14 PROCEDURE — 1090F PRES/ABSN URINE INCON ASSESS: CPT | Performed by: INTERNAL MEDICINE

## 2021-12-14 PROCEDURE — G0463 HOSPITAL OUTPT CLINIC VISIT: HCPCS | Performed by: INTERNAL MEDICINE

## 2021-12-14 PROCEDURE — 1101F PT FALLS ASSESS-DOCD LE1/YR: CPT | Performed by: INTERNAL MEDICINE

## 2021-12-14 PROCEDURE — 99213 OFFICE O/P EST LOW 20 MIN: CPT | Performed by: INTERNAL MEDICINE

## 2021-12-14 PROCEDURE — G8752 SYS BP LESS 140: HCPCS | Performed by: INTERNAL MEDICINE

## 2021-12-14 PROCEDURE — 93005 ELECTROCARDIOGRAM TRACING: CPT | Performed by: INTERNAL MEDICINE

## 2021-12-14 PROCEDURE — G8399 PT W/DXA RESULTS DOCUMENT: HCPCS | Performed by: INTERNAL MEDICINE

## 2021-12-14 PROCEDURE — G8510 SCR DEP NEG, NO PLAN REQD: HCPCS | Performed by: INTERNAL MEDICINE

## 2021-12-14 NOTE — LETTER
12/14/2021    Patient: Natividad Foots   YOB: 1940   Date of Visit: 12/14/2021     Nick Ash MD  [address] [place] [id number]  [facility] [address] [id number]  [address] [facility]    Dear Nick Ash MD,      Thank you for referring Ms. Adriana Raymond to [facility] for evaluation. My notes for this consultation are attached. If you have questions, please do not hesitate to call me. I look forward to following your patient along with you.       Sincerely,    Tre Munguia MD

## 2021-12-14 NOTE — PROGRESS NOTES
Chief Complaint   Patient presents with    Coronary Artery Disease     One Month Follow Up;     Irregular Heart Beat    Medication Question     Discuss Imdur-Medication cause severe headaches; changed to Amlodipine (Didn't start medicaiton due to possible medicaiton side effect-Swelling)      Visit Vitals  /80 (BP 1 Location: Left arm, BP Patient Position: Sitting, BP Cuff Size: Adult)   Pulse 76   Resp 18   Ht 5' 2\" (1.575 m)   Wt 207 lb 4.8 oz (94 kg)   SpO2 97%   BMI 37.92 kg/m²       1. Have you been to the ER, urgent care clinic since your last visit? Hospitalized since your last visit? No    2. Have you seen or consulted any other health care providers outside of the [address] [place] [address] since your last visit? Include any pap smears or colon screening.  No

## 2021-12-14 NOTE — PROGRESS NOTES
Vicki Munoz, [facility]    Subjective/HPI:     Татьяна Parisi is a 68 y.o. female is here for routine f/u. She has a PMHx of CAD, PAF, HTN, HLD, DM2 and GERD. Last visit, started on Imdur and obtained stress test due to atypical chest pain symptoms. Event monitor also obtained due to complaints of palpitations. Did not tolerate Imdur due to headaches, switched to amlodipine. Did not start amlodipine due to concerns for leg swelling. Still having symptoms of chest pains, dizziness. Gets out of breath walking to the mailbox. Current Outpatient Medications on File Prior to Visit   Medication Sig Dispense Refill    famotidine (PEPCID) 20 mg tablet Take 20 mg by mouth nightly.  albuterol (PROVENTIL HFA, VENTOLIN HFA, PROAIR HFA) 90 mcg/actuation inhaler INHALE 2 PUFFS BY MOUTH EVERY 4 HOURS AS NEEDED FOR WHEEZE 18 Each 2    hydroCHLOROthiazide (HYDRODIURIL) 25 mg tablet TAKE 1 TABLET BY MOUTH EVERY DAY 90 Tablet 1    metFORMIN (GLUCOPHAGE) 500 mg tablet TAKE 1 TABLET BY MOUTH TWICE A DAY WITH MEALS 180 Tablet 1    metoprolol succinate (TOPROL-XL) 50 mg XL tablet TAKE 1 TABLET BY MOUTH EVERY DAY 90 Tablet 1    rosuvastatin (CRESTOR) 20 mg tablet TAKE 1 TABLET BY MOUTH EVERY DAY AT NIGHT 90 Tablet 3    Eliquis 5 mg tablet TAKE 1 TABLET BY MOUTH TWICE A  Tablet 3    aspirin delayed-release 81 mg tablet TAKE 1 TABLET BY MOUTH EVERY DAY 30 Tablet 5    glucose blood VI test strips (Contour Next Test Strips) strip Check blood sugar once a day e11.9 50 Strip 11    propafenone (RYTHMOL) 150 mg tablet TAKE 1 TABLET BY MOUTH EVERY 8 HOURS 270 Tab 3    ALPRAZolam (XANAX) 0.5 mg tablet TAKE 1 TABLET BY MOUTH 3 TIMES A DAY AS NEEDED FOR ANXIETY 30 Tab 0    pantoprazole (PROTONIX) 40 mg tablet TAKE 1 TABLET BY MOUTH EVERY DAY      MULTIVITAMIN PO Take 1 Tab by mouth daily.  Lancets (FREESTYLE LANCETS) Misc Test once daily.  (diagnosis code: 250.00) 1 Package 11    Blood-Glucose Meter monitoring kit Once daily before breakfast 1 Kit 0    amLODIPine (NORVASC) 2.5 mg tablet Take 1 Tablet by mouth daily. (Patient not taking: Reported on 12/14/2021) 30 Tablet 1     No current facility-administered medications on file prior to visit. Review of Symptoms:    Review of Systems   Constitutional: Negative for chills, fever and weight loss. HENT: Negative for nosebleeds. Eyes: Negative for blurred vision and double vision. Respiratory: Positive for shortness of breath. Negative for cough and wheezing. Cardiovascular: Positive for chest pain and palpitations. Negative for orthopnea, leg swelling and PND. Skin: Negative for rash. Neurological: Negative for dizziness and loss of consciousness. Physical Exam:      General: Well developed, in no acute distress, cooperative and alert  Heart:  reg rate and rhythm; normal S1/S2; no murmurs, no gallops or rubs. Respiratory: Clear bilaterally x 4, no wheezing or rales  Extremities:  Normal cap refill, no cyanosis, atraumatic. No edema. Vascular: 2+ pulses symmetric in all extremities    Vitals:    12/14/21 1029   BP: 134/80   BP 1 Location: Left arm   BP Patient Position: Sitting   BP Cuff Size: Adult   Pulse: 76   Resp: 18   Height: 5' 2\" (1.575 m)   Weight: 207 lb 4.8 oz (94 kg)   SpO2: 97%       ECG done today shows sinus rhythm     Assessment:       ICD-10-CM ICD-9-CM    1. Coronary artery disease involving native coronary artery of native heart without angina pectoris  I25.10 414.01 AMB POC EKG ROUTINE W/ 12 LEADS, INTER & REP   2. Paroxysmal atrial fibrillation (HCC)  I48.0 427.31 AMB POC EKG ROUTINE W/ 12 LEADS, INTER & REP   3. Essential hypertension  I10 401.9 AMB POC EKG ROUTINE W/ 12 LEADS, INTER & REP   4. Mixed hyperlipidemia  E78.2 272.2 AMB POC EKG ROUTINE W/ 12 LEADS, INTER & REP        Plan:     1.  Coronary artery disease involving native coronary artery of native heart without angina pectoris  Cardiac cath done 8/2020 with 95% RPL lesion, vessel is not amenable to PCI. Mild RCA disease. On BB. Did not tolerate Imdur. Resume amlodipine 2.5 mg daily  Obtain stress test  Cotinue ASA, statin therapy    2. Paroxysmal atrial fibrillation (HCC)  Maintaining sinus rhythm  2-week event monitor did not show evidence of AF, PACs and PVCs noted. Conitnue Rhythmol, Toprol  On Eliquis for stroke prevention    3. Essential hypertension  BP controlled. Continue anti-hypertensive therapy and low sodium diet    4. Mixed hyperlipidemia  LDL 74 in 10/2020  Continue statin therapy and low fat, low cholesterol diet  Lipids managed by PCP    F/u with Dr. Naeem Pantoja in 1 month    Renelle Bumpers, NP       [facility]             Patient seen, examined by me personally. Plan discussed as detailed. Agree with note as outlined by  NP with modifications as noted. My independent physical exam reveals : Physical Exam  Vitals and nursing note reviewed. Cardiovascular:      Rate and Rhythm: Normal rate and regular rhythm. Heart sounds: Normal heart sounds. Pulmonary:      Breath sounds: Normal breath sounds. Musculoskeletal:      Right lower leg: No edema. Left lower leg: No edema. Skin:     General: Skin is warm. Neurological:      Mental Status: She is alert and oriented to person, place, and time. Psychiatric:         Mood and Affect: Mood normal.          No additional findings noted. Agree with plan as outlined above with modifications as noted.      Kim Carreno MD

## 2021-12-22 DIAGNOSIS — E55.9 VITAMIN D DEFICIENCY: ICD-10-CM

## 2021-12-22 RX ORDER — ASPIRIN 81 MG/1
TABLET ORAL
Qty: 90 TABLET | Refills: 1 | Status: SHIPPED | OUTPATIENT
Start: 2021-12-22 | End: 2022-05-25

## 2022-01-03 ENCOUNTER — ANCILLARY PROCEDURE (OUTPATIENT)
Dept: CARDIOLOGY CLINIC | Age: 82
End: 2022-01-03
Payer: MEDICARE

## 2022-01-03 VITALS
BODY MASS INDEX: 37.36 KG/M2 | HEIGHT: 62 IN | DIASTOLIC BLOOD PRESSURE: 80 MMHG | WEIGHT: 203 LBS | SYSTOLIC BLOOD PRESSURE: 130 MMHG

## 2022-01-03 DIAGNOSIS — I25.10 CORONARY ARTERY DISEASE INVOLVING NATIVE CORONARY ARTERY OF NATIVE HEART WITHOUT ANGINA PECTORIS: ICD-10-CM

## 2022-01-03 PROCEDURE — A9502 TC99M TETROFOSMIN: HCPCS | Performed by: INTERNAL MEDICINE

## 2022-01-03 PROCEDURE — 93017 CV STRESS TEST TRACING ONLY: CPT | Performed by: INTERNAL MEDICINE

## 2022-01-03 RX ADMIN — TETROFOSMIN 31.7 MILLICURIE: 1.38 INJECTION, POWDER, LYOPHILIZED, FOR SOLUTION INTRAVENOUS at 09:15

## 2022-01-03 RX ADMIN — REGADENOSON 0.4 MG: 0.08 INJECTION, SOLUTION INTRAVENOUS at 09:15

## 2022-01-03 RX ADMIN — TETROFOSMIN 9.4 MILLICURIE: 1.38 INJECTION, POWDER, LYOPHILIZED, FOR SOLUTION INTRAVENOUS at 08:25

## 2022-01-04 ENCOUNTER — TELEPHONE (OUTPATIENT)
Dept: CARDIOLOGY CLINIC | Age: 82
End: 2022-01-04

## 2022-01-04 LAB
NUC STRESS EJECTION FRACTION: 70 %
STRESS BASELINE DIAS BP: 80 MMHG
STRESS BASELINE HR: 71 BPM
STRESS BASELINE SYS BP: 130 MMHG
STRESS PEAK DIAS BP: 80 MMHG
STRESS PEAK SYS BP: 130 MMHG
STRESS PERCENT HR ACHIEVED: 72 %
STRESS POST PEAK HR: 102 BPM
STRESS RATE PRESSURE PRODUCT: NORMAL BPM*MMHG
STRESS TARGET HR: 141 BPM

## 2022-01-04 PROCEDURE — 78452 HT MUSCLE IMAGE SPECT MULT: CPT | Performed by: INTERNAL MEDICINE

## 2022-01-04 PROCEDURE — 93016 CV STRESS TEST SUPVJ ONLY: CPT | Performed by: INTERNAL MEDICINE

## 2022-01-04 PROCEDURE — 93018 CV STRESS TEST I&R ONLY: CPT | Performed by: INTERNAL MEDICINE

## 2022-01-04 NOTE — TELEPHONE ENCOUNTER
This writer contacted patient, two patient identifiers verified. Mrs. Terri Kussmaul was informed of the following results per MORAIMA Mg    ----- Message from Cornell Pitt NP sent at 1/4/2022  2:07 PM EST -----  Please call the patient and inform that stress test is normal.  Low concern for significant blockages in the heart arteries at this time. Patient verbalized understanding of the above results. States she has questions, but will await her upcoming appointment on 01/17/2022 with Dr. Denver Ree to discuss.

## 2022-01-05 ENCOUNTER — OFFICE VISIT (OUTPATIENT)
Dept: INTERNAL MEDICINE CLINIC | Age: 82
End: 2022-01-05
Payer: MEDICARE

## 2022-01-05 VITALS
TEMPERATURE: 97.5 F | HEIGHT: 62 IN | BODY MASS INDEX: 38.09 KG/M2 | RESPIRATION RATE: 16 BRPM | OXYGEN SATURATION: 98 % | WEIGHT: 207 LBS | SYSTOLIC BLOOD PRESSURE: 130 MMHG | HEART RATE: 68 BPM | DIASTOLIC BLOOD PRESSURE: 78 MMHG

## 2022-01-05 DIAGNOSIS — E11.21 CONTROLLED TYPE 2 DIABETES MELLITUS WITH DIABETIC NEPHROPATHY, WITHOUT LONG-TERM CURRENT USE OF INSULIN (HCC): ICD-10-CM

## 2022-01-05 DIAGNOSIS — R09.02 HYPOXIA: ICD-10-CM

## 2022-01-05 DIAGNOSIS — Z11.59 ENCOUNTER FOR HEPATITIS C SCREENING TEST FOR LOW RISK PATIENT: ICD-10-CM

## 2022-01-05 DIAGNOSIS — Z23 NEEDS FLU SHOT: ICD-10-CM

## 2022-01-05 DIAGNOSIS — E78.2 MIXED HYPERLIPIDEMIA: ICD-10-CM

## 2022-01-05 DIAGNOSIS — I48.0 PAROXYSMAL ATRIAL FIBRILLATION (HCC): ICD-10-CM

## 2022-01-05 DIAGNOSIS — I10 ESSENTIAL HYPERTENSION: ICD-10-CM

## 2022-01-05 DIAGNOSIS — F41.9 ANXIETY: Primary | ICD-10-CM

## 2022-01-05 DIAGNOSIS — E66.01 SEVERE OBESITY (BMI 35.0-35.9 WITH COMORBIDITY) (HCC): ICD-10-CM

## 2022-01-05 PROCEDURE — 1090F PRES/ABSN URINE INCON ASSESS: CPT | Performed by: INTERNAL MEDICINE

## 2022-01-05 PROCEDURE — G8417 CALC BMI ABV UP PARAM F/U: HCPCS | Performed by: INTERNAL MEDICINE

## 2022-01-05 PROCEDURE — G8752 SYS BP LESS 140: HCPCS | Performed by: INTERNAL MEDICINE

## 2022-01-05 PROCEDURE — G8536 NO DOC ELDER MAL SCRN: HCPCS | Performed by: INTERNAL MEDICINE

## 2022-01-05 PROCEDURE — G0463 HOSPITAL OUTPT CLINIC VISIT: HCPCS | Performed by: INTERNAL MEDICINE

## 2022-01-05 PROCEDURE — G8510 SCR DEP NEG, NO PLAN REQD: HCPCS | Performed by: INTERNAL MEDICINE

## 2022-01-05 PROCEDURE — 1101F PT FALLS ASSESS-DOCD LE1/YR: CPT | Performed by: INTERNAL MEDICINE

## 2022-01-05 PROCEDURE — 90694 VACC AIIV4 NO PRSRV 0.5ML IM: CPT | Performed by: INTERNAL MEDICINE

## 2022-01-05 PROCEDURE — 99214 OFFICE O/P EST MOD 30 MIN: CPT | Performed by: INTERNAL MEDICINE

## 2022-01-05 PROCEDURE — G8427 DOCREV CUR MEDS BY ELIG CLIN: HCPCS | Performed by: INTERNAL MEDICINE

## 2022-01-05 PROCEDURE — G8754 DIAS BP LESS 90: HCPCS | Performed by: INTERNAL MEDICINE

## 2022-01-05 PROCEDURE — G8399 PT W/DXA RESULTS DOCUMENT: HCPCS | Performed by: INTERNAL MEDICINE

## 2022-01-05 NOTE — PATIENT INSTRUCTIONS
Vaccine Information Statement    Influenza (Flu) Vaccine (Inactivated or Recombinant): What You Need to Know    Many vaccine information statements are available in Croatian and other languages. See www.immunize.org/vis. Hojas de información sobre vacunas están disponibles en español y en muchos otros idiomas. Visite www.immunize.org/vis. 1. Why get vaccinated? Influenza vaccine can prevent influenza (flu). Flu is a contagious disease that spreads around the [facility] every year, usually between October and May. Anyone can get the flu, but it is more dangerous for some people. Infants and young children, people 72 years and older, pregnant people, and people with certain health conditions or a weakened immune system are at greatest risk of flu complications. Pneumonia, bronchitis, sinus infections, and ear infections are examples of flu-related complications. If you have a medical condition, such as heart disease, cancer, or diabetes, flu can make it worse. Flu can cause fever and chills, sore throat, muscle aches, fatigue, cough, headache, and runny or stuffy nose. Some people may have vomiting and diarrhea, though this is more common in children than adults. In an average year, thousands of people in the [facility] die from flu, and many more are hospitalized. Flu vaccine prevents millions of illnesses and flu-related visits to the doctor each year. 2. Influenza vaccines     CDC recommends everyone 6 months and older get vaccinated every flu season. Children 6 months through 6years of age may need 2 doses during a single flu season. Everyone else needs only 1 dose each flu season. It takes about 2 weeks for protection to develop after vaccination. There are many flu viruses, and they are always changing. Each year a new flu vaccine is made to protect against the influenza viruses believed to be likely to cause disease in the upcoming flu season.  Even when the vaccine doesnt exactly match these viruses, it may still provide some protection. Influenza vaccine does not cause flu. Influenza vaccine may be given at the same time as other vaccines. 3. Talk with your health care provider    Tell your vaccination provider if the person getting the vaccine:   Has had an allergic reaction after a previous dose of influenza vaccine, or has any severe, life-threatening allergies    Has ever had Guillain-Barré Syndrome (also called GBS)    In some cases, your health care provider may decide to postpone influenza vaccination until a future visit. Influenza vaccine can be administered at any time during pregnancy. People who are or will be pregnant during influenza season should receive inactivated influenza vaccine. People with minor illnesses, such as a cold, may be vaccinated. People who are moderately or severely ill should usually wait until they recover before getting influenza vaccine. Your health care provider can give you more information. 4. Risks of a vaccine reaction     Soreness, redness, and swelling where the shot is given, fever, muscle aches, and headache can happen after influenza vaccination.  There may be a very small increased risk of Guillain-Barré Syndrome (GBS) after inactivated influenza vaccine (the flu shot). Rachele Peterson children who get the flu shot along with pneumococcal vaccine (PCV13) and/or DTaP vaccine at the same time might be slightly more likely to have a seizure caused by fever. Tell your health care provider if a child who is getting flu vaccine has ever had a seizure. People sometimes faint after medical procedures, including vaccination. Tell your provider if you feel dizzy or have vision changes or ringing in the ears. As with any medicine, there is a very remote chance of a vaccine causing a severe allergic reaction, other serious injury, or death. 5. What if there is a serious problem?     An allergic reaction could occur after the vaccinated person leaves the clinic. If you see signs of a severe allergic reaction (hives, swelling of the face and throat, difficulty breathing, a fast heartbeat, dizziness, or weakness), call 9-1-1 and get the person to the nearest hospital.    For other signs that concern you, call your health care provider. Adverse reactions should be reported to the Vaccine Adverse Event Reporting System (VAERS). Your health care provider will usually file this report, or you can do it yourself. Visit the VAERS website at www.vaers. [facility].gov or call 3-995.107.9519. VAERS is only for reporting reactions, and VAERS staff members do not give medical advice. 6. The National Vaccine Injury Compensation Program    The [facility] Vaccine Injury Compensation Program (VICP) is a federal program that was created to compensate people who may have been injured by certain vaccines. Claims regarding alleged injury or death due to vaccination have a time limit for filing, which may be as short as two years. Visit the VICP website at www.[facility].gov/vaccinecompensation or call 9-662.744.9418 to learn about the program and about filing a claim. 7. How can I learn more?  Ask your health care provider.  Call your local or state health department.  Visit the website of the Food and Drug Administration (FDA) for vaccine package inserts and additional information at www.fda.gov/vaccines-blood-biologics/vaccines.  Contact the Centers for Disease Control and Prevention (CDC):  - Call 2-411.821.9746 (1-800-CDC-INFO) or  - Visit CDCs influenza website at www.cdc.gov/flu. Vaccine Information Statement   Inactivated Influenza Vaccine   8/6/2021  42 ROBERTO CARLOS Tineo Judy [id number]   Department of Health and Human Services  Centers for Disease Control and Prevention    Office Use Only

## 2022-01-05 NOTE — PROGRESS NOTES
Chief Complaint   Patient presents with    Medication Evaluation     Reviewed record in preparation for visit and have obtained necessary documentation. Identified pt with two pt identifiers(name and ).       Health Maintenance Due   Topic    Hepatitis C Screening     Shingrix Vaccine Age 50> (1 of 2)    MICROALBUMIN Q1     A1C test (Diabetic or Prediabetic)     Flu Vaccine (1)    COVID-19 Vaccine (3 - Booster for Pfizer series)    Lipid Screen     Foot Exam Q1     Medicare Yearly Exam          Chief Complaint   Patient presents with    Medication Evaluation        Wt Readings from Last 3 Encounters:   22 207 lb (93.9 kg)   22 203 lb (92.1 kg)   21 207 lb 4.8 oz (94 kg)     Temp Readings from Last 3 Encounters:   22 97.5 °F (36.4 °C) (Temporal)   21 98.4 °F (36.9 °C) (Temporal)   20 97.1 °F (36.2 °C) (Oral)     BP Readings from Last 3 Encounters:   22 130/78   22 130/80   21 134/80     Pulse Readings from Last 3 Encounters:   22 68   21 76   11/15/21 74           Learning Assessment:  :     Learning Assessment 11/15/2021 2018 2017 3/1/2017 2015 2015 2014   PRIMARY LEARNER Patient Patient Patient Patient Patient Patient Patient   HIGHEST LEVEL OF EDUCATION - PRIMARY LEARNER  - - GRADUATED HIGH SCHOOL OR GED GRADUATED HIGH SCHOOL OR GED GRADUATED HIGH SCHOOL OR GED GRADUATED HIGH SCHOOL OR GED GRADUATED HIGH SCHOOL OR GED   BARRIERS PRIMARY LEARNER - - NONE NONE NONE - NONE   CO-LEARNER CAREGIVER - - - - No - No   PRIMARY [address]    NEED - - - - - - No   LEARNER PREFERENCE PRIMARY READING READING READING READING READING READING READING     - - - LISTENING DEMONSTRATION - -   LEARNING SPECIAL TOPICS - - - - - - no   ANSWERED BY patient patient patient  patient griselda patient patient   RELATIONSHIP SELF SELF SELF SELF SELF SELF SELF       Depression Screening:  :     3 most recent PHQ Screens 1/5/2022   Little interest or pleasure in doing things Not at all   Feeling down, depressed, irritable, or hopeless Not at all   Total Score PHQ 2 0       Fall Risk Assessment:  :     Fall Risk Assessment, last 12 mths 1/5/2022   Able to walk? Yes   Fall in past 12 months? 0   Do you feel unsteady? 0   Are you worried about falling 0       Abuse Screening:  :     Abuse Screening Questionnaire 11/15/2021 4/2/2021 7/21/2020 1/15/2020 1/8/2019 9/25/2018 12/4/2017   Do you ever feel afraid of your partner? N N N N N N N   Are you in a relationship with someone who physically or mentally threatens you? N N N N N N N   Is it safe for you to go home? Y Y Y Y Y Y Y       Coordination of Care Questionnaire:  :     1) Have you been to an emergency room, urgent care clinic since your last visit? no   Hospitalized since your last visit? no             2) Have you seen or consulted any other health care providers outside of [address] [place] [address] since your last visit? yes  (Include any pap smears or colon screenings in this section.)    3) Do you have an Advance Directive on file? yes    4) Are you interested in receiving information on Advance Directives? NO      Patient is accompanied by daughter have received verbal consent from Edison Fine to discuss any/all medical information while they are present in the room. Reviewed record  In preparation for visit and have obtained necessary documentation.

## 2022-01-05 NOTE — PROGRESS NOTES
Follow Up Visit    Alize Diallo is a 80 y.o. female. she presents for Medication Evaluation    The patient presents with concerns of a \"shaky feeling\" in her abdomen. She reports that this has been present for the past several weeks and she has noticed this more recently. She denies abdominal pain, nausea or diarrhea. No fevers or chills. She has a history of CAD on cath but has had a recent stress test that was determined to be normal.  She has an appointment with GI today to discuss her symptoms. Today we discussed her symptoms and also that she has had these in the past and that they have been improved with small dosages of Xanax in the past.  She shoulders a lot of her family's burdens per her daughter (in the visit today) and per her own report worries about her family at times. We discussed that this worry/anxiety may be her issue. Patient Active Problem List   Diagnosis Code    Hyperlipemia E78.5    Arthritis M19.90    Essential hypertension I10    Headache(784.0) R51    Spondylitis, cervical ([facility]) M46.92    Tingling sensation R20.2    Chest pain R07.9    Anemia D64.9    Cyst of right kidney N28.1    Hip pain, right M25.551    Acute bronchitis J20.9    Visual floaters H43.399    Postmenopausal state Z78.0    Vitamin D deficiency E55.9    Bilateral hip pain M25.551, M25.552    Postmenopausal disorder N95.1    Numbness and tingling of right leg R20.0, R20.2    Foot pain, left M79.672    Rotator cuff (capsule) sprain and strain [id number]    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 901    Cataract H26.9    Peripheral autonomic neuropathy due to DM ([facility]) E11.43    Pre-ulcerative calluses L84    Type 2 diabetes mellitus with pressure callus ([facility]) E11.628, L84    Hammer toe of right foot M20.41    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 (HCC) E11.69, M21.969    DAVID (generalized anxiety disorder) F41.1    Abnormal finding on MRI of brain R90.89    Tinea pedis B35.3    Arthralgia of right hip M25.551    Swollen gland R59.9    Acute pharyngitis J02.9    Diabetes mellitus type 2, controlled (HCC) E11.9    Multiple thyroid nodules E04.2    Skipped heart beats H05.4    Helicobacter pylori (H. pylori) infection A04.8    Intractable migraine with aura without status migrainosus G43.119    Chronic asthma with status asthmaticus J45.902    Right foot injury S99.921A    Abdominal mass, left upper quadrant R19.02    Left upper quadrant pain R10.12    Paroxysmal atrial fibrillation (HCC) I48.0    Severe obesity (BMI 35.0-39. 9) E66.01    Varicose veins of both lower extremities with pain I83.813    Venous insufficiency of both lower extremities I87.2    Lumbar stenosis M48.061    S/P cardiac cath Z98.890         Prior to Admission medications    Medication Sig Start Date End Date Taking? Authorizing Provider   aspirin delayed-release 81 mg tablet TAKE 1 TABLET BY MOUTH EVERY DAY 12/22/21  Yes Shaun Santiago MD   amLODIPine (NORVASC) 2.5 mg tablet TAKE 1 TABLET BY MOUTH EVERY DAY 12/20/21  Yes Padilla Mg, NP   famotidine (PEPCID) 20 mg tablet Take 20 mg by mouth nightly.  8/13/21  Yes Provider, Historical   albuterol (PROVENTIL HFA, VENTOLIN HFA, PROAIR HFA) 90 mcg/actuation inhaler INHALE 2 PUFFS BY MOUTH EVERY 4 HOURS AS NEEDED FOR WHEEZE 10/26/21  Yes Shaun Santiago MD   hydroCHLOROthiazide (HYDRODIURIL) 25 mg tablet TAKE 1 TABLET BY MOUTH EVERY DAY 10/25/21  Yes Shaun Santiago MD   metFORMIN (GLUCOPHAGE) 500 mg tablet TAKE 1 TABLET BY MOUTH TWICE A DAY WITH MEALS 10/12/21  Yes Shaun Santiago MD   metoprolol succinate (TOPROL-XL) 50 mg XL tablet TAKE 1 TABLET BY MOUTH EVERY DAY 9/12/21  Yes Shaun Santiago MD   rosuvastatin ([address] [place] [address]) 20 mg tablet TAKE 1 TABLET BY MOUTH EVERY DAY AT NIGHT 8/2/21  Yes McGuinessVannessain, NP   Eliquis 5 mg tablet TAKE 1 TABLET BY MOUTH TWICE A DAY 7/23/21  Yes [place] Errolin, NP   propafenone (RYTHMOL) 150 mg tablet TAKE 1 TABLET BY MOUTH EVERY 8 HOURS 12/11/20  Yes McGuineVannessa trotter, NP   pantoprazole (PROTONIX) 40 mg tablet TAKE 1 TABLET BY MOUTH EVERY DAY   Yes Provider, Historical   MULTIVITAMIN PO Take 1 Tab by mouth daily. Yes Provider, Historical   glucose blood VI test strips (Contour Next Test Strips) strip Check blood sugar once a day e11.9 12/17/20   MD DESIREE WadeZolam Jennett Dubin) 0.5 mg tablet TAKE 1 TABLET BY MOUTH 3 TIMES A DAY AS NEEDED FOR ANXIETY  Patient not taking: Reported on 1/5/2022 4/1/20   Lorena Perrin MD   Lancets (FREESTYLE LANCETS) Misc Test once daily. (diagnosis code: 250.00) 10/24/13   Amber Leyva MD   Blood-Glucose Meter monitoring kit Once daily before breakfast 9/20/13   Amber Leyva MD         Health Maintenance   Topic Date Due    Hepatitis C Screening  Never done    Shingrix Vaccine Age 50> (1 of 2) Never done    MICROALBUMIN Q1  08/30/2020    A1C test (Diabetic or Prediabetic)  04/26/2021    Flu Vaccine (1) 09/01/2021    COVID-19 Vaccine (3 - Booster for [employer] series) 09/18/2021    Lipid Screen  10/26/2021    Foot Exam Q1  12/07/2021    Medicare Yearly Exam  12/08/2021    Eye Exam Retinal or Dilated  12/13/2023    DTaP/Tdap/Td series (2 - Td or Tdap) 05/26/2026    Bone Densitometry (Dexa) Screening  Completed    Pneumococcal 65+ years  Completed       Review of Systems   Constitutional: Negative. Respiratory: Negative. Cardiovascular: Negative. Psychiatric/Behavioral: The patient is nervous/anxious.             Visit Vitals  /78 (BP 1 Location: Right arm)   Pulse 68   Temp 97.5 °F (36.4 °C) (Temporal)   Resp 16   Ht 5' 2\" (1.575 m)   Wt 207 lb (93.9 kg)   LMP 02/18/1983   SpO2 98%   BMI 37.86 kg/m² Physical Exam  Constitutional:       Appearance: She is well-developed. Cardiovascular:      Rate and Rhythm: Normal rate and regular rhythm. Pulmonary:      Effort: Pulmonary effort is normal.      Breath sounds: Normal breath sounds. ASSESSMENT/PLAN    Diagnoses and all orders for this visit:    1. Anxiety - She will try prn Xanax for her anxiety. She does not want to see a counselor now. She will let the office know how she is doing with this. 2. Controlled type 2 diabetes mellitus with diabetic nephropathy, without long-term current use of insulin (HCC)  -     MICROALBUMIN, UR, RAND W/ MICROALB/CREAT RATIO; Future  -     HEMOGLOBIN A1C WITH EAG; Future    3. Mixed hyperlipidemia  -     METABOLIC PANEL, COMPREHENSIVE; Future  -     LIPID PANEL; Future    4. Essential hypertension  -     CBC WITH AUTOMATED DIFF; Future    5. Encounter for hepatitis C screening test for low risk patient  -     HEPATITIS C AB; Future    6. Severe obesity (BMI 35.0-35.9 with comorbidity) (HCC)    7. Paroxysmal atrial fibrillation ([facility] [address]    8. Hypoxia  -     REFERRAL TO PULMONARY DISEASE    9. Needs flu shot  -     FLU (FLUAD QUAD INFLUENZA VACCINE,QUAD,ADJUVANTED)           Follow-up and Dispositions    · Return in about 6 months (around 7/5/2022).

## 2022-01-06 ENCOUNTER — TRANSCRIBE ORDER (OUTPATIENT)
Dept: SCHEDULING | Age: 82
End: 2022-01-06

## 2022-01-06 DIAGNOSIS — Z12.31 SCREENING MAMMOGRAM FOR HIGH-RISK PATIENT: Primary | ICD-10-CM

## 2022-01-10 RX ORDER — PROPAFENONE HYDROCHLORIDE 150 MG/1
TABLET, FILM COATED ORAL
Qty: 270 TABLET | Refills: 3 | Status: SHIPPED | OUTPATIENT
Start: 2022-01-10

## 2022-01-17 ENCOUNTER — OFFICE VISIT (OUTPATIENT)
Dept: CARDIOLOGY CLINIC | Age: 82
End: 2022-01-17
Payer: MEDICARE

## 2022-01-17 VITALS
BODY MASS INDEX: 37.93 KG/M2 | HEIGHT: 62 IN | RESPIRATION RATE: 18 BRPM | HEART RATE: 76 BPM | WEIGHT: 206.1 LBS | SYSTOLIC BLOOD PRESSURE: 126 MMHG | DIASTOLIC BLOOD PRESSURE: 68 MMHG | OXYGEN SATURATION: 96 %

## 2022-01-17 DIAGNOSIS — I25.10 CORONARY ARTERY DISEASE INVOLVING NATIVE CORONARY ARTERY OF NATIVE HEART WITHOUT ANGINA PECTORIS: Primary | ICD-10-CM

## 2022-01-17 DIAGNOSIS — I48.0 PAROXYSMAL ATRIAL FIBRILLATION (HCC): ICD-10-CM

## 2022-01-17 DIAGNOSIS — I10 ESSENTIAL HYPERTENSION: ICD-10-CM

## 2022-01-17 DIAGNOSIS — E78.2 MIXED HYPERLIPIDEMIA: ICD-10-CM

## 2022-01-17 PROCEDURE — G8417 CALC BMI ABV UP PARAM F/U: HCPCS | Performed by: INTERNAL MEDICINE

## 2022-01-17 PROCEDURE — 93010 ELECTROCARDIOGRAM REPORT: CPT | Performed by: INTERNAL MEDICINE

## 2022-01-17 PROCEDURE — G8427 DOCREV CUR MEDS BY ELIG CLIN: HCPCS | Performed by: INTERNAL MEDICINE

## 2022-01-17 PROCEDURE — 1090F PRES/ABSN URINE INCON ASSESS: CPT | Performed by: INTERNAL MEDICINE

## 2022-01-17 PROCEDURE — G8510 SCR DEP NEG, NO PLAN REQD: HCPCS | Performed by: INTERNAL MEDICINE

## 2022-01-17 PROCEDURE — G0463 HOSPITAL OUTPT CLINIC VISIT: HCPCS | Performed by: INTERNAL MEDICINE

## 2022-01-17 PROCEDURE — G8754 DIAS BP LESS 90: HCPCS | Performed by: INTERNAL MEDICINE

## 2022-01-17 PROCEDURE — 99214 OFFICE O/P EST MOD 30 MIN: CPT | Performed by: INTERNAL MEDICINE

## 2022-01-17 PROCEDURE — 1101F PT FALLS ASSESS-DOCD LE1/YR: CPT | Performed by: INTERNAL MEDICINE

## 2022-01-17 PROCEDURE — G8536 NO DOC ELDER MAL SCRN: HCPCS | Performed by: INTERNAL MEDICINE

## 2022-01-17 PROCEDURE — G8399 PT W/DXA RESULTS DOCUMENT: HCPCS | Performed by: INTERNAL MEDICINE

## 2022-01-17 PROCEDURE — G8752 SYS BP LESS 140: HCPCS | Performed by: INTERNAL MEDICINE

## 2022-01-17 PROCEDURE — 93005 ELECTROCARDIOGRAM TRACING: CPT | Performed by: INTERNAL MEDICINE

## 2022-01-17 NOTE — PROGRESS NOTES
Ebenezer Albert, [facility]    Subjective/HPI:     Karyna Scott is a 80 y.o. female is here for routine f/u. She has a PMHx of CAD, PAF, HTN, HLD, DM2 and GERD. Last visit, started on amlodipine for management of shortness of breath and chest pain symptoms. Had stress test done for further evaluation. Still having similar symptoms. No improvement since starting amlodipine. Has an appt to see pulmonary in Feb.         Current Outpatient Medications on File Prior to Visit   Medication Sig Dispense Refill    propafenone (RYTHMOL) 150 mg tablet TAKE 1 TABLET BY MOUTH EVERY 8 HOURS 270 Tablet 3    aspirin delayed-release 81 mg tablet TAKE 1 TABLET BY MOUTH EVERY DAY 90 Tablet 1    amLODIPine (NORVASC) 2.5 mg tablet TAKE 1 TABLET BY MOUTH EVERY DAY 90 Tablet 3    famotidine (PEPCID) 20 mg tablet Take 20 mg by mouth nightly.  albuterol (PROVENTIL HFA, VENTOLIN HFA, PROAIR HFA) 90 mcg/actuation inhaler INHALE 2 PUFFS BY MOUTH EVERY 4 HOURS AS NEEDED FOR WHEEZE 18 Each 2    hydroCHLOROthiazide (HYDRODIURIL) 25 mg tablet TAKE 1 TABLET BY MOUTH EVERY DAY 90 Tablet 1    metFORMIN (GLUCOPHAGE) 500 mg tablet TAKE 1 TABLET BY MOUTH TWICE A DAY WITH MEALS 180 Tablet 1    metoprolol succinate (TOPROL-XL) 50 mg XL tablet TAKE 1 TABLET BY MOUTH EVERY DAY 90 Tablet 1    rosuvastatin (CRESTOR) 20 mg tablet TAKE 1 TABLET BY MOUTH EVERY DAY AT NIGHT 90 Tablet 3    Eliquis 5 mg tablet TAKE 1 TABLET BY MOUTH TWICE A  Tablet 3    glucose blood VI test strips (Contour Next Test Strips) strip Check blood sugar once a day e11.9 50 Strip 11    pantoprazole (PROTONIX) 40 mg tablet TAKE 1 TABLET BY MOUTH EVERY DAY      MULTIVITAMIN PO Take 1 Tab by mouth daily.  Lancets (FREESTYLE LANCETS) Misc Test once daily.  (diagnosis code: 250.00) 1 Package 11    Blood-Glucose Meter monitoring kit Once daily before breakfast 1 Kit 0    ALPRAZolam (XANAX) 0.5 mg tablet TAKE 1 TABLET BY MOUTH 3 TIMES A DAY AS NEEDED FOR ANXIETY (Patient not taking: Reported on 1/5/2022) 30 Tab 0     No current facility-administered medications on file prior to visit. Review of Symptoms:    Review of Systems   Constitutional: Negative for chills, fever and weight loss. HENT: Negative for nosebleeds. Eyes: Negative for blurred vision and double vision. Respiratory: Positive for shortness of breath. Negative for cough and wheezing. Cardiovascular: Positive for chest pain and palpitations. Negative for orthopnea, leg swelling and PND. Skin: Negative for rash. Neurological: Negative for dizziness and loss of consciousness. Physical Exam:      General: Well developed, in no acute distress, cooperative and alert  Heart:  reg rate and rhythm; normal S1/S2; no murmurs, no gallops or rubs. Respiratory: Clear bilaterally x 4, no wheezing or rales  Extremities:  Normal cap refill, no cyanosis, atraumatic. No edema. Vascular: 2+ pulses symmetric in all extremities    Vitals:    01/17/22 1052   BP: 126/68   BP 1 Location: Left upper arm   BP Patient Position: Sitting   BP Cuff Size: Large adult   Resp: 18   Height: 5' 2\" (1.575 m)   Weight: 206 lb 1.6 oz (93.5 kg)   SpO2: 96%       ECG done today shows sinus rhythm     Assessment:       ICD-10-CM ICD-9-CM    1. Coronary artery disease involving native coronary artery of native heart without angina pectoris  I25.10 414.01 AMB POC EKG ROUTINE W/ 12 LEADS, INTER & REP   2. Paroxysmal atrial fibrillation (HCC)  I48.0 427.31    3. Essential hypertension  I10 401.9    4. Mixed hyperlipidemia  E78.2 272.2         Plan:     1. Coronary artery disease involving native coronary artery of native heart without angina pectoris  Cardiac cath done 8/2020 with 95% RPL lesion, vessel is not amenable to PCI. Mild RCA disease. Lexiscan stress test done 1/2022 without evidence of ischemia. EF 70%  On BB.   Symptoms unchanged since starting amlodipine  Will plan for cardiac cath for definitive assessment. Discussed procedure, risks, benefits, alternatives. She does desire to proceed  Cotinue ASA, statin therapy  CPT 52296    2. Paroxysmal atrial fibrillation (HCC)  Maintaining sinus rhythm  2-week event monitor did not show evidence of AF, PACs and PVCs noted. Conitnue Rhythmol, Toprol  On Eliquis for stroke prevention    3. Essential hypertension  BP controlled. Continue anti-hypertensive therapy and low sodium diet    4. Mixed hyperlipidemia  LDL 74 in 10/2020  Continue statin therapy and low fat, low cholesterol diet  Lipids managed by PCP    F/u with Dr. Shannan Lazo in after procedure    Nate Lawtno NP       [facility]             Patient seen, examined by me personally. Plan discussed as detailed. Agree with note as outlined by  NP with modifications as noted. My independent physical exam reveals : Physical Exam  Vitals and nursing note reviewed. Constitutional:       Appearance: Normal appearance. Cardiovascular:      Rate and Rhythm: Normal rate and regular rhythm. Heart sounds: Normal heart sounds. Pulmonary:      Effort: Pulmonary effort is normal.      Breath sounds: Normal breath sounds. Musculoskeletal:      Right lower leg: No edema. Left lower leg: No edema. Skin:     General: Skin is dry. Neurological:      Mental Status: She is alert and oriented to person, place, and time. Psychiatric:         Mood and Affect: Mood normal.          No additional findings noted. Agree with plan as outlined above with modifications as noted.      Kenia Jackson MD

## 2022-01-17 NOTE — LETTER
1/17/2022    Patient: Karyna Scott   YOB: 1940   Date of Visit: 1/17/2022     Lui Roa MD  [address] [place] [id number]  [address] [place]    Dear Lui Roa MD,      Thank you for referring Ms. Lavell Ramirez to [facility] for evaluation. My notes for this consultation are attached. If you have questions, please do not hesitate to call me. I look forward to following your patient along with you.       Sincerely,    Celeste Buchanan MD

## 2022-01-17 NOTE — PROGRESS NOTES
1. Have you been to the ER, urgent care clinic since your last visit? Hospitalized since your last visit? No    2. Have you seen or consulted any other health care providers outside of the [address] [place] [address] since your last visit? Include any pap smears or colon screening.  No           Chief Complaint   Patient presents with    Coronary Artery Disease     C/O  Palpitations, SOB

## 2022-01-17 NOTE — H&P (VIEW-ONLY)
Brendan Taveras, [facility]    Subjective/HPI:     Janelle Ibrahim is a 80 y.o. female is here for routine f/u. She has a PMHx of CAD, PAF, HTN, HLD, DM2 and GERD. Last visit, started on amlodipine for management of shortness of breath and chest pain symptoms. Had stress test done for further evaluation. Still having similar symptoms. No improvement since starting amlodipine. Has an appt to see pulmonary in Feb.         Current Outpatient Medications on File Prior to Visit   Medication Sig Dispense Refill    propafenone (RYTHMOL) 150 mg tablet TAKE 1 TABLET BY MOUTH EVERY 8 HOURS 270 Tablet 3    aspirin delayed-release 81 mg tablet TAKE 1 TABLET BY MOUTH EVERY DAY 90 Tablet 1    amLODIPine (NORVASC) 2.5 mg tablet TAKE 1 TABLET BY MOUTH EVERY DAY 90 Tablet 3    famotidine (PEPCID) 20 mg tablet Take 20 mg by mouth nightly.  albuterol (PROVENTIL HFA, VENTOLIN HFA, PROAIR HFA) 90 mcg/actuation inhaler INHALE 2 PUFFS BY MOUTH EVERY 4 HOURS AS NEEDED FOR WHEEZE 18 Each 2    hydroCHLOROthiazide (HYDRODIURIL) 25 mg tablet TAKE 1 TABLET BY MOUTH EVERY DAY 90 Tablet 1    metFORMIN (GLUCOPHAGE) 500 mg tablet TAKE 1 TABLET BY MOUTH TWICE A DAY WITH MEALS 180 Tablet 1    metoprolol succinate (TOPROL-XL) 50 mg XL tablet TAKE 1 TABLET BY MOUTH EVERY DAY 90 Tablet 1    rosuvastatin (CRESTOR) 20 mg tablet TAKE 1 TABLET BY MOUTH EVERY DAY AT NIGHT 90 Tablet 3    Eliquis 5 mg tablet TAKE 1 TABLET BY MOUTH TWICE A  Tablet 3    glucose blood VI test strips (Contour Next Test Strips) strip Check blood sugar once a day e11.9 50 Strip 11    pantoprazole (PROTONIX) 40 mg tablet TAKE 1 TABLET BY MOUTH EVERY DAY      MULTIVITAMIN PO Take 1 Tab by mouth daily.  Lancets (FREESTYLE LANCETS) Misc Test once daily.  (diagnosis code: 250.00) 1 Package 11    Blood-Glucose Meter monitoring kit Once daily before breakfast 1 Kit 0    ALPRAZolam (XANAX) 0.5 mg tablet TAKE 1 TABLET BY MOUTH 3 TIMES A DAY AS NEEDED FOR ANXIETY (Patient not taking: Reported on 1/5/2022) 30 Tab 0     No current facility-administered medications on file prior to visit. Review of Symptoms:    Review of Systems   Constitutional: Negative for chills, fever and weight loss. HENT: Negative for nosebleeds. Eyes: Negative for blurred vision and double vision. Respiratory: Positive for shortness of breath. Negative for cough and wheezing. Cardiovascular: Positive for chest pain and palpitations. Negative for orthopnea, leg swelling and PND. Skin: Negative for rash. Neurological: Negative for dizziness and loss of consciousness. Physical Exam:      General: Well developed, in no acute distress, cooperative and alert  Heart:  reg rate and rhythm; normal S1/S2; no murmurs, no gallops or rubs. Respiratory: Clear bilaterally x 4, no wheezing or rales  Extremities:  Normal cap refill, no cyanosis, atraumatic. No edema. Vascular: 2+ pulses symmetric in all extremities    Vitals:    01/17/22 1052   BP: 126/68   BP 1 Location: Left upper arm   BP Patient Position: Sitting   BP Cuff Size: Large adult   Resp: 18   Height: 5' 2\" (1.575 m)   Weight: 206 lb 1.6 oz (93.5 kg)   SpO2: 96%       ECG done today shows sinus rhythm     Assessment:       ICD-10-CM ICD-9-CM    1. Coronary artery disease involving native coronary artery of native heart without angina pectoris  I25.10 414.01 AMB POC EKG ROUTINE W/ 12 LEADS, INTER & REP   2. Paroxysmal atrial fibrillation (HCC)  I48.0 427.31    3. Essential hypertension  I10 401.9    4. Mixed hyperlipidemia  E78.2 272.2         Plan:     1. Coronary artery disease involving native coronary artery of native heart without angina pectoris  Cardiac cath done 8/2020 with 95% RPL lesion, vessel is not amenable to PCI. Mild RCA disease. Lexiscan stress test done 1/2022 without evidence of ischemia. EF 70%  On BB.   Symptoms unchanged since starting amlodipine  Will plan for cardiac cath for definitive assessment. Discussed procedure, risks, benefits, alternatives. She does desire to proceed  Cotinue ASA, statin therapy  CPT 98167    2. Paroxysmal atrial fibrillation (HCC)  Maintaining sinus rhythm  2-week event monitor did not show evidence of AF, PACs and PVCs noted. Conitnue Rhythmol, Toprol  On Eliquis for stroke prevention    3. Essential hypertension  BP controlled. Continue anti-hypertensive therapy and low sodium diet    4. Mixed hyperlipidemia  LDL 74 in 10/2020  Continue statin therapy and low fat, low cholesterol diet  Lipids managed by PCP    F/u with Dr. Channing Horvath in after procedure    Emmanuel Leonard NP       [facility]             Patient seen, examined by me personally. Plan discussed as detailed. Agree with note as outlined by  NP with modifications as noted. My independent physical exam reveals : Physical Exam  Vitals and nursing note reviewed. Constitutional:       Appearance: Normal appearance. Cardiovascular:      Rate and Rhythm: Normal rate and regular rhythm. Heart sounds: Normal heart sounds. Pulmonary:      Effort: Pulmonary effort is normal.      Breath sounds: Normal breath sounds. Musculoskeletal:      Right lower leg: No edema. Left lower leg: No edema. Skin:     General: Skin is dry. Neurological:      Mental Status: She is alert and oriented to person, place, and time. Psychiatric:         Mood and Affect: Mood normal.          No additional findings noted. Agree with plan as outlined above with modifications as noted.      Molly Ramos MD

## 2022-01-19 NOTE — PERIOP NOTES
Patient denied any COVID-19 signs, symptoms or possible exposure in the last 30 days. Patient is fully vaccinated & will bring updated proof of vaccination(s) day of surgery/procedure.

## 2022-01-24 NOTE — TELEPHONE ENCOUNTER
Requested Prescriptions     Pending Prescriptions Disp Refills    fluticasone prp-sod. chl,bicarb 50 mcg- 0.9 % ksps       Sig: by Nasal route.        [facility]/pharmacy #[id number]- [address] [place]  [phone]

## 2022-01-26 RX ORDER — FLUTICASONE PROPIONATE 50 MCG
SPRAY, SUSPENSION (ML) NASAL
Qty: 1 EACH | Refills: 3 | Status: SHIPPED | OUTPATIENT
Start: 2022-01-26 | End: 2022-05-11

## 2022-01-27 ENCOUNTER — HOSPITAL ENCOUNTER (OUTPATIENT)
Age: 82
Setting detail: OUTPATIENT SURGERY
Discharge: HOME | End: 2022-01-27
Attending: INTERNAL MEDICINE | Admitting: INTERNAL MEDICINE
Payer: MEDICARE

## 2022-01-27 VITALS
BODY MASS INDEX: 37.36 KG/M2 | DIASTOLIC BLOOD PRESSURE: 60 MMHG | TEMPERATURE: 98.1 F | HEART RATE: 70 BPM | WEIGHT: 203 LBS | SYSTOLIC BLOOD PRESSURE: 135 MMHG | OXYGEN SATURATION: 97 % | HEIGHT: 62 IN | RESPIRATION RATE: 16 BRPM

## 2022-01-27 DIAGNOSIS — R07.9 CHEST PAIN, UNSPECIFIED TYPE: ICD-10-CM

## 2022-01-27 DIAGNOSIS — E11.21 CONTROLLED TYPE 2 DIABETES MELLITUS WITH DIABETIC NEPHROPATHY, WITHOUT LONG-TERM CURRENT USE OF INSULIN (HCC): ICD-10-CM

## 2022-01-27 PROBLEM — I20.9 ANGINA, CLASS III (HCC): Status: ACTIVE | Noted: 2022-01-27

## 2022-01-27 LAB
GLUCOSE BLD STRIP.AUTO-MCNC: 139 MG/DL (ref 65–117)
SERVICE CMNT-IMP: ABNORMAL

## 2022-01-27 PROCEDURE — C1894 INTRO/SHEATH, NON-LASER: HCPCS | Performed by: INTERNAL MEDICINE

## 2022-01-27 PROCEDURE — 74011000250 HC RX REV CODE- 250: Performed by: INTERNAL MEDICINE

## 2022-01-27 PROCEDURE — 93458 L HRT ARTERY/VENTRICLE ANGIO: CPT | Performed by: INTERNAL MEDICINE

## 2022-01-27 PROCEDURE — 99152 MOD SED SAME PHYS/QHP 5/>YRS: CPT | Performed by: INTERNAL MEDICINE

## 2022-01-27 PROCEDURE — 77030042317 HC BND COMPR HEMSTAT -B: Performed by: INTERNAL MEDICINE

## 2022-01-27 PROCEDURE — 77030019698 HC SYR ANGI MDLON MRTM -A: Performed by: INTERNAL MEDICINE

## 2022-01-27 PROCEDURE — 76937 US GUIDE VASCULAR ACCESS: CPT | Performed by: INTERNAL MEDICINE

## 2022-01-27 PROCEDURE — 77030010221 HC SPLNT WR POS TELE -B: Performed by: INTERNAL MEDICINE

## 2022-01-27 PROCEDURE — 77030008543 HC TBNG MON PRSS MRTM -A: Performed by: INTERNAL MEDICINE

## 2022-01-27 PROCEDURE — 77030004549 HC CATH ANGI DX PRF MRTM -A: Performed by: INTERNAL MEDICINE

## 2022-01-27 PROCEDURE — 82962 GLUCOSE BLOOD TEST: CPT

## 2022-01-27 PROCEDURE — 77030015766: Performed by: INTERNAL MEDICINE

## 2022-01-27 PROCEDURE — 74011000636 HC RX REV CODE- 636: Performed by: INTERNAL MEDICINE

## 2022-01-27 PROCEDURE — C1769 GUIDE WIRE: HCPCS | Performed by: INTERNAL MEDICINE

## 2022-01-27 PROCEDURE — 74011250637 HC RX REV CODE- 250/637: Performed by: INTERNAL MEDICINE

## 2022-01-27 PROCEDURE — 74011250636 HC RX REV CODE- 250/636: Performed by: INTERNAL MEDICINE

## 2022-01-27 RX ORDER — MIDAZOLAM HYDROCHLORIDE 1 MG/ML
INJECTION, SOLUTION INTRAMUSCULAR; INTRAVENOUS AS NEEDED
Status: DISCONTINUED | OUTPATIENT
Start: 2022-01-27 | End: 2022-01-27 | Stop reason: HOSPADM

## 2022-01-27 RX ORDER — HEPARIN SODIUM 200 [USP'U]/100ML
INJECTION, SOLUTION INTRAVENOUS
Status: COMPLETED | OUTPATIENT
Start: 2022-01-27 | End: 2022-01-27

## 2022-01-27 RX ORDER — LIDOCAINE HYDROCHLORIDE 10 MG/ML
INJECTION, SOLUTION EPIDURAL; INFILTRATION; INTRACAUDAL; PERINEURAL AS NEEDED
Status: DISCONTINUED | OUTPATIENT
Start: 2022-01-27 | End: 2022-01-27 | Stop reason: HOSPADM

## 2022-01-27 RX ORDER — VERAPAMIL HYDROCHLORIDE 2.5 MG/ML
INJECTION, SOLUTION INTRAVENOUS AS NEEDED
Status: DISCONTINUED | OUTPATIENT
Start: 2022-01-27 | End: 2022-01-27 | Stop reason: HOSPADM

## 2022-01-27 RX ORDER — HEPARIN SODIUM 1000 [USP'U]/ML
INJECTION, SOLUTION INTRAVENOUS; SUBCUTANEOUS AS NEEDED
Status: DISCONTINUED | OUTPATIENT
Start: 2022-01-27 | End: 2022-01-27 | Stop reason: HOSPADM

## 2022-01-27 RX ORDER — GUAIFENESIN 100 MG/5ML
81 LIQUID (ML) ORAL ONCE
Status: COMPLETED | OUTPATIENT
Start: 2022-01-27 | End: 2022-01-27

## 2022-01-27 RX ORDER — METFORMIN HYDROCHLORIDE 500 MG/1
500 TABLET ORAL 2 TIMES DAILY WITH MEALS
Qty: 180 TABLET | Refills: 1 | Status: SHIPPED | OUTPATIENT
Start: 2022-01-27 | End: 2022-07-14 | Stop reason: SDUPTHER

## 2022-01-27 RX ORDER — FENTANYL CITRATE 50 UG/ML
INJECTION, SOLUTION INTRAMUSCULAR; INTRAVENOUS AS NEEDED
Status: DISCONTINUED | OUTPATIENT
Start: 2022-01-27 | End: 2022-01-27 | Stop reason: HOSPADM

## 2022-01-27 RX ADMIN — ASPIRIN 81 MG: 81 TABLET, CHEWABLE ORAL at 06:56

## 2022-01-27 NOTE — PROGRESS NOTES
Patient ambulated in hallway with steady gait; no complaints of discomfort, dizziness, sob. Right radial site clean dry and intact. Discharge instructions reviewed with patient and answered questions as needed. Transported patient in wheelchair to car.

## 2022-01-27 NOTE — DISCHARGE INSTRUCTIONS
[address], [place], [address] [facility]  [phone]        Patient ID:  Raza Venegas  [id number]  36 y.o.  1940    Admit Date: 1/27/2022    Discharge Date: 1/27/2022     Admitting Physician: Hui Rome MD     Discharge Physician: Hui Rome MD    Admission Diagnoses:   Chest pain, unspecified type [R07.9]    Discharge Diagnoses: Active Problems:    S/P cardiac cath (8/20/2020)      Overview: 1/26/2022 LAD. LCX, RCA normal. Small PLB subtotal      8/120/2020 cardiac cath with noninterventional PBL, otherwise neg      Angina, class III ([address] (1/27/2022)        Discharge Condition: Good    Cardiology Procedures this Admission:  Diagnostic left heart catheterization    Disposition: home    Reference discharge instructions provided by nursing for diet and activity. Signed:  Maki Bonilla NP  1/27/2022  8:58 AM        Radial Cardiac Catheterization/Angiography Discharge Instructions    It is normal to feel tired the first couple days. Take it easy and follow the physicians instructions. CHECK THE CATHETER INSERTION SITE DAILY:    Remove the wrist dressing 24 hours after the procedure. You may shower 24 hours after the procedure. Wash with soap and water and pat dry. Gentle cleaning of the site with soap and water is sufficient, cover with a dry clean dressing or bandage. Do not apply creams or powders to the area. No soaking the wrist for 3 days. Leave the puncture site open to air after 24 hours post-procedure. CALL THE PHYSICIANS:     If the site becomes red, swollen or feels warm to the touch  If there is bleeding or drainage or if there is unusual pain at the radial site. If there is any minor oozing, you may apply a band-aid and remove after 12 hours.    If the bleeding continues, hold pressure with the middle finger against the puncture site and the thumb against the back of the wrist,call 911 to be transported to the hospital.  DO NOT DRIVE YOURSELF, OR HAVE ANYONE ELSE DRIVE YOU - CALL [phone]. ACTIVITY:   For the first 24 hours do not manipulate the wrist.  No lifting, pushing or pulling over 3-5 pounds with the affected wrist for 7 daysand no straining the insertion site. Do not life grocery bags or the garbage can, do not run the vacuum  or  for 7 days. Start with short walks as in the hospital and gradually increase as tolerated each day. It is recommended to walk 30 minutes 5-7 days per week. Follow your physicians instructions on activity. Avoid walking outside in extremes of heat or cold. Walk inside when it is cold and windy or hot and humid. Things to keep in mind:  No driving for at least 24 hours, or as designated by your physician. Limit the number of times you go up and down the stairs  Take rests and pace yourself with activity. Be careful and do not strain with bowel movements. MEDICATIONS:    Take all medications as prescribed  Call your physician if you have any questions  Keep an updated list of your medications with you at all times and give a list to your physician and pharmacist    SIGNS AND SYMPTOMS:   Be cautious of symptoms of angina or recurrent symptoms such as chest discomfort, unusual shortness of breath or fatigue. These could be symptoms of restenosis, a new blockage or a heart attack. If your symptoms are relieved with rest it is still recommended that you notify your physician of recurrent chest pain or discomfort. For CHEST PAIN or symptoms of angina not relieved with rest:  If the discomfort is not relieved with rest, and you have been prescribed Nitroglycerin, take as directed (taken under the tongue, one at a time 5 minutes apart for a total of 3 doses). If the discomfort is not relieved after the 3rd nitroglycerin, call 911. If you have not been prescribed Nitroglycerin  and your chest discomfort is not relieved with rest, call 911.      AFTER CARE: Follow up with your physician as instructed. Follow a heart healthy diet with proper portion control, daily stress management, daily exercise, blood pressure and cholesterol control , and smoking cessation.

## 2022-01-27 NOTE — INTERVAL H&P NOTE
Update History & Physical    The Patient's History and Physical of January 17, 2022 was reviewed with the patient and I examined the patient. There was no change. The surgical site was confirmed by the patient and me. Plan:  The risk, benefits, expected outcome, and alternative to the recommended procedure have been discussed with the patient. Patient understands and wants to proceed with the procedure.     Electronically signed by Sloane Cuenca MD on 1/27/2022 at 7:42 AM

## 2022-01-27 NOTE — PROGRESS NOTES
Brief Procedure Note    Patient: Juliette Toro MRN: [id number]  SSN: [id number]    YOB: 1940  Age: 80 y.o. Sex: female      Date of Procedure: 1/27/2022     Pre-procedure Diagnosis: angina    Post-procedure Diagnosis: angina    Procedure: left heart cath    Performed By: Danny Bernal MD     Anesthesia: Moderate Sedation    Estimated Blood Loss: Less than 10 mL      Specimens:  None    Findings: LAD. LCX, RCA normal. Small PLB subtotal. LVEF 60%. Complications: None    Implants: None    Recommendations: Continue medical therapy.     Signed By: Danny Bernal MD     January 27, 2022

## 2022-01-27 NOTE — Clinical Note
Right radial artery. Radial access needle used. Using ultrasound guidance.  Number of attempts =  1.

## 2022-01-27 NOTE — PROGRESS NOTES
TRANSFER - IN REPORT:    Verbal report received from [facility] on Gregory Linder  being received from cath lab for routine progression of care. Report consisted of patients Situation, Background, Assessment and Recommendations(SBAR). Information from the following report(s) SBAR was reviewed with the receiving clinician. Opportunity for questions and clarification was provided. Assessment completed upon patients arrival to [address] [place] [address]. Cardiac Cath Lab Recovery Arrival Note:    Gregory Linder arrived to [facility] area. Patient procedure= heart cath. Patient on cardiac monitor, non-invasive blood pressure, SPO2 monitor. On RA. IV  of nacl  on pump at 75 ml/hr. Patient status doing well without problems. Patient is A&Ox 4. Patient reports no complaints. PROCEDURE SITE CHECK:    Procedure site:without any bleeding and no hematoma, no pain/discomfort reported at procedure site. No change in patient status. Continue to monitor patient and status.

## 2022-01-31 NOTE — CARDIO/PULMONARY
Cardiac Rehab Note: chart review    Angina. Cardiac cath, without intervention 1/27/22      EF 55-60%  on 1/27/22 per Cath report    Smoking history assessed. Patient is a non smoker. Smoking Cessation Program link has not been added to the AVS.     Patient not seen due to conditions listed above.  No MI noted

## 2022-02-14 RX ORDER — BLOOD SUGAR DIAGNOSTIC
STRIP MISCELLANEOUS
Qty: 50 STRIP | Refills: 11 | Status: SHIPPED | OUTPATIENT
Start: 2022-02-14

## 2022-02-15 ENCOUNTER — HOSPITAL ENCOUNTER (OUTPATIENT)
Dept: MAMMOGRAPHY | Age: 82
Discharge: HOME | End: 2022-02-15
Attending: INTERNAL MEDICINE
Payer: MEDICARE

## 2022-02-15 DIAGNOSIS — Z12.31 SCREENING MAMMOGRAM FOR HIGH-RISK PATIENT: ICD-10-CM

## 2022-02-15 PROCEDURE — 77063 BREAST TOMOSYNTHESIS BI: CPT

## 2022-02-25 ENCOUNTER — OFFICE VISIT (OUTPATIENT)
Dept: CARDIOLOGY CLINIC | Age: 82
End: 2022-02-25
Payer: MEDICARE

## 2022-02-25 VITALS
DIASTOLIC BLOOD PRESSURE: 78 MMHG | HEART RATE: 74 BPM | BODY MASS INDEX: 38.18 KG/M2 | HEIGHT: 62 IN | WEIGHT: 207.5 LBS | SYSTOLIC BLOOD PRESSURE: 138 MMHG | OXYGEN SATURATION: 98 % | RESPIRATION RATE: 18 BRPM

## 2022-02-25 DIAGNOSIS — I48.0 PAROXYSMAL ATRIAL FIBRILLATION (HCC): ICD-10-CM

## 2022-02-25 DIAGNOSIS — I25.10 CORONARY ARTERY DISEASE INVOLVING NATIVE CORONARY ARTERY OF NATIVE HEART WITHOUT ANGINA PECTORIS: Primary | ICD-10-CM

## 2022-02-25 DIAGNOSIS — Z09 HOSPITAL DISCHARGE FOLLOW-UP: ICD-10-CM

## 2022-02-25 DIAGNOSIS — I10 ESSENTIAL HYPERTENSION: ICD-10-CM

## 2022-02-25 DIAGNOSIS — E78.2 MIXED HYPERLIPIDEMIA: ICD-10-CM

## 2022-02-25 PROCEDURE — G8417 CALC BMI ABV UP PARAM F/U: HCPCS | Performed by: INTERNAL MEDICINE

## 2022-02-25 PROCEDURE — G8399 PT W/DXA RESULTS DOCUMENT: HCPCS | Performed by: INTERNAL MEDICINE

## 2022-02-25 PROCEDURE — 93005 ELECTROCARDIOGRAM TRACING: CPT | Performed by: INTERNAL MEDICINE

## 2022-02-25 PROCEDURE — G8536 NO DOC ELDER MAL SCRN: HCPCS | Performed by: INTERNAL MEDICINE

## 2022-02-25 PROCEDURE — 1090F PRES/ABSN URINE INCON ASSESS: CPT | Performed by: INTERNAL MEDICINE

## 2022-02-25 PROCEDURE — G8754 DIAS BP LESS 90: HCPCS | Performed by: INTERNAL MEDICINE

## 2022-02-25 PROCEDURE — 99213 OFFICE O/P EST LOW 20 MIN: CPT | Performed by: INTERNAL MEDICINE

## 2022-02-25 PROCEDURE — G8752 SYS BP LESS 140: HCPCS | Performed by: INTERNAL MEDICINE

## 2022-02-25 PROCEDURE — 93010 ELECTROCARDIOGRAM REPORT: CPT | Performed by: INTERNAL MEDICINE

## 2022-02-25 PROCEDURE — G0463 HOSPITAL OUTPT CLINIC VISIT: HCPCS | Performed by: INTERNAL MEDICINE

## 2022-02-25 PROCEDURE — 1111F DSCHRG MED/CURRENT MED MERGE: CPT | Performed by: INTERNAL MEDICINE

## 2022-02-25 PROCEDURE — 1101F PT FALLS ASSESS-DOCD LE1/YR: CPT | Performed by: INTERNAL MEDICINE

## 2022-02-25 PROCEDURE — G8432 DEP SCR NOT DOC, RNG: HCPCS | Performed by: INTERNAL MEDICINE

## 2022-02-25 PROCEDURE — G8427 DOCREV CUR MEDS BY ELIG CLIN: HCPCS | Performed by: INTERNAL MEDICINE

## 2022-02-25 RX ORDER — CETIRIZINE HCL 10 MG
10 TABLET ORAL DAILY
COMMUNITY
Start: 2022-02-12 | End: 2022-03-07

## 2022-02-25 NOTE — PROGRESS NOTES
Trudy Mcgregor, [facility]    Subjective/HPI:     Andrés Claudio is a 80 y.o. female is here for routine f/u. She has a PMHx of CAD, PAF, HTN, HLD, DM2 and GERD. Had cardiac catheterization for chest pain symptoms and shortness of breath with abnormal stress test.  Cath with 99% RPL stenosis, too small for PCI. Went to [facility] ER a few weeks later with chest pain with back pain. Not felt to be cardiac in origin. She feels her pain is MSK and coming from amlodipine. Wants to try to hold this for a few days to see if symptoms resolve. Current Outpatient Medications on File Prior to Visit   Medication Sig Dispense Refill    cetirizine (ZYRTEC) 10 mg tablet Take 10 mg by mouth daily.  glucose blood VI test strips (Contour Next Test Strips) strip USE TO CHECK BLOOD SUGAR ONCE A DAY 50 Strip 11    metFORMIN (GLUCOPHAGE) 500 mg tablet Take 1 Tablet by mouth two (2) times daily (with meals). Please restart Metformin on 1/29/2022 180 Tablet 1    fluticasone propionate (FLONASE) 50 mcg/actuation nasal spray USE 1 TO 2 SPRAYS INTO EACH NOSTRIL EVERY DAY 1 Each 3    propafenone (RYTHMOL) 150 mg tablet TAKE 1 TABLET BY MOUTH EVERY 8 HOURS 270 Tablet 3    aspirin delayed-release 81 mg tablet TAKE 1 TABLET BY MOUTH EVERY DAY 90 Tablet 1    amLODIPine (NORVASC) 2.5 mg tablet TAKE 1 TABLET BY MOUTH EVERY DAY 90 Tablet 3    famotidine (PEPCID) 20 mg tablet Take 40 mg by mouth nightly.       albuterol (PROVENTIL HFA, VENTOLIN HFA, PROAIR HFA) 90 mcg/actuation inhaler INHALE 2 PUFFS BY MOUTH EVERY 4 HOURS AS NEEDED FOR WHEEZE 18 Each 2    hydroCHLOROthiazide (HYDRODIURIL) 25 mg tablet TAKE 1 TABLET BY MOUTH EVERY DAY 90 Tablet 1    metoprolol succinate (TOPROL-XL) 50 mg XL tablet TAKE 1 TABLET BY MOUTH EVERY DAY 90 Tablet 1    rosuvastatin (CRESTOR) 20 mg tablet TAKE 1 TABLET BY MOUTH EVERY DAY AT NIGHT 90 Tablet 3    Eliquis 5 mg tablet TAKE 1 TABLET BY MOUTH TWICE A  Tablet 3    pantoprazole (PROTONIX) 40 mg tablet TAKE 1 TABLET BY MOUTH EVERY DAY      MULTIVITAMIN PO Take 1 Tab by mouth daily.  Lancets (FREESTYLE LANCETS) Misc Test once daily. (diagnosis code: 250.00) 1 Package 11    Blood-Glucose Meter monitoring kit Once daily before breakfast 1 Kit 0     No current facility-administered medications on file prior to visit. Review of Symptoms:    Review of Systems   Constitutional: Negative for chills, fever and weight loss. HENT: Negative for nosebleeds. Eyes: Negative for blurred vision and double vision. Respiratory: Negative for cough, shortness of breath and wheezing. Cardiovascular: Positive for chest pain. Negative for palpitations, orthopnea, leg swelling and PND. Skin: Negative for rash. Neurological: Negative for dizziness and loss of consciousness. Physical Exam:      General: Well developed, in no acute distress, cooperative and alert  Heart:  reg rate and rhythm; normal S1/S2; no murmurs, no gallops or rubs. Respiratory: Clear bilaterally x 4, no wheezing or rales  Extremities:  Normal cap refill, no cyanosis, atraumatic. No edema. Vascular: 2+ pulses symmetric in all extremities    Vitals:    02/25/22 1034   BP: 138/78   BP 1 Location: Left arm   BP Patient Position: Sitting   BP Cuff Size: Large adult   Pulse: 74   Resp: 18   Height: 5' 2\" (1.575 m)   Weight: 207 lb 8 oz (94.1 kg)   SpO2: 98%       ECG done today shows sinus rhythm     Assessment:       ICD-10-CM ICD-9-CM    1. Coronary artery disease involving native coronary artery of native heart without angina pectoris  I25.10 414.01 AMB POC EKG ROUTINE W/ 12 LEADS, INTER & REP   2. Paroxysmal atrial fibrillation (HCC)  I48.0 427.31    3. Essential hypertension  I10 401.9    4. Mixed hyperlipidemia  E78.2 272.2         Plan:     1.  Coronary artery disease involving native coronary artery of native heart without angina pectoris  Cardiac cath done 1/2022 with 99% RPL stenosis -- unchanged from 8/2020  Continue ASA, BB, statin. Hold amlodipine for 7 days. Did not tolerate Imdur in the past.    2. Paroxysmal atrial fibrillation (HCC)  Maintaining sinus rhythm  2-week event monitor did not show evidence of AF, PACs and PVCs noted. Conitnue Rhythmol, Toprol  On Eliquis for stroke prevention    3. Essential hypertension  May hold amlodipine for 7 days to see if symptoms improve; if not, then resume amlodipine. Otherwise, monitor BP off amlodipine and call for BP > 140/90    4. Mixed hyperlipidemia  LDL 74 in 10/2020  Continue statin therapy and low fat, low cholesterol diet  Lipids managed by PCP    F/u with Dr. Afia Dixon in 6 months    Arturo Holley NP       [facility]             Patient seen, examined by me personally. Plan discussed as detailed. Agree with note as outlined by  NP with modifications as noted. My independent physical exam reveals : Physical Exam  Vitals and nursing note reviewed. Cardiovascular:      Rate and Rhythm: Normal rate and regular rhythm. Heart sounds: Normal heart sounds. Pulmonary:      Breath sounds: Normal breath sounds. Musculoskeletal:      Right lower leg: No edema. Left lower leg: No edema. Skin:     General: Skin is warm. Neurological:      Mental Status: She is alert and oriented to person, place, and time. Psychiatric:         Mood and Affect: Mood normal.          No additional findings noted. Agree with plan as outlined above with modifications as noted.      Chelsie Lloyd MD

## 2022-02-25 NOTE — PROGRESS NOTES
Chief Complaint   Patient presents with   [facility] Follow Up     Patient presents today for Heart catherization follow up-     Chest Pain     Reports chest pain. Patient was at [facility] ED for chest pain on 02/12/2022. Today patient still has chest pain that radiates to back. 1. Have you been to the ER, urgent care clinic since your last visit? Hospitalized since your last visit? Yes, [facility] ED for chest pain on 02/12/2022    2. Have you seen or consulted any other health care providers outside of the [address] [place] [address] since your last visit? Yes, see above.

## 2022-02-25 NOTE — LETTER
2/25/2022    Patient: Christemmanuel Labor   YOB: 1940   Date of Visit: 2/25/2022     Maude Ruiz MD  [address] [place] [id number]  [address] [place]    Dear Maude Ruiz MD,      Thank you for referring Ms. Barry Noel to [address] for evaluation. My notes for this consultation are attached. If you have questions, please do not hesitate to call me. I look forward to following your patient along with you.       Sincerely,    Yovany Camacho MD

## 2022-03-07 RX ORDER — CETIRIZINE HCL 10 MG
TABLET ORAL
Qty: 30 TABLET | Refills: 1 | Status: SHIPPED | OUTPATIENT
Start: 2022-03-07 | End: 2022-04-03

## 2022-03-19 PROBLEM — I25.10 CORONARY ARTERY DISEASE INVOLVING NATIVE CORONARY ARTERY OF NATIVE HEART WITHOUT ANGINA PECTORIS: Status: ACTIVE | Noted: 2022-02-25

## 2022-03-19 PROBLEM — G43.119 INTRACTABLE MIGRAINE WITH AURA WITHOUT STATUS MIGRAINOSUS: Status: ACTIVE | Noted: 2017-01-25

## 2022-03-19 PROBLEM — I83.813 VARICOSE VEINS OF BOTH LOWER EXTREMITIES WITH PAIN: Status: ACTIVE | Noted: 2019-08-13

## 2022-03-19 PROBLEM — R19.02 ABDOMINAL MASS, LEFT UPPER QUADRANT: Status: ACTIVE | Noted: 2017-06-13

## 2022-03-19 PROBLEM — I48.0 PAROXYSMAL ATRIAL FIBRILLATION (HCC): Status: ACTIVE | Noted: 2018-01-02

## 2022-03-19 PROBLEM — M48.061 LUMBAR STENOSIS: Status: ACTIVE | Noted: 2019-09-10

## 2022-03-19 PROBLEM — J45.902: Status: ACTIVE | Noted: 2017-01-26

## 2022-03-19 PROBLEM — S99.921A RIGHT FOOT INJURY: Status: ACTIVE | Noted: 2017-05-02

## 2022-03-19 PROBLEM — R10.12 LEFT UPPER QUADRANT PAIN: Status: ACTIVE | Noted: 2017-06-13

## 2022-03-19 PROBLEM — Z98.890 S/P CARDIAC CATH: Status: ACTIVE | Noted: 2020-08-20

## 2022-03-20 PROBLEM — I87.2 VENOUS INSUFFICIENCY OF BOTH LOWER EXTREMITIES: Status: ACTIVE | Noted: 2019-08-13

## 2022-03-20 PROBLEM — I20.9 ANGINA, CLASS III (HCC): Status: ACTIVE | Noted: 2022-01-27

## 2022-03-23 DIAGNOSIS — I10 ESSENTIAL HYPERTENSION: ICD-10-CM

## 2022-03-23 RX ORDER — METOPROLOL SUCCINATE 50 MG/1
TABLET, EXTENDED RELEASE ORAL
Qty: 90 TABLET | Refills: 1 | Status: SHIPPED | OUTPATIENT
Start: 2022-03-23 | End: 2022-09-02

## 2022-05-11 RX ORDER — FLUTICASONE PROPIONATE 50 MCG
SPRAY, SUSPENSION (ML) NASAL
Qty: 16 G | Refills: 1 | Status: SHIPPED | OUTPATIENT
Start: 2022-05-11 | End: 2022-06-05

## 2022-05-25 DIAGNOSIS — E55.9 VITAMIN D DEFICIENCY: ICD-10-CM

## 2022-05-25 RX ORDER — ASPIRIN 81 MG/1
TABLET ORAL
Qty: 30 TABLET | Refills: 5 | Status: SHIPPED | OUTPATIENT
Start: 2022-05-25 | End: 2022-10-26

## 2022-05-26 ENCOUNTER — OFFICE VISIT (OUTPATIENT)
Dept: INTERNAL MEDICINE CLINIC | Age: 82
End: 2022-05-26
Payer: MEDICARE

## 2022-05-26 VITALS
BODY MASS INDEX: 38.09 KG/M2 | HEART RATE: 76 BPM | WEIGHT: 207 LBS | RESPIRATION RATE: 16 BRPM | TEMPERATURE: 97.1 F | OXYGEN SATURATION: 98 % | DIASTOLIC BLOOD PRESSURE: 83 MMHG | HEIGHT: 62 IN | SYSTOLIC BLOOD PRESSURE: 152 MMHG

## 2022-05-26 DIAGNOSIS — M46.92 SPONDYLITIS, CERVICAL (HCC): ICD-10-CM

## 2022-05-26 DIAGNOSIS — B37.0 THRUSH: Primary | ICD-10-CM

## 2022-05-26 DIAGNOSIS — I20.9 ANGINA, CLASS III (HCC): ICD-10-CM

## 2022-05-26 PROCEDURE — G8427 DOCREV CUR MEDS BY ELIG CLIN: HCPCS | Performed by: INTERNAL MEDICINE

## 2022-05-26 PROCEDURE — G8536 NO DOC ELDER MAL SCRN: HCPCS | Performed by: INTERNAL MEDICINE

## 2022-05-26 PROCEDURE — 1090F PRES/ABSN URINE INCON ASSESS: CPT | Performed by: INTERNAL MEDICINE

## 2022-05-26 PROCEDURE — 99213 OFFICE O/P EST LOW 20 MIN: CPT | Performed by: INTERNAL MEDICINE

## 2022-05-26 PROCEDURE — G8754 DIAS BP LESS 90: HCPCS | Performed by: INTERNAL MEDICINE

## 2022-05-26 PROCEDURE — G8753 SYS BP > OR = 140: HCPCS | Performed by: INTERNAL MEDICINE

## 2022-05-26 PROCEDURE — G8510 SCR DEP NEG, NO PLAN REQD: HCPCS | Performed by: INTERNAL MEDICINE

## 2022-05-26 PROCEDURE — G8417 CALC BMI ABV UP PARAM F/U: HCPCS | Performed by: INTERNAL MEDICINE

## 2022-05-26 PROCEDURE — 1101F PT FALLS ASSESS-DOCD LE1/YR: CPT | Performed by: INTERNAL MEDICINE

## 2022-05-26 PROCEDURE — G8399 PT W/DXA RESULTS DOCUMENT: HCPCS | Performed by: INTERNAL MEDICINE

## 2022-05-26 PROCEDURE — G0463 HOSPITAL OUTPT CLINIC VISIT: HCPCS | Performed by: INTERNAL MEDICINE

## 2022-05-26 RX ORDER — NYSTATIN 100000 [USP'U]/ML
1 SUSPENSION ORAL 4 TIMES DAILY
COMMUNITY
End: 2022-05-26 | Stop reason: SDUPTHER

## 2022-05-26 RX ORDER — NYSTATIN 100000 [USP'U]/ML
1 SUSPENSION ORAL 4 TIMES DAILY
Qty: 200 ML | Refills: 0 | Status: SHIPPED | OUTPATIENT
Start: 2022-05-26

## 2022-05-26 NOTE — PROGRESS NOTES
Verified name and birth date for privacy precautions. Chart reviewed in preparation for today's visit.      Chief Complaint   Patient presents with    ED Follow-up     [facility] 5[facility]14[facility]22 for coating in mouth           Health Maintenance Due   Topic    Shingrix Vaccine Age 50> (1 of 2)    Foot Exam Q1     Medicare Yearly Exam          Wt Readings from Last 3 Encounters:   05/26/22 207 lb (93.9 kg)   02/25/22 207 lb 8 oz (94.1 kg)   01/27/22 203 lb (92.1 kg)     Temp Readings from Last 3 Encounters:   05/26/22 97.1 °F (36.2 °C) (Temporal)   01/27/22 98.1 °F (36.7 °C)   01/05/22 97.5 °F (36.4 °C) (Temporal)     BP Readings from Last 3 Encounters:   05/26/22 (!) 152/83   02/25/22 138/78   01/27/22 135/60     Pulse Readings from Last 3 Encounters:   05/26/22 76   02/25/22 74   01/27/22 70         Learning Assessment:  :     Learning Assessment 11/15/2021 6/27/2018 12/4/2017 3/1/2017 4/24/2015 4/13/2015 2/12/2014   PRIMARY LEARNER Patient Patient Patient Patient Patient Patient Patient   HIGHEST LEVEL OF EDUCATION - PRIMARY LEARNER  - - GRADUATED HIGH SCHOOL OR GED GRADUATED HIGH SCHOOL OR GED GRADUATED HIGH SCHOOL OR GED GRADUATED HIGH SCHOOL OR GED GRADUATED HIGH SCHOOL OR GED   BARRIERS PRIMARY LEARNER - - NONE NONE NONE - NONE   CO-LEARNER CAREGIVER - - - - No - No   PRIMARY LANGUAGE ENGLISH ENGLISH ENGLISH ENGLISH ENGLISH ENGLISH ENGLISH    NEED - - - - - - No   LEARNER PREFERENCE PRIMARY READING READING READING READING READING READING READING     - - - LISTENING DEMONSTRATION - -   LEARNING SPECIAL TOPICS - - - - - - no   ANSWERED BY patient patient patient  patient griselda patient patient   RELATIONSHIP SELF SELF SELF SELF SELF SELF SELF       Depression Screening:  :     3 most recent PHQ Screens 5/26/2022   Little interest or pleasure in doing things Not at all   Feeling down, depressed, irritable, or hopeless Not at all   Total Score PHQ 2 0       Fall Risk Assessment:  :     Fall Risk Assessment, last 15 [facility] 5/26/2022   Able to walk? Yes   Fall in past 12 months? 0   Do you feel unsteady? 0   Are you worried about falling 0       Abuse Screening:  :     Abuse Screening Questionnaire 5/26/2022 2/25/2022 1/17/2022 11/15/2021 4/2/2021 7/21/2020 1/15/2020   Do you ever feel afraid of your partner? N N N N N N N   Are you in a relationship with someone who physically or mentally threatens you? N N N N N N N   Is it safe for you to go home?  Abhishek Luther

## 2022-05-29 DIAGNOSIS — G43.119 INTRACTABLE MIGRAINE WITH AURA WITHOUT STATUS MIGRAINOSUS: ICD-10-CM

## 2022-05-29 DIAGNOSIS — Z23 ENCOUNTER FOR IMMUNIZATION: ICD-10-CM

## 2022-05-29 DIAGNOSIS — I10 ESSENTIAL HYPERTENSION: ICD-10-CM

## 2022-05-29 DIAGNOSIS — E11.9 DIABETES MELLITUS WITHOUT COMPLICATION (HCC): ICD-10-CM

## 2022-05-29 RX ORDER — HYDROCHLOROTHIAZIDE 25 MG/1
TABLET ORAL
Qty: 90 TABLET | Refills: 1 | Status: SHIPPED | OUTPATIENT
Start: 2022-05-29

## 2022-06-05 RX ORDER — FLUTICASONE PROPIONATE 50 MCG
SPRAY, SUSPENSION (ML) NASAL
Qty: 16 G | Refills: 1 | Status: SHIPPED | OUTPATIENT
Start: 2022-06-05 | End: 2022-07-04

## 2022-06-20 NOTE — PROGRESS NOTES
Acute Care Note    Randy Reynoso is 80 y.o. female. she presents for evaluation of ED Follow-up ([facility] 5/14/22 for coating in mouth )      The patient went to [facility] ED on 5/14 for a complaint of a whitish coating in her mouth and on her tongue. She was diagnosed with thrush at that time and given nystatin. She completed the medication but is now concerned since she has an persistent coating on her tongue. She denies pain or fever. No present difficulty with swallowing. She denies pain in the throat. We discussed that this may represent ongoing/incompletely treated thrush. Prior to Admission medications    Medication Sig Start Date End Date Taking? Authorizing Provider   nystatin (MYCOSTATIN) 100,000 unit/mL suspension Take 5 mL by mouth four (4) times daily. swish and spit 5/26/22  Yes Yesi Brandon MD   aspirin delayed-release 81 mg tablet TAKE 1 TABLET BY MOUTH EVERY DAY 5/25/22  Yes Yesi Brandon MD   cetirizine (ZYRTEC) 10 mg tablet TAKE 1 TABLET BY MOUTH EVERY DAY 4/3/22  Yes Yesi Brandon MD   metoprolol succinate (TOPROL-XL) 50 mg XL tablet TAKE 1 TABLET BY MOUTH EVERY DAY 3/23/22  Yes Yesi Brandon MD   metFORMIN (GLUCOPHAGE) 500 mg tablet Take 1 Tablet by mouth two (2) times daily (with meals). Please restart Metformin on 1/29/2022 1/27/22  Yes Dante Andre NP   propafenone (RYTHMOL) 150 mg tablet TAKE 1 TABLET BY MOUTH EVERY 8 HOURS 1/10/22  Yes Karlos Mg NP   famotidine (PEPCID) 20 mg tablet Take 40 mg by mouth nightly.  8/13/21  Yes Provider, Historical   albuterol (PROVENTIL HFA, VENTOLIN HFA, PROAIR HFA) 90 mcg/actuation inhaler INHALE 2 PUFFS BY MOUTH EVERY 4 HOURS AS NEEDED FOR WHEEZE 10/26/21  Yes Yesi Brandon MD   rosuvastatin (CRESTOR) 20 mg tablet TAKE 1 TABLET BY MOUTH EVERY DAY AT NIGHT 8/2/21  Yes Karlos Mg NP   Eliquis 5 mg tablet TAKE 1 TABLET BY MOUTH TWICE A DAY 7/23/21  Yes McGuiness, Karlos Holland, NP   pantoprazole (PROTONIX) 40 mg tablet TAKE 1 TABLET BY MOUTH EVERY DAY   Yes Provider, Historical   MULTIVITAMIN PO Take 1 Tab by mouth daily. Yes Provider, Historical   fluticasone propionate (FLONASE) 50 mcg/actuation nasal spray USE 1 TO 2 SPRAYS INTO EACH NOSTRIL EVERY DAY 6/5/22   Che Andrews MD   hydroCHLOROthiazide (HYDRODIURIL) 25 mg tablet TAKE 1 TABLET BY MOUTH EVERY DAY 5/29/22   Che Andrews MD   glucose blood VI test strips (Contour Next Test Strips) strip USE TO CHECK BLOOD SUGAR ONCE A DAY 2/14/22   Che Andrews MD   amLODIPine (NORVASC) 2.5 mg tablet TAKE 1 TABLET BY MOUTH EVERY DAY  Patient not taking: Reported on 5/26/2022 12/20/21   Milton Mg NP   Lancets (FREESTYLE LANCETS) Misc Test once daily. (diagnosis code: 250.00) 10/24/13   Camden Garcia MD   Blood-Glucose Meter monitoring kit Once daily before breakfast 9/20/13   Camden Garcia MD         Patient Active Problem List   Diagnosis Code    Hyperlipemia E78.5    Arthritis M19.90    Essential hypertension I10    Headache(784.0) R51    Spondylitis, cervical (HCC) M46.92    Tingling sensation R20.2    Chest pain R07.9    Anemia D64.9    Cyst of right kidney N28.1    Hip pain, right M25.551    Acute bronchitis J20.9    Visual floaters H43.399    Postmenopausal state Z78.0    Vitamin D deficiency E55.9    Bilateral hip pain M25.551, M25.552    Postmenopausal disorder N95.1    Numbness and tingling of right leg R20.0, R20.2    Foot pain, left M79.672    Rotator cuff (capsule) sprain and strain [id number]    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 pylori) infection A04.8    Intractable migraine with aura without status migrainosus G43.119    Chronic asthma with status asthmaticus J45.902    Right foot injury S99.921A    Abdominal mass, left upper quadrant R19.02    Left upper quadrant pain R10.12    Paroxysmal atrial fibrillation ([facility]) I48.0    Severe obesity (BMI 35.0-39. 9) E66.01    Varicose veins of both lower extremities with pain I83.813    Venous insufficiency of both lower extremities I87.2    Lumbar stenosis M48.061    S/P cardiac cath Z98.890    Angina, class III ([facility]) I20.9    Coronary artery disease involving native coronary artery of native heart without angina pectoris I25.10         Review of Systems   Constitutional: Negative. HENT:        Light whitish coating at the tongue and oral mucosa   Respiratory: Negative. Cardiovascular: Negative. Visit Vitals  BP (!) 152/83 (BP 1 Location: Left arm, BP Patient Position: Sitting, BP Cuff Size: Large adult)   Pulse 76   Temp 97.1 °F (36.2 °C) (Temporal)   Resp 16   Ht 5' 2\" (1.575 m)   Wt 207 lb (93.9 kg)   LMP 02/18/1983   SpO2 98%   BMI 37.86 kg/m²       Physical Exam  Cardiovascular:      Rate and Rhythm: Normal rate and regular rhythm. Musculoskeletal:      Cervical back: Normal range of motion and neck supple. ASSESSMENT/PLAN  Diagnoses and all orders for this visit:    1. Thrush  -     nystatin (MYCOSTATIN) 100,000 unit/mL suspension; Take 5 mL by mouth four (4) times daily.  swish and spit    2. Spondylitis, cervical ([address]    3. Angina, class III ([address]         Advised the patient to call back or return to office if symptoms worsen/change/persist.   Discussed expected course/resolution/complications of diagnosis in detail with patient. Medication risks/benefits/costs/interactions/alternatives discussed with patient. The patient was given an after visit summary which includes diagnoses, current medications, & vitals. They expressed understanding with the diagnosis and plan.

## 2022-07-04 RX ORDER — FLUTICASONE PROPIONATE 50 MCG
SPRAY, SUSPENSION (ML) NASAL
Qty: 16 G | Refills: 1 | Status: SHIPPED | OUTPATIENT
Start: 2022-07-04 | End: 2022-07-27

## 2022-07-14 DIAGNOSIS — E11.21 CONTROLLED TYPE 2 DIABETES MELLITUS WITH DIABETIC NEPHROPATHY, WITHOUT LONG-TERM CURRENT USE OF INSULIN (HCC): ICD-10-CM

## 2022-07-14 NOTE — TELEPHONE ENCOUNTER
Requested Prescriptions     Pending Prescriptions Disp Refills    metFORMIN (GLUCOPHAGE) 500 mg tablet 180 Tablet 1     Sig: Take 1 Tablet by mouth two (2) times daily (with meals).  Please restart Metformin on 1/29/2022     [facility] #[id number]- [place] - [address] [place] [address] [phone]

## 2022-07-15 RX ORDER — METFORMIN HYDROCHLORIDE 500 MG/1
500 TABLET ORAL 2 TIMES DAILY WITH MEALS
Qty: 180 TABLET | Refills: 1 | Status: SHIPPED | OUTPATIENT
Start: 2022-07-15

## 2022-07-27 RX ORDER — FLUTICASONE PROPIONATE 50 MCG
SPRAY, SUSPENSION (ML) NASAL
Qty: 16 G | Refills: 1 | Status: SHIPPED | OUTPATIENT
Start: 2022-07-27 | End: 2022-08-22
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
## 2022-08-22 RX ORDER — FLUTICASONE PROPIONATE 50 MCG
SPRAY, SUSPENSION (ML) NASAL
Qty: 16 G | Refills: 1 | Status: SHIPPED | OUTPATIENT
Start: 2022-08-22 | End: 2022-09-18

## 2022-08-26 ENCOUNTER — OFFICE VISIT (OUTPATIENT)
Dept: INTERNAL MEDICINE CLINIC | Age: 82
End: 2022-08-26
Payer: MEDICARE

## 2022-08-26 VITALS
OXYGEN SATURATION: 97 % | BODY MASS INDEX: 38.87 KG/M2 | SYSTOLIC BLOOD PRESSURE: 130 MMHG | WEIGHT: 211.2 LBS | HEART RATE: 73 BPM | HEIGHT: 62 IN | DIASTOLIC BLOOD PRESSURE: 80 MMHG | RESPIRATION RATE: 16 BRPM | TEMPERATURE: 97.5 F

## 2022-08-26 DIAGNOSIS — R10.30 LOWER ABDOMINAL PAIN: Primary | ICD-10-CM

## 2022-08-26 LAB
BILIRUB UR QL STRIP: NEGATIVE
GLUCOSE UR-MCNC: NEGATIVE MG/DL
KETONES P FAST UR STRIP-MCNC: NEGATIVE MG/DL
PH UR STRIP: 7 [PH] (ref 4.6–8)
PROT UR QL STRIP: NEGATIVE
SP GR UR STRIP: 1.02 (ref 1–1.03)
UA UROBILINOGEN AMB POC: NORMAL (ref 0.2–1)
URINALYSIS CLARITY POC: CLEAR
URINALYSIS COLOR POC: YELLOW
URINE BLOOD POC: NEGATIVE
URINE LEUKOCYTES POC: NEGATIVE
URINE NITRITES POC: NEGATIVE

## 2022-08-26 PROCEDURE — G8399 PT W/DXA RESULTS DOCUMENT: HCPCS | Performed by: INTERNAL MEDICINE

## 2022-08-26 PROCEDURE — 99214 OFFICE O/P EST MOD 30 MIN: CPT | Performed by: INTERNAL MEDICINE

## 2022-08-26 PROCEDURE — 1090F PRES/ABSN URINE INCON ASSESS: CPT | Performed by: INTERNAL MEDICINE

## 2022-08-26 PROCEDURE — 81003 URINALYSIS AUTO W/O SCOPE: CPT | Performed by: INTERNAL MEDICINE

## 2022-08-26 PROCEDURE — G8510 SCR DEP NEG, NO PLAN REQD: HCPCS | Performed by: INTERNAL MEDICINE

## 2022-08-26 PROCEDURE — G8427 DOCREV CUR MEDS BY ELIG CLIN: HCPCS | Performed by: INTERNAL MEDICINE

## 2022-08-26 PROCEDURE — 1101F PT FALLS ASSESS-DOCD LE1/YR: CPT | Performed by: INTERNAL MEDICINE

## 2022-08-26 PROCEDURE — G8752 SYS BP LESS 140: HCPCS | Performed by: INTERNAL MEDICINE

## 2022-08-26 PROCEDURE — G8417 CALC BMI ABV UP PARAM F/U: HCPCS | Performed by: INTERNAL MEDICINE

## 2022-08-26 PROCEDURE — G8536 NO DOC ELDER MAL SCRN: HCPCS | Performed by: INTERNAL MEDICINE

## 2022-08-26 PROCEDURE — G0463 HOSPITAL OUTPT CLINIC VISIT: HCPCS | Performed by: INTERNAL MEDICINE

## 2022-08-26 PROCEDURE — G8754 DIAS BP LESS 90: HCPCS | Performed by: INTERNAL MEDICINE

## 2022-08-26 NOTE — PROGRESS NOTES
Chief Complaint   Patient presents with    Headache    Dizziness     Reviewed record in preparation for visit and have obtained necessary documentation. Identified pt with two pt identifiers(name and ).       Health Maintenance Due   Topic    Shingrix Vaccine Age 50> (1 of 2)    Foot Exam Q1     Medicare Yearly Exam     A1C test (Diabetic or Prediabetic)          Chief Complaint   Patient presents with    Headache    Dizziness        Wt Readings from Last 3 Encounters:   22 211 lb 3.2 oz (95.8 kg)   22 207 lb (93.9 kg)   22 207 lb 8 oz (94.1 kg)     Temp Readings from Last 3 Encounters:   22 97.5 °F (36.4 °C) (Temporal)   22 97.1 °F (36.2 °C) (Temporal)   22 98.1 °F (36.7 °C)     BP Readings from Last 3 Encounters:   22 130/80   22 (!) 152/83   22 138/78     Pulse Readings from Last 3 Encounters:   22 73   22 76   22 74           Learning Assessment:  :     Learning Assessment 11/15/2021 2018 2017 3/1/2017 2015 2015 2014   PRIMARY LEARNER Patient Patient Patient Patient Patient Patient Patient   HIGHEST LEVEL OF EDUCATION - PRIMARY LEARNER  - - GRADUATED HIGH SCHOOL OR GED GRADUATED HIGH SCHOOL OR GED GRADUATED HIGH SCHOOL OR GED GRADUATED HIGH SCHOOL OR GED GRADUATED HIGH SCHOOL OR GED   BARRIERS PRIMARY LEARNER - - NONE NONE NONE - NONE   CO-LEARNER CAREGIVER - - - - No - No   PRIMARY LANGUAGE ENGLISH ENGLISH ENGLISH ENGLISH ENGLISH ENGLISH ENGLISH    NEED - - - - - - No   LEARNER PREFERENCE PRIMARY READING READING READING READING READING READING READING     - - - LISTENING DEMONSTRATION - -   LEARNING SPECIAL TOPICS - - - - - - no   ANSWERED BY patient patient patient  patient griselda patient patient   RELATIONSHIP SELF SELF SELF SELF SELF SELF SELF       Depression Screening:  :     3 most recent [facility] Screens 2022   Little interest or pleasure in doing things Not at all   Feeling down, depressed, irritable, or hopeless Not at all   Total Score PHQ 2 0       Fall Risk Assessment:  :     Fall Risk Assessment, last 12 mths 8/26/2022   Able to walk? Yes   Fall in past 12 months? 0   Do you feel unsteady? 0   Are you worried about falling 0       Abuse Screening:  :     Abuse Screening Questionnaire 8/26/2022 5/26/2022 2/25/2022 1/17/2022 11/15/2021 4/2/2021 7/21/2020   Do you ever feel afraid of your partner? N N N N N N N   Are you in a relationship with someone who physically or mentally threatens you? N N N N N N N   Is it safe for you to go home? Y Y Y Y Y Y Y       Coordination of Care Questionnaire:  :     1) Have you been to an emergency room, urgent care clinic since your last visit? no   Hospitalized since your last visit? no             2) Have you seen or consulted any other health care providers outside of [address] [place] [address] since your last visit? no  (Include any pap smears or colon screenings in this section.)    3) Do you have an Advance Directive on file? no    4) Are you interested in receiving information on Advance Directives? NO      Patient is accompanied by daughter I have received verbal consent from Amish Arrieta to discuss any/all medical information while they are present in the room. Reviewed record  In preparation for visit and have obtained necessary documentation.

## 2022-08-26 NOTE — PROGRESS NOTES
Follow Up Visit    Juliette Roth is a 80 y.o. female. she presents for Headache    She was having stomach discomfort two weeks ago. She also reports some nausea. Pain was in the lower stomach. She notes that this improved but that she has some minor lower abdominal pain now. She reports some mild urinary frequency. This has been episodic but bothersome to the patient. She denies fevers or chills. She has had normal bowel movements. Urine has had a normal appearance. She also has had some ongoing issues with gait unsteadiness. She has not fallen but notes that she has to hold onto a grocery cart or her daughter when walking places. She has had spinal surgery in the past.  She notes that some of her discomfort appears to be related to this. Patient Active Problem List   Diagnosis Code    Hyperlipemia E78.5    Arthritis M19.90    Essential hypertension I10    Headache(784.0) R51    Spondylitis, cervical ([facility]) M46.92    Tingling sensation R20.2    Chest pain R07.9    Anemia D64.9    Cyst of right kidney N28.1    Hip pain, right M25.551    Acute bronchitis J20.9    Visual floaters H43.399    Postmenopausal state Z78.0    Vitamin D deficiency E55.9    Bilateral hip pain M25.551, M25.552    Postmenopausal disorder N95.1    Numbness and tingling of right leg R20.0, R20.2    Foot pain, left M79.672    Rotator cuff (capsule) sprain and strain [id number]    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 0    Cataract H26.9    Peripheral autonomic neuropathy due to DM ([facility]) E11.43    Pre-ulcerative calluses L84    Type 2 diabetes mellitus with pressure callus ([facility]) E11.628, L84    Hammer toe of right foot M20.41    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 ([facility] [address] E11.69, M21.969    DAVID (generalized anxiety disorder) F41.1    Abnormal finding on MRI of brain R90.89    Tinea pedis B35.3    Arthralgia of right hip M25.551    Swollen gland R59.9    Acute pharyngitis J02.9    Diabetes mellitus type 2, controlled (HCC) E11.9    Multiple thyroid nodules E04.2    Skipped heart beats Q92.7    Helicobacter pylori (H. pylori) infection A04.8    Intractable migraine with aura without status migrainosus G43.119    Chronic asthma with status asthmaticus J45.902    Right foot injury S99.921A    Abdominal mass, left upper quadrant R19.02    Left upper quadrant pain R10.12    Paroxysmal atrial fibrillation (HCC) I48.0    Severe obesity (BMI 35.0-39. 9) E66.01    Varicose veins of both lower extremities with pain I83.813    Venous insufficiency of both lower extremities I87.2    Lumbar stenosis M48.061    S/P cardiac cath Z98.890    Angina, class III ([facility]) I20.9    Coronary artery disease involving native coronary artery of native heart without angina pectoris I25.10         Prior to Admission medications    Medication Sig Start Date End Date Taking? Authorizing Provider   metFORMIN (GLUCOPHAGE) 500 mg tablet Take 1 Tablet by mouth two (2) times daily (with meals).  Please restart Metformin on 1/29/2022 7/15/22  Yes Coby Hodgson MD   hydroCHLOROthiazide (HYDRODIURIL) 25 mg tablet TAKE 1 TABLET BY MOUTH EVERY DAY 5/29/22  Yes Coby Hodgson MD   aspirin delayed-release 81 mg tablet TAKE 1 TABLET BY MOUTH EVERY DAY 5/25/22  Yes Coby Hodgson MD   cetirizine (ZYRTEC) 10 mg tablet TAKE 1 TABLET BY MOUTH EVERY DAY 4/3/22  Yes Coby Hodgson MD   metoprolol succinate (TOPROL-XL) 50 mg XL tablet TAKE 1 TABLET BY MOUTH EVERY DAY 3/23/22  Yes Coby Hodgson MD   glucose blood VI test strips (Contour Next Test Strips) strip USE TO CHECK BLOOD SUGAR ONCE A DAY 2/14/22  Yes Coby Hodgson MD   propafenone (RYTHMOL) 150 mg tablet TAKE 1 TABLET BY MOUTH EVERY 8 HOURS 1/10/22  Yes Belle Mg NP   famotidine (PEPCID) 20 mg tablet Take 40 mg by mouth nightly. 8/13/21  Yes Provider, Historical   albuterol (PROVENTIL HFA, VENTOLIN HFA, PROAIR HFA) 90 mcg/actuation inhaler INHALE 2 PUFFS BY MOUTH EVERY 4 HOURS AS NEEDED FOR WHEEZE 10/26/21  Yes Ruby Monzon MD   rosuvastatin (CRESTOR) 20 mg tablet TAKE 1 TABLET BY MOUTH EVERY DAY AT NIGHT 8/2/21  Yes Belle Mg NP   Eliquis 5 mg tablet TAKE 1 TABLET BY MOUTH TWICE A DAY 7/23/21  Yes Belle Mg NP   pantoprazole (PROTONIX) 40 mg tablet TAKE 1 TABLET BY MOUTH EVERY DAY   Yes Provider, Historical   MULTIVITAMIN PO Take 1 Tab by mouth daily. Yes Provider, Historical   fluticasone propionate (FLONASE) 50 mcg/actuation nasal spray USE 1 TO 2 SPRAYS INTO EACH NOSTRIL EVERY DAY 8/22/22   Ruby Monzon MD   nystatin (MYCOSTATIN) 100,000 unit/mL suspension Take 5 mL by mouth four (4) times daily. swish and spit  Patient not taking: Reported on 8/26/2022 5/26/22   Ruby Monzon MD   amLODIPine (NORVASC) 2.5 mg tablet TAKE 1 TABLET BY MOUTH EVERY DAY  Patient not taking: No sig reported 12/20/21   Belle Mg NP   Lancets (FREESTYLE LANCETS) Misc Test once daily.  (diagnosis code: 250.00) 10/24/13   Hermann Garcia MD   Blood-Glucose Meter monitoring kit Once daily before breakfast 9/20/13   Hermann Garcia MD         Health Maintenance   Topic Date Due    Shingrix Vaccine Age 49> (1 of 2) Never done    Foot Exam Q1  12/07/2021    Medicare Yearly Exam  12/08/2021    A1C test (Diabetic or Prediabetic)  07/05/2022    Flu Vaccine (1) 09/01/2022    MICROALBUMIN Q1  01/05/2023    Lipid Screen  01/05/2023    Depression Screen  08/26/2023    Eye Exam Retinal or Dilated  12/13/2023    DTaP/Tdap/Td series (2 - Td or Tdap) 05/26/2026    Hepatitis C Screening  Completed    Bone Densitometry (Dexa) Screening  Completed    COVID-19 Vaccine  Completed    Pneumococcal 65+ years Completed       Review of Systems   Constitutional: Negative. Respiratory: Negative. Cardiovascular: Negative. Gastrointestinal: Negative. Visit Vitals  /80 (BP 1 Location: Right arm, BP Patient Position: Sitting, BP Cuff Size: Adult)   Pulse 73   Temp 97.5 °F (36.4 °C) (Temporal)   Resp 16   Ht 5' 2\" (1.575 m)   Wt 211 lb 3.2 oz (95.8 kg)   LMP 02/18/1983   SpO2 97%   BMI 38.63 kg/m²       Physical Exam  Cardiovascular:      Rate and Rhythm: Normal rate and regular rhythm. Pulmonary:      Effort: Pulmonary effort is normal.      Breath sounds: Normal breath sounds. Abdominal:      General: There is no distension. Palpations: Abdomen is soft. There is no mass. Tenderness: There is abdominal tenderness (present at the lower abdomen--centered at the suprapubic area. No mass, no rebound. ). Hernia: No hernia is present. Neurological:      Mental Status: She is alert. Urinalysis today-Negative. ASSESSMENT/PLAN    Diagnoses and all orders for this visit:    1. Lower abdominal pain - Given normal urinalysis and ongoing discomfort. Advised preliminary imaging. Will obtain an US to determine if any abnormality. May need to involve urology. Await results.    -     US ABD COMP; Future

## 2022-09-02 ENCOUNTER — HOSPITAL ENCOUNTER (OUTPATIENT)
Dept: ULTRASOUND IMAGING | Age: 82
Discharge: HOME | End: 2022-09-02
Attending: INTERNAL MEDICINE
Payer: MEDICARE

## 2022-09-02 DIAGNOSIS — R10.30 LOWER ABDOMINAL PAIN: ICD-10-CM

## 2022-09-02 DIAGNOSIS — I10 ESSENTIAL HYPERTENSION: ICD-10-CM

## 2022-09-02 PROCEDURE — 76700 US EXAM ABDOM COMPLETE: CPT

## 2022-09-02 RX ORDER — ALBUTEROL SULFATE 90 UG/1
AEROSOL, METERED RESPIRATORY (INHALATION)
Qty: 18 EACH | Refills: 2 | Status: SHIPPED | OUTPATIENT
Start: 2022-09-02

## 2022-09-02 RX ORDER — CETIRIZINE HCL 10 MG
TABLET ORAL
Qty: 30 TABLET | Refills: 5 | Status: SHIPPED | OUTPATIENT
Start: 2022-09-02

## 2022-09-02 RX ORDER — METOPROLOL SUCCINATE 50 MG/1
TABLET, EXTENDED RELEASE ORAL
Qty: 90 TABLET | Refills: 1 | Status: SHIPPED | OUTPATIENT
Start: 2022-09-02

## 2022-09-18 RX ORDER — FLUTICASONE PROPIONATE 50 MCG
SPRAY, SUSPENSION (ML) NASAL
Qty: 16 G | Refills: 1 | Status: SHIPPED | OUTPATIENT
Start: 2022-09-18 | End: 2022-10-14

## 2022-09-23 ENCOUNTER — TELEPHONE (OUTPATIENT)
Dept: INTERNAL MEDICINE CLINIC | Age: 82
End: 2022-09-23

## 2022-09-23 NOTE — TELEPHONE ENCOUNTER
Reason for call:  Pt understood from her last visit that she was supposed to have a CT scan. When she went for the test, an ultrasound was done. She wants to know if the test was changed and why and did the results of the ultrasound provide the information that was needed.  She was also to be contacted about getting a rollator but she has not been contacted as of yet    Is this a new problem: yes     Date of last appointment:  8/26/2022     Can we respond via [employer]: no    Best call back number:   Irina Hong (Self) [phone] (Home)

## 2022-09-23 NOTE — TELEPHONE ENCOUNTER
Spoke with patient and explained that CT is usually the follow up to an abnormal ultrasound.      Patient is waiting to hear about her rollator

## 2022-10-14 RX ORDER — FLUTICASONE PROPIONATE 50 MCG
SPRAY, SUSPENSION (ML) NASAL
Qty: 16 G | Refills: 3 | Status: SHIPPED | OUTPATIENT
Start: 2022-10-14

## 2022-10-25 DIAGNOSIS — E55.9 VITAMIN D DEFICIENCY: ICD-10-CM

## 2022-10-26 RX ORDER — ASPIRIN 81 MG/1
TABLET ORAL
Qty: 90 TABLET | Refills: 1 | Status: SHIPPED | OUTPATIENT
Start: 2022-10-26

## 2022-11-08 ENCOUNTER — TELEPHONE (OUTPATIENT)
Dept: INTERNAL MEDICINE CLINIC | Age: 82
End: 2022-11-08

## 2022-11-08 NOTE — TELEPHONE ENCOUNTER
Reason for call:    Patient had thrush a few months ago and was prescribed medication for it. She has thrush again and wants to know if something can be called in for her or does she need to come in.     Is this a new problem: yes     Date of last appointment:  8/26/2022     Can we respond via [employer]: no    Best call back number:     Eliana Nations - [phone]

## 2022-11-09 ENCOUNTER — OFFICE VISIT (OUTPATIENT)
Dept: INTERNAL MEDICINE CLINIC | Age: 82
End: 2022-11-09
Payer: MEDICARE

## 2022-11-09 VITALS
RESPIRATION RATE: 16 BRPM | BODY MASS INDEX: 38.9 KG/M2 | DIASTOLIC BLOOD PRESSURE: 80 MMHG | WEIGHT: 211.4 LBS | HEIGHT: 62 IN | TEMPERATURE: 97.3 F | SYSTOLIC BLOOD PRESSURE: 150 MMHG | OXYGEN SATURATION: 98 % | HEART RATE: 97 BPM

## 2022-11-09 DIAGNOSIS — E11.21 CONTROLLED TYPE 2 DIABETES MELLITUS WITH DIABETIC NEPHROPATHY, WITHOUT LONG-TERM CURRENT USE OF INSULIN (HCC): ICD-10-CM

## 2022-11-09 DIAGNOSIS — B37.0 THRUSH: Primary | ICD-10-CM

## 2022-11-09 PROCEDURE — G8753 SYS BP > OR = 140: HCPCS | Performed by: INTERNAL MEDICINE

## 2022-11-09 PROCEDURE — G8399 PT W/DXA RESULTS DOCUMENT: HCPCS | Performed by: INTERNAL MEDICINE

## 2022-11-09 PROCEDURE — G8427 DOCREV CUR MEDS BY ELIG CLIN: HCPCS | Performed by: INTERNAL MEDICINE

## 2022-11-09 PROCEDURE — 1101F PT FALLS ASSESS-DOCD LE1/YR: CPT | Performed by: INTERNAL MEDICINE

## 2022-11-09 PROCEDURE — G0463 HOSPITAL OUTPT CLINIC VISIT: HCPCS | Performed by: INTERNAL MEDICINE

## 2022-11-09 PROCEDURE — 1090F PRES/ABSN URINE INCON ASSESS: CPT | Performed by: INTERNAL MEDICINE

## 2022-11-09 PROCEDURE — G8510 SCR DEP NEG, NO PLAN REQD: HCPCS | Performed by: INTERNAL MEDICINE

## 2022-11-09 PROCEDURE — G8417 CALC BMI ABV UP PARAM F/U: HCPCS | Performed by: INTERNAL MEDICINE

## 2022-11-09 PROCEDURE — G8754 DIAS BP LESS 90: HCPCS | Performed by: INTERNAL MEDICINE

## 2022-11-09 PROCEDURE — G8536 NO DOC ELDER MAL SCRN: HCPCS | Performed by: INTERNAL MEDICINE

## 2022-11-09 PROCEDURE — 99213 OFFICE O/P EST LOW 20 MIN: CPT | Performed by: INTERNAL MEDICINE

## 2022-11-09 RX ORDER — NYSTATIN 100000 [USP'U]/ML
1 SUSPENSION ORAL 4 TIMES DAILY
Qty: 200 ML | Refills: 0 | Status: SHIPPED | OUTPATIENT
Start: 2022-11-09 | End: 2022-11-10

## 2022-11-09 RX ORDER — FLUCONAZOLE 150 MG/1
150 TABLET ORAL ONCE
Qty: 1 TABLET | Refills: 0 | Status: CANCELLED | OUTPATIENT
Start: 2022-11-09 | End: 2022-11-09

## 2022-11-09 NOTE — PROGRESS NOTES
Chief Complaint   Patient presents with    Croup     Negative Covid test . Patient thinks she also has thrush     Reviewed record in preparation for visit and have obtained necessary documentation. Identified pt with two pt identifiers(name and ).       Health Maintenance Due   Topic    Shingrix Vaccine Age 50> (1 of 2)    Foot Exam Q1     Medicare Yearly Exam     COVID-19 Vaccine (5 - Booster for Pfizer series)    A1C test (Diabetic or Prediabetic)     Flu Vaccine (1)         Chief Complaint   Patient presents with    Croup     Negative Covid test . Patient thinks she also has thrush        Wt Readings from Last 3 Encounters:   22 211 lb 6.4 oz (95.9 kg)   22 211 lb 3.2 oz (95.8 kg)   22 207 lb (93.9 kg)     Temp Readings from Last 3 Encounters:   22 97.3 °F (36.3 °C) (Temporal)   22 97.5 °F (36.4 °C) (Temporal)   22 97.1 °F (36.2 °C) (Temporal)     BP Readings from Last 3 Encounters:   22 (!) 150/80   22 130/80   22 (!) 152/83     Pulse Readings from Last 3 Encounters:   22 97   22 73   22 76           Learning Assessment:  :     Learning Assessment 11/15/2021 2018 2017 3/1/2017 2015 2015 2014   PRIMARY LEARNER Patient Patient Patient Patient Patient Patient Patient   HIGHEST LEVEL OF EDUCATION - PRIMARY LEARNER  - - GRADUATED HIGH SCHOOL OR GED GRADUATED HIGH SCHOOL OR GED GRADUATED HIGH SCHOOL OR GED GRADUATED HIGH SCHOOL OR GED GRADUATED HIGH SCHOOL OR GED   BARRIERS PRIMARY LEARNER - - NONE NONE NONE - NONE   CO-LEARNER CAREGIVER - - - - No - No   PRIMARY LANGUAGE ENGLISH ENGLISH ENGLISH ENGLISH ENGLISH ENGLISH ENGLISH    NEED - - - - - - No   LEARNER PREFERENCE PRIMARY READING READING READING READING READING READING READING     - - - LISTENING DEMONSTRATION - -   LEARNING SPECIAL TOPICS - - - - - - no   ANSWERED BY patient patient patient  patient griselda patient patient   RELATIONSHIP SELF SELF SELF SELF SELF SELF SELF       Depression Screening:  :     3 most recent PHQ Screens 11/9/2022   Little interest or pleasure in doing things Not at all   Feeling down, depressed, irritable, or hopeless Not at all   Total Score PHQ 2 0       Fall Risk Assessment:  :     Fall Risk Assessment, last 12 mths 11/9/2022   Able to walk? Yes   Fall in past 12 months? 0   Do you feel unsteady? 0   Are you worried about falling 0       Abuse Screening:  :     Abuse Screening Questionnaire 11/9/2022 8/26/2022 5/26/2022 2/25/2022 1/17/2022 11/15/2021 4/2/2021   Do you ever feel afraid of your partner? N N N N N N N   Are you in a relationship with someone who physically or mentally threatens you? N N N N N N N   Is it safe for you to go home? Y Y Y Y Y Y Y       Coordination of Care Questionnaire:  :     1) Have you been to an emergency room, urgent care clinic since your last visit? no   Hospitalized since your last visit? no             2) Have you seen or consulted any other health care providers outside of [address] [place] [address] since your last visit? no  (Include any pap smears or colon screenings in this section.)    3) Do you have an Advance Directive on file? no    4) Are you interested in receiving information on Advance Directives? NO      Patient is accompanied by self I have received verbal consent from Edison Fine to discuss any/all medical information while they are present in the room. Reviewed record  In preparation for visit and have obtained necessary documentation.

## 2022-11-10 DIAGNOSIS — B37.0 THRUSH: ICD-10-CM

## 2022-11-10 LAB
EST. AVERAGE GLUCOSE BLD GHB EST-MCNC: 163 MG/DL
HBA1C MFR BLD: 7.3 % (ref 4–5.6)

## 2022-11-10 RX ORDER — NYSTATIN 100000 [USP'U]/ML
1 SUSPENSION ORAL 4 TIMES DAILY
Qty: 200 ML | Refills: 0 | Status: SHIPPED | OUTPATIENT
Start: 2022-11-10

## 2022-11-23 NOTE — PROGRESS NOTES
Acute Care Note    Uziel Laws is 80 y.o. female. she presents for evaluation of Croup (Negative Covid test . Patient thinks she also has thrush)      Vasquez Larson is here to talk about a cough. This is a new problem problem. Symptoms began a few days ago, unchanged since that time. The cough is non-productive, without wheezing, dyspnea or hemoptysis and is aggravated by nothing. Associated symptoms include:postnasal drip. She denies: fever, chills. She has tried OTC medication, and has been somewhat effective. There is a PMH significant for: a previous history of thrush which has caused her to have some coughing. Prior to Admission medications    Medication Sig Start Date End Date Taking? Authorizing Provider   aspirin delayed-release 81 mg tablet TAKE 1 TABLET BY MOUTH EVERY DAY 10/26/22  Yes Phil Strauss MD   fluticasone propionate (FLONASE) 50 mcg/actuation nasal spray USE 1 TO 2 SPRAYS INTO EACH NOSTRIL EVERY DAY 10/14/22  Yes Phil Strauss MD   metoprolol succinate (TOPROL-XL) 50 mg XL tablet TAKE 1 TABLET BY MOUTH EVERY DAY 9/2/22  Yes Phil Strauss MD   cetirizine (ZYRTEC) 10 mg tablet TAKE 1 TABLET BY MOUTH EVERY DAY 9/2/22  Yes Phil Strauss MD   metFORMIN (GLUCOPHAGE) 500 mg tablet Take 1 Tablet by mouth two (2) times daily (with meals). Please restart Metformin on 1/29/2022 7/15/22  Yes Phil Strauss MD   hydroCHLOROthiazide (HYDRODIURIL) 25 mg tablet TAKE 1 TABLET BY MOUTH EVERY DAY 5/29/22  Yes Phil Strauss MD   glucose blood VI test strips (Contour Next Test Strips) strip USE TO CHECK BLOOD SUGAR ONCE A DAY 2/14/22  Yes Phil Strauss MD   propafenone (RYTHMOL) 150 mg tablet TAKE 1 TABLET BY MOUTH EVERY 8 HOURS 1/10/22  Yes Makayla Mg NP   famotidine (PEPCID) 20 mg tablet Take 40 mg by mouth nightly.  8/13/21  Yes Provider, Historical   rosuvastatin (CRESTOR) 20 mg tablet TAKE 1 TABLET BY MOUTH EVERY DAY AT NIGHT 8/2/21  Yes Saurav Alfa Romero NP   Eliquis 5 mg tablet TAKE 1 TABLET BY MOUTH TWICE A DAY 7/23/21  Yes Alfa Mg NP   pantoprazole (PROTONIX) 40 mg tablet TAKE 1 TABLET BY MOUTH EVERY DAY   Yes Provider, Historical   MULTIVITAMIN PO Take 1 Tab by mouth daily. Yes Provider, Historical   Lancets (FREESTYLE LANCETS) Misc Test once daily. (diagnosis code: 250.00) 10/24/13  Yes Stevie Finch MD   Blood-Glucose Meter monitoring kit Once daily before breakfast 9/20/13  Yes Stevie Finch MD   nystatin (MYCOSTATIN) 100,000 unit/mL suspension TAKE 5 ML BY MOUTH FOUR (4) TIMES DAILY. SWISH AND SPIT 11/10/22   Brandon Caputo MD   albuterol (PROVENTIL HFA, VENTOLIN HFA, PROAIR HFA) 90 mcg/actuation inhaler INHALE 2 PUFFS BY MOUTH EVERY 4 HOURS AS NEEDED FOR WHEEZE 9/2/22   Brandon Caputo MD   amLODIPine (NORVASC) 2.5 mg tablet TAKE 1 TABLET BY MOUTH EVERY DAY  Patient not taking: No sig reported 12/20/21   Joshua Hyde NP         Patient Active Problem List   Diagnosis Code    Hyperlipemia E78.5    Arthritis M19.90    Essential hypertension I10    Headache(784.0) R51    Spondylitis, cervical (HCC) M46.92    Tingling sensation R20.2    Chest pain R07.9    Anemia D64.9    Cyst of right kidney N28.1    Hip pain, right M25.551    Acute bronchitis J20.9    Visual floaters H43.399    Postmenopausal state Z78.0    Vitamin D deficiency E55.9    Bilateral hip pain M25.551, M25.552    Postmenopausal disorder N95.1    Numbness and tingling of right leg R20.0, R20.2    Foot pain, left M79.672    Rotator cuff (capsule) sprain and strain [id number]    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 901    Cataract H26.9    Peripheral autonomic neuropathy due to DM (HCC) E11.43    Pre-ulcerative calluses L84    Type 2 diabetes mellitus with pressure callus ([facility]) E11.628, L84    Hammer toe of right foot M20.41    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 ([facility]) E11.69, M21.969    DAVID (generalized anxiety disorder) F41.1    Abnormal finding on MRI of brain R90.89    Tinea pedis B35.3    Arthralgia of right hip M25.551    Swollen gland R59.9    Acute pharyngitis J02.9    Diabetes mellitus type 2, controlled ([facility]) E11.9    Multiple thyroid nodules E04.2    Skipped heart beats X28.6    Helicobacter pylori (H. pylori) infection A04.8    Intractable migraine with aura without status migrainosus G43.119    Chronic asthma with status asthmaticus J45.902    Right foot injury S99.921A    Abdominal mass, left upper quadrant R19.02    Left upper quadrant pain R10.12    Paroxysmal atrial fibrillation ([facility]) I48.0    Severe obesity (BMI 35.0-39. 9) E66.01    Varicose veins of both lower extremities with pain I83.813    Venous insufficiency of both lower extremities I87.2    Lumbar stenosis M48.061    S/P cardiac cath Z98.890    Angina, class III ([facility]) I20.9    Coronary artery disease involving native coronary artery of native heart without angina pectoris I25.10         Review of Systems   Constitutional: Negative. HENT: Negative. Respiratory:  Negative for cough. Cardiovascular:  Negative for chest pain and palpitations. Gastrointestinal: Negative. Musculoskeletal: Negative. All other systems reviewed and are negative. Visit Vitals  BP (!) 150/80   Pulse 97   Temp 97.3 °F (36.3 °C) (Temporal)   Resp 16   Ht 5' 2\" (1.575 m)   Wt 211 lb 6.4 oz (95.9 kg)   LMP 02/18/1983   SpO2 98%   BMI 38.67 kg/m²       Physical Exam        ASSESSMENT/PLAN  Diagnoses and all orders for this visit:    1. Thrush - The patient has some evidence of thrush. We will treat empirically. Follow up if no improvement    2.  Controlled type 2 diabetes mellitus with diabetic nephropathy, without long-term current use of insulin (HCC)  -     HEMOGLOBIN A1C WITH EAG; Future         Advised the patient to call back or return to office if symptoms worsen/change/persist.   Discussed expected course/resolution/complications of diagnosis in detail with patient. Medication risks/benefits/costs/interactions/alternatives discussed with patient. The patient was given an after visit summary which includes diagnoses, current medications, & vitals. They expressed understanding with the diagnosis and plan.

## 2022-11-27 DIAGNOSIS — G43.119 INTRACTABLE MIGRAINE WITH AURA WITHOUT STATUS MIGRAINOSUS: ICD-10-CM

## 2022-11-27 DIAGNOSIS — Z23 ENCOUNTER FOR IMMUNIZATION: ICD-10-CM

## 2022-11-27 DIAGNOSIS — E11.9 DIABETES MELLITUS WITHOUT COMPLICATION (HCC): ICD-10-CM

## 2022-11-27 DIAGNOSIS — I10 ESSENTIAL HYPERTENSION: ICD-10-CM

## 2022-12-02 RX ORDER — HYDROCHLOROTHIAZIDE 25 MG/1
TABLET ORAL
Qty: 90 TABLET | Refills: 1 | Status: SHIPPED | OUTPATIENT
Start: 2022-12-02

## 2022-12-16 ENCOUNTER — TRANSCRIBE ORDER (OUTPATIENT)
Dept: SCHEDULING | Age: 82
End: 2022-12-16

## 2022-12-16 DIAGNOSIS — Z12.31 SCREENING MAMMOGRAM FOR HIGH-RISK PATIENT: Primary | ICD-10-CM

## 2023-01-06 DIAGNOSIS — E11.21 CONTROLLED TYPE 2 DIABETES MELLITUS WITH DIABETIC NEPHROPATHY, WITHOUT LONG-TERM CURRENT USE OF INSULIN (HCC): ICD-10-CM

## 2023-01-12 RX ORDER — METFORMIN HYDROCHLORIDE 500 MG/1
500 TABLET ORAL 2 TIMES DAILY WITH MEALS
Qty: 180 TABLET | Refills: 1 | Status: SHIPPED | OUTPATIENT
Start: 2023-01-12

## 2023-01-12 RX ORDER — FLUTICASONE PROPIONATE 50 MCG
SPRAY, SUSPENSION (ML) NASAL
Qty: 1 EACH | Refills: 1 | Status: SHIPPED | OUTPATIENT
Start: 2023-01-12

## 2023-02-06 RX ORDER — FLUTICASONE PROPIONATE 50 MCG
SPRAY, SUSPENSION (ML) NASAL
Qty: 16 G | Refills: 1 | Status: SHIPPED | OUTPATIENT
Start: 2023-02-06

## 2023-02-08 DIAGNOSIS — M19.019 OSTEOARTHRITIS OF ACROMIOCLAVICULAR JOINT: ICD-10-CM

## 2023-02-08 DIAGNOSIS — M19.90 ARTHRITIS: Primary | ICD-10-CM

## 2023-02-08 DIAGNOSIS — M46.92 SPONDYLITIS, CERVICAL (HCC): ICD-10-CM

## 2023-02-14 RX ORDER — CETIRIZINE HYDROCHLORIDE 10 MG/1
TABLET ORAL
Qty: 90 TABLET | Refills: 1 | Status: SHIPPED | OUTPATIENT
Start: 2023-02-14

## 2023-02-17 ENCOUNTER — HOSPITAL ENCOUNTER (OUTPATIENT)
Dept: MAMMOGRAPHY | Age: 83
End: 2023-02-17
Attending: INTERNAL MEDICINE
Payer: MEDICARE

## 2023-02-17 DIAGNOSIS — Z12.31 SCREENING MAMMOGRAM FOR HIGH-RISK PATIENT: ICD-10-CM

## 2023-02-17 PROCEDURE — 77063 BREAST TOMOSYNTHESIS BI: CPT

## 2023-03-03 RX ORDER — FLUTICASONE PROPIONATE 50 MCG
SPRAY, SUSPENSION (ML) NASAL
Qty: 16 G | Refills: 1 | Status: SHIPPED | OUTPATIENT
Start: 2023-03-03

## 2023-03-06 DIAGNOSIS — I10 ESSENTIAL HYPERTENSION: ICD-10-CM

## 2023-03-07 RX ORDER — METOPROLOL SUCCINATE 50 MG/1
TABLET, EXTENDED RELEASE ORAL
Qty: 90 TABLET | Refills: 1 | Status: SHIPPED | OUTPATIENT
Start: 2023-03-07

## 2023-03-21 RX ORDER — ALBUTEROL SULFATE 90 UG/1
AEROSOL, METERED RESPIRATORY (INHALATION)
Qty: 18 EACH | Refills: 2 | Status: SHIPPED | OUTPATIENT
Start: 2023-03-21

## 2023-03-21 RX ORDER — BLOOD SUGAR DIAGNOSTIC
STRIP MISCELLANEOUS
Qty: 50 STRIP | Refills: 11 | Status: SHIPPED | OUTPATIENT
Start: 2023-03-21

## 2023-03-28 RX ORDER — FLUTICASONE PROPIONATE 50 MCG
SPRAY, SUSPENSION (ML) NASAL
Qty: 16 G | Refills: 1 | Status: SHIPPED | OUTPATIENT
Start: 2023-03-28

## 2023-04-13 ENCOUNTER — OFFICE VISIT (OUTPATIENT)
Dept: INTERNAL MEDICINE CLINIC | Age: 83
End: 2023-04-13
Payer: MEDICARE

## 2023-04-13 VITALS
HEART RATE: 73 BPM | TEMPERATURE: 97.1 F | HEIGHT: 62 IN | SYSTOLIC BLOOD PRESSURE: 130 MMHG | OXYGEN SATURATION: 96 % | DIASTOLIC BLOOD PRESSURE: 80 MMHG | WEIGHT: 209.4 LBS | BODY MASS INDEX: 38.53 KG/M2 | RESPIRATION RATE: 16 BRPM

## 2023-04-13 DIAGNOSIS — M76.62 LEFT ACHILLES TENDINITIS: ICD-10-CM

## 2023-04-13 DIAGNOSIS — E66.01 SEVERE OBESITY (BMI 35.0-39.9) WITH COMORBIDITY (HCC): ICD-10-CM

## 2023-04-13 DIAGNOSIS — E11.21 CONTROLLED TYPE 2 DIABETES MELLITUS WITH DIABETIC NEPHROPATHY, WITHOUT LONG-TERM CURRENT USE OF INSULIN (HCC): Primary | ICD-10-CM

## 2023-04-13 PROCEDURE — G8417 CALC BMI ABV UP PARAM F/U: HCPCS | Performed by: INTERNAL MEDICINE

## 2023-04-13 PROCEDURE — G0463 HOSPITAL OUTPT CLINIC VISIT: HCPCS | Performed by: INTERNAL MEDICINE

## 2023-04-13 PROCEDURE — G8399 PT W/DXA RESULTS DOCUMENT: HCPCS | Performed by: INTERNAL MEDICINE

## 2023-04-13 PROCEDURE — G8510 SCR DEP NEG, NO PLAN REQD: HCPCS | Performed by: INTERNAL MEDICINE

## 2023-04-13 PROCEDURE — G8536 NO DOC ELDER MAL SCRN: HCPCS | Performed by: INTERNAL MEDICINE

## 2023-04-13 PROCEDURE — 1101F PT FALLS ASSESS-DOCD LE1/YR: CPT | Performed by: INTERNAL MEDICINE

## 2023-04-13 PROCEDURE — 99214 OFFICE O/P EST MOD 30 MIN: CPT | Performed by: INTERNAL MEDICINE

## 2023-04-13 PROCEDURE — G8427 DOCREV CUR MEDS BY ELIG CLIN: HCPCS | Performed by: INTERNAL MEDICINE

## 2023-04-13 PROCEDURE — 1090F PRES/ABSN URINE INCON ASSESS: CPT | Performed by: INTERNAL MEDICINE

## 2023-04-13 NOTE — PROGRESS NOTES
Chief Complaint   Patient presents with    ED Follow-up     [facility] ER visit 04-     Reviewed record in preparation for visit and have obtained necessary documentation. Identified pt with two pt identifiers(name and ).       Health Maintenance Due   Topic    Foot Exam Q1     Medicare Yearly Exam     Lipid Screen          Chief Complaint   Patient presents with    ED Follow-up     [facility] visit 04-        Wt Readings from Last 3 Encounters:   22 211 lb 6.4 oz (95.9 kg)   22 211 lb 3.2 oz (95.8 kg)   22 207 lb (93.9 kg)     Temp Readings from Last 3 Encounters:   22 97.3 °F (36.3 °C) (Temporal)   22 97.5 °F (36.4 °C) (Temporal)   22 97.1 °F (36.2 °C) (Temporal)     BP Readings from Last 3 Encounters:   22 (!) 150/80   22 130/80   22 (!) 152/83     Pulse Readings from Last 3 Encounters:   22 97   22 73   22 76           Learning Assessment:  :     Learning Assessment 11/15/2021 2018 2017 3/1/2017 2015 2015 2014   PRIMARY LEARNER Patient Patient Patient Patient Patient Patient Patient   HIGHEST LEVEL OF EDUCATION - PRIMARY LEARNER  - - GRADUATED HIGH SCHOOL OR GED GRADUATED HIGH SCHOOL OR GED GRADUATED HIGH SCHOOL OR GED GRADUATED HIGH SCHOOL OR GED GRADUATED HIGH SCHOOL OR GED   BARRIERS PRIMARY LEARNER - - NONE NONE NONE - NONE   CO-LEARNER CAREGIVER - - - - No - No   PRIMARY [address]    NEED - - - - - - No   LEARNER PREFERENCE PRIMARY READING READING READING READING READING READING READING     - - - LISTENING DEMONSTRATION - -   LEARNING SPECIAL TOPICS - - - - - - no   ANSWERED BY patient patient patient  patient griselda patient patient   RELATIONSHIP SELF SELF SELF SELF SELF SELF SELF       Depression Screening:  :     3 most recent PHQ Screens 2023   Little interest or pleasure in doing things Not at all   Feeling down, depressed, irritable, or hopeless Not at all   Total Score PHQ 2 0       Fall Risk Assessment:  :     Fall Risk Assessment, last 12 mths 4/13/2023   Able to walk? Yes   Fall in past 12 months? 0   Do you feel unsteady? 0   Are you worried about falling 0       Abuse Screening:  :     Abuse Screening Questionnaire 4/13/2023 11/9/2022 8/26/2022 5/26/2022 2/25/2022 1/17/2022 11/15/2021   Do you ever feel afraid of your partner? N N N N N N N   Are you in a relationship with someone who physically or mentally threatens you? N N N N N N N   Is it safe for you to go home? Y Y Y Y Y Y Y       Coordination of Care Questionnaire:  :     1) Have you been to an emergency room, urgent care clinic since your last visit? yes   Hospitalized since your last visit? no             2) Have you seen or consulted any other health care providers outside of [address] [place] [address] since your last visit? yes  (Include any pap smears or colon screenings in this section.)    3) Do you have an Advance Directive on file? no    4) Are you interested in receiving information on Advance Directives? NO      Patient is accompanied by self I have received verbal consent from Wendy Hill to discuss any/all medical information while they are present in the room. Reviewed record  In preparation for visit and have obtained necessary documentation.

## 2023-04-13 NOTE — PATIENT INSTRUCTIONS
Please try VOLTAREN gel for your ankle. This is over the counter. If not improved, you can follow up with Dr. Génesis Rhodes.

## 2023-04-17 DIAGNOSIS — E55.9 VITAMIN D DEFICIENCY: ICD-10-CM

## 2023-04-17 RX ORDER — ASPIRIN 81 MG/1
TABLET ORAL
Qty: 90 TABLET | Refills: 1 | Status: SHIPPED | OUTPATIENT
Start: 2023-04-17

## 2023-04-17 NOTE — ANESTHESIA POSTPROCEDURE EVALUATION
Detail Level: Detailed Post-Anesthesia Evaluation and Assessment    Patient: Jennyfer Pepper MRN: [id number]  SSN: [id number]    YOB: 1940  Age: 78 y.o. Sex: female       Cardiovascular Function/Vital Signs  Visit Vitals    /64    Pulse 79    Temp 36.7 °C (98 °F)    Resp 20    Ht 5' 2\" (1.575 m)    Wt 90.7 kg (200 lb)    SpO2 97%    BMI 36.58 kg/m2       Patient is status post MAC anesthesia for Procedure(s):  ESOPHAGOGASTRODUODENOSCOPY (EGD) ([facility]EX ALLERGY)  ESOPHAGOGASTRODUODENAL (EGD) BIOPSY. Nausea/Vomiting: None    Postoperative hydration reviewed and adequate. Pain:  Pain Scale 1: Numeric (0 - 10) (07/05/17 0828)  Pain Intensity 1: 0 (07/05/17 0651)   Managed    Neurological Status: At baseline    Mental Status and Level of Consciousness: Arousable    Pulmonary Status:   O2 Device: Room air (07/05/17 1001)   Adequate oxygenation and airway patent    Complications related to anesthesia: None    Post-anesthesia assessment completed.  No concerns    Signed By: Mario Rea MD     July 5, 2017

## 2023-04-19 NOTE — PROGRESS NOTES
Follow Up Visit    Kiet Mayer is a [de-identified] y.o. female. she presents for ED Follow-up ([facility] ER visit 04-)    The patient went to the [facility] ED on 4/10 for complaints of feeling \"shaky\" and somewhat ill. She had an evaluation with images and labs and there was no abnormality found. She is concerned about these symptoms. They have somewhat abated at this time. She has no other complaints other than some left achilles discomfort. Noted that we will follow up with this.     Patient Active Problem List   Diagnosis Code    Hyperlipemia E78.5    Arthritis M19.90    Essential hypertension I10    Headache(784.0) R51    Spondylitis, cervical ([facility]) M46.92    Tingling sensation R20.2    Chest pain R07.9    Anemia D64.9    Cyst of right kidney N28.1    Hip pain, right M25.551    Acute bronchitis J20.9    Visual floaters H43.399    Postmenopausal state Z78.0    Vitamin D deficiency E55.9    Bilateral hip pain M25.551, M25.552    Postmenopausal disorder N95.1    Numbness and tingling of right leg R20.0, R20.2    Foot pain, left M79.672    Rotator cuff (capsule) sprain and strain [id number]    Osteoarthritis of acromioclavicular joint M19.019    Shoulder pain, left M25.512    GERD (gastroesophageal reflux disease) K21.9    Elevated LFTs R79.89    Screening for depression Z13.31    Risk for falls Z91.81    Acute asthma exacerbation J45.901    Cataract H26.9    Peripheral autonomic neuropathy due to DM (HCC) E11.43    Pre-ulcerative calluses L84    Type 2 diabetes mellitus with pressure callus (HCC) E11.628, L84    Hammer toe of right foot M20.41    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 (HCC) E11.69, M21.969    DAVID (generalized anxiety disorder) F41.1    Abnormal finding on MRI of brain R90.89    Tinea pedis B35.3    Arthralgia of right hip M25.551    Swollen gland R59.9    Acute pharyngitis J02.9    Diabetes mellitus type 2, controlled ([address] E11.9    Multiple thyroid nodules E04.2    Skipped heart beats I45.9 Helicobacter pylori (H. pylori) infection A04.8    Intractable migraine with aura without status migrainosus G43.119    Chronic asthma with status asthmaticus J45.902    Right foot injury S99.921A    Abdominal mass, left upper quadrant R19.02    Left upper quadrant pain R10.12    Paroxysmal atrial fibrillation (HCC) I48.0    Severe obesity (BMI 35.0-39. 9) E66.01    Varicose veins of both lower extremities with pain I83.813    Venous insufficiency of both lower extremities I87.2    Lumbar stenosis M48.061    S/P cardiac cath Z98.890    Angina, class III (HCC) I20.9    Coronary artery disease involving native coronary artery of native heart without angina pectoris I25.10         Prior to Admission medications    Medication Sig Start Date End Date Taking? Authorizing Provider   fluticasone propionate (FLONASE) 50 mcg/actuation nasal spray SPRAY 1-2 SPRAYS INTO EACH NOSTRIL EVERY DAY 3/28/23  Yes Leydi Nolen MD   albuterol (PROVENTIL HFA, VENTOLIN HFA, PROAIR HFA) 90 mcg/actuation inhaler INHALE 2 PUFFS BY MOUTH EVERY 4 HOURS AS NEEDED FOR WHEEZING 3/21/23  Yes Leydi Nolen MD   Contour Next Test Strips strip USE TO CHECK BLOOD SUGAR ONCE A DAY 3/21/23  Yes Leydi Nolen MD   metoprolol succinate (TOPROL-XL) 50 mg XL tablet TAKE 1 TABLET BY MOUTH EVERY DAY 3/7/23  Yes Leydi Nolen MD   cetirizine (ZYRTEC) 10 mg tablet TAKE 1 TABLET BY MOUTH EVERY DAY 2/14/23  Yes Leydi Nolen MD   metFORMIN (GLUCOPHAGE) 500 mg tablet TAKE 1 TABLET BY MOUTH TWO (2) TIMES DAILY (WITH MEALS). PLEASE RESTART METFORMIN ON 1/29/2022 1/12/23  Yes Leydi Nolen MD   hydroCHLOROthiazide (HYDRODIURIL) 25 mg tablet TAKE 1 TABLET BY MOUTH EVERY DAY 12/2/22  Yes Leydi Nolen MD   nystatin (MYCOSTATIN) 100,000 unit/mL suspension TAKE 5 ML BY MOUTH FOUR (4) TIMES DAILY.  SWISH AND SPIT 11/10/22  Yes Leydi Nolen MD   propafenone (RYTHMOL) 150 mg tablet TAKE 1 TABLET BY MOUTH EVERY 8 HOURS 1/10/22  Yes Saurav, Becca Tejeda NP   famotidine (PEPCID) 20 mg tablet Take 2 Tablets by mouth nightly. 8/13/21  Yes Provider, Historical   rosuvastatin (CRESTOR) 20 mg tablet TAKE 1 TABLET BY MOUTH EVERY DAY AT NIGHT 8/2/21  Yes Becca Mg NP   Eliquis 5 mg tablet TAKE 1 TABLET BY MOUTH TWICE A DAY 7/23/21  Yes Becca Mg NP   pantoprazole (PROTONIX) 40 mg tablet TAKE 1 TABLET BY MOUTH EVERY DAY   Yes Provider, Historical   MULTIVITAMIN PO Take 1 Tab by mouth daily. Yes Provider, Historical   Lancets (FREESTYLE LANCETS) Misc Test once daily. (diagnosis code: 250.00) 10/24/13  Yes Beatrice Parker MD   Blood-Glucose Meter monitoring kit Once daily before breakfast 9/20/13  Yes Beatrice Parker MD   aspirin delayed-release 81 mg tablet TAKE 1 TABLET BY MOUTH EVERY DAY 4/17/23   Kyra Herrera MD   amLODIPine (NORVASC) 2.5 mg tablet TAKE 1 TABLET BY MOUTH EVERY DAY  Patient not taking: No sig reported 12/20/21   Becca Mg NP         Health Maintenance   Topic Date Due    Foot Exam Q1  12/07/2021    Medicare Yearly Exam  12/08/2021    Lipid Screen  01/05/2023    Shingles Vaccine (1 of 2) 11/24/2033 (Originally 12/26/1992)    A1C test (Diabetic or Prediabetic)  10/18/2023    Depression Screen  04/13/2024    Eye Exam Retinal or Dilated  01/24/2025    DTaP/Tdap/Td series (2 - Td or Tdap) 05/26/2026    Bone Densitometry (Dexa) Screening  Completed    Flu Vaccine  Completed    COVID-19 Vaccine  Completed    Pneumococcal 65+ years  Completed    Hepatitis C Screening  Discontinued       Review of Systems   Constitutional: Negative. HENT: Negative. Respiratory:  Negative for cough. Cardiovascular:  Negative for chest pain and palpitations. Gastrointestinal: Negative. Musculoskeletal: Negative. All other systems reviewed and are negative.         Visit Vitals  /80   Pulse 73   Temp 97.1 °F (36.2 °C) (Temporal)   Resp 16   Ht 5' 2\" (1.575 m)   Wt 209 lb 6.4 oz (95 kg)   LMP 02/18/1983 SpO2 96%   BMI 38.30 kg/m²       Physical Exam  Constitutional:       Appearance: She is well-developed. Cardiovascular:      Rate and Rhythm: Normal rate and regular rhythm. Pulmonary:      Effort: Pulmonary effort is normal.      Breath sounds: Normal breath sounds. ASSESSMENT/PLAN    Diagnoses and all orders for this visit:    1. Controlled type 2 diabetes mellitus with diabetic nephropathy, without long-term current use of insulin (HCC)  -     HEMOGLOBIN A1C WITH EAG; Future  -     URINALYSIS W/ RFLX MICROSCOPIC; Future    2. Severe obesity (BMI 35.0-39. 9) with comorbidity ([address]    3.  Left Achilles tendinitis  -     REFERRAL TO ORTHOPEDICS

## 2023-04-25 ENCOUNTER — OFFICE VISIT (OUTPATIENT)
Dept: ORTHOPEDIC SURGERY | Age: 83
End: 2023-04-25

## 2023-04-25 VITALS — WEIGHT: 209 LBS | HEIGHT: 62 IN | BODY MASS INDEX: 38.46 KG/M2

## 2023-04-25 DIAGNOSIS — M67.874 ACHILLES TENDINOSIS OF LEFT ANKLE: Primary | ICD-10-CM

## 2023-04-25 DIAGNOSIS — M25.572 LEFT ANKLE PAIN, UNSPECIFIED CHRONICITY: ICD-10-CM

## 2023-04-25 DIAGNOSIS — M79.672 LEFT FOOT PAIN: ICD-10-CM

## 2023-04-25 RX ORDER — FLUTICASONE PROPIONATE 50 MCG
SPRAY, SUSPENSION (ML) NASAL
Qty: 16 G | Refills: 1 | Status: SHIPPED | OUTPATIENT
Start: 2023-04-25

## 2023-05-16 DIAGNOSIS — G43.119 MIGRAINE WITH AURA, INTRACTABLE, WITHOUT STATUS MIGRAINOSUS: ICD-10-CM

## 2023-05-16 DIAGNOSIS — E11.9 TYPE 2 DIABETES MELLITUS WITHOUT COMPLICATIONS (HCC): ICD-10-CM

## 2023-05-16 RX ORDER — HYDROCHLOROTHIAZIDE 25 MG/1
TABLET ORAL
Qty: 90 TABLET | Refills: 1 | Status: SHIPPED | OUTPATIENT
Start: 2023-05-16

## 2023-05-24 RX ORDER — FLUTICASONE PROPIONATE 50 MCG
SPRAY, SUSPENSION (ML) NASAL
Qty: 1 EACH | Refills: 1 | Status: SHIPPED | OUTPATIENT
Start: 2023-05-24 | End: 2023-06-19

## 2023-06-19 RX ORDER — FLUTICASONE PROPIONATE 50 MCG
SPRAY, SUSPENSION (ML) NASAL
Qty: 1 EACH | Refills: 1 | Status: SHIPPED | OUTPATIENT
Start: 2023-06-19 | End: 2023-07-11

## 2023-06-19 NOTE — PROGRESS NOTES
This is the Subsequent Medicare Annual Wellness Exam, performed 12 months or more after the Initial AWV or the last Subsequent AWV    I have reviewed the patient's medical history in detail and updated the computerized patient record. She reports feeling well. We reviewed her previous labs. No significant issues noted. She is working on [employer] Depression Risk Factor Screening:     3 most recent PHQ Screens 7/21/2020   Little interest or pleasure in doing things Not at all   Feeling down, depressed, irritable, or hopeless Not at all   Total Score PHQ 2 0       Alcohol Risk Screen   Do you average more than 1 drink per night or more than 7 drinks a week:  No    On any one occasion in the past three months have you have had more than 3 drinks containing alcohol:  No        Functional Ability and Level of Safety:   Hearing: Hearing is good. Activities of Daily Living: The home contains: no safety equipment. Patient does total self care     Ambulation: with no difficulty     Fall Risk:  Fall Risk Assessment, last 12 mths 7/21/2020   Able to walk? Yes   Fall in past 12 months? No     Abuse Screen:  Patient is not abused       Cognitive Screening   Has your family/caregiver stated any concerns about your memory: no         Assessment/Plan   Education and counseling provided:  Are appropriate based on today's review and evaluation    Diagnoses and all orders for this visit:    1. Medicare annual wellness visit, subsequent    2. Screening for alcoholism    3.  Controlled type 2 diabetes mellitus with diabetic nephropathy, without long-term current use of insulin (HCC)  -     MICROALBUMIN, UR, RAND W/ MICROALB/CREAT RATIO; Future        Health Maintenance Due     Health Maintenance Due   Topic Date Due    Shingrix Vaccine Age 49> (1 of 2) 12/26/1992    Foot Exam Q1  03/06/2019    GLAUCOMA SCREENING Q2Y  07/17/2019    Eye Exam Retinal or Dilated  07/17/2019    MICROALBUMIN Q1  08/30/2020    Medicare Yearly Exam  10/08/2020       Patient Care Team   Patient Care Team:  Daryle Gambles, MD as PCP - General (Internal Medicine)  Daryle Gambles, MD as PCP - [facility]ABILITATION [facility]  Jonna Baumgarten, MD (Neurology)  Viola Pringle MD as Physician (Cardiology)  Jessica Mcintyre MD as Consulting Provider (Endocrinology)  Delilah Aguilera RN as Nurse Navigator  Cassia Cardozo NP as Nurse Practitioner (Nurse Practitioner)  AMARIS Dasilva (Nurse Practitioner)    History     Patient Active Problem List   Diagnosis Code    Hyperlipemia E78.5    Arthritis M19.90    Essential hypertension I10    Headache(784.0) R51    Spondylitis, cervical ([facility] M46.92    Tingling sensation R20.2    Chest pain R07.9    Anemia D64.9    Cyst of right kidney N28.1    Hip pain, right M25.551    Acute bronchitis J20.9    Visual floaters H43.399    Postmenopausal state Z78.0    Vitamin D deficiency E55.9    Bilateral hip pain M25.551, M25.552    Postmenopausal disorder N95.1    Numbness and tingling of right leg R20.0, R20.2    Foot pain, left M79.672    Rotator cuff (capsule) sprain and strain [id number]    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 0    Cataract H26.9    Peripheral autonomic neuropathy due to DM (HCC) E11.43    Pre-ulcerative calluses L84    Type 2 diabetes mellitus with pressure callus (HCC) E11.628, L84    Hammer toe of right foot M20.41    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 (HCC) E11.69, M21.969    DAVID (generalized anxiety disorder) F41.1    Abnormal finding on MRI of brain R90.89    Tinea pedis B35.3    Arthralgia of right hip M25.551    Swollen gland R59.9    Acute pharyngitis J02.9    Diabetes mellitus type 2, controlled ([address] E11.9    Multiple thyroid nodules E04.2    Skipped heart beats J08.4    Helicobacter pylori (H. pylori) infection A04.8    Intractable migraine with aura without status migrainosus G43.119    Chronic asthma with status asthmaticus J45.902    Right foot injury S99.921A    Abdominal mass, left upper quadrant R19.02    Left upper quadrant pain R10.12    Paroxysmal atrial fibrillation (HCC) I48.0    Severe obesity (BMI 35.0-39. 9) E66.01    Varicose veins of both lower extremities with pain I83.813    Venous insufficiency of both lower extremities I87.2    Lumbar stenosis M48.061    S/P cardiac cath Z98.890     Past Medical History:   Diagnosis Date    Abdominal mass, left upper quadrant 06/13/2017    no finding per pt    Abnormal finding on MRI of brain 3/16/2015    evaluated by neurologist and no findings    Acute pharyngitis 5/26/2016    Anemia NEC     Arrhythmia     a fib    Arthralgia of right hip 4/25/2016    Arthritis 2/18/2010    Cataract 9/24/2014    Diabetes ([address]     Elevated LFTs 4/16/2014    DAVID (generalized anxiety disorder) 1/22/2015    GERD (gastroesophageal reflux disease) 4/7/2014    Hammer toe of right foot 9/29/2014    Headache(784.0) 3/78/3824    Helicobacter pylori (H. 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 ([address] 9/29/2014    Preop examination 9/24/2014    Right foot injury 5/2/2017    Risk for falls 4/16/2014    S/P cardiac cath 8/20/2020 8/120/2020 cardiac cath with noninterventional PBL, otherwise neg    Screening for breast cancer 9/23/2014    Screening for depression 4/16/2014    Shoulder pain, left 3/18/2014    Spondylitis, cervical ([facility] 4/5/2010    Tinea pedis 6/3/2015    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 ([facility] 9/29/2014      Past Surgical History:   Procedure Laterality Date    ABDOMEN SURGERY PROC UNLISTED      inguinal hernia    COLONOSCOPY N/A 4/23/2019    COL[facility] at [facility] ENDOSCOPY    ENDOSCOPY, COLON, DIAGNOSTIC      HX APPENDECTOMY      HX BACK SURGERY      HX CATARACT REMOVAL Bilateral     HX GYN  1983    total hysterectomy for uterine fibroid    HX HEENT      tonsillectomy    HX ORTHOPAEDIC      left foot hammertoe surgery    HX OTHER SURGICAL      ? straightened colon out    HX ROTATOR CUFF REPAIR  2009    left    IR INJ FORAMIN EPID LUMB ANES/STER ADDL  8/2/2019    IR INJ FORAMIN EPID LUMB ANES/STER SNGL  8/2/2019    UT OPEN REPAIR CLAVICLE FRACTURE  2009    did not have surgery for this/pt states she was in an accident and had RCR on left, but the clavicle was not repaired - injury was done at the same time     Current Outpatient Medications   Medication Sig Dispense Refill    albuterol (PROVENTIL HFA, VENTOLIN HFA, PROAIR HFA) 90 mcg/actuation inhaler Take 2 Puffs by inhalation every four (4) hours as needed for Wheezing. 18 g 3    metFORMIN (GLUCOPHAGE) 500 mg tablet TAKE 1 TABLET BY MOUTH TWICE A DAY WITH MEALS 180 Tab 1    hydroCHLOROthiazide (HYDRODIURIL) 25 mg tablet TAKE 1 TABLET BY MOUTH EVERY DAY 90 Tab 1    aspirin delayed-release 81 mg tablet TAKE 1 TABLET BY MOUTH EVERY DAY 30 Tab 5    metoprolol succinate (TOPROL-XL) 50 mg XL tablet TAKE 1 TABLET BY MOUTH EVERY DAY 90 Tab 1    HYDROcodone-acetaminophen (NORCO) 5-325 mg per tablet Take 1 Tab by mouth every six (6) hours as needed.  rosuvastatin (CRESTOR) 20 mg tablet Take 1 Tab by mouth nightly. 90 Tab 3    Eliquis 5 mg tablet TAKE 1 TABLET BY MOUTH TWICE A  Tab 3    famotidine (PEPCID) 40 mg tablet Take 40 mg by mouth two (2) times daily as needed.       glucose blood VI test strips (Contour Next Test Strips) strip Check blood sugar once a day e11.9 50 Strip 11    ALPRAZolam (XANAX) 0.5 mg tablet TAKE 1 TABLET BY MOUTH 3 TIMES A DAY AS NEEDED FOR ANXIETY 30 Tab 0    propafenone (RYTHMOL) 150 mg tablet TAKE 1 TABLET BY MOUTH EVERY 8 HOURS 270 Tab 3    pantoprazole (PROTONIX) 40 mg tablet TAKE 1 TABLET BY MOUTH EVERY DAY      MULTIVITAMIN PO Take 1 Tab by mouth daily.  Lancets (FREESTYLE LANCETS) Misc Test once daily.  (diagnosis code: 250.00) 1 Package 11    Blood-Glucose Meter monitoring kit Once daily before breakfast 1 Kit 0     Allergies   Allergen Reactions    Latex Rash    Kiwi Anaphylaxis     \"FEELS LIKE THROAT IS CLOSING UP\"    Amoxil [Amoxicillin] Itching     rash    Cephalosporins Unknown (comments)     PT NOT SURE OF REACTIONS    Levaquin [Levofloxacin] Other (comments)     Dizziness      Penicillins Rash     \"chickenpox like rash\" was in the 1980s    Percocet [Oxycodone-Acetaminophen] Nausea and Vomiting    Tetanus Vaccines And Toxoid Swelling    Tetracycline Hcl Rash     \"black fuzzy tongue\"   [address] Other (comments)     RAW TONGUE       Family History   Problem Relation Age of Onset    Cancer Mother         mycosis fungoives - skin cancer/got into bloodstream    Hypertension Mother     Arthritis-osteo Mother     Stroke Father     Cancer Sister         one sister with breast cancer    Breast Cancer Sister         42's    MS Sister     Heart Disease Brother     Diabetes Brother     Diabetes Daughter     Hypertension Daughter     Psychiatric Disorder Daughter         ANXIETY    Cancer Paternal Aunt         2 paternal aunts with breast cancer    Breast Cancer Paternal Aunt         over 48    No Known Problems Sister     Arthritis-osteo Sister     Headache Brother     No Known Problems Brother     No Known Problems Brother     Breast Cancer Paternal Aunt         over 48    Thyroid Cancer Neg Hx     Anesth Problems Neg Hx Social History     Tobacco Use    Smoking status: Former Smoker     Packs/day: 0.50     Years: 20.00     Pack years: 10.00     Last attempt to quit: 1988     Years since quittin.5    Smokeless tobacco: Never Used   Substance Use Topics    Alcohol use:  No Changes in sleep patterns

## 2023-06-21 ENCOUNTER — TELEPHONE (OUTPATIENT)
Age: 83
End: 2023-06-21

## 2023-06-21 NOTE — TELEPHONE ENCOUNTER
Reason for call:  TC from pt. Pt id verified. Pt calling to schedule an appointment to get a clearance for surgery on her heal. Pt stated she did not have a surgery date and didn't know when it would happen. Advised pt we needed to know when her surgery was going to be before a Pre-Op appointment could be made. Pt verbalized understanding then asked to speak with a nurse to see if Dr. Omari Do received notes from her Ortho on the type of surgery he wanted to perform on her heal. Pt request call from nurse to discuss.      Is this a new problem: Yes    Date of last appointment:  4/13/2023     Can we respond via [employer]: No    Best call back number: [phone]

## 2023-06-30 ENCOUNTER — OFFICE VISIT (OUTPATIENT)
Age: 83
End: 2023-06-30
Payer: MEDICARE

## 2023-06-30 VITALS
DIASTOLIC BLOOD PRESSURE: 72 MMHG | HEIGHT: 62 IN | BODY MASS INDEX: 38.68 KG/M2 | RESPIRATION RATE: 16 BRPM | OXYGEN SATURATION: 98 % | HEART RATE: 72 BPM | SYSTOLIC BLOOD PRESSURE: 119 MMHG | WEIGHT: 210.2 LBS | TEMPERATURE: 97.8 F

## 2023-06-30 DIAGNOSIS — Z01.818 PREOP EXAM FOR INTERNAL MEDICINE: Primary | ICD-10-CM

## 2023-06-30 DIAGNOSIS — I48.0 PAROXYSMAL ATRIAL FIBRILLATION (HCC): ICD-10-CM

## 2023-06-30 DIAGNOSIS — E11.9 TYPE 2 DIABETES MELLITUS WITHOUT COMPLICATION, UNSPECIFIED WHETHER LONG TERM INSULIN USE (HCC): ICD-10-CM

## 2023-06-30 DIAGNOSIS — M46.92 UNSPECIFIED INFLAMMATORY SPONDYLOPATHY, CERVICAL REGION (HCC): ICD-10-CM

## 2023-06-30 DIAGNOSIS — I20.9 ANGINA PECTORIS, UNSPECIFIED (HCC): ICD-10-CM

## 2023-06-30 LAB
ALBUMIN SERPL-MCNC: 4 G/DL (ref 3.5–5)
ALBUMIN/GLOB SERPL: 1.2 (ref 1.1–2.2)
ALP SERPL-CCNC: 48 U/L (ref 45–117)
ALT SERPL-CCNC: 34 U/L (ref 12–78)
ANION GAP SERPL CALC-SCNC: 2 MMOL/L (ref 5–15)
AST SERPL-CCNC: 26 U/L (ref 15–37)
BASOPHILS # BLD: 0 K/UL (ref 0–0.1)
BASOPHILS NFR BLD: 1 % (ref 0–1)
BILIRUB SERPL-MCNC: 0.4 MG/DL (ref 0.2–1)
BUN SERPL-MCNC: 15 MG/DL (ref 6–20)
BUN/CREAT SERPL: 18 (ref 12–20)
CALCIUM SERPL-MCNC: 9.8 MG/DL (ref 8.5–10.1)
CHLORIDE SERPL-SCNC: 100 MMOL/L (ref 97–108)
CO2 SERPL-SCNC: 34 MMOL/L (ref 21–32)
CREAT SERPL-MCNC: 0.84 MG/DL (ref 0.55–1.02)
DIFFERENTIAL METHOD BLD: ABNORMAL
EOSINOPHIL # BLD: 0.1 K/UL (ref 0–0.4)
EOSINOPHIL NFR BLD: 2 % (ref 0–7)
ERYTHROCYTE [DISTWIDTH] IN BLOOD BY AUTOMATED COUNT: 14.6 % (ref 11.5–14.5)
EST. AVERAGE GLUCOSE BLD GHB EST-MCNC: 154 MG/DL
GLOBULIN SER CALC-MCNC: 3.4 G/DL (ref 2–4)
GLUCOSE SERPL-MCNC: 129 MG/DL (ref 65–100)
HBA1C MFR BLD: 7 % (ref 4–5.6)
HCT VFR BLD AUTO: 37.4 % (ref 35–47)
HGB BLD-MCNC: 11.4 G/DL (ref 11.5–16)
IMM GRANULOCYTES # BLD AUTO: 0 K/UL (ref 0–0.04)
IMM GRANULOCYTES NFR BLD AUTO: 0 % (ref 0–0.5)
LYMPHOCYTES # BLD: 1.2 K/UL (ref 0.8–3.5)
LYMPHOCYTES NFR BLD: 34 % (ref 12–49)
MCH RBC QN AUTO: 27.1 PG (ref 26–34)
MCHC RBC AUTO-ENTMCNC: 30.5 G/DL (ref 30–36.5)
MCV RBC AUTO: 88.8 FL (ref 80–99)
MONOCYTES # BLD: 0.3 K/UL (ref 0–1)
MONOCYTES NFR BLD: 9 % (ref 5–13)
NEUTS SEG # BLD: 2 K/UL (ref 1.8–8)
NEUTS SEG NFR BLD: 54 % (ref 32–75)
NRBC # BLD: 0 K/UL (ref 0–0.01)
NRBC BLD-RTO: 0 PER 100 WBC
PLATELET # BLD AUTO: 273 K/UL (ref 150–400)
PMV BLD AUTO: 10.3 FL (ref 8.9–12.9)
POTASSIUM SERPL-SCNC: 4.5 MMOL/L (ref 3.5–5.1)
PROT SERPL-MCNC: 7.4 G/DL (ref 6.4–8.2)
RBC # BLD AUTO: 4.21 M/UL (ref 3.8–5.2)
SODIUM SERPL-SCNC: 136 MMOL/L (ref 136–145)
WBC # BLD AUTO: 3.6 K/UL (ref 3.6–11)

## 2023-06-30 PROCEDURE — 99214 OFFICE O/P EST MOD 30 MIN: CPT | Performed by: INTERNAL MEDICINE

## 2023-06-30 PROCEDURE — 1090F PRES/ABSN URINE INCON ASSESS: CPT | Performed by: INTERNAL MEDICINE

## 2023-06-30 PROCEDURE — 3078F DIAST BP <80 MM HG: CPT | Performed by: INTERNAL MEDICINE

## 2023-06-30 PROCEDURE — G8427 DOCREV CUR MEDS BY ELIG CLIN: HCPCS | Performed by: INTERNAL MEDICINE

## 2023-06-30 PROCEDURE — 1036F TOBACCO NON-USER: CPT | Performed by: INTERNAL MEDICINE

## 2023-06-30 PROCEDURE — 1123F ACP DISCUSS/DSCN MKR DOCD: CPT | Performed by: INTERNAL MEDICINE

## 2023-06-30 PROCEDURE — 3051F HG A1C>EQUAL 7.0%<8.0%: CPT | Performed by: INTERNAL MEDICINE

## 2023-06-30 PROCEDURE — G8399 PT W/DXA RESULTS DOCUMENT: HCPCS | Performed by: INTERNAL MEDICINE

## 2023-06-30 PROCEDURE — G8417 CALC BMI ABV UP PARAM F/U: HCPCS | Performed by: INTERNAL MEDICINE

## 2023-06-30 PROCEDURE — 3074F SYST BP LT 130 MM HG: CPT | Performed by: INTERNAL MEDICINE

## 2023-06-30 RX ORDER — PERPHENAZINE 16 MG/1
TABLET, FILM COATED ORAL
COMMUNITY
Start: 2023-05-16

## 2023-06-30 RX ORDER — LANCETS 28 GAUGE
EACH MISCELLANEOUS
COMMUNITY
Start: 2013-10-24

## 2023-06-30 RX ORDER — BLOOD-GLUCOSE METER
EACH MISCELLANEOUS
COMMUNITY
Start: 2013-09-20

## 2023-06-30 SDOH — ECONOMIC STABILITY: FOOD INSECURITY: WITHIN THE PAST 12 MONTHS, THE FOOD YOU BOUGHT JUST DIDN'T LAST AND YOU DIDN'T HAVE MONEY TO GET MORE.: NEVER TRUE

## 2023-06-30 SDOH — ECONOMIC STABILITY: HOUSING INSECURITY
IN THE LAST 12 MONTHS, WAS THERE A TIME WHEN YOU DID NOT HAVE A STEADY PLACE TO SLEEP OR SLEPT IN A SHELTER (INCLUDING NOW)?: NO

## 2023-06-30 SDOH — ECONOMIC STABILITY: INCOME INSECURITY: HOW HARD IS IT FOR YOU TO PAY FOR THE VERY BASICS LIKE FOOD, HOUSING, MEDICAL CARE, AND HEATING?: NOT HARD AT ALL

## 2023-06-30 SDOH — ECONOMIC STABILITY: FOOD INSECURITY: WITHIN THE PAST 12 MONTHS, YOU WORRIED THAT YOUR FOOD WOULD RUN OUT BEFORE YOU GOT MONEY TO BUY MORE.: NEVER TRUE

## 2023-06-30 ASSESSMENT — ENCOUNTER SYMPTOMS
GASTROINTESTINAL NEGATIVE: 1
RESPIRATORY NEGATIVE: 1

## 2023-07-11 RX ORDER — FLUTICASONE PROPIONATE 50 MCG
SPRAY, SUSPENSION (ML) NASAL
Qty: 1 EACH | Refills: 1 | Status: SHIPPED | OUTPATIENT
Start: 2023-07-11

## 2023-07-18 RX ORDER — CETIRIZINE HYDROCHLORIDE 10 MG/1
TABLET ORAL
Qty: 90 TABLET | Refills: 1 | Status: SHIPPED | OUTPATIENT
Start: 2023-07-18

## 2023-08-03 ENCOUNTER — TELEPHONE (OUTPATIENT)
Age: 83
End: 2023-08-03

## 2023-08-03 DIAGNOSIS — E11.21 TYPE 2 DIABETES MELLITUS WITH DIABETIC NEPHROPATHY (HCC): ICD-10-CM

## 2023-08-03 NOTE — TELEPHONE ENCOUNTER
Medication Refill Request    [facility] is requesting a refill of the following medication(s):   Rosuvastatin 20 mg  Please send refill to:     [facility]/pharmacy [facility], [address] [place] [address] [address] [phone]  [address] [place] [address]  Phone: [phone] Fax: [phone]

## 2023-08-09 ENCOUNTER — E-VISIT (OUTPATIENT)
Age: 83
End: 2023-08-09
Payer: MEDICARE

## 2023-08-09 ENCOUNTER — TELEPHONE (OUTPATIENT)
Age: 83
End: 2023-08-09

## 2023-08-09 DIAGNOSIS — U07.1 COVID-19: Primary | ICD-10-CM

## 2023-08-09 PROCEDURE — 99421 OL DIG E/M SVC 5-10 MIN: CPT | Performed by: INTERNAL MEDICINE

## 2023-08-09 RX ORDER — FLUTICASONE PROPIONATE 50 MCG
SPRAY, SUSPENSION (ML) NASAL
Qty: 1 EACH | Refills: 1 | Status: SHIPPED | OUTPATIENT
Start: 2023-08-09

## 2023-08-09 ASSESSMENT — LIFESTYLE VARIABLES: SMOKING_STATUS: NO, I HAVE NEVER SMOKED

## 2023-08-09 NOTE — TELEPHONE ENCOUNTER
Reason for call:  TC from [employer], Daughter of pt. Daughter on PHI. Pt id verified. Daughter states she spoke with Dr on call yesterday. Daughter states the Dr on call told her  if her Mother tested POS for Covid to call this morning so the Dr, that was on call yesterday, could call her Mother in [facility] today. Daughter states she did two test on her Mother, this morning, and both were POS.        [id number] [address] [phone] [facility] [phone]  [phone]      Is this a new problem: Yes    Date of last appointment:  6/30/2023     Can we respond via [employer]: No    Best call back number: [phone]

## 2023-08-09 NOTE — PROGRESS NOTES
Angeline Vanegas (1940) initiated an asynchronous digital communication through [employer]. HPI: per patient questionnaire   -tested positive 8/9/2023. Exam: not applicable    Diagnoses and all orders for this visit:  Diagnoses and all orders for this visit:    [id number]    -recommend  molnupiravir - patient is on eliquis and rosuvastatin.  -increased risk for covid complications due to age, history of CAD and asthma. Time: EV1 - 5-10 minutes were spent on the digital evaluation and management of this patient.     Beatriz Puente MD

## 2023-08-09 NOTE — TELEPHONE ENCOUNTER
Second call - Daughter calling inquiring if , on call from yesterday, had a chance to call in [address] [place] [address] for her Mother.

## 2023-08-17 DIAGNOSIS — I10 ESSENTIAL (PRIMARY) HYPERTENSION: ICD-10-CM

## 2023-08-17 RX ORDER — METOPROLOL SUCCINATE 50 MG/1
TABLET, EXTENDED RELEASE ORAL
Qty: 90 TABLET | Refills: 1 | Status: SHIPPED | OUTPATIENT
Start: 2023-08-17

## 2023-09-03 RX ORDER — ALBUTEROL SULFATE 90 UG/1
AEROSOL, METERED RESPIRATORY (INHALATION)
Qty: 18 EACH | Refills: 2 | Status: SHIPPED | OUTPATIENT
Start: 2023-09-03

## 2023-09-05 RX ORDER — FLUTICASONE PROPIONATE 50 MCG
SPRAY, SUSPENSION (ML) NASAL
Qty: 16 G | Refills: 1 | Status: SHIPPED | OUTPATIENT
Start: 2023-09-05

## 2023-09-29 NOTE — TELEPHONE ENCOUNTER
TC to pt.  Pt states she is having surger in October and will call once she is healed to make appt for [facility]. 09/29/23 TR

## 2023-10-01 RX ORDER — FLUTICASONE PROPIONATE 50 MCG
SPRAY, SUSPENSION (ML) NASAL
Qty: 16 G | Refills: 1 | Status: SHIPPED | OUTPATIENT
Start: 2023-10-01

## 2023-10-25 DIAGNOSIS — E11.21 TYPE 2 DIABETES MELLITUS WITH DIABETIC NEPHROPATHY (HCC): ICD-10-CM

## 2023-10-25 DIAGNOSIS — E11.9 TYPE 2 DIABETES MELLITUS WITHOUT COMPLICATIONS (HCC): ICD-10-CM

## 2023-10-25 DIAGNOSIS — G43.119 MIGRAINE WITH AURA, INTRACTABLE, WITHOUT STATUS MIGRAINOSUS: ICD-10-CM

## 2023-10-25 DIAGNOSIS — I10 ESSENTIAL (PRIMARY) HYPERTENSION: ICD-10-CM

## 2023-10-25 RX ORDER — FAMOTIDINE 20 MG/1
20 TABLET, FILM COATED ORAL 2 TIMES DAILY
Qty: 60 TABLET | Refills: 0 | Status: SHIPPED | OUTPATIENT
Start: 2023-10-25

## 2023-10-25 RX ORDER — CETIRIZINE HYDROCHLORIDE 10 MG/1
10 TABLET ORAL DAILY
Qty: 90 TABLET | Refills: 0 | Status: SHIPPED | OUTPATIENT
Start: 2023-10-25

## 2023-10-25 RX ORDER — ASPIRIN 81 MG/1
81 TABLET ORAL DAILY
Qty: 90 TABLET | Refills: 0 | Status: SHIPPED | OUTPATIENT
Start: 2023-10-25

## 2023-10-25 RX ORDER — LANCETS 28 GAUGE
1 EACH MISCELLANEOUS DAILY
Qty: 100 EACH | Refills: 0 | Status: SHIPPED | OUTPATIENT
Start: 2023-10-25

## 2023-10-25 RX ORDER — ALBUTEROL SULFATE 90 UG/1
AEROSOL, METERED RESPIRATORY (INHALATION)
Qty: 18 EACH | Refills: 1 | Status: SHIPPED | OUTPATIENT
Start: 2023-10-25

## 2023-10-25 RX ORDER — FLUTICASONE PROPIONATE 50 MCG
2 SPRAY, SUSPENSION (ML) NASAL DAILY
Qty: 16 G | Refills: 1 | Status: SHIPPED | OUTPATIENT
Start: 2023-10-25

## 2023-10-25 RX ORDER — METOPROLOL SUCCINATE 50 MG/1
50 TABLET, EXTENDED RELEASE ORAL DAILY
Qty: 90 TABLET | Refills: 0 | Status: SHIPPED | OUTPATIENT
Start: 2023-10-25

## 2023-10-25 RX ORDER — HYDROCHLOROTHIAZIDE 25 MG/1
25 TABLET ORAL DAILY
Qty: 90 TABLET | Refills: 0 | Status: SHIPPED | OUTPATIENT
Start: 2023-10-25

## 2023-10-25 NOTE — TELEPHONE ENCOUNTER
Medication Refill Request    Jacinta Bui is requesting a refill of the following medication(s):     aspirin 81 MG EC tablet    cetirizine (ZYRTEC) 10 MG tablet    fluticasone (FLONASE) 50 MCG/ACT nasal spray    famotidine (PEPCID) 20 MG tablet    hydroCHLOROthiazide (HYDRODIURIL) 25 MG tablet    metFORMIN (GLUCOPHAGE) 500 MG tablet  FreeStyle Lancets MISC    metoprolol succinate (TOPROL XL) 50 MG extended release tablet    albuterol sulfate HFA (PROVENTIL;VENTOLIN;PROAIR) 108 (90 Base) MCG/ACT inhaler    Please send refill to:   [facility]/pharmacy #[id number]- [address] -  [phone] - F [phone]

## 2023-11-22 RX ORDER — FAMOTIDINE 20 MG/1
20 TABLET, FILM COATED ORAL 2 TIMES DAILY PRN
Qty: 180 TABLET | Refills: 0 | Status: SHIPPED | OUTPATIENT
Start: 2023-11-22

## 2023-11-22 NOTE — TELEPHONE ENCOUNTER
This is a patient of Dr Whit Bustos, who previously worked in this practice. I have been asked to provide a medication refill as bridge until the patient can establish with a new primary care doctor. I have reviewed the most recent progress notes and labs. Please call her - 20mg twice a day as needed is a better dosing regimen than 40mg daily. She may find she only needs to take the 20mg once daily and this saves her from taking extra medication.      Connor Morales MD

## 2023-11-22 NOTE — TELEPHONE ENCOUNTER
Medication Refill Request    Loyd Caldwelltzer is requesting a refill of the following medication(s):     Famotidine (Pepcid) 40 MG tablet    Please send refill to:     [facility]/pharmacy #[id number]- [place], [address] - [phone]      Patient would like to know why last month her refill was sent in for 20 mg tablets, BID, when she was usually getting 40 mg tablets once a day.     [facility] - [phone]

## 2023-11-24 NOTE — TELEPHONE ENCOUNTER
Verified patient identity with two identifiers. Spoke with patient by phone patient not pleased with lower dosing. Stated Dr Caro Kelley originally prescribed as 40mg twice daily. Suggested she call GI office if not satisfied before sched with  Dr Rosette Cohn 2/21/24. Patient verbalized understanding.

## 2024-01-18 RX ORDER — ASPIRIN 81 MG/1
81 TABLET, COATED ORAL DAILY
Qty: 90 TABLET | Refills: 0 | Status: SHIPPED | OUTPATIENT
Start: 2024-01-18

## 2024-01-18 NOTE — ED NOTES
Labor & Delivery History & Physical  Patient Name:  Josephine Silva   MRN:  [id number]  Age:  33 year old    YOB: 1990      Subjective:    Josephine Silva is a 33 year old  female with NICK: 24,  Gestational Age: 40w3d, who presents for labor.  Patient reports cxns begat at around 1930 and since  have been every 4-5min apart and strong. She denies VB, LOF. Has had an increase in discharge. Reports active FM. Plans to go w/o epidural. Denies complications with prior pregnancies/births. Desires nexplanon for contraception.    Prenatal care with [facility]  Care Before 12 weeks:  no, started @ 12wga  Prepregnancy BMI of 37  TWG of 27lbs    Prenatal care complicated by:  CARLOTA  -On oral iron  2. Obesity      Fetal Issues:  -N/A    Medical Problems (from 26 to present)       No problems associated with this episode.          OB History    Para Term  AB Living   3 2 2     2   SAB IAB Ectopic Molar Multiple Live Births             2      # Outcome Date GA Lbr [place]/2nd Weight Sex Delivery Anes PTL Lv   3 Current            2 Term 07/10/21    F Vag-Spont None N BERNADETTE   1 Term 17    F Vag-Spont EPI  BERNADETTE     No past medical history on file.  No past surgical history on file.      History   Drug Use Not on file     No family history on file.  Medications Prior to Admission   Medication Sig Dispense Refill    Prenatal Vit-Fe Fumarate-FA (multivitamin & mineral w/folic acid- PRENATAL) 27-1 MG Tab Take 1 tablet by mouth daily.         There is no immunization history on file for this patient.    Lab Review:    HGB (g/dL)   Date Value   2024 11.1 (L)     HCT (%)   Date Value   2024 34.7 (L)           Lab Review:    ABO: A positive  Antibody: negative  Rubella: Immune  RPR: NR   Heb B: negative  Hep C ab: NR  HIV: NR  Sickle Cell: negative  Varicella: Immune  H/H first tri 11.1/33.4   Third tri: 9.9/30.9  Pap: 20: NILM  G/c: neg/ neg  Urine culture: 10-25k mixed  early 1hr gtt:  Pt reports accidentally dropped a folding chair on her right foot this evening around 530pm. \"I didn't think nothing of it until a knot came about. \" 85  3rd tri 1 hr gtt: 90  GBS: negative  QG Negative  Declined genetics    Ultrasound:    @ 18w3d: 236g, EFW 43%, posterior placenta, suboptimal anatomy, SLIUP c/w LMP   @ 21w3d: SLIUP, FANTA, PnoP   @ 36w3d: 2825g, EFW 42%, MVP 7.71cm, vertex, PnoP    Review of Systems - NEGATIVE FOR  Fevers  Chills  Headache  Visual changes  Sore throat  Cough  Shortness of breath  Chest pain  Palpitations  Diarrhea  Vomiting  Dysuria    Objective:    Visit Vitals  /77   Pulse 86   Temp 97.9 °F (36.6 °C) (Temporal)   Resp 16   Ht 5' 2\" (1.575 m)   Wt 106.6 kg   BMI 42.98 kg/m²         General: General appearance: alert, well appearing, and in no distress and oriented to person, place, and time.   Lungs:   unlabored   Heart:  regular rate and rhythm   Abdomen: fundus soft, nontender   [place]: Contraction Frequency (min): q2-3min  Contraction Duration (sec): 80 seconds   FHT: Baseline 135 BPM   Fetal heart variability:moderate  Fetal heart rate accelerations:  Present 15 x 15  Fetal heart rate decelerations: none  Fetal heart rate interpretation:  Category I   Speculum: Deferred    Cervix: Dilation: 4  Effacement: 80  Station: -2  Soft  Mid  Fetal sutures palpated    Extremities: Trace to 1+ edema bilateral, symmetric edema  [place]: 3800g, difficult to determine due to body habitus      Assessment  Josephine Silva is a 33 year old  @ 40w3d in early labor  -A positive  -GBBS negative  -Obesity, PP BMI 37  -Hx anx/dep, no medications  -CARLOTA on oral iron  -Category 1 FHR tracing  -Fetal status reassuring.  -VSS    Plan  -H&P completed.  -Admit to L&D for expectant management  -Continuous Monitoring  -Okay to eat in early labor  -Activity as tolerated, encourage position changed  -Discussed labor plan. Pt desires non-pharmacologic measures for pain mngt.  -All questions answered and patient understands and agrees with the plan of care.       Alyce Espana CNM

## 2024-01-22 DIAGNOSIS — E11.9 TYPE 2 DIABETES MELLITUS WITHOUT COMPLICATIONS (HCC): ICD-10-CM

## 2024-01-22 DIAGNOSIS — G43.119 MIGRAINE WITH AURA, INTRACTABLE, WITHOUT STATUS MIGRAINOSUS: ICD-10-CM

## 2024-01-22 RX ORDER — HYDROCHLOROTHIAZIDE 25 MG/1
25 TABLET ORAL DAILY
Qty: 90 TABLET | Refills: 0 | Status: SHIPPED | OUTPATIENT
Start: 2024-01-22

## 2024-02-14 DIAGNOSIS — I10 ESSENTIAL (PRIMARY) HYPERTENSION: ICD-10-CM

## 2024-02-14 RX ORDER — METOPROLOL SUCCINATE 50 MG/1
50 TABLET, EXTENDED RELEASE ORAL DAILY
Qty: 90 TABLET | Refills: 0 | OUTPATIENT
Start: 2024-02-14

## 2024-02-20 SDOH — HEALTH STABILITY: PHYSICAL HEALTH: ON AVERAGE, HOW MANY MINUTES DO YOU ENGAGE IN EXERCISE AT THIS LEVEL?: PATIENT DECLINED

## 2024-02-20 SDOH — HEALTH STABILITY: PHYSICAL HEALTH: ON AVERAGE, HOW MANY DAYS PER WEEK DO YOU ENGAGE IN MODERATE TO STRENUOUS EXERCISE (LIKE A BRISK WALK)?: 0 DAYS

## 2024-02-21 ENCOUNTER — TELEMEDICINE (OUTPATIENT)
Age: 84
End: 2024-02-21
Payer: MEDICARE

## 2024-02-21 DIAGNOSIS — E04.2 MULTIPLE THYROID NODULES: ICD-10-CM

## 2024-02-21 DIAGNOSIS — I10 ESSENTIAL HYPERTENSION: ICD-10-CM

## 2024-02-21 DIAGNOSIS — E11.9 TYPE 2 DIABETES MELLITUS WITHOUT COMPLICATION, UNSPECIFIED WHETHER LONG TERM INSULIN USE (HCC): Primary | ICD-10-CM

## 2024-02-21 DIAGNOSIS — E11.21 TYPE 2 DIABETES MELLITUS WITH DIABETIC NEPHROPATHY, UNSPECIFIED WHETHER LONG TERM INSULIN USE (HCC): ICD-10-CM

## 2024-02-21 DIAGNOSIS — J45.32: ICD-10-CM

## 2024-02-21 DIAGNOSIS — E66.01 SEVERE OBESITY (BMI 35.0-39.9) WITH COMORBIDITY (HCC): ICD-10-CM

## 2024-02-21 DIAGNOSIS — K21.9 GASTROESOPHAGEAL REFLUX DISEASE, UNSPECIFIED WHETHER ESOPHAGITIS PRESENT: ICD-10-CM

## 2024-02-21 DIAGNOSIS — I25.10 CORONARY ARTERY DISEASE INVOLVING NATIVE CORONARY ARTERY OF NATIVE HEART WITHOUT ANGINA PECTORIS: ICD-10-CM

## 2024-02-21 DIAGNOSIS — M46.92 UNSPECIFIED INFLAMMATORY SPONDYLOPATHY, CERVICAL REGION (HCC): ICD-10-CM

## 2024-02-21 DIAGNOSIS — I48.0 PAROXYSMAL ATRIAL FIBRILLATION (HCC): ICD-10-CM

## 2024-02-21 PROBLEM — I20.9 ANGINA, CLASS III (HCC): Status: RESOLVED | Noted: 2022-01-27 | Resolved: 2024-02-21

## 2024-02-21 PROCEDURE — 99204 OFFICE O/P NEW MOD 45 MIN: CPT | Performed by: INTERNAL MEDICINE

## 2024-02-21 ASSESSMENT — PATIENT HEALTH QUESTIONNAIRE - PHQ9
SUM OF ALL RESPONSES TO PHQ9 QUESTIONS 1 & 2: 0
SUM OF ALL RESPONSES TO PHQ QUESTIONS 1-9: 0
1. LITTLE INTEREST OR PLEASURE IN DOING THINGS: 0
SUM OF ALL RESPONSES TO PHQ QUESTIONS 1-9: 0
SUM OF ALL RESPONSES TO PHQ QUESTIONS 1-9: 0
2. FEELING DOWN, DEPRESSED OR HOPELESS: 0
SUM OF ALL RESPONSES TO PHQ QUESTIONS 1-9: 0

## 2024-02-21 NOTE — PROGRESS NOTES
1. \"Have you been to the ER, urgent care clinic since your last visit?  Hospitalized since your last visit?\" No    2. \"Have you seen or consulted any other health care providers outside of the [facility] since your last visit?\" No     3. For patients aged 45-75: Has the patient had a colonoscopy / FIT/ Cologuard? NA - based on age      If the patient is female:    4. For patients aged 40-74: Has the patient had a mammogram within the past 2 years? NA - based on age or sex      5. For patients aged 21-65: Has the patient had a pap smear? NA - based on age or sex  
Base) MCG/ACT inhaler TAKE 2 PUFFS BY MOUTH EVERY 4 HOURS AS NEEDED FOR WHEEZE    fluticasone (FLONASE) 50 MCG/ACT nasal spray 2 sprays by Nasal route daily    cetirizine (ZYRTEC) 10 MG tablet Take 1 tablet by mouth daily    metFORMIN (GLUCOPHAGE) 500 MG tablet TAKE 1 TABLET BY MOUTH TWO (2) TIMES DAILY (WITH MEALS). PLEASE RESTART METFORMIN ON 1/29/2022    metoprolol succinate (TOPROL XL) 50 MG extended release tablet Take 1 tablet by mouth daily    FreeStyle Lancets MISC 1 each by Does not apply route daily    CONTOUR NEXT TEST strip USE TO CHECK BLOOD SUGAR ONCE A DAY    Blood Glucose Monitoring Suppl ([facility] BLOOD GLUCOSE METER) w/Device KIT Once daily before breakfast    Multiple Vitamin (MULTI-VITAMIN DAILY PO) Multi Vitamin   one tablet by mouth once daily    apixaban (ELIQUIS) 5 MG TABS tablet TAKE 1 TABLET BY MOUTH TWICE A DAY    pantoprazole (PROTONIX) 40 MG tablet TAKE 1 TABLET BY MOUTH EVERY DAY    propafenone (RYTHMOL) 150 MG tablet TAKE 1 TABLET BY MOUTH EVERY 8 HOURS    rosuvastatin (CRESTOR) 20 MG tablet TAKE 1 TABLET BY MOUTH EVERY DAY AT NIGHT     No current facility-administered medications for this visit.         FH: Her family history includes Breast Cancer in her paternal aunt, paternal aunt, and sister; Cancer in her mother, paternal aunt, and sister; Diabetes in her brother and daughter; Headache in her brother; Heart Disease in her brother; Hypertension in her daughter and mother; Mult Sclerosis in her sister; No Known Problems in her brother, brother, and sister; Osteoarthritis in her mother and sister; Psychiatric Disorder in her daughter; Stroke in her father.     SH: . She is retired from work at [employer], then [employer]. She reports that she quit smoking about 35 years ago. Her smoking use included cigarettes. She started smoking about 65 years ago. She has a 15.0 pack-year smoking history. She has never used smokeless tobacco. She reports that she does not

## 2024-03-11 ENCOUNTER — NURSE ONLY (OUTPATIENT)
Age: 84
End: 2024-03-11

## 2024-03-11 DIAGNOSIS — E04.2 MULTIPLE THYROID NODULES: ICD-10-CM

## 2024-03-11 DIAGNOSIS — M21.969 TYPE 2 DIABETES MELLITUS WITH DIABETIC FOOT DEFORMITY (HCC): ICD-10-CM

## 2024-03-11 DIAGNOSIS — E11.69 TYPE 2 DIABETES MELLITUS WITH DIABETIC FOOT DEFORMITY (HCC): ICD-10-CM

## 2024-03-11 DIAGNOSIS — I10 ESSENTIAL HYPERTENSION: Primary | ICD-10-CM

## 2024-03-11 DIAGNOSIS — I10 ESSENTIAL HYPERTENSION: ICD-10-CM

## 2024-03-12 LAB
ALBUMIN SERPL-MCNC: 3.8 G/DL (ref 3.5–5)
ALBUMIN/GLOB SERPL: 1.1 (ref 1.1–2.2)
ALP SERPL-CCNC: 53 U/L (ref 45–117)
ALT SERPL-CCNC: 25 U/L (ref 12–78)
ANION GAP SERPL CALC-SCNC: 6 MMOL/L (ref 5–15)
AST SERPL-CCNC: 16 U/L (ref 15–37)
BASOPHILS # BLD: 0 K/UL (ref 0–0.1)
BASOPHILS NFR BLD: 1 % (ref 0–1)
BILIRUB SERPL-MCNC: 0.3 MG/DL (ref 0.2–1)
BUN SERPL-MCNC: 10 MG/DL (ref 6–20)
BUN/CREAT SERPL: 13 (ref 12–20)
CALCIUM SERPL-MCNC: 9.2 MG/DL (ref 8.5–10.1)
CHLORIDE SERPL-SCNC: 103 MMOL/L (ref 97–108)
CO2 SERPL-SCNC: 33 MMOL/L (ref 21–32)
CREAT SERPL-MCNC: 0.77 MG/DL (ref 0.55–1.02)
DIFFERENTIAL METHOD BLD: ABNORMAL
EOSINOPHIL # BLD: 0.1 K/UL (ref 0–0.4)
EOSINOPHIL NFR BLD: 2 % (ref 0–7)
ERYTHROCYTE [DISTWIDTH] IN BLOOD BY AUTOMATED COUNT: 14.6 % (ref 11.5–14.5)
EST. AVERAGE GLUCOSE BLD GHB EST-MCNC: 157 MG/DL
GLOBULIN SER CALC-MCNC: 3.6 G/DL (ref 2–4)
GLUCOSE SERPL-MCNC: 118 MG/DL (ref 65–100)
HBA1C MFR BLD: 7.1 % (ref 4–5.6)
HCT VFR BLD AUTO: 37 % (ref 35–47)
HGB BLD-MCNC: 11.5 G/DL (ref 11.5–16)
IMM GRANULOCYTES # BLD AUTO: 0 K/UL (ref 0–0.04)
IMM GRANULOCYTES NFR BLD AUTO: 0 % (ref 0–0.5)
LYMPHOCYTES # BLD: 1.2 K/UL (ref 0.8–3.5)
LYMPHOCYTES NFR BLD: 35 % (ref 12–49)
MCH RBC QN AUTO: 27.3 PG (ref 26–34)
MCHC RBC AUTO-ENTMCNC: 31.1 G/DL (ref 30–36.5)
MCV RBC AUTO: 87.9 FL (ref 80–99)
MONOCYTES # BLD: 0.3 K/UL (ref 0–1)
MONOCYTES NFR BLD: 7 % (ref 5–13)
NEUTS SEG # BLD: 1.9 K/UL (ref 1.8–8)
NEUTS SEG NFR BLD: 55 % (ref 32–75)
NRBC # BLD: 0 K/UL (ref 0–0.01)
NRBC BLD-RTO: 0 PER 100 WBC
PLATELET # BLD AUTO: 275 K/UL (ref 150–400)
PMV BLD AUTO: 10.4 FL (ref 8.9–12.9)
POTASSIUM SERPL-SCNC: 4.2 MMOL/L (ref 3.5–5.1)
PROT SERPL-MCNC: 7.4 G/DL (ref 6.4–8.2)
RBC # BLD AUTO: 4.21 M/UL (ref 3.8–5.2)
SODIUM SERPL-SCNC: 142 MMOL/L (ref 136–145)
TSH SERPL DL<=0.05 MIU/L-ACNC: 1.31 UIU/ML (ref 0.36–3.74)
WBC # BLD AUTO: 3.5 K/UL (ref 3.6–11)

## 2024-04-01 ENCOUNTER — TRANSCRIBE ORDERS (OUTPATIENT)
Facility: HOSPITAL | Age: 84
End: 2024-04-01

## 2024-04-01 DIAGNOSIS — Z12.31 OTHER SCREENING MAMMOGRAM: Primary | ICD-10-CM

## 2024-04-09 ENCOUNTER — TELEPHONE (OUTPATIENT)
Age: 84
End: 2024-04-09

## 2024-04-09 DIAGNOSIS — M21.969 TYPE 2 DIABETES MELLITUS WITH DIABETIC FOOT DEFORMITY (HCC): Primary | ICD-10-CM

## 2024-04-09 DIAGNOSIS — E11.69 TYPE 2 DIABETES MELLITUS WITH DIABETIC FOOT DEFORMITY (HCC): Primary | ICD-10-CM

## 2024-04-09 NOTE — TELEPHONE ENCOUNTER
Caller requests Refill of:  CONTOUR NEXT TEST strip   (Include: dx code and frequency for medicare billing)          Please send to[facility]    [id number] [place], [place] - [address] - P [phone] - F [phone]  [address]  [facility] [address]  Phone: [phone] Fax: [phone]         Visit / Appointment History:  Future Appointment at [facility]:  8/21/24      Last Appointment With PCP:  2/21/2024       Caller confirmed instructions and dosages as correct.    Caller was advised that Meds will be refilled as soon as possible, however there can be a 48-72 business hour turn around on refill requests.

## 2024-04-14 DIAGNOSIS — I10 ESSENTIAL (PRIMARY) HYPERTENSION: ICD-10-CM

## 2024-04-14 RX ORDER — METOPROLOL SUCCINATE 50 MG/1
50 TABLET, EXTENDED RELEASE ORAL DAILY
Qty: 90 TABLET | Refills: 0 | OUTPATIENT
Start: 2024-04-14

## 2024-04-16 DIAGNOSIS — I10 ESSENTIAL (PRIMARY) HYPERTENSION: ICD-10-CM

## 2024-04-16 RX ORDER — METOPROLOL SUCCINATE 50 MG/1
50 TABLET, EXTENDED RELEASE ORAL DAILY
Qty: 90 TABLET | Refills: 1 | Status: SHIPPED | OUTPATIENT
Start: 2024-04-16

## 2024-04-16 NOTE — TELEPHONE ENCOUNTER
Patient states she needs refill done for 90 day supplies w/refills thru [facility] on file for her  Metoprolol succinate (TOPROL XL) 50 MG extended release tablet. Please call if any questions. Thank you      Patient reminded of [address] Turn Around on refills

## 2024-04-16 NOTE — TELEPHONE ENCOUNTER
Future Appointments:  Future Appointments   Date Time Provider Department Center   4/23/2024  8:30 AM Deshawn Powell III, MD [facility] AM[facility]   4/25/2024  8:40 AM [facility] 3 [facility]   8/21/2024  9:30 AM Frantz Francois MD [facility] AMB        Last Appointment With Me:  2/21/2024     Requested Prescriptions     Pending Prescriptions Disp Refills    metoprolol succinate (TOPROL XL) 50 MG extended release tablet 90 tablet 1     Sig: Take 1 tablet by mouth daily        Interventional Cardiology  Follow-Up Note  Established Clinic Visit    Chief Complaint:   Chief Complaint   Patient presents with   • Follow-up     1 year    • Office Visit        History of Present Illness:   Janusz Mora is a 65 year old male with a past medical history significant for coronary artery disease s/p PCI of prox-mid LAD (04/23/2013), complicated by right CFA/ CI DVT s/p Coumadin & DVT filter (removed after 30 days), hypertension, hyperlipidemia, chronic venous insufficiency, Red's esophagus, & GERD who presents for follow-up.    During 10/05/2022 OV, reported to PCP that he's been experiencing left arm tingling, originates near his left shoulder/ near his mid-forearm down his arm.  It occurs while sitting still & improves with use.  Patient reported to me that it's persistent & he is experiences it right now but not when he's working.  Nuclear stress test performed on 10/19/2022 revealed a small reversible perfusion defect of mild severity involving the mid to distal anterior wall (predominantly segments 7 and 13).  Small fixed perfusion defect in the apical cap (segment 17).  MRI of C-spine did reveal severe central canal stenosis at C4-5 and C5-6 and moderate central canal stenosis at C3-4 and C6-7 and neural foraminal narrowing, all progressed compared to 2018.  He's now s/p coronary angiogram on 10/27/2022 which revealed no evidence of occlusive CAD.    On 07/27/2022, he called complaining of worsening LLE edema thus lower extremity vessel mapping was performed which did reveal chronic venous insufficiency.    During 02/24/2021 OV, Rosuvastatin was increased from 20mg to 40mg qhs which successfully achieved goal LDL.    Since last OV, had meniscus tear of right knee with staph epidermidis bacterial infection s/p right knee operative arthroscopy, meniscectomy, Right knee arthroscopy with lavage x 3 on 08/20/2021, 09/10/2021, and 09/20/2021.  He was taken off of methotrexate during that  time.    Tested COVID+ in both the rapid and PCR test during 06/19/2020 admission to [facility] but was asymptomatic from that.  He has since repeated it twice on his own, both of which have been negative.  He had presented there with an episode of pressure/ pain in his chest that awoke him from sleep and then spread across his entire chest.  Described it as sharp in nature.  The pain did subside on his own after he got up to walk around his home.  He denies any chest pain since that episode.  He also has noted that he has had intermittent left arm pressure numbness & tingling travels down his left biceps to his left mid forearm, not with exertion & totally unrelated to his chest discomfort, but it's exacerbated if he puts his hand on something or rest his elbow on something or when sitting in his chair.  Also feels like he \"loses his breath\" at times during exertion.  He does have arthritic pain in multiple joints.  Transthoracic echocardiogram and nuclear stress test performed in the outpatient setting after his hospital discharge were WNL.    S/p right total hip replacement on 08/26/2019 by Dr. Faith at [facility].    Continues to have headache, especially on the right side.  Especially if he uses his RUE/ shoulder.  He is on Elavil for it which helps.    Previously complained of palpitations described as occasional skipped beats from which he felt strongly.  Symptoms can last for up to 5-10 minutes, and they occur randomly daily.  Because of baseline sinus bradycardia, medication options were limited.  Multaq was prescribed by Dr. Rice but he was unable to tolerate it.  Based on symptoms, he likely has symptomatic PACs.  I had reassured patient that PACs were benign.  It has since resolved and he can't remember the last time he experienced it.    Decreased caffeinated coffee significantly and he finds that it decreased his cramps significantly.  Drinks approx 4 - 5 bottles a day.    Able to walk 2 blocks  and climb 2 flights of stairs without stopping.  Otherwise, denies dyspnea on exertion/ PND/ orthopnea/ lower extremity edema.    Patient has had a long history of \"medication related side effects\". He has not been able to tolerate statins, BB, or ACEi due to muscle related issues. He is convinced that most medications gives him \"jeana horse\".  His CK has always been normal.  He lost weight and his LDL was at goal.  We had changed the enalapril to losartan since he was attributing his myalgias and joint pain to low dose enalapril and have never been on an ARB.  However he tolerated that poorly as well with a whole host of symptoms.  We tried taking him off most medications/ supplements prior visits and introducing medications now slowly.  After a year, he's finally on a medication regimen that he can tolerate.  He was doing well on Amlodipine until he developed lower extremity edema thus was changed back to Losartan.    DEPRESSION ASSESSMENT/PLAN:  Depression screening is negative no further plan needed.    Review of Systems:  (For the following ROS, pertinent positives are in bold; pertinent negatives are in italics.)  CONSTITUTIONAL: fevers, chills, sweats, weight loss, weight gain, fatigue  EYES: vision changes, double vision, blurring  ENMT: hearing loss, tinnitus, epistaxis, sores, voice changes, sore throat, sinus congestion, sinus pain, rhinorrhea   CV: chest pain, dyspnea, palpitations, orthopnea, PND, LE swelling  RESP: cough, sputum, dyspnea, hemoptysis, pleuritic pain  GI: abdominal pain, bloating, nausea, vomiting, hematemesis, diarrhea, constipation, BRBPR, melena, GERD/heartburn  : hematuria, dysuria, frequency, urgency, nocturia  MUSCULOSKELETAL: pain, muscle weakness, joint pain, joint swelling, decreased ROM, back pain  SKIN: rash, pain, pruritis, lesions, lumps, skin breakdown  NEURO: headache, dizziness, LOC, weakness, paresthesias, gait problems, seizures, diplopia, numbness, memory loss    PSYCHIATRIC: depressed mood, diminished interest/pleasure, difficulty sleeping, decreased concentration, anxiety   HEMATOLOGIC: anemia, easy bruising, persistent bleeding, DVT/Clots  All the above systems were reviewed.     Past Medical History:  Past Medical History:   Diagnosis Date   • AC joint arthropathy 11/17/2016   • Acquired spondylolisthesis 12/12/2003   • Acute sinusitis, unspecified 02/02/2001   • Acute upper respiratory infection 01/15/2001   • Allergic rhinitis 03/07/2001   • Angina pectoris (CMS/[facility]) 01/28/2015   • Arthralgia of right hip 03/10/2016   • Arthritis    • Atherosclerosis of coronary artery 4/26/2013   • Atherosclerosis of coronary artery 4/26/2013   • Backache 11/21/2019   • Red's esophagus 12/07/2021    On EGD   • Biceps muscle tear, right, initial encounter 12/18/2017   • Blood clot associated with vein wall inflammation     Right leg blood clot after heart surgery   • Bradycardia 06/29/2012   • CAD (coronary artery disease) 04/12/2013    s/p NERY x2 to prox LAD 4/12/13   • CAD S/P percutaneous coronary angioplasty 4/26/2013   • Cavus deformity of foot 2/9/2018   • Chronic headaches 7/3/2019   • Chronic pain    • Chronic pain of left knee 11/21/2019   • Cortical senile cataract 11/13/2015   • DDD (degenerative disc disease), cervical 10/29/2015   • Degeneration of lumbar or lumbosacral intervertebral disc 12/12/2003   • Difficulty walking 9/7/2021   • DVT (deep venous thrombosis) (CMS/[facility]) 04/24/2013   • Dyslipidemia    • Essential hypertension, benign 03/07/2001   • Fatigue 06/29/2012   • Ganglion, unspecified site 02/27/2001   • GERD (gastroesophageal reflux disease)    • Hammertoes of both feet 2/9/2018   • Headache 11/21/2019   • Heart disease    • High cholesterol    • History of blood clots    • History of repair of rotator cuff 12/3/2016   • Hyperlipemia 8/19/2014   • Iliotibial band syndrome of right side 1/6/2020   • Iliotibial band syndrome, left 1/6/2020   • Iliotibial  band syndrome, left 1/6/2020   • Impotence of organic origin 07/31/2009   • Inflammatory arthritis 10/29/2015   • Inflammatory polyarthropathy (CMS/HCC) 10/29/2015   • Ingrowing nail 2/9/2018   • Inguinal hernia without mention of obstruction or gangrene, unilateral or unspecified, (not specified as recurrent) [K40.90] 06/04/1999   • 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   • Kidney stone 08/19/2014   • Left hip pain 03/11/2016   • Long-term use of immunosuppressant medication 10/29/2015   • Lumbago    • Medication side effect 06/10/2014   • Metatarsalgia of both feet 2/9/2018   • Migraine    • Monoclonal paraproteinemia 12/29/2008   • Muscle weakness 12/3/2016   • OA (osteoarthritis) of hip 03/11/2016   • Old disruption of anterior cruciate ligament 10/07/1997   • Olecranon bursitis of right elbow 2/13/2017   • Osteoarthritis of hip 3/11/2016   • Pain in both feet 7/3/2019   • Pain in joint    • Palpitations    • Personal history of DVT (deep vein thrombosis) 03/06/2014   • Porokeratosis 2/9/2018   • Primary osteoarthritis of both hands 10/29/2015   • Primary osteoarthritis of right hip 03/29/2016   • Radial styloid tenosynovitis 02/24/2013   • Renal colic on right side 11/21/2019   • Right knee pain     infected right knee   • Rotator cuff tendinitis 9/19/2016   • Spinal stenosis, lumbar region without neurogenic claudication 12/12/2003   • Spinal stenosis, lumbar region, without neurogenic claudication 12/12/2003   • Sprain of lumbar region 08/11/2003   • Strain of adductor wilma muscle 03/10/2016   • Strain of neck muscle 11/21/2019   • Strain of neck muscle 11/21/2019   • Strain of neck muscle 11/21/2019   • Strain of right subscapularis muscle 12/18/2017   • Strain of right subscapularis muscle 12/18/2017   • Tear of meniscus of right knee as current injury 9/7/2021   • Tear of right supraspinatus tendon 11/17/2016   • Testicular atrophy 06/29/2012   • Toe pain, bilateral 2/9/2018   • Toe pain,  bilateral 2/9/2018   • Tricompartment osteoarthritis of both knees 1/6/2020   • Unspecified hemorrhoids without mention of complication 03/01/2006   • Unspecified internal derangement of left knee 10/07/1997   • Unspecified rotator cuff tear or rupture of left shoulder, not specified as traumatic 3/16/2017   • Venous insufficiency (chronic) (peripheral) 4/3/2019   • Weakness of right lower extremity 9/7/2021       Past Surgical History:  Past Surgical History:   Procedure Laterality Date   • ------------other-------------      heart stents   • ------------other-------------  2013    heart filter    • Arthroscopy knee medial or lat  1998    ARTHROSCOPY W/LAT OR MED MENISCECTOMY; KNEE,INCL DEBR,ANY COMP LEFT   • Biopsy soft tissue back,deep     • Cardiac catherization      x2   • Cardiac surgery     • Fracture surgery      Right and left RCR   • Hip surgery Right 08/26/2019    Right total hip replacement by Dr. Faith at [facility]   • Joint replacement      Right hip   • Kidney stone surgery     • Knee scope,diagnostic Right 09/02/2021   • Repair ing hernia,5+y/o,reducibl  2000   • Sinus surgery     • [place] tooth extraction         Allergies:  ALLERGIES:  Ace inhibitors, Amoxicillin, Amoxicillin-pot clavulanate, Bactrim ds, and Statins     Medications:  Current Outpatient Medications   Medication Sig Dispense Refill   • methotrexate 2.5 MG Tab Take 6 tabs once a week 72 tablet 0   • losartan (COZAAR) 50 MG tablet Take 1 tablet by mouth in the morning and 1 tablet in the evening. 180 tablet 1   • pantoprazole (PROTONIX) 20 MG tablet Take 1 tablet by mouth daily. 90 tablet 1   • gabapentin (NEURONTIN) 400 MG capsule Take 1 capsule by mouth in the morning and 1 capsule in the evening. 180 capsule 1   • rosuvastatin (CRESTOR) 40 MG tablet Take 1 tablet by mouth nightly. 90 tablet 1   • ezetimibe (ZETIA) 10 MG tablet Take 1 tablet by mouth daily. 90 tablet 1   • amitriptyline (ELAVIL) 75 MG tablet Take 1 tablet  by mouth nightly. 90 tablet 1   • mupirocin (BACTROBAN) 2 % ointment Apply topically 3 times daily. (Patient taking differently: Apply topically as needed.) 22 g 0   • fluticasone-salmeterol 250-50 MCG/ACT inhaler Inhale 1 puff into the lungs in the morning and 1 puff before bedtime. Rinse mouth out after use 1 each 1   • albuterol 108 (90 Base) MCG/ACT inhaler Take 2 inhalations every 4 hours for the next 7-10 days then wean as tolerated. 1 each 0   • Polyethylene Glycol 3350 (MIRALAX PO) Take by mouth daily.     • acetaminophen (TYLENOL) 325 MG tablet Take 650 mg by mouth every 4 hours as needed for Pain.     • cyclobenzaprine (FLEXERIL) 5 MG tablet Take 1 tablet by mouth 3 times daily as needed (Cramping). 30 tablet 1   • azelastine (ASTELIN) 0.1 % nasal spray Spray 1 spray in each nostril 2 times daily. Use in each nostril as directed 30 mL 12   • aspirin 81 MG chewable tablet Chew 1 tablet by mouth daily.     • coenzyme Q10 100 MG capsule Take 100 mg by mouth daily.     • Homeopathic Products ([employer] LEG CRAMPS PAIN RELIEF) Tab Take by mouth as needed.     • Omega-3 Krill Oil 300 MG Cap Take 300 mg by mouth daily.     • MAGNESIUM PO Take 500 mg by mouth 2 times daily. Take 2-3 Tabs by mouth BID     • psyllium 0.52 g capsule Take 1 capsule by mouth daily.     • VITAMIN D, ERGOCALCIFEROL, PO Take 1 tablet by mouth daily.       No current facility-administered medications for this visit.       Social History:  Social History     Substance and Sexual Activity   Alcohol Use Yes   • Alcohol/week: 2.0 standard drinks   • Types: 2 Glasses of wine per week    Comment: rare     Social History     Tobacco Use   Smoking Status Former   • Packs/day: 1.00   • Years: 14.00   • Pack years: 14.00   • Types: Cigarettes   • Quit date: 1989   • Years since quittin.0   Smokeless Tobacco Never     Social History     Substance and Sexual Activity   Drug Use Not on file             Family History:  Family History   Problem  Relation Age of Onset   • Heart Mother         irregular heart beats   • Heart Father         Heart & LE bypasses @ ager 54, smoker   • Alcohol Abuse Father    • Substance Abuse Father    • Other Father         pulmonary   • Hypertension Father    • Gastrointestinal Sister    • Hypertension Brother    • Hyperlipidemia Brother      Family History reviewed. Other than mentioned above, there is no pertinent family history.    Physical Exam:  Visit Vitals  /62   Pulse 65   Resp 16   Ht 6' 5\" (1.956 m)   Wt 89.4 kg (197 lb)   SpO2 99%   BMI 23.36 kg/m²     General:  Alert and oriented x3, No apparent distress  HEENT: anicteric, moist mucous membranes, EOMI  Neck: Supple, no elevated JVD, no thyromegaly    Cardiovascular:  Heart: RRR, S1 S2, no m/r/g  Carotid Bruits: No   Pulses: 2+    Pulmonary: Lungs CTA b/l  GI: Abdomen soft, NT, ND, +BS, no HSM  Extremities: Extremely trace LLE edema, +2 pulses  Musculoskeletal: +5/5 strength in all extremities  Neuro: No focal deficits or asymmetry.  CNs grossly intact  Skin: No obvious skin lesions or rashes  Psychiatric: Normal affect    Reviewed Data:    Labs:   Lab Services on 01/18/2023   Component Date Value Ref Range Status   • Sodium 01/18/2023 141  135 - 145 mmol/L Final   • Potassium 01/18/2023 4.8  3.4 - 5.1 mmol/L Final   • Chloride 01/18/2023 105  97 - 110 mmol/L Final   • Carbon Dioxide 01/18/2023 30  21 - 32 mmol/L Final   • Anion Gap 01/18/2023 11  7 - 19 mmol/L Final   • Glucose 01/18/2023 101 (A)  70 - 99 mg/dL Final   • BUN 01/18/2023 21 (A)  6 - 20 mg/dL Final   • Creatinine 01/18/2023 1.17  0.67 - 1.17 mg/dL Final   • Glomerular Filtration Rate 01/18/2023 70  >=60 Final   • BUN/ Creatinine Ratio 01/18/2023 18  7 - 25 Final   • Calcium 01/18/2023 9.0  8.4 - 10.2 mg/dL Final   • Bilirubin, Total 01/18/2023 0.8  0.2 - 1.0 mg/dL Final   • GOT/AST 01/18/2023 25  <=37 Units/L Final   • GPT/ALT 01/18/2023 34  <64 Units/L Final   • Alkaline Phosphatase 01/18/2023  76  45 - 117 Units/L Final   • Albumin 01/18/2023 3.8  3.6 - 5.1 g/dL Final   • Protein, Total 01/18/2023 6.0 (A)  6.4 - 8.2 g/dL Final   • Globulin 01/18/2023 2.2  2.0 - 4.0 g/dL Final   • A/G Ratio 01/18/2023 1.7  1.0 - 2.4 Final   • Hemoglobin A1C 01/18/2023 5.5  4.5 - 5.6 % Final   • TSH 01/18/2023 3.330  0.350 - 5.000 mcUnits/mL Final   • Magnesium 01/18/2023 1.9  1.7 - 2.4 mg/dL Final   • Cholesterol 01/18/2023 130  <=199 mg/dL Final   • Triglycerides 01/18/2023 63  <=149 mg/dL Final   • HDL 01/18/2023 59  >=40 mg/dL Final   • LDL 01/18/2023 58  <=129 mg/dL Final   • Non-HDL Cholesterol 01/18/2023 71  mg/dL Final   • Cholesterol/ HDL Ratio 01/18/2023 2.2  <=4.4 Final   • WBC 01/18/2023 4.7  4.2 - 11.0 K/mcL Final   • RBC 01/18/2023 4.39 (A)  4.50 - 5.90 mil/mcL Final   • HGB 01/18/2023 13.5  13.0 - 17.0 g/dL Final   • HCT 01/18/2023 39.7  39.0 - 51.0 % Final   • MCV 01/18/2023 90.4  78.0 - 100.0 fl Final   • MCH 01/18/2023 30.8  26.0 - 34.0 pg Final   • MCHC 01/18/2023 34.0  32.0 - 36.5 g/dL Final   • RDW-CV 01/18/2023 13.1  11.0 - 15.0 % Final   • RDW-SD 01/18/2023 42.8  39.0 - 50.0 fL Final   • PLT 01/18/2023 156  140 - 450 K/mcL Final   • Neutrophil, Percent 01/18/2023 53  % Final   • Lymphocytes, Percent 01/18/2023 36  % Final   • Mono, Percent 01/18/2023 7  % Final   • Eosinophils, Percent 01/18/2023 3  % Final   • Basophils, Percent 01/18/2023 1  % Final   • Immature Granulocytes 01/18/2023 0  % Final   • Absolute Neutrophils 01/18/2023 2.6  1.8 - 7.7 K/mcL Final   • Absolute Lymphocytes 01/18/2023 1.7  1.0 - 4.0 K/mcL Final   • Absolute Monocytes 01/18/2023 0.3  0.3 - 0.9 K/mcL Final   • Absolute Eosinophils  01/18/2023 0.1  0.0 - 0.5 K/mcL Final   • Absolute Basophils 01/18/2023 0.0  0.0 - 0.3 K/mcL Final   • Absolute Immmature Granulocytes 01/18/2023 0.0  0.0 - 0.2 K/mcL Final   Ancillary Procedure on 01/04/2023   Component Date Value Ref Range Status   • AV Stenosis Severity Text 01/04/2023 Absent    Corrected   • Aortic Valve Area 01/04/2023 2.78   Corrected   • AV Peak Gradient 01/04/2023 12.30   Corrected   • AV Mean Gradient 01/04/2023 6.42   Corrected   • AV Peak Velocity 01/04/2023 1.75   Corrected   • Ejection Fraction 01/04/2023 65%   Corrected   • AV VTI (Previously displayed as AV* 01/04/2023 0.90   Corrected   • MV Peak E Velocity 01/04/2023 0.72   Corrected   • MV Peak A Velocity 01/04/2023 242   Corrected   • E Wave Decelaration Time 01/04/2023 [id number]   Corrected   • MV E Wave Keegan/E Tissue Keegan Med 01/04/2023 8.47   Corrected   • MV E Tissue Keegan Med 01/04/2023 10.43   Corrected   • Tricuspid Valve Peak Regurgitation* 01/04/2023 3.426   Corrected   • RV End Systolic Longitudinal Strai* 01/04/2023 2.18   Corrected   • LVOT 2D 01/04/2023 27.55   Corrected   • LVOT VTI 01/04/2023 1.29   Corrected   • DOP Calc LVOT Peak Keegan 01/04/2023 3.01   Corrected   • DINH LVOT Peak Gradient 01/04/2023 0.8   Corrected   Lab Services on 12/27/2022   Component Date Value Ref Range Status   • Albumin 12/27/2022 3.8  3.6 - 5.1 g/dL Final   • Bilirubin, Total 12/27/2022 0.9  0.2 - 1.0 mg/dL Final   • Bilirubin, Direct 12/27/2022 0.2  0.0 - 0.2 mg/dL Final   • Alkaline Phosphatase 12/27/2022 83  45 - 117 Units/L Final   • GPT/ALT 12/27/2022 41  <64 Units/L Final   • GOT/AST 12/27/2022 33  <=37 Units/L Final   • Protein, Total 12/27/2022 6.4  6.4 - 8.2 g/dL Final   • WBC 12/27/2022 6.1  4.2 - 11.0 K/mcL Final   • RBC 12/27/2022 4.42 (A)  4.50 - 5.90 mil/mcL Final   • HGB 12/27/2022 13.6  13.0 - 17.0 g/dL Final   • HCT 12/27/2022 39.7  39.0 - 51.0 % Final   • MCV 12/27/2022 89.8  78.0 - 100.0 fl Final   • MCH 12/27/2022 30.8  26.0 - 34.0 pg Final   • MCHC 12/27/2022 34.3  32.0 - 36.5 g/dL Final   • RDW-CV 12/27/2022 13.0  11.0 - 15.0 % Final   • RDW-SD 12/27/2022 42.7  39.0 - 50.0 fL Final   • PLT 12/27/2022 177  140 - 450 K/mcL Final   • Neutrophil, Percent 12/27/2022 65  % Final   • Lymphocytes, Percent 12/27/2022 26   % Final   • Mono, Percent 12/27/2022 5  % Final   • Eosinophils, Percent 12/27/2022 4  % Final   • Basophils, Percent 12/27/2022 0  % Final   • Immature Granulocytes 12/27/2022 0  % Final   • Absolute Neutrophils 12/27/2022 4.0  1.8 - 7.7 K/mcL Final   • Absolute Lymphocytes 12/27/2022 1.6  1.0 - 4.0 K/mcL Final   • Absolute Monocytes 12/27/2022 0.3  0.3 - 0.9 K/mcL Final   • Absolute Eosinophils  12/27/2022 0.2  0.0 - 0.5 K/mcL Final   • Absolute Basophils 12/27/2022 0.0  0.0 - 0.3 K/mcL Final   • Absolute Immmature Granulocytes 12/27/2022 0.0  0.0 - 0.2 K/mcL Final   Lab Services on 11/10/2022   Component Date Value Ref Range Status   • Albumin 11/10/2022 3.7  3.6 - 5.1 g/dL Final   • Bilirubin, Total 11/10/2022 0.6  0.2 - 1.0 mg/dL Final   • Bilirubin, Direct 11/10/2022 0.2  0.0 - 0.2 mg/dL Final   • Alkaline Phosphatase 11/10/2022 80  45 - 117 Units/L Final   • GPT/ALT 11/10/2022 37  <64 Units/L Final   • GOT/AST 11/10/2022 31  <=37 Units/L Final   • Protein, Total 11/10/2022 6.3 (A)  6.4 - 8.2 g/dL Final   Lab Requisition on 10/25/2022   Component Date Value Ref Range Status   • PTH, Intact 10/25/2022 32  19 - 88 pg/mL Final   Lab Services on 10/26/2022   Component Date Value Ref Range Status   • VITAMIN D, 25-HYDROXY 10/25/2022 41.1  30.0 - 100.0 ng/mL Final   • INTACT PTH 10/25/2022 32  19 - 88 pg/mL Final   Lab Services on 10/25/2022   Component Date Value Ref Range Status   • IRON, SERUM 10/25/2022 94  49 - 181 ug/dL Final   • IRON BINDING CAPACITY, TOTAL 10/25/2022 369  250 - 450 ug/dL Final   • %IRON SATURATION (CALC.) 10/25/2022 25  13 - 59 % Final   • NA (SODIUM, SERUM) 10/25/2022 138  136 - 146 mmol/L Final   • K (POTASSIUM, SERUM) 10/25/2022 4.5  3.5 - 5.3 mmol/L Final   • GLUCOSE, RANDOM 10/25/2022 104  70 - 200 mg/dL Final   • CREATININE, SERUM 10/25/2022 1.2  0.6 - 1.6 mg/dL Final   • CO2 VENOUS 10/25/2022 25  22 - 32 mmol/L Final   • CHLORIDE, SERUM 10/25/2022 105  96 - 107 mmol/L Final   •  CALCIUM, SERUM 10/25/2022 11.0 (A)  8.6 - 10.6 mg/dL Final   • BLOOD UREA NITROGEN 10/25/2022 19  6 - 27 mg/dL Final   • EGFR -R 10/25/2022 >60  >60 mL/min/[1.73m2] Final   • EGFR NON--R 10/25/2022 >60  >60 mL/min/[1.73m2] Final   • PROTHROMBIN TIME, THERAPEUTIC 10/25/2022 11.1  9.8 - 12.1 s Final   • INTERNATIONAL NORMALIZED RATIO 10/25/2022 1.1   Final   • MG (MAGNESIUM, SERUM) 10/25/2022 2.2  1.7 - 2.6 mg/dL Final   • WHITE BLOOD CELL COUNT 10/25/2022 5.1  4.0 - 10.0 10*3/uL Final   • RED BLOOD CELL COUNT 10/25/2022 4.34  3.90 - 5.70 10*6/uL Final   • HEMOGLOBIN 10/25/2022 13.3 (A)  13.7 - 17.5 g/dL Final   • HEMATOCRIT 10/25/2022 39.4 (A)  40.0 - 51.0 % Final   • MEAN CORPUSCULAR VOLUME 10/25/2022 90.8  79.0 - 95.0 fL Final   • MEAN CORPUSCULAR HGB 10/25/2022 30.6  27.0 - 34.0 pg Final   • MEAN CORPUSCULAR HGB CONCENTRATION 10/25/2022 33.8  32.0 - 36.0 % Final   • PLATELET COUNT 10/25/2022 174  150 - 400 10*3/uL Final   • MEAN PLATELET VOLUME 10/25/2022 9.4  8.6 - 12.4 fL Final   • RED CELL DISTRIBUTION WIDTH 10/25/2022 12.9  11.3 - 14.8 % Final   • NEUTROPHIL PERCENT 10/25/2022 56.4  34.0 - 73.5 % Final   • NEUTROPHIL ABSOLUTE # 10/25/2022 2.9  1.4 - 6.5 10*3/uL Final   • IMMATURE GRANULOCYTE PERCENT 10/25/2022 0.2  0.0 - 0.5 % Final   • IMMATURE GRANULOCYTE ABSOLUTE 10/25/2022 0.01  0.00 - 0.05 10*3/uL Final   • LYMPH PERCENT 10/25/2022 30.7  20.5 - 51.1 % Final   • LYMPHOCYTE ABSOLUTE # 10/25/2022 1.6  1.2 - 3.4 10*3/uL Final   • MONOCYTE PERCENT 10/25/2022 8.4  4.3 - 12.9 % Final   • MONOCYTE ABSOLUTE # 10/25/2022 0.4  0.2 - 0.9 10*3/uL Final   • EOSINOPHIL % 10/25/2022 3.5  0.0 - 10.0 % Final   • EOSINOPHIL ABSOLUTE # 10/25/2022 0.2  0.0 - 0.5 10*3/uL Final   • BASOPHIL % 10/25/2022 0.8  0.0 - 1.2 % Final   • BASOPHIL ABSOLUTE # 10/25/2022 0.0  0.0 - 0.1 10*3/uL Final   • DIFFERENTIAL TYPE 10/25/2022 AUTO DIFF   Final   Ancillary Procedure on 10/19/2022   Component Date Value  Ref Range Status   • Predicted HR Max 10/19/2022 157  BPM Final   Lab Services on 10/19/2022   Component Date Value Ref Range Status   • K (POTASSIUM, SERUM) 10/19/2022 4.5  3.5 - 5.3 mmol/L Final   Lab Services on 10/13/2022   Component Date Value Ref Range Status   • K (POTASSIUM, SERUM) 10/13/2022 4.2  3.5 - 5.3 mmol/L Final   There may be more visits with results that are not included.     ECG:  Date: 01/20/2023  I have reviewed the ECG with the following interpretation:  Sinus bradycardia at 58 bpm, PVC    ECG:  Date: 10/25/2022  I have reviewed the ECG with the following interpretation:  Sinus bradycardia at 59 bpm    ECG:  Date: 01/17/2022  I have reviewed the ECG with the following interpretation:  Sinus bradycardia at 57 bpm    ECG:  Date: 02/24/2021  I have reviewed the ECG with the following interpretation:  Normal sinus rhythm at 69 bpm    ECG:  Date: 08/06/2020  I have reviewed the ECG with the following interpretation:  Sinus rhythm at 60 bpm    ECG:  Date: 08/07/2019  I have reviewed the ECG with the following interpretation:  Sinus bradycardia at 55 bpm, evidence of anteroseptal infarct    ECG:  Date: 12/01/2017  I have reviewed the ECG with the following interpretation:  Marked sinus bradycardia at 47 bpm    ECG:  Date: 09/14/2015  I have reviewed the ECG with the following interpretation:  Sinus bradycardia at 49 bpm    Echo: Date: 01/04/2023  I have reviewed the echo results with following summary:  1. Left ventricle: The cavity size is normal. Wall thickness is normal. Systolic function is normal. The ejection fraction was measured by biplane method of disks. Left ventricular diastolic function parameters are normal. The ejection fraction is 65-70%.  2. Aortic valve: There is thickening, consistent with sclerosis.  3. Right ventricle: The cavity size is normal. Wall thickness is normal. Systolic function is normal.  4. Pulmonary arteries: Systolic pressure is within the normal range, estimated to  be 21mm Hg.    Cardiac Catheterization:  Date: 10/27/2022  I have reviewed the results with following summary:  1.  LM large but short vessel which gave rise to the LAD and circumflex. There was no significant stenosis.  2.  LAD: large, type III vessel which wrapped around the apex.  There was an ostial 30% stenosis with a tubular 30% lesion in the proximal segment while the previously placed stents in the proximal to mid segment remained widely patent with a discrete 30% lesion at the distal stent edge in the mid LAD followed by a tubular 40% lesion in the distal segment.  D1 was a small branch which had a discrete 50% lesion in its proximal segment.  D2 was extremely small.  3.  LCx: very large, co-dominant vessel which distally gave rise to a very small LPDA and a very small LPLB.  There was a discrete 30% stenosis in the proximal segment (at OM1).  OM1 was a large caliber branch which had a discrete 30% lesion in its proximal segment and bifurcated into two medium branches in its mid segment.  4.  RCA: medium, co-dominant vessel which distally gave rise to an extremely small RPDA.  There was no significant stenosis.    iFR RESULTS  1.  iFR of distal LAD was hemodynamically non-significant at 0.93.    Nuclear Stress Test:  Date: 10/19/2022  I have reviewed the results with following summary:  Small reversible perfusion defect of mild severity involving the mid to distal anterior wall (predominantly segments 7 and 13). Small fixed perfusion defect in the apical cap (segment 17).  Gated images could not be obtained.  Prior study from 2020 did not report the above findings.    Lower Extremity Vessel Mapping:  Date: 10/07/2022  I have reviewed the results with following summary:  1. There is no evidence of acute or chronic deep vein or superficial vein thrombosis in the right lower extremity and left lower extremity were visualized.  2. There is no evidence of deep vein insufficiency in the right lower extremity and  left lower extremity were visualized.  3. There is no evidence of venous insufficiency in the right anterior accessory saphenous vein and left anterior accessory saphenous vein.  4. Severe reflux in the right great saphenous vein and left great saphenous vein.  5. Severe reflux in the right small saphenous vein and left small saphenous vein.  6. Severe reflux in the left calf tributaries.    Echo: Date: 09/24/2020  I have reviewed the echo results with following summary:  1. Left ventricle: The cavity size is normal. Wall thickness is mildly increased. There is concentric hypertrophy. Systolic function is normal. The estimated ejection fraction is 55-60%.  2. Pulmonary arteries: Systolic pressure is indeterminate due to the absence of tricuspid regurgitation.    Nuclear Stress Test:  Date: 08/20/2020  I have reviewed the results with following summary:  Normal perfusion imaging without evidence of inducible ischemia or infarct.  Mildly increased left ventricular volume with normal systolic thickening and function and an ejection fraction of 61%.    Echo: Date: 08/08/2019  I have reviewed the echo results with following summary:  Left ventricle: The cavity size is normal. Wall thickness is increased. There is concentric hypertrophy. Systolic function is normal. The estimated ejection fraction is 55-60%    Lower Extremity Vessel Mapping:  Date: 03/06/2019  I have reviewed the results with following summary:  - Severe reflux in the right saphenofemoral junction, greater saphenous vein.  - Severe reflux in the left saphenofemoral junction, greater saphenous vein.  Incidental findings:  Left AAS not visualized.    Nuclear Stress Test:  Date: 08/01/2018  I have reviewed the results with following summary:  Normal perfusion imaging without evidence of inducible ischemia or infarct.  Mildly increased left ventricular volume.    Carotid Duplex Ultrasound:  Date: 09/02/2017  I have reviewed the results with following  summary:  No evidence of hemodynamically significant carotid stenosis.    Echo: Date: 11/07/2016  I have reviewed the echo results with following summary:  * Normal LV size with normal function. LVEF=65%.  * There is mild concentric hypertrophy.  * Normal diastolic function.  * Normal right ventricular size with normal function.  * Normal left atrium. Normal right atrium.  * Normal, trileaflet aortic valve. There is no aortic regurgitation or stenosis.  * Normal mitral valve. There is trace mitral regurgitation. No mitral valve prolapse.  * Normal tricuspid valve. There is trace tricuspid regurgitation.  * Normal PA pressure (19mmHg).    Lower Extremity Arterial Dopplers:  Date: 01/07/2016  I have reviewed the results with following summary:  No evidence of occlusion. Abnormal waveforms in bilateral dorsalis pedis arteries and the right anterior tibial artery.     MAXIMO:  Date: 12/31/2015  I have reviewed the results with following summary:  MAXIMO on right  Posterior tibial: 1.07  Dorsalis pedis: 1.02    MAXIMO on left:   Posterior tibial: 1.16  Dorsalis pedis: 0.89    Waveforms: triphasic waveforms seen bilaterally    Conclusion:  - Abnormal MAXIMO. Recommend lower extremity arterial segmental dopplers for further evaluation if clinically indicated.    Echo: Date:  02/06/2015  I have reviewed the echo results with following summary:  * Moderately dilated LV with normal function. LVEF=55%. Normal LV filling.  * Normal right ventricular size with normal function.  * Mildly dilated left atrium.  * Mildly dilated right atrium.  * Normal, trileaflet aortic valve without stenosis or regurgitation.  * Normal mitral valve without prolapse or stenosis. There is mild to moderate mitral regurgitation.  * Normal tricuspid valve. There is trace tricuspid regurgitation.  * Normal PA pressure (PASP=18mmHg).  * Normal pulmonic valve.     Cardiac Catheterization:  Date: 01/29/2015  I have reviewed the results with following summary:  LM:  very short but normal  LCx: dominant.  Very large, normal bifurcated OM.  Small normal LPDA  LAD: previously deployed stents in LAD remain widely patent with no significant stenosis.  Small diagonal branch which arises from the area of the stents and this diagonal branch appears normal with no stenosis.  More distally, the middle & distal segments of the LAD contain diffuse mild nonobstructive atherosclerosis with luminal diameter narrowings of < 10 - 20%  RCA: nondominant.  Normal      Problem List:  Patient Active Problem List   Diagnosis   • CAD (coronary artery disease)   • Angina pectoris (CMS/[facility])   • AC joint arthropathy   • Allergic rhinitis   • Biceps muscle tear, right, initial encounter   • Bradycardia   • CAD S/P percutaneous coronary angioplasty   • Cavus deformity of foot   • Cortical senile cataract   • DDD (degenerative disc disease), cervical   • Degeneration of lumbar or lumbosacral intervertebral disc   • DVT (deep venous thrombosis) (CMS/[facility])   • Essential hypertension, benign   • GERD (gastroesophageal reflux disease)   • Hammertoes of both feet   • History of repair of rotator cuff   • Hyperlipemia   • Impotence of organic origin   • Inflammatory arthritis   • Instability of right shoulder joint   • Kidney stone   • Metatarsalgia of both feet   • Muscle weakness   • Pain in joint, shoulder region   • Palpitations   • Porokeratosis   • Osteoarthritis of hip   • Primary osteoarthritis of both hands   • Rotator cuff tendinitis   • Radial styloid tenosynovitis   • Tear of right supraspinatus tendon   • Unspecified rotator cuff tear or rupture of left shoulder, not specified as traumatic   • Spinal stenosis, lumbar region, without neurogenic claudication   • Strain of adductor wilma muscle   • Testicular atrophy   • Venous insufficiency (chronic) (peripheral)   • Chronic headaches   • Pain in both feet   • Backache   • Headache   • Chronic pain of left knee   • Renal colic on right side   •  Tricompartment osteoarthritis of both knees   • Tear of meniscus of right knee as current injury   • Weakness of right lower extremity   • Red's esophagus       Assessment/Plan:    1. Coronary Artery Disease      Atypical Chest Pain & Left Arm Pain (Occurring separately)  10/27/2022: Patent proximal to mLAD stent.  iFR of distal LAD was hemodynamically non-significant at 0.93  10/19/2022: Small reversible perfusion defect of mild severity involving the mid to distal anterior wall (predominantly segments 7 and 13).  Small fixed perfusion defect in the apical cap (segment 17)  04/23/2013: S/P PCI of mLAD with overlapping 3.0 x 12 mm and 3.0 x 12 mm Expedition NERY complicated by DVT in R CFA and R NITISH  - Continue antiplatelet therapy: Aspirin 81mg daily  - Not on beta blockers: due to bradycardia  - Statin therapy as below to delay progression and induce regression of atherosclerotic lesions  - Reviewed surveillance echocardiogram findings  - Reviewed nuclear stress test and cardiac catheterization findings in detail    2. Palpitations & Sinus Bradycardia  Unclear etiology as all ECGs have shown only sinus bradycardia.  Palpitations have since resolved  8/2015: Event monitor only showed sinus rhythm with occasional PACs  - Multaq previously prescribed by Dr. Rice, but this was stopped due to severe cramping  - Because of baseline bradycardia, avoid CCB or BBs  - If he becomes symptomatic from his sinus bradycardia or requires rate control medications, he may require/ meet critieria for dual chamber pacemaker in the future. Hold off for now  - Reassurance provided that PACs were benign    3. Hypertension  /62 mmHg at goal  - Continue Losartan 50mg BID  - Minimize Na intake    4. Hyperlipidemia  Previously not on statins due to intolerance  01/18/2023: LDL 58  10/07/2022: LDL 44       TG 63  01/07/2022: LDL 64       TG 74  12/03/2020:      TG 76  08/06/2020:      TG 91  06/20/2020: LDL  82  07/31/2019:   07/27/2018: LDL 97  08/12/2017: LDL 87  - Continue Rosuvastatin 40mg qhs  - Continue Zetia 10mg daily  - Advised coenzyme Q10 for management of muscle aches and soreness  - Check FLP & AST/ ALT a few days prior to next OV    5. Impaired Fasting Glucose  01/18/2023: Hgb A1c 5.5  10/07/2022: Hgb A1c 5.5  - Education provided re: detriments of poor glycemic control  - Care per PCP    6. Deep Vein Thrombosis  Had right CFA/ CI DVT after his coronary angiogram  - S/P Coumadin & DVT filter which was removed    7. Lower Extremity Pain/ Feet Pain/ Diffuse Myalgias  Palpable pulses.  Symptoms are not typical for claudication  - Recommend fluid hydration & Magnesium supplementation    8. CEAP Class III Venous Insufficiency  Previously resolved off of Amlodipine  - Recommend compression stockings given varicose veins  - Keep legs elevated to alleviate swelling  - The natural history of venous insufficiency was discussed with the patient including options for treatment  - The recommendations for conservative treatment include weight management, at least 20-30 mmHg compression stockings, elevation, low sodium diet, and exercise were reviewed  - Janusz has worn compression stockings (at least 20-30mmHg) for a minimum of 12 weeks with some relief, but no correction of impairment.   - Duplex ultrasound with color flow doppler demonstrates significant venous reflux  - Plan of medical management has been followed including compression stockings, weight management, low sodium diet and exercise.   - Janusz is a candidate for : Endovascular therapy of symptomatic venous insufficiency - RFA of left GSV and L SSV as well as UGFS of left tribs.  Procedure described in detail but will have to pursue coronary angiogram first  - The expected benefit is relief of discomfort from venous reflux.  Discussed risks and benefits of vein procedure including risk of DVT, PE, parasthesias and recurrence.  All questions  answered.  Will apply for predetermination    9. Neuropathy  - Continue Amitriptyline  - Continue Gabapentin    10. Inflammatory Arthritis  - Continue Methotrexate  - Care per Dr. Shepherd    11. Excessive Use of Aspirin  - Advised against using continuous & excessive NSAIDs given history of coronary artery disease.  He then took a lot of ASA  - Caution provided    12. GERD  - Continue PPI    13. Preventive Cardiology  - Use of Aspirin for Secondary Prevention: Patient is already on aspirin, risks and benefits discussed.  - Janusz's BMI is Body mass index is 23.36 kg/m².  - Diet and exercise counseling provided  - Does patient smoke: No. Quit in 1991  - s/p both COVID vaccinations: Moderna, advised him to get his bivalent booster as soon as possible    Return to Clinic in 12 months or sooner if needed.    Electronically Signed by:    Sharon Bledsoe MD, 01/20/23

## 2024-04-18 DIAGNOSIS — G43.119 MIGRAINE WITH AURA, INTRACTABLE, WITHOUT STATUS MIGRAINOSUS: ICD-10-CM

## 2024-04-18 DIAGNOSIS — E11.9 TYPE 2 DIABETES MELLITUS WITHOUT COMPLICATIONS (HCC): ICD-10-CM

## 2024-04-18 RX ORDER — HYDROCHLOROTHIAZIDE 25 MG/1
25 TABLET ORAL DAILY
Qty: 90 TABLET | Refills: 0 | Status: SHIPPED | OUTPATIENT
Start: 2024-04-18 | End: 2024-04-18

## 2024-04-19 RX ORDER — FLUTICASONE PROPIONATE 50 MCG
SPRAY, SUSPENSION (ML) NASAL
Qty: 48 G | Refills: 3 | Status: SHIPPED | OUTPATIENT
Start: 2024-04-19

## 2024-04-25 ENCOUNTER — HOSPITAL ENCOUNTER (OUTPATIENT)
Facility: HOSPITAL | Age: 84
Discharge: HOME | End: 2024-04-25
Attending: INTERNAL MEDICINE
Payer: MEDICARE

## 2024-04-25 DIAGNOSIS — Z12.31 SCREENING MAMMOGRAM FOR HIGH-RISK PATIENT: ICD-10-CM

## 2024-04-25 PROCEDURE — 77067 SCR MAMMO BI INCL CAD: CPT

## 2024-04-25 PROCEDURE — 77063 BREAST TOMOSYNTHESIS BI: CPT

## 2024-04-28 DIAGNOSIS — E11.21 TYPE 2 DIABETES MELLITUS WITH DIABETIC NEPHROPATHY (HCC): ICD-10-CM

## 2024-04-29 RX ORDER — ASPIRIN 81 MG/1
81 TABLET ORAL DAILY
Qty: 90 TABLET | Refills: 3 | Status: SHIPPED | OUTPATIENT
Start: 2024-04-29

## 2024-05-10 RX ORDER — CETIRIZINE HYDROCHLORIDE 10 MG/1
TABLET ORAL
Qty: 90 TABLET | Refills: 0 | Status: SHIPPED | OUTPATIENT
Start: 2024-05-10

## 2024-06-03 ENCOUNTER — TELEPHONE (OUTPATIENT)
Age: 84
End: 2024-06-03

## 2024-06-03 NOTE — TELEPHONE ENCOUNTER
Patient states she needs refill done thru [facility] on file for her HydroCHLOROthiazide (HYDRODIURIL) 25 MG tablet.     Please call if any questions as PSR at time of call does not see on Current Medication List.     Patient reminded of [address] Turn Around on refill.     Patient had [facility] appt 2/21/24

## 2024-06-04 RX ORDER — HYDROCHLOROTHIAZIDE 25 MG/1
25 TABLET ORAL DAILY
Qty: 30 TABLET | Refills: 3 | Status: SHIPPED | OUTPATIENT
Start: 2024-06-04

## 2024-06-04 RX ORDER — HYDROCHLOROTHIAZIDE 25 MG/1
1 TABLET ORAL DAILY
COMMUNITY
End: 2024-06-04 | Stop reason: SDUPTHER

## 2024-06-04 NOTE — TELEPHONE ENCOUNTER
PCP: Frantz Francois MD    Last appt: 2/21/2024  Future Appointments   Date Time Provider Department Center   8/21/2024  9:30 AM Frantz Francois MD [facility]       Requested Prescriptions     Pending Prescriptions Disp Refills    hydroCHLOROthiazide (HYDRODIURIL) 25 MG tablet 30 tablet 3     Sig: Take 1 tablet by mouth daily

## 2024-07-23 NOTE — PROGRESS NOTES
The patient was identified by name and date of birth. The risks and side effects were explained to the patient and questions were answered prior to testing. Myoview stress test was completed. None

## 2024-07-31 RX ORDER — CETIRIZINE HYDROCHLORIDE 10 MG/1
10 TABLET ORAL DAILY
Qty: 90 TABLET | Refills: 0 | OUTPATIENT
Start: 2024-07-31

## 2024-08-08 RX ORDER — CETIRIZINE HYDROCHLORIDE 10 MG/1
TABLET ORAL
Qty: 90 TABLET | Refills: 0 | OUTPATIENT
Start: 2024-08-08

## 2024-08-09 RX ORDER — CETIRIZINE HYDROCHLORIDE 10 MG/1
TABLET ORAL
Qty: 90 TABLET | Refills: 0 | Status: SHIPPED | OUTPATIENT
Start: 2024-08-09

## 2024-08-18 SDOH — ECONOMIC STABILITY: FOOD INSECURITY: WITHIN THE PAST 12 MONTHS, THE FOOD YOU BOUGHT JUST DIDN'T LAST AND YOU DIDN'T HAVE MONEY TO GET MORE.: NEVER TRUE

## 2024-08-18 SDOH — ECONOMIC STABILITY: FOOD INSECURITY: WITHIN THE PAST 12 MONTHS, YOU WORRIED THAT YOUR FOOD WOULD RUN OUT BEFORE YOU GOT MONEY TO BUY MORE.: NEVER TRUE

## 2024-08-18 SDOH — ECONOMIC STABILITY: INCOME INSECURITY: HOW HARD IS IT FOR YOU TO PAY FOR THE VERY BASICS LIKE FOOD, HOUSING, MEDICAL CARE, AND HEATING?: NOT HARD AT ALL

## 2024-08-18 SDOH — ECONOMIC STABILITY: TRANSPORTATION INSECURITY
IN THE PAST 12 MONTHS, HAS LACK OF TRANSPORTATION KEPT YOU FROM MEETINGS, WORK, OR FROM GETTING THINGS NEEDED FOR DAILY LIVING?: NO

## 2024-08-21 ENCOUNTER — OFFICE VISIT (OUTPATIENT)
Age: 84
End: 2024-08-21
Payer: MEDICARE

## 2024-08-21 VITALS
OXYGEN SATURATION: 98 % | HEIGHT: 62 IN | DIASTOLIC BLOOD PRESSURE: 85 MMHG | SYSTOLIC BLOOD PRESSURE: 158 MMHG | HEART RATE: 76 BPM | WEIGHT: 207 LBS | BODY MASS INDEX: 38.09 KG/M2 | TEMPERATURE: 97.3 F | RESPIRATION RATE: 16 BRPM

## 2024-08-21 DIAGNOSIS — D68.69 SECONDARY HYPERCOAGULABLE STATE (HCC): ICD-10-CM

## 2024-08-21 DIAGNOSIS — Z00.00 ENCOUNTER FOR ANNUAL WELLNESS VISIT (AWV) IN MEDICARE PATIENT: Primary | ICD-10-CM

## 2024-08-21 DIAGNOSIS — E04.2 MULTIPLE THYROID NODULES: ICD-10-CM

## 2024-08-21 DIAGNOSIS — E11.21 TYPE 2 DIABETES MELLITUS WITH DIABETIC NEPHROPATHY, UNSPECIFIED WHETHER LONG TERM INSULIN USE (HCC): ICD-10-CM

## 2024-08-21 DIAGNOSIS — I10 ESSENTIAL (PRIMARY) HYPERTENSION: ICD-10-CM

## 2024-08-21 DIAGNOSIS — I48.0 PAROXYSMAL ATRIAL FIBRILLATION (HCC): ICD-10-CM

## 2024-08-21 LAB
ALBUMIN SERPL-MCNC: 4 G/DL (ref 3.5–5)
ALBUMIN/GLOB SERPL: 1.1 (ref 1.1–2.2)
ALP SERPL-CCNC: 51 U/L (ref 45–117)
ALT SERPL-CCNC: 26 U/L (ref 12–78)
ANION GAP SERPL CALC-SCNC: 1 MMOL/L (ref 5–15)
AST SERPL-CCNC: ABNORMAL U/L (ref 15–37)
BASOPHILS # BLD: 0 K/UL (ref 0–0.1)
BASOPHILS NFR BLD: 1 % (ref 0–1)
BILIRUB SERPL-MCNC: 0.5 MG/DL (ref 0.2–1)
BUN SERPL-MCNC: 12 MG/DL (ref 6–20)
BUN/CREAT SERPL: 17 (ref 12–20)
CALCIUM SERPL-MCNC: 9.4 MG/DL (ref 8.5–10.1)
CHLORIDE SERPL-SCNC: 102 MMOL/L (ref 97–108)
CHOLEST SERPL-MCNC: 152 MG/DL
CO2 SERPL-SCNC: 33 MMOL/L (ref 21–32)
CREAT SERPL-MCNC: 0.72 MG/DL (ref 0.55–1.02)
CREAT UR-MCNC: 25.2 MG/DL
DIFFERENTIAL METHOD BLD: ABNORMAL
EOSINOPHIL # BLD: 0.1 K/UL (ref 0–0.4)
EOSINOPHIL NFR BLD: 2 % (ref 0–7)
ERYTHROCYTE [DISTWIDTH] IN BLOOD BY AUTOMATED COUNT: 14.8 % (ref 11.5–14.5)
EST. AVERAGE GLUCOSE BLD GHB EST-MCNC: 157 MG/DL
GLOBULIN SER CALC-MCNC: 3.6 G/DL (ref 2–4)
GLUCOSE SERPL-MCNC: 112 MG/DL (ref 65–100)
HBA1C MFR BLD: 7.1 % (ref 4–5.6)
HCT VFR BLD AUTO: 36.3 % (ref 35–47)
HDLC SERPL-MCNC: 61 MG/DL
HDLC SERPL: 2.5 (ref 0–5)
HGB BLD-MCNC: 11.1 G/DL (ref 11.5–16)
IMM GRANULOCYTES # BLD AUTO: 0 K/UL (ref 0–0.04)
IMM GRANULOCYTES NFR BLD AUTO: 0 % (ref 0–0.5)
LDLC SERPL CALC-MCNC: 68.6 MG/DL (ref 0–100)
LYMPHOCYTES # BLD: 1.3 K/UL (ref 0.8–3.5)
LYMPHOCYTES NFR BLD: 39 % (ref 12–49)
MCH RBC QN AUTO: 27.2 PG (ref 26–34)
MCHC RBC AUTO-ENTMCNC: 30.6 G/DL (ref 30–36.5)
MCV RBC AUTO: 89 FL (ref 80–99)
MICROALBUMIN UR-MCNC: <0.5 MG/DL
MICROALBUMIN/CREAT UR-RTO: <20 MG/G (ref 0–30)
MONOCYTES # BLD: 0.3 K/UL (ref 0–1)
MONOCYTES NFR BLD: 9 % (ref 5–13)
NEUTS SEG # BLD: 1.7 K/UL (ref 1.8–8)
NEUTS SEG NFR BLD: 49 % (ref 32–75)
NRBC # BLD: 0 K/UL (ref 0–0.01)
NRBC BLD-RTO: 0 PER 100 WBC
PLATELET # BLD AUTO: 279 K/UL (ref 150–400)
PMV BLD AUTO: 10.5 FL (ref 8.9–12.9)
POTASSIUM SERPL-SCNC: ABNORMAL MMOL/L (ref 3.5–5.1)
PROT SERPL-MCNC: 7.6 G/DL (ref 6.4–8.2)
RBC # BLD AUTO: 4.08 M/UL (ref 3.8–5.2)
SODIUM SERPL-SCNC: 136 MMOL/L (ref 136–145)
SPECIMEN HOLD: NORMAL
TRIGL SERPL-MCNC: 112 MG/DL
TSH SERPL DL<=0.05 MIU/L-ACNC: 1.41 UIU/ML (ref 0.36–3.74)
VLDLC SERPL CALC-MCNC: 22.4 MG/DL
WBC # BLD AUTO: 3.4 K/UL (ref 3.6–11)

## 2024-08-21 PROCEDURE — 99214 OFFICE O/P EST MOD 30 MIN: CPT | Performed by: INTERNAL MEDICINE

## 2024-08-21 RX ORDER — CLOTRIMAZOLE 1 %
CREAM (GRAM) TOPICAL
Qty: 45 G | Refills: 1 | Status: SHIPPED | OUTPATIENT
Start: 2024-08-21 | End: 2024-08-28

## 2024-08-21 RX ORDER — MONTELUKAST SODIUM 10 MG/1
10 TABLET ORAL DAILY
Qty: 30 TABLET | Refills: 11 | Status: SHIPPED | OUTPATIENT
Start: 2024-08-21

## 2024-08-21 NOTE — PROGRESS NOTES
PROGRESS NOTE  Name: Bethany Cortez   : 1940       ASSESSMENT/ PLAN:     Bethany was seen today for new patient.    Type 2 diabetes mellitus with diabetic nephropathy (HCC): Recheck labs.   Paroxysmal atrial fibrillation (HCC): Follow up with cardiologist, Dr. Olivo.   Coronary artery disease involving native coronary artery of native heart without angina pectoris: Follows with Dr. Olivo.   Essential hypertension: Continue current meds. Last recorded BP was okay in .   Mild persistent chronic asthma with status asthmaticus: Stable. Continue inhaler.   Gastroesophageal reflux disease: Stable. Sees GI, Dr. Baires.   Multiple thyroid nodules: Check TSH.   Severe obesity (BMI 35.0-39.9) with comorbidity (HCC): Work on diet, exercise.   Cervical spondylosis  Foot deformity, hammer toe, achilles tendonitis: Follow up as desired with Dr. Polanco.   Rhinitis: On flonase and zyrtec. Add Singulair.     Return in about 6 months (around 2025) for Hypertension.     I have reviewed the patient's medications and risks/side effects/benefits were discussed. Diagnosis(-es) explained to patient and questions answered. Literature provided where appropriate.                    SUBJECTIVE:   Ms. Bethany Cortez is a 83 y.o. female who presents today for follow up.      Chief Complaint   Patient presents with    Follow-up     BP is high here. At home BP runs 109 - 120 systolic.     She has had some LUQ pain, sharp, comes on suddenly and goes away.     DM: Glucose is \"usually 120 -130\" 133 this morning.     CAD: She sees Dr. Olivo. She denies chest pain.     Asthma: She has seen Dr. Gomez, pulmonology. \"I have just a little bit of asthma. I wheeze a lot, and it may be a combination of the heart, the acid reflux, and the asthma. I stay congested lately.\" She has albuterol inhaler, that she uses occasionally, maybe as often as once a day.     Achilles tendon left: \"I was supposed to have had surgery in August, 
\"Have you been to the ER, urgent care clinic since your last visit?  Hospitalized since your last visit?\"    NO    “Have you seen or consulted any other health care providers outside of [facility] since your last visit?”    NO            Click Here for Release of Records Request      
36.2    Smokeless tobacco: Never   Substance and Sexual Activity    Alcohol use: No    Drug use: No    Sexual activity: Not on file   Other Topics Concern    Not on file   Social History Narrative    Not on file     Social Determinants of Health     Financial Resource Strain: Low Risk  (6/30/2023)    Overall Financial Resource Strain (CARDIA)     Difficulty of Paying Living Expenses: Not hard at all   Food Insecurity: Not on file (8/18/2024)   Transportation Needs: No Transportation Needs (9/25/2023)    PRAPARE - Transportation     Lack of Transportation (Medical): No     Lack of Transportation (Non-Medical): No   Physical Activity: Unknown (2/20/2024)    Exercise Vital Sign     Days of Exercise per Week: 0 days     Minutes of Exercise per Session: Patient declined   Stress: No Stress Concern Present (9/25/2023)    [employer] - Occupational Stress Questionnaire     Feeling of Stress : Not at all   Social Connections: Socially Isolated (9/25/2023)    Social Connection and Isolation Panel [NHANES]     Frequency of Communication with Friends and Family: More than three times a week     Frequency of Social Gatherings with Friends and Family: Three times a week     Attends Protestant Services: Never     Active Member of Clubs or Organizations: No     Attends Club or Organization Meetings: Never     Marital Status:    Intimate Partner Violence: Not on file   Housing Stability: Unknown (8/18/2024)    Housing Stability Vital Sign     Unable to Pay for Housing in the Last Year: No     Number of Times Moved in the Last Year: Not on file     Homeless in the Last Year: No       Frantz Francois MD

## 2024-09-03 RX ORDER — HYDROCHLOROTHIAZIDE 25 MG/1
25 TABLET ORAL DAILY
Qty: 90 TABLET | Refills: 1 | Status: SHIPPED | OUTPATIENT
Start: 2024-09-03

## 2024-09-09 ENCOUNTER — TELEPHONE (OUTPATIENT)
Age: 84
End: 2024-09-09

## 2024-09-13 ENCOUNTER — TELEMEDICINE (OUTPATIENT)
Age: 84
End: 2024-09-13

## 2024-09-13 DIAGNOSIS — E04.1 THYROID NODULE: ICD-10-CM

## 2024-09-13 DIAGNOSIS — R10.12 ABDOMINAL PAIN, LUQ: Primary | ICD-10-CM

## 2024-09-13 RX ORDER — METHOCARBAMOL 750 MG/1
750 TABLET, FILM COATED ORAL 4 TIMES DAILY PRN
Qty: 40 TABLET | Refills: 0 | Status: SHIPPED | OUTPATIENT
Start: 2024-09-13 | End: 2024-09-23

## 2024-09-13 ASSESSMENT — PATIENT HEALTH QUESTIONNAIRE - PHQ9
SUM OF ALL RESPONSES TO PHQ QUESTIONS 1-9: 0
SUM OF ALL RESPONSES TO PHQ9 QUESTIONS 1 & 2: 0
SUM OF ALL RESPONSES TO PHQ QUESTIONS 1-9: 0
1. LITTLE INTEREST OR PLEASURE IN DOING THINGS: NOT AT ALL
2. FEELING DOWN, DEPRESSED OR HOPELESS: NOT AT ALL

## 2024-09-27 ENCOUNTER — HOSPITAL ENCOUNTER (OUTPATIENT)
Facility: HOSPITAL | Age: 84
Discharge: HOME | End: 2024-09-30
Attending: INTERNAL MEDICINE
Payer: MEDICARE

## 2024-09-27 ENCOUNTER — HOSPITAL ENCOUNTER (OUTPATIENT)
Facility: HOSPITAL | Age: 84
Discharge: HOME | End: 2024-09-30
Attending: ORTHOPAEDIC SURGERY
Payer: MEDICARE

## 2024-09-27 DIAGNOSIS — E11.9 CONTROLLED TYPE 2 DIABETES MELLITUS WITHOUT COMPLICATION, WITHOUT LONG-TERM CURRENT USE OF INSULIN (HCC): ICD-10-CM

## 2024-09-27 DIAGNOSIS — G89.29 CHRONIC HEEL PAIN, LEFT: ICD-10-CM

## 2024-09-27 DIAGNOSIS — M92.62 HAGLUND'S DEFORMITY, LEFT: ICD-10-CM

## 2024-09-27 DIAGNOSIS — M67.874 ACHILLES TENDINOSIS OF LEFT ANKLE: ICD-10-CM

## 2024-09-27 DIAGNOSIS — M79.672 CHRONIC HEEL PAIN, LEFT: ICD-10-CM

## 2024-09-27 DIAGNOSIS — E04.1 THYROID NODULE: ICD-10-CM

## 2024-09-27 PROCEDURE — 76536 US EXAM OF HEAD AND NECK: CPT

## 2024-09-27 PROCEDURE — 73721 MRI JNT OF LWR EXTRE W/O DYE: CPT

## 2024-10-03 ENCOUNTER — TRANSCRIBE ORDERS (OUTPATIENT)
Facility: HOSPITAL | Age: 84
End: 2024-10-03

## 2024-10-03 ENCOUNTER — TELEPHONE (OUTPATIENT)
Age: 84
End: 2024-10-03

## 2024-10-03 DIAGNOSIS — R19.7 DIARRHEA, UNSPECIFIED TYPE: Primary | ICD-10-CM

## 2024-10-03 NOTE — TELEPHONE ENCOUNTER
Pt states that she has had an US and labs done.    She has not heard one thing and would like to know why.  I advised that these results are sent through My Chart.  Pt would like a call to go over as she doesn't understand what all of the results mean.    Thanks.

## 2024-10-03 NOTE — TELEPHONE ENCOUNTER
I advised the patient per , \"Diabetes not adequately controlled--work on diet and exercise. Slight anemia.      Ultrasound results are not yet available.\"     Patient verbalized understanding of information discussed w/ no further questions at this time.

## 2024-10-03 NOTE — TELEPHONE ENCOUNTER
May discuss results w patient.  Labs show:     Diabetes not adequately controlled--work on diet and exercise.     Slight anemia.     Ultrasound results are not yet available.     BJF

## 2024-10-05 DIAGNOSIS — I10 ESSENTIAL (PRIMARY) HYPERTENSION: ICD-10-CM

## 2024-10-07 ENCOUNTER — TELEPHONE (OUTPATIENT)
Age: 84
End: 2024-10-07

## 2024-10-07 DIAGNOSIS — R79.89 INCREASED THYROID STIMULATING HORMONE (TSH) LEVEL: ICD-10-CM

## 2024-10-07 DIAGNOSIS — E04.1 THYROID NODULE: Primary | ICD-10-CM

## 2024-10-07 RX ORDER — METOPROLOL SUCCINATE 50 MG/1
50 TABLET, EXTENDED RELEASE ORAL DAILY
Qty: 90 TABLET | Refills: 1 | Status: SHIPPED | OUTPATIENT
Start: 2024-10-07

## 2024-10-07 NOTE — TELEPHONE ENCOUNTER
PCP: Fratnz Francois MD    Last appt: 9/13/2024  Future Appointments   Date Time Provider Department Center   2/27/2025  8:45 AM Frantz Francois MD [facility]       Requested Prescriptions      No prescriptions requested or ordered in this encounter

## 2024-10-09 ENCOUNTER — HOSPITAL ENCOUNTER (OUTPATIENT)
Facility: HOSPITAL | Age: 84
Discharge: HOME | End: 2024-10-12
Payer: MEDICARE

## 2024-10-09 DIAGNOSIS — R19.7 DIARRHEA, UNSPECIFIED TYPE: ICD-10-CM

## 2024-10-09 PROCEDURE — 74019 RADEX ABDOMEN 2 VIEWS: CPT

## 2024-10-14 RX ORDER — CLOTRIMAZOLE 1 %
CREAM (GRAM) TOPICAL
Qty: 45 G | Refills: 1 | Status: SHIPPED | OUTPATIENT
Start: 2024-10-14

## 2024-11-11 RX ORDER — CETIRIZINE HYDROCHLORIDE 10 MG/1
TABLET ORAL
Qty: 90 TABLET | Refills: 0 | OUTPATIENT
Start: 2024-11-11

## 2024-11-12 NOTE — TELEPHONE ENCOUNTER
cetirizine (ZYRTEC) 10 MG tablet    Pt states pharm is waiting on refill.  I did advise that it is a 48/72 hour turn around on refills.

## 2024-11-15 ENCOUNTER — HOSPITAL ENCOUNTER (OUTPATIENT)
Facility: HOSPITAL | Age: 84
Discharge: HOME | End: 2024-11-18
Attending: OTOLARYNGOLOGY
Payer: MEDICARE

## 2024-11-15 DIAGNOSIS — Z78.9 NON-SMOKER: ICD-10-CM

## 2024-11-15 DIAGNOSIS — E04.1 THYROID NODULE: ICD-10-CM

## 2024-11-15 PROCEDURE — 88173 CYTOPATH EVAL FNA REPORT: CPT

## 2024-11-15 PROCEDURE — 88172 CYTP DX EVAL FNA 1ST EA SITE: CPT

## 2024-11-15 PROCEDURE — 10005 FNA BX W/US GDN 1ST LES: CPT

## 2024-11-15 PROCEDURE — 2500000003 HC RX 250 WO HCPCS

## 2024-11-15 RX ORDER — CETIRIZINE HYDROCHLORIDE 10 MG/1
TABLET ORAL
Qty: 90 TABLET | Refills: 1 | Status: SHIPPED | OUTPATIENT
Start: 2024-11-15

## 2024-11-15 RX ORDER — LIDOCAINE HYDROCHLORIDE 10 MG/ML
10 INJECTION, SOLUTION EPIDURAL; INFILTRATION; INTRACAUDAL; PERINEURAL ONCE
Status: COMPLETED | OUTPATIENT
Start: 2024-11-15 | End: 2024-11-15

## 2024-11-15 RX ADMIN — LIDOCAINE HYDROCHLORIDE 10 ML: 10 INJECTION, SOLUTION EPIDURAL; INFILTRATION; INTRACAUDAL; PERINEURAL at 14:05

## 2024-11-15 NOTE — TELEPHONE ENCOUNTER
Caller requests Refill of:    cetirizine (ZYRTEC) 10 MG tablet       Please send to:    [facility]/pharmacy #[id number] - [place], [place] - [address] - P [phone] - F [phone]  [address]  [facility] [address]  Phone: [phone] Fax: [phone]         Visit / Appointment History:  Future Appointment at [facility]:  2/27/2025   Last Appointment With PCP:  9/13/2024       Caller confirmed instructions and dosages as correct.    Caller was advised that Meds will be refilled as soon as possible, however there can be a 48-72 business hour turn around on refill requests.

## 2024-11-15 NOTE — TELEPHONE ENCOUNTER
Future Appointments:  Future Appointments   Date Time Provider Department Center   2/27/2025  8:45 AM Frantz Francois MD [facility] DEP        Last Appointment With Me:  9/13/2024     Requested Prescriptions     Pending Prescriptions Disp Refills    cetirizine (ZYRTEC) 10 MG tablet 90 tablet 1     Sig: TAKE 1 TABLET BY MOUTH EVERY DAY

## 2024-12-05 ENCOUNTER — ANESTHESIA EVENT (OUTPATIENT)
Facility: HOSPITAL | Age: 84
End: 2024-12-05
Payer: MEDICARE

## 2024-12-05 ENCOUNTER — HOSPITAL ENCOUNTER (OUTPATIENT)
Facility: HOSPITAL | Age: 84
Setting detail: OUTPATIENT SURGERY
Discharge: HOME | End: 2024-12-05
Attending: INTERNAL MEDICINE | Admitting: INTERNAL MEDICINE
Payer: MEDICARE

## 2024-12-05 ENCOUNTER — ANESTHESIA (OUTPATIENT)
Facility: HOSPITAL | Age: 84
End: 2024-12-05
Payer: MEDICARE

## 2024-12-05 VITALS
WEIGHT: 202 LBS | HEART RATE: 77 BPM | TEMPERATURE: 97.9 F | OXYGEN SATURATION: 98 % | SYSTOLIC BLOOD PRESSURE: 138 MMHG | HEIGHT: 62 IN | RESPIRATION RATE: 21 BRPM | BODY MASS INDEX: 37.17 KG/M2 | DIASTOLIC BLOOD PRESSURE: 64 MMHG

## 2024-12-05 LAB
GLUCOSE BLD STRIP.AUTO-MCNC: 93 MG/DL (ref 65–117)
SERVICE CMNT-IMP: NORMAL

## 2024-12-05 PROCEDURE — 7100000010 HC PHASE II RECOVERY - FIRST 15 MIN: Performed by: INTERNAL MEDICINE

## 2024-12-05 PROCEDURE — 7100000011 HC PHASE II RECOVERY - ADDTL 15 MIN: Performed by: INTERNAL MEDICINE

## 2024-12-05 PROCEDURE — C1889 IMPLANT/INSERT DEVICE, NOC: HCPCS | Performed by: INTERNAL MEDICINE

## 2024-12-05 PROCEDURE — 3700000000 HC ANESTHESIA ATTENDED CARE: Performed by: INTERNAL MEDICINE

## 2024-12-05 PROCEDURE — 2580000003 HC RX 258: Performed by: INTERNAL MEDICINE

## 2024-12-05 PROCEDURE — 3600007502: Performed by: INTERNAL MEDICINE

## 2024-12-05 PROCEDURE — 6360000002 HC RX W HCPCS

## 2024-12-05 PROCEDURE — 3700000001 HC ADD 15 MINUTES (ANESTHESIA): Performed by: INTERNAL MEDICINE

## 2024-12-05 PROCEDURE — 88305 TISSUE EXAM BY PATHOLOGIST: CPT

## 2024-12-05 PROCEDURE — 2709999900 HC NON-CHARGEABLE SUPPLY: Performed by: INTERNAL MEDICINE

## 2024-12-05 PROCEDURE — 82962 GLUCOSE BLOOD TEST: CPT

## 2024-12-05 PROCEDURE — 3600007512: Performed by: INTERNAL MEDICINE

## 2024-12-05 DEVICE — WORKING LENGTH 235CM, WORKING CHANNEL 2.8MM
Type: IMPLANTABLE DEVICE | Status: FUNCTIONAL
Brand: RESOLUTION 360 CLIP

## 2024-12-05 RX ORDER — SODIUM CHLORIDE 9 MG/ML
INJECTION, SOLUTION INTRAVENOUS CONTINUOUS
Status: DISCONTINUED | OUTPATIENT
Start: 2024-12-05 | End: 2024-12-05 | Stop reason: HOSPADM

## 2024-12-05 RX ORDER — SODIUM CHLORIDE 0.9 % (FLUSH) 0.9 %
5-40 SYRINGE (ML) INJECTION PRN
Status: DISCONTINUED | OUTPATIENT
Start: 2024-12-05 | End: 2024-12-05 | Stop reason: HOSPADM

## 2024-12-05 RX ORDER — LIDOCAINE HYDROCHLORIDE 20 MG/ML
INJECTION, SOLUTION EPIDURAL; INFILTRATION; INTRACAUDAL; PERINEURAL
Status: DISCONTINUED | OUTPATIENT
Start: 2024-12-05 | End: 2024-12-05 | Stop reason: SDUPTHER

## 2024-12-05 RX ORDER — SODIUM CHLORIDE 9 MG/ML
INJECTION, SOLUTION INTRAVENOUS PRN
Status: DISCONTINUED | OUTPATIENT
Start: 2024-12-05 | End: 2024-12-05 | Stop reason: HOSPADM

## 2024-12-05 RX ORDER — SODIUM CHLORIDE 0.9 % (FLUSH) 0.9 %
5-40 SYRINGE (ML) INJECTION EVERY 12 HOURS SCHEDULED
Status: DISCONTINUED | OUTPATIENT
Start: 2024-12-05 | End: 2024-12-05 | Stop reason: HOSPADM

## 2024-12-05 RX ADMIN — PROPOFOL 20 MG: 10 INJECTION, EMULSION INTRAVENOUS at 16:41

## 2024-12-05 RX ADMIN — PROPOFOL 30 MG: 10 INJECTION, EMULSION INTRAVENOUS at 16:35

## 2024-12-05 RX ADMIN — LIDOCAINE HYDROCHLORIDE 40 MG: 20 INJECTION, SOLUTION EPIDURAL; INFILTRATION; INTRACAUDAL; PERINEURAL at 16:28

## 2024-12-05 RX ADMIN — PROPOFOL 20 MG: 10 INJECTION, EMULSION INTRAVENOUS at 16:38

## 2024-12-05 RX ADMIN — PROPOFOL 50 MG: 10 INJECTION, EMULSION INTRAVENOUS at 16:29

## 2024-12-05 RX ADMIN — SODIUM CHLORIDE: 9 INJECTION, SOLUTION INTRAVENOUS at 16:28

## 2024-12-05 RX ADMIN — PROPOFOL 30 MG: 10 INJECTION, EMULSION INTRAVENOUS at 16:32

## 2024-12-05 RX ADMIN — PROPOFOL 50 MG: 10 INJECTION, EMULSION INTRAVENOUS at 16:28

## 2024-12-05 ASSESSMENT — PAIN SCALES - GENERAL
PAINLEVEL_OUTOF10: 0
PAINLEVEL_OUTOF10: 0

## 2024-12-05 ASSESSMENT — PAIN - FUNCTIONAL ASSESSMENT: PAIN_FUNCTIONAL_ASSESSMENT: NONE - DENIES PAIN

## 2024-12-05 NOTE — OP NOTE
[facility]  [address], [place] [address]      Colonoscopy Operative Report    Bethany Cortez  [id number]  1940      Procedure Type:   Colonoscopy with biopsy     Indications:    Abdominal pain, LLQ, Diarrhea       Pre-operative Diagnosis: see indication above    Post-operative Diagnosis:  See findings below    :  Glynn Nam MD    Surgical Assistant: Circulator: Prince Galvin RN  Endoscopy Technician: Boogie Stanley    Implants:  None    Referring Provider: Frantz Francois MD      Sedation:  MAC anesthesia Propofol      Procedure Details:  After informed consent was obtained with all risks and benefits of procedure explained and preoperative exam completed, the patient was taken to the endoscopy suite and placed in the left lateral decubitus position.  Upon sequential sedation as per above, a digital rectal exam was performed demonstrating internal hemorrhoids.  The Olympus videocolonoscope  was inserted in the rectum and carefully advanced to the cecum, which was identified by the ileocecal valve and appendiceal orifice, terminal ileum.  The cecum was identified by the ileocecal valve and appendiceal orifice.  The quality of preparation was good.  The colonoscope was slowly withdrawn with careful evaluation between folds. Retroflexion in the rectum was completed .     Findings:   Rectum: normal  Sigmoid: severe diverticulosis;  Descending Colon: moderate diverticulosis;  Transverse Colon: normal  Ascending Colon: normal  Cecum: normal  Terminal Ileum: normal    2 random biopsies taken from colon ( ascending colon) to look for any microscopic colitis, there was blood oozing from both  sites, I completely stopped them by placing one hemoclip ( resolution clip) on each site( total of 2 hemoclips)  No more biopsies taken because of that       Specimen Removed:   as above     Complications: None.     EBL:  None.    Impression:     see findings     Recommendations:

## 2024-12-05 NOTE — ANESTHESIA POSTPROCEDURE EVALUATION
Department of Anesthesiology  Postprocedure Note    Patient: Bethany Cortez  MRN: [id number]  YOB: 1940  Date of evaluation: 12/5/2024    Procedure Summary       Date: 12/05/24 Room / Location: [facility] 01 / [facility]    Anesthesia Start: 1619 Anesthesia Stop: 1646    Procedure: COLONOSCOPY WITH BIOPSY WITH POLYP REMOVAL Diagnosis:       Diarrhea, unspecified type      (Diarrhea, unspecified type [R19.7])    Surgeons: Glynn Nam MD Responsible Provider: Sotero Costello DO    Anesthesia Type: MAC ASA Status: 3            Anesthesia Type: MAC    Luis Phase I: Luis Score: 10    Luis Phase II:      Anesthesia Post Evaluation    Patient location during evaluation: PACU  Patient participation: complete - patient participated  Level of consciousness: awake  Pain score: 0  Airway patency: patent  Nausea & Vomiting: no nausea  Cardiovascular status: hemodynamically stable  Respiratory status: acceptable  Hydration status: euvolemic  Comments: Seen, no complaints   Pain management: adequate    No notable events documented.

## 2024-12-05 NOTE — DISCHARGE INSTRUCTIONS
[facility]  [address], [place] [address]    COLON DISCHARGE INSTRUCTIONS    Bethany Cortez  [id number]  1940    Discomfort:  Redness at IV site- apply warm compress to area; if redness or soreness persist- contact your physician  There may be a slight amount of blood passed from the rectum  Gaseous discomfort- walking, belching will help relieve any discomfort  You may not operate a vehicle for 12 hours  You may not engage in an occupation involving machinery or appliances for rest of today  You may not drink alcoholic beverages for at least 12 hours  Avoid making any critical decisions for at least 24 hour  DIET:  You may resume your regular diet - however -  remember your colon is empty and a heavy meal will produce gas.  Avoid these foods:  vegetables, fried / greasy foods, carbonated drinks     ACTIVITY:  You may  resume your normal daily activities it is recommended that you spend the remainder of the day resting -  avoid any strenuous activity.    CALL M.D.  ANY SIGN OF:   Increasing pain, nausea, vomiting  Abdominal distension (swelling)  New increased bleeding (oral or rectal)  Fever (chills)  Pain in chest area  Bloody discharge from nose or mouth  Shortness of breath      Follow-up Instructions:   Call Dr. Glynn Nam for any questions or problems at [phone], and follow up in 3 months    Resume Eliquis on 12/8/2024           ENDOSCOPY FINDINGS:   Your colonoscopy showed only diverticulosis, rest of exam was normal , routine biopsies taken to look for any inflammation .  Telephone # [phone]      Signed By: Glynn Nam MD     12/5/2024  4:55 PM       DISCHARGE SUMMARY from Nurse    The following personal items collected during your admission are returned to you:   Dental Appliance:    Vision:    Hearing Aid:    Jewelry:    Clothing:    Other Valuables:    Valuables sent to safe: Dose (mL/hr) Propofol : *20 mg      Learning About Coronavirus

## 2024-12-05 NOTE — ANESTHESIA PRE PROCEDURE
Department of Anesthesiology  Preprocedure Note       Name:  Bethany Cortez   Age:  83 y.o.  :  1940                                          MRN:  [id number]         Date:  2024      Surgeon: Surgeon(s):  Glynn Nam MD    Procedure: Procedure(s):  COLONOSCOPY WITH BIOPSY WITH POLYP REMOVAL    Medications prior to admission:   Prior to Admission medications    Medication Sig Start Date End Date Taking? Authorizing Provider   cetirizine (ZYRTEC) 10 MG tablet TAKE 1 TABLET BY MOUTH EVERY DAY 11/15/24  Yes Frantz Francois MD   clotrimazole (LOTRIMIN) 1 % cream APPLY TO AFFECTED AREA TWICE A DAY 10/14/24  Yes Frantz Francois MD   metoprolol succinate (TOPROL XL) 50 MG extended release tablet TAKE 1 TABLET BY MOUTH EVERY DAY 10/7/24  Yes Frantz Francois MD   hydroCHLOROthiazide (HYDRODIURIL) 25 MG tablet TAKE 1 TABLET BY MOUTH EVERY DAY 9/3/24  Yes Frantz Francois MD   metFORMIN (GLUCOPHAGE) 500 MG tablet TAKE 1 TABLET BY MOUTH TWO (2) TIMES DAILY (WITH MEALS). PLEASE RESTART METFORMIN ON 2022  Yes Frantz Francois MD   aspirin (ASPIRIN LOW DOSE) 81 MG EC tablet Take 1 tablet by mouth daily 24  Yes Frantz Francois MD   fluticasone (FLONASE) 50 MCG/ACT nasal spray SPRAY 2 SPRAYS BY NASAL ROUTE DAILY 24  Yes Frantz Francois MD   CONTOUR NEXT TEST strip USE TO CHECK BLOOD SUGAR ONCE A DAY 24  Yes Frantz Francois MD   albuterol sulfate HFA (PROVENTIL;VENTOLIN;PROAIR) 108 (90 Base) MCG/ACT inhaler TAKE 2 PUFFS BY MOUTH EVERY 4 HOURS AS NEEDED FOR WHEEZE 10/25/23  Yes Florentin Neil MD   FreeStyle Lancets MISC 1 each by Does not apply route daily 10/25/23  Yes Florentin Neil MD   Blood Glucose Monitoring Suppl ([employer] BLOOD GLUCOSE METER) w/Device KIT Once daily before breakfast 13  Yes Provider, MD Theresa   pantoprazole (PROTONIX) 40 MG tablet TAKE 1 TABLET BY MOUTH EVERY DAY   Yes Automatic Reconciliation, Ar   propafenone (RYTHMOL) 150 MG tablet TAKE 1 TABLET

## 2024-12-05 NOTE — H&P
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)    Risk for falls    Osteoarthritis of acromioclavicular joint    Tingling sensation    Arthritis    Elevated LFTs    Postmenopausal disorder    Anemia    Visual floaters    Vitamin D deficiency    Paroxysmal atrial fibrillation (HCC)    Cyst of right kidney    Numbness and tingling of right leg    Swollen gland    Diabetes mellitus type 2, controlled (HCC)    Multiple thyroid nodules    Intractable migraine with aura without status migrainosus    Chronic asthma with status asthmaticus    Hammer toe of right foot    Postmenopausal state    Spondylitis, cervical (HCC)    SULLY (generalized anxiety disorder)    Venous insufficiency of both lower extremities    Hyperlipemia    GERD (gastroesophageal reflux disease)    Secondary hypercoagulable state (HCC)         Plan:     Endoscopic procedure with sedation     Signed By: Glynn Nam MD     12/5/2024  4:24 PM

## 2024-12-09 RX ORDER — CLOTRIMAZOLE 1 %
CREAM (GRAM) TOPICAL
Qty: 45 G | Refills: 1 | Status: SHIPPED | OUTPATIENT
Start: 2024-12-09

## 2025-01-20 ENCOUNTER — TELEPHONE (OUTPATIENT)
Age: 85
End: 2025-01-20

## 2025-01-20 DIAGNOSIS — M21.969 TYPE 2 DIABETES MELLITUS WITH DIABETIC FOOT DEFORMITY (HCC): Primary | ICD-10-CM

## 2025-01-20 DIAGNOSIS — E11.69 TYPE 2 DIABETES MELLITUS WITH DIABETIC FOOT DEFORMITY (HCC): Primary | ICD-10-CM

## 2025-01-20 NOTE — TELEPHONE ENCOUNTER
Pt called in states needs surgery on achilles but can't schedule surgery due to high A1C.     Pt requesting new lab order to test A1C. Please call to discuss.

## 2025-02-05 RX ORDER — CLOTRIMAZOLE 1 %
CREAM (GRAM) TOPICAL
Qty: 45 G | Refills: 1 | Status: SHIPPED | OUTPATIENT
Start: 2025-02-05

## 2025-02-24 RX ORDER — HYDROCHLOROTHIAZIDE 25 MG/1
25 TABLET ORAL DAILY
Qty: 90 TABLET | Refills: 1 | Status: SHIPPED | OUTPATIENT
Start: 2025-02-24

## 2025-02-27 ENCOUNTER — OFFICE VISIT (OUTPATIENT)
Age: 85
End: 2025-02-27
Payer: MEDICARE

## 2025-02-27 ENCOUNTER — PATIENT MESSAGE (OUTPATIENT)
Age: 85
End: 2025-02-27

## 2025-02-27 VITALS
HEART RATE: 73 BPM | HEIGHT: 62 IN | WEIGHT: 208 LBS | TEMPERATURE: 97.3 F | SYSTOLIC BLOOD PRESSURE: 132 MMHG | DIASTOLIC BLOOD PRESSURE: 71 MMHG | OXYGEN SATURATION: 97 % | BODY MASS INDEX: 38.28 KG/M2

## 2025-02-27 DIAGNOSIS — M21.969 TYPE 2 DIABETES MELLITUS WITH DIABETIC FOOT DEFORMITY (HCC): ICD-10-CM

## 2025-02-27 DIAGNOSIS — E66.01 MORBID (SEVERE) OBESITY DUE TO EXCESS CALORIES: ICD-10-CM

## 2025-02-27 DIAGNOSIS — E04.1 THYROID NODULE: ICD-10-CM

## 2025-02-27 DIAGNOSIS — M21.969 TYPE 2 DIABETES MELLITUS WITH DIABETIC FOOT DEFORMITY (HCC): Primary | ICD-10-CM

## 2025-02-27 DIAGNOSIS — E11.69 TYPE 2 DIABETES MELLITUS WITH DIABETIC FOOT DEFORMITY (HCC): Primary | ICD-10-CM

## 2025-02-27 DIAGNOSIS — E11.69 TYPE 2 DIABETES MELLITUS WITH DIABETIC FOOT DEFORMITY (HCC): ICD-10-CM

## 2025-02-27 DIAGNOSIS — I48.0 PAROXYSMAL ATRIAL FIBRILLATION (HCC): ICD-10-CM

## 2025-02-27 PROCEDURE — 1123F ACP DISCUSS/DSCN MKR DOCD: CPT | Performed by: INTERNAL MEDICINE

## 2025-02-27 PROCEDURE — G8417 CALC BMI ABV UP PARAM F/U: HCPCS | Performed by: INTERNAL MEDICINE

## 2025-02-27 PROCEDURE — 1159F MED LIST DOCD IN RCRD: CPT | Performed by: INTERNAL MEDICINE

## 2025-02-27 PROCEDURE — 3078F DIAST BP <80 MM HG: CPT | Performed by: INTERNAL MEDICINE

## 2025-02-27 PROCEDURE — 1036F TOBACCO NON-USER: CPT | Performed by: INTERNAL MEDICINE

## 2025-02-27 PROCEDURE — 99214 OFFICE O/P EST MOD 30 MIN: CPT | Performed by: INTERNAL MEDICINE

## 2025-02-27 PROCEDURE — 1090F PRES/ABSN URINE INCON ASSESS: CPT | Performed by: INTERNAL MEDICINE

## 2025-02-27 PROCEDURE — 3051F HG A1C>EQUAL 7.0%<8.0%: CPT | Performed by: INTERNAL MEDICINE

## 2025-02-27 PROCEDURE — G8399 PT W/DXA RESULTS DOCUMENT: HCPCS | Performed by: INTERNAL MEDICINE

## 2025-02-27 PROCEDURE — G8427 DOCREV CUR MEDS BY ELIG CLIN: HCPCS | Performed by: INTERNAL MEDICINE

## 2025-02-27 PROCEDURE — 3075F SYST BP GE 130 - 139MM HG: CPT | Performed by: INTERNAL MEDICINE

## 2025-02-27 RX ORDER — AZITHROMYCIN 250 MG/1
TABLET, FILM COATED ORAL
Qty: 6 TABLET | Refills: 0 | Status: SHIPPED | OUTPATIENT
Start: 2025-02-27 | End: 2025-03-09

## 2025-02-27 SDOH — ECONOMIC STABILITY: FOOD INSECURITY: WITHIN THE PAST 12 MONTHS, THE FOOD YOU BOUGHT JUST DIDN'T LAST AND YOU DIDN'T HAVE MONEY TO GET MORE.: NEVER TRUE

## 2025-02-27 SDOH — ECONOMIC STABILITY: FOOD INSECURITY: WITHIN THE PAST 12 MONTHS, YOU WORRIED THAT YOUR FOOD WOULD RUN OUT BEFORE YOU GOT MONEY TO BUY MORE.: NEVER TRUE

## 2025-02-27 ASSESSMENT — PATIENT HEALTH QUESTIONNAIRE - PHQ9
SUM OF ALL RESPONSES TO PHQ QUESTIONS 1-9: 0
1. LITTLE INTEREST OR PLEASURE IN DOING THINGS: NOT AT ALL
SUM OF ALL RESPONSES TO PHQ QUESTIONS 1-9: 0
2. FEELING DOWN, DEPRESSED OR HOPELESS: NOT AT ALL
SUM OF ALL RESPONSES TO PHQ9 QUESTIONS 1 & 2: 0

## 2025-02-27 NOTE — PROGRESS NOTES
\"Have you been to the ER, urgent care clinic since your last visit?  Hospitalized since your last visit?\"    NO    “Have you seen or consulted any other health care providers outside our system since your last visit?”    NO      “Have you had a diabetic eye exam?”    YES - Where: 12/2024 [facility] Nurse/CMA to request most recent records if not in the chart     Date of last diabetic eye exam: 1/23/2024          
Date/Time    WBC 3.4 08/21/2024 10:32 AM    HGB 11.1 08/21/2024 10:32 AM    HCT 36.3 08/21/2024 10:32 AM     08/21/2024 10:32 AM    MCV 89.0 08/21/2024 10:32 AM

## 2025-02-28 ENCOUNTER — TELEPHONE (OUTPATIENT)
Age: 85
End: 2025-02-28

## 2025-02-28 DIAGNOSIS — D64.9 ANEMIA, UNSPECIFIED TYPE: Primary | ICD-10-CM

## 2025-02-28 LAB
ALBUMIN SERPL-MCNC: 3.8 G/DL (ref 3.5–5)
ALBUMIN/GLOB SERPL: 1.1 (ref 1.1–2.2)
ALP SERPL-CCNC: 52 U/L (ref 45–117)
ALT SERPL-CCNC: 23 U/L (ref 12–78)
ANION GAP SERPL CALC-SCNC: 5 MMOL/L (ref 2–12)
AST SERPL-CCNC: 22 U/L (ref 15–37)
BASOPHILS # BLD: 0.02 K/UL (ref 0–0.1)
BASOPHILS NFR BLD: 0.5 % (ref 0–1)
BILIRUB SERPL-MCNC: 0.4 MG/DL (ref 0.2–1)
BUN SERPL-MCNC: 12 MG/DL (ref 6–20)
BUN/CREAT SERPL: 15 (ref 12–20)
CALCIUM SERPL-MCNC: 9.5 MG/DL (ref 8.5–10.1)
CHLORIDE SERPL-SCNC: 101 MMOL/L (ref 97–108)
CHOLEST SERPL-MCNC: 147 MG/DL
CO2 SERPL-SCNC: 32 MMOL/L (ref 21–32)
CREAT SERPL-MCNC: 0.79 MG/DL (ref 0.55–1.02)
DIFFERENTIAL METHOD BLD: ABNORMAL
EOSINOPHIL # BLD: 0.06 K/UL (ref 0–0.4)
EOSINOPHIL NFR BLD: 1.5 % (ref 0–7)
ERYTHROCYTE [DISTWIDTH] IN BLOOD BY AUTOMATED COUNT: 16 % (ref 11.5–14.5)
EST. AVERAGE GLUCOSE BLD GHB EST-MCNC: 154 MG/DL
GLOBULIN SER CALC-MCNC: 3.5 G/DL (ref 2–4)
GLUCOSE SERPL-MCNC: 126 MG/DL (ref 65–100)
HBA1C MFR BLD: 7 % (ref 4–5.6)
HCT VFR BLD AUTO: 33.1 % (ref 35–47)
HDLC SERPL-MCNC: 56 MG/DL
HDLC SERPL: 2.6 (ref 0–5)
HGB BLD-MCNC: 9.8 G/DL (ref 11.5–16)
IMM GRANULOCYTES # BLD AUTO: 0.01 K/UL (ref 0–0.04)
IMM GRANULOCYTES NFR BLD AUTO: 0.2 % (ref 0–0.5)
LDLC SERPL CALC-MCNC: 64.8 MG/DL (ref 0–100)
LYMPHOCYTES # BLD: 1.24 K/UL (ref 0.8–3.5)
LYMPHOCYTES NFR BLD: 30.4 % (ref 12–49)
MCH RBC QN AUTO: 25.3 PG (ref 26–34)
MCHC RBC AUTO-ENTMCNC: 29.6 G/DL (ref 30–36.5)
MCV RBC AUTO: 85.3 FL (ref 80–99)
MONOCYTES # BLD: 0.34 K/UL (ref 0–1)
MONOCYTES NFR BLD: 8.3 % (ref 5–13)
NEUTS SEG # BLD: 2.41 K/UL (ref 1.8–8)
NEUTS SEG NFR BLD: 59.1 % (ref 32–75)
NRBC # BLD: 0 K/UL (ref 0–0.01)
NRBC BLD-RTO: 0 PER 100 WBC
PLATELET # BLD AUTO: 309 K/UL (ref 150–400)
PMV BLD AUTO: 10 FL (ref 8.9–12.9)
POTASSIUM SERPL-SCNC: 4.2 MMOL/L (ref 3.5–5.1)
PROT SERPL-MCNC: 7.3 G/DL (ref 6.4–8.2)
RBC # BLD AUTO: 3.88 M/UL (ref 3.8–5.2)
SODIUM SERPL-SCNC: 138 MMOL/L (ref 136–145)
TRIGL SERPL-MCNC: 131 MG/DL
TSH SERPL DL<=0.05 MIU/L-ACNC: 1.62 UIU/ML (ref 0.36–3.74)
VLDLC SERPL CALC-MCNC: 26.2 MG/DL
WBC # BLD AUTO: 4.1 K/UL (ref 3.6–11)

## 2025-02-28 NOTE — TELEPHONE ENCOUNTER
Pt would like to know if she is to take the Jardiance and Metformin together?    Please call to advise.

## 2025-03-23 RX ORDER — ALBUTEROL SULFATE 90 UG/1
INHALANT RESPIRATORY (INHALATION)
Qty: 18 EACH | Refills: 1 | Status: SHIPPED | OUTPATIENT
Start: 2025-03-23

## 2025-03-25 ENCOUNTER — RESULTS FOLLOW-UP (OUTPATIENT)
Facility: CLINIC | Age: 85
End: 2025-03-25

## 2025-03-25 NOTE — TELEPHONE ENCOUNTER
The patient had questions about her lab results. I explained to her per , \"she is anemic. Let's check CBC again along with iron profile. She may need to go back to GI, Dr. Nam.\"    I let her know  ordered additional labs. I sent them via [employer] to her. Patient verbalized understanding of information discussed w/ no further questions at this time.

## 2025-03-27 ENCOUNTER — LAB (OUTPATIENT)
Age: 85
End: 2025-03-27

## 2025-03-27 DIAGNOSIS — D64.9 ANEMIA, UNSPECIFIED TYPE: ICD-10-CM

## 2025-03-27 LAB
ANION GAP SERPL CALC-SCNC: 8 MMOL/L (ref 2–12)
BASOPHILS # BLD: 0.03 K/UL (ref 0–0.1)
BASOPHILS NFR BLD: 0.9 % (ref 0–1)
BUN SERPL-MCNC: 11 MG/DL (ref 6–20)
BUN/CREAT SERPL: 15 (ref 12–20)
CALCIUM SERPL-MCNC: 9 MG/DL (ref 8.5–10.1)
CHLORIDE SERPL-SCNC: 103 MMOL/L (ref 97–108)
CO2 SERPL-SCNC: 30 MMOL/L (ref 21–32)
CREAT SERPL-MCNC: 0.75 MG/DL (ref 0.55–1.02)
DIFFERENTIAL METHOD BLD: ABNORMAL
EOSINOPHIL # BLD: 0.05 K/UL (ref 0–0.4)
EOSINOPHIL NFR BLD: 1.5 % (ref 0–7)
ERYTHROCYTE [DISTWIDTH] IN BLOOD BY AUTOMATED COUNT: 15.8 % (ref 11.5–14.5)
FERRITIN SERPL-MCNC: 6 NG/ML (ref 8–252)
GLUCOSE SERPL-MCNC: 123 MG/DL (ref 65–100)
HAPTOGLOB SERPL-MCNC: 138 MG/DL (ref 30–200)
HCT VFR BLD AUTO: 32.1 % (ref 35–47)
HGB BLD-MCNC: 9.6 G/DL (ref 11.5–16)
IMM GRANULOCYTES # BLD AUTO: 0.01 K/UL (ref 0–0.04)
IMM GRANULOCYTES NFR BLD AUTO: 0.3 % (ref 0–0.5)
IRON SATN MFR SERPL: 8 % (ref 20–50)
IRON SERPL-MCNC: 32 UG/DL (ref 35–150)
LYMPHOCYTES # BLD: 0.92 K/UL (ref 0.8–3.5)
LYMPHOCYTES NFR BLD: 27 % (ref 12–49)
MCH RBC QN AUTO: 25.1 PG (ref 26–34)
MCHC RBC AUTO-ENTMCNC: 29.9 G/DL (ref 30–36.5)
MCV RBC AUTO: 84 FL (ref 80–99)
MONOCYTES # BLD: 0.27 K/UL (ref 0–1)
MONOCYTES NFR BLD: 7.9 % (ref 5–13)
NEUTS SEG # BLD: 2.13 K/UL (ref 1.8–8)
NEUTS SEG NFR BLD: 62.4 % (ref 32–75)
NRBC # BLD: 0 K/UL (ref 0–0.01)
NRBC BLD-RTO: 0 PER 100 WBC
PLATELET # BLD AUTO: 349 K/UL (ref 150–400)
PMV BLD AUTO: 10 FL (ref 8.9–12.9)
POTASSIUM SERPL-SCNC: 3.9 MMOL/L (ref 3.5–5.1)
RBC # BLD AUTO: 3.82 M/UL (ref 3.8–5.2)
RETICS # AUTO: 0.06 M/UL (ref 0.02–0.08)
RETICS/RBC NFR AUTO: 1.5 % (ref 0.7–2.1)
SODIUM SERPL-SCNC: 141 MMOL/L (ref 136–145)
TIBC SERPL-MCNC: 395 UG/DL (ref 250–450)
WBC # BLD AUTO: 3.4 K/UL (ref 3.6–11)

## 2025-03-28 ENCOUNTER — TRANSCRIBE ORDERS (OUTPATIENT)
Facility: HOSPITAL | Age: 85
End: 2025-03-28

## 2025-03-28 ENCOUNTER — RESULTS FOLLOW-UP (OUTPATIENT)
Age: 85
End: 2025-03-28

## 2025-03-28 DIAGNOSIS — Z12.31 ENCOUNTER FOR MAMMOGRAM TO ESTABLISH BASELINE MAMMOGRAM: Primary | ICD-10-CM

## 2025-04-03 ENCOUNTER — TELEPHONE (OUTPATIENT)
Age: 85
End: 2025-04-03

## 2025-04-03 NOTE — TELEPHONE ENCOUNTER
Pt states believes has thrush. Pt states experiencing white coating on tongue and small red spots on tongue, states may have came from something she ate.    Confirmed pharmacy on file.    Please call to discuss.

## 2025-04-04 RX ORDER — NYSTATIN 100000 [USP'U]/ML
500000 SUSPENSION ORAL 4 TIMES DAILY
Qty: 140 ML | Refills: 0 | Status: SHIPPED | OUTPATIENT
Start: 2025-04-04 | End: 2025-04-04 | Stop reason: SDUPTHER

## 2025-04-04 RX ORDER — NYSTATIN 100000 [USP'U]/ML
500000 SUSPENSION ORAL 4 TIMES DAILY
Qty: 140 ML | Refills: 0 | Status: SHIPPED | OUTPATIENT
Start: 2025-04-04 | End: 2025-04-11

## 2025-04-04 RX ORDER — CLOTRIMAZOLE 1 %
CREAM (GRAM) TOPICAL 2 TIMES DAILY
Qty: 45 G | Refills: 1 | Status: SHIPPED | OUTPATIENT
Start: 2025-04-04

## 2025-04-05 DIAGNOSIS — E11.21 TYPE 2 DIABETES MELLITUS WITH DIABETIC NEPHROPATHY (HCC): ICD-10-CM

## 2025-04-05 DIAGNOSIS — I10 ESSENTIAL (PRIMARY) HYPERTENSION: ICD-10-CM

## 2025-04-07 RX ORDER — METOPROLOL SUCCINATE 50 MG/1
50 TABLET, EXTENDED RELEASE ORAL DAILY
Qty: 90 TABLET | Refills: 1 | Status: SHIPPED | OUTPATIENT
Start: 2025-04-07

## 2025-04-10 RX ORDER — FLUTICASONE PROPIONATE 50 MCG
SPRAY, SUSPENSION (ML) NASAL
Qty: 48 ML | Refills: 3 | Status: SHIPPED | OUTPATIENT
Start: 2025-04-10

## 2025-04-28 RX ORDER — ASPIRIN 81 MG/1
81 TABLET ORAL DAILY
Qty: 90 TABLET | Refills: 3 | Status: SHIPPED | OUTPATIENT
Start: 2025-04-28

## 2025-04-28 RX ORDER — ASPIRIN 81 MG/1
81 TABLET, COATED ORAL DAILY
Qty: 90 TABLET | Refills: 3 | OUTPATIENT
Start: 2025-04-28

## 2025-04-28 NOTE — TELEPHONE ENCOUNTER
Pt states she is waiting on the aspirin and was told we denied this.    The encounter was closed?

## 2025-05-12 RX ORDER — CETIRIZINE HYDROCHLORIDE 10 MG/1
10 TABLET ORAL DAILY
Qty: 90 TABLET | Refills: 1 | Status: SHIPPED | OUTPATIENT
Start: 2025-05-12

## 2025-05-13 ENCOUNTER — HOSPITAL ENCOUNTER (OUTPATIENT)
Facility: HOSPITAL | Age: 85
Discharge: HOME | End: 2025-05-16
Attending: INTERNAL MEDICINE
Payer: MEDICARE

## 2025-05-13 DIAGNOSIS — Z12.31 ENCOUNTER FOR MAMMOGRAM TO ESTABLISH BASELINE MAMMOGRAM: ICD-10-CM

## 2025-05-13 PROCEDURE — 77067 SCR MAMMO BI INCL CAD: CPT

## 2025-05-19 PROBLEM — I87.2 VENOUS INSUFFICIENCY OF BOTH LOWER EXTREMITIES: Status: RESOLVED | Noted: 2019-08-13 | Resolved: 2025-05-19

## 2025-05-19 PROBLEM — J45.902: Status: RESOLVED | Noted: 2017-01-26 | Resolved: 2025-05-19

## 2025-05-19 PROBLEM — S99.921A RIGHT FOOT INJURY: Status: RESOLVED | Noted: 2017-05-02 | Resolved: 2025-05-19

## 2025-05-19 PROBLEM — R10.12 LEFT UPPER QUADRANT PAIN: Status: RESOLVED | Noted: 2017-06-13 | Resolved: 2025-05-19

## 2025-05-19 PROBLEM — R19.02 ABDOMINAL MASS, LEFT UPPER QUADRANT: Status: RESOLVED | Noted: 2017-06-13 | Resolved: 2025-05-19

## 2025-06-02 ENCOUNTER — TELEPHONE (OUTPATIENT)
Age: 85
End: 2025-06-02

## 2025-06-02 RX ORDER — CLOTRIMAZOLE 1 %
CREAM (GRAM) TOPICAL 2 TIMES DAILY
Qty: 45 G | Refills: 1 | Status: SHIPPED | OUTPATIENT
Start: 2025-06-02

## 2025-06-02 NOTE — TELEPHONE ENCOUNTER
Pt states she needs a call back as she had labs done a month ago with no call to discuss results.  Why?    Pt also states that her face is numb along with her lips.  Once she gets this a headache comes on.      Pt doesn't know who she sees for this condition?     Please call pt.

## 2025-06-11 ENCOUNTER — OFFICE VISIT (OUTPATIENT)
Age: 85
End: 2025-06-11
Payer: MEDICARE

## 2025-06-11 VITALS
TEMPERATURE: 98.1 F | WEIGHT: 206.4 LBS | SYSTOLIC BLOOD PRESSURE: 139 MMHG | RESPIRATION RATE: 18 BRPM | HEART RATE: 68 BPM | DIASTOLIC BLOOD PRESSURE: 79 MMHG | OXYGEN SATURATION: 98 % | HEIGHT: 62 IN | BODY MASS INDEX: 37.98 KG/M2

## 2025-06-11 DIAGNOSIS — R20.0 RIGHT FACIAL NUMBNESS: ICD-10-CM

## 2025-06-11 DIAGNOSIS — D50.9 IRON DEFICIENCY ANEMIA, UNSPECIFIED IRON DEFICIENCY ANEMIA TYPE: Primary | ICD-10-CM

## 2025-06-11 PROCEDURE — G8399 PT W/DXA RESULTS DOCUMENT: HCPCS | Performed by: INTERNAL MEDICINE

## 2025-06-11 PROCEDURE — 1036F TOBACCO NON-USER: CPT | Performed by: INTERNAL MEDICINE

## 2025-06-11 PROCEDURE — 1090F PRES/ABSN URINE INCON ASSESS: CPT | Performed by: INTERNAL MEDICINE

## 2025-06-11 PROCEDURE — 3075F SYST BP GE 130 - 139MM HG: CPT | Performed by: INTERNAL MEDICINE

## 2025-06-11 PROCEDURE — 99214 OFFICE O/P EST MOD 30 MIN: CPT | Performed by: INTERNAL MEDICINE

## 2025-06-11 PROCEDURE — G8427 DOCREV CUR MEDS BY ELIG CLIN: HCPCS | Performed by: INTERNAL MEDICINE

## 2025-06-11 PROCEDURE — 1159F MED LIST DOCD IN RCRD: CPT | Performed by: INTERNAL MEDICINE

## 2025-06-11 PROCEDURE — 1123F ACP DISCUSS/DSCN MKR DOCD: CPT | Performed by: INTERNAL MEDICINE

## 2025-06-11 PROCEDURE — 3078F DIAST BP <80 MM HG: CPT | Performed by: INTERNAL MEDICINE

## 2025-06-11 PROCEDURE — G8417 CALC BMI ABV UP PARAM F/U: HCPCS | Performed by: INTERNAL MEDICINE

## 2025-06-11 RX ORDER — FERROUS SULFATE 325(65) MG
325 TABLET ORAL
Qty: 90 TABLET | Refills: 3 | Status: SHIPPED | OUTPATIENT
Start: 2025-06-11

## 2025-06-11 RX ORDER — BUSPIRONE HYDROCHLORIDE 10 MG/1
10 TABLET ORAL 3 TIMES DAILY
Qty: 90 TABLET | Refills: 0 | Status: SHIPPED | OUTPATIENT
Start: 2025-06-11 | End: 2025-07-11

## 2025-06-11 SDOH — ECONOMIC STABILITY: FOOD INSECURITY: WITHIN THE PAST 12 MONTHS, YOU WORRIED THAT YOUR FOOD WOULD RUN OUT BEFORE YOU GOT MONEY TO BUY MORE.: NEVER TRUE

## 2025-06-11 SDOH — ECONOMIC STABILITY: FOOD INSECURITY: WITHIN THE PAST 12 MONTHS, THE FOOD YOU BOUGHT JUST DIDN'T LAST AND YOU DIDN'T HAVE MONEY TO GET MORE.: NEVER TRUE

## 2025-06-11 ASSESSMENT — PATIENT HEALTH QUESTIONNAIRE - PHQ9
SUM OF ALL RESPONSES TO PHQ QUESTIONS 1-9: 0
2. FEELING DOWN, DEPRESSED OR HOPELESS: NOT AT ALL
SUM OF ALL RESPONSES TO PHQ QUESTIONS 1-9: 0
1. LITTLE INTEREST OR PLEASURE IN DOING THINGS: NOT AT ALL

## 2025-06-11 NOTE — PROGRESS NOTES
Chief Complaint   Patient presents with    Numbness     Onset 1 week ago, comes and goes. Numbness starts on right side of face then moves into lips    Discuss Labs     \"Have you been to the ER, urgent care clinic since your last visit?  Hospitalized since your last visit?\"    Yes, [facility] for sore throat    “Have you seen or consulted any other health care providers outside of [facility] since your last visit?”    NO           
Patient Position: Sitting)   Pulse 68   Temp 98.1 °F (36.7 °C)   Resp 18   Ht 1.575 m (5' 2\")   Wt 93.6 kg (206 lb 6.4 oz)   SpO2 98%   BMI 37.75 kg/m²   Gen: Pleasant 84 y.o.  female in NAD.   HEENT: PERRLA. EOMI. OP moist and pink.  Neck: Supple.  No LAD.  HEART: RRR, No M/G/R.   LUNGS: CTAB No W/R.   ABDOMEN: S, NT, ND, BS+.   EXTREMITIES: Warm. No C/C/E. MUSCULOSKELETAL: Normal ROM, muscle strength 5/5 all groups. NEURO: Alert and oriented x 3.  Cranial nerves grossly intact.  No focal sensory or motor deficits noted. SKIN: Warm. Dry. No rashes or other lesions noted.

## 2025-06-12 ENCOUNTER — HOSPITAL ENCOUNTER (OUTPATIENT)
Facility: HOSPITAL | Age: 85
Discharge: HOME | End: 2025-06-14
Attending: INTERNAL MEDICINE
Payer: MEDICARE

## 2025-06-12 DIAGNOSIS — R20.0 RIGHT FACIAL NUMBNESS: ICD-10-CM

## 2025-06-12 LAB
VAS LEFT CCA DIST EDV: 19.2 CM/S
VAS LEFT CCA DIST PSV: 64.6 CM/S
VAS LEFT CCA PROX EDV: 25.7 CM/S
VAS LEFT CCA PROX PSV: 131.9 CM/S
VAS LEFT ECA EDV: 8.5 CM/S
VAS LEFT ECA PSV: 60 CM/S
VAS LEFT ICA DIST EDV: 21.2 CM/S
VAS LEFT ICA DIST PSV: 79.6 CM/S
VAS LEFT ICA MID EDV: 30.5 CM/S
VAS LEFT ICA MID PSV: 105.1 CM/S
VAS LEFT ICA PROX EDV: 16.7 CM/S
VAS LEFT ICA PROX PSV: 46.2 CM/S
VAS LEFT ICA/CCA PSV: 1.6 NO UNITS
VAS LEFT SUBCLAVIAN PROX EDV: 0 CM/S
VAS LEFT SUBCLAVIAN PROX PSV: 168.2 CM/S
VAS LEFT VERTEBRAL EDV: 17.1 CM/S
VAS LEFT VERTEBRAL PSV: 60 CM/S
VAS RIGHT CCA DIST EDV: 20.8 CM/S
VAS RIGHT CCA DIST PSV: 69.9 CM/S
VAS RIGHT CCA PROX EDV: 19.5 CM/S
VAS RIGHT CCA PROX PSV: 113.9 CM/S
VAS RIGHT ECA EDV: 7.7 CM/S
VAS RIGHT ECA PSV: 88 CM/S
VAS RIGHT ICA DIST EDV: 14.7 CM/S
VAS RIGHT ICA DIST PSV: 50.6 CM/S
VAS RIGHT ICA MID EDV: 24.1 CM/S
VAS RIGHT ICA MID PSV: 78.1 CM/S
VAS RIGHT ICA PROX EDV: 15.3 CM/S
VAS RIGHT ICA PROX PSV: 39.4 CM/S
VAS RIGHT ICA/CCA PSV: 1.1 NO UNITS
VAS RIGHT SUBCLAVIAN PROX EDV: 0 CM/S
VAS RIGHT SUBCLAVIAN PROX PSV: 85.4 CM/S
VAS RIGHT VERTEBRAL EDV: 15.4 CM/S
VAS RIGHT VERTEBRAL PSV: 51.1 CM/S

## 2025-06-12 PROCEDURE — 93880 EXTRACRANIAL BILAT STUDY: CPT

## 2025-06-16 ENCOUNTER — RESULTS FOLLOW-UP (OUTPATIENT)
Age: 85
End: 2025-06-16

## 2025-06-16 DIAGNOSIS — E11.69 TYPE 2 DIABETES MELLITUS WITH DIABETIC FOOT DEFORMITY (HCC): ICD-10-CM

## 2025-06-16 DIAGNOSIS — M21.969 TYPE 2 DIABETES MELLITUS WITH DIABETIC FOOT DEFORMITY (HCC): ICD-10-CM

## 2025-06-19 ENCOUNTER — HOSPITAL ENCOUNTER (OUTPATIENT)
Facility: HOSPITAL | Age: 85
Discharge: HOME | End: 2025-06-22
Attending: INTERNAL MEDICINE
Payer: MEDICARE

## 2025-06-19 DIAGNOSIS — R20.0 RIGHT FACIAL NUMBNESS: ICD-10-CM

## 2025-06-19 LAB — CREAT BLD-MCNC: 1.1 MG/DL (ref 0.6–1.3)

## 2025-06-19 PROCEDURE — 70470 CT HEAD/BRAIN W/O & W/DYE: CPT

## 2025-06-19 PROCEDURE — 6360000004 HC RX CONTRAST MEDICATION: Performed by: INTERNAL MEDICINE

## 2025-06-19 PROCEDURE — 82565 ASSAY OF CREATININE: CPT

## 2025-06-19 RX ORDER — IOPAMIDOL 755 MG/ML
100 INJECTION, SOLUTION INTRAVASCULAR
Status: COMPLETED | OUTPATIENT
Start: 2025-06-19 | End: 2025-06-19

## 2025-06-19 RX ADMIN — IOPAMIDOL 100 ML: 755 INJECTION, SOLUTION INTRAVENOUS at 14:28

## 2025-06-23 DIAGNOSIS — G50.0 TRIGEMINAL NEURALGIA: Primary | ICD-10-CM

## 2025-07-07 RX ORDER — BUSPIRONE HYDROCHLORIDE 10 MG/1
10 TABLET ORAL 3 TIMES DAILY
Qty: 270 TABLET | Refills: 1 | Status: SHIPPED | OUTPATIENT
Start: 2025-07-07

## 2025-08-01 RX ORDER — CLOTRIMAZOLE 1 %
CREAM (GRAM) TOPICAL 2 TIMES DAILY
Qty: 45 G | Refills: 1 | Status: SHIPPED | OUTPATIENT
Start: 2025-08-01

## 2025-08-18 RX ORDER — HYDROCHLOROTHIAZIDE 25 MG/1
25 TABLET ORAL DAILY
Qty: 90 TABLET | Refills: 1 | Status: SHIPPED | OUTPATIENT
Start: 2025-08-18

## 2025-08-29 ENCOUNTER — TELEMEDICINE (OUTPATIENT)
Age: 85
End: 2025-08-29
Payer: MEDICARE

## 2025-08-29 DIAGNOSIS — M21.969 TYPE 2 DIABETES MELLITUS WITH DIABETIC FOOT DEFORMITY (HCC): Primary | ICD-10-CM

## 2025-08-29 DIAGNOSIS — I48.0 PAROXYSMAL ATRIAL FIBRILLATION (HCC): ICD-10-CM

## 2025-08-29 DIAGNOSIS — I10 ESSENTIAL (PRIMARY) HYPERTENSION: ICD-10-CM

## 2025-08-29 DIAGNOSIS — E11.69 TYPE 2 DIABETES MELLITUS WITH DIABETIC FOOT DEFORMITY (HCC): Primary | ICD-10-CM

## 2025-08-29 DIAGNOSIS — D50.9 IRON DEFICIENCY ANEMIA, UNSPECIFIED IRON DEFICIENCY ANEMIA TYPE: ICD-10-CM

## 2025-08-29 DIAGNOSIS — E04.1 THYROID NODULE: ICD-10-CM

## 2025-08-29 PROCEDURE — 99214 OFFICE O/P EST MOD 30 MIN: CPT | Performed by: INTERNAL MEDICINE

## 2025-08-29 PROCEDURE — 3051F HG A1C>EQUAL 7.0%<8.0%: CPT | Performed by: INTERNAL MEDICINE

## 2025-08-29 PROCEDURE — G8427 DOCREV CUR MEDS BY ELIG CLIN: HCPCS | Performed by: INTERNAL MEDICINE

## 2025-08-29 PROCEDURE — 1036F TOBACCO NON-USER: CPT | Performed by: INTERNAL MEDICINE

## 2025-08-29 PROCEDURE — 1090F PRES/ABSN URINE INCON ASSESS: CPT | Performed by: INTERNAL MEDICINE

## 2025-08-29 PROCEDURE — G8399 PT W/DXA RESULTS DOCUMENT: HCPCS | Performed by: INTERNAL MEDICINE

## 2025-08-29 PROCEDURE — 1123F ACP DISCUSS/DSCN MKR DOCD: CPT | Performed by: INTERNAL MEDICINE

## 2025-08-29 PROCEDURE — G8417 CALC BMI ABV UP PARAM F/U: HCPCS | Performed by: INTERNAL MEDICINE

## 2025-08-29 PROCEDURE — 1159F MED LIST DOCD IN RCRD: CPT | Performed by: INTERNAL MEDICINE

## (undated) DEVICE — QUILTED PREMIUM COMFORT UNDERPAD,EXTRA HEAVY: Brand: WINGS

## (undated) DEVICE — Z DISCONTINUED NO SUB IDED SET EXTN W/ 4 W STPCOCK M SPIN LOK 36IN

## (undated) DEVICE — SPONGE GZ W4XL4IN COT 12 PLY TYP VII WVN C FLD DSGN

## (undated) DEVICE — CONNECTOR TBNG AUX H2O JET DISP FOR OLY 160/180 SER

## (undated) DEVICE — 1200 GUARD II KIT W/5MM TUBE W/O VAC TUBE: Brand: GUARDIAN

## (undated) DEVICE — Z DISCONTINUED USE 2751540 TUBING IRRIG L10IN DISP PMP ENDOGATOR

## (undated) DEVICE — PAD,ABDOMINAL,5"X9",ST,LF,25/BX: Brand: MEDLINE INDUSTRIES, INC.

## (undated) DEVICE — BAG SPEC BIOHZD LF 2MIL 6X10IN -- CONVERT TO ITEM 357326

## (undated) DEVICE — CATH IV AUTOGRD BC BLU 22GA 25 -- INSYTE

## (undated) DEVICE — GUIDEWIRE VASC L260CM 0.035IN J TIP L3MM PTFE FIX COR NAMIC

## (undated) DEVICE — Z CONVERTED USE 2274305 CUFF BLD PRSS AD L SZ 12 FOR 32-43CM LIMB VLY SFT W/O TUBE

## (undated) DEVICE — SUPPLEMENT DIGESTIVE H2O SOL GI-EASE

## (undated) DEVICE — Device

## (undated) DEVICE — FORCEPS BX L240CM JAW DIA2.8MM L CAP W/ NDL MIC MESH TOOTH

## (undated) DEVICE — GLIDESHEATH SLENDER ACCESS KIT: Brand: GLIDESHEATH SLENDER

## (undated) DEVICE — SET EXTN TBNG L BOR 4 W STPCOCK ST 32IN PRIMING VOL 6ML

## (undated) DEVICE — COVER,MAYO STAND,STERILE: Brand: MEDLINE

## (undated) DEVICE — BAG BELONG PT PERS CLEAR HANDL

## (undated) DEVICE — CONTAINER SPEC 20 ML LID NEUT BUFF FORMALIN 10 % POLYPR STS

## (undated) DEVICE — DRAIN KT WND 10FR RND 400ML --

## (undated) DEVICE — ENDO CARRY-ON PROCEDURE KIT INCLUDES ENZYMATIC SPONGE, GAUZE, BIOHAZARD LABEL, TRAY, LUBRICANT, DIRTY SCOPE LABEL, WATER LABEL, TRAY, DRAWSTRING PAD, AND DEFENDO 4-PIECE KIT.: Brand: ENDO CARRY-ON PROCEDURE KIT

## (undated) DEVICE — BIPOLAR IRRIGATOR INTEGRATED TUBING AND BIPOLAR CORD SET, DISPOSABLE

## (undated) DEVICE — 3M™ TEGADERM™ TRANSPARENT FILM DRESSING FRAME STYLE, 1626W, 4 IN X 4-3/4 IN (10 CM X 12 CM), 50/CT 4CT/CASE: Brand: 3M™ TEGADERM™

## (undated) DEVICE — PACK PROCEDURE SURG HRT CATH

## (undated) DEVICE — CANN NASAL O2 CAPNOGRAPHY AD -- FILTERLINE

## (undated) DEVICE — FORCEPS BX L240CM JAW DIA2.4MM ORNG L CAP W/ NDL DISP RAD

## (undated) DEVICE — COVER,TABLE,HEAVY DUTY,60"X90",STRL: Brand: MEDLINE

## (undated) DEVICE — MASTISOL ADHESIVE LIQ 2/3ML

## (undated) DEVICE — SUTURE ABSRB BRAID COAT UD OS-6 NO 1 27IN VCRL J535H

## (undated) DEVICE — INFECTION CONTROL KIT SYS

## (undated) DEVICE — TUBING HYDR IRR --

## (undated) DEVICE — BAND COMPR L24CM REG CLR PLAS HEMSTAT EXT HK AND LOOP RETEN

## (undated) DEVICE — LAMINECTOMY RICHMOND-LF: Brand: MEDLINE INDUSTRIES, INC.

## (undated) DEVICE — NEONATAL-ADULT SPO2 SENSOR: Brand: NELLCOR

## (undated) DEVICE — SYRINGE ANGIO 10 CC BRL STD PRNT POLYCARB LT BLU MEDALLION

## (undated) DEVICE — DRAPE PRB US TRNSDCR 6X96IN --

## (undated) DEVICE — KIT IV STRT W CHLORAPREP PD 1ML

## (undated) DEVICE — SPLINT WR POS F/ARTERIAL ACC -- BX/10

## (undated) DEVICE — SYR 50ML SLIP TIP NSAF LF STRL --

## (undated) DEVICE — SOLIDIFIER FLUID 3000 CC ABSORB

## (undated) DEVICE — HI-TORQUE VERSACORE FLOPPY GUIDE WIRE SYSTEM 145 CM: Brand: HI-TORQUE VERSACORE

## (undated) DEVICE — BW-412T DISP COMBO CLEANING BRUSH: Brand: SINGLE USE COMBINATION CLEANING BRUSH

## (undated) DEVICE — SET ADMIN 16ML TBNG L100IN 2 Y INJ SITE IV PIGGY BK DISP

## (undated) DEVICE — SYRINGE MED 20ML STD CLR PLAS LUERLOCK TIP N CTRL DISP

## (undated) DEVICE — FCPS BX HOT RJ4 2.2MMX240CM -- RADIAL JAW 4 BX/40

## (undated) DEVICE — AIRLIFE™ U/CONNECT-IT OXYGEN TUBING 7 FEET (2.1 M) CRUSH-RESISTANT OXYGEN TUBING, VINYL TIPPED: Brand: AIRLIFE™

## (undated) DEVICE — SURGIFOAM SPNG SZ 100

## (undated) DEVICE — TR BAND RADIAL ARTERY COMPRESSION DEVICE: Brand: TR BAND

## (undated) DEVICE — NEEDLE HYPO 18GA L1.5IN PNK S STL HUB POLYPR SHLD REG BVL

## (undated) DEVICE — ANGIOGRAPHY KIT CUST [K0910930B] [MERIT MEDICAL SYSTEMS INC]

## (undated) DEVICE — TOOL 14BA50 LEGEND 14CM 5MM BA: Brand: MIDAS REX ™

## (undated) DEVICE — RADIFOCUS OPTITORQUE ANGIOGRAPHIC CATHETER: Brand: OPTITORQUE

## (undated) DEVICE — SOLUTION IV 250ML 0.9% SOD CHL CLR INJ FLX BG CONT PRT CLSR

## (undated) DEVICE — CATHETER ETER ANGIO L110CM OD5FR ID046IN L75CM 038IN 145DEG CARD

## (undated) DEVICE — BONE WAX WHITE: Brand: BONE WAX WHITE

## (undated) DEVICE — REM POLYHESIVE ADULT PATIENT RETURN ELECTRODE: Brand: VALLEYLAB

## (undated) DEVICE — GARMENT,MEDLINE,DVT,INT,CALF,MED, GEN2: Brand: MEDLINE

## (undated) DEVICE — PREP SKN PREVAIL 40ML APPL --

## (undated) DEVICE — DRAPE XR C ARM 41X74IN LF --

## (undated) DEVICE — SOLUTION IRRIG 3000ML 0.9% SOD CHL FLX CONT 0797208] ICU MEDICAL INC]

## (undated) DEVICE — SYR POWER 150ML 8IN FILL TUBE --

## (undated) DEVICE — 4-PORT MANIFOLD: Brand: NEPTUNE 2

## (undated) DEVICE — WRISTBAND ID AD W1XL11.5IN RED POLY ALRG PREPRINTED PERM

## (undated) DEVICE — STERILE POLYISOPRENE POWDER-FREE SURGICAL GLOVES WITH EMOLLIENT COATING: Brand: PROTEXIS

## (undated) DEVICE — TUBING PRSS MON L6IN PVC M FEM CONN

## (undated) DEVICE — Device: Brand: MEDICAL ACTION INDUSTRIES

## (undated) DEVICE — POSITIONER HD REST FOAM CMFRT TCH

## (undated) DEVICE — NEEDLE HYPO 22GA L1.5IN BLK POLYPR HUB S STL REG BVL STR

## (undated) DEVICE — KENDALL RADIOLUCENT FOAM MONITORING ELECTRODE -RECTANGULAR SHAPE: Brand: KENDALL

## (undated) DEVICE — SUTURE VCRL 2-0 L27IN ABSRB UD CP-2 L26MM 1/2 CIR REV CUT J869H

## (undated) DEVICE — TRAY CATH SIL 16FR 10 CA STATLOK

## (undated) DEVICE — HANDPIECE SET WITH BONE CLEANING TIP AND SUCTION TUBE: Brand: INTERPULSE

## (undated) DEVICE — PREP KIT PEEL PTCH POVIDONE IOD

## (undated) DEVICE — SPONGE LAP 18X18IN STRL -- 5/PK

## (undated) DEVICE — ZIP 16 SURGICAL SKIN CLOSURE DEVICE: Brand: ZIP 16 SURGICAL SKIN CLOSURE DEVICE